# Patient Record
Sex: MALE | Race: WHITE | NOT HISPANIC OR LATINO | Employment: OTHER | URBAN - METROPOLITAN AREA
[De-identification: names, ages, dates, MRNs, and addresses within clinical notes are randomized per-mention and may not be internally consistent; named-entity substitution may affect disease eponyms.]

---

## 2017-05-18 ENCOUNTER — HOSPITAL ENCOUNTER (OUTPATIENT)
Facility: HOSPITAL | Age: 57
Setting detail: OBSERVATION
Discharge: HOME | End: 2017-05-19
Attending: EMERGENCY MEDICINE | Admitting: INTERNAL MEDICINE
Payer: COMMERCIAL

## 2017-05-18 ENCOUNTER — GENERIC CONVERSION - ENCOUNTER (OUTPATIENT)
Dept: OTHER | Facility: OTHER | Age: 57
End: 2017-05-18

## 2017-05-18 ENCOUNTER — APPOINTMENT (EMERGENCY)
Dept: RADIOLOGY | Facility: HOSPITAL | Age: 57
End: 2017-05-18
Payer: COMMERCIAL

## 2017-05-18 DIAGNOSIS — R06.01 ORTHOPNEA: Primary | ICD-10-CM

## 2017-05-18 DIAGNOSIS — I50.23 ACUTE ON CHRONIC SYSTOLIC CHF (CONGESTIVE HEART FAILURE) (HCC): ICD-10-CM

## 2017-05-18 DIAGNOSIS — N17.9 AKI (ACUTE KIDNEY INJURY) (HCC): ICD-10-CM

## 2017-05-18 DIAGNOSIS — R77.8 ELEVATED TROPONIN: ICD-10-CM

## 2017-05-18 DIAGNOSIS — I10 HYPERTENSION: ICD-10-CM

## 2017-05-18 PROBLEM — R79.89 ELEVATED TROPONIN: Status: ACTIVE | Noted: 2017-05-18

## 2017-05-18 LAB
ALBUMIN SERPL BCP-MCNC: 2.8 G/DL (ref 3.5–5)
ALP SERPL-CCNC: 54 U/L (ref 46–116)
ALT SERPL W P-5'-P-CCNC: 18 U/L (ref 12–78)
ANION GAP SERPL CALCULATED.3IONS-SCNC: 6 MMOL/L (ref 4–13)
ANION GAP SERPL CALCULATED.3IONS-SCNC: 9 MMOL/L (ref 4–13)
AST SERPL W P-5'-P-CCNC: 14 U/L (ref 5–45)
ATRIAL RATE: 93 BPM
BASOPHILS # BLD AUTO: 0.06 THOUSANDS/ΜL (ref 0–0.1)
BASOPHILS NFR BLD AUTO: 1 % (ref 0–1)
BILIRUB SERPL-MCNC: 0.8 MG/DL (ref 0.2–1)
BUN SERPL-MCNC: 20 MG/DL (ref 5–25)
BUN SERPL-MCNC: 23 MG/DL (ref 5–25)
CALCIUM SERPL-MCNC: 8.6 MG/DL (ref 8.3–10.1)
CALCIUM SERPL-MCNC: 8.6 MG/DL (ref 8.3–10.1)
CHLORIDE SERPL-SCNC: 101 MMOL/L (ref 100–108)
CHLORIDE SERPL-SCNC: 104 MMOL/L (ref 100–108)
CHOLEST SERPL-MCNC: 161 MG/DL (ref 50–200)
CO2 SERPL-SCNC: 27 MMOL/L (ref 21–32)
CO2 SERPL-SCNC: 28 MMOL/L (ref 21–32)
CREAT SERPL-MCNC: 1.51 MG/DL (ref 0.6–1.3)
CREAT SERPL-MCNC: 1.64 MG/DL (ref 0.6–1.3)
EOSINOPHIL # BLD AUTO: 0.32 THOUSAND/ΜL (ref 0–0.61)
EOSINOPHIL NFR BLD AUTO: 5 % (ref 0–6)
ERYTHROCYTE [DISTWIDTH] IN BLOOD BY AUTOMATED COUNT: 13 % (ref 11.6–15.1)
EST. AVERAGE GLUCOSE BLD GHB EST-MCNC: 306 MG/DL
GFR SERPL CREATININE-BSD FRML MDRD: 43.2 ML/MIN/1.73SQ M
GFR SERPL CREATININE-BSD FRML MDRD: 47.5 ML/MIN/1.73SQ M
GLUCOSE P FAST SERPL-MCNC: 332 MG/DL (ref 65–99)
GLUCOSE SERPL-MCNC: 163 MG/DL (ref 65–140)
GLUCOSE SERPL-MCNC: 164 MG/DL (ref 65–140)
GLUCOSE SERPL-MCNC: 261 MG/DL (ref 65–140)
GLUCOSE SERPL-MCNC: 289 MG/DL (ref 65–140)
GLUCOSE SERPL-MCNC: 289 MG/DL (ref 65–140)
GLUCOSE SERPL-MCNC: 332 MG/DL (ref 65–140)
GLUCOSE SERPL-MCNC: 401 MG/DL (ref 65–140)
GLUCOSE SERPL-MCNC: 41 MG/DL (ref 65–140)
GLUCOSE SERPL-MCNC: 43 MG/DL (ref 65–140)
GLUCOSE SERPL-MCNC: 76 MG/DL (ref 65–140)
HBA1C MFR BLD: 12.3 % (ref 4.2–6.3)
HCT VFR BLD AUTO: 37.3 % (ref 36.5–49.3)
HDLC SERPL-MCNC: 39 MG/DL (ref 40–60)
HGB BLD-MCNC: 12.4 G/DL (ref 12–17)
LDLC SERPL CALC-MCNC: 105 MG/DL (ref 0–100)
LYMPHOCYTES # BLD AUTO: 1.5 THOUSANDS/ΜL (ref 0.6–4.47)
LYMPHOCYTES NFR BLD AUTO: 24 % (ref 14–44)
MAGNESIUM SERPL-MCNC: 1.8 MG/DL (ref 1.6–2.6)
MCH RBC QN AUTO: 27.1 PG (ref 26.8–34.3)
MCHC RBC AUTO-ENTMCNC: 33.2 G/DL (ref 31.4–37.4)
MCV RBC AUTO: 82 FL (ref 82–98)
MONOCYTES # BLD AUTO: 0.49 THOUSAND/ΜL (ref 0.17–1.22)
MONOCYTES NFR BLD AUTO: 8 % (ref 4–12)
NEUTROPHILS # BLD AUTO: 3.81 THOUSANDS/ΜL (ref 1.85–7.62)
NEUTS SEG NFR BLD AUTO: 62 % (ref 43–75)
NT-PROBNP SERPL-MCNC: 2233 PG/ML
P AXIS: 65 DEGREES
PLATELET # BLD AUTO: 259 THOUSANDS/UL (ref 149–390)
PMV BLD AUTO: 9.6 FL (ref 8.9–12.7)
POTASSIUM SERPL-SCNC: 4.3 MMOL/L (ref 3.5–5.3)
POTASSIUM SERPL-SCNC: 4.3 MMOL/L (ref 3.5–5.3)
PR INTERVAL: 154 MS
PROT SERPL-MCNC: 6.4 G/DL (ref 6.4–8.2)
QRS AXIS: -43 DEGREES
QRSD INTERVAL: 82 MS
QT INTERVAL: 384 MS
QTC INTERVAL: 477 MS
RBC # BLD AUTO: 4.57 MILLION/UL (ref 3.88–5.62)
SODIUM SERPL-SCNC: 137 MMOL/L (ref 136–145)
SODIUM SERPL-SCNC: 138 MMOL/L (ref 136–145)
T WAVE AXIS: 73 DEGREES
TRIGL SERPL-MCNC: 84 MG/DL
TROPONIN I SERPL-MCNC: 0.18 NG/ML
TROPONIN I SERPL-MCNC: 0.18 NG/ML
TROPONIN I SERPL-MCNC: 0.2 NG/ML
VENTRICULAR RATE: 93 BPM
WBC # BLD AUTO: 6.18 THOUSAND/UL (ref 4.31–10.16)

## 2017-05-18 PROCEDURE — 83735 ASSAY OF MAGNESIUM: CPT | Performed by: PHYSICIAN ASSISTANT

## 2017-05-18 PROCEDURE — 85025 COMPLETE CBC W/AUTO DIFF WBC: CPT | Performed by: EMERGENCY MEDICINE

## 2017-05-18 PROCEDURE — 82948 REAGENT STRIP/BLOOD GLUCOSE: CPT

## 2017-05-18 PROCEDURE — 83880 ASSAY OF NATRIURETIC PEPTIDE: CPT | Performed by: PHYSICIAN ASSISTANT

## 2017-05-18 PROCEDURE — 83036 HEMOGLOBIN GLYCOSYLATED A1C: CPT | Performed by: PHYSICIAN ASSISTANT

## 2017-05-18 PROCEDURE — 80053 COMPREHEN METABOLIC PANEL: CPT | Performed by: PHYSICIAN ASSISTANT

## 2017-05-18 PROCEDURE — 80061 LIPID PANEL: CPT | Performed by: PHYSICIAN ASSISTANT

## 2017-05-18 PROCEDURE — 71020 HB CHEST X-RAY 2VW FRONTAL&LATL: CPT

## 2017-05-18 PROCEDURE — 84484 ASSAY OF TROPONIN QUANT: CPT | Performed by: EMERGENCY MEDICINE

## 2017-05-18 PROCEDURE — 84484 ASSAY OF TROPONIN QUANT: CPT | Performed by: PHYSICIAN ASSISTANT

## 2017-05-18 PROCEDURE — 36415 COLL VENOUS BLD VENIPUNCTURE: CPT | Performed by: EMERGENCY MEDICINE

## 2017-05-18 PROCEDURE — 99285 EMERGENCY DEPT VISIT HI MDM: CPT

## 2017-05-18 PROCEDURE — 80048 BASIC METABOLIC PNL TOTAL CA: CPT | Performed by: EMERGENCY MEDICINE

## 2017-05-18 PROCEDURE — 93005 ELECTROCARDIOGRAM TRACING: CPT | Performed by: EMERGENCY MEDICINE

## 2017-05-18 RX ORDER — HEPARIN SODIUM 5000 [USP'U]/ML
5000 INJECTION, SOLUTION INTRAVENOUS; SUBCUTANEOUS EVERY 8 HOURS SCHEDULED
Status: DISCONTINUED | OUTPATIENT
Start: 2017-05-18 | End: 2017-05-19 | Stop reason: HOSPADM

## 2017-05-18 RX ORDER — ONDANSETRON 2 MG/ML
4 INJECTION INTRAMUSCULAR; INTRAVENOUS EVERY 6 HOURS PRN
Status: DISCONTINUED | OUTPATIENT
Start: 2017-05-18 | End: 2017-05-19 | Stop reason: HOSPADM

## 2017-05-18 RX ORDER — LISINOPRIL 10 MG/1
20 TABLET ORAL ONCE
Status: COMPLETED | OUTPATIENT
Start: 2017-05-18 | End: 2017-05-18

## 2017-05-18 RX ORDER — ATORVASTATIN CALCIUM 40 MG/1
40 TABLET, FILM COATED ORAL
Status: DISCONTINUED | OUTPATIENT
Start: 2017-05-18 | End: 2017-05-19 | Stop reason: HOSPADM

## 2017-05-18 RX ORDER — FUROSEMIDE 40 MG/1
20 TABLET ORAL ONCE
Status: COMPLETED | OUTPATIENT
Start: 2017-05-18 | End: 2017-05-18

## 2017-05-18 RX ORDER — CARVEDILOL 12.5 MG/1
12.5 TABLET ORAL ONCE
Status: COMPLETED | OUTPATIENT
Start: 2017-05-18 | End: 2017-05-18

## 2017-05-18 RX ORDER — GINSENG 100 MG
1 CAPSULE ORAL ONCE
Status: DISCONTINUED | OUTPATIENT
Start: 2017-05-18 | End: 2017-05-19 | Stop reason: HOSPADM

## 2017-05-18 RX ORDER — ASPIRIN 81 MG/1
81 TABLET ORAL DAILY
Status: DISCONTINUED | OUTPATIENT
Start: 2017-05-18 | End: 2017-05-18

## 2017-05-18 RX ORDER — FUROSEMIDE 20 MG/1
20 TABLET ORAL DAILY
Status: DISCONTINUED | OUTPATIENT
Start: 2017-05-19 | End: 2017-05-18

## 2017-05-18 RX ORDER — FUROSEMIDE 10 MG/ML
20 INJECTION INTRAMUSCULAR; INTRAVENOUS
Status: DISCONTINUED | OUTPATIENT
Start: 2017-05-18 | End: 2017-05-18

## 2017-05-18 RX ORDER — ASPIRIN 81 MG/1
324 TABLET, CHEWABLE ORAL ONCE
Status: COMPLETED | OUTPATIENT
Start: 2017-05-18 | End: 2017-05-18

## 2017-05-18 RX ORDER — FUROSEMIDE 10 MG/ML
40 INJECTION INTRAMUSCULAR; INTRAVENOUS ONCE
Status: COMPLETED | OUTPATIENT
Start: 2017-05-18 | End: 2017-05-18

## 2017-05-18 RX ORDER — ASPIRIN 81 MG/1
81 TABLET, CHEWABLE ORAL DAILY
Status: DISCONTINUED | OUTPATIENT
Start: 2017-05-19 | End: 2017-05-19 | Stop reason: HOSPADM

## 2017-05-18 RX ORDER — ACETAMINOPHEN 325 MG/1
650 TABLET ORAL EVERY 4 HOURS PRN
Status: DISCONTINUED | OUTPATIENT
Start: 2017-05-18 | End: 2017-05-19 | Stop reason: HOSPADM

## 2017-05-18 RX ADMIN — INSULIN LISPRO 4 UNITS: 100 INJECTION, SOLUTION INTRAVENOUS; SUBCUTANEOUS at 12:56

## 2017-05-18 RX ADMIN — INSULIN LISPRO 2 UNITS: 100 INJECTION, SOLUTION INTRAVENOUS; SUBCUTANEOUS at 21:45

## 2017-05-18 RX ADMIN — INSULIN LISPRO 8 UNITS: 100 INJECTION, SOLUTION INTRAVENOUS; SUBCUTANEOUS at 12:56

## 2017-05-18 RX ADMIN — FUROSEMIDE 40 MG: 10 INJECTION, SOLUTION INTRAMUSCULAR; INTRAVENOUS at 15:26

## 2017-05-18 RX ADMIN — HEPARIN SODIUM 5000 UNITS: 5000 INJECTION, SOLUTION INTRAVENOUS; SUBCUTANEOUS at 21:45

## 2017-05-18 RX ADMIN — ASPIRIN 81 MG 324 MG: 81 TABLET ORAL at 03:34

## 2017-05-18 RX ADMIN — ATORVASTATIN CALCIUM 40 MG: 40 TABLET, FILM COATED ORAL at 16:50

## 2017-05-18 RX ADMIN — HEPARIN SODIUM 5000 UNITS: 5000 INJECTION, SOLUTION INTRAVENOUS; SUBCUTANEOUS at 14:26

## 2017-05-18 RX ADMIN — LISINOPRIL 20 MG: 10 TABLET ORAL at 02:49

## 2017-05-18 RX ADMIN — INSULIN LISPRO 4 UNITS: 100 INJECTION, SOLUTION INTRAVENOUS; SUBCUTANEOUS at 08:43

## 2017-05-18 RX ADMIN — INSULIN DETEMIR 30 UNITS: 100 INJECTION, SOLUTION SUBCUTANEOUS at 08:42

## 2017-05-18 RX ADMIN — METOPROLOL TARTRATE 25 MG: 25 TABLET ORAL at 08:42

## 2017-05-18 RX ADMIN — METFORMIN HYDROCHLORIDE 1000 MG: 500 TABLET, FILM COATED ORAL at 03:16

## 2017-05-18 RX ADMIN — HEPARIN SODIUM 5000 UNITS: 5000 INJECTION, SOLUTION INTRAVENOUS; SUBCUTANEOUS at 08:42

## 2017-05-18 RX ADMIN — CARVEDILOL 12.5 MG: 12.5 TABLET, FILM COATED ORAL at 02:50

## 2017-05-18 RX ADMIN — METOPROLOL TARTRATE 25 MG: 25 TABLET ORAL at 21:45

## 2017-05-18 RX ADMIN — NITROGLYCERIN 0.5 INCH: 20 OINTMENT TOPICAL at 03:35

## 2017-05-18 RX ADMIN — FUROSEMIDE 20 MG: 40 TABLET ORAL at 03:16

## 2017-05-19 ENCOUNTER — APPOINTMENT (OUTPATIENT)
Dept: NON INVASIVE DIAGNOSTICS | Facility: HOSPITAL | Age: 57
End: 2017-05-19
Payer: COMMERCIAL

## 2017-05-19 VITALS
DIASTOLIC BLOOD PRESSURE: 79 MMHG | OXYGEN SATURATION: 98 % | RESPIRATION RATE: 18 BRPM | HEIGHT: 70 IN | BODY MASS INDEX: 26.48 KG/M2 | WEIGHT: 185 LBS | SYSTOLIC BLOOD PRESSURE: 129 MMHG | HEART RATE: 75 BPM | TEMPERATURE: 98.1 F

## 2017-05-19 PROBLEM — R79.89 ELEVATED TROPONIN: Status: RESOLVED | Noted: 2017-05-18 | Resolved: 2017-05-19

## 2017-05-19 PROBLEM — R77.8 ELEVATED TROPONIN: Status: RESOLVED | Noted: 2017-05-18 | Resolved: 2017-05-19

## 2017-05-19 LAB
ANION GAP SERPL CALCULATED.3IONS-SCNC: 8 MMOL/L (ref 4–13)
BUN SERPL-MCNC: 26 MG/DL (ref 5–25)
CALCIUM SERPL-MCNC: 9 MG/DL (ref 8.3–10.1)
CHLORIDE SERPL-SCNC: 104 MMOL/L (ref 100–108)
CO2 SERPL-SCNC: 28 MMOL/L (ref 21–32)
CREAT SERPL-MCNC: 1.47 MG/DL (ref 0.6–1.3)
GFR SERPL CREATININE-BSD FRML MDRD: 49 ML/MIN/1.73SQ M
GLUCOSE P FAST SERPL-MCNC: 65 MG/DL (ref 65–99)
GLUCOSE SERPL-MCNC: 160 MG/DL (ref 65–140)
GLUCOSE SERPL-MCNC: 343 MG/DL (ref 65–140)
GLUCOSE SERPL-MCNC: 65 MG/DL (ref 65–140)
GLUCOSE SERPL-MCNC: 87 MG/DL (ref 65–140)
POTASSIUM SERPL-SCNC: 3.6 MMOL/L (ref 3.5–5.3)
SODIUM SERPL-SCNC: 140 MMOL/L (ref 136–145)

## 2017-05-19 PROCEDURE — 82948 REAGENT STRIP/BLOOD GLUCOSE: CPT

## 2017-05-19 PROCEDURE — 80048 BASIC METABOLIC PNL TOTAL CA: CPT | Performed by: INTERNAL MEDICINE

## 2017-05-19 RX ORDER — FUROSEMIDE 20 MG/1
20 TABLET ORAL DAILY
Qty: 30 TABLET | Refills: 0 | Status: SHIPPED | OUTPATIENT
Start: 2017-05-19 | End: 2018-06-12 | Stop reason: SDUPTHER

## 2017-05-19 RX ORDER — FUROSEMIDE 20 MG/1
20 TABLET ORAL DAILY
Status: DISCONTINUED | OUTPATIENT
Start: 2017-05-19 | End: 2017-05-19 | Stop reason: HOSPADM

## 2017-05-19 RX ORDER — ASPIRIN 81 MG/1
81 TABLET, CHEWABLE ORAL DAILY
Qty: 30 TABLET | Refills: 0
Start: 2017-05-19 | End: 2022-07-15

## 2017-05-19 RX ORDER — ATORVASTATIN CALCIUM 40 MG/1
40 TABLET, FILM COATED ORAL DAILY
Qty: 30 TABLET | Refills: 0 | Status: SHIPPED | OUTPATIENT
Start: 2017-05-19 | End: 2019-08-05 | Stop reason: SDUPTHER

## 2017-05-19 RX ADMIN — ASPIRIN 81 MG 81 MG: 81 TABLET ORAL at 08:55

## 2017-05-19 RX ADMIN — FUROSEMIDE 20 MG: 20 TABLET ORAL at 12:18

## 2017-05-19 RX ADMIN — METOPROLOL TARTRATE 25 MG: 25 TABLET ORAL at 08:55

## 2017-05-19 RX ADMIN — HEPARIN SODIUM 5000 UNITS: 5000 INJECTION, SOLUTION INTRAVENOUS; SUBCUTANEOUS at 05:23

## 2017-05-19 RX ADMIN — INSULIN DETEMIR 30 UNITS: 100 INJECTION, SOLUTION SUBCUTANEOUS at 08:56

## 2017-05-26 ENCOUNTER — ALLSCRIPTS OFFICE VISIT (OUTPATIENT)
Dept: OTHER | Facility: OTHER | Age: 57
End: 2017-05-26

## 2017-07-05 ENCOUNTER — ALLSCRIPTS OFFICE VISIT (OUTPATIENT)
Dept: OTHER | Facility: OTHER | Age: 57
End: 2017-07-05

## 2017-07-05 DIAGNOSIS — I25.10 ATHEROSCLEROTIC HEART DISEASE OF NATIVE CORONARY ARTERY WITHOUT ANGINA PECTORIS: ICD-10-CM

## 2017-07-05 DIAGNOSIS — E11.9 TYPE 2 DIABETES MELLITUS WITHOUT COMPLICATIONS (HCC): ICD-10-CM

## 2017-07-05 DIAGNOSIS — I10 ESSENTIAL (PRIMARY) HYPERTENSION: ICD-10-CM

## 2017-07-05 DIAGNOSIS — E78.00 PURE HYPERCHOLESTEROLEMIA: ICD-10-CM

## 2018-01-13 VITALS
SYSTOLIC BLOOD PRESSURE: 110 MMHG | DIASTOLIC BLOOD PRESSURE: 60 MMHG | OXYGEN SATURATION: 99 % | WEIGHT: 180.06 LBS | BODY MASS INDEX: 25.78 KG/M2 | HEIGHT: 70 IN | HEART RATE: 80 BPM

## 2018-01-15 VITALS
HEIGHT: 70 IN | BODY MASS INDEX: 25.62 KG/M2 | DIASTOLIC BLOOD PRESSURE: 60 MMHG | SYSTOLIC BLOOD PRESSURE: 108 MMHG | WEIGHT: 179 LBS | HEART RATE: 84 BPM

## 2018-02-20 PROBLEM — Z95.5 PRESENCE OF DRUG COATED STENT IN RIGHT CORONARY ARTERY: Status: ACTIVE | Noted: 2018-02-20

## 2018-02-20 PROBLEM — Z95.5 PRESENCE OF DRUG COATED STENT IN LAD CORONARY ARTERY: Status: ACTIVE | Noted: 2018-02-20

## 2018-03-10 ENCOUNTER — APPOINTMENT (OUTPATIENT)
Dept: LAB | Facility: CLINIC | Age: 58
End: 2018-03-10
Payer: COMMERCIAL

## 2018-03-10 ENCOUNTER — TRANSCRIBE ORDERS (OUTPATIENT)
Dept: LAB | Facility: CLINIC | Age: 58
End: 2018-03-10

## 2018-03-10 DIAGNOSIS — E11.9 DIABETES MELLITUS WITHOUT COMPLICATION (HCC): ICD-10-CM

## 2018-03-10 DIAGNOSIS — E11.9 DIABETES MELLITUS WITHOUT COMPLICATION (HCC): Primary | ICD-10-CM

## 2018-03-10 LAB
ALBUMIN SERPL BCP-MCNC: 3.4 G/DL (ref 3.5–5)
ALP SERPL-CCNC: 64 U/L (ref 46–116)
ALT SERPL W P-5'-P-CCNC: 26 U/L (ref 12–78)
ANION GAP SERPL CALCULATED.3IONS-SCNC: 7 MMOL/L (ref 4–13)
AST SERPL W P-5'-P-CCNC: 18 U/L (ref 5–45)
BACTERIA UR QL AUTO: ABNORMAL /HPF
BASOPHILS # BLD AUTO: 0.06 THOUSANDS/ΜL (ref 0–0.1)
BASOPHILS NFR BLD AUTO: 1 % (ref 0–1)
BILIRUB SERPL-MCNC: 1 MG/DL (ref 0.2–1)
BILIRUB UR QL STRIP: NEGATIVE
BUN SERPL-MCNC: 25 MG/DL (ref 5–25)
CALCIUM SERPL-MCNC: 9.3 MG/DL (ref 8.3–10.1)
CHLORIDE SERPL-SCNC: 103 MMOL/L (ref 100–108)
CHOLEST SERPL-MCNC: 119 MG/DL (ref 50–200)
CLARITY UR: CLEAR
CO2 SERPL-SCNC: 32 MMOL/L (ref 21–32)
COLOR UR: YELLOW
CREAT SERPL-MCNC: 1.41 MG/DL (ref 0.6–1.3)
CREAT UR-MCNC: 113 MG/DL
EOSINOPHIL # BLD AUTO: 0.37 THOUSAND/ΜL (ref 0–0.61)
EOSINOPHIL NFR BLD AUTO: 6 % (ref 0–6)
ERYTHROCYTE [DISTWIDTH] IN BLOOD BY AUTOMATED COUNT: 12.9 % (ref 11.6–15.1)
EST. AVERAGE GLUCOSE BLD GHB EST-MCNC: 318 MG/DL
GFR SERPL CREATININE-BSD FRML MDRD: 54 ML/MIN/1.73SQ M
GLUCOSE P FAST SERPL-MCNC: 159 MG/DL (ref 65–99)
GLUCOSE UR STRIP-MCNC: ABNORMAL MG/DL
HBA1C MFR BLD: 12.7 % (ref 4.2–6.3)
HCT VFR BLD AUTO: 37.6 % (ref 36.5–49.3)
HDLC SERPL-MCNC: 51 MG/DL (ref 40–60)
HGB BLD-MCNC: 12.8 G/DL (ref 12–17)
HGB UR QL STRIP.AUTO: ABNORMAL
KETONES UR STRIP-MCNC: NEGATIVE MG/DL
LDLC SERPL CALC-MCNC: 55 MG/DL (ref 0–100)
LDLC SERPL DIRECT ASSAY-MCNC: 55 MG/DL (ref 0–100)
LEUKOCYTE ESTERASE UR QL STRIP: ABNORMAL
LYMPHOCYTES # BLD AUTO: 1.48 THOUSANDS/ΜL (ref 0.6–4.47)
LYMPHOCYTES NFR BLD AUTO: 23 % (ref 14–44)
MCH RBC QN AUTO: 27.5 PG (ref 26.8–34.3)
MCHC RBC AUTO-ENTMCNC: 34 G/DL (ref 31.4–37.4)
MCV RBC AUTO: 81 FL (ref 82–98)
MICROALBUMIN UR-MCNC: 1290 MG/L (ref 0–20)
MICROALBUMIN/CREAT 24H UR: 1142 MG/G CREATININE (ref 0–30)
MONOCYTES # BLD AUTO: 0.48 THOUSAND/ΜL (ref 0.17–1.22)
MONOCYTES NFR BLD AUTO: 8 % (ref 4–12)
NEUTROPHILS # BLD AUTO: 4.05 THOUSANDS/ΜL (ref 1.85–7.62)
NEUTS SEG NFR BLD AUTO: 62 % (ref 43–75)
NITRITE UR QL STRIP: NEGATIVE
NON-SQ EPI CELLS URNS QL MICRO: ABNORMAL /HPF
PH UR STRIP.AUTO: 6 [PH] (ref 4.5–8)
PLATELET # BLD AUTO: 279 THOUSANDS/UL (ref 149–390)
PMV BLD AUTO: 9.9 FL (ref 8.9–12.7)
POTASSIUM SERPL-SCNC: 3.8 MMOL/L (ref 3.5–5.3)
PROT SERPL-MCNC: 7.2 G/DL (ref 6.4–8.2)
PROT UR STRIP-MCNC: ABNORMAL MG/DL
RBC # BLD AUTO: 4.65 MILLION/UL (ref 3.88–5.62)
RBC #/AREA URNS AUTO: ABNORMAL /HPF
SODIUM SERPL-SCNC: 142 MMOL/L (ref 136–145)
SP GR UR STRIP.AUTO: 1.02 (ref 1–1.03)
TRIGL SERPL-MCNC: 63 MG/DL
UROBILINOGEN UR QL STRIP.AUTO: 0.2 E.U./DL
WBC # BLD AUTO: 6.44 THOUSAND/UL (ref 4.31–10.16)
WBC #/AREA URNS AUTO: ABNORMAL /HPF

## 2018-03-10 PROCEDURE — 80053 COMPREHEN METABOLIC PANEL: CPT

## 2018-03-10 PROCEDURE — 36415 COLL VENOUS BLD VENIPUNCTURE: CPT

## 2018-03-10 PROCEDURE — 80061 LIPID PANEL: CPT

## 2018-03-10 PROCEDURE — 83036 HEMOGLOBIN GLYCOSYLATED A1C: CPT

## 2018-03-10 PROCEDURE — 81001 URINALYSIS AUTO W/SCOPE: CPT | Performed by: INTERNAL MEDICINE

## 2018-03-10 PROCEDURE — 82570 ASSAY OF URINE CREATININE: CPT | Performed by: INTERNAL MEDICINE

## 2018-03-10 PROCEDURE — 83721 ASSAY OF BLOOD LIPOPROTEIN: CPT

## 2018-03-10 PROCEDURE — 85025 COMPLETE CBC W/AUTO DIFF WBC: CPT

## 2018-03-10 PROCEDURE — 82043 UR ALBUMIN QUANTITATIVE: CPT | Performed by: INTERNAL MEDICINE

## 2018-06-12 DIAGNOSIS — I50.32 CHRONIC DIASTOLIC CONGESTIVE HEART FAILURE (HCC): Primary | ICD-10-CM

## 2018-06-12 RX ORDER — FUROSEMIDE 20 MG/1
TABLET ORAL
Qty: 90 TABLET | Refills: 3 | Status: SHIPPED | OUTPATIENT
Start: 2018-06-12 | End: 2019-04-01 | Stop reason: SDUPTHER

## 2018-09-02 ENCOUNTER — APPOINTMENT (EMERGENCY)
Dept: RADIOLOGY | Facility: HOSPITAL | Age: 58
DRG: 603 | End: 2018-09-02
Payer: COMMERCIAL

## 2018-09-02 ENCOUNTER — HOSPITAL ENCOUNTER (INPATIENT)
Facility: HOSPITAL | Age: 58
LOS: 3 days | Discharge: HOME | DRG: 603 | End: 2018-09-05
Attending: EMERGENCY MEDICINE | Admitting: INTERNAL MEDICINE
Payer: COMMERCIAL

## 2018-09-02 ENCOUNTER — APPOINTMENT (EMERGENCY)
Dept: ULTRASOUND IMAGING | Facility: HOSPITAL | Age: 58
DRG: 603 | End: 2018-09-02
Payer: COMMERCIAL

## 2018-09-02 DIAGNOSIS — L03.116 CELLULITIS OF LEFT LOWER LEG: Primary | ICD-10-CM

## 2018-09-02 DIAGNOSIS — N18.30 CHRONIC KIDNEY DISEASE (CKD), STAGE III (MODERATE) (HCC): ICD-10-CM

## 2018-09-02 DIAGNOSIS — N18.9 CKD (CHRONIC KIDNEY DISEASE): ICD-10-CM

## 2018-09-02 DIAGNOSIS — L03.119 RECURRENT CELLULITIS OF LOWER LEG: ICD-10-CM

## 2018-09-02 LAB
ALBUMIN SERPL BCP-MCNC: 2.9 G/DL (ref 3.5–5)
ALP SERPL-CCNC: 71 U/L (ref 46–116)
ALT SERPL W P-5'-P-CCNC: 21 U/L (ref 12–78)
ANION GAP SERPL CALCULATED.3IONS-SCNC: 5 MMOL/L (ref 4–13)
APTT PPP: 32 SECONDS (ref 24–36)
AST SERPL W P-5'-P-CCNC: 15 U/L (ref 5–45)
BACTERIA UR QL AUTO: ABNORMAL /HPF
BASOPHILS # BLD AUTO: 0.06 THOUSANDS/ΜL (ref 0–0.1)
BASOPHILS NFR BLD AUTO: 1 % (ref 0–1)
BILIRUB SERPL-MCNC: 0.9 MG/DL (ref 0.2–1)
BILIRUB UR QL STRIP: NEGATIVE
BUN SERPL-MCNC: 26 MG/DL (ref 5–25)
CALCIUM SERPL-MCNC: 9 MG/DL (ref 8.3–10.1)
CHLORIDE SERPL-SCNC: 103 MMOL/L (ref 100–108)
CLARITY UR: CLEAR
CO2 SERPL-SCNC: 31 MMOL/L (ref 21–32)
COLOR UR: YELLOW
CREAT SERPL-MCNC: 1.91 MG/DL (ref 0.6–1.3)
DEPRECATED D DIMER PPP: 687 NG/ML (FEU) (ref 0–424)
EOSINOPHIL # BLD AUTO: 0.3 THOUSAND/ΜL (ref 0–0.61)
EOSINOPHIL NFR BLD AUTO: 4 % (ref 0–6)
ERYTHROCYTE [DISTWIDTH] IN BLOOD BY AUTOMATED COUNT: 13 % (ref 11.6–15.1)
ERYTHROCYTE [SEDIMENTATION RATE] IN BLOOD: 76 MM/HOUR (ref 0–10)
GFR SERPL CREATININE-BSD FRML MDRD: 37 ML/MIN/1.73SQ M
GLUCOSE SERPL-MCNC: 152 MG/DL (ref 65–140)
GLUCOSE SERPL-MCNC: 160 MG/DL (ref 65–140)
GLUCOSE SERPL-MCNC: 240 MG/DL (ref 65–140)
GLUCOSE SERPL-MCNC: 248 MG/DL (ref 65–140)
GLUCOSE UR STRIP-MCNC: ABNORMAL MG/DL
HCT VFR BLD AUTO: 35.1 % (ref 36.5–49.3)
HGB BLD-MCNC: 11.6 G/DL (ref 12–17)
HGB UR QL STRIP.AUTO: ABNORMAL
IMM GRANULOCYTES # BLD AUTO: 0.03 THOUSAND/UL (ref 0–0.2)
IMM GRANULOCYTES NFR BLD AUTO: 0 % (ref 0–2)
INR PPP: 1.07 (ref 0.86–1.17)
KETONES UR STRIP-MCNC: NEGATIVE MG/DL
LACTATE SERPL-SCNC: 0.7 MMOL/L (ref 0.5–2)
LEUKOCYTE ESTERASE UR QL STRIP: NEGATIVE
LYMPHOCYTES # BLD AUTO: 1.48 THOUSANDS/ΜL (ref 0.6–4.47)
LYMPHOCYTES NFR BLD AUTO: 19 % (ref 14–44)
MCH RBC QN AUTO: 27.2 PG (ref 26.8–34.3)
MCHC RBC AUTO-ENTMCNC: 33 G/DL (ref 31.4–37.4)
MCV RBC AUTO: 82 FL (ref 82–98)
MONOCYTES # BLD AUTO: 0.51 THOUSAND/ΜL (ref 0.17–1.22)
MONOCYTES NFR BLD AUTO: 6 % (ref 4–12)
NEUTROPHILS # BLD AUTO: 5.59 THOUSANDS/ΜL (ref 1.85–7.62)
NEUTS SEG NFR BLD AUTO: 70 % (ref 43–75)
NITRITE UR QL STRIP: NEGATIVE
NON-SQ EPI CELLS URNS QL MICRO: ABNORMAL /HPF
NRBC BLD AUTO-RTO: 0 /100 WBCS
PH UR STRIP.AUTO: 5.5 [PH] (ref 4.5–8)
PLATELET # BLD AUTO: 326 THOUSANDS/UL (ref 149–390)
PMV BLD AUTO: 9.3 FL (ref 8.9–12.7)
POTASSIUM SERPL-SCNC: 4.2 MMOL/L (ref 3.5–5.3)
PROT SERPL-MCNC: 7.8 G/DL (ref 6.4–8.2)
PROT UR STRIP-MCNC: ABNORMAL MG/DL
PROTHROMBIN TIME: 13.6 SECONDS (ref 11.8–14.2)
RBC # BLD AUTO: 4.26 MILLION/UL (ref 3.88–5.62)
RBC #/AREA URNS AUTO: ABNORMAL /HPF
SODIUM SERPL-SCNC: 139 MMOL/L (ref 136–145)
SP GR UR STRIP.AUTO: 1.02 (ref 1–1.03)
UROBILINOGEN UR QL STRIP.AUTO: 0.2 E.U./DL
WBC # BLD AUTO: 7.97 THOUSAND/UL (ref 4.31–10.16)
WBC #/AREA URNS AUTO: ABNORMAL /HPF

## 2018-09-02 PROCEDURE — 73590 X-RAY EXAM OF LOWER LEG: CPT

## 2018-09-02 PROCEDURE — 93971 EXTREMITY STUDY: CPT | Performed by: SURGERY

## 2018-09-02 PROCEDURE — 81001 URINALYSIS AUTO W/SCOPE: CPT

## 2018-09-02 PROCEDURE — 85379 FIBRIN DEGRADATION QUANT: CPT | Performed by: PHYSICIAN ASSISTANT

## 2018-09-02 PROCEDURE — 36415 COLL VENOUS BLD VENIPUNCTURE: CPT | Performed by: PHYSICIAN ASSISTANT

## 2018-09-02 PROCEDURE — 82948 REAGENT STRIP/BLOOD GLUCOSE: CPT

## 2018-09-02 PROCEDURE — 93971 EXTREMITY STUDY: CPT

## 2018-09-02 PROCEDURE — 96361 HYDRATE IV INFUSION ADD-ON: CPT

## 2018-09-02 PROCEDURE — 85730 THROMBOPLASTIN TIME PARTIAL: CPT | Performed by: PHYSICIAN ASSISTANT

## 2018-09-02 PROCEDURE — 83605 ASSAY OF LACTIC ACID: CPT | Performed by: PHYSICIAN ASSISTANT

## 2018-09-02 PROCEDURE — 87040 BLOOD CULTURE FOR BACTERIA: CPT | Performed by: PHYSICIAN ASSISTANT

## 2018-09-02 PROCEDURE — 99285 EMERGENCY DEPT VISIT HI MDM: CPT

## 2018-09-02 PROCEDURE — 85652 RBC SED RATE AUTOMATED: CPT | Performed by: PHYSICIAN ASSISTANT

## 2018-09-02 PROCEDURE — 80053 COMPREHEN METABOLIC PANEL: CPT | Performed by: PHYSICIAN ASSISTANT

## 2018-09-02 PROCEDURE — 96360 HYDRATION IV INFUSION INIT: CPT

## 2018-09-02 PROCEDURE — 85610 PROTHROMBIN TIME: CPT | Performed by: PHYSICIAN ASSISTANT

## 2018-09-02 PROCEDURE — 85025 COMPLETE CBC W/AUTO DIFF WBC: CPT | Performed by: PHYSICIAN ASSISTANT

## 2018-09-02 PROCEDURE — 99223 1ST HOSP IP/OBS HIGH 75: CPT | Performed by: INTERNAL MEDICINE

## 2018-09-02 RX ORDER — HEPARIN SODIUM 5000 [USP'U]/ML
5000 INJECTION, SOLUTION INTRAVENOUS; SUBCUTANEOUS EVERY 8 HOURS SCHEDULED
Status: DISCONTINUED | OUTPATIENT
Start: 2018-09-02 | End: 2018-09-05 | Stop reason: HOSPADM

## 2018-09-02 RX ORDER — ASPIRIN 81 MG/1
81 TABLET, CHEWABLE ORAL DAILY
Status: DISCONTINUED | OUTPATIENT
Start: 2018-09-02 | End: 2018-09-05 | Stop reason: HOSPADM

## 2018-09-02 RX ORDER — TRAMADOL HYDROCHLORIDE 50 MG/1
50 TABLET ORAL EVERY 6 HOURS PRN
Status: DISCONTINUED | OUTPATIENT
Start: 2018-09-02 | End: 2018-09-05 | Stop reason: HOSPADM

## 2018-09-02 RX ORDER — ACETAMINOPHEN 325 MG/1
650 TABLET ORAL EVERY 6 HOURS PRN
Status: DISCONTINUED | OUTPATIENT
Start: 2018-09-02 | End: 2018-09-05 | Stop reason: HOSPADM

## 2018-09-02 RX ORDER — MORPHINE SULFATE 4 MG/ML
4 INJECTION, SOLUTION INTRAMUSCULAR; INTRAVENOUS ONCE
Status: COMPLETED | OUTPATIENT
Start: 2018-09-02 | End: 2018-09-02

## 2018-09-02 RX ORDER — ATORVASTATIN CALCIUM 40 MG/1
40 TABLET, FILM COATED ORAL DAILY
Status: DISCONTINUED | OUTPATIENT
Start: 2018-09-02 | End: 2018-09-05 | Stop reason: HOSPADM

## 2018-09-02 RX ORDER — FUROSEMIDE 20 MG/1
20 TABLET ORAL DAILY
Status: DISCONTINUED | OUTPATIENT
Start: 2018-09-02 | End: 2018-09-05 | Stop reason: HOSPADM

## 2018-09-02 RX ORDER — SODIUM CHLORIDE 9 MG/ML
125 INJECTION, SOLUTION INTRAVENOUS CONTINUOUS
Status: DISCONTINUED | OUTPATIENT
Start: 2018-09-02 | End: 2018-09-02

## 2018-09-02 RX ADMIN — VANCOMYCIN HYDROCHLORIDE 1750 MG: 1 INJECTION, POWDER, LYOPHILIZED, FOR SOLUTION INTRAVENOUS at 13:23

## 2018-09-02 RX ADMIN — CEFAZOLIN SODIUM 2000 MG: 2 SOLUTION INTRAVENOUS at 12:44

## 2018-09-02 RX ADMIN — HEPARIN SODIUM 5000 UNITS: 5000 INJECTION, SOLUTION INTRAVENOUS; SUBCUTANEOUS at 21:44

## 2018-09-02 RX ADMIN — TRAMADOL HYDROCHLORIDE 50 MG: 50 TABLET, COATED ORAL at 19:26

## 2018-09-02 RX ADMIN — SODIUM CHLORIDE 125 ML/HR: 0.9 INJECTION, SOLUTION INTRAVENOUS at 13:12

## 2018-09-02 RX ADMIN — MORPHINE SULFATE 4 MG: 4 INJECTION INTRAVENOUS at 12:44

## 2018-09-02 RX ADMIN — SODIUM CHLORIDE 1000 ML: 0.9 INJECTION, SOLUTION INTRAVENOUS at 10:49

## 2018-09-02 RX ADMIN — ASPIRIN 81 MG 81 MG: 81 TABLET ORAL at 16:11

## 2018-09-02 RX ADMIN — HEPARIN SODIUM 5000 UNITS: 5000 INJECTION, SOLUTION INTRAVENOUS; SUBCUTANEOUS at 16:11

## 2018-09-02 RX ADMIN — INSULIN LISPRO 1 UNITS: 100 INJECTION, SOLUTION INTRAVENOUS; SUBCUTANEOUS at 16:37

## 2018-09-02 RX ADMIN — METOPROLOL TARTRATE 25 MG: 25 TABLET, FILM COATED ORAL at 21:44

## 2018-09-02 RX ADMIN — CEFAZOLIN SODIUM 2000 MG: 2 SOLUTION INTRAVENOUS at 21:44

## 2018-09-02 NOTE — PLAN OF CARE
Problem: Potential for Falls  Goal: Patient will remain free of falls  INTERVENTIONS:  - Assess patient frequently for physical needs  -  Identify cognitive and physical deficits and behaviors that affect risk of falls  -  [place] fall precautions as indicated by assessment   - Educate patient/family on patient safety including physical limitations  - Instruct patient to call for assistance with activity based on assessment  - Modify environment to reduce risk of injury  - Consider OT/PT consult to assist with strengthening/mobility   Outcome: Progressing      Problem: Nutrition/Hydration-ADULT  Goal: Nutrient/Hydration intake appropriate for improving, restoring or maintaining nutritional needs  Monitor and assess patient's nutrition/hydration status for malnutrition (ex- brittle hair, bruises, dry skin, pale skin and conjunctiva, muscle wasting, smooth red tongue, and disorientation)  Collaborate with interdisciplinary team and initiate plan and interventions as ordered  Monitor patient's weight and dietary intake as ordered or per policy  Utilize nutrition screening tool and intervene per policy  Determine patient's food preferences and provide high-protein, high-caloric foods as appropriate       INTERVENTIONS:  - Monitor oral intake, urinary output, labs, and treatment plans  - Assess nutrition and hydration status and recommend course of action  - Evaluate amount of meals eaten  - Assist patient with eating if necessary   - Allow adequate time for meals  - Recommend/ encourage appropriate diets, oral nutritional supplements, and vitamin/mineral supplements  - Order, calculate, and assess calorie counts as needed  - Recommend, monitor, and adjust tube feedings and TPN/PPN based on assessed needs  - Assess need for intravenous fluids  - Provide specific nutrition/hydration education as appropriate  - Include patient/family/caregiver in decisions related to nutrition   Outcome: Progressing      Problem: PAIN - ADULT  Goal: Verbalizes/displays adequate comfort level or baseline comfort level  Interventions:  - Encourage patient to monitor pain and request assistance  - Assess pain using appropriate pain scale  - Administer analgesics based on type and severity of pain and evaluate response  - Implement non-pharmacological measures as appropriate and evaluate response  - Consider cultural and social influences on pain and pain management  - Notify physician/advanced practitioner if interventions unsuccessful or patient reports new pain  Outcome: Progressing      Problem: INFECTION - ADULT  Goal: Absence or prevention of progression during hospitalization  INTERVENTIONS:  - Assess and monitor for signs and symptoms of infection  - Monitor lab/diagnostic results  - Monitor all insertion sites, i e  indwelling lines, tubes, and drains  - Monitor endotracheal (as able) and nasal secretions for changes in amount and color  - [place] appropriate cooling/warming therapies per order  - Administer medications as ordered  - Instruct and encourage patient and family to use good hand hygiene technique  - Identify and instruct in appropriate isolation precautions for identified infection/condition  Outcome: Progressing      Problem: SAFETY ADULT  Goal: Maintain or return to baseline ADL function  INTERVENTIONS:  -  Assess patient's ability to carry out ADLs; assess patient's baseline for ADL function and identify physical deficits which impact ability to perform ADLs (bathing, care of mouth/teeth, toileting, grooming, dressing, etc )  - Assess/evaluate cause of self-care deficits   - Assess range of motion  - Assess patient's mobility; develop plan if impaired  - Assess patient's need for assistive devices and provide as appropriate  - Encourage maximum independence but intervene and supervise when necessary  ¯ Involve family in performance of ADLs  ¯ Assess for home care needs following discharge   ¯ Request OT consult to assist with ADL evaluation and planning for discharge  ¯ Provide patient education as appropriate  Outcome: Progressing    Goal: Maintain or return mobility status to optimal level  INTERVENTIONS:  - Assess patient's baseline mobility status (ambulation, transfers, stairs, etc )    - Identify cognitive and physical deficits and behaviors that affect mobility  - Identify mobility aids required to assist with transfers and/or ambulation (gait belt, sit-to-stand, lift, walker, cane, etc )  - [place] fall precautions as indicated by assessment  - Record patient progress and toleration of activity level on Mobility SBAR; progress patient to next Phase/Stage  - Instruct patient to call for assistance with activity based on assessment  - Request Rehabilitation consult to assist with strengthening/weightbearing, etc   Outcome: Progressing      Problem: DISCHARGE PLANNING  Goal: Discharge to home or other facility with appropriate resources  INTERVENTIONS:  - Identify barriers to discharge w/patient and caregiver  - Arrange for needed discharge resources and transportation as appropriate  - Identify discharge learning needs (meds, wound care, etc )  - Arrange for interpretive services to assist at discharge as needed  - Refer to Case Management Department for coordinating discharge planning if the patient needs post-hospital services based on physician/advanced practitioner order or complex needs related to functional status, cognitive ability, or social support system  Outcome: Progressing

## 2018-09-02 NOTE — ASSESSMENT & PLAN NOTE
Lab Results   Component Value Date    HGBA1C 12 7 (H) 03/10/2018       Recent Labs      09/02/18   1502   POCGLU  160*       Blood Sugar Average: Last 72 hrs:  (P) 160   · Last hemoglobin A1c in March 2018 elevated  Check repeat level  · Patient does not check his blood glucose at home    Continue Lantus with correctional scale coverage and adjust as indicated
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
## 2018-09-02 NOTE — ASSESSMENT & PLAN NOTE
· Baseline creatinine is unclear however his most recent levels in March of 2018 was 1 41  · Although he denies prior kidney disease, I suspect he has underlying chronic kidney disease  · Monitor renal function and hold IV fluids at this time as he appears euvolemic  · Check repeat labs in the morning

## 2018-09-02 NOTE — ASSESSMENT & PLAN NOTE
· Clinically euvolemic    Continue Lasix, metoprolol  · Last echocardiogram on the system was in February 2015 which had showed EF 40-45%  · Follows outpatient with Dr Leonel Malone
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
## 2018-09-02 NOTE — ASSESSMENT & PLAN NOTE
· Reports history recurrent left lower extremity cellulitis following traumatic injury dating 20 years ago for which she is status post removal of rods and skin graft in the past   · Increasing redness, pain and swelling over the past week  · Venous duplex done in the ED showed no evidence of DVT per ER physician  · Patient has failed one-week + treatment with cephalexin/ciprofloxacin outpatient and is now admitted for inpatient IV antibiotics  · Continue IV cephazolin, elevate extremity  · Patient counseled on keeping left lower extremity elevated as he reports noncompliance with this since onset of symptoms and he has been mostly on his feet

## 2018-09-03 LAB
ANION GAP SERPL CALCULATED.3IONS-SCNC: 5 MMOL/L (ref 4–13)
BUN SERPL-MCNC: 21 MG/DL (ref 5–25)
CALCIUM SERPL-MCNC: 8.5 MG/DL (ref 8.3–10.1)
CHLORIDE SERPL-SCNC: 103 MMOL/L (ref 100–108)
CO2 SERPL-SCNC: 30 MMOL/L (ref 21–32)
CREAT SERPL-MCNC: 1.56 MG/DL (ref 0.6–1.3)
ERYTHROCYTE [DISTWIDTH] IN BLOOD BY AUTOMATED COUNT: 13 % (ref 11.6–15.1)
EST. AVERAGE GLUCOSE BLD GHB EST-MCNC: 243 MG/DL
GFR SERPL CREATININE-BSD FRML MDRD: 48 ML/MIN/1.73SQ M
GLUCOSE SERPL-MCNC: 162 MG/DL (ref 65–140)
GLUCOSE SERPL-MCNC: 163 MG/DL (ref 65–140)
GLUCOSE SERPL-MCNC: 175 MG/DL (ref 65–140)
GLUCOSE SERPL-MCNC: 72 MG/DL (ref 65–140)
GLUCOSE SERPL-MCNC: 94 MG/DL (ref 65–140)
HBA1C MFR BLD: 10.1 % (ref 4.2–6.3)
HCT VFR BLD AUTO: 34.2 % (ref 36.5–49.3)
HGB BLD-MCNC: 10.9 G/DL (ref 12–17)
MCH RBC QN AUTO: 26.4 PG (ref 26.8–34.3)
MCHC RBC AUTO-ENTMCNC: 31.9 G/DL (ref 31.4–37.4)
MCV RBC AUTO: 83 FL (ref 82–98)
PLATELET # BLD AUTO: 297 THOUSANDS/UL (ref 149–390)
PMV BLD AUTO: 9.3 FL (ref 8.9–12.7)
POTASSIUM SERPL-SCNC: 3.6 MMOL/L (ref 3.5–5.3)
RBC # BLD AUTO: 4.13 MILLION/UL (ref 3.88–5.62)
SODIUM SERPL-SCNC: 138 MMOL/L (ref 136–145)
WBC # BLD AUTO: 7.9 THOUSAND/UL (ref 4.31–10.16)

## 2018-09-03 PROCEDURE — 83036 HEMOGLOBIN GLYCOSYLATED A1C: CPT | Performed by: INTERNAL MEDICINE

## 2018-09-03 PROCEDURE — 82948 REAGENT STRIP/BLOOD GLUCOSE: CPT

## 2018-09-03 PROCEDURE — 80048 BASIC METABOLIC PNL TOTAL CA: CPT | Performed by: INTERNAL MEDICINE

## 2018-09-03 PROCEDURE — 99232 SBSQ HOSP IP/OBS MODERATE 35: CPT | Performed by: NURSE PRACTITIONER

## 2018-09-03 PROCEDURE — 85027 COMPLETE CBC AUTOMATED: CPT | Performed by: INTERNAL MEDICINE

## 2018-09-03 RX ORDER — ONDANSETRON 2 MG/ML
4 INJECTION INTRAMUSCULAR; INTRAVENOUS EVERY 6 HOURS PRN
Status: DISCONTINUED | OUTPATIENT
Start: 2018-09-03 | End: 2018-09-05 | Stop reason: HOSPADM

## 2018-09-03 RX ADMIN — TRAMADOL HYDROCHLORIDE 50 MG: 50 TABLET, COATED ORAL at 08:32

## 2018-09-03 RX ADMIN — CEFAZOLIN SODIUM 2000 MG: 2 SOLUTION INTRAVENOUS at 06:24

## 2018-09-03 RX ADMIN — FUROSEMIDE 20 MG: 20 TABLET ORAL at 08:32

## 2018-09-03 RX ADMIN — METOPROLOL TARTRATE 25 MG: 25 TABLET, FILM COATED ORAL at 08:33

## 2018-09-03 RX ADMIN — HEPARIN SODIUM 5000 UNITS: 5000 INJECTION, SOLUTION INTRAVENOUS; SUBCUTANEOUS at 21:25

## 2018-09-03 RX ADMIN — ATORVASTATIN CALCIUM 40 MG: 40 TABLET, FILM COATED ORAL at 08:32

## 2018-09-03 RX ADMIN — ASPIRIN 81 MG 81 MG: 81 TABLET ORAL at 08:32

## 2018-09-03 RX ADMIN — CEFAZOLIN SODIUM 2000 MG: 2 SOLUTION INTRAVENOUS at 12:47

## 2018-09-03 RX ADMIN — HEPARIN SODIUM 5000 UNITS: 5000 INJECTION, SOLUTION INTRAVENOUS; SUBCUTANEOUS at 13:15

## 2018-09-03 RX ADMIN — INSULIN DETEMIR 30 UNITS: 100 INJECTION, SOLUTION SUBCUTANEOUS at 08:40

## 2018-09-03 RX ADMIN — ACETAMINOPHEN 650 MG: 325 TABLET, FILM COATED ORAL at 07:43

## 2018-09-03 RX ADMIN — CEFAZOLIN SODIUM 2000 MG: 2 SOLUTION INTRAVENOUS at 21:25

## 2018-09-03 RX ADMIN — METOPROLOL TARTRATE 25 MG: 25 TABLET, FILM COATED ORAL at 21:25

## 2018-09-03 RX ADMIN — HEPARIN SODIUM 5000 UNITS: 5000 INJECTION, SOLUTION INTRAVENOUS; SUBCUTANEOUS at 06:24

## 2018-09-03 RX ADMIN — TRAMADOL HYDROCHLORIDE 50 MG: 50 TABLET, COATED ORAL at 02:16
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
## 2018-09-03 NOTE — PLAN OF CARE
Problem: Potential for Falls  Goal: Patient will remain free of falls  INTERVENTIONS:  - Assess patient frequently for physical needs  -  Identify cognitive and physical deficits and behaviors that affect risk of falls  -  [place] fall precautions as indicated by assessment   - Educate patient/family on patient safety including physical limitations  - Instruct patient to call for assistance with activity based on assessment  - Modify environment to reduce risk of injury  - Consider OT/PT consult to assist with strengthening/mobility   Outcome: Progressing      Problem: Nutrition/Hydration-ADULT  Goal: Nutrient/Hydration intake appropriate for improving, restoring or maintaining nutritional needs  Monitor and assess patient's nutrition/hydration status for malnutrition (ex- brittle hair, bruises, dry skin, pale skin and conjunctiva, muscle wasting, smooth red tongue, and disorientation)  Collaborate with interdisciplinary team and initiate plan and interventions as ordered  Monitor patient's weight and dietary intake as ordered or per policy  Utilize nutrition screening tool and intervene per policy  Determine patient's food preferences and provide high-protein, high-caloric foods as appropriate       INTERVENTIONS:  - Monitor oral intake, urinary output, labs, and treatment plans  - Assess nutrition and hydration status and recommend course of action  - Evaluate amount of meals eaten  - Assist patient with eating if necessary   - Allow adequate time for meals  - Recommend/ encourage appropriate diets, oral nutritional supplements, and vitamin/mineral supplements  - Order, calculate, and assess calorie counts as needed  - Recommend, monitor, and adjust tube feedings and TPN/PPN based on assessed needs  - Assess need for intravenous fluids  - Provide specific nutrition/hydration education as appropriate  - Include patient/family/caregiver in decisions related to nutrition   Outcome: Progressing      Problem: PAIN - ADULT  Goal: Verbalizes/displays adequate comfort level or baseline comfort level  Interventions:  - Encourage patient to monitor pain and request assistance  - Assess pain using appropriate pain scale  - Administer analgesics based on type and severity of pain and evaluate response  - Implement non-pharmacological measures as appropriate and evaluate response  - Consider cultural and social influences on pain and pain management  - Notify physician/advanced practitioner if interventions unsuccessful or patient reports new pain   Outcome: Progressing      Problem: INFECTION - ADULT  Goal: Absence or prevention of progression during hospitalization  INTERVENTIONS:  - Assess and monitor for signs and symptoms of infection  - Monitor lab/diagnostic results  - Monitor all insertion sites, i e  indwelling lines, tubes, and drains  - Monitor endotracheal (as able) and nasal secretions for changes in amount and color  - [place] appropriate cooling/warming therapies per order  - Administer medications as ordered  - Instruct and encourage patient and family to use good hand hygiene technique  - Identify and instruct in appropriate isolation precautions for identified infection/condition   Outcome: Progressing      Problem: SAFETY ADULT  Goal: Maintain or return to baseline ADL function  INTERVENTIONS:  -  Assess patient's ability to carry out ADLs; assess patient's baseline for ADL function and identify physical deficits which impact ability to perform ADLs (bathing, care of mouth/teeth, toileting, grooming, dressing, etc )  - Assess/evaluate cause of self-care deficits   - Assess range of motion  - Assess patient's mobility; develop plan if impaired  - Assess patient's need for assistive devices and provide as appropriate  - Encourage maximum independence but intervene and supervise when necessary  ¯ Involve family in performance of ADLs  ¯ Assess for home care needs following discharge   ¯ Request OT consult to assist with ADL evaluation and planning for discharge  ¯ Provide patient education as appropriate   Outcome: Progressing    Goal: Maintain or return mobility status to optimal level  INTERVENTIONS:  - Assess patient's baseline mobility status (ambulation, transfers, stairs, etc )    - Identify cognitive and physical deficits and behaviors that affect mobility  - Identify mobility aids required to assist with transfers and/or ambulation (gait belt, sit-to-stand, lift, walker, cane, etc )  - [place] fall precautions as indicated by assessment  - Record patient progress and toleration of activity level on Mobility SBAR; progress patient to next Phase/Stage  - Instruct patient to call for assistance with activity based on assessment  - Request Rehabilitation consult to assist with strengthening/weightbearing, etc    Outcome: Progressing      Problem: DISCHARGE PLANNING  Goal: Discharge to home or other facility with appropriate resources  INTERVENTIONS:  - Identify barriers to discharge w/patient and caregiver  - Arrange for needed discharge resources and transportation as appropriate  - Identify discharge learning needs (meds, wound care, etc )  - Arrange for interpretive services to assist at discharge as needed  - Refer to Case Management Department for coordinating discharge planning if the patient needs post-hospital services based on physician/advanced practitioner order or complex needs related to functional status, cognitive ability, or social support system   Outcome: Progressing

## 2018-09-03 NOTE — ASSESSMENT & PLAN NOTE
· history recurrent left lower extremity cellulitis following traumatic injury dating 20 years ago, status post removal of rods and skin graft in the past   · Increasing redness, pain and swelling over the past week, failed PO abxs outpatient: cephalexin/ciprofloxacin  · Venous duplex done in the ED showed no evidence of DVT  · Continue IV cephazolin, elevate extremity

## 2018-09-03 NOTE — PROGRESS NOTES
Progress Note Shalini Quintana 1960, 59 y o  male MRN: [id number]    Unit/Bed#: -01 Encounter: [id number]    Primary Care Provider: Darylene Duster, MD   Date and time admitted to hospital: 9/2/2018  9:39 AM        * Recurrent cellulitis of lower leg   Assessment & Plan    · history recurrent left lower extremity cellulitis following traumatic injury dating 20 years ago, status post removal of rods and skin graft in the past   · Increasing redness, pain and swelling over the past week, failed PO abxs outpatient: cephalexin/ciprofloxacin  · Venous duplex done in the ED showed no evidence of DVT  · Continue IV cephazolin, elevate extremity        Type 2 diabetes mellitus with hyperglycemia, with long-term current use of insulin [facility])   Assessment & Plan    Lab Results   Component Value Date    HGBA1C 10 1 (H) 09/03/2018       Recent Labs      09/02/18   1605  09/02/18   2113  09/03/18   0706  09/03/18   1144   POCGLU  152*  248*  72  175*       Blood Sugar Average: Last 72 hrs:  (P) 161 4   · Last hemoglobin A1c in March 2018 = 12 7, now improved to 10 1  · Patient does not check his blood glucose at home  · Nutrition consulted- pt reports typically eating only one meal a day and candy in between   · C/w levemir 30 units daily with SSI coverage based on accuchecks         Chronic combined systolic and diastolic CHF (congestive heart failure) (HCC)   Assessment & Plan    · Clinically euvolemic    Continue Lasix, metoprolol  · Last echocardiogram on the system was in February 2015 which had showed EF 40-45%  · Follows outpatient with Dr Mor Narvaez        Chronic kidney disease (CKD), stage III (moderate)   Assessment & Plan    · Baseline creatinine appears to be 1 4-1 6 approximately   · Creat today stable at 1 56  · Recommend outpatient nephrology f/u after discharge        Essential hypertension   Assessment & Plan    · Stable, continue home medications            VTE Pharmacologic Prophylaxis:   Pharmacologic: Heparin  Mechanical VTE Prophylaxis in Place: No    Patient Centered Rounds: I have performed bedside rounds with nursing staff today  Discussions with Specialists or Other Care Team Provider: d/w RN     Education and Discussions with Family / Patient: d/w patient     Time Spent for Care: 30 minutes  More than 50% of total time spent on counseling and coordination of care as described above  Current Length of Stay: 1 day(s)    Current Patient Status: Inpatient   Certification Statement: The patient will continue to require additional inpatient hospital stay due to iv abxds     Discharge Plan: possible in the next 24-48hours     Code Status: Level 1 - Full Code      Subjective:   Pt reports some improvement in the left lower extremity cellulitis  Still persists with pain /10, improved with tramadol  (+) still persists with warmth over the site  (+) episode of n/v earlier after taking tramadol- not sure if it was related to the pain meds or the fact that he needed to eat  Denies any other complaints  Objective:     Vitals:   Temp (24hrs), Av 9 °F (36 6 °C), Min:97 5 °F (36 4 °C), Max:98 2 °F (36 8 °C)    HR:  [65-72] 69  Resp:  [16-18] 18  BP: (115-128)/(64-74) 115/64  SpO2:  [97 %-99 %] 98 %  Body mass index is 25 78 kg/m²  Input and Output Summary (last 24 hours): Intake/Output Summary (Last 24 hours) at 18 1424  Last data filed at 18 1301   Gross per 24 hour   Intake              900 ml   Output             1700 ml   Net             -800 ml       Physical Exam:     Physical Exam   Constitutional: He is oriented to person, place, and time  No distress  Cardiovascular: Normal rate, regular rhythm, normal heart sounds and intact distal pulses  No murmur heard  Pulmonary/Chest: Effort normal and breath sounds normal  No respiratory distress  He has no wheezes  He has no rales  Abdominal: Soft  Bowel sounds are normal  He exhibits no distension  There is no tenderness  There is no rebound  Musculoskeletal: He exhibits edema (left lower extremity mild-moderate edema)  He exhibits no tenderness  Neurological: He is alert and oriented to person, place, and time  Skin: Skin is warm and dry  He is not diaphoretic  There is erythema (LLE )  Psychiatric: He has a normal mood and affect  Additional Data:     Labs:      Results from last 7 days  Lab Units 09/03/18  0619 09/02/18  1038   WBC Thousand/uL 7 90 7 97   HEMOGLOBIN g/dL 10 9* 11 6*   HEMATOCRIT % 34 2* 35 1*   PLATELETS Thousands/uL 297 326   NEUTROS PCT %  --  70   LYMPHS PCT %  --  19   MONOS PCT %  --  6   EOS PCT %  --  4       Results from last 7 days  Lab Units 09/03/18  0619 09/02/18  1038   SODIUM mmol/L 138 139   POTASSIUM mmol/L 3 6 4 2   CHLORIDE mmol/L 103 103   CO2 mmol/L 30 31   BUN mg/dL 21 26*   CREATININE mg/dL 1 56* 1 91*   CALCIUM mg/dL 8 5 9 0   ALK PHOS U/L  --  71   ALT U/L  --  21   AST U/L  --  15       Results from last 7 days  Lab Units 09/02/18  1038   INR  1 07       Results from last 7 days  Lab Units 09/03/18  1144 09/03/18  0706 09/02/18  2113 09/02/18  1605 09/02/18  1502   POC GLUCOSE mg/dl 175* 72 248* 152* 160*       Results from last 7 days  Lab Units 09/03/18  0619   HEMOGLOBIN A1C % 10 1*         * I Have Reviewed All Lab Data Listed Above  * Additional Pertinent Lab Tests Reviewed:  All Labs Within Last 24 Hours Reviewed    Imaging:    Imaging Reports Reviewed Today Include: venous duplex, left tibia and fibula xray     Recent Cultures (last 7 days):           Last 24 Hours Medication List:     Current Facility-Administered Medications:  acetaminophen 650 mg Oral Q6H PRN Tera Jessica MD    aspirin 81 mg Oral Daily Tera Jessica MD    atorvastatin 40 mg Oral Daily Tera Jessica MD    cefazolin 2,000 mg Intravenous Nelsy Last MD Last Rate: 2,000 mg (09/03/18 1247)   furosemide 20 mg Oral Daily Tera Jessica MD    heparin (porcine) 5,000 Units Subcutaneous [facility] [address] Kelly Araujo MD    insulin detemir 30 Units Subcutaneous QAM Kelly Araujo MD    insulin lispro 1-5 Units Subcutaneous HS Kelly Araujo MD    insulin lispro 1-6 Units Subcutaneous TID AC Kelly Araujo MD    metoprolol tartrate 25 mg Oral Q12H [facility] Kelly Araujo MD    ondansetron 4 mg Intravenous Q6H PRN LATASHA Hamilton    traMADol 50 mg Oral Q6H PRN Yarelis Lu PA-C         Today, Patient Was Seen By: LATASHA Hamilton    ** Please Note: Dictation voice to text software may have been used in the creation of this document   **

## 2018-09-03 NOTE — ASSESSMENT & PLAN NOTE
· Baseline creatinine appears to be 1 4-1 6 approximately   · Creat today stable at 1 56  · Recommend outpatient nephrology f/u after discharge

## 2018-09-03 NOTE — ASSESSMENT & PLAN NOTE
Lab Results   Component Value Date    HGBA1C 10 1 (H) 09/03/2018       Recent Labs      09/02/18   1605  09/02/18   2113  09/03/18   0706  09/03/18   1144   POCGLU  152*  248*  72  175*       Blood Sugar Average: Last 72 hrs:  (P) 161 4   · Last hemoglobin A1c in March 2018 = 12 7, now improved to 10 1  · Patient does not check his blood glucose at home  · Nutrition consulted- pt reports typically eating only one meal a day and candy in between   · C/w levemir 30 units daily with SSI coverage based on accuchecks

## 2018-09-03 NOTE — ASSESSMENT & PLAN NOTE
· Clinically euvolemic    Continue Lasix, metoprolol  · Last echocardiogram on the system was in February 2015 which had showed EF 40-45%  · Follows outpatient with Dr Wu Padilla

## 2018-09-04 LAB
ANION GAP SERPL CALCULATED.3IONS-SCNC: 6 MMOL/L (ref 4–13)
BUN SERPL-MCNC: 22 MG/DL (ref 5–25)
CALCIUM SERPL-MCNC: 8.7 MG/DL (ref 8.3–10.1)
CHLORIDE SERPL-SCNC: 105 MMOL/L (ref 100–108)
CO2 SERPL-SCNC: 31 MMOL/L (ref 21–32)
CREAT SERPL-MCNC: 1.51 MG/DL (ref 0.6–1.3)
ERYTHROCYTE [DISTWIDTH] IN BLOOD BY AUTOMATED COUNT: 13.1 % (ref 11.6–15.1)
GFR SERPL CREATININE-BSD FRML MDRD: 49 ML/MIN/1.73SQ M
GLUCOSE SERPL-MCNC: 106 MG/DL (ref 65–140)
GLUCOSE SERPL-MCNC: 106 MG/DL (ref 65–140)
GLUCOSE SERPL-MCNC: 276 MG/DL (ref 65–140)
GLUCOSE SERPL-MCNC: 279 MG/DL (ref 65–140)
GLUCOSE SERPL-MCNC: 286 MG/DL (ref 65–140)
GLUCOSE SERPL-MCNC: 64 MG/DL (ref 65–140)
GLUCOSE SERPL-MCNC: 69 MG/DL (ref 65–140)
GLUCOSE SERPL-MCNC: 91 MG/DL (ref 65–140)
HCT VFR BLD AUTO: 35 % (ref 36.5–49.3)
HGB BLD-MCNC: 11.4 G/DL (ref 12–17)
MCH RBC QN AUTO: 27 PG (ref 26.8–34.3)
MCHC RBC AUTO-ENTMCNC: 32.6 G/DL (ref 31.4–37.4)
MCV RBC AUTO: 83 FL (ref 82–98)
PLATELET # BLD AUTO: 308 THOUSANDS/UL (ref 149–390)
PMV BLD AUTO: 9.1 FL (ref 8.9–12.7)
POTASSIUM SERPL-SCNC: 4 MMOL/L (ref 3.5–5.3)
RBC # BLD AUTO: 4.23 MILLION/UL (ref 3.88–5.62)
SODIUM SERPL-SCNC: 142 MMOL/L (ref 136–145)
WBC # BLD AUTO: 7.43 THOUSAND/UL (ref 4.31–10.16)

## 2018-09-04 PROCEDURE — 85027 COMPLETE CBC AUTOMATED: CPT | Performed by: NURSE PRACTITIONER

## 2018-09-04 PROCEDURE — 82948 REAGENT STRIP/BLOOD GLUCOSE: CPT

## 2018-09-04 PROCEDURE — 80048 BASIC METABOLIC PNL TOTAL CA: CPT | Performed by: NURSE PRACTITIONER

## 2018-09-04 PROCEDURE — 99232 SBSQ HOSP IP/OBS MODERATE 35: CPT | Performed by: NURSE PRACTITIONER

## 2018-09-04 RX ADMIN — METOPROLOL TARTRATE 25 MG: 25 TABLET, FILM COATED ORAL at 21:36

## 2018-09-04 RX ADMIN — HEPARIN SODIUM 5000 UNITS: 5000 INJECTION, SOLUTION INTRAVENOUS; SUBCUTANEOUS at 05:18

## 2018-09-04 RX ADMIN — FUROSEMIDE 20 MG: 20 TABLET ORAL at 10:25

## 2018-09-04 RX ADMIN — ASPIRIN 81 MG 81 MG: 81 TABLET ORAL at 08:49

## 2018-09-04 RX ADMIN — INSULIN LISPRO 4 UNITS: 100 INJECTION, SOLUTION INTRAVENOUS; SUBCUTANEOUS at 13:00

## 2018-09-04 RX ADMIN — INSULIN DETEMIR 30 UNITS: 100 INJECTION, SOLUTION SUBCUTANEOUS at 10:14

## 2018-09-04 RX ADMIN — ACETAMINOPHEN 650 MG: 325 TABLET, FILM COATED ORAL at 05:18

## 2018-09-04 RX ADMIN — CEFAZOLIN SODIUM 2000 MG: 2 SOLUTION INTRAVENOUS at 13:02

## 2018-09-04 RX ADMIN — HEPARIN SODIUM 5000 UNITS: 5000 INJECTION, SOLUTION INTRAVENOUS; SUBCUTANEOUS at 14:20

## 2018-09-04 RX ADMIN — INSULIN LISPRO 4 UNITS: 100 INJECTION, SOLUTION INTRAVENOUS; SUBCUTANEOUS at 16:24

## 2018-09-04 RX ADMIN — CEFAZOLIN SODIUM 2000 MG: 2 SOLUTION INTRAVENOUS at 05:18

## 2018-09-04 RX ADMIN — CEFAZOLIN SODIUM 2000 MG: 2 SOLUTION INTRAVENOUS at 21:36

## 2018-09-04 RX ADMIN — HEPARIN SODIUM 5000 UNITS: 5000 INJECTION, SOLUTION INTRAVENOUS; SUBCUTANEOUS at 21:36

## 2018-09-04 RX ADMIN — ATORVASTATIN CALCIUM 40 MG: 40 TABLET, FILM COATED ORAL at 08:49

## 2018-09-04 NOTE — PLAN OF CARE
DISCHARGE PLANNING     Discharge to home or other facility with appropriate resources Progressing        INFECTION - ADULT     Absence or prevention of progression during hospitalization Progressing        Nutrition/Hydration-ADULT     Nutrient/Hydration intake appropriate for improving, restoring or maintaining nutritional needs Progressing        PAIN - ADULT     Verbalizes/displays adequate comfort level or baseline comfort level Progressing        Potential for Falls     Patient will remain free of falls Progressing        SAFETY ADULT     Maintain or return to baseline ADL function Progressing     Maintain or return mobility status to optimal level Progressing

## 2018-09-04 NOTE — ASSESSMENT & PLAN NOTE
· Clinically euvolemic    Continue Lasix, metoprolol  · Last echocardiogram on the system was in February 2015 which had showed EF 40-45%  · Follows outpatient with Dr Gustabo England

## 2018-09-04 NOTE — PROGRESS NOTES
Progress Note Laurie Verma 1960, 59 y o  male MRN: [id number]    Unit/Bed#: -01 Encounter: [id number]    Primary Care Provider: Laine Wallace MD   Date and time admitted to hospital: 9/2/2018  9:39 AM        * Recurrent cellulitis of lower leg   Assessment & Plan    · history recurrent left lower extremity cellulitis following traumatic injury dating 20 years ago, status post removal of rods and skin graft in the past   · Increasing redness, pain and swelling over the past week, failed PO abxs outpatient: cephalexin/ciprofloxacin  · Venous duplex done in the ED showed no evidence of DVT  · Continue IV ancef today- day #3, consider transition to PO abxs in am  · elevate extremity        Type 2 diabetes mellitus with hyperglycemia, with long-term current use of insulin [facility])   Assessment & Plan    Lab Results   Component Value Date    HGBA1C 10 1 (H) 09/03/2018       Recent Labs      09/03/18   2100  09/04/18   0718  09/04/18   0958  09/04/18   1234   POCGLU  163*  64*  276*  286*       Blood Sugar Average: Last 72 hrs:  (P) 175 8   · Last hemoglobin A1c in March 2018 = 12 7, now improved to 10 1  · Patient does not check his blood glucose at home  · Nutrition consulted- pt reports typically eating only one meal a day and candy in between   · C/w levemir 30 units daily with SSI coverage based on accuchecks   · pts blood sugar was 64 this am- discussed with patient and RN- he did not have an evening snack- recommended this evening         Chronic combined systolic and diastolic CHF (congestive heart failure) (HCC)   Assessment & Plan    · Clinically euvolemic    Continue Lasix, metoprolol  · Last echocardiogram on the system was in February 2015 which had showed EF 40-45%  · Follows outpatient with Dr Alverto Toledo        Chronic kidney disease (CKD), stage III (moderate)   Assessment & Plan    · Baseline creatinine appears to be 1 4-1 6 approximately   · Creat today stable at 1 51  · Recommend outpatient nephrology f/u after discharge        Essential hypertension   Assessment & Plan    · Stable, continue home medications            VTE Pharmacologic Prophylaxis:   Pharmacologic: Heparin  Mechanical VTE Prophylaxis in Place: No    Patient Centered Rounds: I have performed bedside rounds with nursing staff today  Discussions with Specialists or Other Care Team Provider: greer/w RN     Education and Discussions with Family / Patient: d/w patient     Time Spent for Care: 30 minutes  More than 50% of total time spent on counseling and coordination of care as described above  Current Length of Stay: 2 day(s)    Current Patient Status: Inpatient   Certification Statement: The patient will continue to require additional inpatient hospital stay due to iv abxs     Discharge Plan: possible d/c in the next 24-48 hours     Code Status: Level 1 - Full Code      Subjective:   Pt reports that the pain in his left lower extremity is about a 5/10 today which is an improvement from yesterday  Denies any other complaints  Objective:     Vitals:   Temp (24hrs), Av 9 °F (36 6 °C), Min:97 8 °F (36 6 °C), Max:98 °F (36 7 °C)    HR:  [69-80] 80  Resp:  [16-20] 16  BP: (102-131)/(57-79) 131/68  SpO2:  [95 %-97 %] 95 %  Body mass index is 26 98 kg/m²  Input and Output Summary (last 24 hours): Intake/Output Summary (Last 24 hours) at 18 1426  Last data filed at 18 1000   Gross per 24 hour   Intake             1080 ml   Output              500 ml   Net              580 ml       Physical Exam:     Physical Exam   Constitutional: He is oriented to person, place, and time  No distress  Cardiovascular: Normal rate, regular rhythm, normal heart sounds and intact distal pulses  No murmur heard  Pulmonary/Chest: Effort normal and breath sounds normal  No respiratory distress  He has no wheezes  He has no rales  Abdominal: Soft  Bowel sounds are normal  He exhibits no distension  There is no tenderness   There is no rebound  Musculoskeletal: He exhibits no edema or tenderness  Neurological: He is alert and oriented to person, place, and time  Skin: Skin is warm and dry  He is not diaphoretic  Psychiatric: He has a normal mood and affect  Additional Data:     Labs:      Results from last 7 days  Lab Units 09/04/18  0514  09/02/18  1038   WBC Thousand/uL 7 43  < > 7 97   HEMOGLOBIN g/dL 11 4*  < > 11 6*   HEMATOCRIT % 35 0*  < > 35 1*   PLATELETS Thousands/uL 308  < > 326   NEUTROS PCT %  --   --  70   LYMPHS PCT %  --   --  19   MONOS PCT %  --   --  6   EOS PCT %  --   --  4   < > = values in this interval not displayed  Results from last 7 days  Lab Units 09/04/18  0514  09/02/18  1038   SODIUM mmol/L 142  < > 139   POTASSIUM mmol/L 4 0  < > 4 2   CHLORIDE mmol/L 105  < > 103   CO2 mmol/L 31  < > 31   BUN mg/dL 22  < > 26*   CREATININE mg/dL 1 51*  < > 1 91*   CALCIUM mg/dL 8 7  < > 9 0   ALK PHOS U/L  --   --  71   ALT U/L  --   --  21   AST U/L  --   --  15   < > = values in this interval not displayed  Results from last 7 days  Lab Units 09/02/18  1038   INR  1 07       Results from last 7 days  Lab Units 09/04/18  1234 09/04/18  0958 09/04/18  0718 09/03/18  2100 09/03/18  1603 09/03/18  1144 09/03/18  0706 09/02/18  2113 09/02/18  1605 09/02/18  1502   POC GLUCOSE mg/dl 286* 276* 64* 163* 162* 175* 72 248* 152* 160*       Results from last 7 days  Lab Units 09/03/18  0619   HEMOGLOBIN A1C % 10 1*         * I Have Reviewed All Lab Data Listed Above  * Additional Pertinent Lab Tests Reviewed: All Labs Within Last 24 Hours Reviewed    Imaging:    Imaging Reports Reviewed Today Include: left tibia and fibula xray     Recent Cultures (last 7 days):       Results from last 7 days  Lab Units 09/02/18  1053 09/02/18  1038   BLOOD CULTURE  No Growth at 24 hrs  No Growth at 24 hrs         Last 24 Hours Medication List:     Current Facility-Administered Medications:  acetaminophen 650 mg Oral Q6H PRN Kennedy Diaz MD    aspirin 81 mg Oral Daily Kennedy Diaz MD    atorvastatin 40 mg Oral Daily Kennedy Diaz MD    cefazolin 2,000 mg Intravenous Q8H Kennedy Diaz MD Last Rate: 2,000 mg (09/04/18 1302)   furosemide 20 mg Oral Daily Kennedy Diaz MD    heparin (porcine) 5,000 Units Subcutaneous Q8H [address] Kennedy Diaz MD    insulin detemir 30 Units Subcutaneous QAM Kennedy Diaz MD    insulin lispro 1-5 Units Subcutaneous HS Kennedy Diaz MD    insulin lispro 1-6 Units Subcutaneous TID AC Kennedy Diaz MD    metoprolol tartrate 25 mg Oral Q12H [address] Kennedy Diaz MD    ondansetron 4 mg Intravenous Q6H PRN LATASHA Henry    traMADol 50 mg Oral Q6H PRN Yarelis Lu PA-C         Today, Patient Was Seen By: LATASHA Giordano    ** Please Note: Dictation voice to text software may have been used in the creation of this document   **

## 2018-09-04 NOTE — ASSESSMENT & PLAN NOTE
Lab Results   Component Value Date    HGBA1C 10 1 (H) 09/03/2018       Recent Labs      09/03/18   2100  09/04/18   0718  09/04/18   0958  09/04/18   1234   POCGLU  163*  64*  276*  286*       Blood Sugar Average: Last 72 hrs:  (P) 175 8   · Last hemoglobin A1c in March 2018 = 12 7, now improved to 10 1  · Patient does not check his blood glucose at home  · Nutrition consulted- pt reports typically eating only one meal a day and candy in between   · C/w levemir 30 units daily with SSI coverage based on accuchecks   · pts blood sugar was 64 this am- discussed with patient and RN- he did not have an evening snack- recommended this evening

## 2018-09-04 NOTE — ASSESSMENT & PLAN NOTE
· Baseline creatinine appears to be 1 4-1 6 approximately   · Creat today stable at 1 51  · Recommend outpatient nephrology f/u after discharge

## 2018-09-04 NOTE — ASSESSMENT & PLAN NOTE
· history recurrent left lower extremity cellulitis following traumatic injury dating 20 years ago, status post removal of rods and skin graft in the past   · Increasing redness, pain and swelling over the past week, failed PO abxs outpatient: cephalexin/ciprofloxacin  · Venous duplex done in the ED showed no evidence of DVT  · Continue IV ancef today- day #3, consider transition to PO abxs in am  · elevate extremity

## 2018-09-05 VITALS
BODY MASS INDEX: 27.02 KG/M2 | RESPIRATION RATE: 16 BRPM | SYSTOLIC BLOOD PRESSURE: 128 MMHG | WEIGHT: 188.71 LBS | HEART RATE: 78 BPM | HEIGHT: 70 IN | OXYGEN SATURATION: 95 % | TEMPERATURE: 97.7 F | DIASTOLIC BLOOD PRESSURE: 71 MMHG

## 2018-09-05 LAB
GLUCOSE SERPL-MCNC: 105 MG/DL (ref 65–140)
GLUCOSE SERPL-MCNC: 131 MG/DL (ref 65–140)
GLUCOSE SERPL-MCNC: 239 MG/DL (ref 65–140)

## 2018-09-05 PROCEDURE — 99239 HOSP IP/OBS DSCHRG MGMT >30: CPT | Performed by: PHYSICIAN ASSISTANT

## 2018-09-05 PROCEDURE — 82948 REAGENT STRIP/BLOOD GLUCOSE: CPT

## 2018-09-05 RX ORDER — CEPHALEXIN 500 MG/1
500 CAPSULE ORAL EVERY 6 HOURS SCHEDULED
Qty: 56 CAPSULE | Refills: 0
Start: 2018-09-05 | End: 2018-09-16

## 2018-09-05 RX ADMIN — METOPROLOL TARTRATE 25 MG: 25 TABLET, FILM COATED ORAL at 08:38

## 2018-09-05 RX ADMIN — HEPARIN SODIUM 5000 UNITS: 5000 INJECTION, SOLUTION INTRAVENOUS; SUBCUTANEOUS at 05:19

## 2018-09-05 RX ADMIN — FUROSEMIDE 20 MG: 20 TABLET ORAL at 08:38

## 2018-09-05 RX ADMIN — ATORVASTATIN CALCIUM 40 MG: 40 TABLET, FILM COATED ORAL at 08:38

## 2018-09-05 RX ADMIN — CEFAZOLIN SODIUM 2000 MG: 2 SOLUTION INTRAVENOUS at 05:19

## 2018-09-05 RX ADMIN — ASPIRIN 81 MG 81 MG: 81 TABLET ORAL at 08:38

## 2018-09-05 RX ADMIN — INSULIN DETEMIR 30 UNITS: 100 INJECTION, SOLUTION SUBCUTANEOUS at 08:38

## 2018-09-05 NOTE — DISCHARGE SUMMARY
Discharge- Jailyn Mercado 1960, 59 y o  male MRN: [id number]    Unit/Bed#: -01 Encounter: [id number]    Primary Care Provider: Mely Reza MD   Date and time admitted to hospital: 9/2/2018  9:39 AM    * Recurrent cellulitis of lower leg   Assessment & Plan    · History recurrent left lower extremity cellulitis following traumatic injury dating 20 years ago, status post removal of rods and skin graft in the past   · Increasing redness, pain and swelling over the past week, failed PO abxs outpatient: cephalexin/ciprofloxacin  · Venous duplex done in the ED showed no evidence of DVT  · Continue IV ancef today- day #3, will transition to oral Keflex 500 mg QID for 14 days  Patient states he has Keflex at home with multiple refills  · Patient medically stable for discharge at this time  Has remained afebrile without leukocytosis and with clinical improvement of cellulitis  Chronic combined systolic and diastolic CHF (congestive heart failure) (HCC)   Assessment & Plan    · Clinically euvolemic  Continue Lasix, metoprolol  · Last echocardiogram on the system was in February 2015 which had showed EF 40-45%  · Follows outpatient with Dr Melita Warner  · Continue daily weights and low-sodium diet  Chronic kidney disease (CKD), stage III (moderate)   Assessment & Plan    · Baseline creatinine appears to be 1 4-1 6 approximately   · Creat today stable at 1 51  · Recommend outpatient nephrology f/u after discharge  · Estimated Creatinine Clearance: 53 7 mL/min (A) (by C-G formula based on SCr of 1 51 mg/dL (H))            Type 2 diabetes mellitus with hyperglycemia, with long-term current use of insulin [facility])   Assessment & Plan    Lab Results   Component Value Date    HGBA1C 10 1 (H) 09/03/2018       Recent Labs      09/04/18   1831  09/04/18   2058  09/05/18   0230  09/05/18   0739   POCGLU  106  106  131  105       Blood Sugar Average: Last 72 hrs:  (P) 159 625   · Last hemoglobin A1c in March 2018 = 12 7, now improved to 10 1  · Patient does not check his blood glucose at home  Patient reports if it "is managed", then he drops too low - hypoglycemia  · Nutrition had been consulted- pt reports typically eating only one meal a day and candy in between  · Continue home regimen and advise close follow-up with PCP  If patient continues to be above goal, consider referral to endocrinology  Essential hypertension   Assessment & Plan    · Stable throughout hospital course  Continue home regimen  Discharging Physician / Practitioner: Bela Cottrell PA-C  PCP: Nahum Esquivel MD  Admission Date:   Admission Orders     Ordered        09/02/18 1234  Inpatient Admission (expected length of stay for this patient is greater than two midnights)  Once             Discharge Date: 09/05/18    Resolved Problems  Date Reviewed: 9/5/2018    None          Consultations During Hospital Stay:  · Nutrition    Procedures Performed:     · None    Significant Findings / Test Results:     · BMP --> creatinine improved from 1 9 to 1 5 near patient baseline upon discharge  · UA --> negative nitrite, WBC, occasional bacteria -- neg UTI  · Blood culture x 2 negative at 48 hours  · Sed rate 76 on admission  · VAS lower limb venous duplex --> neg for thrombus bilaterally  · HgbA1C --> 10 1    Incidental Findings:   · None     Test Results Pending at Discharge (will require follow up): · None     Outpatient Tests Requested:  · None    Complications:  No complications during hospital course  Reason for Admission: Recurrent cellulitis of lower leg    Hospital Course:     Damian Zhong is a 59 y o  male patient who originally presented to the hospital on 9/2/2018 due to recurrent cellulitis of lower leg after failed outpatient oral antibiotics      Patient with PMH of recurrent cellulitis, chronic heart failure, chronic kidney disease, HTN and uncontrolled Type II DM reported to the ED with over 1 week history of worsening left lower extremity pain and redness  Patient has dealt with recurrent cellulitis to this limb following a traumatic injury 20 years ago  Patient already has Keflex at home, which he has on-hand for cellulitis flares  However after about 6 days of keflex/cipro, patient came to ED for further management  Venous duplex negative for thrombus  Patient afebrile without leukocytosis  Patient received 3 doses of IV Ancef with clinical improvement of cellulitis overall  Patient agreeable to transition to PO keflex upon discharge  Please see above list of diagnoses and related plan for additional information  Condition at Discharge: good     Discharge Day Visit / Exam:     Subjective:  Patient is seated upright in bed, lower extremities elevated with wife and daughter at bedside  Patient reports his left leg feels "much better" than admission  Patient reports this is a chronic issue, and that drainage from open wounds on his leg have stopped and have begun to heal   Patient also describes that his pain is very minimal at this time  Denies nausea/vomiting, chills, chest pain, abdominal pain, or diarrhea  Patient states he would like to go home if able  Vitals: Blood Pressure: 128/71 (09/05/18 0829)  Pulse: 78 (09/05/18 0829)  Temperature: 97 7 °F (36 5 °C) (09/05/18 0829)  Temp Source: Oral (09/05/18 0829)  Respirations: 16 (09/05/18 0829)  Height: 5' 10" (177 8 cm) (09/02/18 1307)  Weight - Scale: 85 6 kg (188 lb 11 4 oz) (09/05/18 0533)  SpO2: 95 % (09/05/18 0829)  Exam:   Physical Exam   Constitutional: He is oriented to person, place, and time  Vital signs are normal  He appears well-developed  HENT:   Head: Normocephalic  Eyes: Pupils are equal, round, and reactive to light  No scleral icterus  Cardiovascular: Normal rate, regular rhythm and normal heart sounds  No murmur heard  Pulmonary/Chest: Effort normal and breath sounds normal  No respiratory distress  Abdominal: Soft   Bowel sounds are normal  There is no tenderness  Musculoskeletal: He exhibits no edema  Neurological: He is alert and oriented to person, place, and time  Skin: Skin is warm and dry  Psychiatric: He has a normal mood and affect  His speech is normal  Cognition and memory are normal              Discussion with Family: Discussed with patient as well as wife and daughter at bedside  Instructed patient to take Keflex 500 mg q 6 hr for the next 14 days, of which patient already has a prescription at home with multiple refills  Also stressed the importance of diabetes management, as that can impede wound healing as well as worsen kidney disease  Patient verbalized agreement and understanding of discharge plan  Discharge instructions/Information to patient and family:   See after visit summary for information provided to patient and family  Provisions for Follow-Up Care:  See after visit summary for information related to follow-up care and any pertinent home health orders  Disposition:     Home    For Discharges to [facility]F:   · Not Applicable to this Patient - Not Applicable to this Patient    Planned Readmission: None     Discharge Statement:  I spent 40 minutes discharging the patient  This time was spent on the day of discharge  I had direct contact with the patient on the day of discharge  Greater than 50% of the total time was spent examining patient, answering all patient questions, arranging and discussing plan of care with patient as well as directly providing post-discharge instructions  Additional time then spent on discharge activities  Discharge Medications:  See after visit summary for reconciled discharge medications provided to patient and family        ** Please Note: This note has been constructed using a voice recognition system **

## 2018-09-05 NOTE — PLAN OF CARE
DISCHARGE PLANNING     Discharge to home or other facility with appropriate resources Adequate for Discharge        INFECTION - ADULT     Absence or prevention of progression during hospitalization Adequate for Discharge        Nutrition/Hydration-ADULT     Nutrient/Hydration intake appropriate for improving, restoring or maintaining nutritional needs Adequate for Discharge        PAIN - ADULT     Verbalizes/displays adequate comfort level or baseline comfort level Adequate for Discharge        Potential for Falls     Patient will remain free of falls Adequate for Discharge        SAFETY ADULT     Maintain or return to baseline ADL function Adequate for Discharge     Maintain or return mobility status to optimal level Adequate for Discharge

## 2018-09-05 NOTE — DISCHARGE INSTRUCTIONS
· Please follow up with your PCP within 1 week  · Watch for signs of worsening infection - redness, drainage, fever/chills, increased pain  · Continue Keflex 500 mg four times daily for 11 days, as you have received IV antibiotics for 3 days for a total of 14 days  · Continue daily weights and blood sugar checks  Follow a low-carb and low-sodium diet  · Suggested follow-up with nephrology given chronic kidney disease as well as endocrinology for diabetes management  Cellulitis, Ambulatory Care   GENERAL INFORMATION:   Cellulitis  is a skin infection caused by bacteria  Common symptoms include the following:   · Fever    · A red, warm, swollen area on your skin    · Pain when the area is touched    · Bumps or blisters (abscess) that may drain pus    · Bumpy, raised skin that feels like an orange peel  Seek immediate care for the following symptoms:   · An increase in pain, redness, warmth, and size    · Red streaks coming from the infected area    · A thin, gray-brown discharge coming from your infected skin area    · A crackling under your skin when you touch it    · Purple dots or bumps on your skin, or bleeding under your skin    · New swelling and pain in your legs    · Sudden trouble breathing or chest pain  Treatment for cellulitis  may include medicines to treat the bacterial infection or decrease pain  The infection may need to be cleaned out  Damaged, dead, or infected tissue may need to be cut away to help your wound heal   Manage your symptoms:   · Elevate your wound above the level of your heart  as often as you can  This will help decrease swelling and pain  Prop your wound on pillows or blankets to keep it elevated comfortably  · Clean your wound as directed  You may need to wash the wound with soap and water  Look for signs of infection  · Wear pressure stockings as directed  The stockings are tight and put pressure on your legs   This improves blood flow and decreases swelling  Prevent cellulitis:   · Wash your hands often  Use soap and water  Wash your hands after you use the bathroom, change a child's diapers, or sneeze  Wash your hands before you prepare or eat food  Use lotion to prevent dry, cracked skin  · Do not share personal items, such as towels, clothing, and razors  · Clean exercise equipment  with germ-killing  before and after you use it  Follow up with your healthcare provider as directed:  Write down your questions so you remember to ask them during your visits  CARE AGREEMENT:   You have the right to help plan your care  Learn about your health condition and how it may be treated  Discuss treatment options with your caregivers to decide what care you want to receive  You always have the right to refuse treatment  The above information is an  only  It is not intended as medical advice for individual conditions or treatments  Talk to your doctor, nurse or pharmacist before following any medical regimen to see if it is safe and effective for you  © 2014 [address] is for End User's use only and may not be sold, redistributed or otherwise used for commercial purposes  All illustrations and images included in CareNotes® are the copyrighted property of [employer]  or [employer]

## 2018-09-05 NOTE — ASSESSMENT & PLAN NOTE
Lab Results   Component Value Date    HGBA1C 10 1 (H) 09/03/2018       Recent Labs      09/04/18   1831  09/04/18   2058  09/05/18   0230  09/05/18   0739   POCGLU  106  106  131  105       Blood Sugar Average: Last 72 hrs:  (P) 159 625   · Last hemoglobin A1c in March 2018 = 12 7, now improved to 10 1  · Patient does not check his blood glucose at home  Patient reports if it "is managed", then he drops too low - hypoglycemia  · Nutrition had been consulted- pt reports typically eating only one meal a day and candy in between  · Continue home regimen and advise close follow-up with PCP  If patient continues to be above goal, consider referral to endocrinology

## 2018-09-05 NOTE — ASSESSMENT & PLAN NOTE
· Clinically euvolemic  Continue Lasix, metoprolol  · Last echocardiogram on the system was in February 2015 which had showed EF 40-45%  · Follows outpatient with Dr Alverto Toledo  · Continue daily weights and low-sodium diet

## 2018-09-05 NOTE — ASSESSMENT & PLAN NOTE
· Baseline creatinine appears to be 1 4-1 6 approximately   · Creat today stable at 1 51  · Recommend outpatient nephrology f/u after discharge  · Estimated Creatinine Clearance: 53 7 mL/min (A) (by C-G formula based on SCr of 1 51 mg/dL (H))

## 2018-09-05 NOTE — ASSESSMENT & PLAN NOTE
· History recurrent left lower extremity cellulitis following traumatic injury dating 20 years ago, status post removal of rods and skin graft in the past   · Increasing redness, pain and swelling over the past week, failed PO abxs outpatient: cephalexin/ciprofloxacin  · Venous duplex done in the ED showed no evidence of DVT  · Continue IV ancef today- day #3, will transition to oral Keflex 500 mg QID for 14 days  Patient states he has Keflex at home with multiple refills  · Patient medically stable for discharge at this time  Has remained afebrile without leukocytosis and with clinical improvement of cellulitis

## 2018-09-07 LAB
BACTERIA BLD CULT: NORMAL
BACTERIA BLD CULT: NORMAL

## 2018-09-16 ENCOUNTER — APPOINTMENT (EMERGENCY)
Dept: RADIOLOGY | Facility: HOSPITAL | Age: 58
End: 2018-09-16
Payer: COMMERCIAL

## 2018-09-16 ENCOUNTER — HOSPITAL ENCOUNTER (EMERGENCY)
Facility: HOSPITAL | Age: 58
Discharge: HOME | End: 2018-09-16
Attending: EMERGENCY MEDICINE | Admitting: EMERGENCY MEDICINE
Payer: COMMERCIAL

## 2018-09-16 VITALS
TEMPERATURE: 98.1 F | HEART RATE: 85 BPM | RESPIRATION RATE: 16 BRPM | WEIGHT: 188.71 LBS | OXYGEN SATURATION: 99 % | DIASTOLIC BLOOD PRESSURE: 80 MMHG | SYSTOLIC BLOOD PRESSURE: 128 MMHG | HEIGHT: 70 IN | BODY MASS INDEX: 27.02 KG/M2

## 2018-09-16 DIAGNOSIS — T07.XXXA MULTIPLE ABRASIONS: ICD-10-CM

## 2018-09-16 DIAGNOSIS — S63.259A DISLOCATION OF FINGER, INITIAL ENCOUNTER: Primary | ICD-10-CM

## 2018-09-16 PROCEDURE — 73140 X-RAY EXAM OF FINGER(S): CPT

## 2018-09-16 PROCEDURE — 99283 EMERGENCY DEPT VISIT LOW MDM: CPT

## 2018-09-16 PROCEDURE — 90715 TDAP VACCINE 7 YRS/> IM: CPT | Performed by: PHYSICIAN ASSISTANT

## 2018-09-16 PROCEDURE — 73130 X-RAY EXAM OF HAND: CPT

## 2018-09-16 PROCEDURE — 90471 IMMUNIZATION ADMIN: CPT

## 2018-09-16 RX ORDER — IBUPROFEN 600 MG/1
600 TABLET ORAL EVERY 6 HOURS PRN
Qty: 15 TABLET | Refills: 0 | Status: SHIPPED | OUTPATIENT
Start: 2018-09-16 | End: 2019-04-01 | Stop reason: ALTCHOICE

## 2018-09-16 RX ORDER — BUPIVACAINE HYDROCHLORIDE 2.5 MG/ML
5 INJECTION, SOLUTION EPIDURAL; INFILTRATION; INTRACAUDAL ONCE
Status: COMPLETED | OUTPATIENT
Start: 2018-09-16 | End: 2018-09-16

## 2018-09-16 RX ORDER — LIDOCAINE HYDROCHLORIDE 10 MG/ML
5 INJECTION, SOLUTION EPIDURAL; INFILTRATION; INTRACAUDAL; PERINEURAL ONCE
Status: COMPLETED | OUTPATIENT
Start: 2018-09-16 | End: 2018-09-16

## 2018-09-16 RX ADMIN — TETANUS TOXOID, REDUCED DIPHTHERIA TOXOID AND ACELLULAR PERTUSSIS VACCINE, ADSORBED 0.5 ML: 5; 2.5; 8; 8; 2.5 SUSPENSION INTRAMUSCULAR at 19:05

## 2018-09-16 RX ADMIN — BUPIVACAINE HYDROCHLORIDE 5 ML: 2.5 INJECTION, SOLUTION EPIDURAL; INFILTRATION; INTRACAUDAL at 19:08

## 2018-09-16 RX ADMIN — LIDOCAINE HYDROCHLORIDE 5 ML: 10 INJECTION, SOLUTION EPIDURAL; INFILTRATION; INTRACAUDAL; PERINEURAL at 19:08

## 2018-09-16 NOTE — DISCHARGE INSTRUCTIONS
Finger Dislocation   WHAT YOU NEED TO KNOW:   A finger dislocation occurs when bones in your finger move out of their normal position  This takes place at a joint (where bones meet)  DISCHARGE INSTRUCTIONS:   Care for your finger:  Care for your finger as directed  This may help reduce pain and swelling  It also may improve how well you can move and use your finger  · Ice your finger: This can help decrease pain and swelling  Place a plastic bag filled with ice, or an ice pack, on your finger  Apply an ice pack as often as directed  · Elevate your finger:  Keep your finger above the level of your heart  This can help reduce swelling  Prop your arm or hand on a pillow  This should be done as often as you can for the first 1 to 3 days after your injury  Medicines:   · Pain medicine: You may be given medicine to take at home to take away or decrease pain  Do not wait until the pain is too bad before taking your medicine  · Take your medicine as directed  Contact your healthcare provider if you think your medicine is not helping or if you have side effects  Tell him or her if you are allergic to any medicine  Keep a list of the medicines, vitamins, and herbs you take  Include the amounts, and when and why you take them  Bring the list or the pill bottles to follow-up visits  Carry your medicine list with you in case of an emergency  Exercise your finger:  Exercise can help reduce pain, swelling, and stiffness in your finger  It also can help increase strength and movement  You may need to exercise your finger as soon as you can  You also may be told not to move your finger for a few weeks  Be sure to follow your healthcare provider's instructions  Occupational therapy (OT) may be ordered for you  A therapist works with you to help you regain movement in your finger    Care for your splint or cast:  Your injured finger may be brooke-taped, splinted, or put in a cast to hold your finger or thumb in place while it heals  Joe tape is when your injured finger is taped to the finger next to it  A splint is a piece of stiff material attached to your finger using cloth straps  You may have these treatments for 8 weeks or more  To care for your splint or cast:  · Do not get your splint or cast wet  Use a plastic bag to cover the splint or cast if you shower  · Keep your splint or cast clean  Make sure no dirt gets under your splint or cast     · Do not trim your cast without talking to your healthcare provider  Also, never remove your cast on your own  Follow up with your healthcare provider or hand specialist as directed:  Write down your questions so you remember to ask them during your follow-up visits  Contact your healthcare provider or hand specialist if:   · You have numbness or tingling in your hand  · The skin under your cast or splint burns or stings  · The skin around your cast becomes red or raw  · Your cast becomes cracked or damaged  · You have questions or concerns about your injury or treatment  Return to the emergency department if:   · You have increased swelling beneath your splint or cast     · You think your cast or splint is too tight  · You cannot move your fingers  © 2017 [address]  Information is for End User's use only and may not be sold, redistributed or otherwise used for commercial purposes  All illustrations and images included in CareNotes® are the copyrighted property of [employer]  or [employer] Jensen  The above information is an  only  It is not intended as medical advice for individual conditions or treatments  Talk to your doctor, nurse or pharmacist before following any medical regimen to see if it is safe and effective for you

## 2018-09-16 NOTE — ED PROVIDER NOTES
History  Chief Complaint   Patient presents with    Finger Injury     fell approx 8 feet off ladder, has a deformed pinky finger L hand     Patient presents emergency room after falling 8 feet off of a ladder and injuring his left little finger  He also complains of some tenderness in his left shoulder as well as an abrasion on his right forearm and nose  He states he landed on his left side  He had no loss of consciousness  No headache, dizziness, blurred vision, nausea, vomiting  He denies any chest or abdominal pain  He is able to ambulate with no discomfort  Moving all extremities with the exception of his left little finger secondary to a deformity and pain  He has a past medical history that is positive for coronary artery disease, diabetes mellitus type 2, hypertension, mi, osteomyelitis, pancreatitis  His last tetanus was 10 years ago  History provided by:  Patient      Prior to Admission Medications   Prescriptions Last Dose Informant Patient Reported? Taking?   aspirin 81 mg chewable tablet   No No   Sig: Chew 1 tablet daily for 30 days   atorvastatin (LIPITOR) 40 mg tablet   No No   Sig: Take 1 tablet by mouth daily for 30 days   cephalexin (KEFLEX) 500 mg capsule   No No   Sig: Take 1 capsule (500 mg total) by mouth every 6 (six) hours for 11 days   furosemide (LASIX) 20 mg tablet   No No   Sig: TAKE 1 TABLET DAILY AS DIRECTED     insulin detemir (LEVEMIR) 100 units/mL subcutaneous injection   Yes No   Sig: Inject 30 Units under the skin every morning     metoprolol tartrate (LOPRESSOR) 25 mg tablet   No No   Sig: Take 1 tablet by mouth every 12 (twelve) hours for 30 days      Facility-Administered Medications: None       Past Medical History:   Diagnosis Date    CAD (coronary artery disease)     Diabetes mellitus ([facility] )     type 2    Hypertension     MI [facility]myocardial infarction) ([facility] )     Osteomyelitis ([facility] )     Pancreatitis        Past Surgical History:   Procedure Laterality Date  ANGIOPLASTY      ballon    CORONARY ANGIOPLASTY WITH STENT PLACEMENT      LULA to proximal and mid LAD, Proximal RCA   HERNIA REPAIR         Family History   Problem Relation Age of Onset    Heart attack Father     Diabetes Mother      I have reviewed and agree with the history as documented  Social History   Substance Use Topics    Smoking status: Never Smoker    Smokeless tobacco: Never Used    Alcohol use No        Review of Systems   Constitutional: Positive for activity change  HENT: Negative for congestion, dental problem, ear discharge, ear pain, facial swelling, mouth sores, nosebleeds, rhinorrhea, sinus pain, sinus pressure and sore throat  Eyes: Negative for photophobia, pain, discharge, redness, itching and visual disturbance  Respiratory: Negative for chest tightness and shortness of breath  Cardiovascular: Negative for chest pain  Gastrointestinal: Negative for abdominal pain  Musculoskeletal: Positive for arthralgias and joint swelling  Negative for back pain and gait problem  Skin: Positive for wound  Negative for color change, pallor and rash  Psychiatric/Behavioral: Negative for confusion  All other systems reviewed and are negative  Physical Exam  Physical Exam   Constitutional: He is oriented to person, place, and time  He appears well-developed and well-nourished  No distress  HENT:   Head: Normocephalic  Right Ear: External ear normal    Left Ear: External ear normal    Mouth/Throat: Oropharynx is clear and moist    Abrasion of the nose  There is no septal hematoma  Eyes: Conjunctivae and EOM are normal  Pupils are equal, round, and reactive to light  Right eye exhibits no discharge  Left eye exhibits no discharge  No scleral icterus  Neck: Neck supple  Pulmonary/Chest: Effort normal  No respiratory distress  He exhibits no tenderness  Abdominal: There is no tenderness  There is no rebound and no guarding     Musculoskeletal:   Examination of the right upper extremity there is an abrasion present over the mid forearm  There is full range of motion of the elbow and wrist in all planes  The arm is aligned  Remainder of the hand elbow upper arm and shoulder are nontender  Inspection of the left upper extremity  The there is a obvious deformity of the little finger on the left hand  There is a dislocation present  There is decreased range of motion of the PIP joint with tenderness upon palpation  Remainder of the hand wrist forearm elbow upper arm and shoulder are nontender with good range of motion  There is full range of motion of the lower extremities in all planes  There is no back tenderness present  Neurological: He is alert and oriented to person, place, and time  No cranial nerve deficit  Coordination normal    Skin: Capillary refill takes less than 2 seconds  He is not diaphoretic  Abrasion on the nose as well as the right forearm  Psychiatric: He has a normal mood and affect  His behavior is normal  Judgment and thought content normal    Nursing note and vitals reviewed        Vital Signs  ED Triage Vitals [09/16/18 1842]   Temperature Pulse Respirations Blood Pressure SpO2   98 1 °F (36 7 °C) 85 16 128/80 99 %      Temp Source Heart Rate Source Patient Position - Orthostatic VS BP Location FiO2 (%)   Oral Monitor Sitting Right arm --      Pain Score       5           Vitals:    09/16/18 1842   BP: 128/80   Pulse: 85   Patient Position - Orthostatic VS: Sitting       Visual Acuity      ED Medications  Medications   lidocaine (PF) (XYLOCAINE-MPF) 1 % injection 5 mL (5 mL Infiltration Given 9/16/18 1908)   bupivacaine (PF) (MARCAINE) 0 25 % injection 5 mL (5 mL Infiltration Given 9/16/18 1908)   tetanus-diphtheria-acellular pertussis (BOOSTRIX) IM injection 0 5 mL (0 5 mL Intramuscular Given 9/16/18 1905)       Diagnostic Studies  Results Reviewed     None                 XR finger fifth digit-pinkie LEFT   ED Interpretation by Caitlyn Ontiveros Gutzweiler, PA-C (09/16 1946)   Anatomical alignment      XR hand 3+ views LEFT   ED Interpretation by Migdalia Mejia PA-C (09/16 1902)   Dislocation of the PIP joint  No fracture seen                 Procedures  Orthopedic Injury  Date/Time: 9/16/2018 7:33 PM  Performed by: Alexa Flood  Authorized by: Alexa Flood     Patient Location:  ED  Other Assisting Provider: No    Verbal consent obtained?: Yes    Consent given by:  Patient  Patient states understanding of procedure being performed: Yes    Test results available and properly labeled: Yes    Site marked: Yes    Radiology Images displayed and confirmed  If images not available, report reviewed: Yes    Injury location: Left little finger dislocation of PIP joint    Distal perfusion: normal    Neurological function: normal    Range of motion: reduced    Local anesthesia used?: Yes    Anesthesia:  Digital block (Metacarpal)  Local anesthetic:  Lidocaine 1% without epinephrine and bupivacaine 0 25% without epinephrine  Skeletal traction used?: No    Immobilization:  Splint  Splint type:  Finger splint, static  Neurovascular status: Neurovascularly intact    Distal perfusion: normal    Neurological function: normal    Range of motion: normal    Patient tolerance:  Patient tolerated the procedure well with no immediate complications           Phone Contacts  ED Phone Contact    ED Course                               MDM  Number of Diagnoses or Management Options  Dislocation of finger, initial encounter: new and requires workup  Multiple abrasions: new and requires workup     Amount and/or Complexity of Data Reviewed  Tests in the radiology section of CPT®: ordered and reviewed  Tests in the medicine section of CPT®: ordered and reviewed    Risk of Complications, Morbidity, and/or Mortality  Presenting problems: high  Diagnostic procedures: moderate  Management options: moderate  General comments: Patient presents to the emergency room after falling at from an 8 foot ladder in injuring his left little finger  He was seen and evaluated  He was diagnosed with the dislocation of the PIP joint  A metacarpal block was performed and a closed reduction was performed without complications  Repeat x-rays demonstrate the dislocation to be in anatomical alignment  There does not appear to be a associated fracture  The patient was placed in a metal splint  He was given an orthopedic referral for follow-up  Patient Progress  Patient progress: stable    CritCare Time    Disposition  Final diagnoses:   Dislocation of finger, initial encounter - Acute dislocation of the PIP joint of the left little finger   Multiple abrasions - Right forearm and nose area     Time reflects when diagnosis was documented in both MDM as applicable and the Disposition within this note     Time User Action Codes Description Comment    9/16/2018  7:40 PM Cristian Arguelles [T89 [id number]] Dislocation of finger, initial encounter     9/16/2018  7:40 PM Sydnie Medrano [J19 [id number]] Dislocation of finger, initial encounter Acute dislocation of the PIP joint of the left little finger    9/16/2018  7:40 PM Armen Eagle  XXXA] Multiple abrasions     9/16/2018  7:40 PM Cristian Jennings Modify [T07  XXXA] Multiple abrasions Right forearm and nose area      ED Disposition     ED Disposition Condition Comment    Discharge  Noel Khan discharge to home/self care      Condition at discharge: Good        Follow-up Information     Follow up With Specialties Details Why [address], MD Orthopedic Surgery   [address] [address]  [id number] [facility]            Discharge Medication List as of 9/16/2018  7:43 PM      START taking these medications    Details   ibuprofen (MOTRIN) 600 mg tablet Take 1 tablet (600 mg total) by mouth every 6 (six) hours as needed for moderate pain for up to 15 doses, Starting Sun 9/16/2018, Normal         CONTINUE these medications which have NOT CHANGED    Details   aspirin 81 mg chewable tablet Chew 1 tablet daily for 30 days, Starting Fri 5/19/2017, Until Sun 9/2/2018, No Print      atorvastatin (LIPITOR) 40 mg tablet Take 1 tablet by mouth daily for 30 days, Starting Fri 5/19/2017, Until Sun 9/2/2018, Print      cephalexin (KEFLEX) 500 mg capsule Take 1 capsule (500 mg total) by mouth every 6 (six) hours for 11 days, Starting Wed 9/5/2018, Until Sun 9/16/2018, No Print      furosemide (LASIX) 20 mg tablet TAKE 1 TABLET DAILY AS DIRECTED , Normal      insulin detemir (LEVEMIR) 100 units/mL subcutaneous injection Inject 30 Units under the skin every morning  , Until Discontinued, Historical Med      metoprolol tartrate (LOPRESSOR) 25 mg tablet Take 1 tablet by mouth every 12 (twelve) hours for 30 days, Starting Fri 5/19/2017, Until Sun 9/2/2018, Print           No discharge procedures on file      ED Provider  Electronically Signed by           Lalit Wallis PA-C  09/16/18 5087

## 2018-09-18 ENCOUNTER — OFFICE VISIT (OUTPATIENT)
Dept: OBGYN CLINIC | Facility: CLINIC | Age: 58
End: 2018-09-18
Payer: COMMERCIAL

## 2018-09-18 VITALS
HEIGHT: 70 IN | SYSTOLIC BLOOD PRESSURE: 129 MMHG | WEIGHT: 182 LBS | DIASTOLIC BLOOD PRESSURE: 82 MMHG | HEART RATE: 86 BPM | BODY MASS INDEX: 26.05 KG/M2

## 2018-09-18 DIAGNOSIS — S63.289A DISLOCATION OF PROXIMAL INTERPHALANGEAL JOINT OF FINGER, INITIAL ENCOUNTER: Primary | ICD-10-CM

## 2018-09-18 PROCEDURE — 99203 OFFICE O/P NEW LOW 30 MIN: CPT | Performed by: ORTHOPAEDIC SURGERY

## 2018-09-18 RX ORDER — CIPROFLOXACIN 500 MG/1
500 TABLET, FILM COATED ORAL EVERY 12 HOURS
Refills: 2 | COMMUNITY
Start: 2018-08-31 | End: 2019-04-01 | Stop reason: ALTCHOICE

## 2018-09-18 RX ORDER — CALCIUM CITRATE/VITAMIN D3 200MG-6.25
TABLET ORAL
Refills: 11 | COMMUNITY
Start: 2018-08-20

## 2018-09-18 RX ORDER — INSULIN GLARGINE 100 [IU]/ML
INJECTION, SOLUTION SUBCUTANEOUS
Refills: 5 | Status: ON HOLD | COMMUNITY
Start: 2018-08-20 | End: 2020-02-16 | Stop reason: SDUPTHER

## 2018-09-18 NOTE — PROGRESS NOTES
Patient Name:  Yanna Maxwell  MRN:  [id number]    Assessment & Plan    Left small finger PIP joint dislocation 9/16/18  1  Continue Alumafoam splint for two weeks while at work only  2  Buddy taping as needed while moderate work with recommendation of stretching exercises  3  Follow-up in two weeks      Chief Complaint    Left small finger injury      History of the Present Illness    68-year-old male, RHD, reports to the office today for his left small finger  Patient states he fell off of a ladder on 9/16/18  After the fall he noted a refill  deformity about his left small finger with associated pain  He reported to the emergency department where x-rays revealed a PIP joint dislocation  This was close reduced in the emergency department and he was placed in an Alumafoam splint  He reports to the office today noting minimal pain  He does note swelling and stiffness  No numbness or tingling  No fevers or chills  Physical Exam    /82   Pulse 86   Ht 5' 10" (1 778 m)   Wt 82 6 kg (182 lb)   BMI 26 11 kg/m²     Left small finger:   No gross deformity  Skin intact  Soft tissue swelling noted about the PIP joint  Passive range of motion of the PIP MCP and DIP joints is intact with minimal discomfort  Sensation intact distally  Brisk capillary refill    Constitutional:  Well-developed and well-nourished  Eyes:  Anicteric sclerae  Neck:  Supple  Lungs:  Unlabored breathing  Cardiovascular:  Capillary refill is less than 2 seconds  Skin:  Intact without erythema  Neurologic:  Sensation intact to light touch  Psychiatric:  Mood and affect are appropriate  Data Review    I have personally reviewed pertinent films in PACS, and my interpretation follows  X-rays of the left small finger performed 9/16/18 reveals successful reduction of a PIP joint dislocation  Evidence of a small avulsion fracture noted at the base the middle phalanx      Past Medical History:   Diagnosis Date    CAD (coronary artery disease)     Diabetes mellitus ([facility] )     type 2    Hypertension     MI [facility]myocardial infarction) ([facility] )     Osteomyelitis ([facility] )     Pancreatitis        Past Surgical History:   Procedure Laterality Date    ANGIOPLASTY      ballon    CORONARY ANGIOPLASTY WITH STENT PLACEMENT      LULA to proximal and mid LAD, Proximal RCA   HERNIA REPAIR         No Known Allergies    Current Outpatient Prescriptions on File Prior to Visit   Medication Sig Dispense Refill    aspirin 81 mg chewable tablet Chew 1 tablet daily for 30 days 30 tablet 0    atorvastatin (LIPITOR) 40 mg tablet Take 1 tablet by mouth daily for 30 days 30 tablet 0    furosemide (LASIX) 20 mg tablet TAKE 1 TABLET DAILY AS DIRECTED  90 tablet 3    ibuprofen (MOTRIN) 600 mg tablet Take 1 tablet (600 mg total) by mouth every 6 (six) hours as needed for moderate pain for up to 15 doses 15 tablet 0    insulin detemir (LEVEMIR) 100 units/mL subcutaneous injection Inject 30 Units under the skin every morning        metoprolol tartrate (LOPRESSOR) 25 mg tablet Take 1 tablet by mouth every 12 (twelve) hours for 30 days 60 tablet 0     No current facility-administered medications on file prior to visit  Social History   Substance Use Topics    Smoking status: Never Smoker    Smokeless tobacco: Never Used    Alcohol use No       Family History   Problem Relation Age of Onset    Heart attack Father     Diabetes Mother          Review of Systems    General:  Negative for fever, lethargy/malaise, or night sweats  Eyes:  Negative for blurry vision or double vision  ENT:  Negative for hearing change, nasal discharge, or sore throat  Hematological:  Negative for bleeding problems or blood clots  Endocrine:  Negative for excessive thirst or temperature intolerance  Respiratory:  Negative for cough or wheezing  Cardiovascular:  Negative for chest pain, dyspnea on exertion, or palpitations    Gastrointestinal:  Negative for abdominal pain, diarrhea, or nausea/vomiting  Musculoskeletal:  As stated in the HPI and otherwise negative  Neurological:  Negative for confusion, headaches, or seizures  Psychological:  Negative for hallucinations or mood swings  Dermatological:  Negative for itching or rash        Scribe Attestation    I,:   Keenan Pierre PA-C am acting as a scribe while in the presence of the attending physician :        I,:   Shelly Raza MD personally performed the services described in this documentation    as scribed in my presence :

## 2018-10-02 ENCOUNTER — OFFICE VISIT (OUTPATIENT)
Dept: OBGYN CLINIC | Facility: CLINIC | Age: 58
End: 2018-10-02
Payer: COMMERCIAL

## 2018-10-02 VITALS
HEART RATE: 79 BPM | DIASTOLIC BLOOD PRESSURE: 75 MMHG | SYSTOLIC BLOOD PRESSURE: 115 MMHG | BODY MASS INDEX: 26.05 KG/M2 | WEIGHT: 182 LBS | HEIGHT: 70 IN

## 2018-10-02 DIAGNOSIS — S63.287A DISLOCATION OF PROXIMAL INTERPHALANGEAL JOINT OF LEFT LITTLE FINGER, INITIAL ENCOUNTER: Primary | ICD-10-CM

## 2018-10-02 PROCEDURE — 99213 OFFICE O/P EST LOW 20 MIN: CPT | Performed by: ORTHOPAEDIC SURGERY

## 2018-10-02 NOTE — PROGRESS NOTES
Patient Name:  Anthony Membreno  MRN:  [id number]    Assessment & Plan    Left small finger PIP joint dislocation 9/16/18  1  Resume normal activity as tolerated  2  Instructed stretching exercises for small finger flexion  3  Follow-up as needed if symptoms persist   Anticipate gradual resolution over the next couple months  Subjective    Patient returns today for his left small finger PIP joint dislocation on 9/16/18  He has some residual mild pain and swelling  He discontinued the splint and has been buddy-taping occasionally  General ROS:  Negative for fever, lethargy/malaise, or night sweats  Neurological ROS:  Negative for confusion or numbness/tingling  Objective    /75   Pulse 79   Ht 5' 10" (1 778 m)   Wt 82 6 kg (182 lb)   BMI 26 11 kg/m²     Left small finger:  Mild soft tissue swelling  Mild tenderness to palpation over the PIP joint  Lacks approximately 5-10 degrees of flexion at the PIP joint and 5° of flexion at the DIP joint  No gross instability  Capillary refill is brisk distally  Sensation intact to light touch in the finger tip

## 2019-03-19 ENCOUNTER — TELEPHONE (OUTPATIENT)
Dept: CARDIOLOGY CLINIC | Facility: CLINIC | Age: 59
End: 2019-03-19

## 2019-03-19 NOTE — TELEPHONE ENCOUNTER
Pt stopped in the office because his feet are swelling  He wanted to schedule an appt asap the next appt you have available isn't until May  He is currently taking lasix 20mg daily  He has not noticed any weight gain  I did ask him to keep track of his weight for the next week to see if it is going up  Please advise if you would like to increase his medication or to see you early      Thanks   Shasha Mayorga

## 2019-03-20 ENCOUNTER — APPOINTMENT (OUTPATIENT)
Dept: LAB | Facility: CLINIC | Age: 59
End: 2019-03-20
Payer: COMMERCIAL

## 2019-03-20 ENCOUNTER — TRANSCRIBE ORDERS (OUTPATIENT)
Dept: LAB | Facility: CLINIC | Age: 59
End: 2019-03-20

## 2019-03-20 DIAGNOSIS — E78.2 MIXED HYPERLIPIDEMIA: ICD-10-CM

## 2019-03-20 DIAGNOSIS — I10 ESSENTIAL HYPERTENSION: ICD-10-CM

## 2019-03-20 DIAGNOSIS — E11.9 DIABETES MELLITUS WITHOUT COMPLICATION (HCC): Primary | ICD-10-CM

## 2019-03-20 DIAGNOSIS — E11.9 DIABETES MELLITUS WITHOUT COMPLICATION (HCC): ICD-10-CM

## 2019-03-20 DIAGNOSIS — I25.10 ATHEROSCLEROSIS OF NATIVE CORONARY ARTERY, ANGINA PRESENCE UNSPECIFIED, UNSPECIFIED WHETHER NATIVE OR TRANSPLANTED HEART: ICD-10-CM

## 2019-03-20 LAB
ALBUMIN SERPL BCP-MCNC: 3.2 G/DL (ref 3.5–5)
ALP SERPL-CCNC: 86 U/L (ref 46–116)
ALT SERPL W P-5'-P-CCNC: 30 U/L (ref 12–78)
ANION GAP SERPL CALCULATED.3IONS-SCNC: 7 MMOL/L (ref 4–13)
AST SERPL W P-5'-P-CCNC: 17 U/L (ref 5–45)
BASOPHILS # BLD AUTO: 0.09 THOUSANDS/ΜL (ref 0–0.1)
BASOPHILS NFR BLD AUTO: 1 % (ref 0–1)
BILIRUB SERPL-MCNC: 1.2 MG/DL (ref 0.2–1)
BUN SERPL-MCNC: 28 MG/DL (ref 5–25)
CALCIUM SERPL-MCNC: 9.1 MG/DL (ref 8.3–10.1)
CHLORIDE SERPL-SCNC: 106 MMOL/L (ref 100–108)
CHOLEST SERPL-MCNC: 121 MG/DL (ref 50–200)
CO2 SERPL-SCNC: 30 MMOL/L (ref 21–32)
CREAT SERPL-MCNC: 1.59 MG/DL (ref 0.6–1.3)
CREAT UR-MCNC: 82.6 MG/DL
EOSINOPHIL # BLD AUTO: 0.32 THOUSAND/ΜL (ref 0–0.61)
EOSINOPHIL NFR BLD AUTO: 5 % (ref 0–6)
ERYTHROCYTE [DISTWIDTH] IN BLOOD BY AUTOMATED COUNT: 13.9 % (ref 11.6–15.1)
EST. AVERAGE GLUCOSE BLD GHB EST-MCNC: 283 MG/DL
GFR SERPL CREATININE-BSD FRML MDRD: 46 ML/MIN/1.73SQ M
GLUCOSE P FAST SERPL-MCNC: 73 MG/DL (ref 65–99)
HBA1C MFR BLD: 11.5 % (ref 4.2–6.3)
HCT VFR BLD AUTO: 40.8 % (ref 36.5–49.3)
HDLC SERPL-MCNC: 55 MG/DL (ref 40–60)
HGB BLD-MCNC: 12.9 G/DL (ref 12–17)
IMM GRANULOCYTES # BLD AUTO: 0.03 THOUSAND/UL (ref 0–0.2)
IMM GRANULOCYTES NFR BLD AUTO: 0 % (ref 0–2)
LDLC SERPL CALC-MCNC: 60 MG/DL (ref 0–100)
LDLC SERPL DIRECT ASSAY-MCNC: 64 MG/DL (ref 0–100)
LYMPHOCYTES # BLD AUTO: 1.38 THOUSANDS/ΜL (ref 0.6–4.47)
LYMPHOCYTES NFR BLD AUTO: 21 % (ref 14–44)
MCH RBC QN AUTO: 27 PG (ref 26.8–34.3)
MCHC RBC AUTO-ENTMCNC: 31.6 G/DL (ref 31.4–37.4)
MCV RBC AUTO: 86 FL (ref 82–98)
MICROALBUMIN UR-MCNC: 2860 MG/L (ref 0–20)
MICROALBUMIN/CREAT 24H UR: 3462 MG/G CREATININE (ref 0–30)
MONOCYTES # BLD AUTO: 0.66 THOUSAND/ΜL (ref 0.17–1.22)
MONOCYTES NFR BLD AUTO: 10 % (ref 4–12)
NEUTROPHILS # BLD AUTO: 4.24 THOUSANDS/ΜL (ref 1.85–7.62)
NEUTS SEG NFR BLD AUTO: 63 % (ref 43–75)
NONHDLC SERPL-MCNC: 66 MG/DL
NRBC BLD AUTO-RTO: 0 /100 WBCS
PLATELET # BLD AUTO: 287 THOUSANDS/UL (ref 149–390)
PMV BLD AUTO: 9.5 FL (ref 8.9–12.7)
POTASSIUM SERPL-SCNC: 3.9 MMOL/L (ref 3.5–5.3)
PROT SERPL-MCNC: 7.4 G/DL (ref 6.4–8.2)
RBC # BLD AUTO: 4.77 MILLION/UL (ref 3.88–5.62)
SODIUM SERPL-SCNC: 143 MMOL/L (ref 136–145)
TRIGL SERPL-MCNC: 31 MG/DL
WBC # BLD AUTO: 6.72 THOUSAND/UL (ref 4.31–10.16)

## 2019-03-20 PROCEDURE — 80053 COMPREHEN METABOLIC PANEL: CPT

## 2019-03-20 PROCEDURE — 80061 LIPID PANEL: CPT

## 2019-03-20 PROCEDURE — 82043 UR ALBUMIN QUANTITATIVE: CPT | Performed by: INTERNAL MEDICINE

## 2019-03-20 PROCEDURE — 82570 ASSAY OF URINE CREATININE: CPT | Performed by: INTERNAL MEDICINE

## 2019-03-20 PROCEDURE — 36415 COLL VENOUS BLD VENIPUNCTURE: CPT | Performed by: INTERNAL MEDICINE

## 2019-03-20 PROCEDURE — 83721 ASSAY OF BLOOD LIPOPROTEIN: CPT

## 2019-03-20 PROCEDURE — 85025 COMPLETE CBC W/AUTO DIFF WBC: CPT

## 2019-03-20 PROCEDURE — 83036 HEMOGLOBIN GLYCOSYLATED A1C: CPT | Performed by: INTERNAL MEDICINE

## 2019-04-01 ENCOUNTER — OFFICE VISIT (OUTPATIENT)
Dept: CARDIOLOGY CLINIC | Facility: CLINIC | Age: 59
End: 2019-04-01
Payer: COMMERCIAL

## 2019-04-01 VITALS
SYSTOLIC BLOOD PRESSURE: 132 MMHG | HEIGHT: 70 IN | HEART RATE: 84 BPM | OXYGEN SATURATION: 97 % | DIASTOLIC BLOOD PRESSURE: 78 MMHG | BODY MASS INDEX: 28.89 KG/M2 | WEIGHT: 201.8 LBS

## 2019-04-01 DIAGNOSIS — I50.32 CHRONIC DIASTOLIC CONGESTIVE HEART FAILURE (HCC): ICD-10-CM

## 2019-04-01 DIAGNOSIS — E11.65 TYPE 2 DIABETES MELLITUS WITH HYPERGLYCEMIA, WITH LONG-TERM CURRENT USE OF INSULIN (HCC): ICD-10-CM

## 2019-04-01 DIAGNOSIS — I50.42 CHRONIC COMBINED SYSTOLIC AND DIASTOLIC CHF (CONGESTIVE HEART FAILURE) (HCC): ICD-10-CM

## 2019-04-01 DIAGNOSIS — I25.10 CORONARY ARTERY DISEASE INVOLVING NATIVE CORONARY ARTERY OF NATIVE HEART WITHOUT ANGINA PECTORIS: Primary | ICD-10-CM

## 2019-04-01 DIAGNOSIS — I25.5 ISCHEMIC CARDIOMYOPATHY: ICD-10-CM

## 2019-04-01 DIAGNOSIS — N18.30 CHRONIC KIDNEY DISEASE (CKD), STAGE III (MODERATE) (HCC): ICD-10-CM

## 2019-04-01 DIAGNOSIS — I10 ESSENTIAL HYPERTENSION: ICD-10-CM

## 2019-04-01 DIAGNOSIS — E78.5 DYSLIPIDEMIA: ICD-10-CM

## 2019-04-01 DIAGNOSIS — Z79.4 TYPE 2 DIABETES MELLITUS WITH HYPERGLYCEMIA, WITH LONG-TERM CURRENT USE OF INSULIN (HCC): ICD-10-CM

## 2019-04-01 PROCEDURE — 93000 ELECTROCARDIOGRAM COMPLETE: CPT | Performed by: NURSE PRACTITIONER

## 2019-04-01 PROCEDURE — 3066F NEPHROPATHY DOC TX: CPT | Performed by: NURSE PRACTITIONER

## 2019-04-01 PROCEDURE — 99214 OFFICE O/P EST MOD 30 MIN: CPT | Performed by: NURSE PRACTITIONER

## 2019-04-01 RX ORDER — FUROSEMIDE 20 MG/1
TABLET ORAL
Qty: 90 TABLET | Refills: 3
Start: 2019-04-01 | End: 2019-04-15 | Stop reason: ALTCHOICE

## 2019-04-01 RX ORDER — POTASSIUM CHLORIDE 20 MEQ/1
TABLET, EXTENDED RELEASE ORAL
Qty: 60 TABLET | Refills: 1 | Status: SHIPPED | OUTPATIENT
Start: 2019-04-01 | End: 2019-11-08 | Stop reason: SDUPTHER

## 2019-04-10 ENCOUNTER — APPOINTMENT (OUTPATIENT)
Dept: LAB | Facility: CLINIC | Age: 59
End: 2019-04-10
Payer: COMMERCIAL

## 2019-04-10 DIAGNOSIS — I50.42 CHRONIC COMBINED SYSTOLIC AND DIASTOLIC CHF (CONGESTIVE HEART FAILURE) (HCC): ICD-10-CM

## 2019-04-10 LAB
ANION GAP SERPL CALCULATED.3IONS-SCNC: 7 MMOL/L (ref 4–13)
BUN SERPL-MCNC: 40 MG/DL (ref 5–25)
CALCIUM SERPL-MCNC: 8.9 MG/DL (ref 8.3–10.1)
CHLORIDE SERPL-SCNC: 101 MMOL/L (ref 100–108)
CO2 SERPL-SCNC: 30 MMOL/L (ref 21–32)
CREAT SERPL-MCNC: 1.82 MG/DL (ref 0.6–1.3)
GFR SERPL CREATININE-BSD FRML MDRD: 39 ML/MIN/1.73SQ M
GLUCOSE SERPL-MCNC: 261 MG/DL (ref 65–140)
MAGNESIUM SERPL-MCNC: 2.4 MG/DL (ref 1.6–2.6)
POTASSIUM SERPL-SCNC: 4.4 MMOL/L (ref 3.5–5.3)
SODIUM SERPL-SCNC: 138 MMOL/L (ref 136–145)

## 2019-04-10 PROCEDURE — 83735 ASSAY OF MAGNESIUM: CPT

## 2019-04-10 PROCEDURE — 80048 BASIC METABOLIC PNL TOTAL CA: CPT

## 2019-04-10 PROCEDURE — 36415 COLL VENOUS BLD VENIPUNCTURE: CPT

## 2019-04-15 ENCOUNTER — OFFICE VISIT (OUTPATIENT)
Dept: CARDIOLOGY CLINIC | Facility: CLINIC | Age: 59
End: 2019-04-15
Payer: COMMERCIAL

## 2019-04-15 VITALS
SYSTOLIC BLOOD PRESSURE: 118 MMHG | BODY MASS INDEX: 26.48 KG/M2 | HEART RATE: 72 BPM | DIASTOLIC BLOOD PRESSURE: 68 MMHG | OXYGEN SATURATION: 100 % | WEIGHT: 185 LBS | HEIGHT: 70 IN

## 2019-04-15 DIAGNOSIS — Z95.5 PRESENCE OF DRUG COATED STENT IN LAD CORONARY ARTERY: ICD-10-CM

## 2019-04-15 DIAGNOSIS — I25.5 ISCHEMIC CARDIOMYOPATHY: ICD-10-CM

## 2019-04-15 DIAGNOSIS — I10 ESSENTIAL HYPERTENSION: ICD-10-CM

## 2019-04-15 DIAGNOSIS — E78.5 DYSLIPIDEMIA: ICD-10-CM

## 2019-04-15 DIAGNOSIS — I25.10 CORONARY ARTERY DISEASE INVOLVING NATIVE CORONARY ARTERY OF NATIVE HEART WITHOUT ANGINA PECTORIS: ICD-10-CM

## 2019-04-15 DIAGNOSIS — I50.32 CHRONIC DIASTOLIC CONGESTIVE HEART FAILURE (HCC): ICD-10-CM

## 2019-04-15 DIAGNOSIS — I50.42 CHRONIC COMBINED SYSTOLIC AND DIASTOLIC CHF (CONGESTIVE HEART FAILURE) (HCC): Primary | ICD-10-CM

## 2019-04-15 PROCEDURE — 99213 OFFICE O/P EST LOW 20 MIN: CPT | Performed by: NURSE PRACTITIONER

## 2019-04-15 RX ORDER — FUROSEMIDE 40 MG/1
40 TABLET ORAL DAILY
Qty: 90 TABLET | Refills: 3 | Status: SHIPPED | OUTPATIENT
Start: 2019-04-15 | End: 2019-08-05

## 2019-04-29 ENCOUNTER — TRANSCRIBE ORDERS (OUTPATIENT)
Dept: RADIOLOGY | Facility: HOSPITAL | Age: 59
End: 2019-04-29

## 2019-04-29 ENCOUNTER — APPOINTMENT (OUTPATIENT)
Dept: LAB | Facility: CLINIC | Age: 59
End: 2019-04-29
Payer: COMMERCIAL

## 2019-04-29 DIAGNOSIS — I50.42 CHRONIC COMBINED SYSTOLIC AND DIASTOLIC CHF (CONGESTIVE HEART FAILURE) (HCC): ICD-10-CM

## 2019-04-29 LAB
ANION GAP SERPL CALCULATED.3IONS-SCNC: 11 MMOL/L (ref 4–13)
BUN SERPL-MCNC: 44 MG/DL (ref 5–25)
CALCIUM SERPL-MCNC: 9 MG/DL (ref 8.3–10.1)
CHLORIDE SERPL-SCNC: 99 MMOL/L (ref 100–108)
CO2 SERPL-SCNC: 26 MMOL/L (ref 21–32)
CREAT SERPL-MCNC: 1.83 MG/DL (ref 0.6–1.3)
GFR SERPL CREATININE-BSD FRML MDRD: 39 ML/MIN/1.73SQ M
GLUCOSE SERPL-MCNC: 412 MG/DL (ref 65–140)
POTASSIUM SERPL-SCNC: 4.5 MMOL/L (ref 3.5–5.3)
SODIUM SERPL-SCNC: 136 MMOL/L (ref 136–145)

## 2019-04-29 PROCEDURE — 80048 BASIC METABOLIC PNL TOTAL CA: CPT

## 2019-04-29 PROCEDURE — 36415 COLL VENOUS BLD VENIPUNCTURE: CPT

## 2019-05-01 ENCOUNTER — OFFICE VISIT (OUTPATIENT)
Dept: CARDIOLOGY CLINIC | Facility: CLINIC | Age: 59
End: 2019-05-01
Payer: COMMERCIAL

## 2019-05-01 VITALS
HEART RATE: 87 BPM | DIASTOLIC BLOOD PRESSURE: 76 MMHG | OXYGEN SATURATION: 100 % | SYSTOLIC BLOOD PRESSURE: 128 MMHG | BODY MASS INDEX: 27.37 KG/M2 | WEIGHT: 191.2 LBS | HEIGHT: 70 IN

## 2019-05-01 DIAGNOSIS — Z95.5 PRESENCE OF DRUG COATED STENT IN RIGHT CORONARY ARTERY: ICD-10-CM

## 2019-05-01 DIAGNOSIS — I25.10 CORONARY ARTERY DISEASE INVOLVING NATIVE CORONARY ARTERY OF NATIVE HEART WITHOUT ANGINA PECTORIS: ICD-10-CM

## 2019-05-01 DIAGNOSIS — Z95.5 PRESENCE OF DRUG COATED STENT IN LAD CORONARY ARTERY: ICD-10-CM

## 2019-05-01 DIAGNOSIS — I25.5 ISCHEMIC CARDIOMYOPATHY: ICD-10-CM

## 2019-05-01 DIAGNOSIS — I10 BENIGN ESSENTIAL HYPERTENSION: ICD-10-CM

## 2019-05-01 DIAGNOSIS — E78.5 DYSLIPIDEMIA: ICD-10-CM

## 2019-05-01 DIAGNOSIS — I50.42 CHRONIC COMBINED SYSTOLIC AND DIASTOLIC CHF (CONGESTIVE HEART FAILURE) (HCC): Primary | ICD-10-CM

## 2019-05-01 PROCEDURE — 99214 OFFICE O/P EST MOD 30 MIN: CPT | Performed by: INTERNAL MEDICINE

## 2019-05-08 ENCOUNTER — HOSPITAL ENCOUNTER (OUTPATIENT)
Dept: NON INVASIVE DIAGNOSTICS | Facility: CLINIC | Age: 59
Discharge: HOME | End: 2019-05-08
Payer: COMMERCIAL

## 2019-05-08 DIAGNOSIS — I25.5 ISCHEMIC CARDIOMYOPATHY: ICD-10-CM

## 2019-05-08 DIAGNOSIS — I50.42 CHRONIC COMBINED SYSTOLIC AND DIASTOLIC CHF (CONGESTIVE HEART FAILURE) (HCC): ICD-10-CM

## 2019-05-08 PROCEDURE — 93306 TTE W/DOPPLER COMPLETE: CPT

## 2019-05-08 PROCEDURE — 93306 TTE W/DOPPLER COMPLETE: CPT | Performed by: INTERNAL MEDICINE

## 2019-06-08 DIAGNOSIS — I50.42 CHRONIC COMBINED SYSTOLIC AND DIASTOLIC CHF (CONGESTIVE HEART FAILURE) (HCC): ICD-10-CM

## 2019-06-08 DIAGNOSIS — I25.5 ISCHEMIC CARDIOMYOPATHY: Primary | ICD-10-CM

## 2019-06-09 RX ORDER — FUROSEMIDE 20 MG/1
TABLET ORAL
Qty: 90 TABLET | Refills: 3 | Status: SHIPPED | OUTPATIENT
Start: 2019-06-09 | End: 2019-08-05

## 2019-08-05 ENCOUNTER — OFFICE VISIT (OUTPATIENT)
Dept: CARDIOLOGY CLINIC | Facility: CLINIC | Age: 59
End: 2019-08-05
Payer: COMMERCIAL

## 2019-08-05 VITALS
HEART RATE: 73 BPM | DIASTOLIC BLOOD PRESSURE: 70 MMHG | BODY MASS INDEX: 27.63 KG/M2 | HEIGHT: 70 IN | WEIGHT: 193 LBS | OXYGEN SATURATION: 98 % | SYSTOLIC BLOOD PRESSURE: 110 MMHG

## 2019-08-05 DIAGNOSIS — I50.42 CHRONIC COMBINED SYSTOLIC AND DIASTOLIC CHF (CONGESTIVE HEART FAILURE) (HCC): Primary | ICD-10-CM

## 2019-08-05 DIAGNOSIS — I25.10 CORONARY ARTERY DISEASE INVOLVING NATIVE CORONARY ARTERY OF NATIVE HEART WITHOUT ANGINA PECTORIS: ICD-10-CM

## 2019-08-05 DIAGNOSIS — I25.5 ISCHEMIC CARDIOMYOPATHY: ICD-10-CM

## 2019-08-05 DIAGNOSIS — Z95.5 PRESENCE OF DRUG COATED STENT IN LAD CORONARY ARTERY: ICD-10-CM

## 2019-08-05 DIAGNOSIS — Z95.5 PRESENCE OF DRUG COATED STENT IN RIGHT CORONARY ARTERY: ICD-10-CM

## 2019-08-05 DIAGNOSIS — E78.5 DYSLIPIDEMIA: ICD-10-CM

## 2019-08-05 DIAGNOSIS — I10 BENIGN ESSENTIAL HYPERTENSION: ICD-10-CM

## 2019-08-05 PROCEDURE — 3074F SYST BP LT 130 MM HG: CPT | Performed by: INTERNAL MEDICINE

## 2019-08-05 PROCEDURE — 99214 OFFICE O/P EST MOD 30 MIN: CPT | Performed by: INTERNAL MEDICINE

## 2019-08-05 RX ORDER — ATORVASTATIN CALCIUM 40 MG/1
40 TABLET, FILM COATED ORAL DAILY
Qty: 90 TABLET | Refills: 3 | Status: SHIPPED | OUTPATIENT
Start: 2019-08-05 | End: 2019-11-08 | Stop reason: SDUPTHER

## 2019-08-05 RX ORDER — FUROSEMIDE 40 MG/1
40 TABLET ORAL DAILY
Qty: 90 TABLET | Refills: 3 | Status: SHIPPED | OUTPATIENT
Start: 2019-08-05 | End: 2020-04-21 | Stop reason: SDUPTHER

## 2019-08-05 NOTE — PROGRESS NOTES
Cardiology Follow Up    Minerva Gage  1960  [id number]  [facility] [place]  [address]  [address]  [place] [address] [phone]    1  Chronic combined systolic and diastolic CHF (congestive heart failure) (HCC)  furosemide (LASIX) 40 mg tablet    Basic metabolic panel   2  Coronary artery disease involving native coronary artery of native heart without angina pectoris     3  Presence of drug coated stent in right coronary artery     4  Presence of drug coated stent in LAD coronary artery     5  Ischemic cardiomyopathy     6  Benign essential hypertension     7  Dyslipidemia  atorvastatin (LIPITOR) 40 mg tablet       Discussion/Summary:  Mr Cecilia Mckeon is a pleasant 67-year-old gentleman who returns to the office today for follow-up  He appears euvolemic on exam today on his current diuretic regimen  He will remain on this as prescribed  After his last visit he underwent an echocardiogram revealing his LV function remains stable at about 45%  He will continue to weigh himself on a regular basis and notify the office of any acute weight gain or signs or symptoms of volume overload  His blood pressure is well controlled in the office today  His most recent lipids were reviewed and are acceptable on current therapy  No testing is advised  I will see him back in the office in six months or sooner if deemed necessary  Interval History:  Mr Cecilia Mckeon is a pleasant 67-year-old gentleman who presents to the office today for follow-up  Since his last visit he has been feeling well  He is sedentary but denies exertional chest pain  He has chronic shortness of breath with exertion  He has been weighing himself regularly with a weight of 172 pounds    He denies any signs or symptoms of recurrent congestive heart failure including increasing lower extremity edema, paroxysmal nocturnal dyspnea, orthopnea, or increasing abdominal girth  He denies lightheadedness, syncope or presyncope  He denies symptoms of palpitations or claudication      He attempts to abide by a salt restricted diet  Problem List     Orthopnea    Essential hypertension    Type 2 diabetes mellitus with hyperglycemia, with long-term current use of insulin ([place] [address] )    Overview Signed 5/18/2017  6:20 AM by Edna Vines PA-C     type 1         Lab Results   Component Value Date    HGBA1C 11 5 (H) 03/20/2019       No results for input(s): POCGLU in the last 72 hours      Blood Sugar Average: Last 72 hrs:          Coronary artery disease    Chronic kidney disease (CKD), stage III (moderate) ([facility])    Chronic combined systolic and diastolic CHF (congestive heart failure) ([facility])    Dyslipidemia    Ischemic cardiomyopathy    Presence of drug coated stent in LAD coronary artery    Presence of drug coated stent in right coronary artery    Recurrent cellulitis of lower leg    Dislocation of proximal interphalangeal joint of left little finger        Past Medical History:   Diagnosis Date    CAD (coronary artery disease)     Chronic combined systolic and diastolic CHF (congestive heart failure) ([place] [address] )     Diabetes mellitus ([place] [address] )     type 2    Dyslipidemia     Hypertension     Ischemic cardiomyopathy     MI (myocardial infarction) ([place] [address] )     Osteomyelitis ([place] [address] )     Pancreatitis      Social History     Socioeconomic History    Marital status: /Civil Union     Spouse name: Not on file    Number of children: Not on file    Years of education: Not on file    Highest education level: Not on file   Occupational History    Not on file   Social Needs    Financial resource strain: Not on file    Food insecurity:     Worry: Not on file     Inability: Not on file    Transportation needs:     Medical: Not on file     Non-medical: Not on file   Tobacco Use    Smoking status: Never Smoker    Smokeless tobacco: Never Used   Substance and Sexual Activity    Alcohol use: No    Drug use: No    Sexual activity: Not on file   Lifestyle    Physical activity:     Days per week: Not on file     Minutes per session: Not on file    Stress: Not on file   Relationships    Social connections:     Talks on phone: Not on file     Gets together: Not on file     Attends Zoroastrian service: Not on file     Active member of club or organization: Not on file     Attends meetings of clubs or organizations: Not on file     Relationship status: Not on file    Intimate partner violence:     Fear of current or ex partner: Not on file     Emotionally abused: Not on file     Physically abused: Not on file     Forced sexual activity: Not on file   Other Topics Concern    Not on file   Social History Narrative    Not on file      Family History   Problem Relation Age of Onset    Heart attack Father     Hyperlipidemia Father     Cancer Father     Diabetes Mother     Heart failure Mother         poss    Cancer Paternal Grandfather     Stroke Neg Hx     Anuerysm Neg Hx     Clotting disorder Neg Hx     Arrhythmia Neg Hx     Coronary artery disease Neg Hx     Hypertension Neg Hx     Sudden death Neg Hx         scd     Past Surgical History:   Procedure Laterality Date    ANGIOPLASTY      ballon    CORONARY ANGIOPLASTY WITH STENT PLACEMENT      LULA to proximal and mid LAD, Proximal RCA       HERNIA REPAIR         Current Outpatient Medications:     aspirin 81 mg chewable tablet, Chew 1 tablet daily for 30 days, Disp: 30 tablet, Rfl: 0    LANTUS SOLOSTAR 100 units/mL injection pen, INJECT 30 UNITS SUBCUTANEOUSLY EVERY MORNING AND 10 UNITS SUBCUTANEOUSLY AT BEDTIME DAILY, Disp: , Rfl: 5    metoprolol tartrate (LOPRESSOR) 25 mg tablet, Take 1 tablet by mouth every 12 (twelve) hours for 30 days, Disp: 60 tablet, Rfl: 0    potassium chloride (K-DUR,KLOR-CON) 20 mEq tablet, One tablet twice a day, Disp: 60 tablet, Rfl: 1    TRUE METRIX BLOOD GLUCOSE TEST test strip, TEST FOUR TIMES DAILY, Disp: , Rfl: 11    atorvastatin (LIPITOR) 40 mg tablet, Take 1 tablet (40 mg total) by mouth daily, Disp: 90 tablet, Rfl: 3    furosemide (LASIX) 40 mg tablet, Take 1 tablet (40 mg total) by mouth daily, Disp: 90 tablet, Rfl: 3  No Known Allergies    Labs:     Chemistry        Component Value Date/Time     06/25/2015 1038    K 4 5 04/29/2019 1254    K 4 3 06/25/2015 1038    CL 99 (L) 04/29/2019 1254     06/25/2015 1038    CO2 26 04/29/2019 1254    CO2 27 06/25/2015 1038    BUN 44 (H) 04/29/2019 1254    BUN 30 (H) 06/25/2015 1038    CREATININE 1 83 (H) 04/29/2019 1254    CREATININE 1 16 06/25/2015 1038        Component Value Date/Time    CALCIUM 9 0 04/29/2019 1254    CALCIUM 8 7 06/25/2015 1038    ALKPHOS 86 03/20/2019 1010    ALKPHOS 61 06/25/2015 1038    AST 17 03/20/2019 1010    AST 15 06/25/2015 1038    ALT 30 03/20/2019 1010    ALT 22 06/25/2015 1038    BILITOT 1 3 (H) 06/25/2015 1038            Lab Results   Component Value Date    CHOL 98 06/25/2015    CHOL 102 01/29/2015    CHOL 150 12/18/2014     Lab Results   Component Value Date    HDL 55 03/20/2019    HDL 51 03/10/2018    HDL 39 (L) 05/18/2017     Lab Results   Component Value Date    LDLCALC 60 03/20/2019    LDLCALC 55 03/10/2018    LDLCALC 105 (H) 05/18/2017     Lab Results   Component Value Date    TRIG 31 03/20/2019    TRIG 63 03/10/2018    TRIG 84 05/18/2017     No results found for: CHOLHDL    Imaging: No results found  Review of Systems   Cardiovascular: Positive for dyspnea on exertion  Negative for chest pain and leg swelling  All other systems reviewed and are negative  Vitals:    08/05/19 1015   BP: 110/70   Pulse: 73   SpO2: 98%     Vitals:    08/05/19 1015   Weight: 87 5 kg (193 lb)     Height: 5' 10" (177 8 cm)   Body mass index is 27 69 kg/m²      Physical Exam:  General:  Alert and cooperative, appears stated age  HEENT:  PERRLA, EOMI, no scleral icterus, no conjunctival pallor  Neck:  No lymphadenopathy, no thyromegaly, no carotid bruits, no elevated JVP  Heart:  Regular rate and rhythm, normal S1/S2, no S3/S4, no murmur  Lungs:  Clear to auscultation bilaterally   Abdomen:  Soft, non-tender, positive bowel sounds, no rebound or guarding,   no organomegaly   Extremities:  Positive edema, left lower extremity scar noted  Vascular:  2+ pedal pulses  Skin:  No rashes or lesions on exposed skin  Neurologic:  Cranial nerves II-XII grossly intact without focal deficits

## 2019-11-08 ENCOUNTER — TELEPHONE (OUTPATIENT)
Dept: CARDIOLOGY CLINIC | Facility: CLINIC | Age: 59
End: 2019-11-08

## 2019-11-08 DIAGNOSIS — I50.42 CHRONIC COMBINED SYSTOLIC AND DIASTOLIC CHF (CONGESTIVE HEART FAILURE) (HCC): ICD-10-CM

## 2019-11-08 DIAGNOSIS — I25.5 ISCHEMIC CARDIOMYOPATHY: ICD-10-CM

## 2019-11-08 DIAGNOSIS — E78.5 DYSLIPIDEMIA: ICD-10-CM

## 2019-11-08 RX ORDER — POTASSIUM CHLORIDE 20 MEQ/1
TABLET, EXTENDED RELEASE ORAL
Qty: 90 TABLET | Refills: 3 | Status: SHIPPED | OUTPATIENT
Start: 2019-11-08 | End: 2020-08-15

## 2019-11-08 RX ORDER — ATORVASTATIN CALCIUM 40 MG/1
40 TABLET, FILM COATED ORAL DAILY
Qty: 90 TABLET | Refills: 3 | Status: SHIPPED | OUTPATIENT
Start: 2019-11-08 | End: 2020-12-02

## 2019-11-12 ENCOUNTER — TELEPHONE (OUTPATIENT)
Dept: CARDIOLOGY CLINIC | Facility: CLINIC | Age: 59
End: 2019-11-12

## 2019-11-12 ENCOUNTER — TRANSCRIBE ORDERS (OUTPATIENT)
Dept: LAB | Facility: CLINIC | Age: 59
End: 2019-11-12

## 2019-11-12 ENCOUNTER — APPOINTMENT (OUTPATIENT)
Dept: LAB | Facility: CLINIC | Age: 59
End: 2019-11-12
Payer: COMMERCIAL

## 2019-11-12 LAB
ANION GAP SERPL CALCULATED.3IONS-SCNC: 7 MMOL/L (ref 4–13)
BUN SERPL-MCNC: 29 MG/DL (ref 5–25)
CALCIUM SERPL-MCNC: 9.1 MG/DL (ref 8.3–10.1)
CHLORIDE SERPL-SCNC: 101 MMOL/L (ref 100–108)
CO2 SERPL-SCNC: 31 MMOL/L (ref 21–32)
CREAT SERPL-MCNC: 1.71 MG/DL (ref 0.6–1.3)
GFR SERPL CREATININE-BSD FRML MDRD: 42 ML/MIN/1.73SQ M
GLUCOSE SERPL-MCNC: 255 MG/DL (ref 65–140)
POTASSIUM SERPL-SCNC: 4.1 MMOL/L (ref 3.5–5.3)
SODIUM SERPL-SCNC: 139 MMOL/L (ref 136–145)

## 2019-11-12 PROCEDURE — 80048 BASIC METABOLIC PNL TOTAL CA: CPT | Performed by: INTERNAL MEDICINE

## 2019-11-12 PROCEDURE — 36415 COLL VENOUS BLD VENIPUNCTURE: CPT | Performed by: INTERNAL MEDICINE

## 2019-11-12 NOTE — TELEPHONE ENCOUNTER
Patient stopped in the office and states he has CHF symptoms-8lb weight gain in one week and SOB when he lies down  He states he was advised to contact us to report symptoms    [phone]

## 2019-11-13 NOTE — TELEPHONE ENCOUNTER
Please confirm his Lasix dose - if he is on it once daily please have him increase this to BID for three days and call back with an update  If he thinks he needs to be seen he can see an advanced practitioner         Thanks -  Sumit Lerma

## 2019-11-14 ENCOUNTER — TELEPHONE (OUTPATIENT)
Dept: CARDIOLOGY CLINIC | Facility: CLINIC | Age: 59
End: 2019-11-14

## 2019-11-14 NOTE — TELEPHONE ENCOUNTER
Called patient to verify dosage of Lasix currently taking and to advise per Dr Ernestine Heath instructions, pt did not answer, LMOM to call back

## 2019-11-15 ENCOUNTER — TELEPHONE (OUTPATIENT)
Dept: CARDIOLOGY CLINIC | Facility: CLINIC | Age: 59
End: 2019-11-15

## 2019-11-15 NOTE — TELEPHONE ENCOUNTER
Called pt's cell phone, continues to not answer, called wife's phone, Hermelinda Epley Tammy confirmed that the patient is now taking Lasix 40mg BID, advised per Dr Davon Armas instructions  Pt's wife reports that he is feeling much better and will return to regular dosing on Monday  Pt is reported to be losing weight (wife is not sure the exact amount)  Pt's wife fully educated on symptoms to watch for, and to seek medical attention if needed

## 2020-02-10 ENCOUNTER — APPOINTMENT (INPATIENT)
Dept: MRI IMAGING | Facility: HOSPITAL | Age: 60
DRG: 571 | End: 2020-02-10
Payer: COMMERCIAL

## 2020-02-10 ENCOUNTER — APPOINTMENT (EMERGENCY)
Dept: RADIOLOGY | Facility: HOSPITAL | Age: 60
DRG: 571 | End: 2020-02-10
Payer: COMMERCIAL

## 2020-02-10 ENCOUNTER — HOSPITAL ENCOUNTER (INPATIENT)
Facility: HOSPITAL | Age: 60
LOS: 6 days | Discharge: HOME HEALTH | DRG: 571 | End: 2020-02-16
Attending: EMERGENCY MEDICINE | Admitting: INTERNAL MEDICINE
Payer: COMMERCIAL

## 2020-02-10 DIAGNOSIS — Z79.4 TYPE 2 DIABETES MELLITUS WITH HYPERGLYCEMIA, WITH LONG-TERM CURRENT USE OF INSULIN (HCC): ICD-10-CM

## 2020-02-10 DIAGNOSIS — M65.072: ICD-10-CM

## 2020-02-10 DIAGNOSIS — L02.612 ABSCESS OF LEFT FOOT: ICD-10-CM

## 2020-02-10 DIAGNOSIS — N17.9 ACUTE-ON-CHRONIC KIDNEY INJURY (HCC): ICD-10-CM

## 2020-02-10 DIAGNOSIS — L03.116 CELLULITIS OF LEFT LOWER LEG: Primary | ICD-10-CM

## 2020-02-10 DIAGNOSIS — N18.9 ACUTE-ON-CHRONIC KIDNEY INJURY (HCC): ICD-10-CM

## 2020-02-10 DIAGNOSIS — A41.9 SEPSIS (HCC): ICD-10-CM

## 2020-02-10 DIAGNOSIS — E11.65 TYPE 2 DIABETES MELLITUS WITH HYPERGLYCEMIA, WITH LONG-TERM CURRENT USE OF INSULIN (HCC): ICD-10-CM

## 2020-02-10 DIAGNOSIS — L03.116 CELLULITIS OF LEFT FOOT: ICD-10-CM

## 2020-02-10 LAB
ALBUMIN SERPL BCP-MCNC: 2.8 G/DL (ref 3.5–5)
ALP SERPL-CCNC: 80 U/L (ref 46–116)
ALT SERPL W P-5'-P-CCNC: 13 U/L (ref 12–78)
ANION GAP SERPL CALCULATED.3IONS-SCNC: 12 MMOL/L (ref 4–13)
APTT PPP: 32 SECONDS (ref 23–37)
AST SERPL W P-5'-P-CCNC: 15 U/L (ref 5–45)
BASOPHILS # BLD AUTO: 0.09 THOUSANDS/ΜL (ref 0–0.1)
BASOPHILS NFR BLD AUTO: 1 % (ref 0–1)
BILIRUB SERPL-MCNC: 1.3 MG/DL (ref 0.2–1)
BUN SERPL-MCNC: 38 MG/DL (ref 5–25)
CALCIUM SERPL-MCNC: 9.1 MG/DL (ref 8.3–10.1)
CHLORIDE SERPL-SCNC: 101 MMOL/L (ref 100–108)
CK MB SERPL-MCNC: 0.7 NG/ML (ref 0–5)
CK MB SERPL-MCNC: <1 % (ref 0–2.5)
CK SERPL-CCNC: 166 U/L (ref 39–308)
CO2 SERPL-SCNC: 26 MMOL/L (ref 21–32)
CREAT SERPL-MCNC: 1.99 MG/DL (ref 0.6–1.3)
CRP SERPL HS-MCNC: 76.45 MG/L
EOSINOPHIL # BLD AUTO: 0.06 THOUSAND/ΜL (ref 0–0.61)
EOSINOPHIL NFR BLD AUTO: 0 % (ref 0–6)
ERYTHROCYTE [DISTWIDTH] IN BLOOD BY AUTOMATED COUNT: 12.8 % (ref 11.6–15.1)
EST. AVERAGE GLUCOSE BLD GHB EST-MCNC: 295 MG/DL
GFR SERPL CREATININE-BSD FRML MDRD: 35 ML/MIN/1.73SQ M
GLUCOSE SERPL-MCNC: 115 MG/DL (ref 65–140)
GLUCOSE SERPL-MCNC: 127 MG/DL (ref 65–140)
GLUCOSE SERPL-MCNC: 129 MG/DL (ref 65–140)
GLUCOSE SERPL-MCNC: 153 MG/DL (ref 65–140)
GLUCOSE SERPL-MCNC: 342 MG/DL (ref 65–140)
GLUCOSE SERPL-MCNC: 352 MG/DL (ref 65–140)
HBA1C MFR BLD: 11.9 % (ref 4.2–6.3)
HCT VFR BLD AUTO: 36.5 % (ref 36.5–49.3)
HGB BLD-MCNC: 12 G/DL (ref 12–17)
IMM GRANULOCYTES # BLD AUTO: 0.08 THOUSAND/UL (ref 0–0.2)
IMM GRANULOCYTES NFR BLD AUTO: 0 % (ref 0–2)
INR PPP: 1.16 (ref 0.84–1.19)
LACTATE SERPL-SCNC: 1.5 MMOL/L (ref 0.5–2)
LYMPHOCYTES # BLD AUTO: 0.92 THOUSANDS/ΜL (ref 0.6–4.47)
LYMPHOCYTES NFR BLD AUTO: 5 % (ref 14–44)
MCH RBC QN AUTO: 28.2 PG (ref 26.8–34.3)
MCHC RBC AUTO-ENTMCNC: 32.9 G/DL (ref 31.4–37.4)
MCV RBC AUTO: 86 FL (ref 82–98)
MONOCYTES # BLD AUTO: 1.51 THOUSAND/ΜL (ref 0.17–1.22)
MONOCYTES NFR BLD AUTO: 8 % (ref 4–12)
NEUTROPHILS # BLD AUTO: 16.19 THOUSANDS/ΜL (ref 1.85–7.62)
NEUTS SEG NFR BLD AUTO: 86 % (ref 43–75)
NRBC BLD AUTO-RTO: 0 /100 WBCS
PLATELET # BLD AUTO: 287 THOUSANDS/UL (ref 149–390)
PMV BLD AUTO: 9.8 FL (ref 8.9–12.7)
POTASSIUM SERPL-SCNC: 3.9 MMOL/L (ref 3.5–5.3)
PROT SERPL-MCNC: 7.3 G/DL (ref 6.4–8.2)
PROTHROMBIN TIME: 14.2 SECONDS (ref 11.6–14.5)
RBC # BLD AUTO: 4.26 MILLION/UL (ref 3.88–5.62)
SODIUM SERPL-SCNC: 139 MMOL/L (ref 136–145)
URATE SERPL-MCNC: 6.9 MG/DL (ref 4.2–8)
WBC # BLD AUTO: 18.85 THOUSAND/UL (ref 4.31–10.16)

## 2020-02-10 PROCEDURE — 99254 IP/OBS CNSLTJ NEW/EST MOD 60: CPT | Performed by: PODIATRIST

## 2020-02-10 PROCEDURE — 99223 1ST HOSP IP/OBS HIGH 75: CPT | Performed by: INTERNAL MEDICINE

## 2020-02-10 PROCEDURE — 87185 SC STD ENZYME DETCJ PER NZM: CPT | Performed by: PODIATRIST

## 2020-02-10 PROCEDURE — 99285 EMERGENCY DEPT VISIT HI MDM: CPT | Performed by: EMERGENCY MEDICINE

## 2020-02-10 PROCEDURE — 83036 HEMOGLOBIN GLYCOSYLATED A1C: CPT | Performed by: NURSE PRACTITIONER

## 2020-02-10 PROCEDURE — 0Y9N3ZX DRAINAGE OF LEFT FOOT, PERCUTANEOUS APPROACH, DIAGNOSTIC: ICD-10-PCS | Performed by: PODIATRIST

## 2020-02-10 PROCEDURE — 71046 X-RAY EXAM CHEST 2 VIEWS: CPT

## 2020-02-10 PROCEDURE — 87040 BLOOD CULTURE FOR BACTERIA: CPT | Performed by: EMERGENCY MEDICINE

## 2020-02-10 PROCEDURE — 80053 COMPREHEN METABOLIC PANEL: CPT | Performed by: EMERGENCY MEDICINE

## 2020-02-10 PROCEDURE — 82948 REAGENT STRIP/BLOOD GLUCOSE: CPT

## 2020-02-10 PROCEDURE — 87070 CULTURE OTHR SPECIMN AEROBIC: CPT | Performed by: PODIATRIST

## 2020-02-10 PROCEDURE — 87076 CULTURE ANAEROBE IDENT EACH: CPT | Performed by: PODIATRIST

## 2020-02-10 PROCEDURE — 99284 EMERGENCY DEPT VISIT MOD MDM: CPT

## 2020-02-10 PROCEDURE — 85025 COMPLETE CBC W/AUTO DIFF WBC: CPT | Performed by: EMERGENCY MEDICINE

## 2020-02-10 PROCEDURE — 73630 X-RAY EXAM OF FOOT: CPT

## 2020-02-10 PROCEDURE — 96365 THER/PROPH/DIAG IV INF INIT: CPT

## 2020-02-10 PROCEDURE — A9585 GADOBUTROL INJECTION: HCPCS | Performed by: INTERNAL MEDICINE

## 2020-02-10 PROCEDURE — 84550 ASSAY OF BLOOD/URIC ACID: CPT | Performed by: NURSE PRACTITIONER

## 2020-02-10 PROCEDURE — 85730 THROMBOPLASTIN TIME PARTIAL: CPT | Performed by: EMERGENCY MEDICINE

## 2020-02-10 PROCEDURE — 99255 IP/OBS CONSLTJ NEW/EST HI 80: CPT | Performed by: INTERNAL MEDICINE

## 2020-02-10 PROCEDURE — 85610 PROTHROMBIN TIME: CPT | Performed by: EMERGENCY MEDICINE

## 2020-02-10 PROCEDURE — 87147 CULTURE TYPE IMMUNOLOGIC: CPT | Performed by: PODIATRIST

## 2020-02-10 PROCEDURE — 73720 MRI LWR EXTREMITY W/O&W/DYE: CPT

## 2020-02-10 PROCEDURE — 10061 I&D ABSCESS COMP/MULTIPLE: CPT | Performed by: PODIATRIST

## 2020-02-10 PROCEDURE — 82550 ASSAY OF CK (CPK): CPT | Performed by: EMERGENCY MEDICINE

## 2020-02-10 PROCEDURE — 82553 CREATINE MB FRACTION: CPT | Performed by: EMERGENCY MEDICINE

## 2020-02-10 PROCEDURE — 73590 X-RAY EXAM OF LOWER LEG: CPT

## 2020-02-10 PROCEDURE — 36415 COLL VENOUS BLD VENIPUNCTURE: CPT | Performed by: EMERGENCY MEDICINE

## 2020-02-10 PROCEDURE — 87205 SMEAR GRAM STAIN: CPT | Performed by: PODIATRIST

## 2020-02-10 PROCEDURE — 87075 CULTR BACTERIA EXCEPT BLOOD: CPT | Performed by: PODIATRIST

## 2020-02-10 PROCEDURE — 86141 C-REACTIVE PROTEIN HS: CPT | Performed by: EMERGENCY MEDICINE

## 2020-02-10 PROCEDURE — 83605 ASSAY OF LACTIC ACID: CPT | Performed by: EMERGENCY MEDICINE

## 2020-02-10 RX ORDER — OXYCODONE HYDROCHLORIDE AND ACETAMINOPHEN 5; 325 MG/1; MG/1
1 TABLET ORAL EVERY 6 HOURS PRN
Status: DISCONTINUED | OUTPATIENT
Start: 2020-02-10 | End: 2020-02-16 | Stop reason: HOSPADM

## 2020-02-10 RX ORDER — POLYETHYLENE GLYCOL 3350 17 G/17G
17 POWDER, FOR SOLUTION ORAL DAILY PRN
Status: DISCONTINUED | OUTPATIENT
Start: 2020-02-10 | End: 2020-02-16 | Stop reason: HOSPADM

## 2020-02-10 RX ORDER — ONDANSETRON 2 MG/ML
4 INJECTION INTRAMUSCULAR; INTRAVENOUS EVERY 6 HOURS PRN
Status: DISCONTINUED | OUTPATIENT
Start: 2020-02-10 | End: 2020-02-16 | Stop reason: HOSPADM

## 2020-02-10 RX ORDER — ONDANSETRON 2 MG/ML
4 INJECTION INTRAMUSCULAR; INTRAVENOUS ONCE
Status: COMPLETED | OUTPATIENT
Start: 2020-02-10 | End: 2020-02-10

## 2020-02-10 RX ORDER — CEFAZOLIN SODIUM 1 G/50ML
1000 SOLUTION INTRAVENOUS EVERY 8 HOURS
Status: DISCONTINUED | OUTPATIENT
Start: 2020-02-10 | End: 2020-02-16

## 2020-02-10 RX ORDER — INSULIN GLARGINE 100 [IU]/ML
30 INJECTION, SOLUTION SUBCUTANEOUS EVERY MORNING
Status: DISCONTINUED | OUTPATIENT
Start: 2020-02-10 | End: 2020-02-16 | Stop reason: HOSPADM

## 2020-02-10 RX ORDER — ATORVASTATIN CALCIUM 40 MG/1
40 TABLET, FILM COATED ORAL
Status: DISCONTINUED | OUTPATIENT
Start: 2020-02-10 | End: 2020-02-16 | Stop reason: HOSPADM

## 2020-02-10 RX ORDER — POTASSIUM CHLORIDE 20 MEQ/1
20 TABLET, EXTENDED RELEASE ORAL DAILY
Status: DISCONTINUED | OUTPATIENT
Start: 2020-02-10 | End: 2020-02-16 | Stop reason: HOSPADM

## 2020-02-10 RX ORDER — HYDROMORPHONE HCL/PF 1 MG/ML
0.5 SYRINGE (ML) INJECTION ONCE
Status: COMPLETED | OUTPATIENT
Start: 2020-02-10 | End: 2020-02-10

## 2020-02-10 RX ORDER — FUROSEMIDE 40 MG/1
40 TABLET ORAL DAILY
Status: DISCONTINUED | OUTPATIENT
Start: 2020-02-10 | End: 2020-02-11

## 2020-02-10 RX ORDER — ACETAMINOPHEN 325 MG/1
650 TABLET ORAL EVERY 6 HOURS PRN
Status: DISCONTINUED | OUTPATIENT
Start: 2020-02-10 | End: 2020-02-16 | Stop reason: HOSPADM

## 2020-02-10 RX ORDER — SODIUM CHLORIDE 9 MG/ML
125 INJECTION, SOLUTION INTRAVENOUS CONTINUOUS
Status: DISCONTINUED | OUTPATIENT
Start: 2020-02-10 | End: 2020-02-10

## 2020-02-10 RX ORDER — INSULIN GLARGINE 100 [IU]/ML
10 INJECTION, SOLUTION SUBCUTANEOUS
Status: DISCONTINUED | OUTPATIENT
Start: 2020-02-10 | End: 2020-02-13

## 2020-02-10 RX ADMIN — FUROSEMIDE 40 MG: 40 TABLET ORAL at 08:39

## 2020-02-10 RX ADMIN — CEFAZOLIN SODIUM 1000 MG: 1 SOLUTION INTRAVENOUS at 17:35

## 2020-02-10 RX ADMIN — VANCOMYCIN HYDROCHLORIDE 1250 MG: 5 INJECTION, POWDER, LYOPHILIZED, FOR SOLUTION INTRAVENOUS at 03:13

## 2020-02-10 RX ADMIN — CEFAZOLIN SODIUM 1000 MG: 1 SOLUTION INTRAVENOUS at 23:25

## 2020-02-10 RX ADMIN — GADOBUTROL 8 ML: 604.72 INJECTION INTRAVENOUS at 18:52

## 2020-02-10 RX ADMIN — HYDROMORPHONE HYDROCHLORIDE 0.5 MG: 1 INJECTION, SOLUTION INTRAMUSCULAR; INTRAVENOUS; SUBCUTANEOUS at 03:10

## 2020-02-10 RX ADMIN — METOPROLOL TARTRATE 25 MG: 25 TABLET ORAL at 21:24

## 2020-02-10 RX ADMIN — ONDANSETRON 4 MG: 2 INJECTION INTRAMUSCULAR; INTRAVENOUS at 03:09

## 2020-02-10 RX ADMIN — CEFAZOLIN SODIUM 1000 MG: 1 SOLUTION INTRAVENOUS at 10:08

## 2020-02-10 RX ADMIN — INSULIN LISPRO 5 UNITS: 100 INJECTION, SOLUTION INTRAVENOUS; SUBCUTANEOUS at 23:20

## 2020-02-10 RX ADMIN — INSULIN LISPRO 6 UNITS: 100 INJECTION, SOLUTION INTRAVENOUS; SUBCUTANEOUS at 17:33

## 2020-02-10 RX ADMIN — AZTREONAM 1000 MG: 1 INJECTION, POWDER, LYOPHILIZED, FOR SOLUTION INTRAMUSCULAR; INTRAVENOUS at 08:34

## 2020-02-10 RX ADMIN — ATORVASTATIN CALCIUM 40 MG: 40 TABLET, FILM COATED ORAL at 17:32

## 2020-02-10 RX ADMIN — OXYCODONE HYDROCHLORIDE AND ACETAMINOPHEN 1 TABLET: 5; 325 TABLET ORAL at 08:39

## 2020-02-10 RX ADMIN — OXYCODONE HYDROCHLORIDE AND ACETAMINOPHEN 1 TABLET: 5; 325 TABLET ORAL at 17:43

## 2020-02-10 RX ADMIN — INSULIN GLARGINE 10 UNITS: 100 INJECTION, SOLUTION SUBCUTANEOUS at 21:23

## 2020-02-10 RX ADMIN — METOPROLOL TARTRATE 25 MG: 25 TABLET ORAL at 08:39

## 2020-02-10 RX ADMIN — POTASSIUM CHLORIDE 20 MEQ: 1500 TABLET, EXTENDED RELEASE ORAL at 08:39

## 2020-02-10 RX ADMIN — METRONIDAZOLE 500 MG: 500 INJECTION, SOLUTION INTRAVENOUS at 09:21

## 2020-02-10 RX ADMIN — SODIUM CHLORIDE 250 ML: 0.9 INJECTION, SOLUTION INTRAVENOUS at 03:10

## 2020-02-10 RX ADMIN — PIPERACILLIN SODIUM AND TAZOBACTAM SODIUM 4.5 G: 36; 4.5 INJECTION, POWDER, FOR SOLUTION INTRAVENOUS at 02:33

## 2020-02-10 NOTE — CONSULTS
Consultation - Infectious Disease   Lakisha Pacheco 56 y o  male MRN: [id number]  Unit/Bed#: W -01 Encounter: [id number]      IMPRESSION & RECOMMENDATIONS:     1  Sepsis  POA  Fever and leukocytosis  Source appears to be left lower extremity cellulitis and foot abscess  Blood cultures pending and negative to date  Rec:  · Continue IV antibiotic as below  · Monitor temperature and hemodynamics  · Follow-up pending blood cultures  · Repeat CBC in a m  2   Chronic diabetic foot ulcer with formation of abscess  Status post bedside I and D today with cultures pending  Preliminary Gram stain growing 4+ Gram-positive cocci in pairs and 2+ Gram-negative rods  Rec:  · Continue cefazolin IV  · Follow-up pending cultures  · Follow-up MRI left foot  · Follow-up clinically  · Repeat CBC in a m  3   Cellulitis left foot  Patient presenting with erythema of dorsal foot, swelling and pain  Patient has history of cellulitis of left lower extremity multiple times in the past with most recent treated with cefazolin to Keflex in 2018  Rec:  · Consider Cefazolin IV  · Serial exams  · Elevation frequently  · Follow-up MRI left foot scheduled for 4 p m     4   Diabetes with neuropathy  02/10/2020 hemoglobin A1c 11 9  Increases risk for infection  Rec:  Blood glucose management per primary care team  Regular diabetic foot care diabetic shoes recommended with neuropathy    5  Chronic kidney disease stage 3  Creatinine on admission 1 99 with an estimated GFR of 35%  Rec:  Continue cefazolin IV renal dose adjusted  Monitor creatinine    6  Chronic osteomyelitis of left tibia  Following fracture with hardware placement in 1996  Patient had multiple infections requiring IV antibiotics and hardware was eventually removed  Patient states every year or so since he has a lower extremity cellulitis    His last cellulitis course was September 2018 and patient received 3 days of Ancef in the hospital and then was transition to oral Keflex 500 mg q i d  for 14 day total course  Antibiotics:  Antibiotics D2 -Ancef, aztreonam, and Flagyl    Above plan discussed in detail with patient, family at bedside, RN, and Dr Estefani Eller  Thank you for this consultation  We will follow along with you  HISTORY OF PRESENT ILLNESS:  Reason for Consult: 1  Cellulitis of left lower leg 2  Cellulitis of left foot 3  Sepsis    HPI: Zakia Vides is a 56 y o  male with past medical history significant for diabetes with neuropathy, poorly controlled, chronic tibia osteomyelitis, coronary artery disease, chronic kidney disease, congestive heart failure who presents with erythema, swelling, and pain of left foot associated  Patient reports having fever and chills Saturday and started taking Cipro he had on hand prescribed by his PCP with his history of recurrence cellulitis and chronic osteomyelitis of left lower extremity  He states the Cipro and ibuprofen were not helping and then he developed redness, swelling, and pain in his dorsal foot that prompted him to come to the ER for evaluation on Sunday  On arrival to the ER, patient had fever to 101 8 with leukocytosis of 18 85  His left lower extremity x-ray showed chronic fracture of the tib-fib similar in appearance to 2018 without evidence of osteomyelitis  Blood cultures were obtained, patient given Zosyn and vancomycin in the ED, and the patient was admitted on cefazolin, aztreonam and Flagyl  Patient was made a new p o  Until seen by Podiatry  Dr Mamadou Lorenzo performed bedside I and D of plantar left foot abscess and sent cultures  Infectious Disease now being consulted regarding evaluation and management of cellulitis of the left lower leg and foot with sepsis  Of note, approximately 20 years ago, patient broke his proximal tibia and underwent surgery  He developed chronic infection    Over the years hardware has been removed he has been on PICC lines and IV antibiotics numerous times     REVIEW OF SYSTEMS:  As above in HPI, patient also denies any recent upper respiratory tract infection, cough, shortness of breath, nausea, vomiting, diarrhea, or any dysuria  Otherwise a complete system-based review of systems is otherwise negative  PAST MEDICAL HISTORY:  Past Medical History:   Diagnosis Date    CAD (coronary artery disease)     Chronic combined systolic and diastolic CHF (congestive heart failure) (HCC)     Diabetes mellitus (HCC)     type 2    Dyslipidemia     Hypertension     Ischemic cardiomyopathy     MI (myocardial infarction) ([address]     Osteomyelitis ([address] )     Pancreatitis      Past Surgical History:   Procedure Laterality Date    ANGIOPLASTY      ballon    CORONARY ANGIOPLASTY WITH STENT PLACEMENT      LULA to proximal and mid LAD, Proximal RCA   HERNIA REPAIR         FAMILY HISTORY:  Non-contributory    SOCIAL HISTORY:  Social History     Substance and Sexual Activity   Alcohol Use Not Currently     Social History     Substance and Sexual Activity   Drug Use No     Social History     Tobacco Use   Smoking Status Never Smoker   Smokeless Tobacco Never Used       ALLERGIES:  No Known Allergies    MEDICATIONS:  All current active medications have been reviewed      PHYSICAL EXAM:  Vitals:  Temp:  [97 1 °F (36 2 °C)-99 2 °F (37 3 °C)] 99 2 °F (37 3 °C)  HR:  [81-89] 89  Resp:  [15-18] 16  BP: ()/(54-78) 109/66  SpO2:  [95 %-100 %] 96 %  Temp (24hrs), Av 4 °F (36 9 °C), Min:97 1 °F (36 2 °C), Max:99 2 °F (37 3 °C)  Current: Temperature: 99 2 °F (37 3 °C)     Physical Exam:  General:  68-year-old male well-nourished, well-developed, in no acute distress, resting in bed with left foot elevated on pillows  Eyes:  Conjunctive clear with no hemorrhages or effusions  Oropharynx:  No ulcers, no lesions  Neck:  Supple, no lymphadenopathy  Lungs:  Clear to auscultation bilaterally, no accessory muscle use  Cardiac:  Regular rate and rhythm, no murmurs  Abdomen:  Soft, non-tender, non-distended  Extremities:  No peripheral cyanosis, clubbing, or edema or erythema outside of heavy gauze wrap to left foot which in since place  Admission photos reviewed with dorsal left foot erythema and callus of plantar aspect at 1st and 5th metatarsals noted  Arm IV site nontender  Patient has chronic hemosiderin since skin changes of left mid shin from prior surgeries and scarring  He also has an intact scab without active drainage or fluctuance  Bilateral DP pulses palpable  Skin:  No rashes, no ulcers  Neurological:  Moves all four extremities spontaneously, positive impaired sensation of plantar feet    LABS, IMAGING, & OTHER STUDIES:  Lab Results:  I have personally reviewed pertinent labs  Results from last 7 days   Lab Units 02/10/20  0200   POTASSIUM mmol/L 3 9   CHLORIDE mmol/L 101   CO2 mmol/L 26   BUN mg/dL 38*   CREATININE mg/dL 1 99*   EGFR ml/min/1 73sq m 35   CALCIUM mg/dL 9 1   AST U/L 15   ALT U/L 13   ALK PHOS U/L 80     Results from last 7 days   Lab Units 02/10/20  0200   WBC Thousand/uL 18 85*   HEMOGLOBIN g/dL 12 0   PLATELETS Thousands/uL 287     Results from last 7 days   Lab Units 02/10/20  0750 02/10/20  0219 02/10/20  0200   BLOOD CULTURE   --  Received in Microbiology Lab  Culture in Progress  Received in Microbiology Lab  Culture in Progress  GRAM STAIN RESULT  Rare Polys*  4+ Gram positive cocci in pairs*  2+ Gram negative rods*  --   --            Imaging Studies:   I have personally reviewed pertinent imaging study reports and images in PACS   02/10/2020 x-ray left tib-fib:  Chronic fractures without x-ray evidence of osteomyelitis of the left leg    EKG, Pathology, and Other Studies:   I have personally reviewed pertinent reports

## 2020-02-10 NOTE — H&P
APULIE&Tona Kramer 1960, 56 y o  male MRN: [id number]    Unit/Bed#: W -01 Encounter: [id number]    Primary Care Provider: Suzy Denise MD   Date and time admitted to hospital: 2/10/2020 12:30 AM        * Recurrent cellulitis of lower leg  Assessment & Plan  · Presents to ED with complaints of two day history left LE pain, erythema, swelling  Pain is located to great toe and mid shin  He reports prior left ankle fracture requiring plate  Hardware has since been removed  He reports episodes of cellulitis about once per year  · He reports he has cipro prescribed to him to use as needed for cellulitis  He took about 3 doses with no improvement  · Tmax 101 8 at home, currently afebrile  · Xray left tib fib: old proximal tib/fib fracture  · Xray left foot: subcutaneous gas 1st webspace, read by ED provider   · Received IV Zosyn IV vancomycin in ED   · Start ancef, aztreonam, flagyl  · Monitor neurovascular checks q2 hour  · NPO for now   · Non weight baring to LLE until eval by podiatry  · Consult podiatry     Sepsis [facility])  Assessment & Plan  · Present on admission   · As evidenced by fever 101 8 and leukocytosis   · Xray left foot concerning for subcutaneous gas on 1st webspace   · Received one liter IV fluids in ED   · Lactic normal  · BC pending   · Received vancomycin and zosyn in ED   · Start ancef, aztreonam, flagyl     Chronic combined systolic and diastolic CHF (congestive heart failure) ([facility] [address] )  Assessment & Plan  Wt Readings from Last 3 Encounters:   02/10/20 81 4 kg (179 lb 7 3 oz)   08/05/19 87 5 kg (193 lb)   05/01/19 86 7 kg (191 lb 3 2 oz)    Patient is known to Dr Brianna Ivan of Cardiology   Euvolemic   Last echocardiogram from May 1678:  Systolic function was mildly reduced  LVEF 45   I&O, daily weight   Low sodium diet and fluid restriction     Current regimen: metoprolol 25 mg BID, lasix 40 mg daily - continue       CKD (chronic kidney disease) stage 3, GFR 30-59 ml/min [facility])  Assessment & Plan   Creatinine slightly above baseline (1 5-1 8)  Creatinine on admission1 99   Monitor kidney function daily    Benign essential hypertension  Assessment & Plan  · Stable   · Continue metoprolol and lasix     Presence of drug coated stent in LAD coronary artery  Assessment & Plan  · Hold aspirin, pending podiatry plan  - last dose 02/09/2020  · Continue statin and beta blocker    Type 2 diabetes mellitus with hyperglycemia, with long-term current use of insulin (HCC)  Assessment & Plan  Lab Results   Component Value Date    HGBA1C 11 5 (H) 03/20/2019       No results for input(s): POCGLU in the last 72 hours  Blood Sugar Average: Last 72 hrs:  · Obtain A1c   · Continue home regimen of Lantus 30 units in a m  and 10 units at bedtime - continue   · Add accucheck and sliding scale  · Reports home blood sugar ranges from 70 to 500 at times       VTE Prophylaxis: Pharmacologic VTE Prophylaxis contraindicated due to hold pending podiatry plan  / sequential compression device   Code Status: Level 1  POLST: POLST is not applicable to this patient  Discussion with family: patient     Anticipated Length of Stay:  Patient will be admitted on an Inpatient basis with an anticipated length of stay of  Greater than 2 midnights  Justification for Hospital Stay: IV antitbiocs, podiatry consult pending     Total Time for Visit, including Counseling / Coordination of Care: 45 minutes  Greater than 50% of this total time spent on direct patient counseling and coordination of care  Chief Complaint:   Worsening erythema, swelling, pain of LLE      History of Present Illness:    Gunner Mendoza is a 56 y o  male who presents with worsening erythema, swelling, pain of LLE  Patient reports symptoms began about two days ago  He took about 3 doses of cipro with no improvement  He reports a fever 101 8  Otherwise, denies any URI, GI,  infectious symptoms  Reports normal appetite        Past medical history significant for type 2 diabetes, hypetension, CAD with stents, CHF, CKD, ICM, left ankle fracture     On arrival to ED, patient met sepsis criteria as evidenced by leukocytosis and pre hospital fever of 101 8  Lactic acid is normal    Otherwise, vitals are stable  Xray left tib fib shows no acute changes  Xray left foot concerning for subcutaneous gas  Received vancomycin and zosyn in ED  Patient will be admitted as inpatient for IV abx  He will require a consult to podiatry  Review of Systems:    Review of Systems   Constitutional: Positive for fever  Negative for appetite change, chills and unexpected weight change  HENT: Negative  Eyes: Negative  Respiratory: Negative  Cardiovascular: Negative  Gastrointestinal: Negative  Genitourinary: Negative  Musculoskeletal: Negative  Neurological: Negative  Hematological: Negative  Psychiatric/Behavioral: Negative  Past Medical and Surgical History:     Past Medical History:   Diagnosis Date    CAD (coronary artery disease)     Chronic combined systolic and diastolic CHF (congestive heart failure) ([facility] )     Diabetes mellitus ([facility] )     type 2    Dyslipidemia     Hypertension     Ischemic cardiomyopathy     MI (myocardial infarction) ([facility] )     Osteomyelitis ([place] [address] )     Pancreatitis        Past Surgical History:   Procedure Laterality Date    ANGIOPLASTY      ballon    CORONARY ANGIOPLASTY WITH STENT PLACEMENT      LULA to proximal and mid LAD, Proximal RCA   HERNIA REPAIR         Meds/Allergies:    Prior to Admission medications    Medication Sig Start Date End Date Taking?  Authorizing Provider   aspirin 81 mg chewable tablet Chew 1 tablet daily for 30 days 5/19/17 8/5/19  LATASHA Lin   atorvastatin (LIPITOR) 40 mg tablet Take 1 tablet (40 mg total) by mouth daily 11/8/19 1/24/22  Constantine Montanez MD   furosemide (LASIX) 40 mg tablet Take 1 tablet (40 mg total) by mouth daily 8/5/19   Calderon Watkins DO LANTUS SOLOSTAR 100 units/mL injection pen INJECT 30 UNITS SUBCUTANEOUSLY EVERY MORNING AND 10 UNITS SUBCUTANEOUSLY AT BEDTIME DAILY 8/20/18   Historical Provider, MD   metoprolol tartrate (LOPRESSOR) 25 mg tablet Take 1 tablet (25 mg total) by mouth every 12 (twelve) hours 11/8/19 12/8/19  Kaushik Farrar MD   potassium chloride (K-DUR,KLOR-CON) 20 mEq tablet One tablet twice a day 11/8/19   Kaushik Farrar MD   TRUE METRIX BLOOD GLUCOSE TEST test strip TEST FOUR TIMES DAILY 8/20/18   Historical Provider, MD     I have reviewed home medications with patient personally  Allergies: No Known Allergies    Social History:     Marital Status: /Civil Union     Substance Use History:   Social History     Substance and Sexual Activity   Alcohol Use No     Social History     Tobacco Use   Smoking Status Never Smoker   Smokeless Tobacco Never Used     Social History     Substance and Sexual Activity   Drug Use No       Family History:    non-contributory    Physical Exam:     Vitals:   Blood Pressure: 112/68 (02/10/20 0424)  Pulse: 81 (02/10/20 0424)  Temperature: 98 9 °F (37 2 °C) (02/10/20 0424)  Temp Source: Oral (02/10/20 0036)  Respirations: 18 (02/10/20 0424)  Height: 5' 10" (177 8 cm) (02/10/20 0036)  Weight - Scale: 81 4 kg (179 lb 7 3 oz) (02/10/20 0036)  SpO2: 95 % (02/10/20 0424)    Physical Exam   Constitutional: He is oriented to person, place, and time  He appears well-developed and well-nourished  No distress  HENT:   Head: Normocephalic and atraumatic  Eyes: Pupils are equal, round, and reactive to light  EOM are normal  No scleral icterus  Neck: Normal range of motion  Neck supple  Cardiovascular: Normal rate, regular rhythm, normal heart sounds and intact distal pulses  Exam reveals no friction rub  No murmur heard  Pulmonary/Chest: Effort normal and breath sounds normal  No stridor  No respiratory distress  He has no wheezes  He has no rales  Abdominal: Soft   Bowel sounds are normal  He exhibits no distension  There is no tenderness  Musculoskeletal: Normal range of motion  He exhibits edema and tenderness  Neurological: He is alert and oriented to person, place, and time  Skin: Skin is warm  Capillary refill takes less than 2 seconds  There is erythema  +2 pedal pulses   Psychiatric: He has a normal mood and affect  Additional Data:     Lab Results: I have personally reviewed pertinent reports  Results from last 7 days   Lab Units 02/10/20  0200   WBC Thousand/uL 18 85*   HEMOGLOBIN g/dL 12 0   HEMATOCRIT % 36 5   PLATELETS Thousands/uL 287   NEUTROS PCT % 86*   LYMPHS PCT % 5*   MONOS PCT % 8   EOS PCT % 0     Results from last 7 days   Lab Units 02/10/20  0200   SODIUM mmol/L 139   POTASSIUM mmol/L 3 9   CHLORIDE mmol/L 101   CO2 mmol/L 26   BUN mg/dL 38*   CREATININE mg/dL 1 99*   ANION GAP mmol/L 12   CALCIUM mg/dL 9 1   ALBUMIN g/dL 2 8*   TOTAL BILIRUBIN mg/dL 1 30*   ALK PHOS U/L 80   ALT U/L 13   AST U/L 15   GLUCOSE RANDOM mg/dL 153*     Results from last 7 days   Lab Units 02/10/20  0200   INR  1 16             Results from last 7 days   Lab Units 02/10/20  0200   LACTIC ACID mmol/L 1 5       Imaging: I have personally reviewed pertinent reports  XR chest 2 views   ED Interpretation by Ziyad Thomas MD (02/10 5457)   No acute disease read by me  XR tibia fibula 2 views LEFT   ED Interpretation by Ziyad Thomas MD (02/10 0218)   Old proximal tib/fib fracture read by me  XR foot 3+ views LEFT   ED Interpretation by Ziyad Thomas MD (02/10 0607)   No fx or FB; (+) subcutaneous gas noted 1st webspace read by me  EKG, Pathology, and Other Studies Reviewed on Admission:       Allscripts / [facility] Records Reviewed: Yes     ** Please Note: This note has been constructed using a voice recognition system   **

## 2020-02-10 NOTE — ASSESSMENT & PLAN NOTE
Lab Results   Component Value Date    HGBA1C 11 5 (H) 03/20/2019       No results for input(s): POCGLU in the last 72 hours      Blood Sugar Average: Last 72 hrs:  · Obtain A1c   · Continue home regimen of Lantus 30 units in a m  and 10 units at bedtime - continue   · Add accucheck and sliding scale  · Reports home blood sugar ranges from 70 to 500 at times

## 2020-02-10 NOTE — ASSESSMENT & PLAN NOTE
 Creatinine slightly above baseline (1 5-1 8)    Creatinine on admission1 99   Monitor kidney function daily

## 2020-02-10 NOTE — ASSESSMENT & PLAN NOTE
Wt Readings from Last 3 Encounters:   02/10/20 81 4 kg (179 lb 7 3 oz)   08/05/19 87 5 kg (193 lb)   05/01/19 86 7 kg (191 lb 3 2 oz)    Patient is known to Dr More Parker of Cardiology   Euvolemic   Last echocardiogram from May 7350:  Systolic function was mildly reduced  LVEF 45   I&O, daily weight   Low sodium diet and fluid restriction     Current regimen: metoprolol 25 mg BID, lasix 40 mg daily - continue

## 2020-02-10 NOTE — PLAN OF CARE
Problem: Potential for Falls  Goal: Patient will remain free of falls  Description  INTERVENTIONS:  - Assess patient frequently for physical needs  -  Identify cognitive and physical deficits and behaviors that affect risk of falls    -  Lead fall precautions as indicated by assessment   - Educate patient/family on patient safety including physical limitations  - Instruct patient to call for assistance with activity based on assessment  - Modify environment to reduce risk of injury  - Consider OT/PT consult to assist with strengthening/mobility  Outcome: Progressing     Problem: SKIN/TISSUE INTEGRITY - ADULT  Goal: Skin integrity remains intact  Description  INTERVENTIONS  - Identify patients at risk for skin breakdown  - Assess and monitor skin integrity  - Assess and monitor nutrition and hydration status  - Monitor labs (i e  albumin)  - Assess for incontinence   - Turn and reposition patient  - Assist with mobility/ambulation  - Relieve pressure over bony prominences  - Avoid friction and shearing  - Provide appropriate hygiene as needed including keeping skin clean and dry  - Evaluate need for skin moisturizer/barrier cream  - Collaborate with interdisciplinary team (i e  Nutrition, Rehabilitation, etc )   - Patient/family teaching  Outcome: Progressing  Goal: Incision(s), wounds(s) or drain site(s) healing without S/S of infection  Description  INTERVENTIONS  - Assess and document risk factors for skin impairment   - Assess and document dressing, incision, wound bed, drain sites and surrounding tissue  - Consider nutrition services referral as needed  - Oral mucous membranes remain intact  - Provide patient/ family education  Outcome: Progressing  Goal: Oral mucous membranes remain intact  Description  INTERVENTIONS  - Assess oral mucosa and hygiene practices  - Implement preventative oral hygiene regimen  - Implement oral medicated treatments as ordered  - Initiate Nutrition services referral as needed  Outcome: Progressing     Problem: MUSCULOSKELETAL - ADULT  Goal: Maintain or return mobility to safest level of function  Description  INTERVENTIONS:  - Assess patient's ability to carry out ADLs; assess patient's baseline for ADL function and identify physical deficits which impact ability to perform ADLs (bathing, care of mouth/teeth, toileting, grooming, dressing, etc )  - Assess/evaluate cause of self-care deficits   - Assess range of motion  - Assess patient's mobility  - Assess patient's need for assistive devices and provide as appropriate  - Encourage maximum independence but intervene and supervise when necessary  - Involve family in performance of ADLs  - Assess for home care needs following discharge   - Consider OT consult to assist with ADL evaluation and planning for discharge  - Provide patient education as appropriate  Outcome: Progressing  Goal: Maintain proper alignment of affected body part  Description  INTERVENTIONS:  - Support, maintain and protect limb and body alignment  - Provide patient/ family with appropriate education  Outcome: Progressing     Problem: PAIN - ADULT  Goal: Verbalizes/displays adequate comfort level or baseline comfort level  Description  Interventions:  - Encourage patient to monitor pain and request assistance  - Assess pain using appropriate pain scale  - Administer analgesics based on type and severity of pain and evaluate response  - Implement non-pharmacological measures as appropriate and evaluate response  - Consider cultural and social influences on pain and pain management  - Notify physician/advanced practitioner if interventions unsuccessful or patient reports new pain  Outcome: Progressing     Problem: INFECTION - ADULT  Goal: Absence or prevention of progression during hospitalization  Description  INTERVENTIONS:  - Assess and monitor for signs and symptoms of infection  - Monitor lab/diagnostic results  - Monitor all insertion sites, i e  indwelling lines, tubes, and drains  - Monitor endotracheal if appropriate and nasal secretions for changes in amount and color  - [place] appropriate cooling/warming therapies per order  - Administer medications as ordered  - Instruct and encourage patient and family to use good hand hygiene technique  - Identify and instruct in appropriate isolation precautions for identified infection/condition  Outcome: Progressing  Goal: Absence of fever/infection during neutropenic period  Description  INTERVENTIONS:  - Monitor WBC    Outcome: Progressing     Problem: SAFETY ADULT  Goal: Maintain or return to baseline ADL function  Description  INTERVENTIONS:  -  Assess patient's ability to carry out ADLs; assess patient's baseline for ADL function and identify physical deficits which impact ability to perform ADLs (bathing, care of mouth/teeth, toileting, grooming, dressing, etc )  - Assess/evaluate cause of self-care deficits   - Assess range of motion  - Assess patient's mobility; develop plan if impaired  - Assess patient's need for assistive devices and provide as appropriate  - Encourage maximum independence but intervene and supervise when necessary  - Involve family in performance of ADLs  - Assess for home care needs following discharge   - Consider OT consult to assist with ADL evaluation and planning for discharge  - Provide patient education as appropriate  Outcome: Progressing  Goal: Maintain or return mobility status to optimal level  Description  INTERVENTIONS:  - Assess patient's baseline mobility status (ambulation, transfers, stairs, etc )    - Identify cognitive and physical deficits and behaviors that affect mobility  - Identify mobility aids required to assist with transfers and/or ambulation (gait belt, sit-to-stand, lift, walker, cane, etc )  - [place] fall precautions as indicated by assessment  - Record patient progress and toleration of activity level on Mobility SBAR; progress patient to next Phase/Stage  - Instruct patient to call for assistance with activity based on assessment  - Consider rehabilitation consult to assist with strengthening/weightbearing, etc   Outcome: Progressing

## 2020-02-10 NOTE — UTILIZATION REVIEW
Initial Clinical Review    Admission: Date/Time/Statement: Admission Orders (From admission, onward)     Ordered        02/10/20 0305  Inpatient Admission  Once                   Orders Placed This Encounter   Procedures    Inpatient Admission     Standing Status:   Standing     Number of Occurrences:   1     Order Specific Question:   Admitting Physician     Answer:   Eliott Oppenheim     Order Specific Question:   Level of Care     Answer:   Med Surg [16]     Order Specific Question:   Estimated length of stay     Answer:   More than 2 Midnights     Order Specific Question:   Certification     Answer:   I certify that inpatient services are medically necessary for this patient for a duration of greater than two midnights  See H&P and MD Progress Notes for additional information about the patient's course of treatment  ED Arrival Information     Expected Arrival Acuity Means of Arrival Escorted By Service Admission Type    - 2/10/2020 00:29 Urgent Walk-In Self Hospitalist Urgent    Arrival Complaint    Leg pain,foot pain,fever        Chief Complaint   Patient presents with    Leg Pain     Pt presents to ED from home w/ left leg pain starting yesterday  pt states getting infections once a year in that leg  Pt (+) hx HTN, DM, CHF  Assessment/Plan: 55 yo male type 2 DM/CKD stage 3 to ED from home admitted as inpatient for Recurrent cellulitis of left Lower leg  Patient reports 2 day  Worsening of erythema , swelling & pain of Left lower extremity  Explains pain is located to great toe & mid shin  Reports prior left ankle fracture requiring plate with hardware removed, reports cellulitis once a year  Shares took 3 doses of Cipro with no improvement  Subjective fever of 101 8  In ED, met sepsis criteria for leukocytosis with pre hospital fever=101 8  In ED: Xray left foot concerning for subcutaneous gas; obtained blood cultures, received IV fluids, IV antibiotics & IV pain , medication   Continue IV antibiotics, neurovascular checks Q 2hr, NPO, consult Podiatry  2/10/2020 Podiatry:  Assessment:  1  Sepsis stemming from left foot abscess  2  Chronic diabetic foot ulcer with abscess left foot  3  Cellulitis left foot  4  Diabetes with neuropathy  5  Chronic osteomyelitis left tibia, stable   MD with I&D of plantar distal interspace- malodorous with seropurulent drainage with culture obtained  Obtain MRI  May weight bear in surgical shoe  Possible further surgical exploration vs local wound care vs partial amputation made in next 24 hours         ED Triage Vitals [02/10/20 0036]   Temperature Pulse Respirations Blood Pressure SpO2   (!) 97 1 °F (36 2 °C) 88 16 132/67 100 %      Temp Source Heart Rate Source Patient Position - Orthostatic VS BP Location FiO2 (%)   Oral Monitor Lying Right arm --      Pain Score       7        Wt Readings from Last 1 Encounters:   02/10/20 80 9 kg (178 lb 6 oz)     Additional Vital Signs:   Date/Time  Temp  Pulse  Resp  BP  MAP (mmHg)  SpO2  O2 Device  Patient Position - Orthostatic VS   02/10/20 08:42:48    85  18  118/78  91  99 %    Lying   02/10/20 0839    85               02/10/20 04:24:14  98 9 °F (37 2 °C)  81  18  112/68  83  95 %       02/10/20 0330    84  15  98/62  76  98 %  None (Room air)     02/10/20 0300    84    97/54  70  98 %       02/10/20 0234    87  16  112/71  87  99 %  None (Room air)  Lying   02/10/20 0203    85  18  120/77    98 %  None (Room air)  Lying   02/10/20 0036  97 1 °F (36 2 °C)Abnormal   88  16  132/67    100 %  None (Room air)  Lying      Weights (last 14 days)     Date/Time  Weight  Weight Method  Height   02/10/20 0600  80 9 kg (178 lb 6 oz)  Standing scale  5' 10" (1 778 m)   02/10/20 0036  81 4 kg (179 lb 7 3 oz)    5' 10" (1 778 m)       Pertinent Labs/Diagnostic Test Results:   Results from last 7 days   Lab Units 02/10/20  0200   WBC Thousand/uL 18 85*   HEMOGLOBIN g/dL 12 0   HEMATOCRIT % 36 5 PLATELETS Thousands/uL 287   NEUTROS ABS Thousands/µL 16 19*         Results from last 7 days   Lab Units 02/10/20  0200   SODIUM mmol/L 139   POTASSIUM mmol/L 3 9   CHLORIDE mmol/L 101   CO2 mmol/L 26   ANION GAP mmol/L 12   BUN mg/dL 38*   CREATININE mg/dL 1 99*   EGFR ml/min/1 73sq m 35   CALCIUM mg/dL 9 1     Results from last 7 days   Lab Units 02/10/20  0200   AST U/L 15   ALT U/L 13   ALK PHOS U/L 80   TOTAL PROTEIN g/dL 7 3   ALBUMIN g/dL 2 8*   TOTAL BILIRUBIN mg/dL 1 30*     Results from last 7 days   Lab Units 02/10/20  1056 02/10/20  0855 02/10/20  0618   POC GLUCOSE mg/dl 129 115 127     Results from last 7 days   Lab Units 02/10/20  0200   GLUCOSE RANDOM mg/dL 153*         Results from last 7 days   Lab Units 02/10/20  0200   HEMOGLOBIN A1C % 11 9*   EAG mg/dl 295     No results found for: BETA-HYDROXYBUTYRATE               Results from last 7 days   Lab Units 02/10/20  0200   CK TOTAL U/L 166   CK MB INDEX % <1 0   CK MB ng/mL 0 7             Results from last 7 days   Lab Units 02/10/20  0200   PROTIME seconds 14 2   INR  1 16   PTT seconds 32             Results from last 7 days   Lab Units 02/10/20  0200   LACTIC ACID mmol/L 1 5               Results from last 7 days   Lab Units 02/10/20  0219 02/10/20  0200   BLOOD CULTURE  Received in Microbiology Lab  Culture in Progress  Received in Microbiology Lab  Culture in Progress  2/10 XR foot 3 views: Evidence of emphysema in the 1st/2nd interspace suggest the possibility of gas-forming organisms/infection   Subcutaneous gas was noted on the ER preliminary result  No x-ray evidence of osteomyelitis at this time  2/10/2020 XR tibia/fibula:  Chronic fractures without x-ray evidence of osteomyelitis at this time    2/10 CXR:  No acute disease      ED Treatment:   Medication Administration from 02/10/2020 0029 to 02/10/2020 0414       Date/Time Order Dose Route Action     02/10/2020 0313 vancomycin (VANCOCIN) 1,250 mg in sodium chloride 0 9 % 250 mL IVPB 1,250 mg Intravenous New Bag     02/10/2020 0233 piperacillin-tazobactam (ZOSYN) 4 5 g in sodium chloride 0 9 % 100 mL IVPB 4 5 g Intravenous New Bag     02/10/2020 0310 sodium chloride 0 9 % bolus 250 mL 250 mL Intravenous New Bag     02/10/2020 0309 ondansetron (ZOFRAN) injection 4 mg 4 mg Intravenous Given     02/10/2020 0310 HYDROmorphone (DILAUDID) injection 0 5 mg 0 5 mg Intravenous Given        Past Medical History:   Diagnosis Date    CAD (coronary artery disease)     Chronic combined systolic and diastolic CH[facility]congestive heart failure) ([facility])     Diabetes mellitus ([place] [address] )     type 2    Dyslipidemia     Hypertension     Ischemic cardiomyopathy     MI (myocardial infarction) ([place] [address] )     Osteomyelitis ([place] [address] )     Pancreatitis      Present on Admission:   Recurrent cellulitis of lower leg   Chronic combined systolic and diastolic CHF (congestive heart failure) ([facility])   CKD (chronic kidney disease) stage 3, GFR 30-59 ml/min ([facility])   Benign essential hypertension      Admitting Diagnosis: Leg pain [M79 606]  Cellulitis of left foot [L03 116]  Acute-on-chronic kidney injury ([place] [address] ) [N17 9, N18 9]  Cellulitis of left lower leg [L03 116]  Age/Sex: 56 y o  male  Admission Orders:  Neurovascular checks Q 2hr  MRi  scd    Scheduled Medications:    Medications:  atorvastatin 40 mg Oral After Dinner   aztreonam 1,000 mg Intravenous Q8H   And      metroNIDAZOLE 500 mg Intravenous Q8H   cefazolin 1,000 mg Intravenous Q8H   furosemide 40 mg Oral Daily   insulin glargine 10 Units Subcutaneous HS   insulin glargine 30 Units Subcutaneous QAM   insulin lispro 1-6 Units Subcutaneous Q6H   metoprolol tartrate 25 mg Oral Q12H [address]   potassium chloride 20 mEq Oral Daily     Continuous IV Infusions:     PRN Meds:    acetaminophen 650 mg Oral Q6H PRN   ondansetron 4 mg Intravenous Q6H PRN   oxyCODONE-acetaminophen 1 tablet Oral Q6H PRN   polyethylene glycol 17 g Oral Daily PRN       IP CONSULT TO CASE MANAGEMENT  IP CONSULT TO PODIATRY  IP CONSULT TO INFECTIOUS DISEASES    Network Utilization Review Department  [email or web address]  ATTENTION: Please call with any questions or concerns to [phone] and carefully listen to the prompts so that you are directed to the right person  All voicemails are confidential   Rissa Goss all requests for admission clinical reviews, approved or denied determinations and any other requests to dedicated fax number below belonging to the campus where the patient is receiving treatment   List of dedicated fax numbers for the Facilities:  [address] DENIALS (Administrative/Medical Necessity) [phone]   [address] (Maternity/NICU/Pediatrics) [phone]   Kyara Darling [phone]   [facility] ([place], [place]) [phone]   Delilah Soto [phone]   [address] [place] [address]  [phone]   [address] [place] [address] [phone]   [facility]  [phone]   [address] [facility] [address]  [address] [facility] [address] [place] [phone]

## 2020-02-10 NOTE — ED PROVIDER NOTES
History  Chief Complaint   Patient presents with    Leg Pain     Pt presents to ED from home w/ left leg pain starting yesterday  pt states getting infections once a year in that leg  Pt (+) hx HTN, DM, CHF  Patient is a 56year old male with intermittent fever and chills since this past Saturday  (+) redness of left lower leg with pain  (+) left foot swelling and redness as well  No recent trauma  Had prior left lower leg fx over 20 years ago and had osteomyelitis and skin grafting  Last Tdap was in 2018 as per Epic  No cough  No N/V/D  No urinary sx  Was last seen in this ED on 9/16/18 for finger dislocation  [facility] website checked on this patient and no Rx found  History provided by:  Patient   used: No    Leg Pain   Associated symptoms: fever        Prior to Admission Medications   Prescriptions Last Dose Informant Patient Reported? Taking?    LANTUS SOLOSTAR 100 units/mL injection pen  Self Yes No   Sig: INJECT 30 UNITS SUBCUTANEOUSLY EVERY MORNING AND 10 UNITS SUBCUTANEOUSLY AT BEDTIME DAILY   TRUE METRIX BLOOD GLUCOSE TEST test strip  Self Yes No   Sig: TEST FOUR TIMES DAILY   aspirin 81 mg chewable tablet  Self No No   Sig: Chew 1 tablet daily for 30 days   atorvastatin (LIPITOR) 40 mg tablet   No No   Sig: Take 1 tablet (40 mg total) by mouth daily   furosemide (LASIX) 40 mg tablet   No No   Sig: Take 1 tablet (40 mg total) by mouth daily   metoprolol tartrate (LOPRESSOR) 25 mg tablet   No No   Sig: Take 1 tablet (25 mg total) by mouth every 12 (twelve) hours   potassium chloride (K-DUR,KLOR-CON) 20 mEq tablet   No No   Sig: One tablet twice a day      Facility-Administered Medications: None       Past Medical History:   Diagnosis Date    CAD (coronary artery disease)     Chronic combined systolic and diastolic CHF (congestive heart failure) (HCC)     Diabetes mellitus (HCC)     type 2    Dyslipidemia     Hypertension     Ischemic cardiomyopathy     [facility]     Osteomyelitis ([facility] )     Pancreatitis        Past Surgical History:   Procedure Laterality Date    ANGIOPLASTY      ballon    CORONARY ANGIOPLASTY WITH STENT PLACEMENT      LULA to proximal and mid LAD, Proximal RCA   HERNIA REPAIR         Family History   Problem Relation Age of Onset    Heart attack Father     Hyperlipidemia Father     Cancer Father     Diabetes Mother     Heart failure Mother         poss    Cancer Paternal Grandfather     Stroke Neg Hx     Anuerysm Neg Hx     Clotting disorder Neg Hx     Arrhythmia Neg Hx     Coronary artery disease Neg Hx     Hypertension Neg Hx     Sudden death Neg Hx         scd     I have reviewed and agree with the history as documented  Social History     Tobacco Use    Smoking status: Never Smoker    Smokeless tobacco: Never Used   Substance Use Topics    Alcohol use: No    Drug use: No        Review of Systems   Constitutional: Positive for chills and fever  Respiratory: Negative for cough  Gastrointestinal: Negative for diarrhea, nausea and vomiting  Genitourinary: Negative for difficulty urinating  Musculoskeletal: Positive for myalgias  Left foot swelling   Skin: Positive for color change  All other systems reviewed and are negative  Physical Exam  Physical Exam   Constitutional: He is oriented to person, place, and time  He appears well-developed and well-nourished  He appears distressed (moderate)  HENT:   Head: Normocephalic and atraumatic  Mouth/Throat: Oropharynx is clear and moist    Eyes: No scleral icterus  Neck: Normal range of motion  Neck supple  No JVD present  No tracheal deviation present  Cardiovascular: Normal rate, regular rhythm and normal heart sounds  No murmur heard  Pulmonary/Chest: Effort normal and breath sounds normal  No respiratory distress  Abdominal: Soft  Bowel sounds are normal  There is no tenderness     Musculoskeletal: He exhibits edema (left foot) and tenderness (mild left foot tenderness distally and dorsally)  NVI  Neurological: He is alert and oriented to person, place, and time  Skin: Skin is warm and dry  There is erythema (left lower leg anteriorly and distal dorsal left foot 1st webspace region  )  Psychiatric: He has a normal mood and affect  Nursing note and vitals reviewed        Vital Signs  ED Triage Vitals [02/10/20 0036]   Temperature Pulse Respirations Blood Pressure SpO2   (!) 97 1 °F (36 2 °C) 88 16 132/67 100 %      Temp Source Heart Rate Source Patient Position - Orthostatic VS BP Location FiO2 (%)   Oral Monitor Lying Right arm --      Pain Score       7           Vitals:    02/10/20 0036 02/10/20 0203 02/10/20 0234   BP: 132/67 120/77 112/71   Pulse: 88 85 87   Patient Position - Orthostatic VS: Lying Lying Lying         Visual Acuity      ED Medications  Medications   vancomycin (VANCOCIN) 1,250 mg in sodium chloride 0 9 % 250 mL IVPB (1,250 mg Intravenous New Bag 2/10/20 0313)   sodium chloride 0 9 % bolus 250 mL (250 mL Intravenous New Bag 2/10/20 0310)   sodium chloride 0 9 % infusion (has no administration in time range)   piperacillin-tazobactam (ZOSYN) 4 5 g in sodium chloride 0 9 % 100 mL IVPB (0 g Intravenous Stopped 2/10/20 0305)   ondansetron (ZOFRAN) injection 4 mg (4 mg Intravenous Given 2/10/20 0309)   HYDROmorphone (DILAUDID) injection 0 5 mg (0 5 mg Intravenous Given 2/10/20 0310)       Diagnostic Studies  Results Reviewed     Procedure Component Value Units Date/Time    High sensitivity CRP [[id number]] Collected:  02/10/20 0200    Lab Status:  Final result Specimen:  Blood from Arm, Left Updated:  02/10/20 0313     CRP, High Sensitivity 76 45 mg/L     Narrative:             HsCRP Level       Relative Risk           <1 0 mg/L          Low           1 0 to 3 0 mg/L    Average           >3 0 mg/L          High        CKMB [[id number]]  (Normal) Collected:  02/10/20 0200    Lab Status:  Final result Specimen:  Blood from Arm, Left Updated:  02/10/20 0312     CK-MB Index <1 0 %      CK-MB 0 7 ng/mL     CK Total with Reflex CKMB [[id number]]  (Normal) Collected:  02/10/20 0200    Lab Status:  Final result Specimen:  Blood from Arm, Left Updated:  02/10/20 0258     Total  U/L     Lactic acid, plasma x2 [[id number]]  (Normal) Collected:  02/10/20 0200    Lab Status:  Final result Specimen:  Blood from Arm, Left Updated:  02/10/20 0232     LACTIC ACID 1 5 mmol/L     Narrative:       Result may be elevated if tourniquet was used during collection  Comprehensive metabolic panel [[id number]]  (Abnormal) Collected:  02/10/20 0200    Lab Status:  Final result Specimen:  Blood from Arm, Left Updated:  02/10/20 0229     Sodium 139 mmol/L      Potassium 3 9 mmol/L      Chloride 101 mmol/L      CO2 26 mmol/L      ANION GAP 12 mmol/L      BUN 38 mg/dL      Creatinine 1 99 mg/dL      Glucose 153 mg/dL      Calcium 9 1 mg/dL      AST 15 U/L      ALT 13 U/L      Alkaline Phosphatase 80 U/L      Total Protein 7 3 g/dL      Albumin 2 8 g/dL      Total Bilirubin 1 30 mg/dL      eGFR 35 ml/min/1 73sq m     Narrative:       [place] guidelines for Chronic Kidney Disease (CKD):     Stage 1 with normal or high GFR (GFR > 90 mL/min/1 73 square meters)    Stage 2 Mild CKD (GFR = 60-89 mL/min/1 73 square meters)    Stage 3A Moderate CKD (GFR = 45-59 mL/min/1 73 square meters)    Stage 3B Moderate CKD (GFR = 30-44 mL/min/1 73 square meters)    Stage 4 Severe CKD (GFR = 15-29 mL/min/1 73 square meters)    Stage 5 End Stage CKD (GFR <15 mL/min/1 73 square meters)  Note: GFR calculation is accurate only with a steady state creatinine    Blood culture #2 [[id number]] Collected:  02/10/20 0219    Lab Status:   In process Specimen:  Blood from Arm, Right Updated:  02/10/20 0225    Protime-INR [[id number]]  (Normal) Collected:  02/10/20 0200    Lab Status:  Final result Specimen:  Blood from Arm, Left Updated:  02/10/20 0222 Protime 14 2 seconds      INR 1 16    APTT [[id number]]  (Normal) Collected:  02/10/20 0200    Lab Status:  Final result Specimen:  Blood from Arm, Left Updated:  02/10/20 0222     PTT 32 seconds     CBC and differential [[id number]]  (Abnormal) Collected:  02/10/20 0200    Lab Status:  Final result Specimen:  Blood from Arm, Left Updated:  02/10/20 0211     WBC 18 85 Thousand/uL      RBC 4 26 Million/uL      Hemoglobin 12 0 g/dL      Hematocrit 36 5 %      MCV 86 fL      MCH 28 2 pg      MCHC 32 9 g/dL      RDW 12 8 %      MPV 9 8 fL      Platelets 333 Thousands/uL      nRBC 0 /100 WBCs      Neutrophils Relative 86 %      Immat GRANS % 0 %      Lymphocytes Relative 5 %      Monocytes Relative 8 %      Eosinophils Relative 0 %      Basophils Relative 1 %      Neutrophils Absolute 16 19 Thousands/µL      Immature Grans Absolute 0 08 Thousand/uL      Lymphocytes Absolute 0 92 Thousands/µL      Monocytes Absolute 1 51 Thousand/µL      Eosinophils Absolute 0 06 Thousand/µL      Basophils Absolute 0 09 Thousands/µL     Blood culture #1 [[id number]] Collected:  02/10/20 0200    Lab Status: In process Specimen:  Blood from Arm, Left Updated:  02/10/20 0209                 XR chest 2 views   ED Interpretation by Mildred Peña MD (02/10 0116)   No acute disease read by me  XR tibia fibula 2 views LEFT   ED Interpretation by Mildred Peña MD (02/10 0218)   Old proximal tib/fib fracture read by me  XR foot 3+ views LEFT   ED Interpretation by Mildred Peña MD (02/10 2001)   No fx or FB; (+) subcutaneous gas noted 1st webspace read by me  Procedures  Procedures         ED Course  ED Course as of Feb 10 0314   Mon Feb 10, 2020   8149 Labs and x-rays d/w patient  Patient requesting pain medication so IV dilaudid ordered  Initial Sepsis Screening     Row Name 02/10/20 0236                Is the patient's history suggestive of a new or worsening infection?   (!) Yes (Proceed)  -AO        Suspected source of infection  soft tissue  -AO        Are two or more of the following signs & symptoms of infection both present and new to the patient? No  -AO        Indicate SIRS criteria  Leukocytosis (WBC > 44126 IJL)  -AO        If the answer is yes to both questions, suspicion of sepsis is present          If severe sepsis is present AND tissue hypoperfusion perists in the hour after fluid resuscitation or lactate > 4, the patient meets criteria for SEPTIC SHOCK          Are any of the following organ dysfunction criteria present within 6 hours of suspected infection and SIRS criteria that are NOT considered to be chronic conditions?         Organ dysfunction          Date of presentation of severe sepsis          Time of presentation of severe sepsis          Tissue hypoperfusion persists in the hour after crystalloid fluid administration, evidenced, by either:          Was hypotension present within one hour of the conclusion of crystalloid fluid administration?         Date of presentation of septic shock          Time of presentation of septic shock            User Key  (r) = Recorded By, (t) = Taken By, (c) = Cosigned By    [address] Name Provider Romelia Lang MD Physician                  MDM  Number of Diagnoses or Management Options  Diagnosis management comments: DDX including but not limited to: cellulitis, osteomyelitis, lymphangitis, folliculitis, carbuncle, abscess, rhabdomyolysis, necrotizing fasciitis, sepsis, severe sepsis; doubt allergic reaction, angioedema, DVT, pneumonia, meningitis, UTI, viral illness          Amount and/or Complexity of Data Reviewed  Clinical lab tests: ordered and reviewed  Tests in the radiology section of CPT®: ordered and reviewed  Decide to obtain previous medical records or to obtain history from someone other than the patient: yes  Review and summarize past medical records: yes  Discuss the patient with other providers: yes  Independent visualization of images, tracings, or specimens: yes          Disposition  Final diagnoses:   Cellulitis of left lower leg   Cellulitis of left foot   Acute-on-chronic kidney injury ([facility] [address] )     Time reflects when diagnosis was documented in both MDM as applicable and the Disposition within this note     Time User Action Codes Description Comment    2/10/2020  2:37 AM Hailey Fontan Add [L03 116] Cellulitis of left lower leg     2/10/2020  2:37 AM Hailey Fontan Add [L03 116] Cellulitis of left foot     2/10/2020  2:37 AM Hailey Fontan Add [N17 9,  N18 9] Acute-on-chronic kidney injury [facility])       ED Disposition     ED Disposition Condition Date/Time Comment    Admit Stable Mon Feb 10, 2020  3:05 AM Case was discussed with MO Armenta   and the patient's admission status was agreed to be Admission Status: inpatient status to the service of Dr Hannah Tuttle   Follow-up Information    None         Patient's Medications   Discharge Prescriptions    No medications on file     No discharge procedures on file      ED Provider  Electronically Signed by           Surekha Landon MD  02/10/20 4679       Surekha Landon MD  02/10/20 0660

## 2020-02-10 NOTE — ASSESSMENT & PLAN NOTE
· Present on admission   · As evidenced by fever 101 8 and leukocytosis   · Xray left foot concerning for subcutaneous gas on 1st webspace   · Received one liter IV fluids in ED   · Lactic normal  · BC pending   · Received vancomycin and zosyn in ED   · Start ancef, aztreonam, flagyl

## 2020-02-10 NOTE — ASSESSMENT & PLAN NOTE
· Presents to ED with complaints of two day history left LE pain, erythema, swelling  Pain is located to great toe and mid shin  He reports prior left ankle fracture requiring plate  Hardware has since been removed  He reports episodes of cellulitis about once per year  · He reports he has cipro prescribed to him to use as needed for cellulitis  He took about 3 doses with no improvement  · Tmax 101 8 at home, currently afebrile     · Xray left tib fib: old proximal tib/fib fracture  · Xray left foot: subcutaneous gas 1st webspace, read by ED provider   · Received IV Zosyn IV vancomycin in ED   · Start ancef, aztreonam, flagyl  · Monitor neurovascular checks q2 hour  · NPO for now   · Non weight baring to LLE until eval by podiatry  · Consult podiatry

## 2020-02-10 NOTE — PLAN OF CARE
Problem: Potential for Falls  Goal: Patient will remain free of falls  Description  INTERVENTIONS:  - Assess patient frequently for physical needs  -  Identify cognitive and physical deficits and behaviors that affect risk of falls    -  Jber fall precautions as indicated by assessment   - Educate patient/family on patient safety including physical limitations  - Instruct patient to call for assistance with activity based on assessment  - Modify environment to reduce risk of injury  - Consider OT/PT consult to assist with strengthening/mobility  Outcome: Progressing     Problem: SKIN/TISSUE INTEGRITY - ADULT  Goal: Skin integrity remains intact  Description  INTERVENTIONS  - Identify patients at risk for skin breakdown  - Assess and monitor skin integrity  - Assess and monitor nutrition and hydration status  - Monitor labs (i e  albumin)  - Assess for incontinence   - Turn and reposition patient  - Assist with mobility/ambulation  - Relieve pressure over bony prominences  - Avoid friction and shearing  - Provide appropriate hygiene as needed including keeping skin clean and dry  - Evaluate need for skin moisturizer/barrier cream  - Collaborate with interdisciplinary team (i e  Nutrition, Rehabilitation, etc )   - Patient/family teaching  Outcome: Progressing  Goal: Incision(s), wounds(s) or drain site(s) healing without S/S of infection  Description  INTERVENTIONS  - Assess and document risk factors for skin impairment   - Assess and document dressing, incision, wound bed, drain sites and surrounding tissue  - Consider nutrition services referral as needed  - Oral mucous membranes remain intact  - Provide patient/ family education  Outcome: Progressing  Goal: Oral mucous membranes remain intact  Description  INTERVENTIONS  - Assess oral mucosa and hygiene practices  - Implement preventative oral hygiene regimen  - Implement oral medicated treatments as ordered  - Initiate Nutrition services referral as needed  Outcome: Progressing     Problem: MUSCULOSKELETAL - ADULT  Goal: Maintain or return mobility to safest level of function  Description  INTERVENTIONS:  - Assess patient's ability to carry out ADLs; assess patient's baseline for ADL function and identify physical deficits which impact ability to perform ADLs (bathing, care of mouth/teeth, toileting, grooming, dressing, etc )  - Assess/evaluate cause of self-care deficits   - Assess range of motion  - Assess patient's mobility  - Assess patient's need for assistive devices and provide as appropriate  - Encourage maximum independence but intervene and supervise when necessary  - Involve family in performance of ADLs  - Assess for home care needs following discharge   - Consider OT consult to assist with ADL evaluation and planning for discharge  - Provide patient education as appropriate  Outcome: Progressing  Goal: Maintain proper alignment of affected body part  Description  INTERVENTIONS:  - Support, maintain and protect limb and body alignment  - Provide patient/ family with appropriate education  Outcome: Progressing     Problem: PAIN - ADULT  Goal: Verbalizes/displays adequate comfort level or baseline comfort level  Description  Interventions:  - Encourage patient to monitor pain and request assistance  - Assess pain using appropriate pain scale  - Administer analgesics based on type and severity of pain and evaluate response  - Implement non-pharmacological measures as appropriate and evaluate response  - Consider cultural and social influences on pain and pain management  - Notify physician/advanced practitioner if interventions unsuccessful or patient reports new pain  Outcome: Progressing     Problem: INFECTION - ADULT  Goal: Absence or prevention of progression during hospitalization  Description  INTERVENTIONS:  - Assess and monitor for signs and symptoms of infection  - Monitor lab/diagnostic results  - Monitor all insertion sites, i e  indwelling lines, tubes, and drains  - Monitor endotracheal if appropriate and nasal secretions for changes in amount and color  - Phil Campbell appropriate cooling/warming therapies per order  - Administer medications as ordered  - Instruct and encourage patient and family to use good hand hygiene technique  - Identify and instruct in appropriate isolation precautions for identified infection/condition  Outcome: Progressing     Problem: SAFETY ADULT  Goal: Maintain or return to baseline ADL function  Description  INTERVENTIONS:  -  Assess patient's ability to carry out ADLs; assess patient's baseline for ADL function and identify physical deficits which impact ability to perform ADLs (bathing, care of mouth/teeth, toileting, grooming, dressing, etc )  - Assess/evaluate cause of self-care deficits   - Assess range of motion  - Assess patient's mobility; develop plan if impaired  - Assess patient's need for assistive devices and provide as appropriate  - Encourage maximum independence but intervene and supervise when necessary  - Involve family in performance of ADLs  - Assess for home care needs following discharge   - Consider OT consult to assist with ADL evaluation and planning for discharge  - Provide patient education as appropriate  Outcome: Progressing  Goal: Maintain or return mobility status to optimal level  Description  INTERVENTIONS:  - Assess patient's baseline mobility status (ambulation, transfers, stairs, etc )    - Identify cognitive and physical deficits and behaviors that affect mobility  - Identify mobility aids required to assist with transfers and/or ambulation (gait belt, sit-to-stand, lift, walker, cane, etc )  - Phil Campbell fall precautions as indicated by assessment  - Record patient progress and toleration of activity level on Mobility SBAR; progress patient to next Phase/Stage  - Instruct patient to call for assistance with activity based on assessment  - Consider rehabilitation consult to assist with strengthening/weightbearing, etc   Outcome: Progressing

## 2020-02-10 NOTE — CONSULTS
Consult - Kaila Anderson Erin 56 y o  male MRN: [id number]  Unit/Bed#: W -01 Encounter: [id number]    Assessment/Plan     Assessment:  1  Sepsis stemming from left foot abscess  2  Chronic diabetic foot ulcer with abscess left foot  3  Cellulitis left foot  4  Diabetes with neuropathy  5  Chronic osteomyelitis left tibia, stable     Plan:  1  Patient had an unknown small wound on the plantar aspect of his left foot which probed into the 1st interspace correlating to the gas seen on the x-ray  The foot was cleaned with Betadine   Nursing was present in the room  I explained to the patient that I was going to do a small incision and drainage and he gave verbal consent   Patient has no feeling on the bottom of his foot and did not need any pain medication  Using an 11 blade a stab incision was made in the plantar distal interspace  There was immediate malodor with seropurulent drainage  This was cultured  The abscess involves the entire interspace between the great toe and 2nd digit  It extends dorsally  This was flushed with 20 mL of normal sterile saline and packed with quarter-inch plain packing  There was no direct probe to bone noted  Patient did not experience any pain due to his extensive neuropathy  2  Follow-up wound cultures  Follow-up MRI to rule out any septic joint or osteomyelitis  I do feel the acute infection has been decompressed at bedside therefore I gave the patient a diet and he may eat today  Continue broad-spectrum IV antibiotics  Recommend Infectious Disease consult  3  Weightbear to left heel in surgical shoe  4  A more definitive plan will be made and the next 24 hours whether the patient needs further surgical exploration versus local wound care versus partial amputations      History of Present Illness     HPI:  Semaj Pineda is a 56 y o  male who presents with past medical history significant for diabetes with neuropathy, poorly controlled, chronic tibia osteomyelitis, coronary artery disease, chronic kidney disease, congestive heart failure  Patient does not see a podiatrist regularly but states he does have neuropathy in his feet  Patient states he does not have any wounds on his feet and thinks he is admitted for cellulitis of his left leg  Approximately 20 years ago patient broke his proximal tibia and underwent surgery  He developed chronic infection  Over the years hardware has been removed he has been on PICC lines and IV antibiotics numerous times       Approximately 2 days ago he noticed his left leg and foot swelling more than normal   He took 3 days of Cipro without any improvement  He was getting fevers over 101  He denies any nausea, vomiting, chills, diarrhea  Upon arrival to the emergency department he did have extremely high leukocytosis with fever of 101 8  Consults  Review of Systems   Constitutional:  Fever  HENT: Negative  Eyes: Negative  Respiratory: Negative  Cardiovascular: Negative  Gastrointestinal: Negative  Musculoskeletal:  Deformity left proximal tibia from previous fracture  Skin:  Redness and swelling to left forefoot  Neurological:  Neuropathy in feet  Psych: negative      Historical Information   Past Medical History:   Diagnosis Date    CAD (coronary artery disease)     [facility] combined systolic [facility]     Diabetes mellitus ([facility])     type 2    Dyslipidemia     Hypertension     Ischemic cardiomyopathy     [facility] [address] [facility]     [facility]     Pancreatitis      Past Surgical History:   Procedure Laterality Date    ANGIOPLASTY      ballon    CORONARY ANGIOPLASTY WITH STENT PLACEMENT      LULA to proximal and mid LAD, Proximal RCA       HERNIA REPAIR       Social History   Social History     Substance and Sexual Activity   Alcohol Use No     Social History     Substance and Sexual Activity   Drug Use No     Social History Tobacco Use   Smoking Status Never Smoker   Smokeless Tobacco Never Used     Family History:   Family History   Problem Relation Age of Onset    Heart attack Father     Hyperlipidemia Father     Cancer Father     Diabetes Mother     Heart failure Mother         poss    Cancer Paternal Grandfather     Stroke Neg Hx     Anuerysm Neg Hx     Clotting disorder Neg Hx     Arrhythmia Neg Hx     Coronary artery disease Neg Hx     Hypertension Neg Hx     Sudden death Neg Hx         scd       Meds/Allergies   Medications Prior to Admission   Medication    aspirin 81 mg chewable tablet    atorvastatin (LIPITOR) 40 mg tablet    furosemide (LASIX) 40 mg tablet    LANTUS SOLOSTAR 100 units/mL injection pen    metoprolol tartrate (LOPRESSOR) 25 mg tablet    potassium chloride (K-DUR,KLOR-CON) 20 mEq tablet    TRUE METRIX BLOOD GLUCOSE TEST test strip     No Known Allergies    Objective   First Vitals:   Blood Pressure: 132/67 (02/10/20 0036)  Pulse: 88 (02/10/20 0036)  Temperature: (!) 97 1 °F (36 2 °C) (02/10/20 0036)  Temp Source: Oral (02/10/20 0036)  Respirations: 16 (02/10/20 0036)  Height: 5' 10" (177 8 cm) (02/10/20 0036)  Weight - Scale: 81 4 kg (179 lb 7 3 oz) (02/10/20 0036)  SpO2: 100 % (02/10/20 0036)    Current Vitals:   Blood Pressure: 112/68 (02/10/20 0424)  Pulse: 81 (02/10/20 0424)  Temperature: 98 9 °F (37 2 °C) (02/10/20 0424)  Temp Source: Oral (02/10/20 0036)  Respirations: 18 (02/10/20 0424)  Height: 5' 10" (177 8 cm) (02/10/20 0600)  Weight - Scale: 80 9 kg (178 lb 6 oz) (02/10/20 0600)  SpO2: 95 % (02/10/20 0424)        /68   Pulse 81   Temp 98 9 °F (37 2 °C)   Resp 18   Ht 5' 10" (1 778 m)   Wt 80 9 kg (178 lb 6 oz)   SpO2 95%   BMI 25 59 kg/m²   Physical Exam:  General:    Alert, cooperative, no distress   Head:     Normocephalic, without obvious abnormality, atraumatic                   Skin:   There is a small dry plantar ulcer to the left forefoot under the 1st metatarsal head with dry eschar  On further exploration the eschar was removed and there is a deep tunnel extending into the 1st interspace  This dorsally there is cellulitis at the 1st interspace on the left foot  Dry scab to left anterior leg  Lungs:     Respirations unlabored   Chest wall:    No tenderness or deformity   Heart/vasc:    Regular rate and rhythm  Palpable pedal pulses  Capillary refills brisk  Cellulitis to the left forefoot  Abdomen:     Soft, non-tender, no distention           Lower MSK:   Deformity to the left leg  Negative Ahmet sign  Ankle range of motion intact     Psychiatric:  AAOx3, no depression           Neurologic:   Lack of protective sensation bilateral lower extremities     Lab Results:   Admission on 02/10/2020   Component Date Value    WBC 02/10/2020 18 85*    RBC 02/10/2020 4 26     Hemoglobin 02/10/2020 12 0     Hematocrit 02/10/2020 36 5     MCV 02/10/2020 86     MCH 02/10/2020 28 2     MCHC 02/10/2020 32 9     RDW 02/10/2020 12 8     MPV 02/10/2020 9 8     Platelets 27/68/7411 287     nRBC 02/10/2020 0     Neutrophils Relative 02/10/2020 86*    Immat GRANS % 02/10/2020 0     Lymphocytes Relative 02/10/2020 5*    Monocytes Relative 02/10/2020 8     Eosinophils Relative 02/10/2020 0     Basophils Relative 02/10/2020 1     Neutrophils Absolute 02/10/2020 16 19*    Immature Grans Absolute 02/10/2020 0 08     Lymphocytes Absolute 02/10/2020 0 92     Monocytes Absolute 02/10/2020 1 51*    Eosinophils Absolute 02/10/2020 0 06     Basophils Absolute 02/10/2020 0 09     Protime 02/10/2020 14 2     INR 02/10/2020 1 16     PTT 02/10/2020 32     Sodium 02/10/2020 139     Potassium 02/10/2020 3 9     Chloride 02/10/2020 101     CO2 02/10/2020 26     ANION GAP 02/10/2020 12     BUN 02/10/2020 38*    Creatinine 02/10/2020 1 99*    Glucose 02/10/2020 153*    Calcium 02/10/2020 9 1     AST 02/10/2020 15     ALT 02/10/2020 13     Alkaline Phosphatase 02/10/2020 80     Total Protein 02/10/2020 7 3     Albumin 02/10/2020 2 8*    Total Bilirubin 02/10/2020 1 30*    eGFR 02/10/2020 35     CRP, High Sensitivity 02/10/2020 76 45     LACTIC ACID 02/10/2020 1 5     Total CK 02/10/2020 166     CK-MB Index 02/10/2020 <1 0     CK-MB 02/10/2020 0 7     Uric Acid 02/10/2020 6 9     POC Glucose 02/10/2020 127                            Imaging: I have personally reviewed pertinent films in PACS  There appears to be some soft tissue emphysema in the 1st interspace  No obvious cortical destruction noted  Code Status: Level 1 - Full Code  Advance Directive and Living Will:      Power of :    POLST:        Portions of the record may have been created with voice recognition software  Occasional wrong word or "sound a like" substitutions may have occurred due to the inherent limitations of voice recognition software  Read the chart carefully and recognize, using context, where substitutions have occurred

## 2020-02-11 LAB
ANION GAP SERPL CALCULATED.3IONS-SCNC: 8 MMOL/L (ref 4–13)
BASOPHILS # BLD AUTO: 0.05 THOUSANDS/ΜL (ref 0–0.1)
BASOPHILS NFR BLD AUTO: 0 % (ref 0–1)
BUN SERPL-MCNC: 42 MG/DL (ref 5–25)
CALCIUM SERPL-MCNC: 8 MG/DL (ref 8.3–10.1)
CHLORIDE SERPL-SCNC: 101 MMOL/L (ref 100–108)
CO2 SERPL-SCNC: 26 MMOL/L (ref 21–32)
CREAT SERPL-MCNC: 2.22 MG/DL (ref 0.6–1.3)
EOSINOPHIL # BLD AUTO: 0.22 THOUSAND/ΜL (ref 0–0.61)
EOSINOPHIL NFR BLD AUTO: 2 % (ref 0–6)
ERYTHROCYTE [DISTWIDTH] IN BLOOD BY AUTOMATED COUNT: 13 % (ref 11.6–15.1)
GFR SERPL CREATININE-BSD FRML MDRD: 31 ML/MIN/1.73SQ M
GLUCOSE SERPL-MCNC: 183 MG/DL (ref 65–140)
GLUCOSE SERPL-MCNC: 186 MG/DL (ref 65–140)
GLUCOSE SERPL-MCNC: 192 MG/DL (ref 65–140)
GLUCOSE SERPL-MCNC: 198 MG/DL (ref 65–140)
GLUCOSE SERPL-MCNC: 318 MG/DL (ref 65–140)
HCT VFR BLD AUTO: 32.1 % (ref 36.5–49.3)
HGB BLD-MCNC: 10.3 G/DL (ref 12–17)
IMM GRANULOCYTES # BLD AUTO: 0.06 THOUSAND/UL (ref 0–0.2)
IMM GRANULOCYTES NFR BLD AUTO: 1 % (ref 0–2)
LYMPHOCYTES # BLD AUTO: 1.09 THOUSANDS/ΜL (ref 0.6–4.47)
LYMPHOCYTES NFR BLD AUTO: 9 % (ref 14–44)
MCH RBC QN AUTO: 27.5 PG (ref 26.8–34.3)
MCHC RBC AUTO-ENTMCNC: 32.1 G/DL (ref 31.4–37.4)
MCV RBC AUTO: 86 FL (ref 82–98)
MONOCYTES # BLD AUTO: 1.14 THOUSAND/ΜL (ref 0.17–1.22)
MONOCYTES NFR BLD AUTO: 9 % (ref 4–12)
NEUTROPHILS # BLD AUTO: 9.6 THOUSANDS/ΜL (ref 1.85–7.62)
NEUTS SEG NFR BLD AUTO: 79 % (ref 43–75)
NRBC BLD AUTO-RTO: 0 /100 WBCS
PLATELET # BLD AUTO: 235 THOUSANDS/UL (ref 149–390)
PMV BLD AUTO: 9.6 FL (ref 8.9–12.7)
POTASSIUM SERPL-SCNC: 3.8 MMOL/L (ref 3.5–5.3)
RBC # BLD AUTO: 3.74 MILLION/UL (ref 3.88–5.62)
SODIUM SERPL-SCNC: 135 MMOL/L (ref 136–145)
WBC # BLD AUTO: 12.16 THOUSAND/UL (ref 4.31–10.16)

## 2020-02-11 PROCEDURE — 80048 BASIC METABOLIC PNL TOTAL CA: CPT | Performed by: NURSE PRACTITIONER

## 2020-02-11 PROCEDURE — 82948 REAGENT STRIP/BLOOD GLUCOSE: CPT

## 2020-02-11 PROCEDURE — 0JBR0ZZ EXCISION OF LEFT FOOT SUBCUTANEOUS TISSUE AND FASCIA, OPEN APPROACH: ICD-10-PCS | Performed by: PODIATRIST

## 2020-02-11 PROCEDURE — 85025 COMPLETE CBC W/AUTO DIFF WBC: CPT | Performed by: NURSE PRACTITIONER

## 2020-02-11 PROCEDURE — 11042 DBRDMT SUBQ TIS 1ST 20SQCM/<: CPT | Performed by: PODIATRIST

## 2020-02-11 PROCEDURE — 99232 SBSQ HOSP IP/OBS MODERATE 35: CPT | Performed by: INTERNAL MEDICINE

## 2020-02-11 RX ORDER — ASPIRIN 81 MG/1
81 TABLET, CHEWABLE ORAL DAILY
Status: DISCONTINUED | OUTPATIENT
Start: 2020-02-11 | End: 2020-02-13

## 2020-02-11 RX ORDER — HEPARIN SODIUM 5000 [USP'U]/ML
5000 INJECTION, SOLUTION INTRAVENOUS; SUBCUTANEOUS EVERY 8 HOURS SCHEDULED
Status: DISCONTINUED | OUTPATIENT
Start: 2020-02-11 | End: 2020-02-16 | Stop reason: HOSPADM

## 2020-02-11 RX ADMIN — INSULIN GLARGINE 30 UNITS: 100 INJECTION, SOLUTION SUBCUTANEOUS at 09:01

## 2020-02-11 RX ADMIN — METOPROLOL TARTRATE 25 MG: 25 TABLET ORAL at 21:19

## 2020-02-11 RX ADMIN — OXYCODONE HYDROCHLORIDE AND ACETAMINOPHEN 1 TABLET: 5; 325 TABLET ORAL at 09:09

## 2020-02-11 RX ADMIN — ATORVASTATIN CALCIUM 40 MG: 40 TABLET, FILM COATED ORAL at 17:43

## 2020-02-11 RX ADMIN — POTASSIUM CHLORIDE 20 MEQ: 1500 TABLET, EXTENDED RELEASE ORAL at 09:02

## 2020-02-11 RX ADMIN — HEPARIN SODIUM 5000 UNITS: 5000 INJECTION INTRAVENOUS; SUBCUTANEOUS at 21:19

## 2020-02-11 RX ADMIN — CEFAZOLIN SODIUM 1000 MG: 1 SOLUTION INTRAVENOUS at 09:02

## 2020-02-11 RX ADMIN — ASPIRIN 81 MG 81 MG: 81 TABLET ORAL at 15:45

## 2020-02-11 RX ADMIN — HEPARIN SODIUM 5000 UNITS: 5000 INJECTION INTRAVENOUS; SUBCUTANEOUS at 15:46

## 2020-02-11 RX ADMIN — CEFAZOLIN SODIUM 1000 MG: 1 SOLUTION INTRAVENOUS at 15:46

## 2020-02-11 RX ADMIN — OXYCODONE HYDROCHLORIDE AND ACETAMINOPHEN 1 TABLET: 5; 325 TABLET ORAL at 00:19

## 2020-02-11 RX ADMIN — FUROSEMIDE 40 MG: 40 TABLET ORAL at 09:02

## 2020-02-11 RX ADMIN — INSULIN GLARGINE 10 UNITS: 100 INJECTION, SOLUTION SUBCUTANEOUS at 21:19

## 2020-02-11 RX ADMIN — CEFAZOLIN SODIUM 1000 MG: 1 SOLUTION INTRAVENOUS at 23:42

## 2020-02-11 NOTE — ASSESSMENT & PLAN NOTE
Wt Readings from Last 3 Encounters:   02/10/20 80 9 kg (178 lb 6 oz)   08/05/19 87 5 kg (193 lb)   05/01/19 86 7 kg (191 lb 3 2 oz)    Patient is known to Dr Laura Gil of Cardiology   Euvolemic   Last echocardiogram from May 6686:  Systolic function was mildly reduced    LVEF 45   I&O, daily weight   Current regimen: metoprolol 25 mg BID, lasix held

## 2020-02-11 NOTE — ASSESSMENT & PLAN NOTE
· Presents to ED with complaints of two day history left LE pain, erythema, swelling  Pain is located to great toe and mid shin  He reports prior left ankle fracture requiring plate  Hardware has since been removed  He reports episodes of cellulitis about once per year  · He reports he has cipro prescribed to him to use as needed for cellulitis  He took about 3 doses with no improvement  · Tmax 101 8 at home; on admission temperature was 36 2; patient does not meet criteria for sepsis/SIRS as there was only leukocytosis on admission (WBC 18 85) and no tachycardia, tachypnea, or Temp <36 or >38  · MRI of L foot showed: Plantar distal first interspace tiny wound/ulcer and/or I&D penetration site with mild regional cellulitis  No residual abscess post I&D and packing  No osteomyelitis  No evidence of septic arthropathy  No significant tenosynovitis  Incidentally noted subcutaneous superficial pressure lesions at the level of the fourth and fifth MTP joints  Tiny T1 and T2 hypointense foci with minimal adjacent T2 hyperintensity and postcontrast hyperemia at the level of the fourth metatarsal head         Plan:  · Patient currently on cefazolin (today will be day 2)  · Podiatry input appreciated  · S/p bedside I and D of left foot abscess  · Weightbear to left heel in surgical shoe  · Infectious disease input appreciated  · Preliminary wound cultures show 4+ Gram-positive cocci and 2+ gram-negative rods; patient started on IV cefazolin as mentioned above    · Infectious disease input appreciated  · WBC count showing a downward trend (on admission it was 18 85 now has trended down to 12 16)

## 2020-02-11 NOTE — ASSESSMENT & PLAN NOTE
Lab Results   Component Value Date    HGBA1C 11 9 (H) 02/10/2020       Recent Labs     02/10/20  1547 02/10/20  2132 02/11/20  0754 02/11/20  1102   POCGLU 352* 342* 186* 318*       Blood Sugar Average: Last 72 hrs:  · (P) 224 [id number] A1c   · Continue home regimen of Lantus 30 units in a m  and 10 units at bedtime - continue   · Add accucheck and sliding scale  · Reports home blood sugar ranges from 70 to 500 at times   · This afternoon, Dr Olimpia Estrella has spoken to patient and he is not interested in seeing endocrinology also not interested in pre meal insulin coverage

## 2020-02-11 NOTE — PLAN OF CARE
Problem: Potential for Falls  Goal: Patient will remain free of falls  Description  INTERVENTIONS:  - Assess patient frequently for physical needs  -  Identify cognitive and physical deficits and behaviors that affect risk of falls    -  [place] fall precautions as indicated by assessment   - Educate patient/family on patient safety including physical limitations  - Instruct patient to call for assistance with activity based on assessment  - Modify environment to reduce risk of injury  - Consider OT/PT consult to assist with strengthening/mobility  Outcome: Progressing     Problem: SKIN/TISSUE INTEGRITY - ADULT  Goal: Skin integrity remains intact  Description  INTERVENTIONS  - Identify patients at risk for skin breakdown  - Assess and monitor skin integrity  - Assess and monitor nutrition and hydration status  - Monitor labs (i e  albumin)  - Assess for incontinence   - Turn and reposition patient  - Assist with mobility/ambulation  - Relieve pressure over bony prominences  - Avoid friction and shearing  - Provide appropriate hygiene as needed including keeping skin clean and dry  - Evaluate need for skin moisturizer/barrier cream  - Collaborate with interdisciplinary team (i e  Nutrition, Rehabilitation, etc )   - Patient/family teaching  Outcome: Progressing  Goal: Incision(s), wounds(s) or drain site(s) healing without S/S of infection  Description  INTERVENTIONS  - Assess and document risk factors for skin impairment   - Assess and document dressing, incision, wound bed, drain sites and surrounding tissue  - Consider nutrition services referral as needed  - Oral mucous membranes remain intact  - Provide patient/ family education  Outcome: Progressing  Goal: Oral mucous membranes remain intact  Description  INTERVENTIONS  - Assess oral mucosa and hygiene practices  - Implement preventative oral hygiene regimen  - Implement oral medicated treatments as ordered  - Initiate Nutrition services referral as needed  Outcome: Progressing     Problem: MUSCULOSKELETAL - ADULT  Goal: Maintain or return mobility to safest level of function  Description  INTERVENTIONS:  - Assess patient's ability to carry out ADLs; assess patient's baseline for ADL function and identify physical deficits which impact ability to perform ADLs (bathing, care of mouth/teeth, toileting, grooming, dressing, etc )  - Assess/evaluate cause of self-care deficits   - Assess range of motion  - Assess patient's mobility  - Assess patient's need for assistive devices and provide as appropriate  - Encourage maximum independence but intervene and supervise when necessary  - Involve family in performance of ADLs  - Assess for home care needs following discharge   - Consider OT consult to assist with ADL evaluation and planning for discharge  - Provide patient education as appropriate  Outcome: Progressing  Goal: Maintain proper alignment of affected body part  Description  INTERVENTIONS:  - Support, maintain and protect limb and body alignment  - Provide patient/ family with appropriate education  Outcome: Progressing     Problem: PAIN - ADULT  Goal: Verbalizes/displays adequate comfort level or baseline comfort level  Description  Interventions:  - Encourage patient to monitor pain and request assistance  - Assess pain using appropriate pain scale  - Administer analgesics based on type and severity of pain and evaluate response  - Implement non-pharmacological measures as appropriate and evaluate response  - Consider cultural and social influences on pain and pain management  - Notify physician/advanced practitioner if interventions unsuccessful or patient reports new pain  Outcome: Progressing     Problem: INFECTION - ADULT  Goal: Absence or prevention of progression during hospitalization  Description  INTERVENTIONS:  - Assess and monitor for signs and symptoms of infection  - Monitor lab/diagnostic results  - Monitor all insertion sites, i e  indwelling lines, tubes, and drains  - Monitor endotracheal if appropriate and nasal secretions for changes in amount and color  - [place] appropriate cooling/warming therapies per order  - Administer medications as ordered  - Instruct and encourage patient and family to use good hand hygiene technique  - Identify and instruct in appropriate isolation precautions for identified infection/condition  Outcome: Progressing  Goal: Absence of fever/infection during neutropenic period  Description  INTERVENTIONS:  - Monitor WBC    Outcome: Progressing     Problem: SAFETY ADULT  Goal: Maintain or return to baseline ADL function  Description  INTERVENTIONS:  -  Assess patient's ability to carry out ADLs; assess patient's baseline for ADL function and identify physical deficits which impact ability to perform ADLs (bathing, care of mouth/teeth, toileting, grooming, dressing, etc )  - Assess/evaluate cause of self-care deficits   - Assess range of motion  - Assess patient's mobility; develop plan if impaired  - Assess patient's need for assistive devices and provide as appropriate  - Encourage maximum independence but intervene and supervise when necessary  - Involve family in performance of ADLs  - Assess for home care needs following discharge   - Consider OT consult to assist with ADL evaluation and planning for discharge  - Provide patient education as appropriate  Outcome: Progressing  Goal: Maintain or return mobility status to optimal level  Description  INTERVENTIONS:  - Assess patient's baseline mobility status (ambulation, transfers, stairs, etc )    - Identify cognitive and physical deficits and behaviors that affect mobility  - Identify mobility aids required to assist with transfers and/or ambulation (gait belt, sit-to-stand, lift, walker, cane, etc )  - [place] fall precautions as indicated by assessment  - Record patient progress and toleration of activity level on Mobility SBAR; progress patient to next Phase/Stage  - Instruct patient to call for assistance with activity based on assessment  - Consider rehabilitation consult to assist with strengthening/weightbearing, etc   Outcome: Progressing     Problem: Nutrition/Hydration-ADULT  Goal: Nutrient/Hydration intake appropriate for improving, restoring or maintaining nutritional needs  Description  Monitor and assess patient's nutrition/hydration status for malnutrition  Collaborate with interdisciplinary team and initiate plan and interventions as ordered  Monitor patient's weight and dietary intake as ordered or per policy  Utilize nutrition screening tool and intervene as necessary  Determine patient's food preferences and provide high-protein, high-caloric foods as appropriate       INTERVENTIONS:  - Monitor oral intake, urinary output, labs, and treatment plans  - Assess nutrition and hydration status and recommend course of action  - Evaluate amount of meals eaten  - Assist patient with eating if necessary   - Allow adequate time for meals  - Recommend/ encourage appropriate diets, oral nutritional supplements, and vitamin/mineral supplements  - Order, calculate, and assess calorie counts as needed  - Recommend, monitor, and adjust tube feedings and TPN/PPN based on assessed needs  - Assess need for intravenous fluids  - Provide specific nutrition/hydration education as appropriate  - Include patient/family/caregiver in decisions related to nutrition  Outcome: Progressing

## 2020-02-11 NOTE — ASSESSMENT & PLAN NOTE
 Creatinine slightly above baseline (1 5-1 8)    Creatinine on admission1 99, today trending up to 2 22   Monitor kidney function daily   Hold Lasix (patient has already received his Lasix does earlier this morning but will hold tomorrow's does and continue to monitor BMP)

## 2020-02-11 NOTE — PROGRESS NOTES
Progress Note - Infectious Disease   Kait Delacruz 56 y o  male MRN: [id number]  Unit/Bed#: W -01 Encounter: [id number]      Impression/Plan:  1  Cellulitis and abscess of left foot  status post bedside I and D on 02/10/2020  MRI negative for osteomyelitis or residual abscess  Abscess culture is growing 2+ beta-hemolytic Streptococcus thus far  Blood cultures are negative to date  White count trending down from 18 to 12  Rec:  · Continue IV cefazolin at present pending final culture and clinical follow-up  · Monitor temperature and hemodynamics  · Follow-up pending blood and wound cultures  · Repeat CBC in a m      2  Chronic diabetic foot ulcer with formation of abscess  Status post bedside I and D 2/10/20  Rec:  · Continue cefazolin IV  · Follow-up pending cultures  · Abscess wound care per Podiatry     3  Diabetes with neuropathy  02/10/2020 hemoglobin A1c 11 9  Increases risk for infection  Rec:  Blood glucose management per primary care team  Regular diabetic foot care diabetic shoes recommended with neuropathy     4  Chronic kidney disease stage 3  Creatinine on admission 1 99  Creatinine 2 22 today  Rec:  Continue cefazolin IV renal dose adjusted  Monitor creatinine     5  Chronic osteomyelitis of left tibia  Following fracture with hardware placement in 1996  Patient had multiple infections requiring IV antibiotics and hardware was eventually removed  Patient states every year or so since he has a lower extremity cellulitis  His last cellulitis course was September 2018 and patient received 3 days of Ancef in the hospital and then was transition to oral Keflex 500 mg q i d  for 14 day total course      Antibiotics:  Cefazolin D3      Above plan discussed in detail with patient, RN, and Dr Daija Pollock  Subjective:  Patient has no fever, chills, sweats overnight; no nausea, vomiting, diarrhea; no cough, shortness of breath; no left foot/leg pain   No new symptoms  Objective:  Vitals:  Temp:  [98 5 °F (36 9 °C)-99 2 °F (37 3 °C)] 98 5 °F (36 9 °C)  HR:  [77-89] 77  Resp:  [16-18] 18  BP: (109)/(66) 109/66  SpO2:  [96 %] 96 %  Temp (24hrs), Av 9 °F (37 2 °C), Min:98 5 °F (36 9 °C), Max:99 2 °F (37 3 °C)  Current: Temperature: 98 5 °F (36 9 °C)    General Appearance:  Awake, alert, cooperative, resting in bed, no acute distress  Throat: Oropharynx moist without lesions  Lungs:   Clear to auscultation bilaterally; no wheezes, rhonchi or rales; respirations unlabored   Heart:  RRR; S1-S2 heard, no murmur   Abdomen:   Soft, non-tender, non-distended, positive bowel sounds  Extremities: Arm IV site nontender, left foot heavily gauze wrap with no strike through drainage  No erythema of foot outside of gauze wrap with 1+ swelling around the ankle  No active drainage, tenderness or erythema of left shin with chronic skin changes status post multiple surgeries  Skin: No rashes      Labs, Imaging, & Other studies:   All pertinent labs and imaging studies were personally reviewed  Results from last 7 days   Lab Units 20  0454 02/10/20  0200   WBC Thousand/uL 12 16* 18 85*   HEMOGLOBIN g/dL 10 3* 12 0   PLATELETS Thousands/uL 235 287     Results from last 7 days   Lab Units 20  0454 02/10/20  0200   POTASSIUM mmol/L 3 8 3 9   CHLORIDE mmol/L 101 101   CO2 mmol/L 26 26   BUN mg/dL 42* 38*   CREATININE mg/dL 2 22* 1 99*   EGFR ml/min/1 73sq m 31 35   CALCIUM mg/dL 8 0* 9 1   AST U/L  --  15   ALT U/L  --  13   ALK PHOS U/L  --  80         Results from last 7 days   Lab Units 02/10/20  0750 02/10/20  0219 02/10/20  0200   BLOOD CULTURE   --  No Growth at 24 hrs  No Growth at 24 hrs     GRAM STAIN RESULT  Rare Polys*  4+ Gram positive cocci in pairs*  2+ Gram negative rods*  --   --    WOUND CULTURE  2+ Growth of Beta Hemolytic Streptococcus Group B*  --   --

## 2020-02-11 NOTE — PROGRESS NOTES
Progress Note Jose L Kramer 1960, 56 y o  male MRN: [id number]    Unit/Bed#: W -01 Encounter: [id number]    Primary Care Provider: Suzy Denise MD   Date and time admitted to hospital: 2/10/2020 12:30 AM        * Cellulitis of left foot  Assessment & Plan  · Presents to ED with complaints of two day history left LE pain, erythema, swelling  Pain is located to great toe and mid shin  He reports prior left ankle fracture requiring plate  Hardware has since been removed  He reports episodes of cellulitis about once per year  · He reports he has cipro prescribed to him to use as needed for cellulitis  He took about 3 doses with no improvement  · Tmax 101 8 at home; on admission temperature was 36 2; patient does not meet criteria for sepsis/SIRS as there was only leukocytosis on admission (WBC 18 85) and no tachycardia, tachypnea, or Temp <36 or >38  · MRI of L foot showed: Plantar distal first interspace tiny wound/ulcer and/or I&D penetration site with mild regional cellulitis  No residual abscess post I&D and packing  No osteomyelitis  No evidence of septic arthropathy  No significant tenosynovitis  Incidentally noted subcutaneous superficial pressure lesions at the level of the fourth and fifth MTP joints  Tiny T1 and T2 hypointense foci with minimal adjacent T2 hyperintensity and postcontrast hyperemia at the level of the fourth metatarsal head         Plan:  · Patient currently on cefazolin (today will be day 2)  · Podiatry input appreciated  · S/p bedside I and D of left foot abscess  · Weightbear to left heel in surgical shoe  · Infectious disease input appreciated  · Preliminary wound cultures show 4+ Gram-positive cocci and 2+ gram-negative rods; patient started on IV cefazolin as mentioned above    · Infectious disease input appreciated  · WBC count showing a downward trend (on admission it was 18 85 now has trended down to 12 16)    Chronic combined systolic and diastolic CHF (congestive heart failure) ([facility])  Assessment & Plan  Wt Readings from Last 3 Encounters:   02/10/20 80 9 kg (178 lb 6 oz)   08/05/19 87 5 kg (193 lb)   05/01/19 86 7 kg (191 lb 3 2 oz)    Patient is known to Dr Suha Burk of Cardiology   Euvolemic   Last echocardiogram from May 1554:  Systolic function was mildly reduced  LVEF 45   I&O, daily weight   Current regimen: metoprolol 25 mg BID, lasix 40 mg daily - continue       Presence of drug coated stent in LAD coronary artery  Assessment & Plan  · last dose 02/09/2020; resume aspirin today  · Continue statin and beta blocker    Benign essential hypertension  Assessment & Plan  · Stable   · Continue metoprolol and lasix     Type 2 diabetes mellitus with hyperglycemia, with long-term current use of insulin [facility])  Assessment & Plan  Lab Results   Component Value Date    HGBA1C 11 9 (H) 02/10/2020       Recent Labs     02/10/20  1547 02/10/20  2132 02/11/20  0754 02/11/20  1102   POCGLU 352* 342* 186* 318*       Blood Sugar Average: Last 72 hrs:  · (P) 224 [id number] A1c   · Continue home regimen of Lantus 30 units in a m  and 10 units at bedtime - continue   · Add accucheck and sliding scale  · Reports home blood sugar ranges from 70 to 500 at times     CKD (chronic kidney disease) stage 3, GFR 30-59 ml/min ([facility])  Assessment & Plan   Creatinine slightly above baseline (1 5-1 8)  Creatinine on admission1 99, today trending up to 2 22   Monitor kidney function daily   Hold Lasix? VTE Pharmacologic Prophylaxis:   Pharmacologic: Heparin  Mechanical VTE Prophylaxis in Place: Yes    Discussions with Specialists or Other Care Team Provider: nursing; case management; infect    Education and Discussions with Family / Patient: patient     Current Length of Stay: 1 day(s)    Current Patient Status: Inpatient     Discharge Plan / Estimated Discharge Date:   To be determined    Code Status: Level 1 - Full Code      Subjective:   Patient reports that he feels much better today compared to yesterday  Reports that pain on his left leg is adequately controlled with the pain medications and we are giving him  Denies any chest pain, shortness of breath, abdominal pain, febrile episodes  Objective:     Vitals:   Temp (24hrs), Av 9 °F (37 2 °C), Min:98 5 °F (36 9 °C), Max:99 2 °F (37 3 °C)    Temp:  [98 5 °F (36 9 °C)-99 2 °F (37 3 °C)] 98 5 °F (36 9 °C)  HR:  [77-89] 77  Resp:  [16-18] 18  BP: (109)/(66) 109/66  SpO2:  [96 %] 96 %  Body mass index is 25 59 kg/m²  Input and Output Summary (last 24 hours):     No intake or output data in the 24 hours ending 20 1130    Physical Exam:     Physical Exam   Constitutional: He is oriented to person, place, and time  No distress  HENT:   Mouth/Throat: Oropharynx is clear and moist    Eyes: Conjunctivae are normal    Neck: Neck supple  Cardiovascular: Normal rate and regular rhythm  Exam reveals no gallop and no friction rub  No murmur heard  Pulmonary/Chest: Effort normal and breath sounds normal  No respiratory distress  He has no wheezes  Abdominal: Soft  Bowel sounds are normal  There is no tenderness  Musculoskeletal: Normal range of motion  left foot heavily wrapped in gauze, dressing is dry  No erythema of foot outside of gauze wrap with 1+ swelling around the ankle  No active drainage, tenderness or erythema of left shin  (+) chronic skin changes status post multiple surgeries  Neurological: He is alert and oriented to person, place, and time  Skin: Skin is warm         Additional Data:     Labs:    Results from last 7 days   Lab Units 20  0454   WBC Thousand/uL 12 16*   HEMOGLOBIN g/dL 10 3*   HEMATOCRIT % 32 1*   PLATELETS Thousands/uL 235   NEUTROS PCT % 79*   LYMPHS PCT % 9*   MONOS PCT % 9   EOS PCT % 2     Results from last 7 days   Lab Units 20  0454 02/10/20  0200   POTASSIUM mmol/L 3 8 3 9   CHLORIDE mmol/L 101 101   CO2 mmol/L 26 26   BUN mg/dL 42* 38*   CREATININE mg/dL 2 22* 1 99*   CALCIUM mg/dL 8 0* 9 1   ALK PHOS U/L  --  80   ALT U/L  --  13   AST U/L  --  15     Results from last 7 days   Lab Units 02/10/20  0200   INR  1 16       * I Have Reviewed All Lab Data Listed Above  * Additional Pertinent Lab Tests Reviewed: All Labs Within Last 24 Hours Reviewed    Imaging:    Imaging Reports Reviewed Today Include: MRI of L foot  Imaging Personally Reviewed by Myself Includes:  none    Recent Cultures (last 7 days):     Results from last 7 days   Lab Units 02/10/20  0750 02/10/20  0219 02/10/20  0200   BLOOD CULTURE   --  No Growth at 24 hrs  No Growth at 24 hrs  GRAM STAIN RESULT  Rare Polys*  4+ Gram positive cocci in pairs*  2+ Gram negative rods*  --   --    WOUND CULTURE  2+ Growth of Beta Hemolytic Streptococcus Group B*  --   --        Last 24 Hours Medication List:     Current Facility-Administered Medications:  acetaminophen 650 mg Oral Q6H PRN Mayda Paniagua DPM    atorvastatin 40 mg Oral After Kendal Revel, BINHNP    cefazolin 1,000 mg Intravenous Q8H LATASHA Pena Last Rate: 1,000 mg (02/11/20 0902)   furosemide 40 mg Oral Daily LATASHA Pena    insulin glargine 10 Units Subcutaneous HS LTAASHA Pena    insulin glargine 30 Units Subcutaneous QAM LATASHA Pena    insulin lispro 1-6 Units Subcutaneous 4x Daily (AC & HS) Jerad Blum PA-C    metoprolol tartrate 25 mg Oral Q12H [address] LATASHA Pena    ondansetron 4 mg Intravenous Q6H PRN LATASHA Pena    oxyCODONE-acetaminophen 1 tablet Oral Q6H PRN Mayda Paniagua DPFINESSE    polyethylene glycol 17 g Oral Daily PRN LATASHA Pena    potassium chloride 20 mEq Oral Daily LATASHA Pena         Today, Patient Was Seen By: Juarez Alvarez MD    ** Please Note: This note has been constructed using a voice recognition system   **

## 2020-02-11 NOTE — PROGRESS NOTES
Progress Note - Podiatry  Va Smith 56 y o  male MRN: [id number]  Unit/Bed#: W -01 Encounter: [id number]      Assessment:  1  Sepsis secondary to left foot abscess  2  Chronic diabetic foot ulcer with abscess left foot S/P bedside I&D  3  Cellulitis left foot - resolving  4  Diabetes with neuropathy  5  Chronic osteomyelitis left tibia, stable     Plan:  Wound is stable without pus  Decreased redness to left foot  MRI was also reviewed and the findings were discussed  Wound was further debrided as in the procedure  Will reassess him tomorrow for possible need for further I&D in the OR  Continue IV abx  NWB and elevation  Procedure: Debridement of subcutaneous tissue- 2 0 sq cm (84964)  Packing was removed  Under aseptic technique, the wound was thoroughly explored for undermining, deep sinus tracts and bone involvement  Sterile instrumentation including scissors and forcep used to excise the clearly delineated devitalized subcutaneous tissue exposing viable tissue  No pus was identified  Bleeding controlled with gentle pressure  Wound was packed open with 1/4" Iodoform gauze and DSD  Patient tolerated debridement without complications  The wound was dressed  Subjective/Objective   Chief Complaint:   Chief Complaint   Patient presents with    Leg Pain     Pt presents to ED from home w/ left leg pain starting yesterday  pt states getting infections once a year in that leg  Pt (+) hx HTN, DM, CHF  Subjective: 56 y o  y/o male was seen and evaluated at bedside        Review of Systems  Review of Systems         Past Medical History  Past Medical History:   Diagnosis Date    CAD (coronary artery disease)     Chronic combined systolic and diastolic CH[facility]congestive [facility] failure) [facility] [address] [facility]     Diabetes mellitus [facility] [address] )     type 2    Dyslipidemia     Hypertension     Ischemic cardiomyopathy     MI (myocardial infarction) [facility] [address] [facility]     Osteomyelitis [facility] [address] [facility]     Pancreatitis        Past Surgical History  Past Surgical History:   Procedure Laterality Date    ANGIOPLASTY      ballon    CORONARY ANGIOPLASTY WITH STENT PLACEMENT      LULA to proximal and mid LAD, Proximal RCA   HERNIA REPAIR          Allergies:  Patient has no known allergies      Medications:  Current Facility-Administered Medications   Medication Dose Route Frequency Provider Last Rate Last Dose    acetaminophen (TYLENOL) tablet 650 mg  650 mg Oral Q6H PRN Waynetta Cap, DPM        aspirin chewable tablet 81 mg  81 mg Oral Daily Demetrice Sagastume MD   81 mg at 02/11/20 1545    atorvastatin (LIPITOR) tablet 40 mg  40 mg Oral After Bob BINH BiswasNP   40 mg at 02/11/20 1743    ceFAZolin (ANCEF) IVPB (premix) 1,000 mg  1,000 mg Intravenous Q8H BINH WiseNP 100 mL/hr at 02/11/20 1546 1,000 mg at 02/11/20 1546    heparin (porcine) subcutaneous injection 5,000 Units  5,000 Units Subcutaneous [facility] Demetrice Sagastume MD   5,000 Units at 02/11/20 1546    insulin glargine (LANTUS) subcutaneous injection 10 Units 0 1 mL  10 Units Subcutaneous HS Sfoiya Lane CRNP   10 Units at 02/10/20 2123    insulin glargine (LANTUS) subcutaneous injection 30 Units 0 3 mL  30 Units Subcutaneous QAM LATASHA Wise   30 Units at 02/11/20 0901    insulin lispro (HumaLOG) 100 units/mL subcutaneous injection 1-6 Units  1-6 Units Subcutaneous 4x Daily (AC & HS) Delmi Laws PA-C   1 Units at 02/11/20 1743    metoprolol tartrate (LOPRESSOR) tablet 25 mg  25 mg Oral Q12H [address] BINH WiseNP   25 mg at 02/10/20 2124    ondansetron (ZOFRAN) injection 4 mg  4 mg Intravenous Q6H PRN BINH WiseNP        oxyCODONE-acetaminophen (PERCOCET) 5-325 mg per tablet 1 tablet  1 tablet Oral Q6H PRN Waynetta Cap, DPM   1 tablet at 02/11/20 0909    polyethylene glycol (MIRALAX) packet 17 g  17 g Oral Daily PRN BINH WiseNP        potassium chloride (K-DUR,KLOR-CON) CR tablet 20 mEq  20 mEq Oral Daily Sofiya Salmons, CRNP 20 mEq at 02/11/20 0902       Social History:  Social History     Socioeconomic History    Marital status: /Civil Union     Spouse name: None    Number of children: None    Years of education: None    Highest education level: None   Occupational History    None   Social Needs    Financial resource strain: None    Food insecurity:     Worry: None     Inability: None    Transportation needs:     Medical: None     Non-medical: None   Tobacco Use    Smoking status: Never Smoker    Smokeless tobacco: Never Used   Substance and Sexual Activity    Alcohol use: Not Currently    Drug use: No    Sexual activity: None   Lifestyle    Physical activity:     Days per week: None     Minutes per session: None    Stress: None   Relationships    Social connections:     Talks on phone: None     Gets together: None     Attends [employer] service: None     Active member of club or organization: None     Attends meetings of clubs or organizations: None     Relationship status: None    Intimate partner violence:     Fear of current or ex partner: None     Emotionally abused: None     Physically abused: None     Forced sexual activity: None   Other Topics Concern    None   Social History Narrative    None          Blood pressure 108/66, pulse 77, temperature 98 5 °F (36 9 °C), resp  rate 16, height 5' 10" (1 778 m), weight 80 9 kg (178 lb 6 oz), SpO2 96 %  ,Body mass index is 25 59 kg/m²  Invasive Devices     Peripheral Intravenous Line            Peripheral IV 02/10/20 Left Antecubital 1 day                Physical Exam:   General: Alert, cooperative and no distress  Lungs: Non labored breathing  Heart: RRR  Normal heart rate  Abdomen: Soft, non-tender  Neurologic:  No focal neurologic deficit  Sensation is intact to light touch  Extremity: No calf pain or edema  No cyanosis or clubbing  Skin:  Wound noted left 1st interspace  4 0 X 0 5 cm  No bone exposure  No soni pus    Some fibrotic/ non-viable tissues  Wound bed is 60% granular  Decreased redness and edema left foot  Vascular: Palpable pedal pulses  CRT less than 3 seconds  Chronic LLE edema presents  Lab and other studies: I have personally reviewed pertinent lab results      Imaging: I have personally reviewed pertinent films in PACS

## 2020-02-12 LAB
ANION GAP SERPL CALCULATED.3IONS-SCNC: 7 MMOL/L (ref 4–13)
BASOPHILS # BLD AUTO: 0.06 THOUSANDS/ΜL (ref 0–0.1)
BASOPHILS NFR BLD AUTO: 1 % (ref 0–1)
BUN SERPL-MCNC: 39 MG/DL (ref 5–25)
CALCIUM SERPL-MCNC: 8 MG/DL (ref 8.3–10.1)
CHLORIDE SERPL-SCNC: 103 MMOL/L (ref 100–108)
CO2 SERPL-SCNC: 27 MMOL/L (ref 21–32)
CREAT SERPL-MCNC: 1.85 MG/DL (ref 0.6–1.3)
EOSINOPHIL # BLD AUTO: 0.28 THOUSAND/ΜL (ref 0–0.61)
EOSINOPHIL NFR BLD AUTO: 3 % (ref 0–6)
ERYTHROCYTE [DISTWIDTH] IN BLOOD BY AUTOMATED COUNT: 12.8 % (ref 11.6–15.1)
GFR SERPL CREATININE-BSD FRML MDRD: 38 ML/MIN/1.73SQ M
GLUCOSE SERPL-MCNC: 104 MG/DL (ref 65–140)
GLUCOSE SERPL-MCNC: 222 MG/DL (ref 65–140)
GLUCOSE SERPL-MCNC: 226 MG/DL (ref 65–140)
GLUCOSE SERPL-MCNC: 286 MG/DL (ref 65–140)
GLUCOSE SERPL-MCNC: 77 MG/DL (ref 65–140)
HCT VFR BLD AUTO: 32.1 % (ref 36.5–49.3)
HGB BLD-MCNC: 10.3 G/DL (ref 12–17)
IMM GRANULOCYTES # BLD AUTO: 0.06 THOUSAND/UL (ref 0–0.2)
IMM GRANULOCYTES NFR BLD AUTO: 1 % (ref 0–2)
LYMPHOCYTES # BLD AUTO: 1.05 THOUSANDS/ΜL (ref 0.6–4.47)
LYMPHOCYTES NFR BLD AUTO: 10 % (ref 14–44)
MCH RBC QN AUTO: 27.5 PG (ref 26.8–34.3)
MCHC RBC AUTO-ENTMCNC: 32.1 G/DL (ref 31.4–37.4)
MCV RBC AUTO: 86 FL (ref 82–98)
MONOCYTES # BLD AUTO: 0.92 THOUSAND/ΜL (ref 0.17–1.22)
MONOCYTES NFR BLD AUTO: 9 % (ref 4–12)
NEUTROPHILS # BLD AUTO: 8.49 THOUSANDS/ΜL (ref 1.85–7.62)
NEUTS SEG NFR BLD AUTO: 76 % (ref 43–75)
NRBC BLD AUTO-RTO: 0 /100 WBCS
PLATELET # BLD AUTO: 264 THOUSANDS/UL (ref 149–390)
PMV BLD AUTO: 10.2 FL (ref 8.9–12.7)
POTASSIUM SERPL-SCNC: 3.7 MMOL/L (ref 3.5–5.3)
RBC # BLD AUTO: 3.75 MILLION/UL (ref 3.88–5.62)
SODIUM SERPL-SCNC: 137 MMOL/L (ref 136–145)
WBC # BLD AUTO: 10.86 THOUSAND/UL (ref 4.31–10.16)

## 2020-02-12 PROCEDURE — 82948 REAGENT STRIP/BLOOD GLUCOSE: CPT

## 2020-02-12 PROCEDURE — 99232 SBSQ HOSP IP/OBS MODERATE 35: CPT | Performed by: INTERNAL MEDICINE

## 2020-02-12 PROCEDURE — 80048 BASIC METABOLIC PNL TOTAL CA: CPT | Performed by: INTERNAL MEDICINE

## 2020-02-12 PROCEDURE — 85025 COMPLETE CBC W/AUTO DIFF WBC: CPT | Performed by: INTERNAL MEDICINE

## 2020-02-12 RX ORDER — INSULIN GLARGINE 100 [IU]/ML
INJECTION, SOLUTION SUBCUTANEOUS
Status: COMPLETED
Start: 2020-02-12 | End: 2020-02-12

## 2020-02-12 RX ORDER — CEFAZOLIN SODIUM 1 G/50ML
SOLUTION INTRAVENOUS
Status: COMPLETED
Start: 2020-02-12 | End: 2020-02-12

## 2020-02-12 RX ORDER — ASPIRIN 81 MG/1
TABLET, CHEWABLE ORAL
Status: COMPLETED
Start: 2020-02-12 | End: 2020-02-12

## 2020-02-12 RX ORDER — POTASSIUM CHLORIDE 20 MEQ/1
TABLET, EXTENDED RELEASE ORAL
Status: COMPLETED
Start: 2020-02-12 | End: 2020-02-12

## 2020-02-12 RX ADMIN — CEFAZOLIN SODIUM 1000 MG: 1 SOLUTION INTRAVENOUS at 16:42

## 2020-02-12 RX ADMIN — HEPARIN SODIUM 5000 UNITS: 5000 INJECTION INTRAVENOUS; SUBCUTANEOUS at 05:58

## 2020-02-12 RX ADMIN — ASPIRIN 81 MG 81 MG: 81 TABLET ORAL at 09:56

## 2020-02-12 RX ADMIN — METOPROLOL TARTRATE 25 MG: 25 TABLET ORAL at 21:24

## 2020-02-12 RX ADMIN — POTASSIUM CHLORIDE 20 MEQ: 1500 TABLET, EXTENDED RELEASE ORAL at 09:56

## 2020-02-12 RX ADMIN — INSULIN GLARGINE 30 UNITS: 100 INJECTION, SOLUTION SUBCUTANEOUS at 12:37

## 2020-02-12 RX ADMIN — METOPROLOL TARTRATE 25 MG: 25 TABLET ORAL at 09:56

## 2020-02-12 RX ADMIN — CEFAZOLIN SODIUM 1000 MG: 1 SOLUTION INTRAVENOUS at 09:55

## 2020-02-12 RX ADMIN — INSULIN GLARGINE 10 UNITS: 100 INJECTION, SOLUTION SUBCUTANEOUS at 21:24

## 2020-02-12 RX ADMIN — HEPARIN SODIUM 5000 UNITS: 5000 INJECTION INTRAVENOUS; SUBCUTANEOUS at 21:24

## 2020-02-12 RX ADMIN — HEPARIN SODIUM 5000 UNITS: 5000 INJECTION INTRAVENOUS; SUBCUTANEOUS at 14:49

## 2020-02-12 RX ADMIN — ATORVASTATIN CALCIUM 40 MG: 40 TABLET, FILM COATED ORAL at 17:09

## 2020-02-12 RX ADMIN — CEFAZOLIN SODIUM 1000 MG: 1 SOLUTION INTRAVENOUS at 23:45

## 2020-02-12 NOTE — ASSESSMENT & PLAN NOTE
· Presents to ED with complaints of two day history left LE pain, erythema, swelling  Pain is located to great toe and mid shin  He reports prior left ankle fracture requiring plate  Hardware has since been removed  He reports episodes of cellulitis about once per year  · He reports he has cipro prescribed to him to use as needed for cellulitis  He took about 3 doses with no improvement  · Tmax 101 8 at home; on admission temperature was 36 2; patient does not meet criteria for sepsis/SIRS as there was only leukocytosis on admission (WBC 18 85) and no tachycardia, tachypnea, or Temp <36 or >38  · MRI of L foot showed: Plantar distal first interspace tiny wound/ulcer and/or I&D penetration site with mild regional cellulitis  No residual abscess post I&D and packing  No osteomyelitis  No evidence of septic arthropathy  No significant tenosynovitis  Incidentally noted subcutaneous superficial pressure lesions at the level of the fourth and fifth MTP joints  Tiny T1 and T2 hypointense foci with minimal adjacent T2 hyperintensity and postcontrast hyperemia at the level of the fourth metatarsal head         Plan:  · Patient currently on cefazolin (today will be day 3)  · Podiatry input appreciated  · S/p bedside I and D of left foot abscess 2/10/2020; dietary also did another bedside debridement yesterday 02/11/2020 and will see patient again today to determine if any more drainage is to be done in the OR  · Weightbear to left heel in surgical shoe  · Infectious disease input appreciated  · Preliminary wound cultures show growth of group B strep   · Infectious disease input appreciated  · WBC count showing a downward trend (on admission it was 18 85 now has trended down to 10 3)

## 2020-02-12 NOTE — PROGRESS NOTES
Progress Note Carly Melendez 1960, 56 y o  male MRN: [id number]    Unit/Bed#: W -01 Encounter: [id number]    Primary Care Provider: Melvin Alvarado MD   Date and time admitted to hospital: 2/10/2020 12:30 AM        * Cellulitis of left foot  Assessment & Plan  · Presents to ED with complaints of two day history left LE pain, erythema, swelling  Pain is located to great toe and mid shin  He reports prior left ankle fracture requiring plate  Hardware has since been removed  He reports episodes of cellulitis about once per year  · He reports he has cipro prescribed to him to use as needed for cellulitis  He took about 3 doses with no improvement  · Tmax 101 8 at home; on admission temperature was 36 2; patient does not meet criteria for sepsis/SIRS as there was only leukocytosis on admission (WBC 18 85) and no tachycardia, tachypnea, or Temp <36 or >38  · MRI of L foot showed: Plantar distal first interspace tiny wound/ulcer and/or I&D penetration site with mild regional cellulitis  No residual abscess post I&D and packing  No osteomyelitis  No evidence of septic arthropathy  No significant tenosynovitis  Incidentally noted subcutaneous superficial pressure lesions at the level of the fourth and fifth MTP joints  Tiny T1 and T2 hypointense foci with minimal adjacent T2 hyperintensity and postcontrast hyperemia at the level of the fourth metatarsal head         Plan:  · Patient currently on cefazolin (today will be day 3)  · Podiatry input appreciated  · S/p bedside I and D of left foot abscess 2/10/2020; dietary also did another bedside debridement yesterday 02/11/2020 and will see patient again today to determine if any more drainage is to be done in the OR  · Weightbear to left heel in surgical shoe  · Infectious disease input appreciated  · Preliminary wound cultures show growth of group B strep   · Infectious disease input appreciated  · WBC count showing a downward trend (on admission it was 18 85 now has trended down to 10 3)    Chronic combined systolic and diastolic CHF (congestive heart failure) ([facility])  Assessment & Plan  Wt Readings from Last 3 Encounters:   02/12/20 81 3 kg (179 lb 2 oz)   08/05/19 87 5 kg (193 lb)   05/01/19 86 7 kg (191 lb 3 2 oz)    Patient is known to Dr Kenya Villanueva of Cardiology   Euvolemic   Last echocardiogram from May 1552:  Systolic function was mildly reduced  LVEF 45   I&O, daily weight   Current regimen: metoprolol 25 mg BID, lasix held      Presence of drug coated stent in LAD coronary artery  Assessment & Plan  · last dose 02/09/2020; resumed aspirin on 02/11/2020  · Continue statin and beta blocker    CKD (chronic kidney disease) stage 3, GFR 30-59 ml/min ([facility])  Assessment & Plan   Creatinine slightly above baseline (1 5-1 8)  Creatinine on admission1 99, yesterday trending up to 2 22, now has trended down to 1 85   Monitor kidney function daily   Hold Lasix (patient has already received his Lasix does earlier yesterday morning but will hold today's dose and continue to monitor BMP)    Benign essential hypertension  Assessment & Plan  · Stable   · Continue metoprolol  · Lasix held    Type 2 diabetes mellitus with hyperglycemia, with long-term current use of insulin [facility])  Assessment & Plan  Lab Results   Component Value Date    HGBA1C 11 9 (H) 02/10/2020       Recent Labs     02/11/20  1604 02/11/20  2120 02/12/20  0734 02/12/20  1103   POCGLU 183* 198* 77 226*       Blood Sugar Average: Last 72 hrs:  · (P) 204 [id number] A1c   · Continue home regimen of Lantus 30 units in a m  and 10 units at bedtime - continue   · Add accucheck and sliding scale  · Reports home blood sugar ranges from 70 to 500 at times   · Yesterday afternoon, Dr Brien Dickerson has spoken to patient and he is not interested in seeing endocrinology also not interested in pre meal insulin coverage    Today I also spoke with patient regarding better glucose control, he is aware of his poorly controlled diet and is habitually eating foods high in sugar  He is not sure whether he is ready to change  VTE Pharmacologic Prophylaxis:   Pharmacologic: Heparin  Mechanical VTE Prophylaxis in Place: Yes    Discussions with Specialists or Other Care Team Provider: case management; nursing    Education and Discussions with Family / Patient:  Patient    Current Length of Stay: 2 day(s)    Current Patient Status: Inpatient     Discharge Plan / Estimated Discharge Date: To be determined    Code Status: Level 1 - Full Code      Subjective:    Patient reports that he over also feels well, reports that pain is controlled  Objective:     Vitals:   Temp (24hrs), Av °F (37 2 °C), Min:98 5 °F (36 9 °C), Max:99 7 °F (37 6 °C)    Temp:  [98 5 °F (36 9 °C)-99 7 °F (37 6 °C)] 98 9 °F (37 2 °C)  HR:  [77-90] 79  Resp:  [16-18] 18  BP: (108-133)/(66-79) 133/77  SpO2:  [94 %-97 %] 97 %  Body mass index is 25 7 kg/m²  Input and Output Summary (last 24 hours): Intake/Output Summary (Last 24 hours) at 2020 1130  Last data filed at 2020 0900  Gross per 24 hour   Intake 290 ml   Output    Net 290 ml       Physical Exam:     Physical Exam  Constitutional: He is oriented to person, place, and time  No distress  HENT:   Mouth/Throat: Oropharynx is clear and moist    Eyes: Conjunctivae are normal    Neck: Neck supple  Cardiovascular: Normal rate and regular rhythm  Exam reveals no gallop and no friction rub  No murmur heard  Pulmonary/Chest: Effort normal and breath sounds normal  No respiratory distress  He has no wheezes  Abdominal: Soft  Bowel sounds are normal  There is no tenderness  Musculoskeletal: Normal range of motion  left foot heavily wrapped in gauze, dressing is dry  No erythema of foot outside of gauze wrap with 1+ swelling around the ankle  No active drainage, tenderness or erythema of left shin  (+) chronic skin changes status post multiple surgeries     Neurological: He is alert and oriented to person, place, and time  Skin: Skin is warm  Additional Data:     Labs:    Results from last 7 days   Lab Units 02/12/20  0557   WBC Thousand/uL 10 86*   HEMOGLOBIN g/dL 10 3*   HEMATOCRIT % 32 1*   PLATELETS Thousands/uL 264   NEUTROS PCT % 76*   LYMPHS PCT % 10*   MONOS PCT % 9   EOS PCT % 3     Results from last 7 days   Lab Units 02/12/20  0557  02/10/20  0200   POTASSIUM mmol/L 3 7   < > 3 9   CHLORIDE mmol/L 103   < > 101   CO2 mmol/L 27   < > 26   BUN mg/dL 39*   < > 38*   CREATININE mg/dL 1 85*   < > 1 99*   CALCIUM mg/dL 8 0*   < > 9 1   ALK PHOS U/L  --   --  80   ALT U/L  --   --  13   AST U/L  --   --  15    < > = values in this interval not displayed  Results from last 7 days   Lab Units 02/10/20  0200   INR  1 16       * I Have Reviewed All Lab Data Listed Above  * Additional Pertinent Lab Tests Reviewed: All Labs Within Last 24 Hours Reviewed    Imaging:    Imaging Reports Reviewed Today Include:  None  Imaging Personally Reviewed by Myself Includes:  None    Recent Cultures (last 7 days):     Results from last 7 days   Lab Units 02/10/20  0750 02/10/20  0219 02/10/20  0200   BLOOD CULTURE   --  No Growth at 48 hrs  No Growth at 48 hrs     GRAM STAIN RESULT  Rare Polys*  4+ Gram positive cocci in pairs*  2+ Gram negative rods*  --   --    WOUND CULTURE  2+ Growth of Beta Hemolytic Streptococcus Group B*  --   --        Last 24 Hours Medication List:     Current Facility-Administered Medications:  acetaminophen 650 mg Oral Q6H PRN Alexi Vogel DPM    aspirin 81 mg Oral Daily Camilo Bucio MD    atorvastatin 40 mg Oral After [employer], CRNP    cefazolin 1,000 mg Intravenous Q8H LATASHA Hendrix Last Rate: 1,000 mg (02/12/20 0955)   heparin (porcine) 5,000 Units Subcutaneous [facility] Camilo Bucio MD    insulin glargine        insulin glargine 10 Units Subcutaneous HS LATASHA Hendrix    insulin glargine 30 Units Subcutaneous GURPREET Moy LATASHA Bragg    insulin lispro 1-6 Units Subcutaneous 4x Daily (AC & HS) Marika Antony PA-C    metoprolol tartrate 25 mg Oral Q12H [address] Payton Phil, LATASHA    ondansetron 4 mg Intravenous Q6H PRN Payton LATASHA Villarreal    oxyCODONE-acetaminophen 1 tablet Oral Q6H PRN Ac Lion DPM    polyethylene glycol 17 g Oral Daily PRN Payton Phil, LATASHA    potassium chloride 20 mEq Oral Daily Payton LATASHA Villarreal         Today, Patient Was Seen By: Adam Medel MD    ** Please Note: This note has been constructed using a voice recognition system   **

## 2020-02-12 NOTE — PROGRESS NOTES
Progress Note - Infectious Disease   Va Smith 56 y o  male MRN: [id number]  Unit/Bed#: W -01 Encounter: [id number]      Impression/Plan:  1    Cellulitis and abscess of left foot   status post bedside I and D on 02/10/2020  MRI negative for osteomyelitis or residual abscess  Abscess culture is growing 2+ beta-hemolytic Streptococcus thus far  Blood cultures are negative to date  White count trending down from 18 to 10 8  Rec:  · Continue IV cefazolin at present pending final culture and clinical follow-up  · Monitor temperature and hemodynamics  · Follow-up pending blood and wound cultures     2   Chronic diabetic foot ulcer with formation of abscess   Status post bedside I and D 2/10/20 and further bedside debridement 2/11/20  Rec:  · Continue cefazolin IV for now  · Follow-up pending cultures  · Abscess wound care per Podiatry; wound vac planned      3   Diabetes with neuropathy   02/10/2020 hemoglobin A1c 11 9   Increases risk for infection  Rec:  Blood glucose management per primary care team  Regular diabetic foot care diabetic shoes recommended with neuropathy     4   Chronic kidney disease stage 3  Creatinine on admission 1 99  Creatinine 1 85 today  Rec:  Continue cefazolin IV renal dose adjusted  Monitor creatinine     5   Chronic osteomyelitis of left tibia   Following fracture with hardware placement in 1996  Patient had multiple infections requiring IV antibiotics and hardware was eventually removed   Patient states every year or so since he has a lower extremity cellulitis   His last cellulitis course was September 2018 and patient received 3 days of Ancef in the hospital and then was transition to oral Keflex 500 mg q i d  for 14 day total course      Antibiotics:  Cefazolin D4      Above plan discussed in detail with patient, RN, and Taylor Perry      Subjective:  Patient has no fever, chills, sweats overnight; no nausea, vomiting, diarrhea; no cough, shortness of breath; no left foot/leg pain with neuropathy  No new symptoms  Objective:  Vitals:  Temp:  [98 5 °F (36 9 °C)-99 7 °F (37 6 °C)] 98 9 °F (37 2 °C)  HR:  [77-90] 79  Resp:  [16-18] 18  BP: (108-133)/(66-79) 133/77  SpO2:  [94 %-97 %] 97 %  Temp (24hrs), Av °F (37 2 °C), Min:98 5 °F (36 9 °C), Max:99 7 °F (37 6 °C)  Current: Temperature: 98 9 °F (37 2 °C)    General Appearance:  Awake, alert, cooperative, resting in bed, no acute distress  Throat: Oropharynx moist without lesions  Lungs:   Clear to auscultation bilaterally; no wheezes, rhonchi or rales; respirations unlabored   Heart:  RRR; S1-S2 heard, no murmur   Abdomen:   Soft, non-tender, non-distended, positive bowel sounds  Extremities: arm IV site nontender, left foot heavily gauze wrapped s/p repeat bedside debridement last night  + bloody strike through on plantar aspect of gauze  No erythema of foot outside of dressing  Chronic left shin changes s/p multiple surgeries with intact scabs  Skin: No rashes      Labs, Imaging, & Other studies:   All pertinent labs and imaging studies were personally reviewed  Results from last 7 days   Lab Units 20  0557 20  0454 02/10/20  0200   WBC Thousand/uL 10 86* 12 16* 18 85*   HEMOGLOBIN g/dL 10 3* 10 3* 12 0   PLATELETS Thousands/uL 264 235 287     Results from last 7 days   Lab Units 20  0557  02/10/20  0200   POTASSIUM mmol/L 3 7   < > 3 9   CHLORIDE mmol/L 103   < > 101   CO2 mmol/L 27   < > 26   BUN mg/dL 39*   < > 38*   CREATININE mg/dL 1 85*   < > 1 99*   EGFR ml/min/1 73sq m 38   < > 35   CALCIUM mg/dL 8 0*   < > 9 1   AST U/L  --   --  15   ALT U/L  --   --  13   ALK PHOS U/L  --   --  80    < > = values in this interval not displayed  Results from last 7 days   Lab Units 02/10/20  0750 02/10/20  0219 02/10/20  0200   BLOOD CULTURE   --  No Growth at 48 hrs  No Growth at 48 hrs     GRAM STAIN RESULT  Rare Polys*  4+ Gram positive cocci in pairs*  2+ Gram negative rods*  --   --    WOUND CULTURE  2+ Growth of Beta Hemolytic Streptococcus Group B*  --   --

## 2020-02-12 NOTE — ASSESSMENT & PLAN NOTE
Lab Results   Component Value Date    HGBA1C 11 9 (H) 02/10/2020       Recent Labs     02/11/20  1604 02/11/20  2120 02/12/20  0734 02/12/20  1103   POCGLU 183* 198* 77 226*       Blood Sugar Average: Last 72 hrs:  · (P) 204 [id number] A1c   · Continue home regimen of Lantus 30 units in a m  and 10 units at bedtime - continue   · Add accucheck and sliding scale  · Reports home blood sugar ranges from 70 to 500 at times   · Yesterday afternoon, Dr Ara Garcia has spoken to patient and he is not interested in seeing endocrinology also not interested in pre meal insulin coverage  Today I also spoke with patient regarding better glucose control, he is aware of his poorly controlled diet and is habitually eating foods high in sugar  He is not sure whether he is ready to change

## 2020-02-12 NOTE — ASSESSMENT & PLAN NOTE
 Creatinine slightly above baseline (1 5-1 8)    Creatinine on admission1 99, yesterday trending up to 2 22, now has trended down to 1 85   Monitor kidney function daily   Hold Lasix (patient has already received his Lasix does earlier yesterday morning but will hold today's dose and continue to monitor BMP)

## 2020-02-12 NOTE — SOCIAL WORK
LOS 2, patient is not a bundle, patient is not a 30 day readmission  CM met with patient at bedside to discuss discharge planning  CM name and role introduced  Patient reports living with his wife Claudia whom he identifies as POA, and teenage daughter, also has a daughter in college  Patient reports being independent with his ADLs, no DME, and is self-employed as a  who owns a garage  Prescriptions filled at [facility] and patient is able to afford his medications, drives himself  Denies hx of STR, denies mental health, reports a prior history of alcohol use but states that he hasn't had a drink in 16 years  CM discussed discharge planning including [facility]; patient reports a history of VNA in the distant past but declines any referrals to [facility] at this time; CM to follow patient's hospital course and assist through discharge  Has transportation home with spouse  CM reviewed discharge planning process including the following: identifying caregivers at home, preference for d/c planning needs, availability of treatment team to discuss questions or concerns patient and/or family may have regarding diagnosis, plan of care, old or new medications and discharge planning   CM will continue to follow for care coordination and update assessment as necessary

## 2020-02-12 NOTE — ASSESSMENT & PLAN NOTE
Wt Readings from Last 3 Encounters:   02/12/20 81 3 kg (179 lb 2 oz)   08/05/19 87 5 kg (193 lb)   05/01/19 86 7 kg (191 lb 3 2 oz)    Patient is known to Dr Esha Posadas of Cardiology   Euvolemic   Last echocardiogram from May 4941:  Systolic function was mildly reduced    LVEF 45   I&O, daily weight   Current regimen: metoprolol 25 mg BID, lasix held

## 2020-02-13 ENCOUNTER — ANESTHESIA EVENT (INPATIENT)
Dept: PERIOP | Facility: HOSPITAL | Age: 60
DRG: 571 | End: 2020-02-13
Payer: COMMERCIAL

## 2020-02-13 ENCOUNTER — APPOINTMENT (INPATIENT)
Dept: ULTRASOUND IMAGING | Facility: HOSPITAL | Age: 60
DRG: 571 | End: 2020-02-13
Payer: COMMERCIAL

## 2020-02-13 PROBLEM — M65.072: Status: ACTIVE | Noted: 2020-02-10

## 2020-02-13 LAB
ANION GAP SERPL CALCULATED.3IONS-SCNC: 9 MMOL/L (ref 4–13)
BACTERIA WND AEROBE CULT: ABNORMAL
BACTERIA WND AEROBE CULT: ABNORMAL
BASOPHILS # BLD AUTO: 0.06 THOUSANDS/ΜL (ref 0–0.1)
BASOPHILS NFR BLD AUTO: 1 % (ref 0–1)
BUN SERPL-MCNC: 35 MG/DL (ref 5–25)
CALCIUM SERPL-MCNC: 8.3 MG/DL (ref 8.3–10.1)
CHLORIDE SERPL-SCNC: 104 MMOL/L (ref 100–108)
CO2 SERPL-SCNC: 27 MMOL/L (ref 21–32)
CREAT SERPL-MCNC: 1.73 MG/DL (ref 0.6–1.3)
EOSINOPHIL # BLD AUTO: 0.25 THOUSAND/ΜL (ref 0–0.61)
EOSINOPHIL NFR BLD AUTO: 2 % (ref 0–6)
ERYTHROCYTE [DISTWIDTH] IN BLOOD BY AUTOMATED COUNT: 12.9 % (ref 11.6–15.1)
GFR SERPL CREATININE-BSD FRML MDRD: 41 ML/MIN/1.73SQ M
GLUCOSE SERPL-MCNC: 107 MG/DL (ref 65–140)
GLUCOSE SERPL-MCNC: 109 MG/DL (ref 65–140)
GLUCOSE SERPL-MCNC: 123 MG/DL (ref 65–140)
GLUCOSE SERPL-MCNC: 199 MG/DL (ref 65–140)
GLUCOSE SERPL-MCNC: 56 MG/DL (ref 65–140)
GLUCOSE SERPL-MCNC: 77 MG/DL (ref 65–140)
GLUCOSE SERPL-MCNC: 85 MG/DL (ref 65–140)
GRAM STN SPEC: ABNORMAL
HCT VFR BLD AUTO: 32.5 % (ref 36.5–49.3)
HGB BLD-MCNC: 10.5 G/DL (ref 12–17)
IMM GRANULOCYTES # BLD AUTO: 0.09 THOUSAND/UL (ref 0–0.2)
IMM GRANULOCYTES NFR BLD AUTO: 1 % (ref 0–2)
LYMPHOCYTES # BLD AUTO: 1.3 THOUSANDS/ΜL (ref 0.6–4.47)
LYMPHOCYTES NFR BLD AUTO: 13 % (ref 14–44)
MCH RBC QN AUTO: 27.9 PG (ref 26.8–34.3)
MCHC RBC AUTO-ENTMCNC: 32.3 G/DL (ref 31.4–37.4)
MCV RBC AUTO: 86 FL (ref 82–98)
MONOCYTES # BLD AUTO: 1.01 THOUSAND/ΜL (ref 0.17–1.22)
MONOCYTES NFR BLD AUTO: 10 % (ref 4–12)
NEUTROPHILS # BLD AUTO: 7.65 THOUSANDS/ΜL (ref 1.85–7.62)
NEUTS SEG NFR BLD AUTO: 73 % (ref 43–75)
NRBC BLD AUTO-RTO: 0 /100 WBCS
PLATELET # BLD AUTO: 274 THOUSANDS/UL (ref 149–390)
PMV BLD AUTO: 9.7 FL (ref 8.9–12.7)
POTASSIUM SERPL-SCNC: 3.9 MMOL/L (ref 3.5–5.3)
RBC # BLD AUTO: 3.77 MILLION/UL (ref 3.88–5.62)
SODIUM SERPL-SCNC: 140 MMOL/L (ref 136–145)
WBC # BLD AUTO: 10.36 THOUSAND/UL (ref 4.31–10.16)

## 2020-02-13 PROCEDURE — 82948 REAGENT STRIP/BLOOD GLUCOSE: CPT

## 2020-02-13 PROCEDURE — 11042 DBRDMT SUBQ TIS 1ST 20SQCM/<: CPT | Performed by: PODIATRIST

## 2020-02-13 PROCEDURE — 0JBR0ZZ EXCISION OF LEFT FOOT SUBCUTANEOUS TISSUE AND FASCIA, OPEN APPROACH: ICD-10-PCS | Performed by: PODIATRIST

## 2020-02-13 PROCEDURE — 99232 SBSQ HOSP IP/OBS MODERATE 35: CPT | Performed by: INTERNAL MEDICINE

## 2020-02-13 PROCEDURE — 93925 LOWER EXTREMITY STUDY: CPT

## 2020-02-13 PROCEDURE — 85025 COMPLETE CBC W/AUTO DIFF WBC: CPT | Performed by: INTERNAL MEDICINE

## 2020-02-13 PROCEDURE — 80048 BASIC METABOLIC PNL TOTAL CA: CPT | Performed by: INTERNAL MEDICINE

## 2020-02-13 PROCEDURE — 93923 UPR/LXTR ART STDY 3+ LVLS: CPT

## 2020-02-13 RX ORDER — FUROSEMIDE 40 MG/1
40 TABLET ORAL DAILY
Status: DISCONTINUED | OUTPATIENT
Start: 2020-02-13 | End: 2020-02-13

## 2020-02-13 RX ADMIN — CEFAZOLIN SODIUM 1000 MG: 1 SOLUTION INTRAVENOUS at 16:20

## 2020-02-13 RX ADMIN — METOPROLOL TARTRATE 25 MG: 25 TABLET ORAL at 21:38

## 2020-02-13 RX ADMIN — POTASSIUM CHLORIDE 20 MEQ: 1500 TABLET, EXTENDED RELEASE ORAL at 07:57

## 2020-02-13 RX ADMIN — CEFAZOLIN SODIUM 1000 MG: 1 SOLUTION INTRAVENOUS at 07:57

## 2020-02-13 RX ADMIN — HEPARIN SODIUM 5000 UNITS: 5000 INJECTION INTRAVENOUS; SUBCUTANEOUS at 21:38

## 2020-02-13 RX ADMIN — ATORVASTATIN CALCIUM 40 MG: 40 TABLET, FILM COATED ORAL at 17:52

## 2020-02-13 RX ADMIN — METOPROLOL TARTRATE 25 MG: 25 TABLET ORAL at 07:57

## 2020-02-13 RX ADMIN — HEPARIN SODIUM 5000 UNITS: 5000 INJECTION INTRAVENOUS; SUBCUTANEOUS at 13:13

## 2020-02-13 RX ADMIN — ASPIRIN 81 MG 81 MG: 81 TABLET ORAL at 07:57

## 2020-02-13 RX ADMIN — HEPARIN SODIUM 5000 UNITS: 5000 INJECTION INTRAVENOUS; SUBCUTANEOUS at 05:36

## 2020-02-13 NOTE — ASSESSMENT & PLAN NOTE
· last dose 02/09/2020; resumed aspirin on 02/11/2020; will hold aspirin dose for tomorrow 2/14/2020 prior to surgery  · Continue statin and beta blocker

## 2020-02-13 NOTE — ASSESSMENT & PLAN NOTE
 Creatinine slightly above baseline on admission at 2 22 (1 5-1 8)  Creatinine on downward trend, today at 1 73 which is within patient's baseline     Monitor kidney function daily   Hold Lasix for one more day

## 2020-02-13 NOTE — PLAN OF CARE
Problem: Potential for Falls  Goal: Patient will remain free of falls  Description  INTERVENTIONS:  - Assess patient frequently for physical needs  -  Identify cognitive and physical deficits and behaviors that affect risk of falls    -  [place] fall precautions as indicated by assessment   - Educate patient/family on patient safety including physical limitations  - Instruct patient to call for assistance with activity based on assessment  - Modify environment to reduce risk of injury  - Consider OT/PT consult to assist with strengthening/mobility  Outcome: Progressing     Problem: SKIN/TISSUE INTEGRITY - ADULT  Goal: Skin integrity remains intact  Description  INTERVENTIONS  - Identify patients at risk for skin breakdown  - Assess and monitor skin integrity  - Assess and monitor nutrition and hydration status  - Monitor labs (i e  albumin)  - Assess for incontinence   - Turn and reposition patient  - Assist with mobility/ambulation  - Relieve pressure over bony prominences  - Avoid friction and shearing  - Provide appropriate hygiene as needed including keeping skin clean and dry  - Evaluate need for skin moisturizer/barrier cream  - Collaborate with interdisciplinary team (i e  Nutrition, Rehabilitation, etc )   - Patient/family teaching  Outcome: Progressing  Goal: Incision(s), wounds(s) or drain site(s) healing without S/S of infection  Description  INTERVENTIONS  - Assess and document risk factors for skin impairment   - Assess and document dressing, incision, wound bed, drain sites and surrounding tissue  - Consider nutrition services referral as needed  - Oral mucous membranes remain intact  - Provide patient/ family education  Outcome: Progressing  Goal: Oral mucous membranes remain intact  Description  INTERVENTIONS  - Assess oral mucosa and hygiene practices  - Implement preventative oral hygiene regimen  - Implement oral medicated treatments as ordered  - Initiate Nutrition services referral as needed  Outcome: Progressing     Problem: MUSCULOSKELETAL - ADULT  Goal: Maintain or return mobility to safest level of function  Description  INTERVENTIONS:  - Assess patient's ability to carry out ADLs; assess patient's baseline for ADL function and identify physical deficits which impact ability to perform ADLs (bathing, care of mouth/teeth, toileting, grooming, dressing, etc )  - Assess/evaluate cause of self-care deficits   - Assess range of motion  - Assess patient's mobility  - Assess patient's need for assistive devices and provide as appropriate  - Encourage maximum independence but intervene and supervise when necessary  - Involve family in performance of ADLs  - Assess for home care needs following discharge   - Consider OT consult to assist with ADL evaluation and planning for discharge  - Provide patient education as appropriate  Outcome: Progressing  Goal: Maintain proper alignment of affected body part  Description  INTERVENTIONS:  - Support, maintain and protect limb and body alignment  - Provide patient/ family with appropriate education  Outcome: Progressing     Problem: PAIN - ADULT  Goal: Verbalizes/displays adequate comfort level or baseline comfort level  Description  Interventions:  - Encourage patient to monitor pain and request assistance  - Assess pain using appropriate pain scale  - Administer analgesics based on type and severity of pain and evaluate response  - Implement non-pharmacological measures as appropriate and evaluate response  - Consider cultural and social influences on pain and pain management  - Notify physician/advanced practitioner if interventions unsuccessful or patient reports new pain  Outcome: Progressing     Problem: INFECTION - ADULT  Goal: Absence or prevention of progression during hospitalization  Description  INTERVENTIONS:  - Assess and monitor for signs and symptoms of infection  - Monitor lab/diagnostic results  - Monitor all insertion sites, i e  indwelling lines, tubes, and drains  - Monitor endotracheal if appropriate and nasal secretions for changes in amount and color  - [place] appropriate cooling/warming therapies per order  - Administer medications as ordered  - Instruct and encourage patient and family to use good hand hygiene technique  - Identify and instruct in appropriate isolation precautions for identified infection/condition  Outcome: Progressing  Goal: Absence of fever/infection during neutropenic period  Description  INTERVENTIONS:  - Monitor WBC    Outcome: Progressing     Problem: SAFETY ADULT  Goal: Maintain or return to baseline ADL function  Description  INTERVENTIONS:  -  Assess patient's ability to carry out ADLs; assess patient's baseline for ADL function and identify physical deficits which impact ability to perform ADLs (bathing, care of mouth/teeth, toileting, grooming, dressing, etc )  - Assess/evaluate cause of self-care deficits   - Assess range of motion  - Assess patient's mobility; develop plan if impaired  - Assess patient's need for assistive devices and provide as appropriate  - Encourage maximum independence but intervene and supervise when necessary  - Involve family in performance of ADLs  - Assess for home care needs following discharge   - Consider OT consult to assist with ADL evaluation and planning for discharge  - Provide patient education as appropriate  Outcome: Progressing  Goal: Maintain or return mobility status to optimal level  Description  INTERVENTIONS:  - Assess patient's baseline mobility status (ambulation, transfers, stairs, etc )    - Identify cognitive and physical deficits and behaviors that affect mobility  - Identify mobility aids required to assist with transfers and/or ambulation (gait belt, sit-to-stand, lift, walker, cane, etc )  - [place] fall precautions as indicated by assessment  - Record patient progress and toleration of activity level on Mobility SBAR; progress patient to next Phase/Stage  - Instruct patient to call for assistance with activity based on assessment  - Consider rehabilitation consult to assist with strengthening/weightbearing, etc   Outcome: Progressing     Problem: Nutrition/Hydration-ADULT  Goal: Nutrient/Hydration intake appropriate for improving, restoring or maintaining nutritional needs  Description  Monitor and assess patient's nutrition/hydration status for malnutrition  Collaborate with interdisciplinary team and initiate plan and interventions as ordered  Monitor patient's weight and dietary intake as ordered or per policy  Utilize nutrition screening tool and intervene as necessary  Determine patient's food preferences and provide high-protein, high-caloric foods as appropriate       INTERVENTIONS:  - Monitor oral intake, urinary output, labs, and treatment plans  - Assess nutrition and hydration status and recommend course of action  - Evaluate amount of meals eaten  - Assist patient with eating if necessary   - Allow adequate time for meals  - Recommend/ encourage appropriate diets, oral nutritional supplements, and vitamin/mineral supplements  - Order, calculate, and assess calorie counts as needed  - Recommend, monitor, and adjust tube feedings and TPN/PPN based on assessed needs  - Assess need for intravenous fluids  - Provide specific nutrition/hydration education as appropriate  - Include patient/family/caregiver in decisions related to nutrition  Outcome: Progressing

## 2020-02-13 NOTE — PROGRESS NOTES
Progress Note - Podiatry  Abi Dennis 56 y o  male MRN: [id number]  Unit/Bed#: W -01 Encounter: [id number]      Assessment:  1  Sepsis secondary to left foot abscess  2  Chronic diabetic foot ulcer with abscess left foot S/P bedside I&D  3  Cellulitis left foot - resolving  4  Diabetes with neuropathy  5  Chronic osteomyelitis left tibia, stable     Plan:  Wound is stable, but not healing  WBC trending down, but he still had low grade fever  Will order arterial study  Wound was debrided again  There is some skin necrosis developing dorsally  It will be beneficial to do OR wash-out  Plan for OR tomorrow for debridement/ I&D with possible wound VAC  Continue IV abx  NWB and elevation  NPO after midnight  Procedure: Debridement of subcutaneous tissue- 2 0 sq cm (12626)  Packing was removed  Under aseptic technique, the wound was thoroughly explored for undermining, deep sinus tracts and bone involvement  Sterile instrumentation including scissors and forcep used to excise the clearly delineated devitalized subcutaneous tissue exposing viable tissue  No soni pus was identified  Bleeding controlled with gentle pressure  Wound was packed open with 1/4" Iodoform gauze and DSD  Patient tolerated debridement without complications  Subjective/Objective   Chief Complaint:   Chief Complaint   Patient presents with    Leg Pain     Pt presents to ED from home w/ left leg pain starting yesterday  pt states getting infections once a year in that leg  Pt (+) hx HTN, DM, CHF  Subjective: 56 y o  y/o male was seen and evaluated at bedside  Minimal pain when he walks now  He has not been using walker to off-load left foot  No new complaint  Review of Systems  Review of Systems   Constitutional: Negative for chills and fever  Respiratory: Negative for shortness of breath  Cardiovascular: Negative for chest pain  Gastrointestinal: Negative for diarrhea, nausea and vomiting  Past Medical History  Past Medical History:   Diagnosis Date    CAD (coronary artery disease)     Chronic combined systolic and diastolic CHF (congestive heart failure) (HCC)     Diabetes mellitus (HCC)     type 2    Dyslipidemia     Hypertension     Ischemic cardiomyopathy     [facility] [address] [facility]     Osteomyelitis [facility] [address] [facility]     Pancreatitis        Past Surgical History  Past Surgical History:   Procedure Laterality Date    ANGIOPLASTY      ballon    CORONARY ANGIOPLASTY WITH STENT PLACEMENT      LULA to proximal and mid LAD, Proximal RCA   HERNIA REPAIR          Allergies:  Patient has no known allergies      Medications:  Current Facility-Administered Medications   Medication Dose Route Frequency Provider Last Rate Last Dose    acetaminophen (TYLENOL) tablet 650 mg  650 mg Oral Q6H PRN Pantera Mcmahon, DPM        aspirin chewable tablet 81 mg  81 mg Oral Daily Demetrice Sagastume MD   81 mg at 02/13/20 0757    atorvastatin (LIPITOR) tablet 40 mg  40 mg Oral After LATASHA Davila   40 mg at 02/12/20 1709    ceFAZolin (ANCEF) IVPB (premix) 1,000 mg  1,000 mg Intravenous Q8H LATASHA Wise   Stopped at 02/13/20 0827    heparin (porcine) subcutaneous injection 5,000 Units  5,000 Units Subcutaneous [facility] Demetrice Sagastume MD   5,000 Units at 02/13/20 0536    insulin glargine (LANTUS) subcutaneous injection 10 Units 0 1 mL  10 Units Subcutaneous HS LATASHA Wise   10 Units at 02/12/20 2124    insulin glargine (LANTUS) subcutaneous injection 30 Units 0 3 mL  30 Units Subcutaneous QAM LATASHA Wise   30 Units at 02/12/20 1237    insulin lispro (HumaLOG) 100 units/mL subcutaneous injection 1-6 Units  1-6 Units Subcutaneous 4x Daily (AC & HS) Delmi Laws PA-C   4 Units at 02/12/20 2126    metoprolol tartrate (LOPRESSOR) tablet 25 mg  25 mg Oral Q12H [address] LATASHA Wise   25 mg at 02/13/20 0757    ondansetron (ZOFRAN) injection 4 mg  4 mg Intravenous Q6H PRN Venedy Albany, CRNP        oxyCODONE-acetaminophen (PERCOCET) 5-325 mg per tablet 1 tablet  1 tablet Oral Q6H PRN Yesenia Choudhary DPM   1 tablet at 02/11/20 9521    polyethylene glycol (MIRALAX) packet 17 g  17 g Oral Daily PRN Venedy Albany, CRNP        potassium chloride (K-DUR,KLOR-CON) CR tablet 20 mEq  20 mEq Oral Daily John [place], CRNP   20 mEq at 02/13/20 3311       Social History:  Social History     Socioeconomic History    Marital status: /Civil Union     Spouse name: None    Number of children: None    Years of education: None    Highest education level: None   Occupational History    None   Social Needs    Financial resource strain: None    Food insecurity:     Worry: None     Inability: None    Transportation needs:     Medical: None     Non-medical: None   Tobacco Use    Smoking status: Never Smoker    Smokeless tobacco: Never Used   Substance and Sexual Activity    Alcohol use: Not Currently    Drug use: No    Sexual activity: None   Lifestyle    Physical activity:     Days per week: None     Minutes per session: None    Stress: None   Relationships    Social connections:     Talks on phone: None     Gets together: None     Attends [employer] service: None     Active member of club or organization: None     Attends meetings of clubs or organizations: None     Relationship status: None    Intimate partner violence:     Fear of current or ex partner: None     Emotionally abused: None     Physically abused: None     Forced sexual activity: None   Other Topics Concern    None   Social History Narrative    None          Blood pressure 126/76, pulse 83, temperature 99 5 °F (37 5 °C), resp  rate 18, height 5' 10" (1 778 m), weight 80 9 kg (178 lb 5 6 oz), SpO2 92 %  ,Body mass index is 25 59 kg/m²      Invasive Devices     Peripheral Intravenous Line            Peripheral IV 02/10/20 Left Antecubital 3 days                Physical Exam:   General: Alert, cooperative and no distress  Lungs: Non labored breathing  Heart: RRR  Normal heart rate  Abdomen: Soft, non-tender  Neurologic:  No focal neurologic deficit  Sensation is intact to light touch  Extremity: No calf pain or edema  No cyanosis or clubbing  Skin:  Wound noted left 1st interspace  4 0 X 0 5 cm  No bone exposure  No soni pus  Still has some fibrotic/ non-viable tissues  Mild skin necrosis/ ecchymosis around the dorsal incision  Wound bed is still 60% granular  Decreased redness and edema left foot  Vascular:  CRT less than 3 seconds  Chronic LLE edema presents  Lab and other studies: I have personally reviewed pertinent lab results      Imaging: I have personally reviewed pertinent films in PACS

## 2020-02-13 NOTE — PROGRESS NOTES
Progress Note - Infectious Disease   Nj Mojica 56 y o  male MRN: [id number]  Unit/Bed#: W -01 Encounter: [id number]      Impression/Plan:  1    Cellulitis and abscess of left foot   status post bedside I and D on 02/10/2020   MRI negative for osteomyelitis or residual abscess   Abscess culture is growing 2+ Group B Streptococcus   Blood cultures are negative to date   White count trending down from 18 to 10 36  Rec:  · Continue IV cefazolin at present pending final culture and clinical follow-up  · Monitor temperature and hemodynamics  · Follow-up final blood and wound cultures  · Abscess care per Podiatry: OR washout being planned       2   Chronic diabetic foot ulcer    Rec:  · Wound care per podiatry     3   Diabetes with neuropathy   02/10/2020 hemoglobin A1c 11 9   Increases risk for infection  Rec:  Blood glucose management per primary care team  Regular diabetic foot care diabetic shoes recommended with neuropathy     4   Chronic kidney disease stage 3  Creatinine on admission 1 99   Creatinine 1 73 today  Rec:  Continue cefazolin IV renal dose adjusted  Monitor creatinine     5   Chronic osteomyelitis of left tibia   Following fracture with hardware placement in 1996  Patient had multiple infections requiring IV antibiotics and hardware was eventually removed   Patient states every year or so since he has a lower extremity cellulitis   His last cellulitis course was September 2018 and patient received 3 days of Ancef in the hospital and then was transition to oral Keflex 500 mg q i d  for 14 day total course      Antibiotics:  Cefazolin D5     Above plan discussed in detail with patient, RN, and Dr Rome Huff      Subjective:  Patient has no fever, chills, sweats overnight; no nausea, vomiting, diarrhea; no cough, shortness of breath; no left foot/leg pain with neuropathy  No new symptoms      Objective:  Vitals:  Temp:  [97 5 °F (36 4 °C)-99 9 °F (37 7 °C)] 99 5 °F (37 5 °C)  HR:  [83-89] 83  Resp:  [18] 18  BP: (126-132)/(76-79) 126/76  SpO2:  [92 %-98 %] 92 %  Temp (24hrs), Av °F (37 2 °C), Min:97 5 °F (36 4 °C), Max:99 9 °F (37 7 °C)  Current: Temperature: 99 5 °F (37 5 °C)    General Appearance:  Awake, alert, cooperative, resting in bed, no acute distress  Throat: Oropharynx moist without lesions  Lungs:   Clear to auscultation bilaterally; no wheezes, rhonchi or rales; respirations unlabored   Heart:  RRR; S1-S2 heard, no murmur   Abdomen:   Soft, non-tender, non-distended, positive bowel sounds  Extremities: No edema, arm IV site nontender, left foot heavily gauze wrapped with dried bloody strike through drainage on plantar aspect of dressing  No erythema of foot outside of dressing  Chronic left shin skin changes status post multiple surgeries with dried blood at chronic scabs  Skin: No rashes      Labs, Imaging, & Other studies:   All pertinent labs and imaging studies were personally reviewed  Results from last 7 days   Lab Units 20  0551 20  0557 20  0454   WBC Thousand/uL 10 36* 10 86* 12 16*   HEMOGLOBIN g/dL 10 5* 10 3* 10 3*   PLATELETS Thousands/uL 274 264 235     Results from last 7 days   Lab Units 20  0551  02/10/20  0200   POTASSIUM mmol/L 3 9   < > 3 9   CHLORIDE mmol/L 104   < > 101   CO2 mmol/L 27   < > 26   BUN mg/dL 35*   < > 38*   CREATININE mg/dL 1 73*   < > 1 99*   EGFR ml/min/1 73sq m 41   < > 35   CALCIUM mg/dL 8 3   < > 9 1   AST U/L  --   --  15   ALT U/L  --   --  13   ALK PHOS U/L  --   --  80    < > = values in this interval not displayed  Results from last 7 days   Lab Units 02/10/20  0750 02/10/20  0219 02/10/20  0200   BLOOD CULTURE   --  No Growth at 72 hrs  No Growth at 72 hrs     GRAM STAIN RESULT  Rare Polys*  4+ Gram positive cocci in pairs*  2+ Gram negative rods*  --   --    WOUND CULTURE  2+ Growth of Beta Hemolytic Streptococcus Group B*  2+ Growth of   --   --

## 2020-02-13 NOTE — ASSESSMENT & PLAN NOTE
· Presents to ED with complaints of two day history left LE pain, erythema, swelling  Pain is located to great toe and mid shin  He reports prior left ankle fracture requiring plate  Hardware has since been removed  He reports episodes of cellulitis about once per year  · He reports he has cipro prescribed to him to use as needed for cellulitis  He took about 3 doses with no improvement  · Tmax 101 8 at home; on admission temperature was 36 2; patient does not meet criteria for sepsis/SIRS as there was only leukocytosis on admission (WBC 18 85) and no tachycardia, tachypnea, or Temp <36 or >38  · MRI of L foot showed: Plantar distal first interspace tiny wound/ulcer and/or I&D penetration site with mild regional cellulitis  No residual abscess post I&D and packing  No osteomyelitis  No evidence of septic arthropathy  No significant tenosynovitis  Incidentally noted subcutaneous superficial pressure lesions at the level of the fourth and fifth MTP joints  Tiny T1 and T2 hypointense foci with minimal adjacent T2 hyperintensity and postcontrast hyperemia at the level of the fourth metatarsal head  · WBC on downward trend but still with low-grade fever overnight      Plan:  · Patient currently on cefazolin (today will be day 4)  · Podiatry input appreciated  · S/p bedside I and D of left foot abscess 2/10/2020; podiatry also did another bedside debridement 02/11/2020   Patient seen today by Podiatry, dressing changed, skin necrosis developing dorsally noted, and plan for debridement tomorrow 2/14/2020 in OR  · Follow up bilateral arterial duplex of lower limb  · Weightbear to left heel in surgical shoe  · Infectious disease input appreciated  · Wound cultures so far show growth of group B strep  · Infectious disease input appreciated  · WBC count showing a downward trend

## 2020-02-13 NOTE — ASSESSMENT & PLAN NOTE
Wt Readings from Last 3 Encounters:   02/13/20 80 9 kg (178 lb 5 6 oz)   08/05/19 87 5 kg (193 lb)   05/01/19 86 7 kg (191 lb 3 2 oz)    Patient is known to Dr Laura Gil of Cardiology   Euvolemic   Last echocardiogram from May 6090:  Systolic function was mildly reduced    LVEF 45   I&O, daily weight   Current regimen: metoprolol 25 mg BID, lasix held

## 2020-02-13 NOTE — PROGRESS NOTES
Progress Note Vinny Cross 1960, 56 y o  male MRN: [id number]    Unit/Bed#: W -01 Encounter: [id number]    Primary Care Provider: Micah Wells MD   Date and time admitted to hospital: 2/10/2020 12:30 AM        * Cellulitis of left foot  Assessment & Plan  · Presents to ED with complaints of two day history left LE pain, erythema, swelling  Pain is located to great toe and mid shin  He reports prior left ankle fracture requiring plate  Hardware has since been removed  He reports episodes of cellulitis about once per year  · He reports he has cipro prescribed to him to use as needed for cellulitis  He took about 3 doses with no improvement  · Tmax 101 8 at home; on admission temperature was 36 2; patient does not meet criteria for sepsis/SIRS as there was only leukocytosis on admission (WBC 18 85) and no tachycardia, tachypnea, or Temp <36 or >38  · MRI of L foot showed: Plantar distal first interspace tiny wound/ulcer and/or I&D penetration site with mild regional cellulitis  No residual abscess post I&D and packing  No osteomyelitis  No evidence of septic arthropathy  No significant tenosynovitis  Incidentally noted subcutaneous superficial pressure lesions at the level of the fourth and fifth MTP joints  Tiny T1 and T2 hypointense foci with minimal adjacent T2 hyperintensity and postcontrast hyperemia at the level of the fourth metatarsal head  · WBC on downward trend but still with low-grade fever overnight      Plan:  · Patient currently on cefazolin (today will be day 4)  · Podiatry input appreciated  · S/p bedside I and D of left foot abscess 2/10/2020; podiatry also did another bedside debridement 02/11/2020   Patient seen today by Podiatry, dressing changed, skin necrosis developing dorsally noted, and plan for debridement tomorrow 2/14/2020 in OR  · Follow up bilateral arterial duplex of lower limb  · Weightbear to left heel in surgical shoe  · Infectious disease input appreciated  · Wound cultures so far show growth of group B strep  · Infectious disease input appreciated  · WBC count showing a downward trend     Chronic combined systolic and diastolic CHF (congestive heart failure) ([facility])  Assessment & Plan  Wt Readings from Last 3 Encounters:   02/13/20 80 9 kg (178 lb 5 6 oz)   08/05/19 87 5 kg (193 lb)   05/01/19 86 7 kg (191 lb 3 2 oz)    Patient is known to Dr Chacorta Edwards of Cardiology   Euvolemic   Last echocardiogram from May 3167:  Systolic function was mildly reduced  LVEF 45   I&O, daily weight   Current regimen: metoprolol 25 mg BID, lasix held       Presence of drug coated stent in LAD coronary artery  Assessment & Plan  · last dose 02/09/2020; resumed aspirin on 02/11/2020; will hold aspirin dose for tomorrow 2/14/2020 prior to surgery  · Continue statin and beta blocker    CKD (chronic kidney disease) stage 3, GFR 30-59 ml/min ([facility])  Assessment & Plan   Creatinine slightly above baseline on admission at 2 22 (1 5-1 8)  Creatinine on downward trend, today at 1 73 which is within patient's baseline   Monitor kidney function daily   Hold Lasix for one more day    Benign essential hypertension  Assessment & Plan  · Stable   · Continue metoprolol  · Lasix held    Type 2 diabetes mellitus with hyperglycemia, with long-term current use of insulin [facility])  Assessment & Plan  Lab Results   Component Value Date    HGBA1C 11 9 (H) 02/10/2020       Recent Labs     02/13/20  0753 02/13/20  0800 02/13/20  0918 02/13/20  1059   POCGLU 107 109 85 123       Blood Sugar Average: Last 72 hrs:  · (P) 180 [id number] A1c   · Continue home regimen of Lantus 30 units in a m    · Discontinued Lantus 10 units at bedtime today 2/13/2020 - reviewed glucose trend and blood sugars have been noted to be low in the early morning  · QID accuchecks and SSI  · Reports home blood sugar ranges from 70 to 500 at times   · Patient now interested in Endocrinology outpatient referral to   [facility]        VTE Pharmacologic Prophylaxis:   Pharmacologic: Heparin  Mechanical VTE Prophylaxis in Place: Yes    Discussions with Specialists or Other Care Team Provider: Podiatry    Education and Discussions with Family / Patient: patient    Current Length of Stay: 3 day(s)    Current Patient Status: Inpatient     Discharge Plan / Estimated Discharge Date: to be determined    Code Status: Level 1 - Full Code      Subjective:   Patient reports that he over also feels well, reports that pain on L foot is controlled  No chest pain, abdominal pain, shortness of breath, headache or dizziness  Objective:     Vitals:   Temp (24hrs), Av °F (37 2 °C), Min:97 5 °F (36 4 °C), Max:99 9 °F (37 7 °C)    Temp:  [97 5 °F (36 4 °C)-99 9 °F (37 7 °C)] 99 5 °F (37 5 °C)  HR:  [83-89] 83  Resp:  [18] 18  BP: (126-132)/(76-79) 126/76  SpO2:  [92 %-98 %] 92 %  Body mass index is 25 59 kg/m²  Input and Output Summary (last 24 hours): Intake/Output Summary (Last 24 hours) at 2020 1420  Last data filed at 2020 1001  Gross per 24 hour   Intake 600 ml   Output    Net 600 ml       Physical Exam:     Physical Exam  Constitutional: He is oriented to person, place, and time  No distress  HENT:   Mouth/Throat: Oropharynx is clear and moist    Eyes: Conjunctivae are normal    Neck: Neck supple  Cardiovascular: Normal rate and regular rhythm  Exam reveals no gallop and no friction rub    No murmur heard  Pulmonary/Chest: Effort normal and breath sounds normal  No respiratory distress  He has no wheezes  Abdominal: Soft  Bowel sounds are normal  There is no tenderness  Musculoskeletal: Normal range of motion    left foot heavily wrapped in gauze, dressing is very mildly soaked only on distal end of plantar area of foot   No erythema of foot outside of gauze wrap with 1+ swelling around the ankle   No active drainage, tenderness or erythema of left shin  (+) chronic skin changes status post multiple surgeries    Neurological: He is alert and oriented to person, place, and time  Skin: Skin is warm        Additional Data:     Labs:    Results from last 7 days   Lab Units 02/13/20  0551   WBC Thousand/uL 10 36*   HEMOGLOBIN g/dL 10 5*   HEMATOCRIT % 32 5*   PLATELETS Thousands/uL 274   NEUTROS PCT % 73   LYMPHS PCT % 13*   MONOS PCT % 10   EOS PCT % 2     Results from last 7 days   Lab Units 02/13/20  0551  02/10/20  0200   POTASSIUM mmol/L 3 9   < > 3 9   CHLORIDE mmol/L 104   < > 101   CO2 mmol/L 27   < > 26   BUN mg/dL 35*   < > 38*   CREATININE mg/dL 1 73*   < > 1 99*   CALCIUM mg/dL 8 3   < > 9 1   ALK PHOS U/L  --   --  80   ALT U/L  --   --  13   AST U/L  --   --  15    < > = values in this interval not displayed  Results from last 7 days   Lab Units 02/10/20  0200   INR  1 16       * I Have Reviewed All Lab Data Listed Above  * Additional Pertinent Lab Tests Reviewed: All Labs Within Last 24 Hours Reviewed    Imaging:    Imaging Reports Reviewed Today Include: none  Imaging Personally Reviewed by Myself Includes:  none    Recent Cultures (last 7 days):     Results from last 7 days   Lab Units 02/10/20  0750 02/10/20  0219 02/10/20  0200   BLOOD CULTURE   --  No Growth at 72 hrs  No Growth at 72 hrs     GRAM STAIN RESULT  Rare Polys*  4+ Gram positive cocci in pairs*  2+ Gram negative rods*  --   --    WOUND CULTURE  2+ Growth of Beta Hemolytic Streptococcus Group B*  2+ Growth of   --   --        Last 24 Hours Medication List:     Current Facility-Administered Medications:  acetaminophen 650 mg Oral Q6H PRN Artiericha Campbell, DPM    atorvastatin 40 mg Oral After Leonarda Bloodgood, CRNP    cefazolin 1,000 mg Intravenous Q8H Clearence Bake CRNP Last Rate: Stopped (02/13/20 0827)   heparin (porcine) 5,000 Units Subcutaneous [facility] MD    insulin glargine 30 Units Subcutaneous QAM Clearence Bake, CRNP    insulin lispro 1-6 Units Subcutaneous 4x Daily (AC & HS) Eber Desai PA-C metoprolol tartrate 25 mg Oral Q12H [facility] LATASAH Welch    ondansetron 4 mg Intravenous Q6H PRN LATASHA Welch    oxyCODONE-acetaminophen 1 tablet Oral Q6H PRN Bishop Yuliet DPM    polyethylene glycol 17 g Oral Daily PRN LATASHA Welch    potassium chloride 20 mEq Oral Daily LATASHA Welch         Today, Patient Was Seen By: Kane Mosley MD    ** Please Note: This note has been constructed using a voice recognition system   **

## 2020-02-13 NOTE — ASSESSMENT & PLAN NOTE
Lab Results   Component Value Date    HGBA1C 11 9 (H) 02/10/2020       Recent Labs     02/13/20  0753 02/13/20  0800 02/13/20  0918 02/13/20  1059   POCGLU 107 109 85 123       Blood Sugar Average: Last 72 hrs:  · (P) 180 [id number] A1c   · Continue home regimen of Lantus 30 units in a m    · Discontinued Lantus 10 units at bedtime today 2/13/2020 - reviewed glucose trend and blood sugars have been noted to be low in the early morning  · QID accuchecks and SSI  · Reports home blood sugar ranges from 70 to 500 at times   · Patient now interested in Endocrinology outpatient referral to Dr Calos Woods

## 2020-02-14 ENCOUNTER — ANESTHESIA (INPATIENT)
Dept: PERIOP | Facility: HOSPITAL | Age: 60
DRG: 571 | End: 2020-02-14
Payer: COMMERCIAL

## 2020-02-14 LAB
ANION GAP SERPL CALCULATED.3IONS-SCNC: 8 MMOL/L (ref 4–13)
BASOPHILS # BLD AUTO: 0.06 THOUSANDS/ΜL (ref 0–0.1)
BASOPHILS NFR BLD AUTO: 1 % (ref 0–1)
BUN SERPL-MCNC: 30 MG/DL (ref 5–25)
CALCIUM SERPL-MCNC: 8.4 MG/DL (ref 8.3–10.1)
CHLORIDE SERPL-SCNC: 104 MMOL/L (ref 100–108)
CO2 SERPL-SCNC: 27 MMOL/L (ref 21–32)
CREAT SERPL-MCNC: 1.63 MG/DL (ref 0.6–1.3)
EOSINOPHIL # BLD AUTO: 0.37 THOUSAND/ΜL (ref 0–0.61)
EOSINOPHIL NFR BLD AUTO: 4 % (ref 0–6)
ERYTHROCYTE [DISTWIDTH] IN BLOOD BY AUTOMATED COUNT: 12.7 % (ref 11.6–15.1)
GFR SERPL CREATININE-BSD FRML MDRD: 44 ML/MIN/1.73SQ M
GLUCOSE SERPL-MCNC: 126 MG/DL (ref 65–140)
GLUCOSE SERPL-MCNC: 145 MG/DL (ref 65–140)
GLUCOSE SERPL-MCNC: 151 MG/DL (ref 65–140)
GLUCOSE SERPL-MCNC: 173 MG/DL (ref 65–140)
GLUCOSE SERPL-MCNC: 193 MG/DL (ref 65–140)
GLUCOSE SERPL-MCNC: 275 MG/DL (ref 65–140)
HCT VFR BLD AUTO: 33.8 % (ref 36.5–49.3)
HGB BLD-MCNC: 10.8 G/DL (ref 12–17)
IMM GRANULOCYTES # BLD AUTO: 0.11 THOUSAND/UL (ref 0–0.2)
IMM GRANULOCYTES NFR BLD AUTO: 1 % (ref 0–2)
LYMPHOCYTES # BLD AUTO: 1.36 THOUSANDS/ΜL (ref 0.6–4.47)
LYMPHOCYTES NFR BLD AUTO: 16 % (ref 14–44)
MCH RBC QN AUTO: 27.3 PG (ref 26.8–34.3)
MCHC RBC AUTO-ENTMCNC: 32 G/DL (ref 31.4–37.4)
MCV RBC AUTO: 85 FL (ref 82–98)
MONOCYTES # BLD AUTO: 0.8 THOUSAND/ΜL (ref 0.17–1.22)
MONOCYTES NFR BLD AUTO: 9 % (ref 4–12)
NEUTROPHILS # BLD AUTO: 5.95 THOUSANDS/ΜL (ref 1.85–7.62)
NEUTS SEG NFR BLD AUTO: 69 % (ref 43–75)
NRBC BLD AUTO-RTO: 0 /100 WBCS
PLATELET # BLD AUTO: 313 THOUSANDS/UL (ref 149–390)
PMV BLD AUTO: 9.3 FL (ref 8.9–12.7)
POTASSIUM SERPL-SCNC: 4.2 MMOL/L (ref 3.5–5.3)
RBC # BLD AUTO: 3.96 MILLION/UL (ref 3.88–5.62)
SODIUM SERPL-SCNC: 139 MMOL/L (ref 136–145)
WBC # BLD AUTO: 8.65 THOUSAND/UL (ref 4.31–10.16)

## 2020-02-14 PROCEDURE — 99232 SBSQ HOSP IP/OBS MODERATE 35: CPT | Performed by: INTERNAL MEDICINE

## 2020-02-14 PROCEDURE — 82948 REAGENT STRIP/BLOOD GLUCOSE: CPT

## 2020-02-14 PROCEDURE — A6550 NEG PRES WOUND THER DRSG SET: HCPCS | Performed by: PODIATRIST

## 2020-02-14 PROCEDURE — 93922 UPR/L XTREMITY ART 2 LEVELS: CPT | Performed by: SURGERY

## 2020-02-14 PROCEDURE — 87181 SC STD AGAR DILUTION PER AGT: CPT | Performed by: PODIATRIST

## 2020-02-14 PROCEDURE — 87070 CULTURE OTHR SPECIMN AEROBIC: CPT | Performed by: PODIATRIST

## 2020-02-14 PROCEDURE — 0J9R30Z DRAINAGE OF LEFT FOOT SUBCUTANEOUS TISSUE AND FASCIA WITH DRAINAGE DEVICE, PERCUTANEOUS APPROACH: ICD-10-PCS | Performed by: STUDENT IN AN ORGANIZED HEALTH CARE EDUCATION/TRAINING PROGRAM

## 2020-02-14 PROCEDURE — 99024 POSTOP FOLLOW-UP VISIT: CPT | Performed by: PODIATRIST

## 2020-02-14 PROCEDURE — 87147 CULTURE TYPE IMMUNOLOGIC: CPT | Performed by: PODIATRIST

## 2020-02-14 PROCEDURE — 85025 COMPLETE CBC W/AUTO DIFF WBC: CPT | Performed by: INTERNAL MEDICINE

## 2020-02-14 PROCEDURE — 87075 CULTR BACTERIA EXCEPT BLOOD: CPT | Performed by: PODIATRIST

## 2020-02-14 PROCEDURE — 28003 TREATMENT OF FOOT INFECTION: CPT | Performed by: PODIATRIST

## 2020-02-14 PROCEDURE — 87185 SC STD ENZYME DETCJ PER NZM: CPT | Performed by: PODIATRIST

## 2020-02-14 PROCEDURE — 0JBR0ZZ EXCISION OF LEFT FOOT SUBCUTANEOUS TISSUE AND FASCIA, OPEN APPROACH: ICD-10-PCS | Performed by: STUDENT IN AN ORGANIZED HEALTH CARE EDUCATION/TRAINING PROGRAM

## 2020-02-14 PROCEDURE — NC001 PR NO CHARGE: Performed by: PODIATRIST

## 2020-02-14 PROCEDURE — 93925 LOWER EXTREMITY STUDY: CPT | Performed by: SURGERY

## 2020-02-14 PROCEDURE — 87205 SMEAR GRAM STAIN: CPT | Performed by: PODIATRIST

## 2020-02-14 PROCEDURE — 87077 CULTURE AEROBIC IDENTIFY: CPT | Performed by: PODIATRIST

## 2020-02-14 PROCEDURE — 80048 BASIC METABOLIC PNL TOTAL CA: CPT | Performed by: INTERNAL MEDICINE

## 2020-02-14 PROCEDURE — 87176 TISSUE HOMOGENIZATION CULTR: CPT | Performed by: PODIATRIST

## 2020-02-14 RX ORDER — BUPIVACAINE HYDROCHLORIDE 2.5 MG/ML
INJECTION, SOLUTION EPIDURAL; INFILTRATION; INTRACAUDAL AS NEEDED
Status: DISCONTINUED | OUTPATIENT
Start: 2020-02-14 | End: 2020-02-14 | Stop reason: HOSPADM

## 2020-02-14 RX ORDER — FENTANYL CITRATE 50 UG/ML
INJECTION, SOLUTION INTRAMUSCULAR; INTRAVENOUS AS NEEDED
Status: DISCONTINUED | OUTPATIENT
Start: 2020-02-14 | End: 2020-02-14 | Stop reason: SURG

## 2020-02-14 RX ORDER — LIDOCAINE HYDROCHLORIDE 10 MG/ML
INJECTION, SOLUTION EPIDURAL; INFILTRATION; INTRACAUDAL; PERINEURAL AS NEEDED
Status: DISCONTINUED | OUTPATIENT
Start: 2020-02-14 | End: 2020-02-14 | Stop reason: HOSPADM

## 2020-02-14 RX ORDER — PROPOFOL 10 MG/ML
INJECTION, EMULSION INTRAVENOUS CONTINUOUS PRN
Status: DISCONTINUED | OUTPATIENT
Start: 2020-02-14 | End: 2020-02-14 | Stop reason: SURG

## 2020-02-14 RX ORDER — SODIUM CHLORIDE, SODIUM LACTATE, POTASSIUM CHLORIDE, CALCIUM CHLORIDE 600; 310; 30; 20 MG/100ML; MG/100ML; MG/100ML; MG/100ML
50 INJECTION, SOLUTION INTRAVENOUS CONTINUOUS
Status: DISCONTINUED | OUTPATIENT
Start: 2020-02-14 | End: 2020-02-15

## 2020-02-14 RX ORDER — SODIUM CHLORIDE, SODIUM LACTATE, POTASSIUM CHLORIDE, CALCIUM CHLORIDE 600; 310; 30; 20 MG/100ML; MG/100ML; MG/100ML; MG/100ML
INJECTION, SOLUTION INTRAVENOUS CONTINUOUS PRN
Status: DISCONTINUED | OUTPATIENT
Start: 2020-02-14 | End: 2020-02-14 | Stop reason: SURG

## 2020-02-14 RX ORDER — FENTANYL CITRATE/PF 50 MCG/ML
25 SYRINGE (ML) INJECTION
Status: DISCONTINUED | OUTPATIENT
Start: 2020-02-14 | End: 2020-02-14 | Stop reason: HOSPADM

## 2020-02-14 RX ORDER — ONDANSETRON 2 MG/ML
4 INJECTION INTRAMUSCULAR; INTRAVENOUS ONCE AS NEEDED
Status: DISCONTINUED | OUTPATIENT
Start: 2020-02-14 | End: 2020-02-14 | Stop reason: HOSPADM

## 2020-02-14 RX ORDER — PROPOFOL 10 MG/ML
INJECTION, EMULSION INTRAVENOUS AS NEEDED
Status: DISCONTINUED | OUTPATIENT
Start: 2020-02-14 | End: 2020-02-14 | Stop reason: SURG

## 2020-02-14 RX ORDER — SODIUM CHLORIDE 9 MG/ML
INJECTION, SOLUTION INTRAVENOUS AS NEEDED
Status: DISCONTINUED | OUTPATIENT
Start: 2020-02-14 | End: 2020-02-14 | Stop reason: HOSPADM

## 2020-02-14 RX ORDER — CEFAZOLIN SODIUM 1 G/50ML
SOLUTION INTRAVENOUS AS NEEDED
Status: DISCONTINUED | OUTPATIENT
Start: 2020-02-14 | End: 2020-02-14 | Stop reason: SURG

## 2020-02-14 RX ORDER — DEXMEDETOMIDINE HYDROCHLORIDE 100 UG/ML
INJECTION, SOLUTION INTRAVENOUS AS NEEDED
Status: DISCONTINUED | OUTPATIENT
Start: 2020-02-14 | End: 2020-02-14 | Stop reason: SURG

## 2020-02-14 RX ADMIN — METOPROLOL TARTRATE 25 MG: 25 TABLET ORAL at 21:38

## 2020-02-14 RX ADMIN — OXYCODONE HYDROCHLORIDE AND ACETAMINOPHEN 1 TABLET: 5; 325 TABLET ORAL at 21:38

## 2020-02-14 RX ADMIN — DEXMEDETOMIDINE HYDROCHLORIDE 8 MCG: 100 INJECTION, SOLUTION INTRAVENOUS at 12:09

## 2020-02-14 RX ADMIN — CEFAZOLIN SODIUM 1000 MG: 1 SOLUTION INTRAVENOUS at 12:05

## 2020-02-14 RX ADMIN — CEFAZOLIN SODIUM 1000 MG: 1 SOLUTION INTRAVENOUS at 16:14

## 2020-02-14 RX ADMIN — CEFAZOLIN SODIUM 1000 MG: 1 SOLUTION INTRAVENOUS at 08:05

## 2020-02-14 RX ADMIN — METOPROLOL TARTRATE 25 MG: 25 TABLET ORAL at 08:04

## 2020-02-14 RX ADMIN — ATORVASTATIN CALCIUM 40 MG: 40 TABLET, FILM COATED ORAL at 17:18

## 2020-02-14 RX ADMIN — HEPARIN SODIUM 5000 UNITS: 5000 INJECTION INTRAVENOUS; SUBCUTANEOUS at 05:27

## 2020-02-14 RX ADMIN — PROPOFOL 100 MCG/KG/MIN: 10 INJECTION, EMULSION INTRAVENOUS at 12:09

## 2020-02-14 RX ADMIN — HEPARIN SODIUM 5000 UNITS: 5000 INJECTION INTRAVENOUS; SUBCUTANEOUS at 21:38

## 2020-02-14 RX ADMIN — CEFAZOLIN SODIUM 1000 MG: 1 SOLUTION INTRAVENOUS at 00:25

## 2020-02-14 RX ADMIN — FENTANYL CITRATE 25 MCG: 50 INJECTION INTRAMUSCULAR; INTRAVENOUS at 12:09

## 2020-02-14 RX ADMIN — SODIUM CHLORIDE, SODIUM LACTATE, POTASSIUM CHLORIDE, AND CALCIUM CHLORIDE: .6; .31; .03; .02 INJECTION, SOLUTION INTRAVENOUS at 12:05

## 2020-02-14 RX ADMIN — PROPOFOL 60 MG: 10 INJECTION, EMULSION INTRAVENOUS at 12:09

## 2020-02-14 RX ADMIN — POTASSIUM CHLORIDE 20 MEQ: 1500 TABLET, EXTENDED RELEASE ORAL at 08:04

## 2020-02-14 RX ADMIN — SODIUM CHLORIDE, SODIUM LACTATE, POTASSIUM CHLORIDE, AND CALCIUM CHLORIDE 50 ML/HR: .6; .31; .03; .02 INJECTION, SOLUTION INTRAVENOUS at 14:22

## 2020-02-14 NOTE — ANESTHESIA PREPROCEDURE EVALUATION
Review of Systems/Medical History          Cardiovascular  Hypertension , Past MI , CAD , Cardiac stents (3Stents) > 1 year CHF ,   Comment: 5/8/19  LEFT VENTRICLE:  Systolic function was mildly reduced  Ejection fraction was estimated to be 45 %  There was mild diffuse hypokinesis with regional variations  There was dyskinesis of the apical wall(s)  There was akinesis of the mid-apical anterior, mid anterolateral, and apical lateral wall(s)  Doppler parameters were consistent with restrictive physiology, indicative of decreased left ventricular diastolic compliance and/or increased left atrial pressure  Doppler parameters were consistent with high ventricular filling pressure,  Pulmonary  Not a smoker , Shortness of breath,        GI/Hepatic    No GERD ,        Kidney disease ,        Endo/Other  Diabetes ,      GYN       Hematology   Musculoskeletal       Neurology   Psychology           Physical Exam    Airway    Mallampati score: II  TM Distance: >3 FB  Neck ROM: full     Dental       Cardiovascular      Pulmonary      Other Findings        Anesthesia Plan  ASA Score- 3     Anesthesia Type- IV sedation with anesthesia with ASA Monitors  Additional Monitors:   Airway Plan:         Plan Factors-Patient not instructed to abstain from smoking on day of procedure  Patient did not smoke on day of surgery  Induction- intravenous  Postoperative Plan-     Informed Consent- Anesthetic plan and risks discussed with patient  I personally reviewed this patient with the CRNA  Discussed and agreed on the Anesthesia Plan with the CRNA               Lab Results   Component Value Date    GLUC 77 02/13/2020    GLUF 73 03/20/2019    ALT 13 02/10/2020    AST 15 02/10/2020    BUN 35 (H) 02/13/2020    CALCIUM 8 3 02/13/2020     02/13/2020    CHOL 98 06/25/2015    CO2 27 02/13/2020    CREATININE 1 73 (H) 02/13/2020    HDL 55 03/20/2019    INR 1 16 02/10/2020    HCT 32 5 (L) 02/13/2020    HGB 10 5 (L) 02/13/2020 PROT 7 7 06/25/2015    HGBA1C 11 9 (H) 02/10/2020    MG 2 4 04/10/2019     02/13/2020    K 3 9 02/13/2020    PSA 0 8 06/25/2015     06/25/2015    TRIG 31 03/20/2019    WBC 10 36 (H) 02/13/2020

## 2020-02-14 NOTE — ASSESSMENT & PLAN NOTE
Wt Readings from Last 3 Encounters:   02/14/20 78 5 kg (173 lb)   08/05/19 87 5 kg (193 lb)   05/01/19 86 7 kg (191 lb 3 2 oz)    Patient is known to Dr Janae Dacosta of Cardiology   Euvolemic   Last echocardiogram from May 5834:  Systolic function was mildly reduced    LVEF 45   I&O, daily weight   Current regimen: metoprolol 25 mg BID, hold lasix today as well given surgery plan today

## 2020-02-14 NOTE — ASSESSMENT & PLAN NOTE
 Creatinine slightly above baseline on admission at 2 22 (1 5-1 8)  Creatinine on downward trend, today at 1 63 which is within patient's baseline     Monitor kidney function daily   Hold Lasix for one more day

## 2020-02-14 NOTE — PROGRESS NOTES
Progress Note Steve Riley 1960, 56 y o  male MRN: [id number]    Unit/Bed#: OR [place] Encounter: [id number]    Primary Care Provider: Adriana Moore MD   Date and time admitted to hospital: 2/10/2020 12:30 AM        * Cellulitis of left foot  Assessment & Plan  · Presents to ED with complaints of two day history left LE pain, erythema, swelling  Pain is located to great toe and mid shin  He reports prior left ankle fracture requiring plate  Hardware has since been removed  He reports episodes of cellulitis about once per year  · He reports he has cipro prescribed to him to use as needed for cellulitis  He took about 3 doses with no improvement  · Tmax 101 8 at home; on admission temperature was 36 2; patient does not meet criteria for sepsis/SIRS as there was only leukocytosis on admission (WBC 18 85) and no tachycardia, tachypnea, or Temp <36 or >38  · MRI of L foot showed: Plantar distal first interspace tiny wound/ulcer and/or I&D penetration site with mild regional cellulitis  No residual abscess post I&D and packing  No osteomyelitis  No evidence of septic arthropathy  No significant tenosynovitis  Incidentally noted subcutaneous superficial pressure lesions at the level of the fourth and fifth MTP joints  Tiny T1 and T2 hypointense foci with minimal adjacent T2 hyperintensity and postcontrast hyperemia at the level of the fourth metatarsal head  · WBC on downward trend and with no reported fevers overnight      Plan:  · Patient currently on cefazolin (today will be day 5)  · Podiatry input appreciated  · S/p bedside I and D of left foot abscess 2/10/2020; podiatry also did another bedside debridement 02/11/2020   Patient seen yesterday by Podiatry, dressing changed, skin necrosis developing dorsally noted, and plan for debridement today 2/14/2020 in OR  · Bilateral arterial duplex of lower limb showed: no evidence of significant lower extremity arterial occlusive disease on both right and left lower extremities  · NWB and elevation  · Infectious disease input appreciated  · Wound cultures show growth of group B strep  · Operative findings today to guide final antibiotic recommendations  · WBC count continuing to show a downward trend     Chronic combined systolic and diastolic CHF (congestive heart failure) ([facility])  Assessment & Plan  Wt Readings from Last 3 Encounters:   02/14/20 78 5 kg (173 lb)   08/05/19 87 5 kg (193 lb)   05/01/19 86 7 kg (191 lb 3 2 oz)    Patient is known to Dr Prasad Barth of Cardiology   Euvolemic   Last echocardiogram from May 7353:  Systolic function was mildly reduced  LVEF 45   I&O, daily weight   Current regimen: metoprolol 25 mg BID, hold lasix today as well given surgery plan today      Presence of drug coated stent in LAD coronary artery  Assessment & Plan  · last dose 02/09/2020; resumed aspirin on 02/11/2020; will hold aspirin dose for today 2/14/2020 prior to surgery  · Continue statin and beta blocker    CKD (chronic kidney disease) stage 3, GFR 30-59 ml/min ([facility])  Assessment & Plan   Creatinine slightly above baseline on admission at 2 22 (1 5-1 8)  Creatinine on downward trend, today at 1 63 which is within patient's baseline   Monitor kidney function daily   Hold Lasix for one more day    Benign essential hypertension  Assessment & Plan  · Stable   · Continue metoprolol  · Lasix held    Type 2 diabetes mellitus with hyperglycemia, with long-term current use of insulin [facility])  Assessment & Plan  Lab Results   Component Value Date    HGBA1C 11 9 (H) 02/10/2020       Recent Labs     02/13/20  0918 02/13/20  1059 02/13/20  1556 02/14/20  0728   POCGLU 85 123 199* 173*       Blood Sugar Average: Last 72 hrs:  · (P) 182 [id number]  · Continue home regimen of Lantus 30 units in a m    · Discontinued Lantus 10 units at bedtime today 2/13/2020 - reviewed glucose trend and blood sugars have been noted to be low in the early morning; patient for surgery today and is NPO  · QID accuchecks and SSI  · Reports home blood sugar ranges from 70 to 500 at times   · Patient now interested in Endocrinology outpatient referral to Dr Pk Acuña          VTE Pharmacologic Prophylaxis:   Pharmacologic: Heparin  Mechanical VTE Prophylaxis in Place: Yes    Discussions with Specialists or Other Care Team Provider: nursing; case management    Education and Discussions with Family / Patient: patient    Current Length of Stay: 4 day(s)    Current Patient Status: Inpatient     Discharge Plan / Estimated Discharge Date: within 24-48 hrs    Code Status: Level 1 - Full Code      Subjective:    Patient reports that he over also feels well, reports that pain on L foot is controlled  No chest pain, abdominal pain, shortness of breath, headache or dizziness  Objective:     Vitals:   Temp (24hrs), Av 6 °F (37 °C), Min:97 8 °F (36 6 °C), Max:98 9 °F (37 2 °C)    Temp:  [97 8 °F (36 6 °C)-98 9 °F (37 2 °C)] 97 8 °F (36 6 °C)  HR:  [75-83] 75  Resp:  [16-18] 16  BP: (123-127)/(78-79) 123/79  SpO2:  [96 %-98 %] 98 %  Body mass index is 24 82 kg/m²  Input and Output Summary (last 24 hours): Intake/Output Summary (Last 24 hours) at 2020 1232  Last data filed at 2020 6081  Gross per 24 hour   Intake 100 ml   Output    Net 100 ml       Physical Exam:     Physical Exam  Constitutional: He is oriented to person, place, and time  No distress  HENT:   Mouth/Throat: Oropharynx is clear and moist    Eyes: Conjunctivae are normal    Neck: Neck supple  Cardiovascular: Normal rate and regular rhythm  Exam reveals no gallop and no friction rub    No murmur heard  Pulmonary/Chest: Effort normal and breath sounds normal  No respiratory distress  He has no wheezes  Abdominal: Soft  Bowel sounds are normal  There is no tenderness  Musculoskeletal: Normal range of motion    left foot heavily wrapped in gauze, dressing is very mildly soaked only on distal end of plantar area of foot   No erythema of foot outside of gauze wrap with 1+ swelling around the ankle   No active drainage, tenderness or erythema of left shin  (+) chronic skin changes status post multiple surgeries    Neurological: He is alert and oriented to person, place, and time  Skin: Skin is warm      Additional Data:     Labs:    Results from last 7 days   Lab Units 02/14/20  0534   WBC Thousand/uL 8 65   HEMOGLOBIN g/dL 10 8*   HEMATOCRIT % 33 8*   PLATELETS Thousands/uL 313   NEUTROS PCT % 69   LYMPHS PCT % 16   MONOS PCT % 9   EOS PCT % 4     Results from last 7 days   Lab Units 02/14/20  0534  02/10/20  0200   POTASSIUM mmol/L 4 2   < > 3 9   CHLORIDE mmol/L 104   < > 101   CO2 mmol/L 27   < > 26   BUN mg/dL 30*   < > 38*   CREATININE mg/dL 1 63*   < > 1 99*   CALCIUM mg/dL 8 4   < > 9 1   ALK PHOS U/L  --   --  80   ALT U/L  --   --  13   AST U/L  --   --  15    < > = values in this interval not displayed  Results from last 7 days   Lab Units 02/10/20  0200   INR  1 16       * I Have Reviewed All Lab Data Listed Above  * Additional Pertinent Lab Tests Reviewed: All Labs Within Last 24 Hours Reviewed    Imaging:    Imaging Reports Reviewed Today Include: none  Imaging Personally Reviewed by Myself Includes:  none    Recent Cultures (last 7 days):     Results from last 7 days   Lab Units 02/10/20  0750 02/10/20  0219 02/10/20  0200   BLOOD CULTURE   --  No Growth After 4 Days  No Growth After 4 Days     GRAM STAIN RESULT  Rare Polys*  4+ Gram positive cocci in pairs*  2+ Gram negative rods*  --   --    WOUND CULTURE  2+ Growth of Beta Hemolytic Streptococcus Group B*  2+ Growth of   --   --        Last 24 Hours Medication List:     Current Facility-Administered Medications:  [MAR Hold] acetaminophen 650 mg Oral Q6H PRN Artie Campbell DPM    [MAR Hold] atorvastatin 40 mg Oral After Leonarda BloodgoodLATASHA    bupivacaine (PF)   PRN Cintia Garcia DPM    [facility] Hold] cefazolin 1,000 mg Intravenous Q8H LATASHA Santa Last

## 2020-02-14 NOTE — PLAN OF CARE
Problem: Potential for Falls  Goal: Patient will remain free of falls  Description  INTERVENTIONS:  - Assess patient frequently for physical needs  -  Identify cognitive and physical deficits and behaviors that affect risk of falls    -  [place] fall precautions as indicated by assessment   - Educate patient/family on patient safety including physical limitations  - Instruct patient to call for assistance with activity based on assessment  - Modify environment to reduce risk of injury  - Consider OT/PT consult to assist with strengthening/mobility  Outcome: Progressing     Problem: SKIN/TISSUE INTEGRITY - ADULT  Goal: Skin integrity remains intact  Description  INTERVENTIONS  - Identify patients at risk for skin breakdown  - Assess and monitor skin integrity  - Assess and monitor nutrition and hydration status  - Monitor labs (i e  albumin)  - Assess for incontinence   - Turn and reposition patient  - Assist with mobility/ambulation  - Relieve pressure over bony prominences  - Avoid friction and shearing  - Provide appropriate hygiene as needed including keeping skin clean and dry  - Evaluate need for skin moisturizer/barrier cream  - Collaborate with interdisciplinary team (i e  Nutrition, Rehabilitation, etc )   - Patient/family teaching  Outcome: Progressing  Goal: Incision(s), wounds(s) or drain site(s) healing without S/S of infection  Description  INTERVENTIONS  - Assess and document risk factors for skin impairment   - Assess and document dressing, incision, wound bed, drain sites and surrounding tissue  - Consider nutrition services referral as needed  - Oral mucous membranes remain intact  - Provide patient/ family education  Outcome: Progressing  Goal: Oral mucous membranes remain intact  Description  INTERVENTIONS  - Assess oral mucosa and hygiene practices  - Implement preventative oral hygiene regimen  - Implement oral medicated treatments as ordered  - Initiate Nutrition services referral as needed  Outcome: Progressing     Problem: MUSCULOSKELETAL - ADULT  Goal: Maintain or return mobility to safest level of function  Description  INTERVENTIONS:  - Assess patient's ability to carry out ADLs; assess patient's baseline for ADL function and identify physical deficits which impact ability to perform ADLs (bathing, care of mouth/teeth, toileting, grooming, dressing, etc )  - Assess/evaluate cause of self-care deficits   - Assess range of motion  - Assess patient's mobility  - Assess patient's need for assistive devices and provide as appropriate  - Encourage maximum independence but intervene and supervise when necessary  - Involve family in performance of ADLs  - Assess for home care needs following discharge   - Consider OT consult to assist with ADL evaluation and planning for discharge  - Provide patient education as appropriate  Outcome: Progressing  Goal: Maintain proper alignment of affected body part  Description  INTERVENTIONS:  - Support, maintain and protect limb and body alignment  - Provide patient/ family with appropriate education  Outcome: Progressing     Problem: PAIN - ADULT  Goal: Verbalizes/displays adequate comfort level or baseline comfort level  Description  Interventions:  - Encourage patient to monitor pain and request assistance  - Assess pain using appropriate pain scale  - Administer analgesics based on type and severity of pain and evaluate response  - Implement non-pharmacological measures as appropriate and evaluate response  - Consider cultural and social influences on pain and pain management  - Notify physician/advanced practitioner if interventions unsuccessful or patient reports new pain  Outcome: Progressing     Problem: INFECTION - ADULT  Goal: Absence or prevention of progression during hospitalization  Description  INTERVENTIONS:  - Assess and monitor for signs and symptoms of infection  - Monitor lab/diagnostic results  - Monitor all insertion sites, i e  indwelling lines, tubes, and drains  - Monitor endotracheal if appropriate and nasal secretions for changes in amount and color  - [place] appropriate cooling/warming therapies per order  - Administer medications as ordered  - Instruct and encourage patient and family to use good hand hygiene technique  - Identify and instruct in appropriate isolation precautions for identified infection/condition  Outcome: Progressing  Goal: Absence of fever/infection during neutropenic period  Description  INTERVENTIONS:  - Monitor WBC    Outcome: Progressing     Problem: SAFETY ADULT  Goal: Maintain or return to baseline ADL function  Description  INTERVENTIONS:  -  Assess patient's ability to carry out ADLs; assess patient's baseline for ADL function and identify physical deficits which impact ability to perform ADLs (bathing, care of mouth/teeth, toileting, grooming, dressing, etc )  - Assess/evaluate cause of self-care deficits   - Assess range of motion  - Assess patient's mobility; develop plan if impaired  - Assess patient's need for assistive devices and provide as appropriate  - Encourage maximum independence but intervene and supervise when necessary  - Involve family in performance of ADLs  - Assess for home care needs following discharge   - Consider OT consult to assist with ADL evaluation and planning for discharge  - Provide patient education as appropriate  Outcome: Progressing  Goal: Maintain or return mobility status to optimal level  Description  INTERVENTIONS:  - Assess patient's baseline mobility status (ambulation, transfers, stairs, etc )    - Identify cognitive and physical deficits and behaviors that affect mobility  - Identify mobility aids required to assist with transfers and/or ambulation (gait belt, sit-to-stand, lift, walker, cane, etc )  - [place] fall precautions as indicated by assessment  - Record patient progress and toleration of activity level on Mobility SBAR; progress patient to next Phase/Stage  - Instruct patient to call for assistance with activity based on assessment  - Consider rehabilitation consult to assist with strengthening/weightbearing, etc   Outcome: Progressing     Problem: Nutrition/Hydration-ADULT  Goal: Nutrient/Hydration intake appropriate for improving, restoring or maintaining nutritional needs  Description  Monitor and assess patient's nutrition/hydration status for malnutrition  Collaborate with interdisciplinary team and initiate plan and interventions as ordered  Monitor patient's weight and dietary intake as ordered or per policy  Utilize nutrition screening tool and intervene as necessary  Determine patient's food preferences and provide high-protein, high-caloric foods as appropriate       INTERVENTIONS:  - Monitor oral intake, urinary output, labs, and treatment plans  - Assess nutrition and hydration status and recommend course of action  - Evaluate amount of meals eaten  - Assist patient with eating if necessary   - Allow adequate time for meals  - Recommend/ encourage appropriate diets, oral nutritional supplements, and vitamin/mineral supplements  - Order, calculate, and assess calorie counts as needed  - Recommend, monitor, and adjust tube feedings and TPN/PPN based on assessed needs  - Assess need for intravenous fluids  - Provide specific nutrition/hydration education as appropriate  - Include patient/family/caregiver in decisions related to nutrition  Outcome: Progressing

## 2020-02-14 NOTE — OP NOTE
OPERATIVE REPORT - Podiatry  PATIENT NAME: Abi Dennis    :  1960  MRN: [id number]  Pt Location: AN OR ROOM 02    SURGERY DATE: 2020    Surgeon(s) and Role:     * James Morgan DPM - Primary     * Kodak Nam DPM - Assisting    Pre-op Diagnosis:  Abscess of tendon of left foot [M65 072]    Post-Op Diagnosis Codes:     * Abscess of tendon of left foot [M65 072]    Procedure(s) (LRB):  EXPLORATION EXTREMITY (Left)  INCISION AND DRAINAGE (I&D) EXTREMITY left foot (cpt 66882) (Left)  APPLICATION VAC DRESSING (Left)    Specimen(s):  ID Type Source Tests Collected by Time Destination   A :  Tissue Foot, Left ANAEROBIC CULTURE AND GRAM STAIN, CULTURE, TISSUE AND GRAM STAIN James Morgan DPM 2020 1226        Estimated Blood Loss:   Minimal    Drains:  Negative Pressure Wound Therapy (V A C ) Toe (Comment  which one) Left (Active)   Black foam- # applied 3 2020 12:00 PM   Cycle Continuous 2020 12:00 PM   Target Pressure (mmHg) 125 2020 12:00 PM   Dressing Status Clean;Dry; Intact 2020 12:00 PM   Number of days: 0       Anesthesia Type:   Choice with 10 ml of 1% Lidocaine and 0 5% Bupivacaine in a 1:1 mixture    Hemostasis:  Surgical dissection  Pneumatic ankle tourniquet placed for 12 minutes    Materials:  Wound VAC (3 black sponges)    Operative Findings:  Necrotic and devitalized tissue was noted in the 1st interspace of the left foot  Upon incision and drainage multiple pus pockets were noted along the extensor hallucis longus tendon, dorsal interspace, and plantar medial interspace  Following incision and drainage with removal of necrotic tissue remaining tissue appeared viable  Extensor hallucis longus tendon appeared intact  Complications:   None    Procedure and Technique:     Under mild sedation, the patient was brought into the operating room and placed on the operating room table in the supine position   A pneumatic tourniquet was then placed around the patient's left lower extremity with ample webril padding  A time out was performed to confirm the correct patient, procedure and site with all parties in agreement  Following IV sedation, a Jones block was performed consisting of 10 ml of 1% Lidocaine and 0 5% Bupivacaine in a 1:1 mixture  The foot was then scrubbed, prepped and draped in the usual aseptic manner and the foot was placed on the operating room table  Attention was directed to the open wound located in the 1st interspace of left foot  Utilizing 15 blade, rongeur, and Jones scissors the wound was irrigated and drained, to remove all necrotic nonviable and purulent tissue  Upon exploration of the wound pocket of purulence were noted along the extensor hallucis longus tendon at the proximal phalanx, the dorsal interspace, in the plantar medial interspace  Once the surgical site was inspected and no remaining pockets of purulence were found, the surgical incision was irrigated with copious amounts of normal sterile saline  The wound was left open and covered with a wound VAC  Tourniquet wound was inflated for application of wound VAC  Wound VAC was applied as below:  Wound VAC application  1  Number of sponges: 3 black  2  Pressure settin continuous  3  Size of wound:  8 x 2 x 2 cm  4  Description of wound:  Remaining wound bed was a mixture of granular and fatty tissue with exposed extensor hallucis longus tendon noted dorsally  Wound was deep to capsule and periosteum of the 1st MPJ  Remaining tissues appeared healthy and viable  The tourniquet was deflated and normal hyperemic flush was noted to all digits  The patient tolerated the procedure and anesthesia well and was transported to the PACU with vital signs stable  Dr Chantelle Mckinnon was present during the entire procedure and participated in all key aspects        SIGNATURE: Shirin Salcedo DPM  DATE: 2020  TIME: 1:12 PM      Portions of the record may have been created with voice recognition software  Occasional wrong word or "sound a like" substitutions may have occurred due to the inherent limitations of voice recognition software  Read the chart carefully and recognize, using context, where substitutions have occurred

## 2020-02-14 NOTE — PLAN OF CARE
Problem: Potential for Falls  Goal: Patient will remain free of falls  Description  INTERVENTIONS:  - Assess patient frequently for physical needs  -  Identify cognitive and physical deficits and behaviors that affect risk of falls    -  [place] fall precautions as indicated by assessment   - Educate patient/family on patient safety including physical limitations  - Instruct patient to call for assistance with activity based on assessment  - Modify environment to reduce risk of injury  - Consider OT/PT consult to assist with strengthening/mobility  Outcome: Progressing     Problem: SKIN/TISSUE INTEGRITY - ADULT  Goal: Skin integrity remains intact  Description  INTERVENTIONS  - Identify patients at risk for skin breakdown  - Assess and monitor skin integrity  - Assess and monitor nutrition and hydration status  - Monitor labs (i e  albumin)  - Assess for incontinence   - Turn and reposition patient  - Assist with mobility/ambulation  - Relieve pressure over bony prominences  - Avoid friction and shearing  - Provide appropriate hygiene as needed including keeping skin clean and dry  - Evaluate need for skin moisturizer/barrier cream  - Collaborate with interdisciplinary team (i e  Nutrition, Rehabilitation, etc )   - Patient/family teaching  Outcome: Progressing  Goal: Incision(s), wounds(s) or drain site(s) healing without S/S of infection  Description  INTERVENTIONS  - Assess and document risk factors for skin impairment   - Assess and document dressing, incision, wound bed, drain sites and surrounding tissue  - Consider nutrition services referral as needed  - Oral mucous membranes remain intact  - Provide patient/ family education  Outcome: Progressing  Goal: Oral mucous membranes remain intact  Description  INTERVENTIONS  - Assess oral mucosa and hygiene practices  - Implement preventative oral hygiene regimen  - Implement oral medicated treatments as ordered  - Initiate Nutrition services referral as needed  Outcome: Progressing     Problem: MUSCULOSKELETAL - ADULT  Goal: Maintain or return mobility to safest level of function  Description  INTERVENTIONS:  - Assess patient's ability to carry out ADLs; assess patient's baseline for ADL function and identify physical deficits which impact ability to perform ADLs (bathing, care of mouth/teeth, toileting, grooming, dressing, etc )  - Assess/evaluate cause of self-care deficits   - Assess range of motion  - Assess patient's mobility  - Assess patient's need for assistive devices and provide as appropriate  - Encourage maximum independence but intervene and supervise when necessary  - Involve family in performance of ADLs  - Assess for home care needs following discharge   - Consider OT consult to assist with ADL evaluation and planning for discharge  - Provide patient education as appropriate  Outcome: Progressing  Goal: Maintain proper alignment of affected body part  Description  INTERVENTIONS:  - Support, maintain and protect limb and body alignment  - Provide patient/ family with appropriate education  Outcome: Progressing     Problem: PAIN - ADULT  Goal: Verbalizes/displays adequate comfort level or baseline comfort level  Description  Interventions:  - Encourage patient to monitor pain and request assistance  - Assess pain using appropriate pain scale  - Administer analgesics based on type and severity of pain and evaluate response  - Implement non-pharmacological measures as appropriate and evaluate response  - Consider cultural and social influences on pain and pain management  - Notify physician/advanced practitioner if interventions unsuccessful or patient reports new pain  Outcome: Progressing     Problem: INFECTION - ADULT  Goal: Absence or prevention of progression during hospitalization  Description  INTERVENTIONS:  - Assess and monitor for signs and symptoms of infection  - Monitor lab/diagnostic results  - Monitor all insertion sites, i e  indwelling lines, tubes, and drains  - Monitor endotracheal if appropriate and nasal secretions for changes in amount and color  - [place] appropriate cooling/warming therapies per order  - Administer medications as ordered  - Instruct and encourage patient and family to use good hand hygiene technique  - Identify and instruct in appropriate isolation precautions for identified infection/condition  Outcome: Progressing  Goal: Absence of fever/infection during neutropenic period  Description  INTERVENTIONS:  - Monitor WBC    Outcome: Progressing     Problem: SAFETY ADULT  Goal: Maintain or return to baseline ADL function  Description  INTERVENTIONS:  -  Assess patient's ability to carry out ADLs; assess patient's baseline for ADL function and identify physical deficits which impact ability to perform ADLs (bathing, care of mouth/teeth, toileting, grooming, dressing, etc )  - Assess/evaluate cause of self-care deficits   - Assess range of motion  - Assess patient's mobility; develop plan if impaired  - Assess patient's need for assistive devices and provide as appropriate  - Encourage maximum independence but intervene and supervise when necessary  - Involve family in performance of ADLs  - Assess for home care needs following discharge   - Consider OT consult to assist with ADL evaluation and planning for discharge  - Provide patient education as appropriate  Outcome: Progressing  Goal: Maintain or return mobility status to optimal level  Description  INTERVENTIONS:  - Assess patient's baseline mobility status (ambulation, transfers, stairs, etc )    - Identify cognitive and physical deficits and behaviors that affect mobility  - Identify mobility aids required to assist with transfers and/or ambulation (gait belt, sit-to-stand, lift, walker, cane, etc )  - [place] fall precautions as indicated by assessment  - Record patient progress and toleration of activity level on Mobility SBAR; progress patient to next Phase/Stage  - Instruct patient to call for assistance with activity based on assessment  - Consider rehabilitation consult to assist with strengthening/weightbearing, etc   Outcome: Progressing     Problem: Nutrition/Hydration-ADULT  Goal: Nutrient/Hydration intake appropriate for improving, restoring or maintaining nutritional needs  Description  Monitor and assess patient's nutrition/hydration status for malnutrition  Collaborate with interdisciplinary team and initiate plan and interventions as ordered  Monitor patient's weight and dietary intake as ordered or per policy  Utilize nutrition screening tool and intervene as necessary  Determine patient's food preferences and provide high-protein, high-caloric foods as appropriate       INTERVENTIONS:  - Monitor oral intake, urinary output, labs, and treatment plans  - Assess nutrition and hydration status and recommend course of action  - Evaluate amount of meals eaten  - Assist patient with eating if necessary   - Allow adequate time for meals  - Recommend/ encourage appropriate diets, oral nutritional supplements, and vitamin/mineral supplements  - Order, calculate, and assess calorie counts as needed  - Recommend, monitor, and adjust tube feedings and TPN/PPN based on assessed needs  - Assess need for intravenous fluids  - Provide specific nutrition/hydration education as appropriate  - Include patient/family/caregiver in decisions related to nutrition  Outcome: Progressing

## 2020-02-14 NOTE — ASSESSMENT & PLAN NOTE
· Presents to ED with complaints of two day history left LE pain, erythema, swelling  Pain is located to great toe and mid shin  He reports prior left ankle fracture requiring plate  Hardware has since been removed  He reports episodes of cellulitis about once per year  · He reports he has cipro prescribed to him to use as needed for cellulitis  He took about 3 doses with no improvement  · Tmax 101 8 at home; on admission temperature was 36 2; patient does not meet criteria for sepsis/SIRS as there was only leukocytosis on admission (WBC 18 85) and no tachycardia, tachypnea, or Temp <36 or >38  · MRI of L foot showed: Plantar distal first interspace tiny wound/ulcer and/or I&D penetration site with mild regional cellulitis  No residual abscess post I&D and packing  No osteomyelitis  No evidence of septic arthropathy  No significant tenosynovitis  Incidentally noted subcutaneous superficial pressure lesions at the level of the fourth and fifth MTP joints  Tiny T1 and T2 hypointense foci with minimal adjacent T2 hyperintensity and postcontrast hyperemia at the level of the fourth metatarsal head  · WBC on downward trend and with no reported fevers overnight      Plan:  · Patient currently on cefazolin (today will be day 5)  · Podiatry input appreciated  · S/p bedside I and D of left foot abscess 2/10/2020; podiatry also did another bedside debridement 02/11/2020  Patient seen yesterday by Podiatry, dressing changed, skin necrosis developing dorsally noted, and plan for debridement today 2/14/2020 in OR  · Bilateral arterial duplex of lower limb showed: no evidence of significant lower extremity arterial occlusive disease on both right and left lower extremities    · NWB and elevation  · Infectious disease input appreciated  · Wound cultures show growth of group B strep  · Operative findings today to guide final antibiotic recommendations  · WBC count continuing to show a downward trend

## 2020-02-14 NOTE — PROGRESS NOTES
Progress Note - Infectious Disease   Abi Dennis 56 y o  male MRN: [id number]  Unit/Bed#: OR POOL Encounter: [id number]      Impression/Plan:  1    Cellulitis and abscess of left foot   status post bedside I and D on 02/10/2020   MRI negative for osteomyelitis or residual abscess   Abscess culture is growing 2+ Group B Streptococcus   Blood cultures are negative to date   White count trended down from 18 to 8 65  Patient is status post OR washout today   Rec:  · Continue IV cefazolin at present pending final OR Cultures  · Monitor temperature and hemodynamics  · Follow-up final OR cultures  · Abscess care per Podiatry: OR washout today with Wound Vac placement  2   Chronic diabetic foot ulcer    Rec:  · Wound care per podiatry     3   Diabetes with neuropathy   02/10/2020 hemoglobin A1c 11 9   Increases risk for infection  Rec:  Blood glucose management per primary care team  Regular diabetic foot care diabetic shoes recommended with neuropathy     4   Chronic kidney disease stage 3  Creatinine on admission 1 99   Creatinine 1 63 today  Rec:  Continue cefazolin IV renal dose adjusted  Monitor creatinine     5   Chronic osteomyelitis of left tibia   Following fracture with hardware placement in 1996  Patient had multiple infections requiring IV antibiotics and hardware was eventually removed   Patient states every year or so since he has a lower extremity cellulitis   His last cellulitis course was September 2018 and patient received 3 days of Ancef in the hospital and then was transition to oral Keflex 500 mg q i d  for 14 day total course      Antibiotics:  Cefazolin D6  OR washout today     Above plan discussed in detail with patient, RN, and Dr Clifton Redmond      Subjective:  Patient going to OR for left foot abscess washout today, no left foot/leg pain with neuropathy  ROS:  Patient has no fever, chills, sweats; no nausea, vomiting, diarrhea; no cough, shortness of breath; no pain   No new symptoms  Objective:  Vitals:  Temp:  [97 8 °F (36 6 °C)-98 9 °F (37 2 °C)] 98 3 °F (36 8 °C)  HR:  [62-83] 62  Resp:  [14-18] 14  BP: (103-127)/(65-79) 106/65  SpO2:  [96 %-100 %] 100 %  Temp (24hrs), Av 5 °F (36 9 °C), Min:97 8 °F (36 6 °C), Max:98 9 °F (37 2 °C)  Current: Temperature: 98 3 °F (36 8 °C)    General Appearance:  Awake, alert, cooperative, resting in bed, no acute distress  Throat: Oropharynx moist without lesions  Lungs:   Clear to auscultation bilaterally; no wheezes, rhonchi or rales; respirations unlabored   Heart:  RRR; S1-S2 heard, no murmur   Abdomen:   Soft, non-tender, non-distended, positive bowel sounds  Extremities: Left foot heavily gauze wrapped with dried Denny state through drainage on plantar aspect of dressing  No erythema foot outside of dressing  Chronic left shin skin changes status post multiple surgeries dried blood is chronic scabs  arm IV site nontender   Skin: No rashes      Labs, Imaging, & Other studies:   All pertinent labs and imaging studies were personally reviewed  Results from last 7 days   Lab Units 20  0534 20  0551 20  0557   WBC Thousand/uL 8 65 10 36* 10 86*   HEMOGLOBIN g/dL 10 8* 10 5* 10 3*   PLATELETS Thousands/uL 313 274 264     Results from last 7 days   Lab Units 20  0534  02/10/20  0200   POTASSIUM mmol/L 4 2   < > 3 9   CHLORIDE mmol/L 104   < > 101   CO2 mmol/L 27   < > 26   BUN mg/dL 30*   < > 38*   CREATININE mg/dL 1 63*   < > 1 99*   EGFR ml/min/1 73sq m 44   < > 35   CALCIUM mg/dL 8 4   < > 9 1   AST U/L  --   --  15   ALT U/L  --   --  13   ALK PHOS U/L  --   --  80    < > = values in this interval not displayed  Results from last 7 days   Lab Units 02/10/20  0750 02/10/20  0219 02/10/20  0200   BLOOD CULTURE   --  No Growth After 4 Days  No Growth After 4 Days     GRAM STAIN RESULT  Rare Polys*  4+ Gram positive cocci in pairs*  2+ Gram negative rods*  --   --    WOUND CULTURE  2+ Growth of Beta Hemolytic Streptococcus Group B*  2+ Growth of   --   --

## 2020-02-14 NOTE — ASSESSMENT & PLAN NOTE
· last dose 02/09/2020; resumed aspirin on 02/11/2020; will hold aspirin dose for today 2/14/2020 prior to surgery  · Continue statin and beta blocker

## 2020-02-14 NOTE — ASSESSMENT & PLAN NOTE
Form signed and faxed (confirmation received). Lab Results   Component Value Date    HGBA1C 11 9 (H) 02/10/2020       Recent Labs     02/13/20  0918 02/13/20  1059 02/13/20  1556 02/14/20  0728   POCGLU 85 123 199* 173*       Blood Sugar Average: Last 72 hrs:  · (P) 302 [id number]  · Continue home regimen of Lantus 30 units in a m    · Discontinued Lantus 10 units at bedtime today 2/13/2020 - reviewed glucose trend and blood sugars have been noted to be low in the early morning; patient for surgery today and is NPO  · QID accuchecks and SSI  · Reports home blood sugar ranges from 70 to 500 at times   · Patient now interested in Endocrinology outpatient referral to Dr Darian Pan

## 2020-02-14 NOTE — ANESTHESIA POSTPROCEDURE EVALUATION
Post-Op Assessment Note    CV Status:  Stable  Pain Score: 0    Pain management: adequate     Mental Status:  Somnolent and arousable   Hydration Status:  Euvolemic   PONV Controlled:  Controlled   Airway Patency:  Patent   Post Op Vitals Reviewed: Yes      Staff: CRNA           BP   103/65   Temp 98 3 °F (36 8 °C) (02/14/20 1310)    Pulse 63 (02/14/20 1310)   Resp   13   SpO2   100

## 2020-02-14 NOTE — UTILIZATION REVIEW
Continued Stay Review    Date: 2020                          Current Patient Class: inpatient     Current Level of Care: med surg    HPI:60 y o  male initially admitted on 2/10/2020 admitted as inpatient for Recurrent cellulitis of left Lower leg  Patient reports 2 day  Worsening of erythema , swelling & pain of Left lower extremity  Pain is located to great toe and mid shin  He reports prior left ankle fracture requiring plate  Hardware has since been removed  He reports episodes of cellulitis about once per year  Assessment/Plan: Patient reports Cipro is prescribed to him to use as needed for Cellulitis; he took 3 doses prior to admission with no improvement  WBC on downward trend  No fevers  Abscess culture is growing 2+ Group B Streptococcus   Blood cultures are negative to date Cont on IV antibiotics ( day 5)  Continue IV cefazolin at present pending final OR Cultures  Podiatry:   2/10 I&D left foot abscess  2020 I&D bedside debridement  2020 in OR: SURGERY DATE: 2020  Procedure(s) (LRB):  EXPLORATION EXTREMITY (Left)  INCISION AND DRAINAGE (I&D) EXTREMITY left foot (cpt 16491) (Left)  APPLICATION VAC DRESSING (Left)  Anesthesia Type:   Choice with 10 ml of 1% Lidocaine and 0 5% Bupivacaine in a 1:1 mixture   Operative Findings:  Necrotic and devitalized tissue was noted in the 1st interspace of the left foot  Upon incision and drainage multiple pus pockets were noted along the extensor hallucis longus tendon, dorsal interspace, and plantar medial interspace  Following incision and drainage with removal of necrotic tissue remaining tissue appeared viable  Extensor hallucis longus tendon appeared intact  The wound was left open and covered with a wound VAC  Tourniquet wound was inflated for application of wound VAC  Wound VAC was applied as below:  Wound VAC application  1  Number of sponges: 3 black  2  Pressure settin continuous  3  Size of wound:  8 x 2 x 2 cm  4  Description of wound:  Remaining wound bed was a mixture of granular and fatty tissue with exposed extensor hallucis longus tendon noted dorsally  Wound was deep to capsule and periosteum of the 1st MPJ  Remaining tissues appeared healthy and viable  Monitoring creatinine-holding Lasix today for CKD      Pertinent Labs/Diagnostic Results:   Results from last 7 days   Lab Units 02/14/20  0534 02/13/20  0551 02/12/20  0557 02/11/20  0454 02/10/20  0200   WBC Thousand/uL 8 65 10 36* 10 86* 12 16* 18 85*   HEMOGLOBIN g/dL 10 8* 10 5* 10 3* 10 3* 12 0   HEMATOCRIT % 33 8* 32 5* 32 1* 32 1* 36 5   PLATELETS Thousands/uL 313 274 264 235 287   NEUTROS ABS Thousands/µL 5 95 7 65* 8 49* 9 60* 16 19*         Results from last 7 days   Lab Units 02/14/20  0534 02/13/20  0551 02/12/20  0557 02/11/20  0454 02/10/20  0200   SODIUM mmol/L 139 140 137 135* 139   POTASSIUM mmol/L 4 2 3 9 3 7 3 8 3 9   CHLORIDE mmol/L 104 104 103 101 101   CO2 mmol/L 27 27 27 26 26   ANION GAP mmol/L 8 9 7 8 12   BUN mg/dL 30* 35* 39* 42* 38*   CREATININE mg/dL 1 63* 1 73* 1 85* 2 22* 1 99*   EGFR ml/min/1 73sq m 44 41 38 31 35   CALCIUM mg/dL 8 4 8 3 8 0* 8 0* 9 1     Results from last 7 days   Lab Units 02/10/20  0200   AST U/L 15   ALT U/L 13   ALK PHOS U/L 80   TOTAL PROTEIN g/dL 7 3   ALBUMIN g/dL 2 8*   TOTAL BILIRUBIN mg/dL 1 30*     Results from last 7 days   Lab Units 02/14/20  1335 02/14/20  1104 02/14/20  0728 02/13/20  1556 02/13/20  1059 02/13/20  0918 02/13/20  0800 02/13/20  0753 02/13/20  0712 02/12/20  2110 02/12/20  1700 02/12/20  1103   POC GLUCOSE mg/dl 145* 151* 173* 199* 123 85 109 107 56* 286* 222* 226*     Results from last 7 days   Lab Units 02/14/20  0534 02/13/20  0551 02/12/20  0557 02/11/20  0454 02/10/20  0200   GLUCOSE RANDOM mg/dL 193* 77 104 192* 153*         Results from last 7 days   Lab Units 02/10/20  0200   HEMOGLOBIN A1C % 11 9*   EAG mg/dl 295     No results found for: BETA-HYDROXYBUTYRATE Results from last 7 days   Lab Units 02/10/20  0200   CK TOTAL U/L 166   CK MB INDEX % <1 0   CK MB ng/mL 0 7             Results from last 7 days   Lab Units 02/10/20  0200   PROTIME seconds 14 2   INR  1 16   PTT seconds 32             Results from last 7 days   Lab Units 02/10/20  0200   LACTIC ACID mmol/L 1 5       Results from last 7 days   Lab Units 02/10/20  0750 02/10/20  0219 02/10/20  0200   BLOOD CULTURE   --  No Growth After 4 Days  No Growth After 4 Days  GRAM STAIN RESULT  Rare Polys*  4+ Gram positive cocci in pairs*  2+ Gram negative rods*  --   --    WOUND CULTURE  2+ Growth of Beta Hemolytic Streptococcus Group B*  2+ Growth of   --   --      2/11/2020 MRI foot/forefoot toes left w & wo contrast: Incidentally noted subcutaneous superficial pressure lesions at the level of the fourth and fifth MTP joints  Tiny T1 and T2 hypointense foci with minimal adjacent T2 hyperintensity and postcontrast hyperemia at the level of the fourth metatarsal head  No evidence of definitive corresponding foreign body on the comparison radiographs   Differential includes tiny nonmetallic foreign bodies with reactive changes, incidental pressure lesion in the setting of old distal plantar fascial arcade injury and   tiny foci of early adventitial bursa formation  Consider follow-up with nonemergent targeted lower extremity ultrasound if clinically warranted      Vital Signs:   02/14/20 1134  97 8 °F (36 6 °C)  75  16      98 %  None (Room air)   02/14/20 07:29:57  98 7 °F (37 1 °C)  79  16  123/79  94  96 %           Medications:   Scheduled Medications:    Medications:  atorvastatin 40 mg Oral After Dinner   cefazolin 1,000 mg Intravenous Q8H   heparin (porcine) 5,000 Units Subcutaneous Q8H [facility]   insulin glargine 30 Units Subcutaneous QAM   insulin lispro 1-6 Units Subcutaneous 4x Daily (AC & HS)   metoprolol tartrate 25 mg Oral Q12H [facility]   potassium chloride 20 mEq Oral Daily     Continuous IV Infusions:    lactated ringers 50 mL/hr Intravenous Continuous     PRN Meds:  acetaminophen 650 mg Oral Q6H PRN   ondansetron 4 mg Intravenous Q6H PRN   oxyCODONE-acetaminophen 1 tablet Oral Q6H PRN   polyethylene glycol 17 g Oral Daily PRN     Discharge Plan: tbd  Network Utilization Review Department  [email or web address]  ATTENTION: Please call with any questions or concerns to [phone] and carefully listen to the prompts so that you are directed to the right person  All voicemails are confidential   Tavon Kwok all requests for admission clinical reviews, approved or denied determinations and any other requests to dedicated fax number below belonging to the campus where the patient is receiving treatment   List of dedicated fax numbers for the Facilities:  [address] DENIALS (Administrative/Medical Necessity) [phone]   [address] (Maternity/NICU/Pediatrics) [phone]   [facility] [phone]   [facility] [phone]   [address] [place] [address] [phone]   [facility] [phone]   [address] [phone]   [facility]  [phone]   [address] [facility]  [address] [facility] [address] [facility] [phone]

## 2020-02-14 NOTE — PROGRESS NOTES
[address] Podiatry - Pre Operative Note  Patient: Kenyon Gr 56 y o  male   MRN: [id number]  PCP: Abhay Brantley MD  Unit/Bed#: [facility] Encounter: [id number]  Date Of Visit: 02/14/20      Assessment:    Podiatric Assessment:  1  Sepsis secondary to left foot abscess  2  Chronic diabetic foot ulcer with abscess left foot S/P bedside I&D  3  Cellulitis left foot - resolving  4  Diabetes with neuropathy    Principal Problem:    Cellulitis of left foot  Active Problems:    Benign essential hypertension    Type 2 diabetes mellitus with hyperglycemia, with long-term current use of insulin ([facility])    CKD (chronic kidney disease) stage 3, GFR 30-59 ml/min ([facility])    Chronic combined systolic and diastolic CHF (congestive heart failure) ([facility])    Presence of drug coated stent in LAD coronary artery    Abscess of tendon of left foot      PLAN:    · Patient to go to OR today, 02/14/20, for  left  Foot  debridement/ I&D with possible wound VAC with Dr Que Barrera  · Consent placed in chart  To be signed with surgeon prior to procedure  · Confirmed NPO status  · H&P, vitals, and current labs reviewed  No acute changes noted  · Alternatives, risks, and complications discussed with patient  · All questions answered  No guarantees given to outcome of procedure  SUBJECTIVE:     Chief Complaint:   Chief Complaint   Patient presents with    Leg Pain     Pt presents to ED from home w/ left leg pain starting yesterday  pt states getting infections once a year in that leg  Pt (+) hx HTN, DM, CHF  The patient was seen, evaluated, and assessed at bedside today  The patient was awake, alert, and in no acute distress  Patient confirmed NPO status  All questions and concerns regarding the surgical procedure addressed  Patient understands risks vs benefits of procedure and remains amenable with plan for surgery today  Patient denies N/V/F/chills/SOB/CP        OBJECTIVE:     Vitals:   /79   Pulse 75   Temp 97 8 °F (36 6 °C) (Temporal)   Resp 16   Ht 5' 10" (1 778 m)   Wt 78 5 kg (173 lb)   SpO2 98%   BMI 24 82 kg/m²     Temp (24hrs), Av 6 °F (37 °C), Min:97 8 °F (36 6 °C), Max:98 9 °F (37 2 °C)      PHYSICAL EXAM:     General: Alert, cooperative and no distress  Lungs: Non labored breathing  Abdomen: Soft, non-tender  Extremity:     NVS at baseline B/l  MSK function at baseline B/l  No calf tenderness noted B/l  Dressing is left intact to the OR  Additional Data:     Labs:    Results from last 7 days   Lab Units 20  0534   WBC Thousand/uL 8 65   HEMOGLOBIN g/dL 10 8*   HEMATOCRIT % 33 8*   PLATELETS Thousands/uL 313   NEUTROS PCT % 69   LYMPHS PCT % 16   MONOS PCT % 9   EOS PCT % 4     Results from last 7 days   Lab Units 20  0534  02/10/20  0200   POTASSIUM mmol/L 4 2   < > 3 9   CHLORIDE mmol/L 104   < > 101   CO2 mmol/L 27   < > 26   BUN mg/dL 30*   < > 38*   CREATININE mg/dL 1 63*   < > 1 99*   CALCIUM mg/dL 8 4   < > 9 1   ALK PHOS U/L  --   --  80   ALT U/L  --   --  13   AST U/L  --   --  15    < > = values in this interval not displayed  Results from last 7 days   Lab Units 02/10/20  0200   INR  1 16       * I Have Reviewed All Lab Data Listed Above  Recent Cultures (last 7 days):     Results from last 7 days   Lab Units 02/10/20  0750 02/10/20  0219 02/10/20  0200   BLOOD CULTURE   --  No Growth After 4 Days  No Growth After 4 Days  GRAM STAIN RESULT  Rare Polys*  4+ Gram positive cocci in pairs*  2+ Gram negative rods*  --   --    WOUND CULTURE  2+ Growth of Beta Hemolytic Streptococcus Group B*  2+ Growth of   --   --        Imaging: I have personally reviewed pertinent films in PACS  EKG, Pathology, and Other Studies: I have personally reviewed pertinent reports  Today, Patient Was Seen By: Christine Hightower DPM    ** Please Note: Portions of the record may have been created with voice recognition software   Occasional wrong word or "sound a like" substitutions may have occurred due to the inherent limitations of voice recognition software  Read the chart carefully and recognize, using context, where substitutions have occurred   **

## 2020-02-14 NOTE — PROGRESS NOTES
Patient was seen prior to the surgery  Non-healing wound after initial bedside I&D  Will need OR exploration/ I&D/ wash out with possible wound VAC  Explained surgical details and post-op course  Discussed all risks and complications related to his condition including further amputation and limb loss  The patient understood that the surgery would not guarantee desirable outcome  All questions and concerns were addressed and the consent was signed  Proceed to OR as planned

## 2020-02-15 PROBLEM — L02.612 ABSCESS OF LEFT FOOT: Status: ACTIVE | Noted: 2018-09-02

## 2020-02-15 LAB
ANION GAP SERPL CALCULATED.3IONS-SCNC: 9 MMOL/L (ref 4–13)
BACTERIA BLD CULT: NORMAL
BACTERIA BLD CULT: NORMAL
BASOPHILS # BLD AUTO: 0.07 THOUSANDS/ΜL (ref 0–0.1)
BASOPHILS NFR BLD AUTO: 1 % (ref 0–1)
BUN SERPL-MCNC: 31 MG/DL (ref 5–25)
CALCIUM SERPL-MCNC: 8.2 MG/DL (ref 8.3–10.1)
CHLORIDE SERPL-SCNC: 103 MMOL/L (ref 100–108)
CO2 SERPL-SCNC: 25 MMOL/L (ref 21–32)
CREAT SERPL-MCNC: 1.48 MG/DL (ref 0.6–1.3)
EOSINOPHIL # BLD AUTO: 0.4 THOUSAND/ΜL (ref 0–0.61)
EOSINOPHIL NFR BLD AUTO: 5 % (ref 0–6)
ERYTHROCYTE [DISTWIDTH] IN BLOOD BY AUTOMATED COUNT: 12.8 % (ref 11.6–15.1)
GFR SERPL CREATININE-BSD FRML MDRD: 50 ML/MIN/1.73SQ M
GLUCOSE SERPL-MCNC: 225 MG/DL (ref 65–140)
GLUCOSE SERPL-MCNC: 229 MG/DL (ref 65–140)
GLUCOSE SERPL-MCNC: 234 MG/DL (ref 65–140)
GLUCOSE SERPL-MCNC: 255 MG/DL (ref 65–140)
GLUCOSE SERPL-MCNC: 314 MG/DL (ref 65–140)
HCT VFR BLD AUTO: 33.5 % (ref 36.5–49.3)
HGB BLD-MCNC: 10.6 G/DL (ref 12–17)
IMM GRANULOCYTES # BLD AUTO: 0.11 THOUSAND/UL (ref 0–0.2)
IMM GRANULOCYTES NFR BLD AUTO: 1 % (ref 0–2)
LYMPHOCYTES # BLD AUTO: 1.53 THOUSANDS/ΜL (ref 0.6–4.47)
LYMPHOCYTES NFR BLD AUTO: 18 % (ref 14–44)
MCH RBC QN AUTO: 27.5 PG (ref 26.8–34.3)
MCHC RBC AUTO-ENTMCNC: 31.6 G/DL (ref 31.4–37.4)
MCV RBC AUTO: 87 FL (ref 82–98)
MONOCYTES # BLD AUTO: 0.82 THOUSAND/ΜL (ref 0.17–1.22)
MONOCYTES NFR BLD AUTO: 10 % (ref 4–12)
NEUTROPHILS # BLD AUTO: 5.5 THOUSANDS/ΜL (ref 1.85–7.62)
NEUTS SEG NFR BLD AUTO: 65 % (ref 43–75)
NRBC BLD AUTO-RTO: 0 /100 WBCS
PLATELET # BLD AUTO: 312 THOUSANDS/UL (ref 149–390)
PMV BLD AUTO: 9.3 FL (ref 8.9–12.7)
POTASSIUM SERPL-SCNC: 4.4 MMOL/L (ref 3.5–5.3)
RBC # BLD AUTO: 3.86 MILLION/UL (ref 3.88–5.62)
SODIUM SERPL-SCNC: 137 MMOL/L (ref 136–145)
WBC # BLD AUTO: 8.43 THOUSAND/UL (ref 4.31–10.16)

## 2020-02-15 PROCEDURE — 80048 BASIC METABOLIC PNL TOTAL CA: CPT | Performed by: INTERNAL MEDICINE

## 2020-02-15 PROCEDURE — 99232 SBSQ HOSP IP/OBS MODERATE 35: CPT | Performed by: INTERNAL MEDICINE

## 2020-02-15 PROCEDURE — 97163 PT EVAL HIGH COMPLEX 45 MIN: CPT

## 2020-02-15 PROCEDURE — 82948 REAGENT STRIP/BLOOD GLUCOSE: CPT

## 2020-02-15 PROCEDURE — 99024 POSTOP FOLLOW-UP VISIT: CPT | Performed by: PODIATRIST

## 2020-02-15 PROCEDURE — 85025 COMPLETE CBC W/AUTO DIFF WBC: CPT | Performed by: INTERNAL MEDICINE

## 2020-02-15 RX ORDER — ASPIRIN 81 MG/1
81 TABLET, CHEWABLE ORAL DAILY
Status: DISCONTINUED | OUTPATIENT
Start: 2020-02-15 | End: 2020-02-16 | Stop reason: HOSPADM

## 2020-02-15 RX ORDER — INSULIN GLARGINE 100 [IU]/ML
10 INJECTION, SOLUTION SUBCUTANEOUS
Status: DISCONTINUED | OUTPATIENT
Start: 2020-02-15 | End: 2020-02-16

## 2020-02-15 RX ORDER — FUROSEMIDE 40 MG/1
40 TABLET ORAL DAILY
Status: DISCONTINUED | OUTPATIENT
Start: 2020-02-15 | End: 2020-02-16 | Stop reason: HOSPADM

## 2020-02-15 RX ADMIN — INSULIN GLARGINE 30 UNITS: 100 INJECTION, SOLUTION SUBCUTANEOUS at 09:32

## 2020-02-15 RX ADMIN — HEPARIN SODIUM 5000 UNITS: 5000 INJECTION INTRAVENOUS; SUBCUTANEOUS at 21:35

## 2020-02-15 RX ADMIN — SODIUM CHLORIDE, SODIUM LACTATE, POTASSIUM CHLORIDE, AND CALCIUM CHLORIDE 50 ML/HR: .6; .31; .03; .02 INJECTION, SOLUTION INTRAVENOUS at 09:37

## 2020-02-15 RX ADMIN — ASPIRIN 81 MG 81 MG: 81 TABLET ORAL at 09:37

## 2020-02-15 RX ADMIN — CEFAZOLIN SODIUM 1000 MG: 1 SOLUTION INTRAVENOUS at 16:37

## 2020-02-15 RX ADMIN — METOPROLOL TARTRATE 25 MG: 25 TABLET ORAL at 09:32

## 2020-02-15 RX ADMIN — ATORVASTATIN CALCIUM 40 MG: 40 TABLET, FILM COATED ORAL at 16:37

## 2020-02-15 RX ADMIN — CEFAZOLIN SODIUM 1000 MG: 1 SOLUTION INTRAVENOUS at 01:02

## 2020-02-15 RX ADMIN — INSULIN GLARGINE 10 UNITS: 100 INJECTION, SOLUTION SUBCUTANEOUS at 21:37

## 2020-02-15 RX ADMIN — CEFAZOLIN SODIUM 1000 MG: 1 SOLUTION INTRAVENOUS at 09:33

## 2020-02-15 RX ADMIN — HEPARIN SODIUM 5000 UNITS: 5000 INJECTION INTRAVENOUS; SUBCUTANEOUS at 05:45

## 2020-02-15 RX ADMIN — METOPROLOL TARTRATE 25 MG: 25 TABLET ORAL at 21:35

## 2020-02-15 RX ADMIN — POTASSIUM CHLORIDE 20 MEQ: 1500 TABLET, EXTENDED RELEASE ORAL at 09:32

## 2020-02-15 RX ADMIN — HEPARIN SODIUM 5000 UNITS: 5000 INJECTION INTRAVENOUS; SUBCUTANEOUS at 15:09

## 2020-02-15 RX ADMIN — FUROSEMIDE 40 MG: 40 TABLET ORAL at 09:37

## 2020-02-15 NOTE — PROGRESS NOTES
Progress Note - Podiatry  Ema Whittington 56 y o  male MRN: [id number]  Unit/Bed#: W -01 Encounter: [id number]    Assessment  1  Left foot abscess status post incision and drainage with wound VAC dressing application    Plan:  1  VAC intact running at 1:25 a m  Low continuous pressure  Minimal serosanguineous drainage canister  No cellulitis to the foot  No pain  Capillary refills brisk to all toes  Patient is stable  He will need the wound VAC at discharge  He is nonweightbearing to his left foot  I did contact Case Management about the need for the wound VAC at discharge  He is stable for discharge at any point in my opinion  He should follow up at the [facility] wound care center with either Dr Maris Manzanares or myself    Subjective/Objective   Chief Complaint:   Chief Complaint   Patient presents with    Leg Pain     Pt presents to ED from home w/ left leg pain starting yesterday  pt states getting infections once a year in that leg  Pt (+) hx HTN, DM, CHF  Subjective: 56 y o  y/o male seen and evaluated at bedside  Patient has no pain from yesterday's procedure wound VAC is intact    No acute events overnight  Denies N/F/V/SOB/CP/cough/diarrhea/constipation  Blood pressure 123/77, pulse 75, temperature 98 7 °F (37 1 °C), resp  rate 18, height 5' 10" (1 778 m), weight 78 6 kg (173 lb 4 5 oz), SpO2 99 %  ,Body mass index is 24 86 kg/m²      Lab Results   Component Value Date    WBC 8 43 02/15/2020    HGB 10 6 (L) 02/15/2020    HCT 33 5 (L) 02/15/2020    MCV 87 02/15/2020     02/15/2020     Lab Results   Component Value Date    GLUCOSE 84 06/25/2015    CALCIUM 8 2 (L) 02/15/2020     06/25/2015    K 4 4 02/15/2020    CO2 25 02/15/2020     02/15/2020    BUN 31 (H) 02/15/2020    CREATININE 1 48 (H) 02/15/2020         Invasive Devices     Peripheral Intravenous Line            Peripheral IV 02/14/20 Right Forearm 1 day          Drain            Negative Pressure Wound Therapy (V  A C ) Toe (Comment  which one) Left less than 1 day                Physical Exam:   General: alert, cooperative and no distress  Left lower extremity wound VAC intact at 1:25 a m  Low continuous pressure  Minimal serosanguineous drainage canister  Capillary refills brisk to all toes  No significant cellulitis or edema  No significant pain  Ankle range of motion intact  No calf pain with compression  Lack of protective sensation bilateral        Lab, Imaging and other studies:   Results from last 7 days   Lab Units 02/14/20  1226 02/10/20  0750   GRAM STAIN RESULT  No Polys or Bacteria seen Rare Polys*  4+ Gram positive cocci in pairs*  2+ Gram negative rods*       Results from last 7 days   Lab Units 02/10/20  0219 02/10/20  0200   BLOOD CULTURE  No Growth After 4 Days  No Growth After 4 Days  Results from last 7 days   Lab Units 02/10/20  0750   ANAEROBIC CULTURE  Culture results to follow  Results from last 7 days   Lab Units 02/10/20  0750   WOUND CULTURE  2+ Growth of Beta Hemolytic Streptococcus Group B*  2+ Growth of      Imaging: I have personally reviewed pertinent films in PACS          Portions of the record may have been created with voice recognition software  Occasional wrong word or "sound a like" substitutions may have occurred due to the inherent limitations of voice recognition software  Read the chart carefully and recognize, using context, where substitutions have occurred

## 2020-02-15 NOTE — PLAN OF CARE
Problem: PHYSICAL THERAPY ADULT  Goal: Performs mobility at highest level of function for planned discharge setting  See evaluation for individualized goals  Description  Treatment/Interventions: Functional transfer training, LE strengthening/ROM, Elevations, Therapeutic exercise, Endurance training, Patient/family training, Equipment eval/education, Bed mobility, Gait training, Spoke to nursing, Spoke to case management  Equipment Recommended: [place] Stains, Cane(cane for stairs if entering w/ 2 ANA CRISTINA)       See flowsheet documentation for full assessment, interventions and recommendations  Note:   Prognosis: Good  Problem List: Decreased strength, Decreased endurance, Impaired balance, Decreased mobility, Orthopedic restrictions  Assessment: Olga Hopson is a 55 y/o Male who [facility]/10[facility]2020 from home w/ c/o increasing LLE pain, with a diagnosis of abscess of L foot, s/p I&D w/ wound VAC application   Received orders for PT eval and treat, with activity order(s) of NWB L LE and fall precautions  This patient presents w/ comorbidities of DMII (poorly controlled), CKD stage 3, CAD w/ stent, HTN, CHF and has personal factors of stair(s) to enter home, visual impairments and inability to perform current job functions  Patient presents with the following impairments at time of evaluation including: weakness, impaired balance, gait deviations, visual impairment, orthopedic restrictions and fall risk  These impairments are evident in findings from the physical examination weakness, mobility assessment (need for supervision to mod I assist w/ all phases of mobility when usually mobilizing independently and tolerance to only 20 feet of ambulation), and objective measure(s) Barthel Index: 80/100  During session, pt needed input for mobility technique initially, then able to demonstrate understanding of input  Due to physical deficits, patient is at an increased risk for falls   This patient's clinical presentation is unstable/unpredictable, which is evident in poor blood sugar control, need for assist w/ all phases of mobility when usually mobilizing independently and tolerance to only 20 feet of ambulation  At this time patient would benefit from skilled inpatient PT in order to address abovementioned deficits in order to improve overall function and mobility and return ability to navigate previous living environment  Discharge recommendation is for home w/ family support in order to reduce fall risk and maximize level of functional independence  Recommend next session to work on stair training for 1-2 ANA CRISTINA w/ railing + cane  Recommendation: Home with family support     PT - OK to Discharge: Yes(when medically stable/cleared)    See flowsheet documentation for full assessment

## 2020-02-15 NOTE — ASSESSMENT & PLAN NOTE
· Presents to ED with complaints of two day history left LE pain, erythema, swelling  Pain is located to great toe and mid shin  He reports prior left ankle fracture requiring plate  Hardware has since been removed  He reports episodes of cellulitis about once per year  · He reports he has cipro prescribed to him to use as needed for cellulitis  He took about 3 doses with no improvement  · Tmax 101 8 at home; on admission temperature was 36 2; patient does not meet criteria for sepsis/SIRS as there was only leukocytosis on admission (WBC 18 85) and no tachycardia, tachypnea, or Temp <36 or >38  · MRI of L foot showed: Plantar distal first interspace tiny wound/ulcer and/or I&D penetration site with mild regional cellulitis  No residual abscess post I&D and packing  No osteomyelitis  No evidence of septic arthropathy  No significant tenosynovitis  Incidentally noted subcutaneous superficial pressure lesions at the level of the fourth and fifth MTP joints  Tiny T1 and T2 hypointense foci with minimal adjacent T2 hyperintensity and postcontrast hyperemia at the level of the fourth metatarsal head  · WBC on downward trend and with no reported fevers overnight      Plan:  · Patient currently on cefazolin (today will be day 6)  · Podiatry input appreciated  · S/p bedside I and D of left foot abscess 2/10/2020; podiatry also did another bedside debridement 02/11/2020  Drainage of abscess done in OR yesterday 02/14/2020 by podiatry  · Bilateral arterial duplex of lower limb showed: no evidence of significant lower extremity arterial occlusive disease on both right and left lower extremities    · NWB on left leg (patient already provided with walker for use at home)  · Wound VAC on discharge as well as wound care services at home (this has already been set up)  · Patient to follow-up with podiatry and [address] [place] [address] on discharge  · Infectious disease input appreciated  · Wound cultures show growth of group B strep  · Per infectious disease, to wait for final OR culture results  For now, continue patient on IV cefazolin and treat tenosynovitis    · WBC count continuing to show a downward trend

## 2020-02-15 NOTE — PHYSICAL THERAPY NOTE
PHYSICAL THERAPY EVALUATION NOTE      Patient Name: Obdulia Marks  CGDHM'L Date: 2/15/2020     AGE:   56 y o  Mrn:   [id number]  ADMIT DX:  Leg pain [M79 606]  Cellulitis of left foot [C16 309]  Acute-on-chronic kidney injury ([facility] ) [N17 9, N18 9]  Cellulitis of left lower leg [D70 712]    Past Medical History:   Diagnosis Date    CAD (coronary artery disease)     Chronic combined systolic and diastolic CHF (congestive heart failure) ([place] [address] )     Diabetes mellitus ([place] [address] )     type 2    Dyslipidemia     Hypertension     Ischemic cardiomyopathy     MI (myocardial infarction) ([place] [address] )     Osteomyelitis ([place] [address] )     Pancreatitis      Length Of Stay: 5  PHYSICAL THERAPY EVALUATION :        02/15/20 1254   Note Type   Note type Eval/Treat   Pain Assessment   Pain Assessment 0-10   Pain Score No Pain   Home Living   Type of Home House   Home Layout Stairs to enter with rails; Two level; Able to live on main level with bedroom/bathroom  (2 ANA CRISTINA from front, 1 ANA CRISTINA from garage)   Bathroom Shower/Tub Walk-in shower   [address]   (None)   Additional Comments Pt lives in 2 [address] [facility] w/ 2 ANA CRISTINA from front, 1 ANA CRISTINA from garage  Master bedroom/bathroom are on first floor, pt is able to live on first floor  No DME at home PTA   Prior Function   Level of [place] Independent with ADLs and functional mobility   Lives With Spouse;Daughter   ADL Assistance Independent   IADLs Independent   Falls in the last 6 months 0   Vocational Full time employment  ()   Comments Pt is I w/ ADLs and IADLs PTA   Restrictions/Precautions   Weight Bearing Precautions Per Order Yes   LLE Weight Bearing Per Order NWB   Braces or Orthoses Other (Comment)  (Surgical shoe on LLE)   Other Precautions Multiple lines; Fall Risk;WBS  (Wound VAC)   General   Family/Caregiver Present Yes  (Friends present for session)   Cognition   Arousal/Participation Alert Orientation Level Oriented X4  (Pt identified by full name and )   Memory Within functional limits   Following Commands Follows one step commands without difficulty   Comments Pt supine in bed upon arrival  He is agreeable to PT intervention, is able to state he is NWB on LLE and able to demonstrate understanding  RLE Assessment   RLE Assessment WFL   Strength RLE   RLE Overall Strength 4/5   LLE Assessment   LLE Assessment WFL   Strength LLE   LLE Overall Strength 4/5   Coordination   Sensation WFL   Light Touch   RLE Light Touch Grossly intact   LLE Light Touch Grossly intact  (toes, ankle)   Bed Mobility   Supine to Sit 6  Modified independent   Sit to Supine 6  Modified independent   Transfers   Sit to Stand 5  Supervision   Additional items Verbal cues  (for hand placement)   Stand to Sit 6  Modified independent   Additional Comments Pt able to demonstrate ability to perform "push ups" w/ walker to replicate stairs, able to perform 2 w/ supervision  Pt spent time explaining up/down stairs w/ use of railing and cane, pt verbalized understanding   Ambulation/Elevation   Gait pattern Short stride  (Swing to)   Gait Assistance 5  Supervision   Additional items Assist x 1;Verbal cues   Assistive Device Rolling walker   Distance 20 ft  (limited secondary wound vac line)   Stair Management Assistance   (see additional comments above)   Balance   Static Sitting Good   Static Standing Fair   Ambulatory Fair -   Activity Tolerance   Activity Tolerance Patient tolerated treatment well   Medical Staff Made Aware TT'd POLLO [place] 50 to RN Robin Blum who cleared pt for PT intervention   Assessment   Prognosis Good   Problem List Decreased strength;Decreased endurance; Impaired balance;Decreased mobility;Orthopedic restrictions   Assessment Joe Boateng is a 57 y/o Male who [facility] on 2/10/2020 from home w/ c/o increasing LLE pain, with a diagnosis of abscess of L foot, s/p I&D w/ wound VAC application   Received orders for PT eval and treat, with activity order(s) of NWB L LE and fall precautions  This patient presents w/ comorbidities of DMII (poorly controlled), CKD stage 3, CAD w/ stent, HTN, CHF and has personal factors of stair(s) to enter home, visual impairments and inability to perform current job functions  Patient presents with the following impairments at time of evaluation including: weakness, impaired balance, gait deviations, visual impairment, orthopedic restrictions and fall risk  These impairments are evident in findings from the physical examination weakness, mobility assessment (need for supervision to mod I assist w/ all phases of mobility when usually mobilizing independently and tolerance to only 20 feet of ambulation), and objective measure(s) Barthel Index: 80/100  During session, pt needed input for mobility technique initially, then able to demonstrate understanding of input  Due to physical deficits, patient is at an increased risk for falls  This patient's clinical presentation is unstable/unpredictable, which is evident in poor blood sugar control, need for assist w/ all phases of mobility when usually mobilizing independently and tolerance to only 20 feet of ambulation  At this time patient would benefit from skilled inpatient PT in order to address abovementioned deficits in order to improve overall function and mobility and return ability to navigate previous living environment  Discharge recommendation is for home w/ family support in order to reduce fall risk and maximize level of functional independence  Recommend next session to work on stair training for 1-2 ANA CRISTINA w/ railing + cane      Goals   Patient Goals go home   STG Expiration Date 02/25/20   Short Term Goal #1 Patient will: Perform all transfers independently to improve independence, Ambulate at least 150 ft  with roller walker independently w/o LOB w/ NWB LLE, Navigate 2 stairs modified independent with unilateral handrail and cane while maintaining NWB LLE to facilitate return to previous living environment and Increase all balance 1/2 grade to decrease risk for falls   PT Treatment Day 0   Plan   Treatment/Interventions Functional transfer training;LE strengthening/ROM; Elevations; Therapeutic exercise; Endurance training;Patient/family training;Equipment eval/education; Bed mobility;Gait training;Spoke to nursing;Spoke to case management   PT Frequency 1-2x/wk   Recommendation   Recommendation Home with family support   Equipment Recommended Walker;Cane  (cane for stairs if entering w/ 2 ANA CRISTINA)   PT - OK to Discharge Yes  (when medically stable/cleared)   Barthel Index   Feeding 10   Bathing 5   Grooming Score 5   Dressing Score 10   Bladder Score 10   Bowels Score 10   Toilet Use Score 10   Transfers (Bed/Chair) Score 15   Mobility (Level Surface) Score 0   Stairs Score 5   Barthel Index Score 80       Skilled PT recommended while in hospital and upon D/C to progress pt toward treatment goals       Caitlyn Bunch, PT

## 2020-02-15 NOTE — ASSESSMENT & PLAN NOTE
Lab Results   Component Value Date    HGBA1C 11 9 (H) 02/10/2020       Recent Labs     02/14/20  1559 02/14/20  2137 02/15/20  0733 02/15/20  1106   POCGLU 126 275* 229* 314*       Blood Sugar Average: Last 72 hrs:  (P) 170 [id number]  · Continue home regimen of Lantus 30 units in a m    · Discontinued Lantus 10 units at bedtime 2/13/2020 - reviewed glucose trend and blood sugars have been noted to be low in the early morning; patient for surgery today yesterday and was on NPO  · Will resume Lantus 10 units at bedtime today 02/15/2020  · QID accuchecks and SSI  · Reports home blood sugar ranges from 70 to 500 at times   · Patient now interested in Endocrinology outpatient referral to Dr Margo Tovar

## 2020-02-15 NOTE — ASSESSMENT & PLAN NOTE
Wt Readings from Last 3 Encounters:   02/15/20 78 6 kg (173 lb 4 5 oz)   08/05/19 87 5 kg (193 lb)   05/01/19 86 7 kg (191 lb 3 2 oz)    Patient is known to Dr Flor Hemphill of Cardiology   Euvolemic   Last echocardiogram from May 1534:  Systolic function was mildly reduced    LVEF 45   I&O, daily weight   Current regimen: metoprolol 25 mg BID, Lasix restarted today 02/15/2020

## 2020-02-15 NOTE — SOCIAL WORK
CM received call from Romeo Duane with [facility] reporting the patient is approved and they will be delivering wound vac to patient's bedside today  CM informed SLIM and bedside RN of same

## 2020-02-15 NOTE — PLAN OF CARE
Problem: SKIN/TISSUE INTEGRITY - ADULT  Goal: Incision(s), wounds(s) or drain site(s) healing without S/S of infection  Description  INTERVENTIONS  - Assess and document risk factors for skin impairment   - Assess and document dressing, incision, wound bed, drain sites and surrounding tissue  - Consider nutrition services referral as needed  - Oral mucous membranes remain intact  - Provide patient/ family education  Outcome: Progressing     Problem: PAIN - ADULT  Goal: Verbalizes/displays adequate comfort level or baseline comfort level  Description  Interventions:  - Encourage patient to monitor pain and request assistance  - Assess pain using appropriate pain scale  - Administer analgesics based on type and severity of pain and evaluate response  - Implement non-pharmacological measures as appropriate and evaluate response  - Consider cultural and social influences on pain and pain management  - Notify physician/advanced practitioner if interventions unsuccessful or patient reports new pain  Outcome: Progressing     Problem: INFECTION - ADULT  Goal: Absence of fever/infection during neutropenic period  Description  INTERVENTIONS:  - Monitor WBC    Outcome: Progressing

## 2020-02-15 NOTE — ASSESSMENT & PLAN NOTE
 Creatinine slightly above baseline on admission at 2 22 (1 5-1 8)  Creatinine on downward trend, today at 1 48   Resume Lasix today 02/15/2020   KODAK tomorrow anastacia patten

## 2020-02-15 NOTE — SOCIAL WORK
CM spoke to patient regarding need for walker  Patient would like to use [facility] for his DME provider  [facility] referral made to [facility]  [facility] accepted and walker delivered to patient's room

## 2020-02-15 NOTE — PROGRESS NOTES
Progress Note [facility] 1960, 56 y o  male MRN: [id number]    Unit/Bed#: W -01 Encounter: [id number]    Primary Care Provider: Hair Guillen MD   Date and time admitted to hospital: 2/10/2020 12:30 AM        * Abscess of left foot  Assessment & Plan  · Presents to ED with complaints of two day history left LE pain, erythema, swelling  Pain is located to great toe and mid shin  He reports prior left ankle fracture requiring plate  Hardware has since been removed  He reports episodes of cellulitis about once per year  · He reports he has cipro prescribed to him to use as needed for cellulitis  He took about 3 doses with no improvement  · Tmax 101 8 at home; on admission temperature was 36 2; patient does not meet criteria for sepsis/SIRS as there was only leukocytosis on admission (WBC 18 85) and no tachycardia, tachypnea, or Temp <36 or >38  · MRI of L foot showed: Plantar distal first interspace tiny wound/ulcer and/or I&D penetration site with mild regional cellulitis  No residual abscess post I&D and packing  No osteomyelitis  No evidence of septic arthropathy  No significant tenosynovitis  Incidentally noted subcutaneous superficial pressure lesions at the level of the fourth and fifth MTP joints  Tiny T1 and T2 hypointense foci with minimal adjacent T2 hyperintensity and postcontrast hyperemia at the level of the fourth metatarsal head  · WBC on downward trend and with no reported fevers overnight      Plan:  · Patient currently on cefazolin (today will be day 6)  · Podiatry input appreciated  · S/p bedside I and D of left foot abscess 2/10/2020; podiatry also did another bedside debridement 02/11/2020  Drainage of abscess done in OR yesterday 02/14/2020 by podiatry  · Bilateral arterial duplex of lower limb showed: no evidence of significant lower extremity arterial occlusive disease on both right and left lower extremities    · NWB on left leg (patient already provided with walker for use at home)  · Wound VAC on discharge as well as wound care services at home (this has already been set up)  · Patient to follow-up with podiatry and [facility] [address] on discharge  · Infectious disease input appreciated  · Wound cultures show growth of group B strep  · Per infectious disease, to wait for final OR culture results  For now, continue patient on IV cefazolin and treat tenosynovitis  · WBC count continuing to show a downward trend     Chronic combined systolic and diastolic CHF (congestive heart failure) ([facility])  Assessment & Plan  Wt Readings from Last 3 Encounters:   02/15/20 78 6 kg (173 lb 4 5 oz)   08/05/19 87 5 kg (193 lb)   05/01/19 86 7 kg (191 lb 3 2 oz)    Patient is known to Dr Honorio Arvizu of Cardiology   Euvolemic   Last echocardiogram from May 4094:  Systolic function was mildly reduced  LVEF 45   I&O, daily weight   Current regimen: metoprolol 25 mg BID, Lasix restarted today 02/15/2020      Presence of drug coated stent in LAD coronary artery  Assessment & Plan  · last dose 02/09/2020; resumed aspirin on 02/11/2020; aspirin held prior to surgery on 02/14/2020; aspirin resumed today 02/15/2020  · Continue statin and beta blocker    CKD (chronic kidney disease) stage 3, GFR 30-59 ml/min ([facility])  Assessment & Plan   Creatinine slightly above baseline on admission at 2 22 (1 5-1 8)  Creatinine on downward trend, today at 1 48   Resume Lasix today 02/15/2020   BMP tomorrow a m      Benign essential hypertension  Assessment & Plan  · Stable   · Continue metoprolol  · Lasix restarted today 02/15/2020    Type 2 diabetes mellitus with hyperglycemia, with long-term current use of insulin [facility])  Assessment & Plan  Lab Results   Component Value Date    HGBA1C 11 9 (H) 02/10/2020       Recent Labs     02/14/20  1559 02/14/20  2137 02/15/20  0733 02/15/20  1106   POCGLU 126 275* 229* 314*       Blood Sugar Average: Last 72 hrs:  (P) 170 [id number]  · Continue home regimen of Lantus 30 units in a m  · Discontinued Lantus 10 units at bedtime 2020 - reviewed glucose trend and blood sugars have been noted to be low in the early morning; patient for surgery today yesterday and was on NPO  · Will resume Lantus 10 units at bedtime today 02/15/2020  · QID accuchecks and SSI  · Reports home blood sugar ranges from 70 to 500 at times   · Patient now interested in Endocrinology outpatient referral to Dr Mario Travis        VTE Pharmacologic Prophylaxis:   Pharmacologic: Heparin  Mechanical VTE Prophylaxis in Place: Yes    Discussions with Specialists or Other Care Team Provider:  Podiatry, Infectious Disease, case management     Education and Discussions with Family / Patient:  Patient and patient's wife    Current Length of Stay: 5 day(s)    Current Patient Status: Inpatient     Discharge Plan / Estimated Discharge Date:  Most likely on 2020    Code Status: Level 1 - Full Code      Subjective:   Patient reports that he feels well overall  Reports no pain at operative site, denies any chest pain abdominal pain or shortness of breath  Objective:     Vitals:   Temp (24hrs), Av 6 °F (37 °C), Min:98 4 °F (36 9 °C), Max:98 7 °F (37 1 °C)    Temp:  [98 4 °F (36 9 °C)-98 7 °F (37 1 °C)] 98 6 °F (37 °C)  HR:  [75] 75  Resp:  [16-18] 16  BP: (121-125)/(74-80) 121/74  SpO2:  [97 %] 97 %  Body mass index is 24 86 kg/m²  Input and Output Summary (last 24 hours): Intake/Output Summary (Last 24 hours) at 2/15/2020 1542  Last data filed at 2/15/2020 1501  Gross per 24 hour   Intake 1499 67 ml   Output 250 ml   Net 1249 67 ml       Physical Exam:     Physical Exam  Constitutional: He is oriented to person, place, and time  No distress  HENT:   Mouth/Throat: Oropharynx is clear and moist    Eyes: Conjunctivae are normal    Neck: Neck supple  Cardiovascular: Normal rate and regular rhythm  Exam reveals no gallop and no friction rub    No murmur heard    Pulmonary/Chest: Effort normal and breath sounds normal  No respiratory distress  He has no wheezes  Abdominal: Soft  Bowel sounds are normal  There is no tenderness  Musculoskeletal: Normal range of motion    left foot heavily wrapped in gauze, dressing is very mildly soaked only on distal end of plantar area of foot  No erythema of foot outside of gauze wrap with 1+ swelling around the ankle   No active drainage, tenderness or erythema of left shin  (+) chronic skin changes status post multiple surgeries    Neurological: He is alert and oriented to person, place, and time  Skin: Skin is warm      Additional Data:     Labs:    Results from last 7 days   Lab Units 02/15/20  0611   WBC Thousand/uL 8 43   HEMOGLOBIN g/dL 10 6*   HEMATOCRIT % 33 5*   PLATELETS Thousands/uL 312   NEUTROS PCT % 65   LYMPHS PCT % 18   MONOS PCT % 10   EOS PCT % 5     Results from last 7 days   Lab Units 02/15/20  0611  02/10/20  0200   POTASSIUM mmol/L 4 4   < > 3 9   CHLORIDE mmol/L 103   < > 101   CO2 mmol/L 25   < > 26   BUN mg/dL 31*   < > 38*   CREATININE mg/dL 1 48*   < > 1 99*   CALCIUM mg/dL 8 2*   < > 9 1   ALK PHOS U/L  --   --  80   ALT U/L  --   --  13   AST U/L  --   --  15    < > = values in this interval not displayed  Results from last 7 days   Lab Units 02/10/20  0200   INR  1 16       * I Have Reviewed All Lab Data Listed Above  * Additional Pertinent Lab Tests Reviewed: All Labs Within Last 24 Hours Reviewed    Imaging:    Imaging Reports Reviewed Today Include:  None  Imaging Personally Reviewed by Myself Includes:  None    Recent Cultures (last 7 days):     Results from last 7 days   Lab Units 02/14/20  1226 02/10/20  0750 02/10/20  0219 02/10/20  0200   BLOOD CULTURE   --   --  No Growth After 5 Days  No Growth After 5 Days     GRAM STAIN RESULT  No Polys or Bacteria seen Rare Polys*  4+ Gram positive cocci in pairs*  2+ Gram negative rods*  --   --    WOUND CULTURE   --  2+ Growth of Beta Hemolytic Streptococcus Group B*  2+ Growth of   --   --        Last 24 Hours Medication List:     Current Facility-Administered Medications:  acetaminophen 650 mg Oral Q6H PRN Carly Tovar, DPM    aspirin 81 mg Oral Daily Kane Mosley MD    atorvastatin 40 mg Oral After Banks Cassis, DPM    cefazolin 1,000 mg Intravenous Q8H Carly Tovar, DPFINESSE Last Rate: 1,000 mg (02/15/20 0933)   furosemide 40 mg Oral Daily Kane Mosley MD    heparin (porcine) 5,000 Units Subcutaneous [facility] Carly Tovar, DPFINESSE    insulin glargine 10 Units Subcutaneous HS Kane Mosley MD    insulin glargine 30 Units Subcutaneous QAM Carly Tovar DPM    insulin lispro 1-6 Units Subcutaneous 4x Daily (AC & HS) Carly Tovar, DPFINESSE    metoprolol tartrate 25 mg Oral Q12H [facility] Carly Tovar, DPFINESSE    ondansetron 4 mg Intravenous Q6H PRN Carly Tovar, DPFINESSE    oxyCODONE-acetaminophen 1 tablet Oral Q6H PRN Carly Tovar, DPM    polyethylene glycol 17 g Oral Daily PRN Carly Tovar, DPM    potassium chloride 20 mEq Oral Daily Carly Tovar DPM         Today, Patient Was Seen By: Kane Mosley MD    ** Please Note: This note has been constructed using a voice recognition system   **

## 2020-02-15 NOTE — SOCIAL WORK
CM made aware patient will need wound vac at DC  Form filled out by podiatry and appropriate clinical along with script sent via [facility] through to [facility]  CM spoke to patient to update him that CM is working on the wound vac and will keep him informed  CM also discussed Marcin Larry services at [place] for wound vac care  CM discussed freedom of choice in agencies and [facility] referral made to  [facility] per preference  CM will continue to follow

## 2020-02-15 NOTE — ASSESSMENT & PLAN NOTE
· last dose 02/09/2020; resumed aspirin on 02/11/2020; aspirin held prior to surgery on 02/14/2020; aspirin resumed today 02/15/2020  · Continue statin and beta blocker

## 2020-02-16 VITALS
RESPIRATION RATE: 18 BRPM | HEIGHT: 70 IN | OXYGEN SATURATION: 92 % | BODY MASS INDEX: 26.95 KG/M2 | WEIGHT: 188.27 LBS | DIASTOLIC BLOOD PRESSURE: 84 MMHG | HEART RATE: 79 BPM | TEMPERATURE: 98.4 F | SYSTOLIC BLOOD PRESSURE: 131 MMHG

## 2020-02-16 LAB
BACTERIA SPEC ANAEROBE CULT: ABNORMAL
BACTERIA SPEC ANAEROBE CULT: ABNORMAL
GLUCOSE SERPL-MCNC: 200 MG/DL (ref 65–140)
GLUCOSE SERPL-MCNC: 64 MG/DL (ref 65–140)
GLUCOSE SERPL-MCNC: 92 MG/DL (ref 65–140)

## 2020-02-16 PROCEDURE — 82948 REAGENT STRIP/BLOOD GLUCOSE: CPT

## 2020-02-16 PROCEDURE — 99024 POSTOP FOLLOW-UP VISIT: CPT | Performed by: PODIATRIST

## 2020-02-16 PROCEDURE — 99239 HOSP IP/OBS DSCHRG MGMT >30: CPT | Performed by: INTERNAL MEDICINE

## 2020-02-16 RX ORDER — CEPHALEXIN 500 MG/1
500 CAPSULE ORAL EVERY 6 HOURS SCHEDULED
Qty: 28 CAPSULE | Refills: 0 | Status: SHIPPED | OUTPATIENT
Start: 2020-02-16 | End: 2020-02-23

## 2020-02-16 RX ORDER — METRONIDAZOLE 500 MG/1
500 TABLET ORAL EVERY 8 HOURS SCHEDULED
Status: DISCONTINUED | OUTPATIENT
Start: 2020-02-16 | End: 2020-02-16 | Stop reason: HOSPADM

## 2020-02-16 RX ORDER — INSULIN GLARGINE 100 [IU]/ML
30 INJECTION, SOLUTION SUBCUTANEOUS EVERY MORNING
Qty: 5 PEN | Refills: 0 | Status: ON HOLD
Start: 2020-02-16 | End: 2021-12-26 | Stop reason: SDUPTHER

## 2020-02-16 RX ORDER — METRONIDAZOLE 500 MG/1
500 TABLET ORAL EVERY 8 HOURS SCHEDULED
Qty: 21 TABLET | Refills: 0 | Status: SHIPPED | OUTPATIENT
Start: 2020-02-16 | End: 2020-02-23

## 2020-02-16 RX ORDER — CEPHALEXIN 500 MG/1
500 CAPSULE ORAL EVERY 6 HOURS SCHEDULED
Status: DISCONTINUED | OUTPATIENT
Start: 2020-02-16 | End: 2020-02-16 | Stop reason: HOSPADM

## 2020-02-16 RX ORDER — INSULIN GLARGINE 100 [IU]/ML
5 INJECTION, SOLUTION SUBCUTANEOUS
Status: DISCONTINUED | OUTPATIENT
Start: 2020-02-16 | End: 2020-02-16

## 2020-02-16 RX ADMIN — INSULIN GLARGINE 30 UNITS: 100 INJECTION, SOLUTION SUBCUTANEOUS at 10:11

## 2020-02-16 RX ADMIN — CEFAZOLIN SODIUM 1000 MG: 1 SOLUTION INTRAVENOUS at 00:19

## 2020-02-16 RX ADMIN — ASPIRIN 81 MG 81 MG: 81 TABLET ORAL at 10:11

## 2020-02-16 RX ADMIN — METOPROLOL TARTRATE 25 MG: 25 TABLET ORAL at 10:12

## 2020-02-16 RX ADMIN — CEPHALEXIN 500 MG: 500 CAPSULE ORAL at 12:58

## 2020-02-16 RX ADMIN — HEPARIN SODIUM 5000 UNITS: 5000 INJECTION INTRAVENOUS; SUBCUTANEOUS at 07:08

## 2020-02-16 RX ADMIN — CEFAZOLIN SODIUM 1000 MG: 1 SOLUTION INTRAVENOUS at 10:10

## 2020-02-16 RX ADMIN — FUROSEMIDE 40 MG: 40 TABLET ORAL at 10:11

## 2020-02-16 RX ADMIN — POTASSIUM CHLORIDE 20 MEQ: 1500 TABLET, EXTENDED RELEASE ORAL at 10:11

## 2020-02-16 NOTE — ASSESSMENT & PLAN NOTE
· last dose 02/09/2020; resumed aspirin on 02/11/2020; aspirin held prior to surgery on 02/14/2020; aspirin resumed 02/15/2020  · Continue statin and beta blocker

## 2020-02-16 NOTE — DISCHARGE INSTRUCTIONS
Dear Shell Archer,     It was our pleasure to care for you here at [facility]  It is our hope that we were always able to exceed the expected standards for your care during your stay  You were hospitalized due to fever and pain on the left foot with associated swelling of the foot  You were cared for on the 4th floor [address] [facility] by Adam Medel MD under the service of Giuliana Obando MD with the [address] [place] [address] Internal Medicine Hospitalist Group who covers for your primary care physician (PCP), Ty Aldana MD, while you were hospitalized  If you have any questions or concerns related to this hospitalization, you may contact us at [phone]  For follow up as well as any medication refills, we recommend that you follow up with your primary care physician  A registered nurse will reach out to you by phone within a few days after your discharge to answer any additional questions that you may have after going home  However, at this time we provide for you here, the most important instructions / recommendations at discharge:     · Notable Medication Adjustments -   · You will take  Antibiotic Metronidazole 500mg every 8 hours for 7 days, take with food, do not drink alcohol with this medication, because it can cause serious side effects  · You will take Antibiotic Keflex 500 mg every 6 hours for 7 days    · Important follow up information -   · Please follow-up at the [address] in [place] (with Dr Sole Perkins or Dr Azar Fernández)  · Please schedule an appointment with your primary care doctor as soon as possible    · Please schedule an appointment with endocrinology (Dr Marina Joshi) soon as possible  · Other Instructions   · Nonweightbearing on left lower extremity (please use walker to ambulate)  · Please review this entire after visit summary as additional general instructions including medication list, appointments, activity, diet, any pertinent wound care, and other additional recommendations from your care team that may be provided for you  Instructions for visiting nurses are as follows:     A) change VAC dressing Monday, Wednesday, Friday  B) gently flushed wound with normal sterile saline and dry well  C) apply wound VAC at 125 Low continuous pressure and bridge to the dorsum of the foot  D) follow-up in 10-14 days with Dr Jorge Alberto Rivero or Marlon Smith at the [place] [address]  E)    Nonweightbearing to left foot      Sincerely,     Salena Lynn MD

## 2020-02-16 NOTE — ASSESSMENT & PLAN NOTE
Lab Results   Component Value Date    HGBA1C 11 9 (H) 02/10/2020       Recent Labs     02/15/20  2117 02/16/20  0742 02/16/20  0904 02/16/20  1115   POCGLU 225* 64* 92 200*     Assessment:  Patient has an episode of hypoglycemia this morning, likely related to Lantus 10 units at bedtime according to the patient he does not take Lantus 10 units at night and he only gets this medication every time he was hospitalized for some reason  Blood Sugar Average: Last 72 hrs:  (P) 510 [id number]  · Continue home regimen of Lantus 30 units in a m    · Discontinued Lantus 10 units at bedtime since patient reported that he does not take this at home  · QID accuchecks and SSI  · Reports home blood sugar ranges from 70 to 500 at times   · Patient now interested in Endocrinology outpatient referral to Dr Filomena Ruiz

## 2020-02-16 NOTE — QUICK NOTE
Unable to see patient prior to discharge  Contacted by primary service and discussed case with Podiatry attending  Clinically patient site is healing well  Podiatry attending confident about culture on initial collection  So far OR cultures remain without growth  Patient is otherwise clinically stable from primary service/chart review  Cultures from 02/10 with additional growth of anaerobic organisms  At this time would recommend an additional 7 days of Keflex with Flagyl, given source control in the OR  No findings on MRI of septic arthropathy or osteomyelitis  Attempted contacting current ID attending  Case reviewed with ID advanced practitioner  Will plan to follow-up patient's pending OR cultures and recommend further adjustments to therapy if needed as outpatient  Additional care as per primary service and Podiatry

## 2020-02-16 NOTE — PROCEDURES
Patient is 2 days s/p left foot I&D  VAC for home use is delivered  HE feels well  Oral consent obtained for VAC change  HE has no feeling in his foot and did not need pain medication  Three black sponges removed from the patient's left foot ulceration  Healthy granular tissue noted within the wound  There is a small area of exposed extensor tendon but overall the wound is red granular without any deep probing to bone  No purulence or cellulitis  No malodor  Wound measures 8 x 2 x 2 cm  I applied 2 black sponges to the wound (1 in the wound, a 2nd to bridge to the dorsum of the foot)  Good seal was noted with pressure 125 low continuous  No complications  PLAN  1  Patient has home VAC and room  Dressing can be left intact when he leaves  Nurse to come to his house Monday or Tuesday to change the dressing  2  Patient initial I&D cultures are finalized  Group B strep  Although a new set was taken 2 days later in the OR, they are currently showing no growth and I see no need to have these finalized as we already have clean culture from 2 days prior  I will defer to ID whether he needs to stay but I feel regardless of the final culture this will not change his course of antibiotic  I am trying to discuss with ID  I did discuss with internal medicine    3  Instructions for visiting nurses are as follows:    A) change VAC dressing Monday, Wednesday, Friday  B) gently flushed wound with normal sterile saline and dry well  C) apply wound VAC at 125 Low continuous pressure and bridge to the dorsum of the foot  D) follow-up in 10-14 days with Dr Violeta Renee or Eber Renteria at the [place] [address]  E)    Nonweightbearing to left foot

## 2020-02-16 NOTE — ASSESSMENT & PLAN NOTE
· Presents to ED with complaints of two day history left LE pain, erythema, swelling  Pain is located to great toe and mid shin  He reports prior left ankle fracture requiring plate  Hardware has since been removed  He reports episodes of cellulitis about once per year  · He reports he has cipro prescribed to him to use as needed for cellulitis  He took about 3 doses with no improvement  · Tmax 101 8 at home; on admission temperature was 36 2; patient does not meet criteria for sepsis/SIRS as there was only leukocytosis on admission (WBC 18 85) and no tachycardia, tachypnea, or Temp <36 or >38  · MRI of L foot showed: Plantar distal first interspace tiny wound/ulcer and/or I&D penetration site with mild regional cellulitis  No residual abscess post I&D and packing  No osteomyelitis  No evidence of septic arthropathy  No significant tenosynovitis  Incidentally noted subcutaneous superficial pressure lesions at the level of the fourth and fifth MTP joints  Tiny T1 and T2 hypointense foci with minimal adjacent T2 hyperintensity and postcontrast hyperemia at the level of the fourth metatarsal head  · WBC on downward trend and with no reported fevers overnight      Plan:  · Patient currently on cefazolin (today will be day 8), will discontinue today  · Podiatry input appreciated  · S/p bedside I and D of left foot abscess 2/10/2020; podiatry also did another bedside debridement 02/11/2020  Drainage of abscess done in OR yesterday 02/14/2020 by podiatry  · Bilateral arterial duplex of lower limb showed: no evidence of significant lower extremity arterial occlusive disease on both right and left lower extremities  · NWB on left leg (patient already provided with walker for use at home)  · change VAC dressing Monday, Wednesday, Friday  · gently flushed wound with normal sterile saline and dry well  · apply wound VAC at 125 Low continuous pressure and bridge to the dorsum of the foot    · follow-up in 10-14 days with Dr Soni Malik or Yovany Marques at the [facility]  · Nonweightbearing to left foot    · Infectious disease input appreciated  · Wound cultures show growth of group B strep  · Discuss with infectious disease, podiatry concerns, that patient is okay for discharge today, and he does not believe that we no longer need to wait for the final cultures till Monday, discussed the case with on-call ID, she discussed the case with Sally advanced practitioner who was familiar with the case, unable to contact ID attending  Will plan to follow-up patient's pending OR cultures and recommend further adjustments to therapy if needed as outpatient  From ID standpoint he is okay for discharge with Flagyl 500 mg every 8 hours for additional 7 days, take with food and avoid alcohol, Keflex 500 mg every 6 hours for additional 7 days to take with food, this was all discussed with the patient    · Internal Medicine, Podiatry and ID agreed that patient is safe for discharge today

## 2020-02-16 NOTE — DISCHARGE SUMMARY
Discharge- Greg Cavanaugh 1960, 56 y o  male MRN: [id number]    Unit/Bed#: W -01 Encounter: [id number]    Primary Care Provider: Jasson Brock MD   Date and time admitted to hospital: 2/10/2020 12:30 AM      * Abscess of left foot  Assessment & Plan  · Presents to ED with complaints of two day history left LE pain, erythema, swelling  Pain is located to great toe and mid shin  He reports prior left ankle fracture requiring plate  Hardware has since been removed  He reports episodes of cellulitis about once per year  · He reports he has cipro prescribed to him to use as needed for cellulitis  He took about 3 doses with no improvement  · Tmax 101 8 at home; on admission temperature was 36 2; patient does not meet criteria for sepsis/SIRS as there was only leukocytosis on admission (WBC 18 85) and no tachycardia, tachypnea, or Temp <36 or >38  · MRI of L foot showed: Plantar distal first interspace tiny wound/ulcer and/or I&D penetration site with mild regional cellulitis  No residual abscess post I&D and packing  No osteomyelitis  No evidence of septic arthropathy  No significant tenosynovitis  Incidentally noted subcutaneous superficial pressure lesions at the level of the fourth and fifth MTP joints  Tiny T1 and T2 hypointense foci with minimal adjacent T2 hyperintensity and postcontrast hyperemia at the level of the fourth metatarsal head  · WBC on downward trend and with no reported fevers overnight      Plan:  · Patient currently on cefazolin (today will be day 8), will discontinue today  · Podiatry input appreciated  · S/p bedside I and D of left foot abscess 2/10/2020; podiatry also did another bedside debridement 02/11/2020  Drainage of abscess done in OR yesterday 02/14/2020 by podiatry  · Bilateral arterial duplex of lower limb showed: no evidence of significant lower extremity arterial occlusive disease on both right and left lower extremities    · NWB on left leg (patient already provided with walker for use at home)  · change VAC dressing Monday, Wednesday, Friday  · gently flushed wound with normal sterile saline and dry well  · apply wound VAC at 125 Low continuous pressure and bridge to the dorsum of the foot  · follow-up in 10-14 days with Dr Olvin Vazquez or Edward Clarke at the [place] [address]  · Nonweightbearing to left foot    · Infectious disease input appreciated  · Wound cultures show growth of group B strep  · Discuss with infectious disease, podiatry concerns, that patient is okay for discharge today, and he does not believe that we no longer need to wait for the final cultures till Monday, discussed the case with on-call ID, she discussed the case with Sally advanced practitioner who was familiar with the case, unable to contact ID attending  Will plan to follow-up patient's pending OR cultures and recommend further adjustments to therapy if needed as outpatient  From ID standpoint he is okay for discharge with Flagyl 500 mg every 8 hours for additional 7 days, take with food and avoid alcohol, Keflex 500 mg every 6 hours for additional 7 days to take with food, this was all discussed with the patient  · Internal Medicine, Podiatry and ID agreed that patient is safe for discharge today    Chronic combined systolic and diastolic CHF (congestive heart failure) (Formerly [facility])  Assessment & Plan  Wt Readings from Last 3 Encounters:   02/16/20 85 4 kg (188 lb 4 4 oz)   08/05/19 87 5 kg (193 lb)   05/01/19 86 7 kg (191 lb 3 2 oz)    Patient is known to Dr Rey Ceballos of Cardiology   Euvolemic   Last echocardiogram from May 9141:  Systolic function was mildly reduced  LVEF 45   I&O, daily weight   Current regimen: metoprolol 25 mg BID, Lasix restarted today 02/15/2020      CKD (chronic kidney disease) stage 3, GFR 30-59 ml/min (Formerly [facility])  Assessment & Plan   Creatinine slightly above baseline on admission at 2 22 (1 5-1 8)    Creatinine on downward trend, today at 1 48    Resume Lasix 02/15/2020   Patient is at baseline    Presence of drug coated stent in LAD coronary artery  Assessment & Plan  · last dose 02/09/2020; resumed aspirin on 02/11/2020; aspirin held prior to surgery on 02/14/2020; aspirin resumed 02/15/2020  · Continue statin and beta blocker    Type 2 diabetes mellitus with hyperglycemia, with long-term current use of insulin [facility])  Assessment & Plan  Lab Results   Component Value Date    HGBA1C 11 9 (H) 02/10/2020       Recent Labs     02/15/20  2117 02/16/20  0742 02/16/20  0904 02/16/20  1115   POCGLU 225* 64* 92 200*     Assessment:  Patient has an episode of hypoglycemia this morning, likely related to Lantus 10 units at bedtime according to the patient he does not take Lantus 10 units at night and he only gets this medication every time he was hospitalized for some reason  Blood Sugar Average: Last 72 hrs:  (P) 577 [id number]  · Continue home regimen of Lantus 30 units in a m    · Discontinued Lantus 10 units at bedtime since patient reported that he does not take this at home  · QID accuchecks and SSI  · Reports home blood sugar ranges from 70 to 500 at times   · Patient now interested in Endocrinology outpatient referral to Dr Amanda Ramirez    Benign essential hypertension  Assessment & Plan  · Stable   · Continue metoprolol  · Lasix restarted 02/15/2020         Discharging Resident Physician: Nanci Castañeda MD  Attending: Michelle Coughlin MD  PCP: Sophie Ricci MD  Admission Date: 2/10/2020  Discharge Date: 02/16/20    Disposition:     Home    Reason for Admission:  Fever, swelling of left lower extremity    Consultations During Hospital Stay:  · Podiatry  · Infectious disease  · Case management    Procedures Performed:     · I&D of foot abscess c/o podiatry 02/10/2020  · Wound debridement C/0 podiatry 02/11/2020  · I and D of foot abscess C/0 podiatry 02/14/2020    Significant Findings / Test Results:     · Wound culture grew group B strep  · MRI of left foot: Plantar distal first interspace tiny wound/ulcer and/or I&D penetration site with mild regional cellulitis  No residual abscess post I&D and packing  No evidence of septic arthropathy  No osteomyelitis  No significant tenosynovitis  Incidental Findings:   · MRI of left foot:  Incidentally noted subcutaneous superficial pressure lesions at the level of the fourth and fifth MTP joints  Tiny T1 and T2 hypointense foci with minimal adjacent T2 hyperintensity and postcontrast hyperemia at the level of the fourth metatarsal head  No evidence of definitive corresponding foreign body on the comparison radiographs   Differential includes tiny nonmetallic foreign bodies with reactive changes, incidental pressure lesion in the setting of old distal plantar fascial arcade injury and   tiny foci of early adventitial bursa formation  Consider follow-up with nonemergent targeted lower extremity ultrasound if clinically warranted  Test Results Pending at Discharge (will require follow up):   · none     Outpatient Tests Requested:  · none    Complications:  none    Hospital Course:     Danae Coates is a 56 y o  male patient who originally presented to the hospital on 2/10/2020 due to erythema, swelling and pain in left lower extremity with associated fever at home  Patient was evaluated by podiatry team on admission, found to have abscess which was drained at bedside  Wound cultures done  X-rays of left foot taken  MRI also ordered to rule out any septic joint or osteomyelitis present  Infectious disease was consulted per podiatry recommendations  Patient started on IV cefazolin  On the 1st hospital day (02/11/2020),  patient remained hemodynamically stable  Wound culture grew GBS  MRI negative for deep infection  Cefazolin IV was continued  Wound was further debrided by Podiatry  On the 2nd hospital day (02/12/2020), patient remained hemodynamically stable    Continued on IV cefazolin and patient was recommended to follow-up with endocrinology in outpatient as his most recent hemoglobin was high  WBC count continued to show a downward trend  Lasix was held because of increased creatinine on admission  On the 3rd hospital day (02/13/2020), patient remained hemodynamically stable  Patient seen by podiatry and wound was debrided again was found to have some skin necrosis developing dorsally, so the plan was to take the patient to the OR the next day for debridement or I&D with possible wound VAC  Patient continued on IV cefazolin  On the 4th hospital day (02/14/2020), patient remained hemodynamically stable  Aspirin held in preparation for OR procedure  Lasix also continued to be held as there was anticipated blood loss with surgery  Patient creatinine continued to improve and show a downward trend  Patient underwent incision and drainage of left foot with application of VAC dressing  No complications  Patient was noted to be progressing well after surgery  Cultures sent  On the 5th hospital day (02/15/2020), patient remained hemodynamically stable and reported minimal pain at the operative site  Lasix an aspirin resumed  Patient continued on IV cefazolin  From podiatry standpoint, patient was stable for discharge however infectious disease plan is to wait for final OR cultures and to keep patient on IV cefazolin  Wound VAC and walker delivered to patient's room and ready for use on discharge  On the 6th day, discuss case with Podiatry and ID, from their standpoint patient is safe to be discharged and will just follow up wound culture outpatient, patient will be discharged with Flagyl 500 mg every 8 hours and Keflex 500 mg every 6 hours for a total of additional 7 days  Patient will need to follow-up as an outpatient with endocrinologist, and primary care physician and Wound Care,  has set up [facility] visiting nurses for wound VAC        Condition at Discharge: good     Discharge Day Visit / Exam:     Subjective:  Patient has no subjective complain  Vitals: Blood Pressure: 131/84 (02/16/20 1008)  Pulse: 79 (02/16/20 0744)  Temperature: 98 4 °F (36 9 °C) (02/16/20 0744)  Temp Source: Oral (02/16/20 0744)  Respirations: 18 (02/16/20 0744)  Height: 5' 10" (177 8 cm) (02/11/20 0945)  Weight - Scale: 85 4 kg (188 lb 4 4 oz)(refused standing) (02/16/20 0600)  SpO2: 92 % (02/16/20 0744)  Exam:   Physical Exam   Constitutional: He is oriented to person, place, and time  He appears well-developed and well-nourished  HENT:   Head: Normocephalic  Eyes: Pupils are equal, round, and reactive to light  Neck: Normal range of motion  Cardiovascular: Normal rate, regular rhythm, normal heart sounds and intact distal pulses  Pulmonary/Chest: Effort normal and breath sounds normal    Abdominal: Soft  Bowel sounds are normal    Musculoskeletal: Normal range of motion  Neurological: He is alert and oriented to person, place, and time  Skin: Skin is warm  Dressing is dry and intact on left foot       Discussion with Family:  Offer to call patient wife, patient reported he ordered called his wife    Discharge instructions/Information to patient and family:   See after visit summary for information provided to patient and family  Provisions for Follow-Up Care:  See after visit summary for information related to follow-up care and any pertinent home health orders  Discharge Medications:  See after visit summary for reconciled discharge medications provided to patient and family        ** Please Note: This note has been constructed using a voice recognition system **

## 2020-02-16 NOTE — ASSESSMENT & PLAN NOTE
Wt Readings from Last 3 Encounters:   02/16/20 85 4 kg (188 lb 4 4 oz)   08/05/19 87 5 kg (193 lb)   05/01/19 86 7 kg (191 lb 3 2 oz)    Patient is known to Dr Stewart Pop of Cardiology   Euvolemic   Last echocardiogram from May 3847:  Systolic function was mildly reduced    LVEF 45   I&O, daily weight   Current regimen: metoprolol 25 mg BID, Lasix restarted today 02/15/2020

## 2020-02-16 NOTE — ASSESSMENT & PLAN NOTE
 Creatinine slightly above baseline on admission at 2 22 (1 5-1 8)  Creatinine on downward trend, today at 1 48     Resume Lasix 02/15/2020   Patient is at baseline

## 2020-02-17 LAB
BACTERIA SPEC ANAEROBE CULT: ABNORMAL
BACTERIA TISS AEROBE CULT: ABNORMAL
BACTERIA TISS AEROBE CULT: ABNORMAL
GRAM STN SPEC: ABNORMAL

## 2020-02-18 NOTE — UTILIZATION REVIEW
J Carlos Rosado RN   Registered Nurse   Utilization Review   Utilization Review   Signed   Date of Service:  2/14/2020  3:19 PM               Signed             Show:Clear all  [x]Manual[x]Template[x]Copied    Added by:  Jewels Brantley RN    []Rosey for details  Continued Stay Review     Date: 2/14/2020                           Current Patient Class: inpatient                     Current Level of Care: med surg     HPI:60 y o  male initially admitted on 2/10/2020 admitted as inpatient for Recurrent cellulitis of left Lower leg  Patient reports 2 day  Worsening of erythema , swelling & pain of Left lower extremity  Pain is located to great toe and mid shin   He reports prior left ankle fracture requiring plate   Hardware has since been removed   He reports episodes of cellulitis about once per year       Assessment/Plan: Patient reports Cipro is prescribed to him to use as needed for Cellulitis; he took 3 doses prior to admission with no improvement  WBC on downward trend  No fevers  Abscess culture is growing 2+ Group B Streptococcus   Blood cultures are negative to date Cont on IV antibiotics ( day 5)   Continue IV cefazolin at present pending final OR Cultures  Podiatry:   2/10 I&D left foot abscess  2/11/2020 I&D bedside debridement  2/14/2020 in OR: SURGERY DATE: 2/14/2020  Procedure(s) (LRB):  EXPLORATION EXTREMITY (Left)  INCISION AND DRAINAGE (I&D) EXTREMITY left foot (cpt 23566) (Left)  APPLICATION VAC DRESSING (Left)  Anesthesia Type:   Choice with 10 ml of 1% Lidocaine and 0 5% Bupivacaine in a 1:1 mixture   Operative Findings:  Necrotic and devitalized tissue was noted in the 1st interspace of the left foot   Upon incision and drainage multiple pus pockets were noted along the extensor hallucis longus tendon, dorsal interspace, and plantar medial interspace   Following incision and drainage with removal of necrotic tissue remaining tissue appeared viable   Extensor hallucis longus tendon appeared intact  The wound was left open and covered with a wound VAC   Tourniquet wound was inflated for application of wound VAC   Wound VAC was applied as below:  Wound VAC application  1  Number of sponges: 3 black  2  Pressure settin continuous  3  Size of wound:  8 x 2 x 2 cm  4   Description of wound:  Remaining wound bed was a mixture of granular and fatty tissue with exposed extensor hallucis longus tendon noted dorsally   Wound was deep to capsule and periosteum of the 1st MPJ   Remaining tissues appeared healthy and viable      Monitoring creatinine-holding Lasix today for CKD      Pertinent Labs/Diagnostic Results:            Results from last 7 days   Lab Units 20  0534 20  0551 20  0557 20  0454 02/10/20  0200   WBC Thousand/uL 8 65 10 36* 10 86* 12 16* 18 85*   HEMOGLOBIN g/dL 10 8* 10 5* 10 3* 10 3* 12 0   HEMATOCRIT % 33 8* 32 5* 32 1* 32 1* 36 5   PLATELETS Thousands/uL 313 274 264 235 287   NEUTROS ABS Thousands/µL 5 95 7 65* 8 49* 9 60* 16 19*                   Results from last 7 days   Lab Units 20  0534 20  0551 20  0557 20  0454 02/10/20  0200   SODIUM mmol/L 139 140 137 135* 139   POTASSIUM mmol/L 4 2 3 9 3 7 3 8 3 9   CHLORIDE mmol/L 104 104 103 101 101   CO2 mmol/L 27 27 27 26 26   ANION GAP mmol/L 8 9 7 8 12   BUN mg/dL 30* 35* 39* 42* 38*   CREATININE mg/dL 1 63* 1 73* 1 85* 2 22* 1 99*   EGFR ml/min/1 73sq m 44 41 38 31 35   CALCIUM mg/dL 8 4 8 3 8 0* 8 0* 9 1           Results from last 7 days   Lab Units 02/10/20  0200   AST U/L 15   ALT U/L 13   ALK PHOS U/L 80   TOTAL PROTEIN g/dL 7 3   ALBUMIN g/dL 2 8*   TOTAL BILIRUBIN mg/dL 1 30*                      Results from last 7 days   Lab Units 20  1335 20  1104 20  0728 20  1556 20  1059 20  0918 20  0800 20  0753 20  0712 20  2110 20  1700 20  1103   POC GLUCOSE mg/dl 145* 151* 173* 199* 123 85 109 107 56* 286* 222* 226*               Results from last 7 days   Lab Units 02/14/20  0534 02/13/20  0551 02/12/20  0557 02/11/20  0454 02/10/20  0200   GLUCOSE RANDOM mg/dL 193* 77 104 192* 153*               Results from last 7 days   Lab Units 02/10/20  0200   HEMOGLOBIN A1C % 11 9*   EAG mg/dl 295      No results found for: BETA-HYDROXYBUTYRATE                    Results from last 7 days   Lab Units 02/10/20  0200   CK TOTAL U/L 166   CK MB INDEX % <1 0   CK MB ng/mL 0 7                   Results from last 7 days   Lab Units 02/10/20  0200   PROTIME seconds 14 2   INR   1 16   PTT seconds 32                   Results from last 7 days   Lab Units 02/10/20  0200   LACTIC ACID mmol/L 1 5              Results from last 7 days   Lab Units 02/10/20  0750 02/10/20  0219 02/10/20  0200   BLOOD CULTURE    --  No Growth After 4 Days  No Growth After 4 Days  GRAM STAIN RESULT   Rare Polys*  4+ Gram positive cocci in pairs*  2+ Gram negative rods*  --   --    WOUND CULTURE   2+ Growth of Beta Hemolytic Streptococcus Group B*  2+ Growth of   --   --       2/11/2020 MRI foot/forefoot toes left w & wo contrast: Incidentally noted subcutaneous superficial pressure lesions at the level of the fourth and fifth MTP joints  Tiny T1 and T2 hypointense foci with minimal adjacent T2 hyperintensity and postcontrast hyperemia at the level of the fourth metatarsal head  No evidence of definitive corresponding foreign body on the comparison radiographs   Differential includes tiny nonmetallic foreign bodies with reactive changes, incidental pressure lesion in the setting of old distal plantar fascial arcade injury and   tiny foci of early adventitial bursa formation   Consider follow-up with nonemergent targeted lower extremity ultrasound if clinically warranted      Vital Signs:   02/14/20 1134   97 8 °F (36 6 °C)   75   16         98 %   None (Room air)   02/14/20 07:29:57   98 7 °F (37 1 °C)   79   16   123/79   94   96 %             Medications:   Scheduled Medications:     Medications:  atorvastatin 40 mg Oral After Dinner   cefazolin 1,000 mg Intravenous Q8H   heparin (porcine) 5,000 Units Subcutaneous Q8H [address]   insulin glargine 30 Units Subcutaneous QAM   insulin lispro 1-6 Units Subcutaneous 4x Daily (AC & HS)   metoprolol tartrate 25 mg Oral Q12H [address]   potassium chloride 20 mEq Oral Daily      Continuous IV Infusions:     lactated ringers 50 mL/hr Intravenous Continuous      PRN Meds:  acetaminophen 650 mg Oral Q6H PRN   ondansetron 4 mg Intravenous Q6H PRN   oxyCODONE-acetaminophen 1 tablet Oral Q6H PRN   polyethylene glycol 17 g Oral Daily PRN      Discharge Plan: tbd  Network Utilization Review Department  [email or web address]  ATTENTION: Please call with any questions or concerns to [phone] and carefully listen to the prompts so that you are directed to the right person  All voicemails are confidential   Melvin Woodward all requests for admission clinical reviews, approved or denied determinations and any other requests to dedicated fax number below belonging to the campus where the patient is receiving treatment  List of dedicated fax numbers for the Facilities:  [address] DENIALS (Administrative/Medical Necessity) [phone]   [address] (Maternity/NICU/Pediatrics) [phone]   [place] [phone]   [facility] [phone]   [address] [place] [address] [phone]   145 [facility]  [phone]   1205 [facility] 1525 [facility] [phone]   Iker Gonzalez [phone]   [facility]  [phone]   [address] [place] [address] [address] [facility] [phone]                     Notification of Discharge  This is a Notification of Discharge from our facility [address]   Please be advised that this patient has been discharge from our facility  Below you will find the admission and discharge date and time including the patients disposition  PRESENTATION DATE: 2/10/2020 12:30 AM  OBS ADMISSION DATE:   IP ADMISSION DATE: 2/10/20 0305   DISCHARGE DATE: 2/16/2020  1:25 PM  DISPOSITION: Home with Adolph Hoang with [facility]   Admission Orders listed below:  Admission Orders (From admission, onward)     Ordered        02/10/20 0305  Inpatient Admission  Once                   Please contact the UR Department if additional information is required to close this patient's authorization/case  [address] Ryan Salazar [facility] Utilization Review Department  Main: [phone] x carefully listen to the prompts  All voicemails are confidential   [email or web address]  Send all requests for admission clinical reviews, approved or denied determinations and any other requests to dedicated fax number below belonging to the campus where the patient is receiving treatment   List of dedicated fax numbers:  [address] DENIALS (Administrative/Medical Necessity) [phone]   [address] (Maternity/NICU/Pediatrics) [phone]   Twyla Menard [phone]   Leyva Dorian [phone]   Woodrow Garces [phone]   Rola Page [facility] 1525 [facility] [phone]   [facility]  [phone]   2205 [facility]  [address] [phone]

## 2020-02-20 ENCOUNTER — APPOINTMENT (OUTPATIENT)
Dept: WOUND CARE | Facility: HOSPITAL | Age: 60
End: 2020-02-20
Payer: COMMERCIAL

## 2020-02-20 PROCEDURE — 11042 DBRDMT SUBQ TIS 1ST 20SQCM/<: CPT

## 2020-02-27 ENCOUNTER — APPOINTMENT (OUTPATIENT)
Dept: WOUND CARE | Facility: HOSPITAL | Age: 60
End: 2020-02-27
Payer: COMMERCIAL

## 2020-02-27 PROCEDURE — 11042 DBRDMT SUBQ TIS 1ST 20SQCM/<: CPT

## 2020-02-27 PROCEDURE — 97605 NEG PRS WND THER DME<=50SQCM: CPT

## 2020-03-02 ENCOUNTER — APPOINTMENT (OUTPATIENT)
Dept: WOUND CARE | Facility: HOSPITAL | Age: 60
End: 2020-03-02
Payer: COMMERCIAL

## 2020-03-02 PROCEDURE — 99212 OFFICE O/P EST SF 10 MIN: CPT

## 2020-03-05 ENCOUNTER — APPOINTMENT (OUTPATIENT)
Dept: WOUND CARE | Facility: HOSPITAL | Age: 60
End: 2020-03-05
Payer: COMMERCIAL

## 2020-03-05 PROCEDURE — 11043 DBRDMT MUSC&/FSCA 1ST 20/<: CPT

## 2020-03-10 ENCOUNTER — TELEPHONE (OUTPATIENT)
Dept: CARDIOLOGY CLINIC | Facility: CLINIC | Age: 60
End: 2020-03-10

## 2020-03-10 DIAGNOSIS — I25.5 ISCHEMIC CARDIOMYOPATHY: Primary | ICD-10-CM

## 2020-03-10 NOTE — TELEPHONE ENCOUNTER
S/w Claudia advised-- verbally understood  Phone # given for central scheduling  I also advised that EKG can be completed at any of the offices w/ an appt-- verbally understood

## 2020-03-10 NOTE — TELEPHONE ENCOUNTER
Patients wife Claudia called, patient is in need of an echo and ekg prior to being able to use hyperbaric oxygen therapy for a diabetic foot ulcer  The physician is asking that our office order testing  He had an echo in 5/2019 but they want one for this year   Patient is scheduled to see you 4/21

## 2020-03-12 ENCOUNTER — APPOINTMENT (OUTPATIENT)
Dept: WOUND CARE | Facility: HOSPITAL | Age: 60
End: 2020-03-12
Payer: COMMERCIAL

## 2020-03-12 PROCEDURE — 11043 DBRDMT MUSC&/FSCA 1ST 20/<: CPT

## 2020-03-12 PROCEDURE — 97605 NEG PRS WND THER DME<=50SQCM: CPT

## 2020-03-16 ENCOUNTER — TRANSCRIBE ORDERS (OUTPATIENT)
Dept: ADMINISTRATIVE | Facility: HOSPITAL | Age: 60
End: 2020-03-16

## 2020-03-16 ENCOUNTER — HOSPITAL ENCOUNTER (OUTPATIENT)
Dept: RADIOLOGY | Facility: HOSPITAL | Age: 60
Discharge: HOME | End: 2020-03-16
Attending: PODIATRIST
Payer: COMMERCIAL

## 2020-03-16 DIAGNOSIS — L97.523 ULCER OF LEFT FOOT, WITH NECROSIS OF MUSCLE (HCC): Primary | ICD-10-CM

## 2020-03-16 DIAGNOSIS — L97.523 ULCER OF LEFT FOOT, WITH NECROSIS OF MUSCLE (HCC): ICD-10-CM

## 2020-03-16 PROCEDURE — 73630 X-RAY EXAM OF FOOT: CPT

## 2020-03-19 ENCOUNTER — APPOINTMENT (OUTPATIENT)
Dept: WOUND CARE | Facility: HOSPITAL | Age: 60
End: 2020-03-19
Payer: COMMERCIAL

## 2020-03-19 PROCEDURE — 97605 NEG PRS WND THER DME<=50SQCM: CPT

## 2020-03-19 PROCEDURE — 11042 DBRDMT SUBQ TIS 1ST 20SQCM/<: CPT

## 2020-03-23 ENCOUNTER — TELEPHONE (OUTPATIENT)
Dept: CARDIOLOGY CLINIC | Facility: CLINIC | Age: 60
End: 2020-03-23

## 2020-03-23 NOTE — TELEPHONE ENCOUNTER
I ordered this at the request of a physician due to hyperbaric oxygen  If they absolutely need this they should probably be the ones to complete a peer to peer etc   His wife was the one who called and I am unsure what physician is requesting the echo  Please reach out to her to see if this testing is absolutely necessary per whomever requested it - thanks       Juan Carlos Madrid

## 2020-03-23 NOTE — TELEPHONE ENCOUNTER
Per insurance Denial notice - Echocardiogram test was denied not medically necessary - requesting medical notes stating  if patient heart problems are not better even though medications are taken, or if patient has new signs or complaints of a heart muscle problem, or if your exam by the doctor is getting worse      If urgent for patient to have this test completed -option for peer to peer @[phone] [id number]

## 2020-03-26 ENCOUNTER — APPOINTMENT (OUTPATIENT)
Dept: WOUND CARE | Facility: HOSPITAL | Age: 60
End: 2020-03-26
Payer: COMMERCIAL

## 2020-03-26 PROCEDURE — 11721 DEBRIDE NAIL 6 OR MORE: CPT

## 2020-03-26 PROCEDURE — 97605 NEG PRS WND THER DME<=50SQCM: CPT

## 2020-03-26 PROCEDURE — 11042 DBRDMT SUBQ TIS 1ST 20SQCM/<: CPT

## 2020-03-26 PROCEDURE — 11056 PARNG/CUTG B9 HYPRKR LES 2-4: CPT

## 2020-04-06 ENCOUNTER — APPOINTMENT (OUTPATIENT)
Dept: WOUND CARE | Facility: HOSPITAL | Age: 60
End: 2020-04-06
Payer: COMMERCIAL

## 2020-04-06 PROCEDURE — 11042 DBRDMT SUBQ TIS 1ST 20SQCM/<: CPT

## 2020-04-06 PROCEDURE — 97605 NEG PRS WND THER DME<=50SQCM: CPT

## 2020-04-06 PROCEDURE — 99212 OFFICE O/P EST SF 10 MIN: CPT

## 2020-04-09 ENCOUNTER — TRANSCRIBE ORDERS (OUTPATIENT)
Dept: ADMINISTRATIVE | Facility: HOSPITAL | Age: 60
End: 2020-04-09

## 2020-04-09 ENCOUNTER — HOSPITAL ENCOUNTER (OUTPATIENT)
Dept: RADIOLOGY | Facility: HOSPITAL | Age: 60
Discharge: HOME | End: 2020-04-09
Attending: PODIATRIST
Payer: COMMERCIAL

## 2020-04-09 DIAGNOSIS — L97.523 NON-PRESSURE CHRONIC ULCER OF OTHER PART OF LEFT FOOT WITH NECROSIS OF MUSCLE (HCC): Primary | ICD-10-CM

## 2020-04-09 DIAGNOSIS — L97.523 NON-PRESSURE CHRONIC ULCER OF OTHER PART OF LEFT FOOT WITH NECROSIS OF MUSCLE (HCC): ICD-10-CM

## 2020-04-09 PROCEDURE — 73630 X-RAY EXAM OF FOOT: CPT

## 2020-04-13 ENCOUNTER — APPOINTMENT (OUTPATIENT)
Dept: WOUND CARE | Facility: HOSPITAL | Age: 60
End: 2020-04-13
Payer: COMMERCIAL

## 2020-04-13 PROCEDURE — 97605 NEG PRS WND THER DME<=50SQCM: CPT

## 2020-04-13 PROCEDURE — 11042 DBRDMT SUBQ TIS 1ST 20SQCM/<: CPT

## 2020-04-20 ENCOUNTER — APPOINTMENT (OUTPATIENT)
Dept: WOUND CARE | Facility: HOSPITAL | Age: 60
End: 2020-04-20
Payer: COMMERCIAL

## 2020-04-20 PROCEDURE — 11042 DBRDMT SUBQ TIS 1ST 20SQCM/<: CPT

## 2020-04-20 PROCEDURE — 97605 NEG PRS WND THER DME<=50SQCM: CPT

## 2020-04-21 ENCOUNTER — TELEMEDICINE (OUTPATIENT)
Dept: CARDIOLOGY CLINIC | Facility: CLINIC | Age: 60
End: 2020-04-21
Payer: COMMERCIAL

## 2020-04-21 VITALS — HEIGHT: 70 IN | WEIGHT: 181 LBS | BODY MASS INDEX: 25.91 KG/M2

## 2020-04-21 DIAGNOSIS — E78.5 DYSLIPIDEMIA: ICD-10-CM

## 2020-04-21 DIAGNOSIS — E11.65 TYPE 2 DIABETES MELLITUS WITH HYPERGLYCEMIA, WITH LONG-TERM CURRENT USE OF INSULIN (HCC): ICD-10-CM

## 2020-04-21 DIAGNOSIS — Z95.5 PRESENCE OF DRUG COATED STENT IN LAD CORONARY ARTERY: ICD-10-CM

## 2020-04-21 DIAGNOSIS — Z79.4 TYPE 2 DIABETES MELLITUS WITH HYPERGLYCEMIA, WITH LONG-TERM CURRENT USE OF INSULIN (HCC): ICD-10-CM

## 2020-04-21 DIAGNOSIS — I50.42 CHRONIC COMBINED SYSTOLIC AND DIASTOLIC CHF (CONGESTIVE HEART FAILURE) (HCC): ICD-10-CM

## 2020-04-21 DIAGNOSIS — I10 BENIGN ESSENTIAL HYPERTENSION: ICD-10-CM

## 2020-04-21 DIAGNOSIS — I25.10 CORONARY ARTERY DISEASE INVOLVING NATIVE CORONARY ARTERY OF NATIVE HEART WITHOUT ANGINA PECTORIS: Primary | ICD-10-CM

## 2020-04-21 DIAGNOSIS — I25.5 ISCHEMIC CARDIOMYOPATHY: ICD-10-CM

## 2020-04-21 DIAGNOSIS — Z95.5 PRESENCE OF DRUG COATED STENT IN RIGHT CORONARY ARTERY: ICD-10-CM

## 2020-04-21 PROCEDURE — 99213 OFFICE O/P EST LOW 20 MIN: CPT | Performed by: INTERNAL MEDICINE

## 2020-04-21 RX ORDER — FUROSEMIDE 40 MG/1
40 TABLET ORAL DAILY
Qty: 90 TABLET | Refills: 3 | Status: SHIPPED | OUTPATIENT
Start: 2020-04-21 | End: 2021-04-17 | Stop reason: SDUPTHER

## 2020-04-27 ENCOUNTER — APPOINTMENT (OUTPATIENT)
Dept: WOUND CARE | Facility: HOSPITAL | Age: 60
End: 2020-04-27
Payer: COMMERCIAL

## 2020-04-27 PROCEDURE — 11042 DBRDMT SUBQ TIS 1ST 20SQCM/<: CPT

## 2020-05-04 ENCOUNTER — APPOINTMENT (OUTPATIENT)
Dept: WOUND CARE | Facility: HOSPITAL | Age: 60
End: 2020-05-04
Payer: COMMERCIAL

## 2020-05-04 PROCEDURE — 11042 DBRDMT SUBQ TIS 1ST 20SQCM/<: CPT

## 2020-05-11 ENCOUNTER — APPOINTMENT (OUTPATIENT)
Dept: WOUND CARE | Facility: HOSPITAL | Age: 60
End: 2020-05-11
Payer: COMMERCIAL

## 2020-05-11 PROCEDURE — 11042 DBRDMT SUBQ TIS 1ST 20SQCM/<: CPT

## 2020-05-18 ENCOUNTER — APPOINTMENT (OUTPATIENT)
Dept: WOUND CARE | Facility: HOSPITAL | Age: 60
End: 2020-05-18
Payer: COMMERCIAL

## 2020-05-18 PROCEDURE — 99213 OFFICE O/P EST LOW 20 MIN: CPT

## 2020-06-01 ENCOUNTER — APPOINTMENT (OUTPATIENT)
Dept: WOUND CARE | Facility: HOSPITAL | Age: 60
End: 2020-06-01
Payer: COMMERCIAL

## 2020-06-01 PROCEDURE — 99213 OFFICE O/P EST LOW 20 MIN: CPT

## 2020-06-11 ENCOUNTER — APPOINTMENT (OUTPATIENT)
Dept: WOUND CARE | Facility: HOSPITAL | Age: 60
End: 2020-06-11
Payer: COMMERCIAL

## 2020-06-11 PROCEDURE — 99213 OFFICE O/P EST LOW 20 MIN: CPT

## 2020-06-22 ENCOUNTER — APPOINTMENT (OUTPATIENT)
Dept: WOUND CARE | Facility: HOSPITAL | Age: 60
End: 2020-06-22
Payer: COMMERCIAL

## 2020-06-22 PROCEDURE — 99212 OFFICE O/P EST SF 10 MIN: CPT

## 2020-07-21 ENCOUNTER — OFFICE VISIT (OUTPATIENT)
Dept: PODIATRY | Facility: CLINIC | Age: 60
End: 2020-07-21
Payer: COMMERCIAL

## 2020-07-21 VITALS
BODY MASS INDEX: 24.39 KG/M2 | HEIGHT: 70 IN | SYSTOLIC BLOOD PRESSURE: 106 MMHG | HEART RATE: 67 BPM | DIASTOLIC BLOOD PRESSURE: 65 MMHG | WEIGHT: 170.4 LBS

## 2020-07-21 DIAGNOSIS — B35.1 ONYCHOMYCOSIS: ICD-10-CM

## 2020-07-21 DIAGNOSIS — I87.2 VENOUS INSUFFICIENCY OF LOWER EXTREMITY, UNSPECIFIED LATERALITY: ICD-10-CM

## 2020-07-21 DIAGNOSIS — I87.312 CHRONIC VENOUS HYPERTENSION (IDIOPATHIC) WITH ULCER OF LEFT LOWER EXTREMITY (HCC): ICD-10-CM

## 2020-07-21 DIAGNOSIS — E11.40 TYPE 2 DIABETES MELLITUS WITH DIABETIC NEUROPATHY, WITH LONG-TERM CURRENT USE OF INSULIN (HCC): Primary | ICD-10-CM

## 2020-07-21 DIAGNOSIS — Z79.4 TYPE 2 DIABETES MELLITUS WITH DIABETIC NEUROPATHY, WITH LONG-TERM CURRENT USE OF INSULIN (HCC): Primary | ICD-10-CM

## 2020-07-21 DIAGNOSIS — L97.929 CHRONIC VENOUS HYPERTENSION (IDIOPATHIC) WITH ULCER OF LEFT LOWER EXTREMITY (HCC): ICD-10-CM

## 2020-07-21 PROCEDURE — 11721 DEBRIDE NAIL 6 OR MORE: CPT | Performed by: PODIATRIST

## 2020-07-21 PROCEDURE — 99213 OFFICE O/P EST LOW 20 MIN: CPT | Performed by: PODIATRIST

## 2020-07-21 NOTE — PROGRESS NOTES
PATIENT:  Malina Khan    1960    ASSESSMENT:     1  Type 2 diabetes mellitus with diabetic neuropathy, with long-term current use of insulin (HCC)     2  Venous insufficiency of lower extremity, unspecified laterality     3  Chronic venous hypertension (idiopathic) with ulcer of left lower extremity (HCC)     4  Onychomycosis  Debridement         PLAN:  1  Patient was counseled on the condition and diagnosis  2  Educated disease prevention and risks related to diabetes  Educated proper daily foot care and exam   Instructed proper skin care / protection and footwear  Instructed to identify any signs of infection and related foot problem  3  Discussed proper blood glucose control with diet and exercise  4  Instructed local care, compression, and elevation on left leg  Instructed him to call the office with any worsening of the wound or if wound does not heal in 2 weeks  5  The patient will return in 2 months  Procedure: All mycotic toenails were reduced and debrided in length, width, and girth using a nail nipper and dremel  Patient tolerated procedure(s) well without complications  Subjective:          HPI  The patients presents for left foot evaluation  S/P I&D of left foot from gas gangrene in Feb   He was treated with local care and wound VAC after the surgery  He was discharged from wound center about 1 month ago  No wound left foot  He has chronic wound on left shin  It is on and off over 20 years after the left leg injury/ wound infection  No cellulitis  His BS has been better  He complained of thick, elongated toenails  The following portions of the patient's history were reviewed and updated as appropriate: allergies, current medications, past family history, past medical history, past social history, past surgical history and problem list   All pertinent labs and images were reviewed      Past Medical History  Past Medical History:   Diagnosis Date    CAD (coronary artery disease)     Chronic combined systolic and diastolic CHF (congestive heart failure) ([facility] )     Diabetes mellitus ([place] [address] )     type 2    Dyslipidemia     Hypertension     Ischemic cardiomyopathy     MI (myocardial infarction) ([facility] )     Osteomyelitis ([facility] )     Pancreatitis        Past Surgical History  Past Surgical History:   Procedure Laterality Date    ANGIOPLASTY      ballon    CORONARY ANGIOPLASTY WITH STENT PLACEMENT      LULA to proximal and mid LAD, Proximal RCA   HERNIA REPAIR      INCISION AND DRAINAGE OF WOUND Left 2/14/2020    Procedure: INCISION AND DRAINAGE (I&D) EXTREMITY left foot (cpt 30248); Surgeon: Hebert Prajapati DPM;  [facility];  Service: Podiatry    WI EXPLORE [facility] Left 2/14/2020    Procedure: EXPLORATION EXTREMITY;  Surgeon: Hebert Prajapati DPM;  [facility];  Service: Podiatry   111 [facility], < 50 CM Left 2/14/2020    Procedure: APPLICATION VAC DRESSING;  Surgeon: Hebert Prajapati DPM;  [facility];  Service: Podiatry        Allergies:  Patient has no known allergies  Medications:  Current Outpatient Medications   Medication Sig Dispense Refill    atorvastatin (LIPITOR) 40 mg tablet Take 1 tablet (40 mg total) by mouth daily 90 tablet 3    furosemide (LASIX) 40 mg tablet Take 1 tablet (40 mg total) by mouth daily 90 tablet 3    LANTUS SOLOSTAR 100 units/mL injection pen Inject 30 Units under the skin every morning 5 pen 0    potassium chloride (K-DUR,KLOR-CON) 20 mEq tablet One tablet twice a day (Patient taking differently: 20 mEq daily One tablet twice a day) 90 tablet 3    TRUE METRIX BLOOD GLUCOSE TEST test strip TEST FOUR TIMES DAILY  11    aspirin 81 mg chewable tablet Chew 1 tablet daily for 30 days 30 tablet 0    metoprolol tartrate (LOPRESSOR) 25 mg tablet Take 1 tablet (25 mg total) by mouth every 12 (twelve) hours 180 tablet 3     No current facility-administered medications for this visit          Social History:  Social History     Socioeconomic History    Marital status: /Civil Union     Spouse name: None    Number of children: None    Years of education: None    Highest education level: None   Occupational History    None   Social Needs    Financial resource strain: None    Food insecurity:     Worry: None     Inability: None    Transportation needs:     Medical: None     Non-medical: None   Tobacco Use    Smoking status: Never Smoker    Smokeless tobacco: Never Used   Substance and Sexual Activity    Alcohol use: Not Currently    Drug use: No    Sexual activity: None   Lifestyle    Physical activity:     Days per week: None     Minutes per session: None    Stress: None   Relationships    Social connections:     Talks on phone: None     Gets together: None     Attends [employer] service: None     Active member of club or organization: None     Attends meetings of clubs or organizations: None     Relationship status: None    Intimate partner violence:     Fear of current or ex partner: None     Emotionally abused: None     Physically abused: None     Forced sexual activity: None   Other Topics Concern    None   Social History Narrative    None        Review of Systems   Constitutional: Negative for chills and fever  HENT: Negative for sore throat  Respiratory: Negative for shortness of breath  Cardiovascular: Positive for leg swelling  Negative for chest pain  Gastrointestinal: Negative for diarrhea, nausea and vomiting  Skin: Positive for wound  Neurological: Positive for numbness  Negative for weakness  Psychiatric/Behavioral: Negative for behavioral problems and confusion  Objective:      /65   Pulse 67   Ht 5' 10" (1 778 m)   Wt 77 3 kg (170 lb 6 4 oz)   BMI 24 45 kg/m²          Physical Exam   Constitutional: He is oriented to person, place, and time  He appears well-developed and well-nourished  No distress     HENT:   Head: Normocephalic and atraumatic  Neck: Normal range of motion  Neck supple  Cardiovascular: Normal rate and regular rhythm  Pulses are no weak pulses  Pulses:       Dorsalis pedis pulses are 1+ on the right side, and 1+ on the left side  Posterior tibial pulses are 1+ on the right side, and 0 on the left side  He has class findings with lack of digital hairs, skin atrophy/ dryness, and dystrophic nails  Pulmonary/Chest: Effort normal and breath sounds normal  No respiratory distress  Musculoskeletal: He exhibits no tenderness  No acute joint pain, edema, or inflammation  Hammertoe deformity noted  Feet:   Right Foot:   Skin Integrity: Positive for dry skin  Negative for ulcer, skin breakdown, erythema, warmth or callus  Left Foot:   Skin Integrity: Positive for dry skin  Negative for ulcer, skin breakdown, erythema, warmth or callus  Neurological: He is alert and oriented to person, place, and time  No cranial nerve deficit  Skin: No ecchymosis, no laceration and no petechiae noted  Wound presents left shin with venous stasis and edema  It is 0 8 X 0 6 X 0 1 cm  Wound bed is granular  No deep probing  No cellulitis  Mycotic nails X 7 with thickening, discoloration, and onycholysis  Psychiatric: His behavior is normal    Vitals reviewed  Diabetic Foot Exam    Right Foot/Ankle   Right Foot Inspection  Skin Exam: skin normal, skin intact and dry skin no warmth, no callus, no erythema, no maceration, no abnormal color, no pre-ulcer, no ulcer and no callus                          Toe Exam: right toe deformityno swelling, no tenderness and erythema  Sensory   Vibration: absent  Proprioception: intact   Monofilament testing: diminished  Vascular  Capillary refills: < 3 seconds  The right DP pulse is 1+  The right PT pulse is 1+       Left Foot/Ankle  Left Foot Inspection  Skin Exam: skin normal, skin intact and dry skinno warmth, no erythema, no maceration, normal color, no pre-ulcer, no ulcer and no callus                         Toe Exam: left toe deformityno swelling, no tenderness and no erythema                   Sensory   Vibration: absent  Proprioception: intact  Monofilament: diminished  Vascular  Capillary refills: < 3 seconds  The left DP pulse is 1+  The left PT pulse is 0  Assign Risk Category:  Deformity present; Loss of protective sensation;  No weak pulses       Risk: 2

## 2020-08-15 DIAGNOSIS — I50.42 CHRONIC COMBINED SYSTOLIC AND DIASTOLIC CHF (CONGESTIVE HEART FAILURE) (HCC): ICD-10-CM

## 2020-08-15 DIAGNOSIS — I25.5 ISCHEMIC CARDIOMYOPATHY: ICD-10-CM

## 2020-08-15 RX ORDER — POTASSIUM CHLORIDE 20 MEQ/1
TABLET, EXTENDED RELEASE ORAL
Qty: 180 TABLET | Refills: 1 | Status: SHIPPED | OUTPATIENT
Start: 2020-08-15 | End: 2021-03-12

## 2020-09-27 ENCOUNTER — HOSPITAL ENCOUNTER (EMERGENCY)
Facility: HOSPITAL | Age: 60
Discharge: HOME | End: 2020-09-27
Attending: EMERGENCY MEDICINE
Payer: COMMERCIAL

## 2020-09-27 VITALS
WEIGHT: 180.12 LBS | DIASTOLIC BLOOD PRESSURE: 70 MMHG | HEIGHT: 70 IN | BODY MASS INDEX: 25.79 KG/M2 | TEMPERATURE: 98 F | OXYGEN SATURATION: 100 % | HEART RATE: 75 BPM | SYSTOLIC BLOOD PRESSURE: 115 MMHG | RESPIRATION RATE: 20 BRPM

## 2020-09-27 DIAGNOSIS — T63.441A LOCAL REACTION TO BEE STING: Primary | ICD-10-CM

## 2020-09-27 PROCEDURE — 99284 EMERGENCY DEPT VISIT MOD MDM: CPT | Performed by: EMERGENCY MEDICINE

## 2020-09-27 PROCEDURE — 99282 EMERGENCY DEPT VISIT SF MDM: CPT

## 2020-09-27 RX ORDER — EPINEPHRINE 0.3 MG/.3ML
0.3 INJECTION SUBCUTANEOUS ONCE
Qty: 0.6 ML | Refills: 0 | Status: SHIPPED | OUTPATIENT
Start: 2020-09-27 | End: 2021-12-26 | Stop reason: HOSPADM

## 2020-09-29 ENCOUNTER — OFFICE VISIT (OUTPATIENT)
Dept: PODIATRY | Facility: CLINIC | Age: 60
End: 2020-09-29
Payer: COMMERCIAL

## 2020-09-29 VITALS — HEIGHT: 70 IN | TEMPERATURE: 98.2 F | BODY MASS INDEX: 25.84 KG/M2

## 2020-09-29 DIAGNOSIS — E11.40 TYPE 2 DIABETES MELLITUS WITH DIABETIC NEUROPATHY, WITH LONG-TERM CURRENT USE OF INSULIN (HCC): Primary | ICD-10-CM

## 2020-09-29 DIAGNOSIS — Z79.4 TYPE 2 DIABETES MELLITUS WITH DIABETIC NEUROPATHY, WITH LONG-TERM CURRENT USE OF INSULIN (HCC): Primary | ICD-10-CM

## 2020-09-29 DIAGNOSIS — I87.2 VENOUS INSUFFICIENCY OF LOWER EXTREMITY, UNSPECIFIED LATERALITY: ICD-10-CM

## 2020-09-29 DIAGNOSIS — M20.40 ACQUIRED HAMMERTOE: ICD-10-CM

## 2020-09-29 PROCEDURE — 99213 OFFICE O/P EST LOW 20 MIN: CPT | Performed by: PODIATRIST

## 2020-09-29 NOTE — PROGRESS NOTES
PATIENT:  Karine Khan    1960    ASSESSMENT:     1  Type 2 diabetes mellitus with diabetic neuropathy, with long-term current use of insulin ([facility])  Diabetic Shoe    Diabetic Shoe Inserts   2  Venous insufficiency of lower extremity, unspecified laterality     3  Acquired hammertoe  Diabetic Shoe    Diabetic Shoe Inserts         PLAN:  1  Patient was counseled on the condition and diagnosis  2  Educated disease prevention and risks related to diabetes  Educated proper daily foot care and exam   Instructed proper skin care / protection and footwear  Instructed to identify any signs of infection and related foot problem  3  Discussed proper blood glucose control with diet and exercise  4  Instructed compression and elevation on left leg  5  Referred him for diabetic shoes and inserts  The patient will return in 2 months  Subjective:         HPI:  The patients presents for diabetic foot exam   He has high risk diabetic feet with history of ulcers and infection  No foot ulcer  No cellulitis  His BS has been better  He did not obtain DM shoes yet  The following portions of the patient's history were reviewed and updated as appropriate: allergies, current medications, past family history, past medical history, past social history, past surgical history and problem list   All pertinent labs and images were reviewed  Past Medical History  Past Medical History:   Diagnosis Date    CAD (coronary artery disease)     Chronic combined systolic and diastolic CHF (congestive heart failure) ([facility])     Diabetes mellitus ([facility])     type 2    Dyslipidemia     Hypertension     Ischemic cardiomyopathy     MI [facility]my[facility] infarction) [facility]     [facility]el[facility] )     Pancreatitis        Past Surgical History  Past Surgical History:   Procedure Laterality Date    ANGIOPLASTY      ballon    CORONARY ANGIOPLASTY WITH STENT PLACEMENT      LULA to proximal and mid LAD, Proximal RCA       HERNIA REPAIR      INCISION AND DRAINAGE OF WOUND Left 2/14/2020    Procedure: INCISION AND DRAINAGE (I&D) EXTREMITY left foot (cpt 32874); Surgeon: Betito Cruz DPM;  [facility];  Service: Podiatry    VT EXPLORE [address] [facility] Left 2/14/2020    Procedure: EXPLORATION EXTREMITY;  Surgeon: Betito Cruz DPM;  [facility];  Service: Podiatry   [facility], < 50 CM Left 2/14/2020    Procedure: APPLICATION VAC DRESSING;  Surgeon: Betito Cruz DPM;  [facility];  Service: Podiatry        Allergies:  Patient has no known allergies  Medications:  Current Outpatient Medications   Medication Sig Dispense Refill    atorvastatin (LIPITOR) 40 mg tablet Take 1 tablet (40 mg total) by mouth daily 90 tablet 3    furosemide (LASIX) 40 mg tablet Take 1 tablet (40 mg total) by mouth daily 90 tablet 3    LANTUS SOLOSTAR 100 units/mL injection pen Inject 30 Units under the skin every morning 5 pen 0    potassium chloride (Klor-Con M20) 20 mEq tablet TAKE 1 TABLET BY MOUTH TWICE A  tablet 1    TRUE METRIX BLOOD GLUCOSE TEST test strip TEST FOUR TIMES DAILY  11    aspirin 81 mg chewable tablet Chew 1 tablet daily for 30 days 30 tablet 0    EPINEPHrine (EPIPEN) 0 3 mg/0 3 mL SOAJ Inject 0 3 mL (0 3 mg total) into a muscle once for 1 dose Only use for trouble breathing, or swelling of the lips, tongue, or throat  0 6 mL 0    metoprolol tartrate (LOPRESSOR) 25 mg tablet Take 1 tablet (25 mg total) by mouth every 12 (twelve) hours 180 tablet 3     No current facility-administered medications for this visit          Social History:  Social History     Socioeconomic History    Marital status: /Civil Union     Spouse name: None    Number of children: None    Years of education: None    Highest education level: None   Occupational History    None   Social Needs    Financial resource strain: None    Food insecurity     Worry: None     Inability: None    Transportation needs Medical: None     Non-medical: None   Tobacco Use    Smoking status: Never Smoker    Smokeless tobacco: Never Used   Substance and Sexual Activity    Alcohol use: Not Currently    Drug use: No    Sexual activity: None   Lifestyle    Physical activity     Days per week: None     Minutes per session: None    Stress: None   Relationships    Social connections     Talks on phone: None     Gets together: None     Attends Sabianism service: None     Active member of club or organization: None     Attends meetings of clubs or organizations: None     Relationship status: None    Intimate partner violence     Fear of current or ex partner: None     Emotionally abused: None     Physically abused: None     Forced sexual activity: None   Other Topics Concern    None   Social History Narrative    None        Review of Systems   Constitutional: Negative for chills and fever  HENT: Negative for sore throat  Respiratory: Negative for shortness of breath  Cardiovascular: Positive for leg swelling  Negative for chest pain  Gastrointestinal: Negative for diarrhea, nausea and vomiting  Neurological: Positive for numbness  Negative for weakness  Objective:      Temp 98 2 °F (36 8 °C)   Ht 5' 10" (1 778 m)   BMI 25 84 kg/m²          Physical Exam  Vitals signs reviewed  Constitutional:       General: He is not in acute distress  Appearance: Normal appearance  He is well-developed  He is not ill-appearing  Cardiovascular:      Rate and Rhythm: Normal rate and regular rhythm  Pulses: no weak pulses          Dorsalis pedis pulses are 1+ on the right side and 1+ on the left side  Posterior tibial pulses are 1+ on the right side and 0 on the left side  Comments: He has class findings with lack of digital hairs, skin atrophy/ dryness, and dystrophic nails  Pulmonary:      Effort: Pulmonary effort is normal  No respiratory distress  Musculoskeletal:         General: Deformity present  No tenderness or signs of injury  Left lower leg: Edema present  Comments: No acute joint pain, edema, or inflammation  Hammertoe deformity noted  Feet:      Right foot:      Skin integrity: Dry skin present  No ulcer, skin breakdown, erythema, warmth or callus  Left foot:      Skin integrity: Dry skin present  No ulcer, skin breakdown, erythema, warmth or callus  Skin:     General: Skin is dry  Coloration: Skin is not cyanotic or mottled  Findings: No ecchymosis, laceration or petechiae  Rash is not purpuric  Nails: There is no clubbing  Comments: No cellulitis  Venous stasis noted LLE  Mycotic nails X 7 with thickening, discoloration, and onycholysis  Neurological:      General: No focal deficit present  Mental Status: He is alert and oriented to person, place, and time  Cranial Nerves: No cranial nerve deficit  Sensory: Sensory deficit present  Motor: No weakness  Psychiatric:         Mood and Affect: Mood normal          Behavior: Behavior normal          Thought Content:  Thought content normal          Judgment: Judgment normal

## 2020-11-25 ENCOUNTER — OFFICE VISIT (OUTPATIENT)
Dept: PODIATRY | Facility: CLINIC | Age: 60
End: 2020-11-25
Payer: COMMERCIAL

## 2020-11-25 VITALS — WEIGHT: 175 LBS | HEIGHT: 70 IN | TEMPERATURE: 97.6 F | BODY MASS INDEX: 25.05 KG/M2

## 2020-11-25 DIAGNOSIS — I87.2 VENOUS INSUFFICIENCY OF LOWER EXTREMITY, UNSPECIFIED LATERALITY: ICD-10-CM

## 2020-11-25 DIAGNOSIS — Z79.4 TYPE 2 DIABETES MELLITUS WITH DIABETIC NEUROPATHY, WITH LONG-TERM CURRENT USE OF INSULIN (HCC): ICD-10-CM

## 2020-11-25 DIAGNOSIS — L97.529 ULCER OF LEFT FOOT, UNSPECIFIED ULCER STAGE (HCC): Primary | ICD-10-CM

## 2020-11-25 DIAGNOSIS — E11.40 TYPE 2 DIABETES MELLITUS WITH DIABETIC NEUROPATHY, WITH LONG-TERM CURRENT USE OF INSULIN (HCC): ICD-10-CM

## 2020-11-25 DIAGNOSIS — B35.1 ONYCHOMYCOSIS: ICD-10-CM

## 2020-11-25 PROCEDURE — 99213 OFFICE O/P EST LOW 20 MIN: CPT | Performed by: PODIATRIST

## 2020-11-25 PROCEDURE — 11721 DEBRIDE NAIL 6 OR MORE: CPT | Performed by: PODIATRIST

## 2020-11-25 RX ORDER — PEN NEEDLE, DIABETIC 31 GX5/16"
NEEDLE, DISPOSABLE MISCELLANEOUS
COMMUNITY
Start: 2020-09-08

## 2020-12-01 DIAGNOSIS — E78.5 DYSLIPIDEMIA: ICD-10-CM

## 2020-12-02 RX ORDER — ATORVASTATIN CALCIUM 40 MG/1
TABLET, FILM COATED ORAL
Qty: 90 TABLET | Refills: 3 | Status: SHIPPED | OUTPATIENT
Start: 2020-12-02

## 2020-12-15 ENCOUNTER — OFFICE VISIT (OUTPATIENT)
Dept: PODIATRY | Facility: CLINIC | Age: 60
End: 2020-12-15
Payer: COMMERCIAL

## 2020-12-15 VITALS — HEIGHT: 70 IN | BODY MASS INDEX: 25.05 KG/M2 | WEIGHT: 175 LBS

## 2020-12-15 DIAGNOSIS — I87.312 CHRONIC VENOUS HYPERTENSION (IDIOPATHIC) WITH ULCER OF LEFT LOWER EXTREMITY (CODE) (HCC): ICD-10-CM

## 2020-12-15 DIAGNOSIS — L97.529 ULCER OF LEFT FOOT, UNSPECIFIED ULCER STAGE (HCC): Primary | ICD-10-CM

## 2020-12-15 DIAGNOSIS — Z79.4 TYPE 2 DIABETES MELLITUS WITH DIABETIC NEUROPATHY, WITH LONG-TERM CURRENT USE OF INSULIN (HCC): ICD-10-CM

## 2020-12-15 DIAGNOSIS — E11.40 TYPE 2 DIABETES MELLITUS WITH DIABETIC NEUROPATHY, WITH LONG-TERM CURRENT USE OF INSULIN (HCC): ICD-10-CM

## 2020-12-15 PROCEDURE — 99213 OFFICE O/P EST LOW 20 MIN: CPT | Performed by: PODIATRIST

## 2021-01-19 DIAGNOSIS — I50.42 CHRONIC COMBINED SYSTOLIC AND DIASTOLIC CHF (CONGESTIVE HEART FAILURE) (HCC): ICD-10-CM

## 2021-02-23 ENCOUNTER — OFFICE VISIT (OUTPATIENT)
Dept: PODIATRY | Facility: CLINIC | Age: 61
End: 2021-02-23
Payer: COMMERCIAL

## 2021-02-23 VITALS
DIASTOLIC BLOOD PRESSURE: 88 MMHG | HEIGHT: 70 IN | SYSTOLIC BLOOD PRESSURE: 139 MMHG | WEIGHT: 189.8 LBS | BODY MASS INDEX: 27.17 KG/M2

## 2021-02-23 DIAGNOSIS — Z79.4 TYPE 2 DIABETES MELLITUS WITH DIABETIC NEUROPATHY, WITH LONG-TERM CURRENT USE OF INSULIN (HCC): Primary | ICD-10-CM

## 2021-02-23 DIAGNOSIS — M20.42 HAMMER TOES OF BOTH FEET: ICD-10-CM

## 2021-02-23 DIAGNOSIS — I87.312 CHRONIC VENOUS HYPERTENSION (IDIOPATHIC) WITH ULCER OF LEFT LOWER EXTREMITY (CODE) (HCC): ICD-10-CM

## 2021-02-23 DIAGNOSIS — M20.41 HAMMER TOES OF BOTH FEET: ICD-10-CM

## 2021-02-23 DIAGNOSIS — E11.40 TYPE 2 DIABETES MELLITUS WITH DIABETIC NEUROPATHY, WITH LONG-TERM CURRENT USE OF INSULIN (HCC): Primary | ICD-10-CM

## 2021-02-23 PROCEDURE — 99214 OFFICE O/P EST MOD 30 MIN: CPT | Performed by: PODIATRIST
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
## 2021-03-11 DIAGNOSIS — I25.5 ISCHEMIC CARDIOMYOPATHY: ICD-10-CM

## 2021-03-11 DIAGNOSIS — I50.42 CHRONIC COMBINED SYSTOLIC AND DIASTOLIC CHF (CONGESTIVE HEART FAILURE) (HCC): ICD-10-CM

## 2021-03-12 RX ORDER — POTASSIUM CHLORIDE 20 MEQ/1
TABLET, EXTENDED RELEASE ORAL
Qty: 180 TABLET | Refills: 1 | Status: SHIPPED | OUTPATIENT
Start: 2021-03-12 | End: 2021-12-26 | Stop reason: HOSPADM

## 2021-03-18 ENCOUNTER — OFFICE VISIT (OUTPATIENT)
Dept: CARDIOLOGY CLINIC | Facility: CLINIC | Age: 61
End: 2021-03-18
Payer: COMMERCIAL

## 2021-03-18 VITALS
HEIGHT: 70 IN | DIASTOLIC BLOOD PRESSURE: 76 MMHG | BODY MASS INDEX: 26.03 KG/M2 | HEART RATE: 78 BPM | WEIGHT: 181.8 LBS | SYSTOLIC BLOOD PRESSURE: 122 MMHG

## 2021-03-18 DIAGNOSIS — I50.42 CHRONIC COMBINED SYSTOLIC AND DIASTOLIC CHF (CONGESTIVE HEART FAILURE) (HCC): ICD-10-CM

## 2021-03-18 DIAGNOSIS — E11.65 TYPE 2 DIABETES MELLITUS WITH HYPERGLYCEMIA, WITH LONG-TERM CURRENT USE OF INSULIN (HCC): ICD-10-CM

## 2021-03-18 DIAGNOSIS — Z95.5 PRESENCE OF DRUG COATED STENT IN LAD CORONARY ARTERY: ICD-10-CM

## 2021-03-18 DIAGNOSIS — E78.5 DYSLIPIDEMIA: ICD-10-CM

## 2021-03-18 DIAGNOSIS — Z79.4 TYPE 2 DIABETES MELLITUS WITH HYPERGLYCEMIA, WITH LONG-TERM CURRENT USE OF INSULIN (HCC): ICD-10-CM

## 2021-03-18 DIAGNOSIS — I25.10 CORONARY ARTERY DISEASE INVOLVING NATIVE CORONARY ARTERY OF NATIVE HEART WITHOUT ANGINA PECTORIS: Primary | ICD-10-CM

## 2021-03-18 DIAGNOSIS — I25.5 ISCHEMIC CARDIOMYOPATHY: ICD-10-CM

## 2021-03-18 DIAGNOSIS — Z95.5 PRESENCE OF DRUG COATED STENT IN RIGHT CORONARY ARTERY: ICD-10-CM

## 2021-03-18 DIAGNOSIS — I10 BENIGN ESSENTIAL HYPERTENSION: ICD-10-CM

## 2021-03-18 PROCEDURE — 99214 OFFICE O/P EST MOD 30 MIN: CPT | Performed by: INTERNAL MEDICINE

## 2021-03-18 PROCEDURE — 93000 ELECTROCARDIOGRAM COMPLETE: CPT | Performed by: INTERNAL MEDICINE

## 2021-03-18 PROCEDURE — 1036F TOBACCO NON-USER: CPT | Performed by: INTERNAL MEDICINE

## 2021-03-18 PROCEDURE — 3008F BODY MASS INDEX DOCD: CPT | Performed by: INTERNAL MEDICINE

## 2021-03-18 RX ORDER — FUROSEMIDE 80 MG
80 TABLET ORAL DAILY
Qty: 90 TABLET | Refills: 3 | Status: SHIPPED | OUTPATIENT
Start: 2021-03-18 | End: 2021-12-26 | Stop reason: HOSPADM

## 2021-03-18 NOTE — PROGRESS NOTES
Cardiology Follow Up    Karen Khan  1960  [id number]  [facility]  [address] [address]  [facility] [address]  Phone#  [phone]  Fax#  [phone]    1  Coronary artery disease involving native coronary artery of native heart without angina pectoris  POCT ECG   2  Presence of drug coated stent in right coronary artery  POCT ECG   3  Presence of drug coated stent in LAD coronary artery     4  Ischemic cardiomyopathy  POCT ECG   5  Chronic combined systolic and diastolic CHF (congestive heart failure) ([facility])  furosemide (LASIX) 80 mg tablet   6  Benign essential hypertension  POCT ECG   7  Dyslipidemia  Lipid Panel With Direct LDL    Comprehensive metabolic panel   8  Type 2 diabetes mellitus with hyperglycemia, with long-term current use of insulin ([facility])  HEMOGLOBIN A1C W/ EAG ESTIMATION        Discussion/Summary:  Mr Khan is a pleasant 33-year-old gentleman who presents to the office today for routine follow-up  Since his last visit he has been utilizing additional Lasix four to five times a week  I have instructed him to take 80 mg on a daily basis  He will have a repeat BMP performed in a week to reassess his renal function and electrolytes  He will increase his potassium supplementation to twice a day  A low-salt diet was reinforced      His blood pressure is well controlled in the office today  No changes were made to his medication regimen      I have no recent assessment of his lipids since 2019  A prescription was provided  An echocardiogram in 2019 revealed his ejection fraction remains stable at 45%  Given his ongoing shortness of breath with exertion we discussed a repeat ischemic evaluation    We will revisit this at his next visit      I will see him back in the office in three months or sooner if deemed necessary      Interval History:  Mr Khan is a pleasant 33-year-old gentleman who presents to the office today for routine follow-up      Since his last visit he has been feeling relatively well  However in the recent past he has been taking additional Lasix 40 mg four to five times a week  This is for acute weight gain  He reports a dry weight of about 172 lb  He has been having orthopnea in the recent past   He denies any increasing abdominal girth  He reports noncompliance with a low-salt diet at home  He has chronic shortness of breath with exertion  He denies any exertional chest pain  He denies palpitations  He denies lightheadedness, syncope or presyncope    He denies symptoms of claudication      Problem List     Orthopnea    Type 2 diabetes mellitus with hyperglycemia, with long-term current use of insulin ([facility] )    Overview Signed 5/18/2017  6:20 AM by Jose Templeton PA-C     type 1           Lab Results   Component Value Date    HGBA1C 11 9 (H) 02/10/2020         Coronary artery disease    CKD (chronic kidney disease) stage 3, GFR 30-59 ml/min ([facility])    Lab Results   Component Value Date    EGFR 50 02/15/2020    EGFR 44 02/14/2020    EGFR 41 02/13/2020    CREATININE 1 48 (H) 02/15/2020    CREATININE 1 63 (H) 02/14/2020    CREATININE 1 73 (H) 02/13/2020         Chronic combined systolic and diastolic CHF (congestive heart failure) ([facility] )    Wt Readings from Last 3 Encounters:   03/18/21 82 5 kg (181 lb 12 8 oz)   02/23/21 86 1 kg (189 lb 12 8 oz)   12/15/20 79 4 kg (175 lb)                 Dyslipidemia    Ischemic cardiomyopathy    Presence of drug coated stent in LAD coronary artery    Presence of drug coated stent in right coronary artery    Abscess of left foot    Dislocation of proximal interphalangeal joint of left little finger    Sepsis ([facility] )    Abscess of tendon of left foot    Overview Signed 2/13/2020 12:47 PM by Kurt Muñoz DPM     Added automatically from request for surgery [id number]         Benign essential hypertension        Past Medical History:   Diagnosis Date    CAD (coronary artery disease)     Chronic combined systolic and diastolic CHF (congestive heart failure) ([place] [address] )     Diabetes mellitus ([place] [address] )     type 2    Dyslipidemia     Hypertension     Ischemic cardiomyopathy     MI (myocardial infarction) ([place] [address] )     Osteomyelitis ([place] [address] )     Pancreatitis      Social History     Socioeconomic History    Marital status: /Civil Union     Spouse name: Not on file    Number of children: Not on file    Years of education: Not on file    Highest education level: Not on file   Occupational History    Not on file   Social Needs    Financial resource strain: Not on file    Food insecurity     Worry: Not on file     Inability: Not on file   [employer] needs     Medical: Not on file     Non-medical: Not on file   Tobacco Use    Smoking status: Never Smoker    Smokeless tobacco: Never Used   Substance and Sexual Activity    Alcohol use: Not Currently    Drug use: No    Sexual activity: Not on file   Lifestyle    Physical activity     Days per week: Not on file     Minutes per session: Not on file    Stress: Not on file   Relationships    Social connections     Talks on phone: Not on file     Gets together: Not on file     Attends Hindu service: Not on file     Active member of club or organization: Not on file     Attends meetings of clubs or organizations: Not on file     Relationship status: Not on file    Intimate partner violence     Fear of current or ex partner: Not on file     Emotionally abused: Not on file     Physically abused: Not on file     Forced sexual activity: Not on file   Other Topics Concern    Not on file   Social History Narrative    Not on file      Family History   Problem Relation Age of Onset    Heart attack Father     Hyperlipidemia Father     Cancer Father     Diabetes Mother     Heart failure Mother         poss    Cancer Paternal Grandfather     Stroke Neg Hx     Anuerysm Neg Hx     Clotting disorder Neg Hx     Arrhythmia Neg Hx     Coronary artery disease Neg Hx     Hypertension Neg Hx     Sudden death Neg Hx         scd     Past Surgical History:   Procedure Laterality Date    ANGIOPLASTY      ballon    CORONARY ANGIOPLASTY WITH STENT PLACEMENT      LULA to proximal and mid LAD, Proximal RCA   HERNIA REPAIR      INCISION AND DRAINAGE OF WOUND Left 2/14/2020    Procedure: INCISION AND DRAINAGE (I&D) EXTREMITY left foot (cpt 81623);   Surgeon: Mari Tinoco DPM;  [facility];  Service: Podiatry    HI EXPLORE [address] Left 2/14/2020    Procedure: EXPLORATION EXTREMITY;  Surgeon: Mari Tinoco DPM;  [facility];  Service: Podiatry    HI NEG PRESS WOUND TX, < 50 CM Left 2/14/2020    Procedure: APPLICATION VAC DRESSING;  Surgeon: Mari Tinoco DPM;  [facility];  Service: Podiatry       Current Outpatient Medications:     aspirin 81 mg chewable tablet, Chew 1 tablet daily for 30 days, Disp: 30 tablet, Rfl: 0    atorvastatin (LIPITOR) 40 mg tablet, TAKE 1 TABLET BY MOUTH EVERY DAY, Disp: 90 tablet, Rfl: 3    furosemide (LASIX) 80 mg tablet, Take 1 tablet (80 mg total) by mouth daily, Disp: 90 tablet, Rfl: 3    LANTUS SOLOSTAR 100 units/mL injection pen, Inject 30 Units under the skin every morning, Disp: 5 pen, Rfl: 0    potassium chloride (Klor-Con M20) 20 mEq tablet, TAKE 1 TABLET BY MOUTH TWICE A DAY (Patient taking differently: 20 mEq daily TAKE 1 TABLET BY MOUTH TWICE A DAY), Disp: 180 tablet, Rfl: 1    TRUE METRIX BLOOD GLUCOSE TEST test strip, TEST FOUR TIMES DAILY, Disp: , Rfl: 11    Unifine Pentips 31G X 8 MM MISC, USE 4 TMES A DAY, Disp: , Rfl:     EPINEPHrine (EPIPEN) 0 3 mg/0 3 mL SOAJ, Inject 0 3 mL (0 3 mg total) into a muscle once for 1 dose Only use for trouble breathing, or swelling of the lips, tongue, or throat , Disp: 0 6 mL, Rfl: 0    metoprolol tartrate (LOPRESSOR) 25 mg tablet, TAKE 1 TABLET (25 MG TOTAL) BY MOUTH EVERY 12 (TWELVE) HOURS, Disp: 180 tablet, Rfl: 3  No Known Allergies    Labs:     Chemistry        Component Value Date/Time     06/25/2015 1038    K 4 4 02/15/2020 0611    K 4 3 06/25/2015 1038     02/15/2020 0611     06/25/2015 1038    CO2 25 02/15/2020 0611    CO2 27 06/25/2015 1038    BUN 31 (H) 02/15/2020 0611    BUN 30 (H) 06/25/2015 1038    CREATININE 1 48 (H) 02/15/2020 0611    CREATININE 1 16 06/25/2015 1038        Component Value Date/Time    CALCIUM 8 2 (L) 02/15/2020 0611    CALCIUM 8 7 06/25/2015 1038    ALKPHOS 80 02/10/2020 0200    ALKPHOS 61 06/25/2015 1038    AST 15 02/10/2020 0200    AST 15 06/25/2015 1038    ALT 13 02/10/2020 0200    ALT 22 06/25/2015 1038    BILITOT 1 3 (H) 06/25/2015 1038            Lab Results   Component Value Date    CHOL 98 06/25/2015    CHOL 102 01/29/2015    CHOL 150 12/18/2014     Lab Results   Component Value Date    HDL 55 03/20/2019    HDL 51 03/10/2018    HDL 39 (L) 05/18/2017     Lab Results   Component Value Date    LDLCALC 60 03/20/2019    LDLCALC 55 03/10/2018    LDLCALC 105 (H) 05/18/2017     Lab Results   Component Value Date    TRIG 31 03/20/2019    TRIG 63 03/10/2018    TRIG 84 05/18/2017     No results found for: CHOLHDL    Imaging: No results found  ECG:    Normal sinus rhythm, left anterior fascicular block, left ventricular hypertrophy, anteroseptal infarct      Review of Systems   Constitution: Positive for weight gain  Cardiovascular: Positive for dyspnea on exertion, leg swelling and orthopnea  Negative for chest pain  All other systems reviewed and are negative  Vitals:    03/18/21 0948   BP: 122/76   Pulse: 78     Vitals:    03/18/21 0948   Weight: 82 5 kg (181 lb 12 8 oz)     Height: 5' 10" (177 8 cm)   Body mass index is 26 09 kg/m²      Physical Exam:   General appearance:  Appears stated age, alert, well appearing and in no distress  HEENT:  PERRLA, EOMI, no scleral icterus, no conjunctival pallor  NECK:  Supple, No elevated JVP, no thyromegaly, no carotid bruits  HEART: Regular rate and rhythm, normal S1/S2, no S3/S4, no murmur or rub  LUNGS:  Clear to auscultation bilaterally  ABDOMEN:  Soft, non-tender, positive bowel sounds, no rebound or guarding, no organomegaly   EXTREMITIES:  bilateral lower extremity edema   SKIN: No lesions or rashes on exposed skin  NEURO:  CN II-XII intact, no focal deficits

## 2021-03-30 DIAGNOSIS — Z23 ENCOUNTER FOR IMMUNIZATION: ICD-10-CM

## 2021-04-06 ENCOUNTER — IMMUNIZATIONS (OUTPATIENT)
Dept: FAMILY MEDICINE CLINIC | Facility: HOSPITAL | Age: 61
End: 2021-04-06

## 2021-04-06 DIAGNOSIS — Z23 ENCOUNTER FOR IMMUNIZATION: Primary | ICD-10-CM

## 2021-04-06 PROCEDURE — 91300 SARS-COV-2 / COVID-19 MRNA VACCINE (PFIZER-BIONTECH) 30 MCG: CPT

## 2021-04-06 PROCEDURE — 0001A SARS-COV-2 / COVID-19 MRNA VACCINE (PFIZER-BIONTECH) 30 MCG: CPT

## 2021-04-17 DIAGNOSIS — I50.42 CHRONIC COMBINED SYSTOLIC AND DIASTOLIC CHF (CONGESTIVE HEART FAILURE) (HCC): ICD-10-CM

## 2021-04-17 RX ORDER — FUROSEMIDE 40 MG/1
TABLET ORAL
Qty: 90 TABLET | Refills: 3 | Status: SHIPPED | OUTPATIENT
Start: 2021-04-17 | End: 2021-12-07 | Stop reason: ALTCHOICE

## 2021-04-28 ENCOUNTER — IMMUNIZATIONS (OUTPATIENT)
Dept: FAMILY MEDICINE CLINIC | Facility: HOSPITAL | Age: 61
End: 2021-04-28

## 2021-04-28 DIAGNOSIS — Z23 ENCOUNTER FOR IMMUNIZATION: Primary | ICD-10-CM

## 2021-04-28 PROCEDURE — 0002A SARS-COV-2 / COVID-19 MRNA VACCINE (PFIZER-BIONTECH) 30 MCG: CPT

## 2021-04-28 PROCEDURE — 91300 SARS-COV-2 / COVID-19 MRNA VACCINE (PFIZER-BIONTECH) 30 MCG: CPT

## 2021-06-01 ENCOUNTER — APPOINTMENT (OUTPATIENT)
Dept: LAB | Facility: CLINIC | Age: 61
End: 2021-06-01
Payer: COMMERCIAL

## 2021-06-01 ENCOUNTER — TRANSCRIBE ORDERS (OUTPATIENT)
Dept: LAB | Facility: CLINIC | Age: 61
End: 2021-06-01

## 2021-06-01 DIAGNOSIS — M86.9 INFLAMMATION OF BONE (HCC): ICD-10-CM

## 2021-06-01 DIAGNOSIS — I25.10 CVD (CARDIOVASCULAR DISEASE): ICD-10-CM

## 2021-06-01 DIAGNOSIS — I10 HYPERTENSION, BENIGN: ICD-10-CM

## 2021-06-01 DIAGNOSIS — E78.2 MIXED HYPERLIPIDEMIA: ICD-10-CM

## 2021-06-01 DIAGNOSIS — E78.5 DYSLIPIDEMIA: ICD-10-CM

## 2021-06-01 DIAGNOSIS — I25.10 CVD (CARDIOVASCULAR DISEASE): Primary | ICD-10-CM

## 2021-06-01 LAB
ALBUMIN SERPL BCP-MCNC: 3.1 G/DL (ref 3.5–5)
ALP SERPL-CCNC: 84 U/L (ref 46–116)
ALT SERPL W P-5'-P-CCNC: 30 U/L (ref 12–78)
ANION GAP SERPL CALCULATED.3IONS-SCNC: 9 MMOL/L (ref 4–13)
AST SERPL W P-5'-P-CCNC: 21 U/L (ref 5–45)
BASOPHILS # BLD AUTO: 0.06 THOUSANDS/ΜL (ref 0–0.1)
BASOPHILS NFR BLD AUTO: 1 % (ref 0–1)
BILIRUB SERPL-MCNC: 1.01 MG/DL (ref 0.2–1)
BUN SERPL-MCNC: 34 MG/DL (ref 5–25)
CALCIUM ALBUM COR SERPL-MCNC: 9.4 MG/DL (ref 8.3–10.1)
CALCIUM SERPL-MCNC: 8.7 MG/DL (ref 8.3–10.1)
CHLORIDE SERPL-SCNC: 107 MMOL/L (ref 100–108)
CHOLEST SERPL-MCNC: 115 MG/DL (ref 50–200)
CO2 SERPL-SCNC: 30 MMOL/L (ref 21–32)
CREAT SERPL-MCNC: 1.74 MG/DL (ref 0.6–1.3)
CREAT UR-MCNC: 82.5 MG/DL
EOSINOPHIL # BLD AUTO: 0.39 THOUSAND/ΜL (ref 0–0.61)
EOSINOPHIL NFR BLD AUTO: 6 % (ref 0–6)
ERYTHROCYTE [DISTWIDTH] IN BLOOD BY AUTOMATED COUNT: 13.3 % (ref 11.6–15.1)
EST. AVERAGE GLUCOSE BLD GHB EST-MCNC: 280 MG/DL
GFR SERPL CREATININE-BSD FRML MDRD: 41 ML/MIN/1.73SQ M
GLUCOSE P FAST SERPL-MCNC: 70 MG/DL (ref 65–99)
HBA1C MFR BLD: 11.4 %
HCT VFR BLD AUTO: 39.1 % (ref 36.5–49.3)
HDLC SERPL-MCNC: 56 MG/DL
HGB BLD-MCNC: 12.7 G/DL (ref 12–17)
IMM GRANULOCYTES # BLD AUTO: 0.02 THOUSAND/UL (ref 0–0.2)
IMM GRANULOCYTES NFR BLD AUTO: 0 % (ref 0–2)
LDLC SERPL CALC-MCNC: 50 MG/DL (ref 0–100)
LDLC SERPL DIRECT ASSAY-MCNC: 54 MG/DL (ref 0–100)
LYMPHOCYTES # BLD AUTO: 1.6 THOUSANDS/ΜL (ref 0.6–4.47)
LYMPHOCYTES NFR BLD AUTO: 25 % (ref 14–44)
MCH RBC QN AUTO: 27.6 PG (ref 26.8–34.3)
MCHC RBC AUTO-ENTMCNC: 32.5 G/DL (ref 31.4–37.4)
MCV RBC AUTO: 85 FL (ref 82–98)
MICROALBUMIN UR-MCNC: 2290 MG/L (ref 0–20)
MICROALBUMIN/CREAT 24H UR: 2776 MG/G CREATININE (ref 0–30)
MONOCYTES # BLD AUTO: 0.59 THOUSAND/ΜL (ref 0.17–1.22)
MONOCYTES NFR BLD AUTO: 9 % (ref 4–12)
NEUTROPHILS # BLD AUTO: 3.76 THOUSANDS/ΜL (ref 1.85–7.62)
NEUTS SEG NFR BLD AUTO: 59 % (ref 43–75)
NRBC BLD AUTO-RTO: 0 /100 WBCS
PLATELET # BLD AUTO: 229 THOUSANDS/UL (ref 149–390)
PMV BLD AUTO: 9.4 FL (ref 8.9–12.7)
POTASSIUM SERPL-SCNC: 3.9 MMOL/L (ref 3.5–5.3)
PROT SERPL-MCNC: 7 G/DL (ref 6.4–8.2)
RBC # BLD AUTO: 4.6 MILLION/UL (ref 3.88–5.62)
SODIUM SERPL-SCNC: 146 MMOL/L (ref 136–145)
TRIGL SERPL-MCNC: 43 MG/DL
WBC # BLD AUTO: 6.42 THOUSAND/UL (ref 4.31–10.16)

## 2021-06-01 PROCEDURE — 82570 ASSAY OF URINE CREATININE: CPT | Performed by: INTERNAL MEDICINE

## 2021-06-01 PROCEDURE — 3062F POS MACROALBUMINURIA REV: CPT | Performed by: INTERNAL MEDICINE

## 2021-06-01 PROCEDURE — 80053 COMPREHEN METABOLIC PANEL: CPT | Performed by: INTERNAL MEDICINE

## 2021-06-01 PROCEDURE — 80061 LIPID PANEL: CPT

## 2021-06-01 PROCEDURE — 85025 COMPLETE CBC W/AUTO DIFF WBC: CPT

## 2021-06-01 PROCEDURE — 82043 UR ALBUMIN QUANTITATIVE: CPT | Performed by: INTERNAL MEDICINE

## 2021-06-01 PROCEDURE — 36415 COLL VENOUS BLD VENIPUNCTURE: CPT | Performed by: INTERNAL MEDICINE

## 2021-06-01 PROCEDURE — 3046F HEMOGLOBIN A1C LEVEL >9.0%: CPT | Performed by: INTERNAL MEDICINE

## 2021-06-01 PROCEDURE — 83721 ASSAY OF BLOOD LIPOPROTEIN: CPT

## 2021-06-01 PROCEDURE — 83036 HEMOGLOBIN GLYCOSYLATED A1C: CPT | Performed by: INTERNAL MEDICINE

## 2021-06-01 NOTE — PROGRESS NOTES
Cardiology Follow Up    Cristina Khan  1960  [id number]  [facility] [address]  [place] [address]  Phone#  [phone]  Fax#  [phone]    1  Coronary artery disease involving native coronary artery of native heart without angina pectoris  NM myocardial perfusion spect (rx stress and/or rest)    Echo complete with contrast if indicated   2  Presence of drug coated stent in right coronary artery  NM myocardial perfusion spect (rx stress and/or rest)    Echo complete with contrast if indicated   3  Presence of drug coated stent in LAD coronary artery  NM myocardial perfusion spect (rx stress and/or rest)    Echo complete with contrast if indicated   4  Ischemic cardiomyopathy     5  Chronic combined systolic and diastolic CHF (congestive heart failure) ([address] )     6  Benign essential hypertension     7  Dyslipidemia     8  Dyspnea on exertion          Discussion/Summary:  Mr Khan is a pleasant 10-year-old gentleman who presents to the office today for routine follow-up  He does appear euvolemic on exam today  His weight is down to his dry weight  No changes were made to his diuretic regimen  His renal function is stable and within his baseline of 1 4 to 1 7      His blood pressure control is acceptable  However given his diabetes and LV dysfunction along with proteinuria we discussed the addition of an ACE-inhibitor with careful monitoring of his renal function which he declines      His most recent lipids reveal acceptable numbers on his current statin regimen to which no changes were made  An echocardiogram in 2019 revealed his ejection fraction remains stable at 45%  I will reassess this with an echocardiogram     Given his ongoing shortness of breath with exertion we discussed a repeat ischemic evaluation to which he is agreeable      His hemoglobin A1c remains suboptimal   I did recommend evaluation by an endocrinologist      I will see him back in the office in a few months for re-evaluation      Interval History:  Mr Khan is a pleasant 58-year-old gentleman who presents to the office today for routine follow-up  After his last visit he has Lasix dose was increased to 80 mg daily due to signs and symptoms of volume overload      He has been weighing about 172 to 174 lb on his home scale which is about his dry weight  He denies any further orthopnea  He notes chronic shortness of breath with exertion  He denies any exertional chest pain  He denies any increasing abdominal girth  He reports he has been abiding by a low-salt diet  He has chronic shortness of breath with exertion  He denies any exertional chest pain  He denies palpitations  He denies lightheadedness, syncope or presyncope    He denies symptoms of claudication      Problem List     Orthopnea    Type 2 diabetes mellitus with hyperglycemia, with long-term current use of insulin ([facility] )    Overview Signed 5/18/2017  6:20 AM by Tripp Thomas PA-C     type 1           Lab Results   Component Value Date    HGBA1C 11 4 (H) 06/01/2021         Coronary artery disease    CKD (chronic kidney disease) stage 3, GFR 30-59 ml/min ([facility])    Lab Results   Component Value Date    EGFR 41 06/01/2021    EGFR 50 02/15/2020    EGFR 44 02/14/2020    CREATININE 1 74 (H) 06/01/2021    CREATININE 1 48 (H) 02/15/2020    CREATININE 1 63 (H) 02/14/2020         Chronic combined systolic and diastolic CHF (congestive heart failure) ([facility])    Wt Readings from Last 3 Encounters:   06/02/21 81 5 kg (179 lb 11 2 oz)   03/18/21 82 5 kg (181 lb 12 8 oz)   02/23/21 86 1 kg (189 lb 12 8 oz)                 Dyslipidemia    Ischemic cardiomyopathy    Presence of drug coated stent in LAD coronary artery    Presence of drug coated stent in right coronary artery    Abscess of left foot    Dislocation of proximal interphalangeal joint of left little finger    Sepsis ([facility])    Abscess of tendon of left foot    Overview Signed 2/13/2020 12:47 PM by Brent Husain DPM     Added automatically from request for surgery [id number]         Benign essential hypertension        Past Medical History:   Diagnosis Date    CAD (coronary artery disease)     Chronic combined systolic and diastolic CHF (congestive heart failure) ([place] [address] )     Diabetes mellitus ([place] [address] )     type 2    Dyslipidemia     Hypertension     Ischemic cardiomyopathy     MI (myocardial infarction) ([place] [address] )     Osteomyelitis ([place] [address] )     Pancreatitis      Social History     Socioeconomic History    Marital status: /Civil Union     Spouse name: Not on file    Number of children: Not on file    Years of education: Not on file    Highest education level: Not on file   Occupational History    Not on file   Social Needs    Financial resource strain: Not on file    Food insecurity     Worry: Not on file     Inability: Not on file   [employer] needs     Medical: Not on file     Non-medical: Not on file   Tobacco Use    Smoking status: Never Smoker    Smokeless tobacco: Never Used   Substance and Sexual Activity    Alcohol use: Not Currently    Drug use: No    Sexual activity: Not on file   Lifestyle    Physical activity     Days per week: Not on file     Minutes per session: Not on file    Stress: Not on file   Relationships    Social connections     Talks on phone: Not on file     Gets together: Not on file     Attends Voodoo service: Not on file     Active member of club or organization: Not on file     Attends meetings of clubs or organizations: Not on file     Relationship status: Not on file    Intimate partner violence     Fear of current or ex partner: Not on file     Emotionally abused: Not on file     Physically abused: Not on file     Forced sexual activity: Not on file   Other Topics Concern    Not on file   Social History Narrative    Not on file      Family History   Problem Relation Age of Onset    Heart attack Father     Hyperlipidemia Father     Cancer Father     Diabetes Mother     Heart failure Mother         poss    Cancer Paternal Grandfather     Stroke Neg Hx     Anuerysm Neg Hx     Clotting disorder Neg Hx     Arrhythmia Neg Hx     Coronary artery disease Neg Hx     Hypertension Neg Hx     Sudden death Neg Hx         scd     Past Surgical History:   Procedure Laterality Date    ANGIOPLASTY      ballon    CORONARY ANGIOPLASTY WITH STENT PLACEMENT      LULA to proximal and mid LAD, Proximal RCA   HERNIA REPAIR      INCISION AND DRAINAGE OF WOUND Left 2/14/2020    Procedure: INCISION AND DRAINAGE (I&D) EXTREMITY left foot (cpt 10026);   Surgeon: Jeanne Johnson DPM;  [facility];  Service: Podiatry    IN EXPLORE [address] [facility] Left 2/14/2020    Procedure: EXPLORATION EXTREMITY;  Surgeon: Jeanne Johnson DPM;  [facility];  Service: Podiatry    IN NEG PRESS WOUND TX, < 50 CM Left 2/14/2020    Procedure: APPLICATION VAC DRESSING;  Surgeon: Jeanne Johnson DPM;  [facility];  Service: Podiatry       Current Outpatient Medications:     aspirin 81 mg chewable tablet, Chew 1 tablet daily for 30 days, Disp: 30 tablet, Rfl: 0    atorvastatin (LIPITOR) 40 mg tablet, TAKE 1 TABLET BY MOUTH EVERY DAY, Disp: 90 tablet, Rfl: 3    EPINEPHrine (EPIPEN) 0 3 mg/0 3 mL SOAJ, Inject 0 3 mL (0 3 mg total) into a muscle once for 1 dose Only use for trouble breathing, or swelling of the lips, tongue, or throat , Disp: 0 6 mL, Rfl: 0    furosemide (LASIX) 80 mg tablet, Take 1 tablet (80 mg total) by mouth daily, Disp: 90 tablet, Rfl: 3    LANTUS SOLOSTAR 100 units/mL injection pen, Inject 30 Units under the skin every morning, Disp: 5 pen, Rfl: 0    metoprolol tartrate (LOPRESSOR) 25 mg tablet, TAKE 1 TABLET (25 MG TOTAL) BY MOUTH EVERY 12 (TWELVE) HOURS, Disp: 180 tablet, Rfl: 3    potassium chloride (Klor-Con M20) 20 mEq tablet, TAKE 1 TABLET BY MOUTH TWICE A DAY (Patient taking differently: 20 mEq daily TAKE 1 TABLET BY MOUTH TWICE A DAY), Disp: 180 tablet, Rfl: 1    TRUE METRIX BLOOD GLUCOSE TEST test strip, TEST FOUR TIMES DAILY, Disp: , Rfl: 11    Unifine Pentips 31G X 8 MM MISC, USE 4 TMES A DAY, Disp: , Rfl:     furosemide (LASIX) 40 mg tablet, TAKE 1 TABLET BY MOUTH EVERY DAY (Patient not taking: Reported on 6/2/2021), Disp: 90 tablet, Rfl: 3  No Known Allergies    Labs:     Chemistry        Component Value Date/Time     06/25/2015 1038    K 3 9 06/01/2021 0826    K 4 3 06/25/2015 1038     06/01/2021 0826     06/25/2015 1038    CO2 30 06/01/2021 0826    CO2 27 06/25/2015 1038    BUN 34 (H) 06/01/2021 0826    BUN 30 (H) 06/25/2015 1038    CREATININE 1 74 (H) 06/01/2021 0826    CREATININE 1 16 06/25/2015 1038        Component Value Date/Time    CALCIUM 8 7 06/01/2021 0826    CALCIUM 8 7 06/25/2015 1038    ALKPHOS 84 06/01/2021 0826    ALKPHOS 61 06/25/2015 1038    AST 21 06/01/2021 0826    AST 15 06/25/2015 1038    ALT 30 06/01/2021 0826    ALT 22 06/25/2015 1038    BILITOT 1 3 (H) 06/25/2015 1038            Lab Results   Component Value Date    CHOL 98 06/25/2015    CHOL 102 01/29/2015    CHOL 150 12/18/2014     Lab Results   Component Value Date    HDL 56 06/01/2021    HDL 55 03/20/2019    HDL 51 03/10/2018     Lab Results   Component Value Date    LDLCALC 50 06/01/2021    LDLCALC 60 03/20/2019    LDLCALC 55 03/10/2018     Lab Results   Component Value Date    TRIG 43 06/01/2021    TRIG 31 03/20/2019    TRIG 63 03/10/2018     No results found for: CHOLHDL    Imaging: No results found  Review of Systems   Cardiovascular: Positive for dyspnea on exertion  Negative for claudication, cyanosis, leg swelling, near-syncope, orthopnea and palpitations  All other systems reviewed and are negative  Vitals:    06/02/21 1135   BP: 130/72   Pulse:    SpO2:      Vitals:    06/02/21 1111   Weight: 81 5 kg (179 lb 11 2 oz)     Height: 5' 10" (177 8 cm)   Body mass index is 25 78 kg/m²  Physical Exam:  General:  Alert and cooperative, appears stated age  HEENT:  PERRLA, EOMI, no scleral icterus, no conjunctival pallor  Neck:  No lymphadenopathy, no thyromegaly, no carotid bruits, no elevated JVP  Heart:  Regular rate and rhythm, normal S1/S2, no S3/S4, no murmur  Lungs:  Clear to auscultation bilaterally   Abdomen:  Soft, non-tender, positive bowel sounds, no rebound or guarding,   no organomegaly   Extremities:  No clubbing, cyanosis or edema   Skin:  No rashes or lesions on exposed skin  Neurologic:  Cranial nerves II-XII grossly intact without focal deficits

## 2021-06-02 ENCOUNTER — OFFICE VISIT (OUTPATIENT)
Dept: CARDIOLOGY CLINIC | Facility: CLINIC | Age: 61
End: 2021-06-02
Payer: COMMERCIAL

## 2021-06-02 VITALS
DIASTOLIC BLOOD PRESSURE: 72 MMHG | HEIGHT: 70 IN | SYSTOLIC BLOOD PRESSURE: 130 MMHG | HEART RATE: 69 BPM | OXYGEN SATURATION: 98 % | WEIGHT: 179.7 LBS | BODY MASS INDEX: 25.73 KG/M2

## 2021-06-02 DIAGNOSIS — R06.00 DYSPNEA ON EXERTION: ICD-10-CM

## 2021-06-02 DIAGNOSIS — I25.5 ISCHEMIC CARDIOMYOPATHY: ICD-10-CM

## 2021-06-02 DIAGNOSIS — Z95.5 PRESENCE OF DRUG COATED STENT IN LAD CORONARY ARTERY: ICD-10-CM

## 2021-06-02 DIAGNOSIS — Z95.5 PRESENCE OF DRUG COATED STENT IN RIGHT CORONARY ARTERY: ICD-10-CM

## 2021-06-02 DIAGNOSIS — I50.42 CHRONIC COMBINED SYSTOLIC AND DIASTOLIC CHF (CONGESTIVE HEART FAILURE) (HCC): ICD-10-CM

## 2021-06-02 DIAGNOSIS — I10 BENIGN ESSENTIAL HYPERTENSION: ICD-10-CM

## 2021-06-02 DIAGNOSIS — I25.10 CORONARY ARTERY DISEASE INVOLVING NATIVE CORONARY ARTERY OF NATIVE HEART WITHOUT ANGINA PECTORIS: Primary | ICD-10-CM

## 2021-06-02 DIAGNOSIS — E78.5 DYSLIPIDEMIA: ICD-10-CM

## 2021-06-02 PROCEDURE — 1036F TOBACCO NON-USER: CPT | Performed by: INTERNAL MEDICINE

## 2021-06-02 PROCEDURE — 99214 OFFICE O/P EST MOD 30 MIN: CPT | Performed by: INTERNAL MEDICINE

## 2021-06-02 PROCEDURE — 3075F SYST BP GE 130 - 139MM HG: CPT | Performed by: INTERNAL MEDICINE

## 2021-06-02 PROCEDURE — 3008F BODY MASS INDEX DOCD: CPT | Performed by: INTERNAL MEDICINE

## 2021-06-02 PROCEDURE — 3078F DIAST BP <80 MM HG: CPT | Performed by: INTERNAL MEDICINE

## 2021-07-30 ENCOUNTER — HOSPITAL ENCOUNTER (OUTPATIENT)
Dept: NON INVASIVE DIAGNOSTICS | Facility: CLINIC | Age: 61
Discharge: HOME | End: 2021-07-30
Payer: COMMERCIAL

## 2021-07-30 DIAGNOSIS — Z95.5 PRESENCE OF DRUG COATED STENT IN RIGHT CORONARY ARTERY: ICD-10-CM

## 2021-07-30 DIAGNOSIS — I25.10 CORONARY ARTERY DISEASE INVOLVING NATIVE CORONARY ARTERY OF NATIVE HEART WITHOUT ANGINA PECTORIS: ICD-10-CM

## 2021-07-30 DIAGNOSIS — Z95.5 PRESENCE OF DRUG COATED STENT IN LAD CORONARY ARTERY: ICD-10-CM

## 2021-07-30 PROCEDURE — A9502 TC99M TETROFOSMIN: HCPCS

## 2021-07-30 PROCEDURE — 93017 CV STRESS TEST TRACING ONLY: CPT

## 2021-07-30 PROCEDURE — 78452 HT MUSCLE IMAGE SPECT MULT: CPT

## 2021-07-30 PROCEDURE — 93018 CV STRESS TEST I&R ONLY: CPT | Performed by: INTERNAL MEDICINE

## 2021-07-30 PROCEDURE — 93306 TTE W/DOPPLER COMPLETE: CPT | Performed by: INTERNAL MEDICINE

## 2021-07-30 PROCEDURE — 93016 CV STRESS TEST SUPVJ ONLY: CPT | Performed by: INTERNAL MEDICINE

## 2021-07-30 PROCEDURE — 93306 TTE W/DOPPLER COMPLETE: CPT

## 2021-07-30 PROCEDURE — 78452 HT MUSCLE IMAGE SPECT MULT: CPT | Performed by: INTERNAL MEDICINE

## 2021-07-30 RX ADMIN — REGADENOSON 0.4 MG: 0.08 INJECTION, SOLUTION INTRAVENOUS at 14:18

## 2021-07-30 RX ADMIN — PERFLUTREN 0.6 ML/MIN: 6.52 INJECTION, SUSPENSION INTRAVENOUS at 12:18

## 2021-09-07 ENCOUNTER — OFFICE VISIT (OUTPATIENT)
Dept: PODIATRY | Facility: CLINIC | Age: 61
End: 2021-09-07
Payer: COMMERCIAL

## 2021-09-07 ENCOUNTER — TELEPHONE (OUTPATIENT)
Dept: PODIATRY | Facility: CLINIC | Age: 61
End: 2021-09-07

## 2021-09-07 VITALS
SYSTOLIC BLOOD PRESSURE: 134 MMHG | HEIGHT: 70 IN | BODY MASS INDEX: 25.34 KG/M2 | DIASTOLIC BLOOD PRESSURE: 81 MMHG | HEART RATE: 83 BPM | WEIGHT: 177 LBS

## 2021-09-07 DIAGNOSIS — L97.529 ULCER OF LEFT FOOT, UNSPECIFIED ULCER STAGE (HCC): Primary | ICD-10-CM

## 2021-09-07 DIAGNOSIS — M20.42 HAMMER TOES OF BOTH FEET: ICD-10-CM

## 2021-09-07 DIAGNOSIS — I87.312 CHRONIC VENOUS HYPERTENSION (IDIOPATHIC) WITH ULCER OF LEFT LOWER EXTREMITY (CODE) (HCC): ICD-10-CM

## 2021-09-07 DIAGNOSIS — E11.40 TYPE 2 DIABETES MELLITUS WITH DIABETIC NEUROPATHY, WITH LONG-TERM CURRENT USE OF INSULIN (HCC): ICD-10-CM

## 2021-09-07 DIAGNOSIS — Z79.4 TYPE 2 DIABETES MELLITUS WITH DIABETIC NEUROPATHY, WITH LONG-TERM CURRENT USE OF INSULIN (HCC): ICD-10-CM

## 2021-09-07 DIAGNOSIS — M20.41 HAMMER TOES OF BOTH FEET: ICD-10-CM

## 2021-09-07 PROCEDURE — 11042 DBRDMT SUBQ TIS 1ST 20SQCM/<: CPT | Performed by: PODIATRIST

## 2021-09-07 PROCEDURE — 99214 OFFICE O/P EST MOD 30 MIN: CPT | Performed by: PODIATRIST

## 2021-09-07 RX ORDER — CEPHALEXIN 500 MG/1
500 CAPSULE ORAL EVERY 8 HOURS SCHEDULED
Qty: 21 CAPSULE | Refills: 0 | Status: SHIPPED | OUTPATIENT
Start: 2021-09-07 | End: 2021-09-14

## 2021-09-07 NOTE — PROGRESS NOTES
PATIENT:  Bennie Khan    1960    ASSESSMENT:     1  Ulcer of left foot, unspecified ulcer stage (HCC)  XR foot 3+ vw left    cephalexin (KEFLEX) 500 mg capsule   2  Type 2 diabetes mellitus with diabetic neuropathy, with long-term current use of insulin ([address] )     3  Chronic venous hypertension (idiopathic) with ulcer of left lower extremity (CODE) (HCC)     4  Hammer toes of both feet           PLAN:  1  Patient was counseled on the condition and diagnosis  2  Educated disease prevention and risks related to diabetes and foot ulcer  3  X-ray was obtained and personally reviewed  The radiographic findings were discussed with the patient  4  Wound was debrided  Instructed daily local care  Start him on orthowedge shoe for off-loading  Recommended NWB with crutches  Rx: Keflex to reduce bioburden  5  He also has VSU on left side  Instructed compression and elevation on left leg  6  The recent blood work was reviewed and the last HbA1c was 11 4  Discussed proper blood glucose control with diet and properly administering the medication  7   Referred him to wound center for further care  Debridement Procedure:    After  Verbal Consent was obtained and time out performed  Wound located left foot was  excisionally Surgical- Subcutaneous  (CPT: 82338) debrided using scapel  Pre-debridement wound measures 0 cm x 0 cm x 0 cm  Pain was controlled by Lack of sensation due to Neuropathy   Post debridement measurement 2 cm x 1 cm x 0 2 cm for a total of 2 square centimeters, with wound appearing Fresh bleeding tissue, viable and granular  100%  of wound debrided  Tissue debrided includes depth of Epidermis, Dermis and Subcutaneous with devitalized tissue being debrided including Hyperkeratosis, Slough, Fibrous and Biofilm  with a small amount of bleeding bleeding was noted during procedure  Hemostasis was achieved using pressure  Pain noted during procedure rated as 0   Pain noted after procedure rated as 0  Procedure was Well tolerated by patient  Subjective:         HPI:  The patients presents for left foot  He noticed discoloration on left plantar foot yesterday  No bleeding or drainage left foot  He gets callus where he had discoloration  No pain  No redness  BS has been high  He does not use insulin as prescribed because he is afraid of hypoglycemia  He also has non-healing ulcer on left leg for a while  He has high risk diabetic foot with prior foot infection / wound and diabetic neuropathy  He was supposed to follow-up in the office in April, but did not present for the appointment  The following portions of the patient's history were reviewed and updated as appropriate: allergies, current medications, past family history, past medical history, past social history, past surgical history and problem list   All pertinent labs and images were reviewed  Past Medical History  Past Medical History:   Diagnosis Date    CAD (coronary artery disease)     Chronic combined systolic and diastolic CHF (congestive heart failure) (HCC)     Diabetes mellitus (HCC)     type 2    Dyslipidemia     Hypertension     Ischemic cardiomyopathy     [facility]     Osteomyelitis [facility] )     Pancreatitis        Past Surgical History  Past Surgical History:   Procedure Laterality Date    ANGIOPLASTY      ballon    CORONARY ANGIOPLASTY WITH STENT PLACEMENT      LULA to proximal and mid LAD, Proximal RCA   HERNIA REPAIR      INCISION AND DRAINAGE OF WOUND Left 2/14/2020    Procedure: INCISION AND DRAINAGE (I&D) EXTREMITY left foot (cpt 10691);   Surgeon: Jaida Rushing DPM;  [facility];  Service: Podiatry    [place] EXPLORE [facility] Left 2/14/2020    Procedure: EXPLORATION EXTREMITY;  Surgeon: Jaida Rushing DPM;  [facility];  Service: Podiatry   [address] [facility], < 50 CM Left 2/14/2020    Procedure: APPLICATION VAC DRESSING;  Surgeon: Mark Ruiz Aj Robertson DPM;  [facility];  Service: Podiatry        Allergies:  Patient has no known allergies  Medications:  Current Outpatient Medications   Medication Sig Dispense Refill    atorvastatin (LIPITOR) 40 mg tablet TAKE 1 TABLET BY MOUTH EVERY DAY 90 tablet 3    furosemide (LASIX) 80 mg tablet Take 1 tablet (80 mg total) by mouth daily 90 tablet 3    LANTUS SOLOSTAR 100 units/mL injection pen Inject 30 Units under the skin every morning 5 pen 0    potassium chloride (Klor-Con M20) 20 mEq tablet TAKE 1 TABLET BY MOUTH TWICE A DAY (Patient taking differently: 20 mEq daily TAKE 1 TABLET BY MOUTH TWICE A DAY) 180 tablet 1    TRUE METRIX BLOOD GLUCOSE TEST test strip TEST FOUR TIMES DAILY  11    Unifine Pentips 31G X 8 MM MISC USE 4 TMES A DAY      aspirin 81 mg chewable tablet Chew 1 tablet daily for 30 days 30 tablet 0    cephalexin (KEFLEX) 500 mg capsule Take 1 capsule (500 mg total) by mouth every 8 (eight) hours for 7 days 21 capsule 0    EPINEPHrine (EPIPEN) 0 3 mg/0 3 mL SOAJ Inject 0 3 mL (0 3 mg total) into a muscle once for 1 dose Only use for trouble breathing, or swelling of the lips, tongue, or throat  0 6 mL 0    furosemide (LASIX) 40 mg tablet TAKE 1 TABLET BY MOUTH EVERY DAY (Patient not taking: Reported on 6/2/2021) 90 tablet 3    metoprolol tartrate (LOPRESSOR) 25 mg tablet TAKE 1 TABLET (25 MG TOTAL) BY MOUTH EVERY 12 (TWELVE) HOURS 180 tablet 3     No current facility-administered medications for this visit         Social History:  Social History     Socioeconomic History    Marital status: /Civil Union     Spouse name: None    Number of children: None    Years of education: None    Highest education level: None   Occupational History    None   Tobacco Use    Smoking status: Never Smoker    Smokeless tobacco: Never Used   Vaping Use    Vaping Use: Never used   Substance and Sexual Activity    Alcohol use: Not Currently    Drug use: No    Sexual activity: None Other Topics Concern    None   Social History Narrative    None     Social Determinants of Health     Financial Resource Strain:     Difficulty of Paying Living Expenses:    Food Insecurity:     Worried About Running Out of Food in the Last Year:     [address] in the Last Year:    Transportation Needs:     Lack of Transportation (Medical):  Lack of Transportation (Non-Medical):    Physical Activity:     Days of Exercise per Week:     Minutes of Exercise per Session:    Stress:     Feeling of Stress :    Social Connections:     Frequency of Communication with Friends and Family:     Frequency of Social Gatherings with Friends and Family:     Attends Denominational Services:     Active Member of Clubs or Organizations:     Attends Club or Organization Meetings:     Marital Status:    Intimate Partner Violence:     Fear of Current or Ex-Partner:     Emotionally Abused:     Physically Abused:     Sexually Abused:         Review of Systems   Constitutional: Negative for chills and fever  Respiratory: Negative for shortness of breath  Cardiovascular: Positive for leg swelling  Negative for chest pain  Gastrointestinal: Negative for diarrhea, nausea and vomiting  Skin: Positive for wound  Neurological: Positive for numbness  Negative for weakness  Objective:      /81   Pulse 83   Ht 5' 10" (1 778 m) Comment: verbal  Wt 80 3 kg (177 lb)   BMI 25 40 kg/m²          Physical Exam  Vitals reviewed  Constitutional:       General: He is not in acute distress  Appearance: He is well-developed  He is not ill-appearing or toxic-appearing  HENT:      Head: Normocephalic and atraumatic  Cardiovascular:      Rate and Rhythm: Normal rate and regular rhythm  Pulses:           Dorsalis pedis pulses are 1+ on the right side and 1+ on the left side  Posterior tibial pulses are 1+ on the right side and 0 on the left side        Comments: He has class findings with lack of digital hairs, skin atrophy/ dryness, and dystrophic nails  Pulmonary:      Effort: Pulmonary effort is normal  No respiratory distress  Musculoskeletal:         General: Deformity present  No tenderness or signs of injury  Cervical back: Normal range of motion and neck supple  Left lower leg: Edema present  Comments: No acute joint pain, edema, or inflammation  Hammertoe deformity noted  Feet:      Right foot:      Skin integrity: Dry skin present  No ulcer, skin breakdown, erythema, warmth or callus  Left foot:      Skin integrity: Dry skin present  No ulcer, skin breakdown, erythema, warmth or callus  Skin:     General: Skin is warm and dry  Capillary Refill: Capillary refill takes less than 2 seconds  Coloration: Skin is not cyanotic or mottled  Findings: Wound present  No ecchymosis, laceration or petechiae  Nails: There is no clubbing  Comments: No cellulitis  Venous stasis noted LLE  Wound presents left shin  It is about 1 0 X 0 8 X 0 2 cm  Wound bed is granular  No deep probing  Heavy callus / dry blood on left submet 4  Ulcer presents under the callus  No deep probing  No purulence  No cellulitis  Neurological:      General: No focal deficit present  Mental Status: He is alert and oriented to person, place, and time  Cranial Nerves: No cranial nerve deficit  Sensory: Sensory deficit present  Motor: No weakness  Psychiatric:         Mood and Affect: Mood normal          Behavior: Behavior normal          Thought Content:  Thought content normal          Judgment: Judgment normal

## 2021-09-09 ENCOUNTER — TELEPHONE (OUTPATIENT)
Dept: NEPHROLOGY | Facility: CLINIC | Age: 61
End: 2021-09-09

## 2021-09-09 NOTE — TELEPHONE ENCOUNTER
Received a call from patient regarding scheduling an appointment  Patient has been referred by Dr Loly Koehler [phone])  I have faxed a request for medical records as well as doctor to doctor referral to [phone]

## 2021-09-14 ENCOUNTER — OFFICE VISIT (OUTPATIENT)
Dept: PODIATRY | Facility: CLINIC | Age: 61
End: 2021-09-14
Payer: COMMERCIAL

## 2021-09-14 ENCOUNTER — APPOINTMENT (OUTPATIENT)
Dept: LAB | Facility: CLINIC | Age: 61
End: 2021-09-14
Payer: COMMERCIAL

## 2021-09-14 VITALS
BODY MASS INDEX: 25.34 KG/M2 | SYSTOLIC BLOOD PRESSURE: 136 MMHG | HEART RATE: 76 BPM | HEIGHT: 70 IN | WEIGHT: 177 LBS | DIASTOLIC BLOOD PRESSURE: 82 MMHG

## 2021-09-14 DIAGNOSIS — L97.529 ULCER OF LEFT FOOT, UNSPECIFIED ULCER STAGE (HCC): Primary | ICD-10-CM

## 2021-09-14 DIAGNOSIS — E11.40 TYPE 2 DIABETES MELLITUS WITH DIABETIC NEUROPATHY, WITH LONG-TERM CURRENT USE OF INSULIN (HCC): ICD-10-CM

## 2021-09-14 DIAGNOSIS — E11.9 DIABETES MELLITUS WITHOUT COMPLICATION (HCC): ICD-10-CM

## 2021-09-14 DIAGNOSIS — B35.1 ONYCHOMYCOSIS: ICD-10-CM

## 2021-09-14 DIAGNOSIS — I87.312 CHRONIC VENOUS HYPERTENSION (IDIOPATHIC) WITH ULCER OF LEFT LOWER EXTREMITY (CODE) (HCC): ICD-10-CM

## 2021-09-14 DIAGNOSIS — Z79.4 TYPE 2 DIABETES MELLITUS WITH DIABETIC NEUROPATHY, WITH LONG-TERM CURRENT USE OF INSULIN (HCC): ICD-10-CM

## 2021-09-14 LAB
ALBUMIN SERPL BCP-MCNC: 3.2 G/DL (ref 3.5–5)
ALP SERPL-CCNC: 83 U/L (ref 46–116)
ALT SERPL W P-5'-P-CCNC: 25 U/L (ref 12–78)
ANION GAP SERPL CALCULATED.3IONS-SCNC: 6 MMOL/L (ref 4–13)
AST SERPL W P-5'-P-CCNC: 9 U/L (ref 5–45)
BASOPHILS # BLD AUTO: 0.07 THOUSANDS/ΜL (ref 0–0.1)
BASOPHILS NFR BLD AUTO: 1 % (ref 0–1)
BILIRUB SERPL-MCNC: 0.99 MG/DL (ref 0.2–1)
BUN SERPL-MCNC: 32 MG/DL (ref 5–25)
CALCIUM ALBUM COR SERPL-MCNC: 9.3 MG/DL (ref 8.3–10.1)
CALCIUM SERPL-MCNC: 8.7 MG/DL (ref 8.3–10.1)
CHLORIDE SERPL-SCNC: 103 MMOL/L (ref 100–108)
CO2 SERPL-SCNC: 32 MMOL/L (ref 21–32)
CORTIS AM PEAK SERPL-MCNC: 14.4 UG/DL (ref 4.2–22.4)
CREAT SERPL-MCNC: 1.85 MG/DL (ref 0.6–1.3)
EOSINOPHIL # BLD AUTO: 0.44 THOUSAND/ΜL (ref 0–0.61)
EOSINOPHIL NFR BLD AUTO: 7 % (ref 0–6)
ERYTHROCYTE [DISTWIDTH] IN BLOOD BY AUTOMATED COUNT: 13 % (ref 11.6–15.1)
EST. AVERAGE GLUCOSE BLD GHB EST-MCNC: 269 MG/DL
GFR SERPL CREATININE-BSD FRML MDRD: 38 ML/MIN/1.73SQ M
GLUCOSE P FAST SERPL-MCNC: 143 MG/DL (ref 65–99)
HBA1C MFR BLD: 11 %
HCT VFR BLD AUTO: 38.6 % (ref 36.5–49.3)
HGB BLD-MCNC: 12.3 G/DL (ref 12–17)
IMM GRANULOCYTES # BLD AUTO: 0.03 THOUSAND/UL (ref 0–0.2)
IMM GRANULOCYTES NFR BLD AUTO: 1 % (ref 0–2)
LYMPHOCYTES # BLD AUTO: 1.42 THOUSANDS/ΜL (ref 0.6–4.47)
LYMPHOCYTES NFR BLD AUTO: 22 % (ref 14–44)
MAGNESIUM SERPL-MCNC: 2.2 MG/DL (ref 1.6–2.6)
MCH RBC QN AUTO: 27.6 PG (ref 26.8–34.3)
MCHC RBC AUTO-ENTMCNC: 31.9 G/DL (ref 31.4–37.4)
MCV RBC AUTO: 87 FL (ref 82–98)
MONOCYTES # BLD AUTO: 0.57 THOUSAND/ΜL (ref 0.17–1.22)
MONOCYTES NFR BLD AUTO: 9 % (ref 4–12)
NEUTROPHILS # BLD AUTO: 3.9 THOUSANDS/ΜL (ref 1.85–7.62)
NEUTS SEG NFR BLD AUTO: 60 % (ref 43–75)
NRBC BLD AUTO-RTO: 0 /100 WBCS
PHOSPHATE SERPL-MCNC: 3.1 MG/DL (ref 2.3–4.1)
PLATELET # BLD AUTO: 254 THOUSANDS/UL (ref 149–390)
PMV BLD AUTO: 9.6 FL (ref 8.9–12.7)
POTASSIUM SERPL-SCNC: 4.1 MMOL/L (ref 3.5–5.3)
PROT SERPL-MCNC: 7.3 G/DL (ref 6.4–8.2)
RBC # BLD AUTO: 4.46 MILLION/UL (ref 3.88–5.62)
SODIUM SERPL-SCNC: 141 MMOL/L (ref 136–145)
T4 FREE SERPL-MCNC: 0.87 NG/DL (ref 0.76–1.46)
TSH SERPL DL<=0.05 MIU/L-ACNC: 5.14 UIU/ML (ref 0.36–3.74)
WBC # BLD AUTO: 6.43 THOUSAND/UL (ref 4.31–10.16)

## 2021-09-14 PROCEDURE — 36415 COLL VENOUS BLD VENIPUNCTURE: CPT

## 2021-09-14 PROCEDURE — 82533 TOTAL CORTISOL: CPT

## 2021-09-14 PROCEDURE — 82024 ASSAY OF ACTH: CPT

## 2021-09-14 PROCEDURE — 80053 COMPREHEN METABOLIC PANEL: CPT

## 2021-09-14 PROCEDURE — 11721 DEBRIDE NAIL 6 OR MORE: CPT | Performed by: PODIATRIST

## 2021-09-14 PROCEDURE — 83835 ASSAY OF METANEPHRINES: CPT

## 2021-09-14 PROCEDURE — 84439 ASSAY OF FREE THYROXINE: CPT

## 2021-09-14 PROCEDURE — 83735 ASSAY OF MAGNESIUM: CPT

## 2021-09-14 PROCEDURE — 83036 HEMOGLOBIN GLYCOSYLATED A1C: CPT

## 2021-09-14 PROCEDURE — 84443 ASSAY THYROID STIM HORMONE: CPT

## 2021-09-14 PROCEDURE — 84100 ASSAY OF PHOSPHORUS: CPT

## 2021-09-14 PROCEDURE — 85025 COMPLETE CBC W/AUTO DIFF WBC: CPT

## 2021-09-14 PROCEDURE — 99213 OFFICE O/P EST LOW 20 MIN: CPT | Performed by: PODIATRIST

## 2021-09-14 RX ORDER — HUMAN INSULIN 100 [IU]/ML
INJECTION, SUSPENSION SUBCUTANEOUS
COMMUNITY
Start: 2021-09-08 | End: 2021-12-26 | Stop reason: HOSPADM

## 2021-09-14 NOTE — PROGRESS NOTES
PATIENT:  Lalo Khan    1960    ASSESSMENT:     1  Ulcer of left foot, unspecified ulcer stage ([facility] [address] )     2  Type 2 diabetes mellitus with diabetic neuropathy, with long-term current use of insulin ([facility] )     3  Chronic venous hypertension (idiopathic) with ulcer of left lower extremity (CODE) ([facility])     4  Onychomycosis  Debridement         PLAN:  1  Reviewed the last office note  Patient was counseled on the condition and diagnosis  2  Educated disease prevention and risks related to diabetes and foot ulcer  3  Continue local care and orthowedge shoe  Recommended NWB with crutches  4  Discussed proper blood glucose control  5  Pt to follow up at wound center on Monday  Procedure: All mycotic toenails were reduced and debrided in length, width, and girth using a nail nipper and dremel  Patient tolerated procedure(s) well without complications  Subjective:         HPI:  The patients presents for wound care left foot  He has DFU on left  He also has VSU left leg  No pain  No redness  Tolerates antibiotics well  He presents with orthowedge shoe and crutches today  He complained of thick, elongated toenails  The following portions of the patient's history were reviewed and updated as appropriate: allergies, current medications, past family history, past medical history, past social history, past surgical history and problem list   All pertinent labs and images were reviewed      Past Medical History  Past Medical History:   Diagnosis Date    CAD (coronary artery disease)     Chronic combined systolic and diastolic CHF (congestive heart failure) ([facility])     Diabetes mellitus ([facility])     type 2    Dyslipidemia     Hypertension     Ischemic cardiomyopathy     MI (myocardial infarction) ([facility] [address] )     [facility] )     Pancreatitis        Past Surgical History  Past Surgical History:   Procedure Laterality Date    ANGIOPLASTY      ballon    CORONARY ANGIOPLASTY WITH STENT PLACEMENT      LULA to proximal and mid LAD, Proximal RCA   HERNIA REPAIR      INCISION AND DRAINAGE OF WOUND Left 2/14/2020    Procedure: INCISION AND DRAINAGE (I&D) EXTREMITY left foot (cpt 90485); Surgeon: Jacey Almanza DPM;  [facility];  Service: Podiatry    [place] EXPLORE [facility] Left 2/14/2020    Procedure: EXPLORATION EXTREMITY;  Surgeon: Jacey Almanza DPM;  [facility];  Service: Podiatry   [address] [facility], < 50 CM Left 2/14/2020    Procedure: APPLICATION VAC DRESSING;  Surgeon: Jacey Almanza DPM;  [facility];  Service: Podiatry        Allergies:  Patient has no known allergies  Medications:  Current Outpatient Medications   Medication Sig Dispense Refill    atorvastatin (LIPITOR) 40 mg tablet TAKE 1 TABLET BY MOUTH EVERY DAY 90 tablet 3    cephalexin (KEFLEX) 500 mg capsule Take 1 capsule (500 mg total) by mouth every 8 (eight) hours for 7 days 21 capsule 0    furosemide (LASIX) 80 mg tablet Take 1 tablet (80 mg total) by mouth daily 90 tablet 3    LANTUS SOLOSTAR 100 units/mL injection pen Inject 30 Units under the skin every morning 5 pen 0    NovoLIN 70/30 FlexPen (70-30) 100 UNIT/ML injection pen INJECT 8 TO 10 UNITES AT DINNER      potassium chloride (Klor-Con M20) 20 mEq tablet TAKE 1 TABLET BY MOUTH TWICE A DAY (Patient taking differently: 20 mEq daily TAKE 1 TABLET BY MOUTH TWICE A DAY) 180 tablet 1    TRUE METRIX BLOOD GLUCOSE TEST test strip TEST FOUR TIMES DAILY  11    Unifine Pentips 31G X 8 MM MISC USE 4 TMES A DAY      aspirin 81 mg chewable tablet Chew 1 tablet daily for 30 days 30 tablet 0    EPINEPHrine (EPIPEN) 0 3 mg/0 3 mL SOAJ Inject 0 3 mL (0 3 mg total) into a muscle once for 1 dose Only use for trouble breathing, or swelling of the lips, tongue, or throat   0 6 mL 0    furosemide (LASIX) 40 mg tablet TAKE 1 TABLET BY MOUTH EVERY DAY (Patient not taking: Reported on 6/2/2021) 90 tablet 3    metoprolol tartrate (LOPRESSOR) 25 mg tablet TAKE 1 TABLET (25 MG TOTAL) BY MOUTH EVERY 12 (TWELVE) HOURS 180 tablet 3     No current facility-administered medications for this visit  Social History:  Social History     Socioeconomic History    Marital status: /Civil Union     Spouse name: None    Number of children: None    Years of education: None    Highest education level: None   Occupational History    None   Tobacco Use    Smoking status: Never Smoker    Smokeless tobacco: Never Used   Vaping Use    Vaping Use: Never used   Substance and Sexual Activity    Alcohol use: Not Currently    Drug use: No    Sexual activity: None   Other Topics Concern    None   Social History Narrative    None     Social Determinants of Health     Financial Resource Strain:     Difficulty of Paying Living Expenses:    Food Insecurity:     Worried About Running Out of Food in the Last Year:     Ran Out of Food in the Last Year:    Transportation Needs:     Lack of Transportation (Medical):  Lack of Transportation (Non-Medical):    Physical Activity:     Days of Exercise per Week:     Minutes of Exercise per Session:    Stress:     Feeling of Stress :    Social Connections:     Frequency of Communication with Friends and Family:     Frequency of Social Gatherings with Friends and Family:     Attends Cheondoism Services:     Active Member of Clubs or Organizations:     Attends Club or Organization Meetings:     Marital Status:    Intimate Partner Violence:     Fear of Current or Ex-Partner:     Emotionally Abused:     Physically Abused:     Sexually Abused:         Review of Systems   Constitutional: Negative for chills and fever  Respiratory: Negative for shortness of breath  Cardiovascular: Positive for leg swelling  Negative for chest pain  Gastrointestinal: Negative for diarrhea, nausea and vomiting  Skin: Positive for wound  Neurological: Positive for numbness  Negative for weakness           Objective:      BP 136/82   Pulse 76   Ht 5' 10" (1 778 m) Comment: verbal  Wt 80 3 kg (177 lb)   BMI 25 40 kg/m²          Physical Exam  Vitals reviewed  Constitutional:       General: He is not in acute distress  Appearance: He is well-developed  He is not ill-appearing or toxic-appearing  Cardiovascular:      Rate and Rhythm: Normal rate and regular rhythm  Pulses:           Dorsalis pedis pulses are 1+ on the right side and 1+ on the left side  Posterior tibial pulses are 1+ on the right side and 0 on the left side  Comments: He has class findings with lack of digital hairs, skin atrophy/ dryness, and dystrophic nails  Pulmonary:      Effort: Pulmonary effort is normal  No respiratory distress  Musculoskeletal:         General: Deformity present  No tenderness or signs of injury  Left lower leg: Edema present  Comments: No acute joint pain, edema, or inflammation  Hammertoe deformity noted  Feet:      Right foot:      Skin integrity: Dry skin present  No ulcer, skin breakdown, erythema, warmth or callus  Left foot:      Skin integrity: Dry skin present  No ulcer, skin breakdown, erythema, warmth or callus  Skin:     General: Skin is warm and dry  Capillary Refill: Capillary refill takes less than 2 seconds  Coloration: Skin is not cyanotic or mottled  Findings: Wound present  No abscess, ecchymosis, laceration or petechiae  Nails: There is no clubbing  Comments: No cellulitis  Venous stasis noted LLE  VSU is stable  Submet 4 ulcer is stable without infection  It is 1 0 X 0 8 X 0 1 cm  He has thick, mycotic nails with discoloration and onycholysis  He has class findings with lack of pedal hairs, skin discoloration, skin atrophy, and brittle toenails  Neurological:      General: No focal deficit present  Mental Status: He is alert and oriented to person, place, and time  Cranial Nerves: No cranial nerve deficit        Sensory: Sensory deficit present  Motor: No weakness  Psychiatric:         Mood and Affect: Mood normal          Behavior: Behavior normal          Thought Content:  Thought content normal          Judgment: Judgment normal

## 2021-09-15 ENCOUNTER — HOSPITAL ENCOUNTER (OUTPATIENT)
Dept: ULTRASOUND IMAGING | Facility: HOSPITAL | Age: 61
Discharge: HOME | End: 2021-09-15
Attending: SPECIALIST
Payer: COMMERCIAL

## 2021-09-15 DIAGNOSIS — E10.65 TYPE 1 DIABETES MELLITUS WITH HYPERGLYCEMIA (HCC): ICD-10-CM

## 2021-09-15 DIAGNOSIS — E10.29 TYPE 1 DIABETES MELLITUS WITH OTHER DIABETIC KIDNEY COMPLICATION (HCC): ICD-10-CM

## 2021-09-15 DIAGNOSIS — E10.49 TYPE 1 DIABETES MELLITUS WITH OTHER DIABETIC NEUROLOGICAL COMPLICATION (HCC): ICD-10-CM

## 2021-09-15 LAB — ACTH PLAS-MCNC: 106 PG/ML (ref 7.2–63.3)

## 2021-09-15 PROCEDURE — 76770 US EXAM ABDO BACK WALL COMP: CPT

## 2021-09-20 ENCOUNTER — OFFICE VISIT (OUTPATIENT)
Dept: WOUND CARE | Facility: HOSPITAL | Age: 61
End: 2021-09-20
Payer: COMMERCIAL

## 2021-09-20 VITALS
TEMPERATURE: 97.2 F | SYSTOLIC BLOOD PRESSURE: 118 MMHG | DIASTOLIC BLOOD PRESSURE: 72 MMHG | RESPIRATION RATE: 18 BRPM | HEART RATE: 72 BPM

## 2021-09-20 DIAGNOSIS — I87.312 CHRONIC VENOUS HYPERTENSION (IDIOPATHIC) WITH ULCER OF LEFT LOWER EXTREMITY (HCC): ICD-10-CM

## 2021-09-20 DIAGNOSIS — E11.40 TYPE 2 DIABETES MELLITUS WITH DIABETIC NEUROPATHY, WITH LONG-TERM CURRENT USE OF INSULIN (HCC): ICD-10-CM

## 2021-09-20 DIAGNOSIS — L97.522 ULCER OF LEFT FOOT, WITH FAT LAYER EXPOSED (HCC): Primary | ICD-10-CM

## 2021-09-20 DIAGNOSIS — Z79.4 TYPE 2 DIABETES MELLITUS WITH DIABETIC NEUROPATHY, WITH LONG-TERM CURRENT USE OF INSULIN (HCC): ICD-10-CM

## 2021-09-20 DIAGNOSIS — L97.929 CHRONIC VENOUS HYPERTENSION (IDIOPATHIC) WITH ULCER OF LEFT LOWER EXTREMITY (HCC): ICD-10-CM

## 2021-09-20 LAB
METANEPH FREE SERPL-MCNC: 75 PG/ML (ref 0–88)
NORMETANEPHRINE SERPL-MCNC: 150.9 PG/ML (ref 0–191.8)

## 2021-09-20 PROCEDURE — 11042 DBRDMT SUBQ TIS 1ST 20SQCM/<: CPT | Performed by: PODIATRIST

## 2021-09-20 PROCEDURE — 97597 DBRDMT OPN WND 1ST 20 CM/<: CPT | Performed by: PODIATRIST

## 2021-09-20 PROCEDURE — 99213 OFFICE O/P EST LOW 20 MIN: CPT | Performed by: PODIATRIST

## 2021-09-20 RX ORDER — LIDOCAINE 40 MG/G
CREAM TOPICAL ONCE
Status: COMPLETED | OUTPATIENT
Start: 2021-09-20 | End: 2021-09-20

## 2021-09-20 RX ADMIN — LIDOCAINE: 40 CREAM TOPICAL at 08:41

## 2021-09-20 NOTE — PROGRESS NOTES
Debridement   Wound 09/20/21 Venous Ulcer Leg Left; Anterior    Universal Protocol:  Consent: Verbal consent obtained    Risks and benefits: risks, benefits and alternatives were discussed  Consent given by: patient  Timeout called at: 9/20/2021 8:49 AM   Patient understanding: patient states understanding of the procedure being performed  Patient identity confirmed: verbally with patient      Performed by: physician  Debridement type: selective  Pain control: lidocaine 4%  Post-debridement measurements  Length (cm): 0 5  Width (cm): 0 5  Depth (cm): 0 1  Percent debrided: 100%  Surface Area (cm^2): 0 25  Area debrided (cm^2): 0 25  Volume (cm^3): 0 03  Devitalized tissue debrided: fibrin and slough  Instrument(s) utilized: blade  Bleeding: small  Hemostasis obtained with: pressure  Procedural pain (0-10): 0  Post-procedural pain: 0   Response to treatment: procedure was tolerated well

## 2021-09-20 NOTE — PATIENT INSTRUCTIONS
Orders Placed This Encounter   Procedures    Wound cleansing and dressings     Left foot wound and left leg wound  Wash your hands with soap and water  Remove old dressing, discard into plastic bag and place in trash  Cleanse the wound with mild soap and water prior to applying a clean dressing  Do not use tissue or cotton balls  Do not scrub the wound  Pat dry using gauze  Shower yes   Apply moisturizing lotion to skin surrounding wound  Apply dermagran to the all  wounds  Cover with 4x4 gauze  Secure with rolled gauze and paper tape  Change dressing daily      Off-loading Instructions:    Keep weight and pressure off wound at all times  and Continue with wedge shoe and using crutches when walking  Put on immediately when rising in the morning and remove when going to bed      Follow up in one week in [place] if possible     Standing Status:   Future     Standing Expiration Date:   9/20/2022

## 2021-09-20 NOTE — PROGRESS NOTES
Patient ID: Elle Dickerson is a 59 y o  male Date of Birth 1960     Chief Complaint  Chief Complaint   Patient presents with   [facility] Patient Visit     Patient here for intitial wound to left foot  he reports that it started around labor day       Allergies  Patient has no known allergies  Assessment:    No problem-specific Assessment & Plan notes found for this encounter  Diagnoses and all orders for this visit:    Ulcer of left foot, with fat layer exposed ([address] )  -     lidocaine (LMX) 4 % cream  -     Wound cleansing and dressings; Future  -     Debridement    Chronic venous hypertension (idiopathic) with ulcer of left lower extremity (HCC)  -     lidocaine (LMX) 4 % cream  -     Wound cleansing and dressings; Future  -     Debridement    Type 2 diabetes mellitus with diabetic neuropathy, with long-term current use of insulin (HCC)  -     lidocaine (LMX) 4 % cream  -     Wound cleansing and dressings; Future  -     Debridement          Debridement   Wound 09/20/21 Diabetic Ulcer Foot Left;Lateral;Posterior    Universal Protocol:  Consent: Verbal consent obtained  Risks and benefits: risks, benefits and alternatives were discussed  Consent given by: patient  Time out: Immediately prior to procedure a "time out" was called to verify the correct patient, procedure, equipment, support staff and site/side marked as required    Timeout called at: 9/20/2021 8:47 AM   Patient understanding: patient states understanding of the procedure being performed  Patient identity confirmed: verbally with patient      Performed by: physician  Debridement type: surgical  Level of debridement: subcutaneous tissue  Pain control: lidocaine 4%  Post-debridement measurements  Length (cm): 1  Width (cm): 0 7  Depth (cm): 0 2  Percent debrided: 100%  Surface Area (cm^2): 0 7  Area debrided (cm^2): 0 7  Volume (cm^3): 0 14  Tissue and other material debrided: dermis, epidermis and subcutaneous tissue  Devitalized tissue debrided: callus, fibrin and slough  Instrument(s) utilized: blade  Bleeding: medium  Hemostasis obtained with: pressure  Procedural pain (0-10): 0  Post-procedural pain: 0   Response to treatment: procedure was tolerated well            PLAN:  1  Patient was counseled on the condition and diagnosis  2  Educated disease prevention and risks related to diabetes and foot ulcer  3  Wound was debrided  Instructed daily local care  Orthowedge shoe for off-loading  Recommended NWB with crutches if possible  4  He also has VSU on left side  Instructed compression and elevation on left leg  Tubigrip X 2 left leg  5  RA in 1 week  Wound 09/20/21 Venous Ulcer Leg Left; Anterior (Active)   Wound Image Images linked 09/20/21 0842   Wound Description Granulation tissue; Epithelialization;Slough 09/20/21 0831   Suzy-wound Assessment Edema 09/20/21 0831   Wound Length (cm) 0 8 cm 09/20/21 0831   Wound Width (cm) 0 6 cm 09/20/21 0831   Wound Depth (cm) 0 2 cm 09/20/21 0831   Wound Surface Area (cm^2) 0 48 cm^2 09/20/21 0831   Wound Volume (cm^3) 0 096 cm^3 09/20/21 0831   Calculated Wound Volume (cm^3) 0 1 cm^3 09/20/21 0831   Drainage Amount Small 09/20/21 0831   Drainage Description Serous 09/20/21 0831   Non-staged Wound Description Full thickness 09/20/21 0831       Wound 09/20/21 Diabetic Ulcer Foot Left;Lateral;Posterior (Active)   Wound Image Images linked 09/20/21 0842   Wound Description Granulation tissue; Epithelialization;Slough 09/20/21 0831   Suzy-wound Assessment Callus 09/20/21 0831   Wound Length (cm) 0 5 cm 09/20/21 0831   Wound Width (cm) 0 3 cm 09/20/21 0831   Wound Depth (cm) 0 3 cm 09/20/21 0831   Wound Surface Area (cm^2) 0 15 cm^2 09/20/21 0831   Wound Volume (cm^3) 0 045 cm^3 09/20/21 0831   Calculated Wound Volume (cm^3) 0 05 cm^3 09/20/21 0831   Drainage Amount Scant 09/20/21 0831   Drainage Description Serous 09/20/21 0831   Non-staged Wound Description Full thickness 09/20/21 0860       Wound 09/20/21 Venous Ulcer Leg Left; Anterior (Active)   Date First Assessed/Time First Assessed: 09/20/21 0828   Primary Wound Type: Venous Ulcer  Location: Leg  Wound Location Orientation: Left; Anterior       Wound 09/20/21 Diabetic Ulcer Foot Left;Lateral;Posterior (Active)   Date First Assessed/Time First Assessed: 09/20/21 0829   Primary Wound Type: Diabetic Ulcer  Location: Foot  Wound Location Orientation: Left;Lateral;Posterior       [REMOVED] Wound 05/18/17 Leg Left; Lower (Removed)   Resolved Date: 09/20/21  Date First Assessed/Time First Assessed: 05/18/17 0245   Location: Leg  Wound Location Orientation: Left; Lower       [REMOVED] Wound 09/02/18 Abrasion(s) Leg Left Shin (Removed)   Resolved Date: 09/20/21  Date First Assessed/Time First Assessed: 09/02/18 1310   Traumatic Wound Type: Abrasion(s)  Location: Leg  Wound Location Orientation: Left  Wound Description (Comments): [place]  Dressing Status: Clean;Dry; Intact       [REMOVED] Wound 02/10/20 Other (Comment) Leg Anterior;Left;Mid (Removed)   Resolved Date: 09/20/21  Date First Assessed/Time First Assessed: 02/10/20 0745   Pre-Existing Wound: Yes  Traumatic Wound Type: (c) Other (Comment)  Location: Leg  Wound Location Orientation: Anterior;Left;Mid  Wound Description (Comments): 2 scabs       [REMOVED] Wound 02/10/20 Foot Left;Posterior (Removed)   Resolved Date: 09/20/21  Date First Assessed/Time First Assessed: 02/10/20 0747   Pre-Existing Wound: Yes  Location: Foot  Wound Location Orientation: Left;Posterior       [REMOVED] Wound 02/14/20 Foot Left (Removed)   Resolved Date: 09/20/21  Date First Assessed/Time First Assessed: 02/14/20 1222   Location: Foot  Wound Location Orientation: Left  Wound Description (Comments): wound vac in place  Incision's 1st Dressing: KERLIX (x1), DRESSING SURGIPAD 5 X 9 IN STRL    Subjective:        HPI  Ashutosh Fishman presents for wound care  He has left DFU and VSU  No redness  No edema  Wound looks smaller  No pain  BS has been better  The following portions of the patient's history were reviewed and updated as appropriate: allergies, current medications, past family history, past medical history, past social history, past surgical history and problem list     PAST MEDICAL HISTORY:  Past Medical History:   Diagnosis Date    CAD (coronary artery disease)     Chronic combined systolic and diastolic CHF (congestive heart failure) ([facility] )     Diabetes mellitus ([facility] )     type 2    Dyslipidemia     Hypertension     Ischemic cardiomyopathy     MI (myocardial infarction) ([facility] )     Osteomyelitis ([place] [address] )     Pancreatitis        PAST SURGICAL HISTORY:  Past Surgical History:   Procedure Laterality Date    ANGIOPLASTY      ballon    CORONARY ANGIOPLASTY WITH STENT PLACEMENT      LULA to proximal and mid LAD, Proximal RCA   HERNIA REPAIR      INCISION AND DRAINAGE OF WOUND Left 2/14/2020    Procedure: INCISION AND DRAINAGE (I&D) EXTREMITY left foot (cpt 76013); Surgeon: Jacey Almanza DPM;  [facility];  Service: Podiatry    MS EXPLORE [facility] Left 2/14/2020    Procedure: EXPLORATION EXTREMITY;  Surgeon: Jacey Almanza DPM;  [facility];  Service: Podiatry   [facility], < 50 CM Left 2/14/2020    Procedure: APPLICATION VAC DRESSING;  Surgeon: Jacey Almanza DPM;  [facility];  Service: Podiatry        ALLERGIES:  Patient has no known allergies  MEDICATIONS:  Current Outpatient Medications   Medication Sig Dispense Refill    aspirin 81 mg chewable tablet Chew 1 tablet daily for 30 days 30 tablet 0    atorvastatin (LIPITOR) 40 mg tablet TAKE 1 TABLET BY MOUTH EVERY DAY 90 tablet 3    EPINEPHrine (EPIPEN) 0 3 mg/0 3 mL SOAJ Inject 0 3 mL (0 3 mg total) into a muscle once for 1 dose Only use for trouble breathing, or swelling of the lips, tongue, or throat   0 6 mL 0    furosemide (LASIX) 40 mg tablet TAKE 1 TABLET BY MOUTH EVERY DAY (Patient not taking: Reported on 6/2/2021) 90 tablet 3  furosemide (LASIX) 80 mg tablet Take 1 tablet (80 mg total) by mouth daily 90 tablet 3    LANTUS SOLOSTAR 100 units/mL injection pen Inject 30 Units under the skin every morning 5 pen 0    metoprolol tartrate (LOPRESSOR) 25 mg tablet TAKE 1 TABLET (25 MG TOTAL) BY MOUTH EVERY 12 (TWELVE) HOURS 180 tablet 3    NovoLIN 70/30 FlexPen (70-30) 100 UNIT/ML injection pen INJECT 8 TO 10 UNITES AT DINNER      potassium chloride (Klor-Con M20) 20 mEq tablet TAKE 1 TABLET BY MOUTH TWICE A DAY (Patient taking differently: 20 mEq daily TAKE 1 TABLET BY MOUTH TWICE A DAY) 180 tablet 1    TRUE METRIX BLOOD GLUCOSE TEST test strip TEST FOUR TIMES DAILY  11    Unifine Pentips 31G X 8 MM MISC USE 4 TMES A DAY       No current facility-administered medications for this visit  SOCIAL HISTORY:  Social History     Socioeconomic History    Marital status: /Civil Union     Spouse name: None    Number of children: None    Years of education: None    Highest education level: None   Occupational History    None   Tobacco Use    Smoking status: Never Smoker    Smokeless tobacco: Never Used   Vaping Use    Vaping Use: Never used   Substance and Sexual Activity    Alcohol use: Not Currently    Drug use: No    Sexual activity: None   Other Topics Concern    None   Social History Narrative    None     Social Determinants of Health     Financial Resource Strain:     Difficulty of Paying Living Expenses:    Food Insecurity:     Worried About Running Out of Food in the Last Year:     Ran Out of Food in the Last Year:    Transportation Needs:     Lack of Transportation (Medical):      Lack of Transportation (Non-Medical):    Physical Activity:     Days of Exercise per Week:     Minutes of Exercise per Session:    Stress:     Feeling of Stress :    Social Connections:     Frequency of Communication with Friends and Family:     Frequency of Social Gatherings with Friends and Family:     Attends [employer] Services:     Active Member of Clubs or Organizations:     Attends Club or Organization Meetings:     Marital Status:    Intimate Partner Violence:     Fear of Current or Ex-Partner:     Emotionally Abused:     Physically Abused:     Sexually Abused:       Review of Systems   Constitutional: Negative for appetite change, chills and fever  Respiratory: Negative for cough and shortness of breath  Cardiovascular: Negative for chest pain  Gastrointestinal: Negative for diarrhea, nausea and vomiting  Musculoskeletal: Negative for gait problem  Skin: Positive for wound  Neurological: Positive for numbness  Objective:       Wound 09/20/21 Venous Ulcer Leg Left; Anterior (Active)   Wound Image Images linked 09/20/21 0842   Wound Description Granulation tissue; Epithelialization;Slough 09/20/21 0831   Suzy-wound Assessment Edema 09/20/21 0831   Wound Length (cm) 0 8 cm 09/20/21 0831   Wound Width (cm) 0 6 cm 09/20/21 0831   Wound Depth (cm) 0 2 cm 09/20/21 0831   Wound Surface Area (cm^2) 0 48 cm^2 09/20/21 0831   Wound Volume (cm^3) 0 096 cm^3 09/20/21 0831   Calculated Wound Volume (cm^3) 0 1 cm^3 09/20/21 0831   Drainage Amount Small 09/20/21 0831   Drainage Description Serous 09/20/21 0831   Non-staged Wound Description Full thickness 09/20/21 0831       Wound 09/20/21 Diabetic Ulcer Foot Left;Lateral;Posterior (Active)   Wound Image Images linked 09/20/21 0842   Wound Description Granulation tissue; Epithelialization;Slough 09/20/21 0831   Suzy-wound Assessment Callus 09/20/21 0831   Wound Length (cm) 0 5 cm 09/20/21 0831   Wound Width (cm) 0 3 cm 09/20/21 0831   Wound Depth (cm) 0 3 cm 09/20/21 0831   Wound Surface Area (cm^2) 0 15 cm^2 09/20/21 0831   Wound Volume (cm^3) 0 045 cm^3 09/20/21 0831   Calculated Wound Volume (cm^3) 0 05 cm^3 09/20/21 0831   Drainage Amount Scant 09/20/21 0831   Drainage Description Serous 09/20/21 0831   Non-staged Wound Description Full thickness 09/20/21 0831 /72   Pulse 72   Temp (!) 97 2 °F (36 2 °C)   Resp 18     Physical Exam  Vitals reviewed  Constitutional:       General: He is not in acute distress  Appearance: He is not toxic-appearing or diaphoretic  Cardiovascular:      Rate and Rhythm: Normal rate and regular rhythm  Pulses:           Dorsalis pedis pulses are 2+ on the right side and 1+ on the left side  Posterior tibial pulses are 0 on the right side and 0 on the left side  Comments: Venous stasis skin changes LLE with edema  Pulmonary:      Effort: Pulmonary effort is normal    Musculoskeletal:         General: Deformity present  No tenderness or signs of injury  Cervical back: Normal range of motion and neck supple  Left lower leg: Edema present  Right foot: No Charcot foot or foot drop  Left foot: No Charcot foot or foot drop  Skin:     General: Skin is warm and dry  Capillary Refill: Capillary refill takes less than 2 seconds  Coloration: Skin is not cyanotic or mottled  Findings: Wound present  No abscess or ecchymosis  Nails: There is no clubbing  Comments: Full thickness ulcer left submet 4  No deep probing  No infection  Stable left VSU  See wound assessment  Neurological:      General: No focal deficit present  Mental Status: He is alert and oriented to person, place, and time  Cranial Nerves: No cranial nerve deficit  Sensory: Sensory deficit present  Coordination: Coordination normal    Psychiatric:         Mood and Affect: Mood normal          Behavior: Behavior normal          Thought Content: Thought content normal          Judgment: Judgment normal            Wound Instructions:  Orders Placed This Encounter   Procedures    Wound cleansing and dressings     Left foot wound and left leg wound  Wash your hands with soap and water  Remove old dressing, discard into plastic bag and place in trash    Cleanse the wound with mild soap and water prior to applying a clean dressing  Do not use tissue or cotton balls  Do not scrub the wound  Pat dry using gauze  Shower yes   Apply moisturizing lotion to skin surrounding wound  Apply dermagran to the all  wounds  Cover with 4x4 gauze  Secure with rolled gauze and paper tape  Change dressing daily      Off-loading Instructions:    Keep weight and pressure off wound at all times  and Continue with wedge shoe and using crutches when walking  Put on immediately when rising in the morning and remove when going to bed  Follow up in one week in [place] if possible     Standing Status:   Future     Standing Expiration Date:   9/20/2022    Debridement     This order was created via procedure documentation    Debridement     This order was created via procedure documentation        Diagnosis ICD-10-CM Associated Orders   1  Ulcer of left foot, with fat layer exposed ([facility] )  L97 522 lidocaine (LMX) 4 % cream     Wound cleansing and dressings     Debridement   2  Chronic venous hypertension (idiopathic) with ulcer of left lower extremity ([facility])  I87 312 lidocaine (LMX) 4 % cream    L97 929 Wound cleansing and dressings     Debridement   3   Type 2 diabetes mellitus with diabetic neuropathy, with long-term current use of insulin ([facility])  E11 40 lidocaine (LMX) 4 % cream    Z79 4 Wound cleansing and dressings     Debridement

## 2021-09-30 ENCOUNTER — OFFICE VISIT (OUTPATIENT)
Dept: WOUND CARE | Facility: HOSPITAL | Age: 61
End: 2021-09-30
Payer: COMMERCIAL

## 2021-09-30 VITALS
HEART RATE: 71 BPM | RESPIRATION RATE: 18 BRPM | SYSTOLIC BLOOD PRESSURE: 141 MMHG | TEMPERATURE: 97.7 F | DIASTOLIC BLOOD PRESSURE: 83 MMHG

## 2021-09-30 DIAGNOSIS — Z79.4 TYPE 2 DIABETES MELLITUS WITH DIABETIC NEUROPATHY, WITH LONG-TERM CURRENT USE OF INSULIN (HCC): ICD-10-CM

## 2021-09-30 DIAGNOSIS — E11.40 TYPE 2 DIABETES MELLITUS WITH DIABETIC NEUROPATHY, WITH LONG-TERM CURRENT USE OF INSULIN (HCC): ICD-10-CM

## 2021-09-30 DIAGNOSIS — L97.522 ULCER OF LEFT FOOT, WITH FAT LAYER EXPOSED (HCC): Primary | ICD-10-CM

## 2021-09-30 DIAGNOSIS — I87.312 CHRONIC VENOUS HYPERTENSION (IDIOPATHIC) WITH ULCER OF LEFT LOWER EXTREMITY (HCC): ICD-10-CM

## 2021-09-30 DIAGNOSIS — L97.929 CHRONIC VENOUS HYPERTENSION (IDIOPATHIC) WITH ULCER OF LEFT LOWER EXTREMITY (HCC): ICD-10-CM

## 2021-09-30 PROCEDURE — 97597 DBRDMT OPN WND 1ST 20 CM/<: CPT | Performed by: PODIATRIST

## 2021-09-30 PROCEDURE — 99213 OFFICE O/P EST LOW 20 MIN: CPT | Performed by: PODIATRIST

## 2021-09-30 RX ORDER — LIDOCAINE HYDROCHLORIDE 40 MG/ML
1 SOLUTION TOPICAL ONCE
Status: COMPLETED | OUTPATIENT
Start: 2021-09-30 | End: 2021-09-30

## 2021-09-30 RX ADMIN — LIDOCAINE HYDROCHLORIDE 1 ML: 40 SOLUTION TOPICAL at 11:16

## 2021-10-07 ENCOUNTER — OFFICE VISIT (OUTPATIENT)
Dept: WOUND CARE | Facility: HOSPITAL | Age: 61
End: 2021-10-07
Payer: COMMERCIAL

## 2021-10-07 VITALS
TEMPERATURE: 96.9 F | RESPIRATION RATE: 16 BRPM | HEART RATE: 69 BPM | SYSTOLIC BLOOD PRESSURE: 131 MMHG | DIASTOLIC BLOOD PRESSURE: 75 MMHG

## 2021-10-07 DIAGNOSIS — L97.929 CHRONIC VENOUS HYPERTENSION (IDIOPATHIC) WITH ULCER OF LEFT LOWER EXTREMITY (HCC): ICD-10-CM

## 2021-10-07 DIAGNOSIS — E11.40 TYPE 2 DIABETES MELLITUS WITH DIABETIC NEUROPATHY, WITH LONG-TERM CURRENT USE OF INSULIN (HCC): ICD-10-CM

## 2021-10-07 DIAGNOSIS — I87.312 CHRONIC VENOUS HYPERTENSION (IDIOPATHIC) WITH ULCER OF LEFT LOWER EXTREMITY (HCC): ICD-10-CM

## 2021-10-07 DIAGNOSIS — Z79.4 TYPE 2 DIABETES MELLITUS WITH DIABETIC NEUROPATHY, WITH LONG-TERM CURRENT USE OF INSULIN (HCC): ICD-10-CM

## 2021-10-07 DIAGNOSIS — L97.522 ULCER OF LEFT FOOT, WITH FAT LAYER EXPOSED (HCC): Primary | ICD-10-CM

## 2021-10-07 PROCEDURE — 99213 OFFICE O/P EST LOW 20 MIN: CPT | Performed by: PODIATRIST

## 2021-10-07 RX ORDER — LIDOCAINE HYDROCHLORIDE 40 MG/ML
5 SOLUTION TOPICAL ONCE
Status: COMPLETED | OUTPATIENT
Start: 2021-10-07 | End: 2021-10-07

## 2021-10-07 RX ADMIN — LIDOCAINE HYDROCHLORIDE 5 ML: 40 SOLUTION TOPICAL at 10:42

## 2021-10-21 ENCOUNTER — HOSPITAL ENCOUNTER (OUTPATIENT)
Dept: RADIOLOGY | Facility: HOSPITAL | Age: 61
Discharge: HOME | End: 2021-10-21
Attending: PODIATRIST
Payer: COMMERCIAL

## 2021-10-21 ENCOUNTER — OFFICE VISIT (OUTPATIENT)
Dept: WOUND CARE | Facility: HOSPITAL | Age: 61
End: 2021-10-21
Payer: COMMERCIAL

## 2021-10-21 VITALS
RESPIRATION RATE: 16 BRPM | SYSTOLIC BLOOD PRESSURE: 150 MMHG | TEMPERATURE: 97.6 F | HEART RATE: 75 BPM | DIASTOLIC BLOOD PRESSURE: 87 MMHG

## 2021-10-21 DIAGNOSIS — L03.032 CELLULITIS OF TOE, LEFT: ICD-10-CM

## 2021-10-21 DIAGNOSIS — L97.521 ULCER OF GREAT TOE, LEFT, LIMITED TO BREAKDOWN OF SKIN (HCC): ICD-10-CM

## 2021-10-21 DIAGNOSIS — E11.40 TYPE 2 DIABETES MELLITUS WITH DIABETIC NEUROPATHY, WITH LONG-TERM CURRENT USE OF INSULIN (HCC): ICD-10-CM

## 2021-10-21 DIAGNOSIS — Z79.4 TYPE 2 DIABETES MELLITUS WITH DIABETIC NEUROPATHY, WITH LONG-TERM CURRENT USE OF INSULIN (HCC): ICD-10-CM

## 2021-10-21 DIAGNOSIS — L97.522 ULCER OF LEFT FOOT, WITH FAT LAYER EXPOSED (HCC): Primary | ICD-10-CM

## 2021-10-21 PROCEDURE — 99214 OFFICE O/P EST MOD 30 MIN: CPT | Performed by: PODIATRIST

## 2021-10-21 PROCEDURE — 73660 X-RAY EXAM OF TOE(S): CPT

## 2021-10-21 PROCEDURE — 11042 DBRDMT SUBQ TIS 1ST 20SQCM/<: CPT | Performed by: PODIATRIST

## 2021-10-21 PROCEDURE — 99213 OFFICE O/P EST LOW 20 MIN: CPT | Performed by: PODIATRIST

## 2021-10-21 RX ORDER — CEPHALEXIN 500 MG/1
500 CAPSULE ORAL EVERY 6 HOURS SCHEDULED
Qty: 28 CAPSULE | Refills: 0 | Status: SHIPPED | OUTPATIENT
Start: 2021-10-21 | End: 2021-10-28

## 2021-10-28 ENCOUNTER — OFFICE VISIT (OUTPATIENT)
Dept: WOUND CARE | Facility: HOSPITAL | Age: 61
End: 2021-10-28
Payer: COMMERCIAL

## 2021-10-28 VITALS
RESPIRATION RATE: 16 BRPM | TEMPERATURE: 96.7 F | SYSTOLIC BLOOD PRESSURE: 149 MMHG | HEART RATE: 77 BPM | DIASTOLIC BLOOD PRESSURE: 77 MMHG

## 2021-10-28 DIAGNOSIS — L97.521 ULCER OF GREAT TOE, LEFT, LIMITED TO BREAKDOWN OF SKIN (HCC): ICD-10-CM

## 2021-10-28 DIAGNOSIS — L97.522 ULCER OF LEFT FOOT, WITH FAT LAYER EXPOSED (HCC): Primary | ICD-10-CM

## 2021-10-28 DIAGNOSIS — E11.40 TYPE 2 DIABETES MELLITUS WITH DIABETIC NEUROPATHY, WITH LONG-TERM CURRENT USE OF INSULIN (HCC): ICD-10-CM

## 2021-10-28 DIAGNOSIS — Z79.4 TYPE 2 DIABETES MELLITUS WITH DIABETIC NEUROPATHY, WITH LONG-TERM CURRENT USE OF INSULIN (HCC): ICD-10-CM

## 2021-10-28 PROCEDURE — 99213 OFFICE O/P EST LOW 20 MIN: CPT | Performed by: PODIATRIST

## 2021-10-28 RX ORDER — LIDOCAINE HYDROCHLORIDE 40 MG/ML
5 SOLUTION TOPICAL ONCE
Status: COMPLETED | OUTPATIENT
Start: 2021-10-28 | End: 2021-10-28

## 2021-10-28 RX ADMIN — LIDOCAINE HYDROCHLORIDE 5 ML: 40 SOLUTION TOPICAL at 09:15

## 2021-11-04 ENCOUNTER — OFFICE VISIT (OUTPATIENT)
Dept: WOUND CARE | Facility: HOSPITAL | Age: 61
End: 2021-11-04
Payer: COMMERCIAL

## 2021-11-04 VITALS
RESPIRATION RATE: 16 BRPM | SYSTOLIC BLOOD PRESSURE: 162 MMHG | DIASTOLIC BLOOD PRESSURE: 80 MMHG | HEART RATE: 82 BPM | TEMPERATURE: 96.5 F

## 2021-11-04 DIAGNOSIS — Z79.4 TYPE 2 DIABETES MELLITUS WITH DIABETIC NEUROPATHY, WITH LONG-TERM CURRENT USE OF INSULIN (HCC): ICD-10-CM

## 2021-11-04 DIAGNOSIS — E11.40 TYPE 2 DIABETES MELLITUS WITH DIABETIC NEUROPATHY, WITH LONG-TERM CURRENT USE OF INSULIN (HCC): ICD-10-CM

## 2021-11-04 DIAGNOSIS — L97.522 ULCER OF LEFT FOOT, WITH FAT LAYER EXPOSED (HCC): Primary | ICD-10-CM

## 2021-11-04 PROCEDURE — 99212 OFFICE O/P EST SF 10 MIN: CPT | Performed by: PODIATRIST

## 2021-11-04 PROCEDURE — 99213 OFFICE O/P EST LOW 20 MIN: CPT | Performed by: PODIATRIST

## 2021-11-11 ENCOUNTER — OFFICE VISIT (OUTPATIENT)
Dept: WOUND CARE | Facility: HOSPITAL | Age: 61
End: 2021-11-11
Payer: COMMERCIAL

## 2021-11-11 VITALS
RESPIRATION RATE: 16 BRPM | DIASTOLIC BLOOD PRESSURE: 91 MMHG | TEMPERATURE: 96.4 F | HEART RATE: 85 BPM | SYSTOLIC BLOOD PRESSURE: 159 MMHG

## 2021-11-11 DIAGNOSIS — E11.40 TYPE 2 DIABETES MELLITUS WITH DIABETIC NEUROPATHY, WITH LONG-TERM CURRENT USE OF INSULIN (HCC): ICD-10-CM

## 2021-11-11 DIAGNOSIS — Z79.4 TYPE 2 DIABETES MELLITUS WITH DIABETIC NEUROPATHY, WITH LONG-TERM CURRENT USE OF INSULIN (HCC): ICD-10-CM

## 2021-11-11 DIAGNOSIS — L97.522 ULCER OF LEFT FOOT, WITH FAT LAYER EXPOSED (HCC): Primary | ICD-10-CM

## 2021-11-11 PROCEDURE — 99213 OFFICE O/P EST LOW 20 MIN: CPT | Performed by: PODIATRIST

## 2021-11-11 PROCEDURE — 99212 OFFICE O/P EST SF 10 MIN: CPT | Performed by: PODIATRIST

## 2021-12-02 NOTE — TELEPHONE ENCOUNTER
Called Dr Tom Saucedo office and left a voicemail with the offices answering service requesting a doctor to doctor referral for patient's upcoming consult appointment  Once referral is sent, please attach and change appointment from NP to consult

## 2021-12-07 ENCOUNTER — OFFICE VISIT (OUTPATIENT)
Dept: NEPHROLOGY | Facility: CLINIC | Age: 61
End: 2021-12-07
Payer: COMMERCIAL

## 2021-12-07 VITALS
HEIGHT: 70 IN | HEART RATE: 72 BPM | DIASTOLIC BLOOD PRESSURE: 86 MMHG | SYSTOLIC BLOOD PRESSURE: 128 MMHG | WEIGHT: 184 LBS | BODY MASS INDEX: 26.34 KG/M2

## 2021-12-07 DIAGNOSIS — E11.65 TYPE 2 DIABETES MELLITUS WITH HYPERGLYCEMIA, WITH LONG-TERM CURRENT USE OF INSULIN (HCC): ICD-10-CM

## 2021-12-07 DIAGNOSIS — N18.32 STAGE 3B CHRONIC KIDNEY DISEASE (HCC): Primary | ICD-10-CM

## 2021-12-07 DIAGNOSIS — Z79.4 TYPE 2 DIABETES MELLITUS WITH HYPERGLYCEMIA, WITH LONG-TERM CURRENT USE OF INSULIN (HCC): ICD-10-CM

## 2021-12-07 DIAGNOSIS — I10 BENIGN ESSENTIAL HYPERTENSION: ICD-10-CM

## 2021-12-07 DIAGNOSIS — I25.5 ISCHEMIC CARDIOMYOPATHY: ICD-10-CM

## 2021-12-07 LAB
SL AMB  POCT GLUCOSE, UA: 100
SL AMB LEUKOCYTE ESTERASE,UA: NEGATIVE
SL AMB POCT BILIRUBIN,UA: NEGATIVE
SL AMB POCT BLOOD,UA: POSITIVE
SL AMB POCT CLARITY,UA: CLEAR
SL AMB POCT COLOR,UA: ABNORMAL
SL AMB POCT KETONES,UA: NEGATIVE
SL AMB POCT NITRITE,UA: NEGATIVE
SL AMB POCT PH,UA: 5
SL AMB POCT SPECIFIC GRAVITY,UA: 1.01
SL AMB POCT URINE PROTEIN: 2000
SL AMB POCT UROBILINOGEN: 0.2

## 2021-12-07 PROCEDURE — 81002 URINALYSIS NONAUTO W/O SCOPE: CPT | Performed by: INTERNAL MEDICINE

## 2021-12-07 PROCEDURE — 99244 OFF/OP CNSLTJ NEW/EST MOD 40: CPT | Performed by: INTERNAL MEDICINE

## 2021-12-07 RX ORDER — BLOOD PRESSURE TEST KIT-MEDIUM
KIT MISCELLANEOUS 2 TIMES DAILY
Qty: 1 KIT | Refills: 0 | Status: SHIPPED | OUTPATIENT
Start: 2021-12-07

## 2021-12-08 ENCOUNTER — APPOINTMENT (OUTPATIENT)
Dept: LAB | Facility: CLINIC | Age: 61
End: 2021-12-08
Payer: COMMERCIAL

## 2021-12-08 ENCOUNTER — TELEPHONE (OUTPATIENT)
Dept: NEPHROLOGY | Facility: CLINIC | Age: 61
End: 2021-12-08

## 2021-12-08 DIAGNOSIS — N18.32 STAGE 3B CHRONIC KIDNEY DISEASE (HCC): ICD-10-CM

## 2021-12-08 DIAGNOSIS — N18.32 STAGE 3B CHRONIC KIDNEY DISEASE (HCC): Primary | ICD-10-CM

## 2021-12-08 LAB
ALBUMIN SERPL BCP-MCNC: 3.3 G/DL (ref 3.5–5)
ALP SERPL-CCNC: 75 U/L (ref 46–116)
ALT SERPL W P-5'-P-CCNC: 23 U/L (ref 12–78)
ANION GAP SERPL CALCULATED.3IONS-SCNC: 6 MMOL/L (ref 4–13)
AST SERPL W P-5'-P-CCNC: 20 U/L (ref 5–45)
BACTERIA UR QL AUTO: ABNORMAL /HPF
BILIRUB SERPL-MCNC: 0.96 MG/DL (ref 0.2–1)
BILIRUB UR QL STRIP: NEGATIVE
BUN SERPL-MCNC: 34 MG/DL (ref 5–25)
CALCIUM ALBUM COR SERPL-MCNC: 9.2 MG/DL (ref 8.3–10.1)
CALCIUM SERPL-MCNC: 8.6 MG/DL (ref 8.3–10.1)
CHLORIDE SERPL-SCNC: 104 MMOL/L (ref 100–108)
CLARITY UR: CLEAR
CO2 SERPL-SCNC: 30 MMOL/L (ref 21–32)
COLOR UR: YELLOW
CREAT SERPL-MCNC: 2.04 MG/DL (ref 0.6–1.3)
CREAT UR-MCNC: 77 MG/DL
CREAT UR-MCNC: 79.8 MG/DL
GFR SERPL CREATININE-BSD FRML MDRD: 34 ML/MIN/1.73SQ M
GLUCOSE P FAST SERPL-MCNC: 101 MG/DL (ref 65–99)
GLUCOSE UR STRIP-MCNC: ABNORMAL MG/DL
HGB UR QL STRIP.AUTO: ABNORMAL
HYALINE CASTS #/AREA URNS LPF: ABNORMAL /LPF
KETONES UR STRIP-MCNC: NEGATIVE MG/DL
LEUKOCYTE ESTERASE UR QL STRIP: NEGATIVE
MICROALBUMIN UR-MCNC: 2250 MG/L (ref 0–20)
MICROALBUMIN/CREAT 24H UR: 2820 MG/G CREATININE (ref 0–30)
MUCOUS THREADS UR QL AUTO: ABNORMAL
NITRITE UR QL STRIP: NEGATIVE
NON-SQ EPI CELLS URNS QL MICRO: ABNORMAL /HPF
PH UR STRIP.AUTO: 5.5 [PH]
POTASSIUM SERPL-SCNC: 3.4 MMOL/L (ref 3.5–5.3)
PROT SERPL-MCNC: 7.2 G/DL (ref 6.4–8.2)
PROT UR STRIP-MCNC: ABNORMAL MG/DL
PROT UR-MCNC: 303 MG/DL
PROT/CREAT UR: 3.94 MG/G{CREAT} (ref 0–0.1)
RBC #/AREA URNS AUTO: ABNORMAL /HPF
SODIUM SERPL-SCNC: 140 MMOL/L (ref 136–145)
SP GR UR STRIP.AUTO: 1.02 (ref 1–1.03)
UROBILINOGEN UR QL STRIP.AUTO: 0.2 E.U./DL
WBC #/AREA URNS AUTO: ABNORMAL /HPF

## 2021-12-08 PROCEDURE — 82043 UR ALBUMIN QUANTITATIVE: CPT

## 2021-12-08 PROCEDURE — 36415 COLL VENOUS BLD VENIPUNCTURE: CPT

## 2021-12-08 PROCEDURE — 84156 ASSAY OF PROTEIN URINE: CPT

## 2021-12-08 PROCEDURE — 80053 COMPREHEN METABOLIC PANEL: CPT

## 2021-12-08 PROCEDURE — 82570 ASSAY OF URINE CREATININE: CPT

## 2021-12-08 PROCEDURE — 81001 URINALYSIS AUTO W/SCOPE: CPT

## 2021-12-10 ENCOUNTER — OFFICE VISIT (OUTPATIENT)
Dept: PODIATRY | Facility: CLINIC | Age: 61
End: 2021-12-10
Payer: COMMERCIAL

## 2021-12-10 VITALS
WEIGHT: 188.4 LBS | BODY MASS INDEX: 26.97 KG/M2 | SYSTOLIC BLOOD PRESSURE: 117 MMHG | HEART RATE: 66 BPM | DIASTOLIC BLOOD PRESSURE: 81 MMHG | HEIGHT: 70 IN

## 2021-12-10 DIAGNOSIS — E11.40 TYPE 2 DIABETES MELLITUS WITH DIABETIC NEUROPATHY, WITH LONG-TERM CURRENT USE OF INSULIN (HCC): Primary | ICD-10-CM

## 2021-12-10 DIAGNOSIS — Z79.4 TYPE 2 DIABETES MELLITUS WITH DIABETIC NEUROPATHY, WITH LONG-TERM CURRENT USE OF INSULIN (HCC): Primary | ICD-10-CM

## 2021-12-10 DIAGNOSIS — B35.1 ONYCHOMYCOSIS: ICD-10-CM

## 2021-12-10 DIAGNOSIS — I87.312 CHRONIC VENOUS HYPERTENSION (IDIOPATHIC) WITH ULCER OF LEFT LOWER EXTREMITY (HCC): ICD-10-CM

## 2021-12-10 DIAGNOSIS — L97.929 CHRONIC VENOUS HYPERTENSION (IDIOPATHIC) WITH ULCER OF LEFT LOWER EXTREMITY (HCC): ICD-10-CM

## 2021-12-10 PROCEDURE — 11721 DEBRIDE NAIL 6 OR MORE: CPT | Performed by: PODIATRIST

## 2021-12-10 PROCEDURE — 99213 OFFICE O/P EST LOW 20 MIN: CPT | Performed by: PODIATRIST

## 2021-12-15 ENCOUNTER — APPOINTMENT (OUTPATIENT)
Dept: LAB | Facility: CLINIC | Age: 61
End: 2021-12-15
Payer: COMMERCIAL

## 2021-12-15 DIAGNOSIS — N18.32 STAGE 3B CHRONIC KIDNEY DISEASE (HCC): ICD-10-CM

## 2021-12-15 LAB
ANION GAP SERPL CALCULATED.3IONS-SCNC: 6 MMOL/L (ref 4–13)
BUN SERPL-MCNC: 33 MG/DL (ref 5–25)
CALCIUM SERPL-MCNC: 8.9 MG/DL (ref 8.3–10.1)
CHLORIDE SERPL-SCNC: 105 MMOL/L (ref 100–108)
CO2 SERPL-SCNC: 32 MMOL/L (ref 21–32)
CREAT SERPL-MCNC: 2.01 MG/DL (ref 0.6–1.3)
GFR SERPL CREATININE-BSD FRML MDRD: 34 ML/MIN/1.73SQ M
GLUCOSE P FAST SERPL-MCNC: 86 MG/DL (ref 65–99)
POTASSIUM SERPL-SCNC: 3.3 MMOL/L (ref 3.5–5.3)
SODIUM SERPL-SCNC: 143 MMOL/L (ref 136–145)

## 2021-12-15 PROCEDURE — 80048 BASIC METABOLIC PNL TOTAL CA: CPT

## 2021-12-15 PROCEDURE — 36415 COLL VENOUS BLD VENIPUNCTURE: CPT

## 2021-12-16 ENCOUNTER — TELEPHONE (OUTPATIENT)
Dept: NEPHROLOGY | Facility: CLINIC | Age: 61
End: 2021-12-16

## 2021-12-16 DIAGNOSIS — N18.32 STAGE 3B CHRONIC KIDNEY DISEASE (HCC): Primary | ICD-10-CM

## 2021-12-21 ENCOUNTER — TELEPHONE (OUTPATIENT)
Dept: NEPHROLOGY | Facility: CLINIC | Age: 61
End: 2021-12-21

## 2021-12-22 ENCOUNTER — APPOINTMENT (EMERGENCY)
Dept: RADIOLOGY | Facility: HOSPITAL | Age: 61
DRG: 866 | End: 2021-12-22
Payer: COMMERCIAL

## 2021-12-22 ENCOUNTER — APPOINTMENT (EMERGENCY)
Dept: CT IMAGING | Facility: HOSPITAL | Age: 61
DRG: 866 | End: 2021-12-22
Payer: COMMERCIAL

## 2021-12-22 ENCOUNTER — HOSPITAL ENCOUNTER (INPATIENT)
Facility: HOSPITAL | Age: 61
LOS: 2 days | Discharge: HOME | DRG: 866 | End: 2021-12-26
Attending: EMERGENCY MEDICINE | Admitting: INTERNAL MEDICINE
Payer: COMMERCIAL

## 2021-12-22 DIAGNOSIS — R09.89 PULMONARY VASCULAR CONGESTION: ICD-10-CM

## 2021-12-22 DIAGNOSIS — N18.9 CHRONIC KIDNEY DISEASE: Primary | ICD-10-CM

## 2021-12-22 DIAGNOSIS — K40.90 LEFT INGUINAL HERNIA: ICD-10-CM

## 2021-12-22 DIAGNOSIS — K56.609 SMALL BOWEL OBSTRUCTION (HCC): ICD-10-CM

## 2021-12-22 DIAGNOSIS — R59.0 LYMPHADENOPATHY, ABDOMINAL: ICD-10-CM

## 2021-12-22 DIAGNOSIS — N13.30 HYDRONEPHROSIS: ICD-10-CM

## 2021-12-22 DIAGNOSIS — Z79.4 TYPE 2 DIABETES MELLITUS WITH HYPERGLYCEMIA, WITH LONG-TERM CURRENT USE OF INSULIN (HCC): ICD-10-CM

## 2021-12-22 DIAGNOSIS — J11.1 INFLUENZA: ICD-10-CM

## 2021-12-22 DIAGNOSIS — R91.8 RIGHT UPPER LOBE PULMONARY INFILTRATE: ICD-10-CM

## 2021-12-22 DIAGNOSIS — E11.65 TYPE 2 DIABETES MELLITUS WITH HYPERGLYCEMIA, WITH LONG-TERM CURRENT USE OF INSULIN (HCC): ICD-10-CM

## 2021-12-22 DIAGNOSIS — N17.9 AKI (ACUTE KIDNEY INJURY) (HCC): ICD-10-CM

## 2021-12-22 PROBLEM — R79.89 ELEVATED SERUM CREATININE: Status: ACTIVE | Noted: 2021-12-22

## 2021-12-22 PROBLEM — J10.1 URI DUE TO INFLUENZA A VIRUS: Status: ACTIVE | Noted: 2021-12-22

## 2021-12-22 PROBLEM — R05.9 COUGH: Status: ACTIVE | Noted: 2021-12-22

## 2021-12-22 PROBLEM — R10.32 LLQ PAIN: Status: ACTIVE | Noted: 2021-12-22

## 2021-12-22 LAB
4HR DELTA HS TROPONIN: 1 NG/L
ALBUMIN SERPL BCP-MCNC: 3.3 G/DL (ref 3.5–5)
ALP SERPL-CCNC: 77 U/L (ref 46–116)
ALT SERPL W P-5'-P-CCNC: 24 U/L (ref 12–78)
AMORPH URATE CRY URNS QL MICRO: ABNORMAL /HPF
ANION GAP SERPL CALCULATED.3IONS-SCNC: 10 MMOL/L (ref 4–13)
AST SERPL W P-5'-P-CCNC: 20 U/L (ref 5–45)
BACTERIA UR QL AUTO: ABNORMAL /HPF
BASOPHILS # BLD AUTO: 0.03 THOUSANDS/ΜL (ref 0–0.1)
BASOPHILS NFR BLD AUTO: 1 % (ref 0–1)
BILIRUB SERPL-MCNC: 1.43 MG/DL (ref 0.2–1)
BILIRUB UR QL STRIP: NEGATIVE
BUN SERPL-MCNC: 38 MG/DL (ref 5–25)
CALCIUM ALBUM COR SERPL-MCNC: 9.7 MG/DL (ref 8.3–10.1)
CALCIUM SERPL-MCNC: 9.1 MG/DL (ref 8.3–10.1)
CARDIAC TROPONIN I PNL SERPL HS: 22 NG/L
CARDIAC TROPONIN I PNL SERPL HS: 23 NG/L
CHLORIDE SERPL-SCNC: 100 MMOL/L (ref 100–108)
CLARITY UR: CLEAR
CO2 SERPL-SCNC: 26 MMOL/L (ref 21–32)
COARSE GRAN CASTS URNS QL MICRO: ABNORMAL /LPF
COLOR UR: YELLOW
CREAT SERPL-MCNC: 2.23 MG/DL (ref 0.6–1.3)
D DIMER PPP FEU-MCNC: 0.52 UG/ML FEU
EOSINOPHIL # BLD AUTO: 0.04 THOUSAND/ΜL (ref 0–0.61)
EOSINOPHIL NFR BLD AUTO: 1 % (ref 0–6)
ERYTHROCYTE [DISTWIDTH] IN BLOOD BY AUTOMATED COUNT: 13.6 % (ref 11.6–15.1)
FLUAV RNA RESP QL NAA+PROBE: POSITIVE
FLUBV RNA RESP QL NAA+PROBE: NEGATIVE
GFR SERPL CREATININE-BSD FRML MDRD: 30 ML/MIN/1.73SQ M
GLUCOSE SERPL-MCNC: 171 MG/DL (ref 65–140)
GLUCOSE UR STRIP-MCNC: ABNORMAL MG/DL
HCT VFR BLD AUTO: 39.8 % (ref 36.5–49.3)
HGB BLD-MCNC: 12.7 G/DL (ref 12–17)
HGB UR QL STRIP.AUTO: ABNORMAL
HOLD SPECIMEN: NORMAL
HYALINE CASTS #/AREA URNS LPF: ABNORMAL /LPF
IMM GRANULOCYTES # BLD AUTO: 0.02 THOUSAND/UL (ref 0–0.2)
IMM GRANULOCYTES NFR BLD AUTO: 0 % (ref 0–2)
KETONES UR STRIP-MCNC: NEGATIVE MG/DL
LACTATE SERPL-SCNC: 1.2 MMOL/L (ref 0.5–2)
LEUKOCYTE ESTERASE UR QL STRIP: NEGATIVE
LIPASE SERPL-CCNC: 44 U/L (ref 73–393)
LYMPHOCYTES # BLD AUTO: 0.5 THOUSANDS/ΜL (ref 0.6–4.47)
LYMPHOCYTES NFR BLD AUTO: 9 % (ref 14–44)
MCH RBC QN AUTO: 27.8 PG (ref 26.8–34.3)
MCHC RBC AUTO-ENTMCNC: 31.9 G/DL (ref 31.4–37.4)
MCV RBC AUTO: 87 FL (ref 82–98)
MONOCYTES # BLD AUTO: 0.6 THOUSAND/ΜL (ref 0.17–1.22)
MONOCYTES NFR BLD AUTO: 11 % (ref 4–12)
NEUTROPHILS # BLD AUTO: 4.55 THOUSANDS/ΜL (ref 1.85–7.62)
NEUTS SEG NFR BLD AUTO: 78 % (ref 43–75)
NITRITE UR QL STRIP: NEGATIVE
NON-SQ EPI CELLS URNS QL MICRO: ABNORMAL /HPF
NRBC BLD AUTO-RTO: 0 /100 WBCS
NT-PROBNP SERPL-MCNC: 7650 PG/ML
PH UR STRIP.AUTO: 5.5 [PH]
PLATELET # BLD AUTO: 198 THOUSANDS/UL (ref 149–390)
PMV BLD AUTO: 9.6 FL (ref 8.9–12.7)
POTASSIUM SERPL-SCNC: 4.8 MMOL/L (ref 3.5–5.3)
PROT SERPL-MCNC: 7.7 G/DL (ref 6.4–8.2)
PROT UR STRIP-MCNC: ABNORMAL MG/DL
RBC # BLD AUTO: 4.57 MILLION/UL (ref 3.88–5.62)
RBC #/AREA URNS AUTO: ABNORMAL /HPF
RSV RNA RESP QL NAA+PROBE: NEGATIVE
SARS-COV-2 RNA RESP QL NAA+PROBE: NEGATIVE
SODIUM SERPL-SCNC: 136 MMOL/L (ref 136–145)
SP GR UR STRIP.AUTO: 1.02 (ref 1–1.03)
UROBILINOGEN UR QL STRIP.AUTO: 0.2 E.U./DL
WBC # BLD AUTO: 5.74 THOUSAND/UL (ref 4.31–10.16)
WBC #/AREA URNS AUTO: ABNORMAL /HPF

## 2021-12-22 PROCEDURE — G1004 CDSM NDSC: HCPCS

## 2021-12-22 PROCEDURE — 71275 CT ANGIOGRAPHY CHEST: CPT

## 2021-12-22 PROCEDURE — 85379 FIBRIN DEGRADATION QUANT: CPT | Performed by: EMERGENCY MEDICINE

## 2021-12-22 PROCEDURE — 80053 COMPREHEN METABOLIC PANEL: CPT | Performed by: EMERGENCY MEDICINE

## 2021-12-22 PROCEDURE — 36415 COLL VENOUS BLD VENIPUNCTURE: CPT

## 2021-12-22 PROCEDURE — 81001 URINALYSIS AUTO W/SCOPE: CPT

## 2021-12-22 PROCEDURE — 85025 COMPLETE CBC W/AUTO DIFF WBC: CPT | Performed by: EMERGENCY MEDICINE

## 2021-12-22 PROCEDURE — 71045 X-RAY EXAM CHEST 1 VIEW: CPT

## 2021-12-22 PROCEDURE — 84484 ASSAY OF TROPONIN QUANT: CPT | Performed by: EMERGENCY MEDICINE

## 2021-12-22 PROCEDURE — 87040 BLOOD CULTURE FOR BACTERIA: CPT | Performed by: EMERGENCY MEDICINE

## 2021-12-22 PROCEDURE — 74177 CT ABD & PELVIS W/CONTRAST: CPT

## 2021-12-22 PROCEDURE — 99285 EMERGENCY DEPT VISIT HI MDM: CPT | Performed by: EMERGENCY MEDICINE

## 2021-12-22 PROCEDURE — 96365 THER/PROPH/DIAG IV INF INIT: CPT

## 2021-12-22 PROCEDURE — 83605 ASSAY OF LACTIC ACID: CPT | Performed by: EMERGENCY MEDICINE

## 2021-12-22 PROCEDURE — 99285 EMERGENCY DEPT VISIT HI MDM: CPT

## 2021-12-22 PROCEDURE — 83690 ASSAY OF LIPASE: CPT | Performed by: EMERGENCY MEDICINE

## 2021-12-22 PROCEDURE — 96375 TX/PRO/DX INJ NEW DRUG ADDON: CPT

## 2021-12-22 PROCEDURE — NC001 PR NO CHARGE: Performed by: SURGERY

## 2021-12-22 PROCEDURE — 83880 ASSAY OF NATRIURETIC PEPTIDE: CPT | Performed by: EMERGENCY MEDICINE

## 2021-12-22 PROCEDURE — 0241U HB NFCT DS VIR RESP RNA 4 TRGT: CPT | Performed by: EMERGENCY MEDICINE

## 2021-12-22 RX ORDER — OXYCODONE HYDROCHLORIDE 5 MG/1
2.5 TABLET ORAL EVERY 4 HOURS PRN
Status: DISCONTINUED | OUTPATIENT
Start: 2021-12-22 | End: 2021-12-26 | Stop reason: HOSPADM

## 2021-12-22 RX ORDER — ONDANSETRON 2 MG/ML
4 INJECTION INTRAMUSCULAR; INTRAVENOUS ONCE
Status: COMPLETED | OUTPATIENT
Start: 2021-12-22 | End: 2021-12-22

## 2021-12-22 RX ORDER — POTASSIUM CHLORIDE 20 MEQ/1
20 TABLET, EXTENDED RELEASE ORAL 2 TIMES DAILY WITH MEALS
Status: DISCONTINUED | OUTPATIENT
Start: 2021-12-23 | End: 2021-12-26 | Stop reason: HOSPADM

## 2021-12-22 RX ORDER — HYDROMORPHONE HCL/PF 1 MG/ML
0.2 SYRINGE (ML) INJECTION EVERY 4 HOURS PRN
Status: DISCONTINUED | OUTPATIENT
Start: 2021-12-22 | End: 2021-12-26 | Stop reason: HOSPADM

## 2021-12-22 RX ORDER — DOXYCYCLINE HYCLATE 100 MG/1
100 CAPSULE ORAL 2 TIMES DAILY
Qty: 14 CAPSULE | Refills: 0 | Status: CANCELLED | OUTPATIENT
Start: 2021-12-22 | End: 2021-12-29

## 2021-12-22 RX ORDER — HYDROMORPHONE HCL/PF 1 MG/ML
0.5 SYRINGE (ML) INJECTION ONCE
Status: COMPLETED | OUTPATIENT
Start: 2021-12-22 | End: 2021-12-22

## 2021-12-22 RX ORDER — OXYCODONE HYDROCHLORIDE 5 MG/1
5 TABLET ORAL EVERY 4 HOURS PRN
Status: DISCONTINUED | OUTPATIENT
Start: 2021-12-22 | End: 2021-12-26 | Stop reason: HOSPADM

## 2021-12-22 RX ADMIN — ONDANSETRON 4 MG: 2 INJECTION INTRAMUSCULAR; INTRAVENOUS at 15:14

## 2021-12-22 RX ADMIN — IOHEXOL 100 ML: 350 INJECTION, SOLUTION INTRAVENOUS at 16:54

## 2021-12-22 RX ADMIN — HYDROMORPHONE HYDROCHLORIDE 0.5 MG: 1 INJECTION, SOLUTION INTRAMUSCULAR; INTRAVENOUS; SUBCUTANEOUS at 15:13

## 2021-12-22 RX ADMIN — CEFTRIAXONE SODIUM 1000 MG: 10 INJECTION, POWDER, FOR SOLUTION INTRAVENOUS at 18:44

## 2021-12-23 ENCOUNTER — APPOINTMENT (OUTPATIENT)
Dept: RADIOLOGY | Facility: HOSPITAL | Age: 61
DRG: 866 | End: 2021-12-23
Payer: COMMERCIAL

## 2021-12-23 PROBLEM — M87.00 AVN (AVASCULAR NECROSIS OF BONE) (HCC): Status: ACTIVE | Noted: 2021-12-23

## 2021-12-23 PROBLEM — K74.60 CIRRHOSIS (HCC): Status: ACTIVE | Noted: 2021-12-23

## 2021-12-23 PROBLEM — R19.09 PRESACRAL MASS: Status: ACTIVE | Noted: 2021-12-23

## 2021-12-23 LAB
ANION GAP SERPL CALCULATED.3IONS-SCNC: 10 MMOL/L (ref 4–13)
BASOPHILS # BLD AUTO: 0.04 THOUSANDS/ΜL (ref 0–0.1)
BASOPHILS NFR BLD AUTO: 1 % (ref 0–1)
BUN SERPL-MCNC: 37 MG/DL (ref 5–25)
CALCIUM SERPL-MCNC: 8.5 MG/DL (ref 8.3–10.1)
CHLORIDE SERPL-SCNC: 99 MMOL/L (ref 100–108)
CO2 SERPL-SCNC: 28 MMOL/L (ref 21–32)
CREAT SERPL-MCNC: 2.06 MG/DL (ref 0.6–1.3)
EOSINOPHIL # BLD AUTO: 0.14 THOUSAND/ΜL (ref 0–0.61)
EOSINOPHIL NFR BLD AUTO: 2 % (ref 0–6)
ERYTHROCYTE [DISTWIDTH] IN BLOOD BY AUTOMATED COUNT: 13.6 % (ref 11.6–15.1)
GFR SERPL CREATININE-BSD FRML MDRD: 33 ML/MIN/1.73SQ M
GLUCOSE P FAST SERPL-MCNC: 52 MG/DL (ref 65–99)
GLUCOSE SERPL-MCNC: 123 MG/DL (ref 65–140)
GLUCOSE SERPL-MCNC: 145 MG/DL (ref 65–140)
GLUCOSE SERPL-MCNC: 239 MG/DL (ref 65–140)
GLUCOSE SERPL-MCNC: 47 MG/DL (ref 65–140)
GLUCOSE SERPL-MCNC: 52 MG/DL (ref 65–140)
HCT VFR BLD AUTO: 38.7 % (ref 36.5–49.3)
HGB BLD-MCNC: 12.4 G/DL (ref 12–17)
IMM GRANULOCYTES # BLD AUTO: 0.03 THOUSAND/UL (ref 0–0.2)
IMM GRANULOCYTES NFR BLD AUTO: 1 % (ref 0–2)
LYMPHOCYTES # BLD AUTO: 0.9 THOUSANDS/ΜL (ref 0.6–4.47)
LYMPHOCYTES NFR BLD AUTO: 15 % (ref 14–44)
MCH RBC QN AUTO: 27.3 PG (ref 26.8–34.3)
MCHC RBC AUTO-ENTMCNC: 32 G/DL (ref 31.4–37.4)
MCV RBC AUTO: 85 FL (ref 82–98)
MONOCYTES # BLD AUTO: 0.8 THOUSAND/ΜL (ref 0.17–1.22)
MONOCYTES NFR BLD AUTO: 13 % (ref 4–12)
NEUTROPHILS # BLD AUTO: 4.3 THOUSANDS/ΜL (ref 1.85–7.62)
NEUTS SEG NFR BLD AUTO: 68 % (ref 43–75)
NRBC BLD AUTO-RTO: 0 /100 WBCS
PLATELET # BLD AUTO: 204 THOUSANDS/UL (ref 149–390)
PMV BLD AUTO: 9.7 FL (ref 8.9–12.7)
POTASSIUM SERPL-SCNC: 3.4 MMOL/L (ref 3.5–5.3)
PROCALCITONIN SERPL-MCNC: 0.13 NG/ML
RBC # BLD AUTO: 4.54 MILLION/UL (ref 3.88–5.62)
SODIUM SERPL-SCNC: 137 MMOL/L (ref 136–145)
WBC # BLD AUTO: 6.21 THOUSAND/UL (ref 4.31–10.16)

## 2021-12-23 PROCEDURE — 99215 OFFICE O/P EST HI 40 MIN: CPT | Performed by: SURGERY

## 2021-12-23 PROCEDURE — 84145 PROCALCITONIN (PCT): CPT | Performed by: INTERNAL MEDICINE

## 2021-12-23 PROCEDURE — C9113 INJ PANTOPRAZOLE SODIUM, VIA: HCPCS | Performed by: INTERNAL MEDICINE

## 2021-12-23 PROCEDURE — 80048 BASIC METABOLIC PNL TOTAL CA: CPT | Performed by: INTERNAL MEDICINE

## 2021-12-23 PROCEDURE — 85025 COMPLETE CBC W/AUTO DIFF WBC: CPT | Performed by: INTERNAL MEDICINE

## 2021-12-23 PROCEDURE — 74022 RADEX COMPL AQT ABD SERIES: CPT

## 2021-12-23 PROCEDURE — 82948 REAGENT STRIP/BLOOD GLUCOSE: CPT

## 2021-12-23 PROCEDURE — 99220 PR INITIAL OBSERVATION CARE/DAY 70 MINUTES: CPT | Performed by: INTERNAL MEDICINE

## 2021-12-23 RX ORDER — POTASSIUM CHLORIDE 20 MEQ/1
20 TABLET, EXTENDED RELEASE ORAL ONCE
Status: COMPLETED | OUTPATIENT
Start: 2021-12-23 | End: 2021-12-23

## 2021-12-23 RX ORDER — ATORVASTATIN CALCIUM 40 MG/1
40 TABLET, FILM COATED ORAL DAILY
Status: DISCONTINUED | OUTPATIENT
Start: 2021-12-23 | End: 2021-12-26 | Stop reason: HOSPADM

## 2021-12-23 RX ORDER — ONDANSETRON 2 MG/ML
4 INJECTION INTRAMUSCULAR; INTRAVENOUS ONCE
Status: DISCONTINUED | OUTPATIENT
Start: 2021-12-23 | End: 2021-12-23

## 2021-12-23 RX ORDER — ONDANSETRON 2 MG/ML
4 INJECTION INTRAMUSCULAR; INTRAVENOUS EVERY 4 HOURS PRN
Status: DISCONTINUED | OUTPATIENT
Start: 2021-12-23 | End: 2021-12-26 | Stop reason: HOSPADM

## 2021-12-23 RX ORDER — HEPARIN SODIUM 5000 [USP'U]/ML
5000 INJECTION, SOLUTION INTRAVENOUS; SUBCUTANEOUS EVERY 8 HOURS SCHEDULED
Status: DISCONTINUED | OUTPATIENT
Start: 2021-12-23 | End: 2021-12-26 | Stop reason: HOSPADM

## 2021-12-23 RX ORDER — ACETAMINOPHEN 325 MG/1
650 TABLET ORAL EVERY 6 HOURS PRN
Status: DISCONTINUED | OUTPATIENT
Start: 2021-12-23 | End: 2021-12-26 | Stop reason: HOSPADM

## 2021-12-23 RX ORDER — SODIUM CHLORIDE 9 MG/ML
50 INJECTION, SOLUTION INTRAVENOUS CONTINUOUS
Status: DISPENSED | OUTPATIENT
Start: 2021-12-23 | End: 2021-12-23

## 2021-12-23 RX ORDER — PROMETHAZINE HYDROCHLORIDE 25 MG/ML
25 INJECTION, SOLUTION INTRAMUSCULAR; INTRAVENOUS ONCE
Status: COMPLETED | OUTPATIENT
Start: 2021-12-23 | End: 2021-12-23

## 2021-12-23 RX ORDER — INSULIN GLARGINE 100 [IU]/ML
30 INJECTION, SOLUTION SUBCUTANEOUS EVERY MORNING
Status: DISCONTINUED | OUTPATIENT
Start: 2021-12-23 | End: 2021-12-23

## 2021-12-23 RX ORDER — ASPIRIN 81 MG/1
81 TABLET, CHEWABLE ORAL DAILY
Status: DISCONTINUED | OUTPATIENT
Start: 2021-12-23 | End: 2021-12-26 | Stop reason: HOSPADM

## 2021-12-23 RX ORDER — PANTOPRAZOLE SODIUM 40 MG/1
40 INJECTION, POWDER, FOR SOLUTION INTRAVENOUS
Status: DISCONTINUED | OUTPATIENT
Start: 2021-12-23 | End: 2021-12-26 | Stop reason: HOSPADM

## 2021-12-23 RX ORDER — ONDANSETRON 2 MG/ML
4 INJECTION INTRAMUSCULAR; INTRAVENOUS EVERY 6 HOURS PRN
Status: DISCONTINUED | OUTPATIENT
Start: 2021-12-23 | End: 2021-12-23

## 2021-12-23 RX ORDER — PROMETHAZINE HYDROCHLORIDE 25 MG/ML
25 INJECTION, SOLUTION INTRAMUSCULAR; INTRAVENOUS EVERY 6 HOURS PRN
Status: DISCONTINUED | OUTPATIENT
Start: 2021-12-23 | End: 2021-12-23

## 2021-12-23 RX ORDER — INSULIN GLARGINE 100 [IU]/ML
20 INJECTION, SOLUTION SUBCUTANEOUS EVERY MORNING
Status: DISCONTINUED | OUTPATIENT
Start: 2021-12-24 | End: 2021-12-25

## 2021-12-23 RX ADMIN — INSULIN LISPRO 3 UNITS: 100 INJECTION, SOLUTION INTRAVENOUS; SUBCUTANEOUS at 12:10

## 2021-12-23 RX ADMIN — OXYCODONE HYDROCHLORIDE 5 MG: 5 TABLET ORAL at 22:25

## 2021-12-23 RX ADMIN — ASPIRIN 81 MG CHEWABLE TABLET 81 MG: 81 TABLET CHEWABLE at 09:26

## 2021-12-23 RX ADMIN — HEPARIN SODIUM 5000 UNITS: 5000 INJECTION INTRAVENOUS; SUBCUTANEOUS at 05:14

## 2021-12-23 RX ADMIN — POTASSIUM CHLORIDE 20 MEQ: 1500 TABLET, EXTENDED RELEASE ORAL at 16:25

## 2021-12-23 RX ADMIN — CEFTRIAXONE SODIUM 1000 MG: 10 INJECTION, POWDER, FOR SOLUTION INTRAVENOUS at 17:30

## 2021-12-23 RX ADMIN — PANTOPRAZOLE SODIUM 40 MG: 40 INJECTION, POWDER, FOR SOLUTION INTRAVENOUS at 15:33

## 2021-12-23 RX ADMIN — METOPROLOL TARTRATE 25 MG: 25 TABLET, FILM COATED ORAL at 09:26

## 2021-12-23 RX ADMIN — ONDANSETRON 4 MG: 2 INJECTION INTRAMUSCULAR; INTRAVENOUS at 14:23

## 2021-12-23 RX ADMIN — HEPARIN SODIUM 5000 UNITS: 5000 INJECTION INTRAVENOUS; SUBCUTANEOUS at 14:14

## 2021-12-23 RX ADMIN — ATORVASTATIN CALCIUM 40 MG: 40 TABLET, FILM COATED ORAL at 09:26

## 2021-12-23 RX ADMIN — PROMETHAZINE HYDROCHLORIDE 25 MG: 25 INJECTION INTRAMUSCULAR; INTRAVENOUS at 16:25

## 2021-12-23 RX ADMIN — OXYCODONE HYDROCHLORIDE 5 MG: 5 TABLET ORAL at 12:08

## 2021-12-23 RX ADMIN — POTASSIUM CHLORIDE 20 MEQ: 1500 TABLET, EXTENDED RELEASE ORAL at 09:34

## 2021-12-23 RX ADMIN — SODIUM CHLORIDE 50 ML/HR: 0.9 INJECTION, SOLUTION INTRAVENOUS at 05:14

## 2021-12-23 RX ADMIN — POTASSIUM CHLORIDE 20 MEQ: 1500 TABLET, EXTENDED RELEASE ORAL at 12:08

## 2021-12-23 RX ADMIN — HEPARIN SODIUM 5000 UNITS: 5000 INJECTION INTRAVENOUS; SUBCUTANEOUS at 22:25

## 2021-12-23 RX ADMIN — ONDANSETRON 4 MG: 2 INJECTION INTRAMUSCULAR; INTRAVENOUS at 22:25

## 2021-12-23 RX ADMIN — OXYCODONE HYDROCHLORIDE 5 MG: 5 TABLET ORAL at 16:25

## 2021-12-23 RX ADMIN — METOPROLOL TARTRATE 25 MG: 25 TABLET, FILM COATED ORAL at 22:25

## 2021-12-24 ENCOUNTER — APPOINTMENT (OUTPATIENT)
Dept: ULTRASOUND IMAGING | Facility: HOSPITAL | Age: 61
DRG: 866 | End: 2021-12-24
Payer: COMMERCIAL

## 2021-12-24 LAB
ANION GAP SERPL CALCULATED.3IONS-SCNC: 8 MMOL/L (ref 4–13)
BACTERIA UR QL AUTO: ABNORMAL /HPF
BILIRUB UR QL STRIP: NEGATIVE
BUN SERPL-MCNC: 46 MG/DL (ref 5–25)
CALCIUM SERPL-MCNC: 8.5 MG/DL (ref 8.3–10.1)
CHLORIDE SERPL-SCNC: 100 MMOL/L (ref 100–108)
CLARITY UR: CLEAR
CO2 SERPL-SCNC: 28 MMOL/L (ref 21–32)
COLOR UR: YELLOW
CREAT SERPL-MCNC: 2.56 MG/DL (ref 0.6–1.3)
ERYTHROCYTE [DISTWIDTH] IN BLOOD BY AUTOMATED COUNT: 13.4 % (ref 11.6–15.1)
EST. AVERAGE GLUCOSE BLD GHB EST-MCNC: 217 MG/DL
GFR SERPL CREATININE-BSD FRML MDRD: 25 ML/MIN/1.73SQ M
GLUCOSE P FAST SERPL-MCNC: 143 MG/DL (ref 65–99)
GLUCOSE SERPL-MCNC: 100 MG/DL (ref 65–140)
GLUCOSE SERPL-MCNC: 143 MG/DL (ref 65–140)
GLUCOSE SERPL-MCNC: 149 MG/DL (ref 65–140)
GLUCOSE SERPL-MCNC: 153 MG/DL (ref 65–140)
GLUCOSE SERPL-MCNC: 204 MG/DL (ref 65–140)
GLUCOSE SERPL-MCNC: 208 MG/DL (ref 65–140)
GLUCOSE UR STRIP-MCNC: NEGATIVE MG/DL
HBA1C MFR BLD: 9.2 %
HCT VFR BLD AUTO: 41 % (ref 36.5–49.3)
HGB BLD-MCNC: 13.4 G/DL (ref 12–17)
HGB UR QL STRIP.AUTO: ABNORMAL
KETONES UR STRIP-MCNC: NEGATIVE MG/DL
LEUKOCYTE ESTERASE UR QL STRIP: NEGATIVE
MCH RBC QN AUTO: 27.9 PG (ref 26.8–34.3)
MCHC RBC AUTO-ENTMCNC: 32.7 G/DL (ref 31.4–37.4)
MCV RBC AUTO: 85 FL (ref 82–98)
NITRITE UR QL STRIP: NEGATIVE
NON-SQ EPI CELLS URNS QL MICRO: ABNORMAL /HPF
PH UR STRIP.AUTO: 6 [PH]
PLATELET # BLD AUTO: 204 THOUSANDS/UL (ref 149–390)
PMV BLD AUTO: 9.8 FL (ref 8.9–12.7)
POTASSIUM SERPL-SCNC: 4.8 MMOL/L (ref 3.5–5.3)
PROCALCITONIN SERPL-MCNC: 0.14 NG/ML
PROT UR STRIP-MCNC: ABNORMAL MG/DL
RBC # BLD AUTO: 4.81 MILLION/UL (ref 3.88–5.62)
RBC #/AREA URNS AUTO: ABNORMAL /HPF
SODIUM SERPL-SCNC: 136 MMOL/L (ref 136–145)
SP GR UR STRIP.AUTO: 1.02 (ref 1–1.03)
UROBILINOGEN UR QL STRIP.AUTO: 0.2 E.U./DL
WBC # BLD AUTO: 6.2 THOUSAND/UL (ref 4.31–10.16)
WBC #/AREA URNS AUTO: ABNORMAL /HPF

## 2021-12-24 PROCEDURE — 76770 US EXAM ABDO BACK WALL COMP: CPT

## 2021-12-24 PROCEDURE — 85027 COMPLETE CBC AUTOMATED: CPT | Performed by: INTERNAL MEDICINE

## 2021-12-24 PROCEDURE — 83036 HEMOGLOBIN GLYCOSYLATED A1C: CPT | Performed by: INTERNAL MEDICINE

## 2021-12-24 PROCEDURE — 99232 SBSQ HOSP IP/OBS MODERATE 35: CPT | Performed by: SURGERY

## 2021-12-24 PROCEDURE — 81001 URINALYSIS AUTO W/SCOPE: CPT | Performed by: INTERNAL MEDICINE

## 2021-12-24 PROCEDURE — 82948 REAGENT STRIP/BLOOD GLUCOSE: CPT

## 2021-12-24 PROCEDURE — 80048 BASIC METABOLIC PNL TOTAL CA: CPT | Performed by: INTERNAL MEDICINE

## 2021-12-24 PROCEDURE — C9113 INJ PANTOPRAZOLE SODIUM, VIA: HCPCS | Performed by: INTERNAL MEDICINE

## 2021-12-24 PROCEDURE — 99255 IP/OBS CONSLTJ NEW/EST HI 80: CPT | Performed by: INTERNAL MEDICINE

## 2021-12-24 PROCEDURE — 99232 SBSQ HOSP IP/OBS MODERATE 35: CPT | Performed by: INTERNAL MEDICINE

## 2021-12-24 PROCEDURE — 84145 PROCALCITONIN (PCT): CPT | Performed by: INTERNAL MEDICINE

## 2021-12-24 RX ORDER — SODIUM CHLORIDE 9 MG/ML
75 INJECTION, SOLUTION INTRAVENOUS ONCE
Status: COMPLETED | OUTPATIENT
Start: 2021-12-24 | End: 2021-12-24

## 2021-12-24 RX ADMIN — CEFTRIAXONE SODIUM 1000 MG: 10 INJECTION, POWDER, FOR SOLUTION INTRAVENOUS at 17:56

## 2021-12-24 RX ADMIN — HEPARIN SODIUM 5000 UNITS: 5000 INJECTION INTRAVENOUS; SUBCUTANEOUS at 21:42

## 2021-12-24 RX ADMIN — POTASSIUM CHLORIDE 20 MEQ: 1500 TABLET, EXTENDED RELEASE ORAL at 17:56

## 2021-12-24 RX ADMIN — HEPARIN SODIUM 5000 UNITS: 5000 INJECTION INTRAVENOUS; SUBCUTANEOUS at 13:13

## 2021-12-24 RX ADMIN — INSULIN LISPRO 1 UNITS: 100 INJECTION, SOLUTION INTRAVENOUS; SUBCUTANEOUS at 18:04

## 2021-12-24 RX ADMIN — ATORVASTATIN CALCIUM 40 MG: 40 TABLET, FILM COATED ORAL at 09:19

## 2021-12-24 RX ADMIN — ASPIRIN 81 MG CHEWABLE TABLET 81 MG: 81 TABLET CHEWABLE at 09:18

## 2021-12-24 RX ADMIN — SODIUM CHLORIDE 75 ML/HR: 0.9 INJECTION, SOLUTION INTRAVENOUS at 09:19

## 2021-12-24 RX ADMIN — INSULIN GLARGINE 6 UNITS: 100 INJECTION, SOLUTION SUBCUTANEOUS at 09:19

## 2021-12-24 RX ADMIN — PANTOPRAZOLE SODIUM 40 MG: 40 INJECTION, POWDER, FOR SOLUTION INTRAVENOUS at 09:20

## 2021-12-24 RX ADMIN — HEPARIN SODIUM 5000 UNITS: 5000 INJECTION INTRAVENOUS; SUBCUTANEOUS at 05:52

## 2021-12-24 RX ADMIN — INSULIN LISPRO 1 UNITS: 100 INJECTION, SOLUTION INTRAVENOUS; SUBCUTANEOUS at 09:33

## 2021-12-24 RX ADMIN — METOPROLOL TARTRATE 25 MG: 25 TABLET, FILM COATED ORAL at 09:18

## 2021-12-24 RX ADMIN — POTASSIUM CHLORIDE 20 MEQ: 1500 TABLET, EXTENDED RELEASE ORAL at 09:33

## 2021-12-25 LAB
ALBUMIN SERPL BCP-MCNC: 2.5 G/DL (ref 3.5–5)
ALP SERPL-CCNC: 64 U/L (ref 46–116)
ALT SERPL W P-5'-P-CCNC: 17 U/L (ref 12–78)
ANION GAP SERPL CALCULATED.3IONS-SCNC: 7 MMOL/L (ref 4–13)
AST SERPL W P-5'-P-CCNC: 18 U/L (ref 5–45)
BILIRUB SERPL-MCNC: 0.77 MG/DL (ref 0.2–1)
BUN SERPL-MCNC: 52 MG/DL (ref 5–25)
CALCIUM ALBUM COR SERPL-MCNC: 9.4 MG/DL (ref 8.3–10.1)
CALCIUM SERPL-MCNC: 8.2 MG/DL (ref 8.3–10.1)
CHLORIDE SERPL-SCNC: 102 MMOL/L (ref 100–108)
CO2 SERPL-SCNC: 27 MMOL/L (ref 21–32)
CREAT SERPL-MCNC: 2.83 MG/DL (ref 0.6–1.3)
GFR SERPL CREATININE-BSD FRML MDRD: 22 ML/MIN/1.73SQ M
GLUCOSE SERPL-MCNC: 115 MG/DL (ref 65–140)
GLUCOSE SERPL-MCNC: 141 MG/DL (ref 65–140)
GLUCOSE SERPL-MCNC: 144 MG/DL (ref 65–140)
GLUCOSE SERPL-MCNC: 162 MG/DL (ref 65–140)
GLUCOSE SERPL-MCNC: 163 MG/DL (ref 65–140)
POTASSIUM SERPL-SCNC: 4.6 MMOL/L (ref 3.5–5.3)
PROT SERPL-MCNC: 6.3 G/DL (ref 6.4–8.2)
SODIUM SERPL-SCNC: 136 MMOL/L (ref 136–145)

## 2021-12-25 PROCEDURE — 99233 SBSQ HOSP IP/OBS HIGH 50: CPT | Performed by: INTERNAL MEDICINE

## 2021-12-25 PROCEDURE — 99231 SBSQ HOSP IP/OBS SF/LOW 25: CPT | Performed by: SURGERY

## 2021-12-25 PROCEDURE — 99232 SBSQ HOSP IP/OBS MODERATE 35: CPT | Performed by: INTERNAL MEDICINE

## 2021-12-25 PROCEDURE — 80053 COMPREHEN METABOLIC PANEL: CPT | Performed by: INTERNAL MEDICINE

## 2021-12-25 PROCEDURE — C9113 INJ PANTOPRAZOLE SODIUM, VIA: HCPCS | Performed by: INTERNAL MEDICINE

## 2021-12-25 PROCEDURE — 82948 REAGENT STRIP/BLOOD GLUCOSE: CPT

## 2021-12-25 RX ORDER — SODIUM CHLORIDE 9 MG/ML
50 INJECTION, SOLUTION INTRAVENOUS ONCE
Status: COMPLETED | OUTPATIENT
Start: 2021-12-25 | End: 2021-12-25

## 2021-12-25 RX ORDER — SODIUM CHLORIDE 9 MG/ML
50 INJECTION, SOLUTION INTRAVENOUS ONCE
Status: DISCONTINUED | OUTPATIENT
Start: 2021-12-25 | End: 2021-12-25

## 2021-12-25 RX ORDER — INSULIN GLARGINE 100 [IU]/ML
10 INJECTION, SOLUTION SUBCUTANEOUS EVERY MORNING
Status: DISCONTINUED | OUTPATIENT
Start: 2021-12-26 | End: 2021-12-26 | Stop reason: HOSPADM

## 2021-12-25 RX ADMIN — POTASSIUM CHLORIDE 20 MEQ: 1500 TABLET, EXTENDED RELEASE ORAL at 18:19

## 2021-12-25 RX ADMIN — INSULIN GLARGINE 20 UNITS: 100 INJECTION, SOLUTION SUBCUTANEOUS at 08:12

## 2021-12-25 RX ADMIN — METOPROLOL TARTRATE 25 MG: 25 TABLET, FILM COATED ORAL at 08:13

## 2021-12-25 RX ADMIN — POTASSIUM CHLORIDE 20 MEQ: 1500 TABLET, EXTENDED RELEASE ORAL at 08:13

## 2021-12-25 RX ADMIN — HEPARIN SODIUM 5000 UNITS: 5000 INJECTION INTRAVENOUS; SUBCUTANEOUS at 05:19

## 2021-12-25 RX ADMIN — SODIUM CHLORIDE 50 ML/HR: 0.9 INJECTION, SOLUTION INTRAVENOUS at 20:19

## 2021-12-25 RX ADMIN — HEPARIN SODIUM 5000 UNITS: 5000 INJECTION INTRAVENOUS; SUBCUTANEOUS at 21:18

## 2021-12-25 RX ADMIN — HEPARIN SODIUM 5000 UNITS: 5000 INJECTION INTRAVENOUS; SUBCUTANEOUS at 15:24

## 2021-12-25 RX ADMIN — ASPIRIN 81 MG CHEWABLE TABLET 81 MG: 81 TABLET CHEWABLE at 08:14

## 2021-12-25 RX ADMIN — ATORVASTATIN CALCIUM 40 MG: 40 TABLET, FILM COATED ORAL at 08:13

## 2021-12-25 RX ADMIN — PANTOPRAZOLE SODIUM 40 MG: 40 INJECTION, POWDER, FOR SOLUTION INTRAVENOUS at 08:12

## 2021-12-26 VITALS
HEIGHT: 70 IN | SYSTOLIC BLOOD PRESSURE: 138 MMHG | WEIGHT: 175.9 LBS | OXYGEN SATURATION: 98 % | BODY MASS INDEX: 25.18 KG/M2 | TEMPERATURE: 97.5 F | DIASTOLIC BLOOD PRESSURE: 83 MMHG | HEART RATE: 67 BPM | RESPIRATION RATE: 16 BRPM

## 2021-12-26 LAB
ANION GAP SERPL CALCULATED.3IONS-SCNC: 9 MMOL/L (ref 4–13)
BUN SERPL-MCNC: 47 MG/DL (ref 5–25)
CALCIUM SERPL-MCNC: 8.4 MG/DL (ref 8.3–10.1)
CHLORIDE SERPL-SCNC: 104 MMOL/L (ref 100–108)
CO2 SERPL-SCNC: 26 MMOL/L (ref 21–32)
CREAT SERPL-MCNC: 2.29 MG/DL (ref 0.6–1.3)
GFR SERPL CREATININE-BSD FRML MDRD: 29 ML/MIN/1.73SQ M
GLUCOSE SERPL-MCNC: 74 MG/DL (ref 65–140)
GLUCOSE SERPL-MCNC: 84 MG/DL (ref 65–140)
POTASSIUM SERPL-SCNC: 4 MMOL/L (ref 3.5–5.3)
SODIUM SERPL-SCNC: 139 MMOL/L (ref 136–145)

## 2021-12-26 PROCEDURE — 99239 HOSP IP/OBS DSCHRG MGMT >30: CPT | Performed by: INTERNAL MEDICINE

## 2021-12-26 PROCEDURE — C9113 INJ PANTOPRAZOLE SODIUM, VIA: HCPCS | Performed by: INTERNAL MEDICINE

## 2021-12-26 PROCEDURE — 80048 BASIC METABOLIC PNL TOTAL CA: CPT | Performed by: INTERNAL MEDICINE

## 2021-12-26 PROCEDURE — 82948 REAGENT STRIP/BLOOD GLUCOSE: CPT

## 2021-12-26 PROCEDURE — 99233 SBSQ HOSP IP/OBS HIGH 50: CPT | Performed by: INTERNAL MEDICINE

## 2021-12-26 RX ORDER — POTASSIUM CHLORIDE 20 MEQ/1
20 TABLET, EXTENDED RELEASE ORAL 2 TIMES DAILY WITH MEALS
Qty: 60 TABLET | Refills: 0 | Status: SHIPPED | OUTPATIENT
Start: 2021-12-26 | End: 2022-05-15

## 2021-12-26 RX ORDER — INSULIN GLARGINE 100 [IU]/ML
10 INJECTION, SOLUTION SUBCUTANEOUS EVERY MORNING
Refills: 0 | Status: ON HOLD
Start: 2021-12-26 | End: 2022-05-15 | Stop reason: SDUPTHER

## 2021-12-26 RX ADMIN — METOPROLOL TARTRATE 25 MG: 25 TABLET, FILM COATED ORAL at 08:36

## 2021-12-26 RX ADMIN — POTASSIUM CHLORIDE 20 MEQ: 1500 TABLET, EXTENDED RELEASE ORAL at 08:36

## 2021-12-26 RX ADMIN — ATORVASTATIN CALCIUM 40 MG: 40 TABLET, FILM COATED ORAL at 08:36

## 2021-12-26 RX ADMIN — ASPIRIN 81 MG CHEWABLE TABLET 81 MG: 81 TABLET CHEWABLE at 08:36

## 2021-12-26 RX ADMIN — OXYCODONE HYDROCHLORIDE 5 MG: 5 TABLET ORAL at 09:45

## 2021-12-26 RX ADMIN — PANTOPRAZOLE SODIUM 40 MG: 40 INJECTION, POWDER, FOR SOLUTION INTRAVENOUS at 08:36

## 2021-12-26 RX ADMIN — HEPARIN SODIUM 5000 UNITS: 5000 INJECTION INTRAVENOUS; SUBCUTANEOUS at 05:53

## 2021-12-26 RX ADMIN — INSULIN GLARGINE 10 UNITS: 100 INJECTION, SOLUTION SUBCUTANEOUS at 08:36

## 2021-12-27 ENCOUNTER — TELEPHONE (OUTPATIENT)
Dept: NEPHROLOGY | Facility: CLINIC | Age: 61
End: 2021-12-27

## 2021-12-27 DIAGNOSIS — N18.32 STAGE 3B CHRONIC KIDNEY DISEASE (HCC): Primary | ICD-10-CM

## 2021-12-27 LAB
BACTERIA BLD CULT: NORMAL
BACTERIA BLD CULT: NORMAL

## 2022-01-03 ENCOUNTER — APPOINTMENT (OUTPATIENT)
Dept: LAB | Facility: CLINIC | Age: 62
End: 2022-01-03
Payer: COMMERCIAL

## 2022-01-03 DIAGNOSIS — N17.9 AKI (ACUTE KIDNEY INJURY) (HCC): ICD-10-CM

## 2022-01-03 DIAGNOSIS — R80.9 NEPHROTIC RANGE PROTEINURIA: ICD-10-CM

## 2022-01-03 DIAGNOSIS — N18.32 STAGE 3B CHRONIC KIDNEY DISEASE (HCC): ICD-10-CM

## 2022-01-03 LAB
ANION GAP SERPL CALCULATED.3IONS-SCNC: 13 MMOL/L (ref 4–13)
BUN SERPL-MCNC: 27 MG/DL (ref 5–25)
C3 SERPL-MCNC: 92.9 MG/DL (ref 90–180)
C4 SERPL-MCNC: 26 MG/DL (ref 10–40)
CALCIUM SERPL-MCNC: 8.7 MG/DL (ref 8.3–10.1)
CHLORIDE SERPL-SCNC: 107 MMOL/L (ref 100–108)
CO2 SERPL-SCNC: 22 MMOL/L (ref 21–32)
CREAT SERPL-MCNC: 1.89 MG/DL (ref 0.6–1.3)
GFR SERPL CREATININE-BSD FRML MDRD: 36 ML/MIN/1.73SQ M
GLUCOSE P FAST SERPL-MCNC: 65 MG/DL (ref 65–99)
HBV CORE AB SER QL: NORMAL
HBV CORE IGM SER QL: NORMAL
HBV SURFACE AG SER QL: NORMAL
HCV AB SER QL: NORMAL
HIV 1+2 AB+HIV1 P24 AG SERPL QL IA: NORMAL
HIV1 P24 AG SER QL: NORMAL
POTASSIUM SERPL-SCNC: 4.1 MMOL/L (ref 3.5–5.3)
RPR SER QL: NORMAL
SODIUM SERPL-SCNC: 142 MMOL/L (ref 136–145)

## 2022-01-03 PROCEDURE — 86803 HEPATITIS C AB TEST: CPT

## 2022-01-03 PROCEDURE — 83520 IMMUNOASSAY QUANT NOS NONAB: CPT

## 2022-01-03 PROCEDURE — 87806 HIV AG W/HIV1&2 ANTB W/OPTIC: CPT

## 2022-01-03 PROCEDURE — 84165 PROTEIN E-PHORESIS SERUM: CPT

## 2022-01-03 PROCEDURE — 83883 ASSAY NEPHELOMETRY NOT SPEC: CPT

## 2022-01-03 PROCEDURE — 87340 HEPATITIS B SURFACE AG IA: CPT

## 2022-01-03 PROCEDURE — 86255 FLUORESCENT ANTIBODY SCREEN: CPT

## 2022-01-03 PROCEDURE — 86704 HEP B CORE ANTIBODY TOTAL: CPT

## 2022-01-03 PROCEDURE — 82595 ASSAY OF CRYOGLOBULIN: CPT

## 2022-01-03 PROCEDURE — 86160 COMPLEMENT ANTIGEN: CPT

## 2022-01-03 PROCEDURE — 84165 PROTEIN E-PHORESIS SERUM: CPT | Performed by: PATHOLOGY

## 2022-01-03 PROCEDURE — 86705 HEP B CORE ANTIBODY IGM: CPT

## 2022-01-03 PROCEDURE — 86162 COMPLEMENT TOTAL (CH50): CPT

## 2022-01-03 PROCEDURE — 84166 PROTEIN E-PHORESIS/URINE/CSF: CPT | Performed by: PATHOLOGY

## 2022-01-03 PROCEDURE — 86225 DNA ANTIBODY NATIVE: CPT

## 2022-01-03 PROCEDURE — 36415 COLL VENOUS BLD VENIPUNCTURE: CPT

## 2022-01-03 PROCEDURE — 83516 IMMUNOASSAY NONANTIBODY: CPT

## 2022-01-03 PROCEDURE — 86038 ANTINUCLEAR ANTIBODIES: CPT

## 2022-01-03 PROCEDURE — 84166 PROTEIN E-PHORESIS/URINE/CSF: CPT

## 2022-01-03 PROCEDURE — 86592 SYPHILIS TEST NON-TREP QUAL: CPT

## 2022-01-03 PROCEDURE — 80048 BASIC METABOLIC PNL TOTAL CA: CPT

## 2022-01-04 LAB
ALBUMIN SERPL ELPH-MCNC: 3.46 G/DL (ref 3.5–5)
ALBUMIN SERPL ELPH-MCNC: 54.1 % (ref 52–65)
ALBUMIN UR ELPH-MCNC: 80.8 %
ALPHA1 GLOB MFR UR ELPH: 3.8 %
ALPHA1 GLOB SERPL ELPH-MCNC: 0.33 G/DL (ref 0.1–0.4)
ALPHA1 GLOB SERPL ELPH-MCNC: 5.1 % (ref 2.5–5)
ALPHA2 GLOB MFR UR ELPH: 2.9 %
ALPHA2 GLOB SERPL ELPH-MCNC: 0.72 G/DL (ref 0.4–1.2)
ALPHA2 GLOB SERPL ELPH-MCNC: 11.3 % (ref 7–13)
B-GLOBULIN MFR UR ELPH: 5.8 %
BETA GLOB ABNORMAL SERPL ELPH-MCNC: 0.4 G/DL (ref 0.4–0.8)
BETA1 GLOB SERPL ELPH-MCNC: 6.3 % (ref 5–13)
BETA2 GLOB SERPL ELPH-MCNC: 6.2 % (ref 2–8)
BETA2+GAMMA GLOB SERPL ELPH-MCNC: 0.4 G/DL (ref 0.2–0.5)
DSDNA AB SER-ACNC: <1 IU/ML (ref 0–9)
GAMMA GLOB ABNORMAL SERPL ELPH-MCNC: 1.09 G/DL (ref 0.5–1.6)
GAMMA GLOB MFR UR ELPH: 6.7 %
GAMMA GLOB SERPL ELPH-MCNC: 17 % (ref 12–22)
IGG/ALB SER: 1.18 {RATIO} (ref 1.1–1.8)
PROT PATTERN SERPL ELPH-IMP: ABNORMAL
PROT PATTERN UR ELPH-IMP: ABNORMAL
PROT SERPL-MCNC: 6.4 G/DL (ref 6.4–8.2)
PROT UR-MCNC: 251 MG/DL
RYE IGE QN: NEGATIVE

## 2022-01-05 LAB
C-ANCA TITR SER IF: NORMAL TITER
CH50 SERPL-ACNC: >60 U/ML
GBM AB SER IA-ACNC: 3 UNITS (ref 0–20)
KAPPA LC FREE SER-MCNC: 66.1 MG/L (ref 3.3–19.4)
KAPPA LC FREE/LAMBDA FREE SER: 1.67 {RATIO} (ref 0.26–1.65)
LAMBDA LC FREE SERPL-MCNC: 39.5 MG/L (ref 5.7–26.3)
MYELOPEROXIDASE AB SER IA-ACNC: <9 U/ML (ref 0–9)
P-ANCA ATYPICAL TITR SER IF: NORMAL TITER
P-ANCA TITR SER IF: NORMAL TITER
PROTEINASE3 AB SER IA-ACNC: <3.5 U/ML (ref 0–3.5)

## 2022-01-07 LAB — CRYOGLOB SER QL 1D COLD INC: POSITIVE

## 2022-01-10 LAB — MISCELLANEOUS LAB TEST RESULT: NORMAL

## 2022-01-12 ENCOUNTER — OFFICE VISIT (OUTPATIENT)
Dept: NEPHROLOGY | Facility: CLINIC | Age: 62
End: 2022-01-12
Payer: COMMERCIAL

## 2022-01-12 VITALS
SYSTOLIC BLOOD PRESSURE: 135 MMHG | BODY MASS INDEX: 26.69 KG/M2 | WEIGHT: 186.4 LBS | DIASTOLIC BLOOD PRESSURE: 78 MMHG | HEART RATE: 81 BPM | HEIGHT: 70 IN

## 2022-01-12 DIAGNOSIS — N18.32 STAGE 3B CHRONIC KIDNEY DISEASE (HCC): Primary | ICD-10-CM

## 2022-01-12 DIAGNOSIS — Z79.4 TYPE 2 DIABETES MELLITUS WITH HYPERGLYCEMIA, WITH LONG-TERM CURRENT USE OF INSULIN (HCC): ICD-10-CM

## 2022-01-12 DIAGNOSIS — E11.65 TYPE 2 DIABETES MELLITUS WITH HYPERGLYCEMIA, WITH LONG-TERM CURRENT USE OF INSULIN (HCC): ICD-10-CM

## 2022-01-12 DIAGNOSIS — I50.42 CHRONIC COMBINED SYSTOLIC AND DIASTOLIC CHF (CONGESTIVE HEART FAILURE) (HCC): ICD-10-CM

## 2022-01-12 DIAGNOSIS — I10 BENIGN ESSENTIAL HYPERTENSION: ICD-10-CM

## 2022-01-12 DIAGNOSIS — R80.9 PROTEINURIA, UNSPECIFIED TYPE: ICD-10-CM

## 2022-01-12 PROCEDURE — 99214 OFFICE O/P EST MOD 30 MIN: CPT | Performed by: INTERNAL MEDICINE

## 2022-01-12 NOTE — PROGRESS NOTES
NEPHROLOGY OUTPATIENT PROGRESS NOTE   Tracy Khan 59 y o  male MRN: [id number]  DATE: 1/12/2022  Reason for visit:   Chief Complaint   Patient presents with    Chronic Kidney Disease    Follow-up        Patient Instructions   1  CKD stage 3b in setting of diabetes, hypertensive nephrosclerosis, ischemic cardiomyopathy as well as cirrhosis  -diuretics on hold since hospitalization, d/c sCr 2 29, now 1 89  -b/l sCr appears to be 1 4-1 6 previously, with rise in sCr to 2 22 as of Feb 2020  Episode of LOBO likely d/t sepsis in setting of left foot abscess at the time  Suspect slightly higher baseline thereafter with last sCr 1 85 as of Sept 2021  This correlates with eGFR in the mid 30s-40s  -recurrent LOBO over Christmas 2021, peak sCr 2 8, improved with holding diuretics/providing IVF  -monitor BMP  -recent UA showed small blood, 2+ protein, 0-1 WBCs, occasional bacteria and microalb:Cr 2820 mg/g as of Dec  2021  -need glycemic control to slow down progression of CKD  -has been referred to CKD education class at last office visit  -I do note elevated eosinophils on Sept 2021 CBC, monitor this, doubt AIN  -CKD education booklet provided in office at last office visit     2  HTN - BP at goal in office  -lasix 80mg daily remains on hold, on K supplement 20meq twice daily, K 4 1  -avoid high salt/sodium diet  -avoid caffeinated beverages  -may take lasix 40mg as needed for weight gain > 3 lbs  If you take lasix 40mg daily, take one 20meq tablet of potassium     3  DM2, uncontrolled - last A1C 11-->9 2 as of Dec  2021, following with Dr Carlos Barone of endocrinology, continue lantus      4  Proteinuria likely d/t diabetic nephropathy in setting of uncontrolled diabetes - microalb:Cr was as high as 3 4g in March 2019, now down to 2 8g as of Dec  2021, monitor this  Not currently on ACEi as s/p recent LOBO   Would likely benefit from low dose ACEi if BP can tolerate    -anti PLA2R, dsDNA, HIV, RPR, complements, SPEP/UPEP/FLC, antiGBM, chronic hep panel, ANCA panel, LISA negative  -cryo + in setting of cirrhosis, will repeat cryoglobulin and check RF     5  Ischemic cardiomyopathy with EF 50% - diuretics as above, monitor daily weight, follows with cardiology outpatient, Dr Steve Bhatia     Of note, you are not anemic  Other issues: AVN of bone, cirrhosis, presacral mass  RTC in in 3 months  Obtain blood and urine testing next week  Opal Carrillo was seen today for chronic kidney disease and follow-up  Diagnoses and all orders for this visit:    Stage 3b chronic kidney disease ([address] )  -     Cryoglobulin; Future  -     Rheumatoid Factor; Future  -     Basic metabolic panel; Future  -     Urinalysis with microscopic; Future  -     Protein / creatinine ratio, urine; Future    Type 2 diabetes mellitus with hyperglycemia, with long-term current use of insulin (HCC)    Benign essential hypertension    Chronic combined systolic and diastolic CHF (congestive heart failure) (HCC)    Proteinuria, unspecified type  -     Cryoglobulin; Future  -     Rheumatoid Factor; Future        Assessment/Plan:  1  CKD stage 3b in setting of diabetes, hypertensive nephrosclerosis, ischemic cardiomyopathy as well as cirrhosis  -diuretics on hold since hospitalization, d/c sCr 2 29, now 1 89  -b/l sCr appears to be 1 4-1 6 previously, with rise in sCr to 2 22 as of Feb 2020  Episode of LOBO likely d/t sepsis in setting of left foot abscess at the time  Suspect slightly higher baseline thereafter with last sCr 1 85 as of Sept 2021  This correlates with eGFR in the mid 30s-40s     -recurrent LOBO over Christmas 2021, peak sCr 2 8, improved with holding diuretics/providing IVF  -monitor BMP  -recent UA showed small blood, 2+ protein, 0-1 WBCs, occasional bacteria and microalb:Cr 2820 mg/g as of Dec  2021  -need glycemic control to slow down progression of CKD  -has been referred to CKD education class at last office visit  -I do note elevated eosinophils on Sept 2021 CBC, monitor this, doubt AIN  -CKD education booklet provided in office at last office visit     2  HTN - BP at goal in office  -lasix 80mg daily remains on hold, on K supplement 20meq twice daily, K 4 1  -avoid high salt/sodium diet  -avoid caffeinated beverages  -may take lasix 40mg as needed for weight gain > 3 lbs  If you take lasix 40mg daily, take one 20meq tablet of potassium     3  DM2, uncontrolled - last A1C 11-->9 2 as of Dec  2021, following with Dr Jean Marie Agrawal of endocrinology, continue lantus      4  Proteinuria likely d/t diabetic nephropathy in setting of uncontrolled diabetes - microalb:Cr was as high as 3 4g in March 2019, now down to 2 8g as of Dec  2021, monitor this  Not currently on ACEi as s/p recent LOBO  Would likely benefit from low dose ACEi if BP can tolerate    -anti PLA2R, dsDNA, HIV, RPR, complements, SPEP/UPEP/FLC, antiGBM, chronic hep panel, ANCA panel, LISA negative  -cryo + in setting of cirrhosis, will repeat cryoglobulin and check RF     5  Ischemic cardiomyopathy with EF 50% - diuretics as above, monitor daily weight, follows with cardiology outpatient, Dr Radha Corado     Of note, you are not anemic  Other issues: AVN of bone, cirrhosis, presacral mass  RTC in in 3 months  SUBJECTIVE / INTERVAL HISTORY:  59 y o  male presents in follow up of CKD  Willard Kamaljit Khan s/p recent admission to [address] [place] [address] for abdominal pain and influenza  Denies NSAID use  HTN - does not check BP at home  DM2 - blood sugars low at times  Had shortness of breath Monday night  Today is Wednesday  Had dropped 170s and went up to 186lbs  Took lasix x 2 days  Thinks he needs 40mg for volume management  Review of Systems   Constitutional: Negative for chills and fever  HENT: Negative for sore throat  Eyes: Negative for visual disturbance  Respiratory: Negative for cough and shortness of breath  Cardiovascular: Negative for chest pain and leg swelling  Gastrointestinal: Negative for abdominal pain, constipation, diarrhea, nausea and vomiting  Endocrine: Negative for polyuria  Genitourinary: Negative for decreased urine volume, difficulty urinating, dysuria and hematuria  Musculoskeletal: Negative for back pain and myalgias  Skin: Negative for rash  Neurological: Negative for dizziness, light-headedness and numbness  Psychiatric/Behavioral: Negative for confusion  OBJECTIVE:  /78 (BP Location: Left arm, Patient Position: Sitting, Cuff Size: Standard)   Pulse 81   Ht 5' 10" (1 778 m)   Wt 84 6 kg (186 lb 6 4 oz)   BMI 26 75 kg/m²  Body mass index is 26 75 kg/m²  Physical exam:  Physical Exam  Vitals reviewed  Constitutional:       General: He is not in acute distress  Appearance: Normal appearance  He is well-developed  He is not diaphoretic  HENT:      Head: Normocephalic and atraumatic  Nose: Nose normal       Mouth/Throat:      Mouth: Mucous membranes are moist       Pharynx: No oropharyngeal exudate  Eyes:      General: No scleral icterus  Right eye: No discharge  Left eye: No discharge  Comments: eyeglasses   Neck:      Thyroid: No thyromegaly  Cardiovascular:      Rate and Rhythm: Normal rate and regular rhythm  Heart sounds: Normal heart sounds  Pulmonary:      Effort: Pulmonary effort is normal       Breath sounds: Normal breath sounds  No wheezing or rales  Abdominal:      General: Bowel sounds are normal  There is no distension  Palpations: Abdomen is soft  Tenderness: There is no abdominal tenderness  Musculoskeletal:         General: Swelling (trace RLE) present  Normal range of motion  Cervical back: Neck supple  Lymphadenopathy:      Cervical: No cervical adenopathy  Skin:     General: Skin is warm and dry  Findings: Lesion (over B/l LE) present  No rash  Neurological:      General: No focal deficit present  Mental Status: He is alert  Comments: awake   Psychiatric:         Mood and Affect: Mood normal          Behavior: Behavior normal          Medications:    Current Outpatient Medications:     aspirin 81 mg chewable tablet, Chew 1 tablet daily for 30 days, Disp: 30 tablet, Rfl: 0    atorvastatin (LIPITOR) 40 mg tablet, TAKE 1 TABLET BY MOUTH EVERY DAY, Disp: 90 tablet, Rfl: 3    Blood Pressure Monitoring (CVS Blood Pressure Monitor) KIT, Use 2 (two) times a day Automatic BP cuff  Measure BP twice daily  , Disp: 1 kit, Rfl: 0    Lantus SoloStar 100 units/mL injection pen, Inject 10 Units under the skin every morning (Patient taking differently: Inject 20 Units under the skin every morning  ), Disp: , Rfl: 0    metoprolol tartrate (LOPRESSOR) 25 mg tablet, TAKE 1 TABLET (25 MG TOTAL) BY MOUTH EVERY 12 (TWELVE) HOURS, Disp: 180 tablet, Rfl: 3    potassium chloride (Klor-Con M20) 20 mEq tablet, Take 1 tablet (20 mEq total) by mouth 2 (two) times a day with meals, Disp: 60 tablet, Rfl: 0    TRUE METRIX BLOOD GLUCOSE TEST test strip, TEST FOUR TIMES DAILY, Disp: , Rfl: 11    Unifine Pentips 31G X 8 MM MISC, USE 4 TMES A DAY, Disp: , Rfl:     Allergies:   Allergies as of 01/12/2022    (No Known Allergies)       The following portions of the patient's history were reviewed and updated as appropriate: past family history, past surgical history and problem list     Laboratory Results:  Lab Results   Component Value Date    SODIUM 142 01/03/2022    K 4 1 01/03/2022     01/03/2022    CO2 22 01/03/2022    BUN 27 (H) 01/03/2022    CREATININE 1 89 (H) 01/03/2022    GLUC 74 12/26/2021    CALCIUM 8 7 01/03/2022        Lab Results   Component Value Date    CALCIUM 8 7 01/03/2022    PHOS 3 1 09/14/2021       Portions of the record may have been created with voice recognition software   Occasional wrong word or "sound a like" substitutions may have occurred due to the inherent limitations of voice recognition software   Read the chart carefully and recognize, using context, where substitutions have occurred

## 2022-01-12 NOTE — PATIENT INSTRUCTIONS
1  CKD stage 3b in setting of diabetes, hypertensive nephrosclerosis, ischemic cardiomyopathy as well as cirrhosis  -diuretics on hold since hospitalization, d/c sCr 2 29, now 1 89  -b/l sCr appears to be 1 4-1 6 previously, with rise in sCr to 2 22 as of Feb 2020  Episode of LOBO likely d/t sepsis in setting of left foot abscess at the time  Suspect slightly higher baseline thereafter with last sCr 1 85 as of Sept 2021  This correlates with eGFR in the mid 30s-40s  -recurrent LOBO over Christmas 2021, peak sCr 2 8, improved with holding diuretics/providing IVF  -monitor BMP  -recent UA showed small blood, 2+ protein, 0-1 WBCs, occasional bacteria and microalb:Cr 2820 mg/g as of Dec  2021  -need glycemic control to slow down progression of CKD  -has been referred to CKD education class at last office visit  -I do note elevated eosinophils on Sept 2021 CBC, monitor this, doubt AIN  -CKD education booklet provided in office at last office visit     2  HTN - BP at goal in office  -lasix 80mg daily remains on hold, on K supplement 20meq twice daily, K 4 1  -avoid high salt/sodium diet  -avoid caffeinated beverages  -may take lasix 40mg as needed for weight gain > 3 lbs  If you take lasix 40mg daily, take one 20meq tablet of potassium     3  DM2, uncontrolled - last A1C 11-->9 2 as of Dec  2021, following with Dr Carlos Barone of endocrinology, continue lantus      4  Proteinuria likely d/t diabetic nephropathy in setting of uncontrolled diabetes - microalb:Cr was as high as 3 4g in March 2019, now down to 2 8g as of Dec  2021, monitor this  Not currently on ACEi as s/p recent LOBO  Would likely benefit from low dose ACEi if BP can tolerate    -anti PLA2R, dsDNA, HIV, RPR, complements, SPEP/UPEP/FLC, antiGBM, chronic hep panel, ANCA panel, LISA negative  -cryo + in setting of cirrhosis, will repeat cryoglobulin and check RF     5   Ischemic cardiomyopathy with EF 50% - diuretics as above, monitor daily weight, follows with cardiology outpatient, Dr Crow Trent     Of note, you are not anemic  Other issues: AVN of bone, cirrhosis, presacral mass  RTC in in 3 months  Obtain blood and urine testing next week

## 2022-01-14 ENCOUNTER — OFFICE VISIT (OUTPATIENT)
Dept: PODIATRY | Facility: CLINIC | Age: 62
End: 2022-01-14
Payer: COMMERCIAL

## 2022-01-14 VITALS
DIASTOLIC BLOOD PRESSURE: 96 MMHG | WEIGHT: 178 LBS | HEART RATE: 8 BPM | SYSTOLIC BLOOD PRESSURE: 157 MMHG | HEIGHT: 70 IN | BODY MASS INDEX: 25.48 KG/M2

## 2022-01-14 DIAGNOSIS — I87.2 VENOUS STASIS DERMATITIS OF LOWER EXTREMITY: ICD-10-CM

## 2022-01-14 DIAGNOSIS — Z79.4 TYPE 2 DIABETES MELLITUS WITH DIABETIC NEUROPATHY, WITH LONG-TERM CURRENT USE OF INSULIN (HCC): Primary | ICD-10-CM

## 2022-01-14 DIAGNOSIS — E11.40 TYPE 2 DIABETES MELLITUS WITH DIABETIC NEUROPATHY, WITH LONG-TERM CURRENT USE OF INSULIN (HCC): Primary | ICD-10-CM

## 2022-01-14 PROCEDURE — 99213 OFFICE O/P EST LOW 20 MIN: CPT | Performed by: PODIATRIST

## 2022-01-14 RX ORDER — TRIAMCINOLONE ACETONIDE 5 MG/G
CREAM TOPICAL 2 TIMES DAILY
Qty: 454 G | Refills: 0 | Status: SHIPPED | OUTPATIENT
Start: 2022-01-14 | End: 2022-05-15

## 2022-01-14 RX ORDER — HUMAN INSULIN 100 [IU]/ML
INJECTION, SUSPENSION SUBCUTANEOUS
COMMUNITY
Start: 2021-12-31

## 2022-01-14 NOTE — PROGRESS NOTES
Patient ID: Maria M Bates is a 59 y o  male Date of Birth 1960     Assessment:    No problem-specific Assessment & Plan notes found for this encounter  Diagnoses and all orders for this visit:    Type 2 diabetes mellitus with diabetic neuropathy, with long-term current use of insulin (HCC)  Venous stasis dermatitis lower extremity        PLAN:  1  Reviewed previous office note  Patient was counseled on the condition and diagnosis  2  Educated disease prevention and risks related to diabetes  Educated proper daily foot care and exam   Instructed proper skin care / protection and footwear  Reviewed blood work and HbA1c was 9 2  Discussed proper BS control  3  Tubigrip for compression  Rx: triamcinolone cream for venous stasis dermatitis  4   Educated management of LE edema with compression, elevation, and diet  5   RA in 6 weeks  Subjective:      HPI  Tia Martin presents for diabetic foot exam and follow-up on left foot ulcer  No recurring ulcer  No pain  He presents with diabetic shoes  He has rashes in his legs  He has been scratching  No cellulitis  BS has been better  The following portions of the patient's history were reviewed and updated as appropriate: allergies, current medications, past family history, past medical history, past social history, past surgical history and problem list     PAST MEDICAL HISTORY:  Past Medical History:   Diagnosis Date    CAD (coronary artery disease)     Chronic combined systolic and diastolic CHF (congestive heart failure) ([facility] )     Diabetes mellitus ([facility] )     type 2    Dyslipidemia     Hypertension     Ischemic cardiomyopathy     MI (myocardial infarction) ([facility] )     Osteomyelitis ([facility] )     Pancreatitis        PAST SURGICAL HISTORY:  Past Surgical History:   Procedure Laterality Date    ANGIOPLASTY      ballon    CORONARY ANGIOPLASTY WITH STENT PLACEMENT      LULA to proximal and mid LAD, Proximal RCA       HERNIA REPAIR  INCISION AND DRAINAGE OF WOUND Left 2/14/2020    Procedure: INCISION AND DRAINAGE (I&D) EXTREMITY left foot (cpt 65539); Surgeon: Ashly Murillo DPM;  [facility];  Service: Podiatry    NV EXPLORE [facility] Left 2/14/2020    Procedure: EXPLORATION EXTREMITY;  Surgeon: Ashly Murillo DPM;  [facility];  Service: Podiatry   [facility], < 50 CM Left 2/14/2020    Procedure: APPLICATION VAC DRESSING;  Surgeon: Ashly Murillo DPM;  [facility];  Service: Podiatry        ALLERGIES:  Patient has no known allergies  MEDICATIONS:  Current Outpatient Medications   Medication Sig Dispense Refill    atorvastatin (LIPITOR) 40 mg tablet TAKE 1 TABLET BY MOUTH EVERY DAY 90 tablet 3    Blood Pressure Monitoring (CVS Blood Pressure Monitor) KIT Use 2 (two) times a day Automatic BP cuff  Measure BP twice daily  1 kit 0    Lantus SoloStar 100 units/mL injection pen Inject 10 Units under the skin every morning (Patient taking differently: Inject 20 Units under the skin every morning  )  0    potassium chloride (Klor-Con M20) 20 mEq tablet Take 1 tablet (20 mEq total) by mouth 2 (two) times a day with meals 60 tablet 0    TRUE METRIX BLOOD GLUCOSE TEST test strip TEST FOUR TIMES DAILY  11    Unifine Pentips 31G X 8 MM MISC USE 4 TMES A DAY      aspirin 81 mg chewable tablet Chew 1 tablet daily for 30 days 30 tablet 0    metoprolol tartrate (LOPRESSOR) 25 mg tablet TAKE 1 TABLET (25 MG TOTAL) BY MOUTH EVERY 12 (TWELVE) HOURS 180 tablet 3    NovoLIN 70/30 FlexPen (70-30) 100 UNIT/ML injection pen  (Patient not taking: Reported on 1/14/2022 )      triamcinolone (KENALOG) 0 5 % cream Apply topically 2 (two) times a day 454 g 0     No current facility-administered medications for this visit         SOCIAL HISTORY:  Social History     Socioeconomic History    Marital status: /Civil Union     Spouse name: None    Number of children: None    Years of education: None    Highest education level: None   Occupational History    None   Tobacco Use    Smoking status: Never Smoker    Smokeless tobacco: Never Used   Vaping Use    Vaping Use: Never used   Substance and Sexual Activity    Alcohol use: Not Currently    Drug use: No    Sexual activity: None   Other Topics Concern    None   Social History Narrative    None     Social Determinants of Health     Financial Resource Strain: Not on file   Food Insecurity: Not on file   Transportation Needs: Not on file   Physical Activity: Not on file   Stress: Not on file   Social Connections: Not on file   Intimate Partner Violence: Not on file   Housing Stability: Not on file      Review of Systems   Constitutional: Negative for appetite change, chills and fever  Respiratory: Negative for cough and shortness of breath  Cardiovascular: Negative for chest pain  Gastrointestinal: Negative for diarrhea, nausea and vomiting  Musculoskeletal: Negative for gait problem  Neurological: Positive for numbness  Objective:            /96   Pulse (!) 8   Ht 5' 10" (1 778 m) Comment: verbal  Wt 80 7 kg (178 lb)   BMI 25 54 kg/m²     Physical Exam  Vitals reviewed  Constitutional:       General: He is not in acute distress  Appearance: He is not toxic-appearing or diaphoretic  Cardiovascular:      Rate and Rhythm: Normal rate and regular rhythm  Pulses: no weak pulses          Dorsalis pedis pulses are 2+ on the right side and 1+ on the left side  Posterior tibial pulses are 0 on the right side and 0 on the left side  Comments: Venous stasis skin changes LLE with edema  Pulmonary:      Effort: Pulmonary effort is normal    Musculoskeletal:         General: Deformity present  No tenderness or signs of injury  Left lower leg: Edema present  Right foot: No Charcot foot or foot drop  Left foot: No Charcot foot or foot drop  Feet:      Right foot:      Protective Sensation: 10 sites tested   10 sites sensed  Skin integrity: Dry skin present  No ulcer or callus  Left foot:      Protective Sensation: 10 sites tested  5 sites sensed  Skin integrity: Dry skin present  No ulcer or callus  Skin:     General: Skin is warm and dry  Capillary Refill: Capillary refill takes less than 2 seconds  Coloration: Skin is not cyanotic or mottled  Findings: Rash and wound present  No abscess or ecchymosis  Rash is papular and scaling  Nails: There is no clubbing  Comments: Venous stasis dermatitis noted BLE, right worse than left  No recurring foot ulcer  Neurological:      General: No focal deficit present  Mental Status: He is alert and oriented to person, place, and time  Cranial Nerves: No cranial nerve deficit  Sensory: Sensory deficit present  Motor: No weakness  Coordination: Coordination normal    Psychiatric:         Mood and Affect: Mood normal          Behavior: Behavior normal          Thought Content: Thought content normal          Judgment: Judgment normal          Diabetic Foot Exam    Patient's shoes and socks removed  Right Foot/Ankle   Right Foot Inspection  Skin Exam: skin intact and dry skin  No callus, no ulcer and no callus  Toe Exam: right toe deformity  No swelling, no tenderness and erythema    Sensory   Vibration: diminished  Proprioception: intact  Monofilament testing: intact    Vascular  Capillary refills: < 3 seconds  The right DP pulse is 2+  The right PT pulse is 0  Left Foot/Ankle  Left Foot Inspection  Skin Exam: dry skin  No ulcer and no callus  Toe Exam: left toe deformity  No swelling, no tenderness and no erythema  Sensory   Vibration: diminished  Proprioception: intact  Monofilament testing: diminished    Vascular  Capillary refills: < 3 seconds  The left DP pulse is 1+  The left PT pulse is 0       Assign Risk Category  Deformity present  Loss of protective sensation  No weak pulses

## 2022-01-14 NOTE — PATIENT INSTRUCTIONS
Foot Care for People with Diabetes   AMBULATORY CARE:   What you need to know about foot care:   · Foot care helps protect your feet and prevent foot ulcers or sores  Long-term high blood sugar levels can damage the blood vessels and nerves in your legs and feet  This damage makes it hard to feel pressure, pain, temperature, and touch  You may not be able to feel a cut or sore, or shoes that are too tight  Foot care is needed to prevent serious problems, such as an infection or amputation  · Diabetes may cause your toes to become crooked or curved under  These changes may affect the way you walk and can lead to increased pressure on your foot  The pressure can decrease blood flow to your feet  Lack of blood flow increases your risk for a foot ulcer  Do not ignore small problems, such as dry skin or small wounds  These can become life-threatening over time without proper care  Call your care team provider if:   · Your feet become numb, weak, or hard to move  · You have pus draining from a sore on your foot  · You have a wound on your foot that gets bigger, deeper, or does not heal      · You see blisters, cuts, scratches, calluses, or sores on your foot  · You have a fever, and your feet become red, warm, and swollen  · Your toenails become thick, curled, or yellow  · You find it hard to check your feet because your vision is poor  · You have questions or concerns about your condition or care  How to care for your feet:   · Check your feet each day  Look at your whole foot, including the bottom, and between and under your toes  Check for wounds, corns, and calluses  Use a mirror to see the bottom of your feet  The skin on your feet may be shiny, tight, or darker than normal  Your feet may also be cold and pale  Feel your feet by running your hands along the tops, bottoms, sides, and between your toes   Redness, swelling, and warmth are signs of blood flow problems that can lead to a foot ulcer  Do not try to remove corns or calluses yourself  · Wash your feet each day with soap and warm water  Do not use hot water, because this can injure your foot  Dry your feet gently with a towel after you wash them  Dry between and under your toes  · Apply lotion or a moisturizer on your dry feet  Ask your care team provider what lotions are best to use  Do not put lotion or moisturizer between your toes  Moisture between your toes could lead to skin breakdown  · Cut your toenails correctly  File or cut your toenails straight across  Use a soft brush to clean around your toenails  If your toenails are very thick, you may need to have a care team provider or specialist cut them  · Protect your feet  Do not walk barefoot or wear your shoes without socks  Check your shoes for rocks or other objects that can hurt your feet  Wear cotton socks to help keep your feet dry  Wear socks without toe seams, or wear them with the seams inside out  Change your socks each day  Do not wear socks that are dirty or damp  · Wear shoes that fit well  Wear shoes that do not rub against any area of your feet  Your shoes should be ½ to ¾ inch (1 to 2 centimeters) longer than your feet  Your shoes should also have extra space around the widest part of your feet  Walking or athletic shoes with laces or straps that adjust are best  Ask your care team provider for help to choose shoes that fit you best  Ask him or her if you need to wear an insert, orthotic, or bandage on your feet  · Go to your follow-up visits  Your care team provider will do a foot exam at least once a year  You may need a foot exam more often if you have nerve damage, foot deformities, or ulcers  He will check for nerve damage and how well you can feel your feet  He will check your shoes to see if they fit well  · Do not smoke  Smoking can damage your blood vessels and put you at increased risk for foot ulcers   Ask your care team provider for information if you currently smoke and need help to quit  E-cigarettes or smokeless tobacco still contain nicotine  Talk to your care team provider before you use these products  Follow up with your diabetes care team provider or foot specialist as directed: You will need to have your feet checked at least once a year  You may need a foot exam more often if you have nerve damage, foot deformities, or ulcers  Write down your questions so you remember to ask them during your visits  © Copyright [employer] 2021 Information is for End User's use only and may not be sold, redistributed or otherwise used for commercial purposes  All illustrations and images included in CareNotes® are the copyrighted property of A D A M , Inc  or [facility][note 3] Karli Tyson   The above information is an  only  It is not intended as medical advice for individual conditions or treatments  Talk to your doctor, nurse or pharmacist before following any medical regimen to see if it is safe and effective for you

## 2022-01-21 ENCOUNTER — APPOINTMENT (OUTPATIENT)
Dept: LAB | Facility: CLINIC | Age: 62
End: 2022-01-21
Payer: COMMERCIAL

## 2022-01-21 ENCOUNTER — TELEPHONE (OUTPATIENT)
Dept: NEPHROLOGY | Facility: CLINIC | Age: 62
End: 2022-01-21

## 2022-01-21 DIAGNOSIS — E03.9 MYXEDEMA HEART DISEASE: ICD-10-CM

## 2022-01-21 DIAGNOSIS — E11.40 DIABETIC NEUROPATHY WITH NEUROLOGIC COMPLICATION (HCC): ICD-10-CM

## 2022-01-21 DIAGNOSIS — E66.9 LIFELONG OBESITY: ICD-10-CM

## 2022-01-21 DIAGNOSIS — N18.32 STAGE 3B CHRONIC KIDNEY DISEASE (HCC): ICD-10-CM

## 2022-01-21 DIAGNOSIS — E11.00 TYPE II DIABETES MELLITUS WITH HYPEROSMOLARITY, UNCONTROLLED (HCC): ICD-10-CM

## 2022-01-21 DIAGNOSIS — R80.9 PROTEINURIA, UNSPECIFIED TYPE: ICD-10-CM

## 2022-01-21 DIAGNOSIS — IMO0002 TYPE II DIABETES MELLITUS WITH RENAL MANIFESTATIONS, UNCONTROLLED: ICD-10-CM

## 2022-01-21 DIAGNOSIS — E11.65 TYPE II DIABETES MELLITUS WITH HYPEROSMOLARITY, UNCONTROLLED (HCC): ICD-10-CM

## 2022-01-21 DIAGNOSIS — I51.9 MYXEDEMA HEART DISEASE: ICD-10-CM

## 2022-01-21 DIAGNOSIS — E11.49 DIABETIC NEUROPATHY WITH NEUROLOGIC COMPLICATION (HCC): ICD-10-CM

## 2022-01-21 LAB
ALBUMIN SERPL BCP-MCNC: 2.7 G/DL (ref 3.5–5)
ALP SERPL-CCNC: 77 U/L (ref 46–116)
ALT SERPL W P-5'-P-CCNC: 20 U/L (ref 12–78)
ANION GAP SERPL CALCULATED.3IONS-SCNC: 8 MMOL/L (ref 4–13)
AST SERPL W P-5'-P-CCNC: 21 U/L (ref 5–45)
BASOPHILS # BLD AUTO: 0.03 THOUSANDS/ΜL (ref 0–0.1)
BASOPHILS NFR BLD AUTO: 1 % (ref 0–1)
BILIRUB SERPL-MCNC: 0.85 MG/DL (ref 0.2–1)
BUN SERPL-MCNC: 26 MG/DL (ref 5–25)
CALCIUM ALBUM COR SERPL-MCNC: 9.4 MG/DL (ref 8.3–10.1)
CALCIUM SERPL-MCNC: 8.4 MG/DL (ref 8.3–10.1)
CHLORIDE SERPL-SCNC: 107 MMOL/L (ref 100–108)
CO2 SERPL-SCNC: 27 MMOL/L (ref 21–32)
CREAT SERPL-MCNC: 1.66 MG/DL (ref 0.6–1.3)
CREAT UR-MCNC: 80.4 MG/DL
CREAT UR-MCNC: 81 MG/DL
EOSINOPHIL # BLD AUTO: 0.27 THOUSAND/ΜL (ref 0–0.61)
EOSINOPHIL NFR BLD AUTO: 6 % (ref 0–6)
ERYTHROCYTE [DISTWIDTH] IN BLOOD BY AUTOMATED COUNT: 14.1 % (ref 11.6–15.1)
EST. AVERAGE GLUCOSE BLD GHB EST-MCNC: 214 MG/DL
GFR SERPL CREATININE-BSD FRML MDRD: 43 ML/MIN/1.73SQ M
GLUCOSE P FAST SERPL-MCNC: 115 MG/DL (ref 65–99)
HBA1C MFR BLD: 9.1 %
HCT VFR BLD AUTO: 36.9 % (ref 36.5–49.3)
HGB BLD-MCNC: 11.8 G/DL (ref 12–17)
IMM GRANULOCYTES # BLD AUTO: 0.01 THOUSAND/UL (ref 0–0.2)
IMM GRANULOCYTES NFR BLD AUTO: 0 % (ref 0–2)
LYMPHOCYTES # BLD AUTO: 1.08 THOUSANDS/ΜL (ref 0.6–4.47)
LYMPHOCYTES NFR BLD AUTO: 22 % (ref 14–44)
MAGNESIUM SERPL-MCNC: 1.8 MG/DL (ref 1.6–2.6)
MCH RBC QN AUTO: 27.4 PG (ref 26.8–34.3)
MCHC RBC AUTO-ENTMCNC: 32 G/DL (ref 31.4–37.4)
MCV RBC AUTO: 86 FL (ref 82–98)
MICROALBUMIN UR-MCNC: 2720 MG/L (ref 0–20)
MICROALBUMIN/CREAT 24H UR: 3383 MG/G CREATININE (ref 0–30)
MONOCYTES # BLD AUTO: 0.48 THOUSAND/ΜL (ref 0.17–1.22)
MONOCYTES NFR BLD AUTO: 10 % (ref 4–12)
NEUTROPHILS # BLD AUTO: 2.97 THOUSANDS/ΜL (ref 1.85–7.62)
NEUTS SEG NFR BLD AUTO: 61 % (ref 43–75)
NRBC BLD AUTO-RTO: 0 /100 WBCS
PHOSPHATE SERPL-MCNC: 3.3 MG/DL (ref 2.3–4.1)
PLATELET # BLD AUTO: 206 THOUSANDS/UL (ref 149–390)
PMV BLD AUTO: 9.8 FL (ref 8.9–12.7)
POTASSIUM SERPL-SCNC: 4 MMOL/L (ref 3.5–5.3)
PROT SERPL-MCNC: 6.5 G/DL (ref 6.4–8.2)
PROT UR-MCNC: 367 MG/DL
PROT/CREAT UR: 4.53 MG/G{CREAT} (ref 0–0.1)
RBC # BLD AUTO: 4.3 MILLION/UL (ref 3.88–5.62)
RHEUMATOID FACT SER QL LA: NEGATIVE
SODIUM SERPL-SCNC: 142 MMOL/L (ref 136–145)
T4 FREE SERPL-MCNC: 1.06 NG/DL (ref 0.76–1.46)
TSH SERPL DL<=0.05 MIU/L-ACNC: 6.29 UIU/ML (ref 0.36–3.74)
WBC # BLD AUTO: 4.84 THOUSAND/UL (ref 4.31–10.16)

## 2022-01-21 PROCEDURE — 86430 RHEUMATOID FACTOR TEST QUAL: CPT

## 2022-01-21 PROCEDURE — 84156 ASSAY OF PROTEIN URINE: CPT

## 2022-01-21 PROCEDURE — 80053 COMPREHEN METABOLIC PANEL: CPT

## 2022-01-21 PROCEDURE — 82595 ASSAY OF CRYOGLOBULIN: CPT

## 2022-01-21 PROCEDURE — 82043 UR ALBUMIN QUANTITATIVE: CPT

## 2022-01-21 PROCEDURE — 82570 ASSAY OF URINE CREATININE: CPT

## 2022-01-21 PROCEDURE — 83036 HEMOGLOBIN GLYCOSYLATED A1C: CPT

## 2022-01-21 PROCEDURE — 84443 ASSAY THYROID STIM HORMONE: CPT

## 2022-01-21 PROCEDURE — 83735 ASSAY OF MAGNESIUM: CPT

## 2022-01-21 PROCEDURE — 84100 ASSAY OF PHOSPHORUS: CPT

## 2022-01-21 PROCEDURE — 84439 ASSAY OF FREE THYROXINE: CPT

## 2022-01-21 PROCEDURE — 85025 COMPLETE CBC W/AUTO DIFF WBC: CPT

## 2022-01-21 PROCEDURE — 36415 COLL VENOUS BLD VENIPUNCTURE: CPT

## 2022-01-21 NOTE — TELEPHONE ENCOUNTER
Pt advised that CR and lytes stable per Ray Quintana  Followup visit scheduled in March with Dr Feliz Fleming      ----- Message from [place], [place] sent at 1/21/2022  3:15 PM EST -----  Creatinine and electrolytes stable  No changes  Follow up appointment scheduled for march with Dr Feliz Fleming

## 2022-01-24 ENCOUNTER — TELEPHONE (OUTPATIENT)
Dept: NEPHROLOGY | Facility: CLINIC | Age: 62
End: 2022-01-24

## 2022-01-24 DIAGNOSIS — N18.32 STAGE 3B CHRONIC KIDNEY DISEASE (HCC): Primary | ICD-10-CM

## 2022-01-24 NOTE — TELEPHONE ENCOUNTER
----- Message from Radha Charles DO sent at 1/24/2022  9:08 AM EST -----  Proteinuria has risen to 4 5g  I would like to initiate low dose lisinopril for the patient if he is willing for proteinuria reduction in the setting of DM  If he is willing to start this, I will order the lisinopril and he should have repeat BMP in 1 week  Thanks

## 2022-01-24 NOTE — TELEPHONE ENCOUNTER
LM for patient asking him to call back to let us know if he is willing to start taking lisinopril for the protein in his urine  Will explain that he needs to go for a BMP a week after starting medication if he chooses to take it

## 2022-01-27 LAB — CRYOGLOB SER QL 1D COLD INC: NORMAL

## 2022-01-27 RX ORDER — LISINOPRIL 5 MG/1
5 TABLET ORAL DAILY
Qty: 90 TABLET | Refills: 1 | Status: SHIPPED | OUTPATIENT
Start: 2022-01-27 | End: 2022-08-05 | Stop reason: SDUPTHER

## 2022-01-27 NOTE — TELEPHONE ENCOUNTER
Pt called back,agreeable with starting Lisinopril and having labs done in a week  Please sent prescription for [facility]in[facility]ril to [facility] on [address] for a 3 months supply

## 2022-01-29 ENCOUNTER — IMMUNIZATIONS (OUTPATIENT)
Dept: FAMILY MEDICINE CLINIC | Facility: HOSPITAL | Age: 62
End: 2022-01-29

## 2022-01-29 DIAGNOSIS — Z23 ENCOUNTER FOR IMMUNIZATION: Primary | ICD-10-CM

## 2022-01-29 PROCEDURE — 0001A COVID-19 PFIZER VACC 0.3 ML: CPT

## 2022-01-29 PROCEDURE — 91300 COVID-19 PFIZER VACC 0.3 ML: CPT

## 2022-02-09 ENCOUNTER — APPOINTMENT (OUTPATIENT)
Dept: LAB | Facility: CLINIC | Age: 62
End: 2022-02-09
Payer: COMMERCIAL

## 2022-02-09 DIAGNOSIS — N18.32 STAGE 3B CHRONIC KIDNEY DISEASE (HCC): ICD-10-CM

## 2022-02-09 LAB
ANION GAP SERPL CALCULATED.3IONS-SCNC: 5 MMOL/L (ref 4–13)
BUN SERPL-MCNC: 37 MG/DL (ref 5–25)
CALCIUM SERPL-MCNC: 8.6 MG/DL (ref 8.3–10.1)
CHLORIDE SERPL-SCNC: 105 MMOL/L (ref 100–108)
CO2 SERPL-SCNC: 30 MMOL/L (ref 21–32)
CREAT SERPL-MCNC: 1.7 MG/DL (ref 0.6–1.3)
GFR SERPL CREATININE-BSD FRML MDRD: 41 ML/MIN/1.73SQ M
GLUCOSE P FAST SERPL-MCNC: 198 MG/DL (ref 65–99)
POTASSIUM SERPL-SCNC: 3.7 MMOL/L (ref 3.5–5.3)
SODIUM SERPL-SCNC: 140 MMOL/L (ref 136–145)

## 2022-02-09 PROCEDURE — 36415 COLL VENOUS BLD VENIPUNCTURE: CPT

## 2022-02-09 PROCEDURE — 80048 BASIC METABOLIC PNL TOTAL CA: CPT

## 2022-02-14 ENCOUNTER — OFFICE VISIT (OUTPATIENT)
Dept: PODIATRY | Facility: CLINIC | Age: 62
End: 2022-02-14
Payer: COMMERCIAL

## 2022-02-14 VITALS
HEART RATE: 77 BPM | HEIGHT: 70 IN | BODY MASS INDEX: 25.48 KG/M2 | WEIGHT: 178 LBS | SYSTOLIC BLOOD PRESSURE: 135 MMHG | DIASTOLIC BLOOD PRESSURE: 88 MMHG

## 2022-02-14 DIAGNOSIS — B35.1 ONYCHOMYCOSIS: ICD-10-CM

## 2022-02-14 DIAGNOSIS — L97.529 ULCER OF LEFT FOOT, UNSPECIFIED ULCER STAGE (HCC): Primary | ICD-10-CM

## 2022-02-14 DIAGNOSIS — Z79.4 TYPE 2 DIABETES MELLITUS WITH DIABETIC NEUROPATHY, WITH LONG-TERM CURRENT USE OF INSULIN (HCC): ICD-10-CM

## 2022-02-14 DIAGNOSIS — E11.40 TYPE 2 DIABETES MELLITUS WITH DIABETIC NEUROPATHY, WITH LONG-TERM CURRENT USE OF INSULIN (HCC): ICD-10-CM

## 2022-02-14 PROCEDURE — 11721 DEBRIDE NAIL 6 OR MORE: CPT | Performed by: PODIATRIST

## 2022-02-14 PROCEDURE — 99213 OFFICE O/P EST LOW 20 MIN: CPT | Performed by: PODIATRIST

## 2022-02-14 RX ORDER — SODIUM FLUORIDE 6 MG/ML
PASTE, DENTIFRICE DENTAL
COMMUNITY
Start: 2022-01-22 | End: 2022-05-15

## 2022-02-14 NOTE — PROGRESS NOTES
PATIENT:  Shelly Khan    1960    ASSESSMENT:     1  Ulcer of left foot, unspecified ulcer stage ([facility] )     2  Type 2 diabetes mellitus with diabetic neuropathy, with long-term current use of insulin ([facility]     3  Onychomycosis  Debridement         PLAN:  1  Patient was counseled on the condition and diagnosis  2  Educated disease prevention and risks related to diabetes and foot ulcer  3  Wound was cleaned and slough was removed  Instructed daily local care  4  Orthowedge shoe and crutches for NWB  5  The recent blood work was reviewed / discussed and the last HbA1c was 9 1  Discussed proper blood glucose control  6   RA in 2 weeks  Procedure: All mycotic toenails were reduced and debrided in length, width, and girth using a nail nipper and dremel  Patient tolerated procedure(s) well without complications  Subjective:       HPI:  The patients presents for ulcer on left foot  He noticed discoloration on left plantar foot for about 1 week  No pain  No redness  He has been on his feet a lot with some different shoes  BS has been better  He also complained of thick, elongated toenails  The following portions of the patient's history were reviewed and updated as appropriate: allergies, current medications, past family history, past medical history, past social history, past surgical history and problem list   All pertinent labs and images were reviewed      Past Medical History  Past Medical History:   Diagnosis Date    CAD (coronary artery disease)     Chronic combined systolic and diastolic CHF (congestive heart failure) (HCC)     Diabetes mellitus (HCC)     type 2    Dyslipidemia     Hypertension     Ischemic cardiomyopathy     [facility] infarction) [facility]     Osteomyelitis [facility]     Pancreatitis        Past Surgical History  Past Surgical History:   Procedure Laterality Date    ANGIOPLASTY      ballon    CORONARY ANGIOPLASTY WITH STENT PLACEMENT LULA to proximal and mid LAD, Proximal RCA   HERNIA REPAIR      INCISION AND DRAINAGE OF WOUND Left 2/14/2020    Procedure: INCISION AND DRAINAGE (I&D) EXTREMITY left foot (cpt 27546); Surgeon: Cristine Newton DPM;  [facility];  Service: Podiatry    IA EXPLORE [facility] Left 2/14/2020    Procedure: EXPLORATION EXTREMITY;  Surgeon: Cristine Newton DPM;  [facility];  Service: Podiatry   [address] [facility], < 50 CM Left 2/14/2020    Procedure: APPLICATION VAC DRESSING;  Surgeon: Cristine Newton DPM;  [facility];  Service: Podiatry        Allergies:  Patient has no known allergies  Medications:  Current Outpatient Medications   Medication Sig Dispense Refill    atorvastatin (LIPITOR) 40 mg tablet TAKE 1 TABLET BY MOUTH EVERY DAY 90 tablet 3    Blood Pressure Monitoring ([facility] Blood Pressure Monitor) KIT Use 2 (two) times a day Automatic BP cuff  Measure BP twice daily   1 kit 0    Lantus SoloStar 100 units/mL injection pen Inject 10 Units under the skin every morning (Patient taking differently: Inject 20 Units under the skin every morning  )  0    lisinopril (ZESTRIL) 5 mg tablet Take 1 tablet (5 mg total) by mouth daily 90 tablet 1    potassium chloride (Klor-Con M20) 20 mEq tablet Take 1 tablet (20 mEq total) by mouth 2 (two) times a day with meals 60 tablet 0    Sodium Fluoride 5000 PPM 1 1 % PSTE BRUSH TWICE A DAY FOR 2 TO 3 MINUTES AND SPIT NO RINISING FOR 30 MINUTES      triamcinolone (KENALOG) 0 5 % cream Apply topically 2 (two) times a day 454 g 0    TRUE METRIX BLOOD GLUCOSE TEST test strip TEST FOUR TIMES DAILY  11    Unifine Pentips 31G X 8 MM MISC USE 4 TMES A DAY      aspirin 81 mg chewable tablet Chew 1 tablet daily for 30 days 30 tablet 0    metoprolol tartrate (LOPRESSOR) 25 mg tablet TAKE 1 TABLET (25 MG TOTAL) BY MOUTH EVERY 12 (TWELVE) HOURS 180 tablet 3    NovoLIN 70/30 FlexPen (70-30) 100 UNIT/ML injection pen  (Patient not taking: Reported on 1/14/2022 ) No current facility-administered medications for this visit  Social History:  Social History     Socioeconomic History    Marital status: /Civil Union     Spouse name: None    Number of children: None    Years of education: None    Highest education level: None   Occupational History    None   Tobacco Use    Smoking status: Never Smoker    Smokeless tobacco: Never Used   Vaping Use    Vaping Use: Never used   Substance and Sexual Activity    Alcohol use: Not Currently    Drug use: No    Sexual activity: None   Other Topics Concern    None   Social History Narrative    None     Social Determinants of Health     Financial Resource Strain: Not on file   Food Insecurity: Not on file   Transportation Needs: Not on file   Physical Activity: Not on file   Stress: Not on file   Social Connections: Not on file   Intimate Partner Violence: Not on file   Housing Stability: Not on file        Review of Systems   Constitutional: Negative for chills and fever  Respiratory: Negative for shortness of breath  Cardiovascular: Positive for leg swelling  Negative for chest pain  Gastrointestinal: Negative for diarrhea, nausea and vomiting  Skin: Positive for wound  Neurological: Positive for numbness  Negative for weakness  Objective:      /88   Pulse 77   Ht 5' 10" (1 778 m) Comment: verbal  Wt 80 7 kg (178 lb)   BMI 25 54 kg/m²          Physical Exam  Vitals reviewed  Constitutional:       General: He is not in acute distress  Appearance: He is well-developed  He is not ill-appearing or toxic-appearing  HENT:      Head: Normocephalic and atraumatic  Cardiovascular:      Rate and Rhythm: Normal rate and regular rhythm  Pulses:           Dorsalis pedis pulses are 1+ on the right side and 1+ on the left side  Posterior tibial pulses are 1+ on the right side and 0 on the left side        Comments: He has class findings with lack of digital hairs, skin atrophy/ dryness, and dystrophic nails  Pulmonary:      Effort: Pulmonary effort is normal  No respiratory distress  Musculoskeletal:         General: Deformity present  No tenderness or signs of injury  Cervical back: Normal range of motion and neck supple  Left lower leg: Edema present  Comments: No acute joint pain, edema, or inflammation  Hammertoe deformity noted  Feet:      Right foot:      Skin integrity: Dry skin present  No ulcer, skin breakdown, erythema, warmth or callus  Left foot:      Skin integrity: Dry skin present  No ulcer, skin breakdown, erythema, warmth or callus  Skin:     General: Skin is warm and dry  Capillary Refill: Capillary refill takes less than 2 seconds  Coloration: Skin is not cyanotic or mottled  Findings: Wound present  No ecchymosis, laceration or petechiae  Nails: There is no clubbing  Comments: DFU left submet 5  It is partial thick  0 7 X 0 7 X 0 1 cm  Wound bed granular  No cellulitis  Venous stasis noted LLE  No deep probing  No purulence  Thick, mycotic toenails X 10  Neurological:      General: No focal deficit present  Mental Status: He is alert and oriented to person, place, and time  Cranial Nerves: No cranial nerve deficit  Sensory: Sensory deficit present  Motor: No weakness  Psychiatric:         Mood and Affect: Mood normal          Behavior: Behavior normal          Thought Content:  Thought content normal          Judgment: Judgment normal

## 2022-02-17 ENCOUNTER — TELEPHONE (OUTPATIENT)
Dept: PODIATRY | Facility: CLINIC | Age: 62
End: 2022-02-17

## 2022-02-17 DIAGNOSIS — L03.116 CELLULITIS OF LEFT LOWER EXTREMITY: Primary | ICD-10-CM

## 2022-02-17 RX ORDER — CEPHALEXIN 500 MG/1
500 CAPSULE ORAL EVERY 6 HOURS SCHEDULED
Qty: 28 CAPSULE | Refills: 0 | Status: SHIPPED | OUTPATIENT
Start: 2022-02-17 | End: 2022-02-24 | Stop reason: SDUPTHER

## 2022-02-17 NOTE — TELEPHONE ENCOUNTER
Patient called  He thinks he has cellulitis  He asked if Dr Yvonne Del Rosario could give him a phone call  Can Dr Yvonen Del Rosario please call the patient back  Pt  Phone  [phone]      Thank you!!!

## 2022-02-21 ENCOUNTER — TELEPHONE (OUTPATIENT)
Dept: NEPHROLOGY | Facility: CLINIC | Age: 62
End: 2022-02-21

## 2022-02-21 NOTE — TELEPHONE ENCOUNTER
Reschedule Appointment   Person speaking to  Patient   Date of original appointment 03/04/22    New appointment date 04/07/22   Patient on dialysis No   Location [facility]    Provider Zane Rosales    Additional Information

## 2022-02-24 ENCOUNTER — TELEPHONE (OUTPATIENT)
Dept: PODIATRY | Facility: CLINIC | Age: 62
End: 2022-02-24

## 2022-02-24 DIAGNOSIS — L03.116 CELLULITIS OF LEFT LOWER EXTREMITY: ICD-10-CM

## 2022-02-24 RX ORDER — CEPHALEXIN 500 MG/1
500 CAPSULE ORAL EVERY 6 HOURS SCHEDULED
Qty: 12 CAPSULE | Refills: 0 | Status: SHIPPED | OUTPATIENT
Start: 2022-02-24 | End: 2022-02-27

## 2022-02-24 NOTE — TELEPHONE ENCOUNTER
Called pt and left a message letting him know that Dr Niko Gallardo has sent over 3 more days of the antibiotics, and we will see him for his appt on 03 01 2022

## 2022-03-01 ENCOUNTER — OFFICE VISIT (OUTPATIENT)
Dept: PODIATRY | Facility: CLINIC | Age: 62
End: 2022-03-01
Payer: COMMERCIAL

## 2022-03-01 VITALS
SYSTOLIC BLOOD PRESSURE: 116 MMHG | HEART RATE: 70 BPM | DIASTOLIC BLOOD PRESSURE: 75 MMHG | HEIGHT: 70 IN | BODY MASS INDEX: 25.48 KG/M2 | WEIGHT: 178 LBS

## 2022-03-01 DIAGNOSIS — E11.40 TYPE 2 DIABETES MELLITUS WITH DIABETIC NEUROPATHY, WITH LONG-TERM CURRENT USE OF INSULIN (HCC): ICD-10-CM

## 2022-03-01 DIAGNOSIS — Z79.4 TYPE 2 DIABETES MELLITUS WITH DIABETIC NEUROPATHY, WITH LONG-TERM CURRENT USE OF INSULIN (HCC): ICD-10-CM

## 2022-03-01 DIAGNOSIS — I82.4Z2 ACUTE DEEP VEIN THROMBOSIS (DVT) OF DISTAL VEIN OF LEFT LOWER EXTREMITY (HCC): ICD-10-CM

## 2022-03-01 DIAGNOSIS — L97.529 ULCER OF LEFT FOOT, UNSPECIFIED ULCER STAGE (HCC): Primary | ICD-10-CM

## 2022-03-01 PROCEDURE — 99213 OFFICE O/P EST LOW 20 MIN: CPT | Performed by: PODIATRIST

## 2022-03-01 NOTE — PROGRESS NOTES
PATIENT:  Jerome Khan    1960    ASSESSMENT:     1  Ulcer of left foot, unspecified ulcer stage ([facility] )     2  Type 2 diabetes mellitus with diabetic neuropathy, with long-term current use of insulin ([facility] )     3  Acute deep vein thrombosis (DVT) of distal vein of left lower extremity (HCC)  VAS lower limb venous duplex study, complete bilateral         PLAN:  1  Reviewed the last office note  Patient was counseled on the condition and diagnosis  2  Educated disease prevention and risks related to diabetes and foot ulcer  3  Removed periwound callus  Wound looks stable without signs of infection  Stressed on patient compliance about proper off-loading and staying off of his feet  Recommended NWB  4  New orthowedge shoe dispensed  5  He has pain and swelling in left calf  It did not look cellulitic  Sent him for STAT venous study to rule out DVT  6  RA in 2 weeks  Subjective:       HPI:  The patients presents for ulcer on left foot  He started pain in left leg after the last visit  He thought it was cellulitis because left leg was tender and little red  Then, it got better, but he still has pain  He presents with worn out orthowedge shoe and carrying crutches instead of using it for off-loading  The following portions of the patient's history were reviewed and updated as appropriate: allergies, current medications, past family history, past medical history, past social history, past surgical history and problem list   All pertinent labs and images were reviewed      Past Medical History  Past Medical History:   Diagnosis Date    CAD (coronary artery disease)     [facility] combined [facility]     Diabetes mellitus (HCC)     type 2    Dyslipidemia     Hypertension     Ischemic cardiomyopathy     [facility]ocardial infarction) [facility]     [facility]     Pancreatitis        Past Surgical History  Past Surgical History: Procedure Laterality Date    ANGIOPLASTY      ballon    CORONARY ANGIOPLASTY WITH STENT PLACEMENT      LULA to proximal and mid LAD, Proximal RCA   HERNIA REPAIR      INCISION AND DRAINAGE OF WOUND Left 2/14/2020    Procedure: INCISION AND DRAINAGE (I&D) EXTREMITY left foot (cpt 90151); Surgeon: Debbie Pitt DPM;  [facility];  Service: Podiatry    MA EXPLORE [facility] Left 2/14/2020    Procedure: EXPLORATION EXTREMITY;  Surgeon: Debbie Pitt DPM;  [facility];  Service: Podiatry   [address] [facility], < 50 CM Left 2/14/2020    Procedure: APPLICATION VAC DRESSING;  Surgeon: Debbie Pitt DPM;  [facility];  Service: Podiatry        Allergies:  Patient has no known allergies  Medications:  Current Outpatient Medications   Medication Sig Dispense Refill    atorvastatin (LIPITOR) 40 mg tablet TAKE 1 TABLET BY MOUTH EVERY DAY 90 tablet 3    Blood Pressure Monitoring ([facility] Blood Pressure Monitor) KIT Use 2 (two) times a day Automatic BP cuff  Measure BP twice daily   1 kit 0    Lantus SoloStar 100 units/mL injection pen Inject 10 Units under the skin every morning (Patient taking differently: Inject 20 Units under the skin every morning  )  0    lisinopril (ZESTRIL) 5 mg tablet Take 1 tablet (5 mg total) by mouth daily 90 tablet 1    potassium chloride (Klor-Con M20) 20 mEq tablet Take 1 tablet (20 mEq total) by mouth 2 (two) times a day with meals 60 tablet 0    Sodium Fluoride 5000 PPM 1 1 % PSTE BRUSH TWICE A DAY FOR 2 TO 3 MINUTES AND SPIT NO RINISING FOR 30 MINUTES      triamcinolone (KENALOG) 0 5 % cream Apply topically 2 (two) times a day 454 g 0    TRUE METRIX BLOOD GLUCOSE TEST test strip TEST FOUR TIMES DAILY  11    Unifine Pentips 31G X 8 MM MISC USE 4 TMES A DAY      aspirin 81 mg chewable tablet Chew 1 tablet daily for 30 days 30 tablet 0    metoprolol tartrate (LOPRESSOR) 25 mg tablet TAKE 1 TABLET (25 MG TOTAL) BY MOUTH EVERY 12 (TWELVE) HOURS 180 tablet 3  NovoLIN 70/30 FlexPen (70-30) 100 UNIT/ML injection pen  (Patient not taking: Reported on 1/14/2022 )       No current facility-administered medications for this visit  Social History:  Social History     Socioeconomic History    Marital status: /Civil Union     Spouse name: None    Number of children: None    Years of education: None    Highest education level: None   Occupational History    None   Tobacco Use    Smoking status: Never Smoker    Smokeless tobacco: Never Used   Vaping Use    Vaping Use: Never used   Substance and Sexual Activity    Alcohol use: Not Currently    Drug use: No    Sexual activity: None   Other Topics Concern    None   Social History Narrative    None     Social Determinants of Health     Financial Resource Strain: Not on file   Food Insecurity: Not on file   Transportation Needs: Not on file   Physical Activity: Not on file   Stress: Not on file   Social Connections: Not on file   Intimate Partner Violence: Not on file   Housing Stability: Not on file        Review of Systems   Constitutional: Negative for chills and fever  Respiratory: Negative for shortness of breath  Cardiovascular: Positive for leg swelling  Negative for chest pain  Gastrointestinal: Negative for diarrhea, nausea and vomiting  Skin: Positive for wound  Neurological: Positive for numbness  Negative for weakness  Objective:      /75   Pulse 70   Ht 5' 10" (1 778 m) Comment: verbal  Wt 80 7 kg (178 lb)   BMI 25 54 kg/m²          Physical Exam  Vitals reviewed  Constitutional:       General: He is not in acute distress  Appearance: He is well-developed  He is not ill-appearing or toxic-appearing  HENT:      Head: Normocephalic and atraumatic  Cardiovascular:      Rate and Rhythm: Normal rate and regular rhythm  Pulses:           Dorsalis pedis pulses are 1+ on the right side and 1+ on the left side          Posterior tibial pulses are 1+ on the right side and 0 on the left side  Comments: He has class findings with lack of digital hairs, skin atrophy/ dryness, and dystrophic nails  Pulmonary:      Effort: Pulmonary effort is normal  No respiratory distress  Musculoskeletal:         General: Deformity present  No tenderness or signs of injury  Cervical back: Normal range of motion and neck supple  Left lower leg: Edema present  Comments: Left calf pain and swelling  Feet:      Right foot:      Skin integrity: Dry skin present  No ulcer, skin breakdown, erythema, warmth or callus  Left foot:      Skin integrity: Dry skin present  No ulcer, skin breakdown, erythema, warmth or callus  Skin:     General: Skin is warm and dry  Capillary Refill: Capillary refill takes less than 2 seconds  Coloration: Skin is not cyanotic or mottled  Findings: Wound present  No ecchymosis, laceration or petechiae  Nails: There is no clubbing  Comments: DFU left submet 5  It is 0 6 X 0 5 X 0 1 cm  Wound bed granular  No cellulitis  Venous stasis noted LLE  No deep probing  No purulence  Chronic venous stasis skin changes in left leg  It does not look cellulitic  Increased edema left leg  Neurological:      General: No focal deficit present  Mental Status: He is alert and oriented to person, place, and time  Cranial Nerves: No cranial nerve deficit  Sensory: Sensory deficit present  Motor: No weakness  Psychiatric:         Mood and Affect: Mood normal          Behavior: Behavior normal          Thought Content:  Thought content normal          Judgment: Judgment normal

## 2022-03-02 ENCOUNTER — HOSPITAL ENCOUNTER (OUTPATIENT)
Dept: VASCULAR ULTRASOUND | Facility: HOSPITAL | Age: 62
Discharge: HOME | End: 2022-03-02
Attending: PODIATRIST
Payer: COMMERCIAL

## 2022-03-02 DIAGNOSIS — I82.4Z2 ACUTE DEEP VEIN THROMBOSIS (DVT) OF DISTAL VEIN OF LEFT LOWER EXTREMITY (HCC): ICD-10-CM

## 2022-03-02 PROCEDURE — 93970 EXTREMITY STUDY: CPT | Performed by: SURGERY

## 2022-03-02 PROCEDURE — 93970 EXTREMITY STUDY: CPT

## 2022-03-15 ENCOUNTER — PROCEDURE VISIT (OUTPATIENT)
Dept: PODIATRY | Facility: CLINIC | Age: 62
End: 2022-03-15
Payer: COMMERCIAL

## 2022-03-15 VITALS
WEIGHT: 179 LBS | SYSTOLIC BLOOD PRESSURE: 122 MMHG | HEART RATE: 73 BPM | HEIGHT: 70 IN | DIASTOLIC BLOOD PRESSURE: 78 MMHG | BODY MASS INDEX: 25.62 KG/M2

## 2022-03-15 DIAGNOSIS — Z79.4 TYPE 2 DIABETES MELLITUS WITH DIABETIC NEUROPATHY, WITH LONG-TERM CURRENT USE OF INSULIN (HCC): ICD-10-CM

## 2022-03-15 DIAGNOSIS — L97.529 ULCER OF LEFT FOOT, UNSPECIFIED ULCER STAGE (HCC): Primary | ICD-10-CM

## 2022-03-15 DIAGNOSIS — E11.40 TYPE 2 DIABETES MELLITUS WITH DIABETIC NEUROPATHY, WITH LONG-TERM CURRENT USE OF INSULIN (HCC): ICD-10-CM

## 2022-03-15 PROCEDURE — 11042 DBRDMT SUBQ TIS 1ST 20SQCM/<: CPT | Performed by: PODIATRIST

## 2022-03-15 NOTE — PROGRESS NOTES
PATIENT:  Kishan Khan    1960    ASSESSMENT:     1  Ulcer of left foot, unspecified ulcer stage (HCC)  Debridement   2  Type 2 diabetes mellitus with diabetic neuropathy, with long-term current use of insulin ([facility] [address] )  Debridement         PLAN:  1  Reviewed the last office note  Patient was counseled on the condition and diagnosis  2  Educated disease prevention and risks related to diabetes and foot ulcer  3  Wound debrided  Stressed on patient compliance about proper off-loading and staying off of his feet  Recommended NWB  4  Will see him at wound center for possible TCC  Debridement Procedure:  After  Verbal Consent was obtained and time out performed  Wound located left submet 5 was  excisionally Surgical- Subcutaneous  (CPT: 92362) debrided using scapel  Pre-debridement wound measures 0 7 cm x 0 7 cm x 0 2 cm  Pain was controlled by Lack of sensation due to Neuropathy   Post debridement measurement 0 9 cm x 0 8 cm x 0 2 cm for a total of 0 72 square centimeters, with wound appearing Fresh bleeding tissue, viable and granular  100%  of wound debrided  Tissue debrided includes depth of Epidermis, Dermis and Subcutaneous with devitalized tissue being debrided including Hyperkeratosis, Slough and Fibrous  with a small amount of bleeding bleeding was noted during procedure  Hemostasis was achieved using pressure  Pain noted during procedure rated as 0  Pain noted after procedure rated as 0  Procedure was Well tolerated by patient  Subjective:       HPI:  The patients presents for ulcer on left foot  Redness and swelling resolved LLE  No pain  BS has been little better  No new compliant  He was not compliant about staying off of his foot          The following portions of the patient's history were reviewed and updated as appropriate: allergies, current medications, past family history, past medical history, past social history, past surgical history and problem list   All pertinent labs and images were reviewed  Past Medical History  Past Medical History:   Diagnosis Date    CAD (coronary artery disease)     Chronic combined systolic and diastolic CHF (congestive heart failure) (HCC)     Diabetes mellitus (HCC)     type 2    Dyslipidemia     Hypertension     Ischemic cardiomyopathy     [facility] infarction) ([facility] [address] )     Osteomyelitis ([facility] [address] )     Pancreatitis        Past Surgical History  Past Surgical History:   Procedure Laterality Date    ANGIOPLASTY      ballon    CORONARY ANGIOPLASTY WITH STENT PLACEMENT      LULA to proximal and mid LAD, Proximal RCA   HERNIA REPAIR      INCISION AND DRAINAGE OF WOUND Left 2/14/2020    Procedure: INCISION AND DRAINAGE (I&D) EXTREMITY left foot (cpt 46507); Surgeon: Gene Pinzon DPM;  [facility];  Service: Podiatry    CT EXPLORE [address] [facility] Left 2/14/2020    Procedure: EXPLORATION EXTREMITY;  Surgeon: Gene Pinzon DPM;  [facility];  Service: Podiatry   [facility], < 50 CM Left 2/14/2020    Procedure: APPLICATION VAC DRESSING;  Surgeon: Gene Pinzon DPM;  [facility];  Service: Podiatry        Allergies:  Patient has no known allergies  Medications:  Current Outpatient Medications   Medication Sig Dispense Refill    atorvastatin (LIPITOR) 40 mg tablet TAKE 1 TABLET BY MOUTH EVERY DAY 90 tablet 3    Blood Pressure Monitoring ([employer] Blood Pressure Monitor) KIT Use 2 (two) times a day Automatic BP cuff  Measure BP twice daily   1 kit 0    Lantus SoloStar 100 units/mL injection pen Inject 10 Units under the skin every morning (Patient taking differently: Inject 20 Units under the skin every morning  )  0    lisinopril (ZESTRIL) 5 mg tablet Take 1 tablet (5 mg total) by mouth daily 90 tablet 1    potassium chloride (Klor-Con M20) 20 mEq tablet Take 1 tablet (20 mEq total) by mouth 2 (two) times a day with meals 60 tablet 0    Sodium Fluoride 5000 PPM 1 1 % PSTE BRUSH TWICE A DAY FOR 2 TO 3 MINUTES AND SPIT NO RINISING FOR 30 MINUTES      triamcinolone (KENALOG) 0 5 % cream Apply topically 2 (two) times a day 454 g 0    TRUE METRIX BLOOD GLUCOSE TEST test strip TEST FOUR TIMES DAILY  11    Unifine Pentips 31G X 8 MM MISC USE 4 TMES A DAY      aspirin 81 mg chewable tablet Chew 1 tablet daily for 30 days 30 tablet 0    metoprolol tartrate (LOPRESSOR) 25 mg tablet TAKE 1 TABLET (25 MG TOTAL) BY MOUTH EVERY 12 (TWELVE) HOURS 180 tablet 3    NovoLIN 70/30 FlexPen (70-30) 100 UNIT/ML injection pen  (Patient not taking: Reported on 1/14/2022 )       No current facility-administered medications for this visit  Social History:  Social History     Socioeconomic History    Marital status: /Civil Union     Spouse name: None    Number of children: None    Years of education: None    Highest education level: None   Occupational History    None   Tobacco Use    Smoking status: Never Smoker    Smokeless tobacco: Never Used   Vaping Use    Vaping Use: Never used   Substance and Sexual Activity    Alcohol use: Not Currently    Drug use: No    Sexual activity: None   Other Topics Concern    None   Social History Narrative    None     Social Determinants of Health     Financial Resource Strain: Not on file   Food Insecurity: Not on file   Transportation Needs: Not on file   Physical Activity: Not on file   Stress: Not on file   Social Connections: Not on file   Intimate Partner Violence: Not on file   Housing Stability: Not on file        Review of Systems   Constitutional: Negative for chills and fever  Respiratory: Negative for shortness of breath  Cardiovascular: Positive for leg swelling  Negative for chest pain  Gastrointestinal: Negative for diarrhea, nausea and vomiting  Skin: Positive for wound  Neurological: Positive for numbness  Negative for weakness           Objective:      /78   Pulse 73   Ht 5' 10" (1 778 m) Comment: verbal  Wt 81 2 kg (179 lb)   BMI 25 68 kg/m²          Physical Exam  Vitals reviewed  Constitutional:       General: He is not in acute distress  Appearance: He is well-developed  He is not ill-appearing or toxic-appearing  HENT:      Head: Normocephalic and atraumatic  Cardiovascular:      Rate and Rhythm: Normal rate and regular rhythm  Pulses:           Dorsalis pedis pulses are 1+ on the right side and 1+ on the left side  Posterior tibial pulses are 1+ on the right side and 0 on the left side  Comments: He has class findings with lack of digital hairs, skin atrophy/ dryness, and dystrophic nails  Pulmonary:      Effort: Pulmonary effort is normal  No respiratory distress  Musculoskeletal:         General: Deformity present  No tenderness or signs of injury  Cervical back: Normal range of motion and neck supple  Left lower leg: Edema present  Comments: LLE edema at baseline  Feet:      Right foot:      Skin integrity: Dry skin present  No ulcer, skin breakdown, erythema, warmth or callus  Left foot:      Skin integrity: Dry skin present  No ulcer, skin breakdown, erythema, warmth or callus  Skin:     General: Skin is warm and dry  Capillary Refill: Capillary refill takes less than 2 seconds  Coloration: Skin is not cyanotic or mottled  Findings: Wound present  No ecchymosis, laceration or petechiae  Nails: There is no clubbing  Comments: Full thickness DFU left submet 5  It is 0 7 X 0 7 X 0 2 cm  Periwound callus / undermining presents  No cellulitis  Venous stasis noted LLE  No deep probing  No purulence  Chronic venous stasis skin changes in left leg  Neurological:      General: No focal deficit present  Mental Status: He is alert and oriented to person, place, and time  Cranial Nerves: No cranial nerve deficit  Sensory: Sensory deficit present  Motor: No weakness     Psychiatric:         Mood and Affect: Mood normal  Behavior: Behavior normal          Thought Content:  Thought content normal          Judgment: Judgment normal

## 2022-03-31 ENCOUNTER — OFFICE VISIT (OUTPATIENT)
Dept: WOUND CARE | Facility: HOSPITAL | Age: 62
End: 2022-03-31
Payer: COMMERCIAL

## 2022-03-31 VITALS
BODY MASS INDEX: 25.2 KG/M2 | HEIGHT: 70 IN | HEART RATE: 77 BPM | DIASTOLIC BLOOD PRESSURE: 83 MMHG | SYSTOLIC BLOOD PRESSURE: 138 MMHG | WEIGHT: 176 LBS | RESPIRATION RATE: 16 BRPM | TEMPERATURE: 96.2 F

## 2022-03-31 DIAGNOSIS — L97.522 ULCER OF LEFT FOOT, WITH FAT LAYER EXPOSED (HCC): Primary | ICD-10-CM

## 2022-03-31 DIAGNOSIS — E11.40 TYPE 2 DIABETES MELLITUS WITH DIABETIC NEUROPATHY, WITH LONG-TERM CURRENT USE OF INSULIN (HCC): ICD-10-CM

## 2022-03-31 DIAGNOSIS — L97.929 CHRONIC VENOUS HYPERTENSION (IDIOPATHIC) WITH ULCER OF LEFT LOWER EXTREMITY (HCC): ICD-10-CM

## 2022-03-31 DIAGNOSIS — Z79.4 TYPE 2 DIABETES MELLITUS WITH DIABETIC NEUROPATHY, WITH LONG-TERM CURRENT USE OF INSULIN (HCC): ICD-10-CM

## 2022-03-31 DIAGNOSIS — I87.312 CHRONIC VENOUS HYPERTENSION (IDIOPATHIC) WITH ULCER OF LEFT LOWER EXTREMITY (HCC): ICD-10-CM

## 2022-03-31 PROCEDURE — 99214 OFFICE O/P EST MOD 30 MIN: CPT | Performed by: PODIATRIST

## 2022-03-31 PROCEDURE — 11042 DBRDMT SUBQ TIS 1ST 20SQCM/<: CPT | Performed by: PODIATRIST

## 2022-03-31 RX ORDER — CEPHALEXIN 500 MG/1
500 CAPSULE ORAL EVERY 6 HOURS SCHEDULED
Qty: 28 CAPSULE | Refills: 0 | Status: SHIPPED | OUTPATIENT
Start: 2022-03-31 | End: 2022-04-07

## 2022-03-31 RX ORDER — LIDOCAINE 40 MG/G
CREAM TOPICAL ONCE
Status: COMPLETED | OUTPATIENT
Start: 2022-03-31 | End: 2022-03-31

## 2022-03-31 RX ADMIN — LIDOCAINE 1 APPLICATION: 40 CREAM TOPICAL at 08:52

## 2022-03-31 NOTE — PATIENT INSTRUCTIONS
Orders Placed This Encounter   Procedures    Wound cleansing and dressings     Left Foot and Left Leg wounds:  Wash your hands with soap and water  Cleanse the wound with mild soap and water prior to applying a clean dressing  Shower no   Apply Silver Alginate to the wounds  Cover with gauze  Secure with toni and tape  Change dressing every other day and as needed      Treatments above were completed today at the [facility]      Supplies ordered today with [employer]---#[phone]     Standing Status:   Future     Standing Expiration Date:   3/31/2023    Wound compression and edema control     Elastic Tubular Stocking----Double Spandagrip size F to the LLE    Tubular elastic bandage: Apply from base of toes to behind the knee  Apply in AM, may remove for sleep  Avoid prolonged standing in one place  Elevate leg(s) above the level of the heart when sitting or as much as possible       Standing Status:   Future     Standing Expiration Date:   3/31/2023    Wound off loading     Continue to wear the Darco shoe to the Left foot for all ambulation and transfers     Standing Status:   Future     Standing Expiration Date:   3/31/2023

## 2022-03-31 NOTE — PROGRESS NOTES
Patient ID: Alexx Hightower is a 57 y o  male Date of Birth 1960     Chief Complaint  Chief Complaint   Patient presents with    New Patient Visit     LLE and left foot wounds       Allergies  Patient has no known allergies  Assessment:    No problem-specific Assessment & Plan notes found for this encounter  Diagnoses and all orders for this visit:    Ulcer of left foot, with fat layer exposed ([facility] [address] )  -     lidocaine (LMX) 4 % cream  -     Wound cleansing and dressings; Future  -     Wound compression and edema control; Future  -     Wound off loading; Future  -     Debridement    Chronic venous hypertension (idiopathic) with ulcer of left lower extremity (HCC)  -     lidocaine (LMX) 4 % cream  -     Wound cleansing and dressings; Future  -     Wound compression and edema control; Future  -     Wound off loading; Future  -     cephalexin (KEFLEX) 500 mg capsule; Take 1 capsule (500 mg total) by mouth every 6 (six) hours for 7 days  -     Debridement  -     Debridement    Type 2 diabetes mellitus with diabetic neuropathy, with long-term current use of insulin ([facility] [address] )  -     Debridement          Debridement   Wound 03/31/22 Diabetic Ulcer Foot Left;Plantar    Universal Protocol:  Consent: Verbal consent obtained  Risks and benefits: risks, benefits and alternatives were discussed  Consent given by: patient  Time out: Immediately prior to procedure a "time out" was called to verify the correct patient, procedure, equipment, support staff and site/side marked as required    Timeout called at: 3/31/2022 8:36 AM   Patient understanding: patient states understanding of the procedure being performed  Patient identity confirmed: verbally with patient      Debridement type: surgical  Level of debridement: subcutaneous tissue  Pain control: lidocaine 4%  Post-debridement measurements  Length (cm): 1  Width (cm): 0 8  Depth (cm): 0 3  Percent debrided: 100%  Surface Area (cm^2): 0 8  Area debrided (cm^2): 0 8  Volume (cm^3): 0 24  Tissue and other material debrided: dermis, epidermis and subcutaneous tissue  Devitalized tissue debrided: biofilm, fibrin and slough  Instrument(s) utilized: blade  Bleeding: small  Hemostasis obtained with: pressure  Procedural pain (0-10): 0  Post-procedural pain: 0   Response to treatment: procedure was tolerated well    Debridement   Wound 03/31/22 Venous Ulcer Leg Left; Lower; Anterior;Proximal    Universal Protocol:  Consent: Verbal consent obtained  Risks and benefits: risks, benefits and alternatives were discussed  Consent given by: patient  Time out: Immediately prior to procedure a "time out" was called to verify the correct patient, procedure, equipment, support staff and site/side marked as required  Timeout called at: 3/31/2022 8:37 AM   Patient understanding: patient states understanding of the procedure being performed  Patient identity confirmed: verbally with patient      Performed by: physician  Debridement type: surgical  Level of debridement: subcutaneous tissue  Pain control: lidocaine 4%  Post-debridement measurements  Length (cm): 1 1  Width (cm): 1  Depth (cm): 0 2  Percent debrided: 100%  Surface Area (cm^2): 1 1  Area debrided (cm^2): 1 1  Volume (cm^3): 0 22  Tissue and other material debrided: dermis, epidermis and subcutaneous tissue  Devitalized tissue debrided: biofilm, fibrin and slough  Instrument(s) utilized: blade  Bleeding: small  Hemostasis obtained with: pressure  Procedural pain (0-10): 0  Post-procedural pain: 0   Response to treatment: procedure was tolerated well    Debridement   Wound 03/31/22 Venous Ulcer Leg Distal;Left; Anterior; Lower    Universal Protocol:  Consent: Verbal consent obtained    Risks and benefits: risks, benefits and alternatives were discussed  Consent given by: patient  Timeout called at: 3/31/2022 8:38 AM   Patient understanding: patient states understanding of the procedure being performed  Patient identity confirmed: verbally with patient      Performed by: physician  Debridement type: surgical  Level of debridement: subcutaneous tissue  Pain control: lidocaine 4%  Post-debridement measurements  Length (cm): 0 6  Width (cm): 0 6  Depth (cm): 0 2  Percent debrided: 100%  Surface Area (cm^2): 0 36  Area debrided (cm^2): 0 36  Volume (cm^3): 0 07  Tissue and other material debrided: dermis, epidermis and subcutaneous tissue  Devitalized tissue debrided: biofilm, fibrin and slough  Instrument(s) utilized: blade  Bleeding: small  Hemostasis obtained with: pressure  Procedural pain (0-10): 0  Post-procedural pain: 0   Response to treatment: procedure was tolerated well            PLAN:  1  Patient was counseled on the condition and diagnosis  2  Educated disease prevention and risks related to diabetes and foot ulcer  3  Wound was debrided  Instructed daily local care  Orthowedge shoe for off-loading  Recommended NWB with crutches if possible  4  He has VSU on left side  Instructed compression and elevation on left leg  Tubigrip X 2 left leg  Rx: Keflex to reduce bioburden  5  RA in 1 week  Wound 03/31/22 Venous Ulcer Leg Left; Lower; Anterior;Proximal (Active)   Wound Image Images linked 03/31/22 0858   Wound Description Yellow;Pink 03/31/22 0833   Suzy-wound Assessment Hyperpigmented;Edema 03/31/22 0833   Wound Length (cm) 1 1 cm 03/31/22 0833   Wound Width (cm) 0 9 cm 03/31/22 0833   Wound Depth (cm) 0 1 cm 03/31/22 0833   Wound Surface Area (cm^2) 0 99 cm^2 03/31/22 0833   Wound Volume (cm^3) 0 099 cm^3 03/31/22 0833   Calculated Wound Volume (cm^3) 0 1 cm^3 03/31/22 0833   Drainage Amount Moderate 03/31/22 0833   Drainage Description Serosanguineous 03/31/22 0833   Non-staged Wound Description Full thickness 03/31/22 0833       Wound 03/31/22 Diabetic Ulcer Foot Left;Plantar (Active)   Wound Image Images linked 03/31/22 0858   Wound Description Yellow;Pink 03/31/22 0836   Suzy-wound Assessment Callus 03/31/22 0836   Wound Length (cm) 0 7 cm 03/31/22 0836   Wound Width (cm) 0 7 cm 03/31/22 0836   Wound Depth (cm) 0 3 cm 03/31/22 0836   Wound Surface Area (cm^2) 0 49 cm^2 03/31/22 0836   Wound Volume (cm^3) 0 147 cm^3 03/31/22 0836   Calculated Wound Volume (cm^3) 0 15 cm^3 03/31/22 0836   Drainage Amount Moderate 03/31/22 0836   Drainage Description Serosanguineous 03/31/22 0836   Non-staged Wound Description Full thickness 03/31/22 0836   Dressing Status Intact 03/31/22 0836       Wound 03/31/22 Venous Ulcer Leg Distal;Left; Anterior; Lower (Active)   Wound Image Images linked 03/31/22 0856   Wound Description Yellow;Pink 03/31/22 0901   Suzy-wound Assessment Edema; Hyperpigmented 03/31/22 0901   Wound Length (cm) 0 5 cm 03/31/22 0901   Wound Width (cm) 0 5 cm 03/31/22 0901   Wound Depth (cm) 0 1 cm 03/31/22 0901   Wound Surface Area (cm^2) 0 25 cm^2 03/31/22 0901   Wound Volume (cm^3) 0 025 cm^3 03/31/22 0901   Calculated Wound Volume (cm^3) 0 03 cm^3 03/31/22 0901   Drainage Amount Moderate 03/31/22 0901   Drainage Description Serosanguineous 03/31/22 0901   Non-staged Wound Description Full thickness 03/31/22 0901       Wound 03/31/22 Venous Ulcer Leg Left; Lower; Anterior;Proximal (Active)   Date First Assessed/Time First Assessed: 03/31/22 0832   Pre-Existing Wound: Yes  Primary Wound Type: Venous Ulcer  Location: Leg  Wound Location Orientation: Left; Lower; Anterior;Proximal       Wound 03/31/22 Diabetic Ulcer Foot Left;Plantar (Active)   Date First Assessed/Time First Assessed: 03/31/22 0832   Pre-Existing Wound: Yes  Primary Wound Type: Diabetic Ulcer  Location: Foot  Wound Location Orientation: Left;Plantar       Wound 03/31/22 Venous Ulcer Leg Distal;Left; Anterior; Lower (Active)   Date First Assessed/Time First Assessed: 03/31/22 0856   Pre-Existing Wound: Yes  Primary Wound Type: Venous Ulcer  Location: Leg  Wound Location Orientation: Distal;Left; Anterior; Lower       Subjective:        HPI  Shalini Manningsabine presents for wound care    He has left DFU left submet 5  No redness  No edema  No pain  BS has been better  He also developed ulcer on left leg  It started with a boil and popped open  The following portions of the patient's history were reviewed and updated as appropriate: allergies, current medications, past family history, past medical history, past social history, past surgical history and problem list     PAST MEDICAL HISTORY:  Past Medical History:   Diagnosis Date    CAD (coronary artery disease)     Chronic combined systolic and diastolic CHF (congestive heart failure) ([facility] )     Diabetes mellitus ([place] [address] )     type 2    Dyslipidemia     Hypertension     Ischemic cardiomyopathy     MI (myocardial infarction) ([facility] )     Osteomyelitis ([place] [address] )     Pancreatitis        PAST SURGICAL HISTORY:  Past Surgical History:   Procedure Laterality Date    ANGIOPLASTY      ballon    CORONARY ANGIOPLASTY WITH STENT PLACEMENT      LULA to proximal and mid LAD, Proximal RCA   HERNIA REPAIR      INCISION AND DRAINAGE OF WOUND Left 2/14/2020    Procedure: INCISION AND DRAINAGE (I&D) EXTREMITY left foot (cpt 03768); Surgeon: Ashly Murillo DPM;  [facility];  Service: Podiatry    NH EXPLORE [facility] Left 2/14/2020    Procedure: EXPLORATION EXTREMITY;  Surgeon: Ashly Murillo DPM;  [facility];  Service: Podiatry   [facility], < 50 CM Left 2/14/2020    Procedure: APPLICATION VAC DRESSING;  Surgeon: Ashly Murillo DPM;  [facility];  Service: Podiatry        ALLERGIES:  Patient has no known allergies  MEDICATIONS:  Current Outpatient Medications   Medication Sig Dispense Refill    aspirin 81 mg chewable tablet Chew 1 tablet daily for 30 days 30 tablet 0    atorvastatin (LIPITOR) 40 mg tablet TAKE 1 TABLET BY MOUTH EVERY DAY 90 tablet 3    Blood Pressure Monitoring ([employer] Blood Pressure Monitor) KIT Use 2 (two) times a day Automatic BP cuff  Measure BP twice daily   1 kit 0    cephalexin (KEFLEX) 500 mg capsule Take 1 capsule (500 mg total) by mouth every 6 (six) hours for 7 days 28 capsule 0    Lantus SoloStar 100 units/mL injection pen Inject 10 Units under the skin every morning (Patient taking differently: Inject 20 Units under the skin every morning  )  0    lisinopril (ZESTRIL) 5 mg tablet Take 1 tablet (5 mg total) by mouth daily 90 tablet 1    metoprolol tartrate (LOPRESSOR) 25 mg tablet TAKE 1 TABLET (25 MG TOTAL) BY MOUTH EVERY 12 (TWELVE) HOURS 180 tablet 3    NovoLIN 70/30 FlexPen (70-30) 100 UNIT/ML injection pen  (Patient not taking: Reported on 1/14/2022 )      potassium chloride (Klor-Con M20) 20 mEq tablet Take 1 tablet (20 mEq total) by mouth 2 (two) times a day with meals 60 tablet 0    Sodium Fluoride 5000 PPM 1 1 % PSTE BRUSH TWICE A DAY FOR 2 TO 3 MINUTES AND SPIT NO RINISING FOR 30 MINUTES      triamcinolone (KENALOG) 0 5 % cream Apply topically 2 (two) times a day 454 g 0    TRUE METRIX BLOOD GLUCOSE TEST test strip TEST FOUR TIMES DAILY  11    Unifine Pentips 31G X 8 MM MISC USE 4 TMES A DAY       No current facility-administered medications for this visit         SOCIAL HISTORY:  Social History     Socioeconomic History    Marital status: /Civil Union     Spouse name: Not on file    Number of children: Not on file    Years of education: Not on file    Highest education level: Not on file   Occupational History    Not on file   Tobacco Use    Smoking status: Never Smoker    Smokeless tobacco: Never Used   Vaping Use    Vaping Use: Never used   Substance and Sexual Activity    Alcohol use: Not Currently    Drug use: No    Sexual activity: Not on file   Other Topics Concern    Not on file   Social History Narrative    Not on file     Social Determinants of Health     Financial Resource Strain: Not on file   Food Insecurity: Not on file   Transportation Needs: Not on file   Physical Activity: Not on file   Stress: Not on file   Social Connections: Not on file Intimate Partner Violence: Not on file   Housing Stability: Not on file      Review of Systems   Constitutional: Negative for appetite change, chills and fever  Respiratory: Negative for cough and shortness of breath  Cardiovascular: Negative for chest pain  Gastrointestinal: Negative for diarrhea, nausea and vomiting  Musculoskeletal: Negative for gait problem  Skin: Positive for wound  Neurological: Positive for numbness  Objective:       Wound 03/31/22 Venous Ulcer Leg Left; Lower; Anterior;Proximal (Active)   Wound Image Images linked 03/31/22 0858   Wound Description Yellow;Pink 03/31/22 0833   Suzy-wound Assessment Hyperpigmented;Edema 03/31/22 0833   Wound Length (cm) 1 1 cm 03/31/22 0833   Wound Width (cm) 0 9 cm 03/31/22 0833   Wound Depth (cm) 0 1 cm 03/31/22 0833   Wound Surface Area (cm^2) 0 99 cm^2 03/31/22 0833   Wound Volume (cm^3) 0 099 cm^3 03/31/22 0833   Calculated Wound Volume (cm^3) 0 1 cm^3 03/31/22 0833   Drainage Amount Moderate 03/31/22 0833   Drainage Description Serosanguineous 03/31/22 0833   Non-staged Wound Description Full thickness 03/31/22 0833       Wound 03/31/22 Diabetic Ulcer Foot Left;Plantar (Active)   Wound Image Images linked 03/31/22 0858   Wound Description Yellow;Pink 03/31/22 0836   Suzy-wound Assessment Callus 03/31/22 0836   Wound Length (cm) 0 7 cm 03/31/22 0836   Wound Width (cm) 0 7 cm 03/31/22 0836   Wound Depth (cm) 0 3 cm 03/31/22 0836   Wound Surface Area (cm^2) 0 49 cm^2 03/31/22 0836   Wound Volume (cm^3) 0 147 cm^3 03/31/22 0836   Calculated Wound Volume (cm^3) 0 15 cm^3 03/31/22 0836   Drainage Amount Moderate 03/31/22 0836   Drainage Description Serosanguineous 03/31/22 0836   Non-staged Wound Description Full thickness 03/31/22 0836   Dressing Status Intact 03/31/22 0836       Wound 03/31/22 Venous Ulcer Leg Distal;Left; Anterior; Lower (Active)   Wound Image Images linked 03/31/22 0856   Wound Description Yellow;Pink 03/31/22 0901 Suzy-wound Assessment Edema; Hyperpigmented 03/31/22 0901   Wound Length (cm) 0 5 cm 03/31/22 0901   Wound Width (cm) 0 5 cm 03/31/22 0901   Wound Depth (cm) 0 1 cm 03/31/22 0901   Wound Surface Area (cm^2) 0 25 cm^2 03/31/22 0901   Wound Volume (cm^3) 0 025 cm^3 03/31/22 0901   Calculated Wound Volume (cm^3) 0 03 cm^3 03/31/22 0901   Drainage Amount Moderate 03/31/22 0901   Drainage Description Serosanguineous 03/31/22 0901   Non-staged Wound Description Full thickness 03/31/22 0901       /83   Pulse 77   Temp (!) 96 2 °F (35 7 °C)   Resp 16   Ht 5' 10" (1 778 m)   Wt 79 8 kg (176 lb)   BMI 25 25 kg/m²     Physical Exam  Vitals reviewed  Constitutional:       General: He is not in acute distress  Appearance: He is not toxic-appearing or diaphoretic  Cardiovascular:      Rate and Rhythm: Normal rate and regular rhythm  Pulses:           Dorsalis pedis pulses are 2+ on the right side and 1+ on the left side  Posterior tibial pulses are 0 on the right side and 0 on the left side  Comments: Venous stasis skin changes LLE with edema  Pulmonary:      Effort: Pulmonary effort is normal    Musculoskeletal:         General: Deformity present  No tenderness or signs of injury  Cervical back: Normal range of motion and neck supple  Left lower leg: Edema present  Right foot: No Charcot foot or foot drop  Left foot: No Charcot foot or foot drop  Skin:     General: Skin is warm and dry  Capillary Refill: Capillary refill takes less than 2 seconds  Coloration: Skin is not cyanotic or mottled  Findings: Wound present  No abscess or ecchymosis  Nails: There is no clubbing  Comments: Full thickness ulcer left submet 5  No deep probing  No infection  Left leg VSU left shin with fibrous wound bed  There is a cutaneous abscess / boil distally with ulcer underneath  See wound assessment  Neurological:      General: No focal deficit present  Mental Status: He is alert and oriented to person, place, and time  Cranial Nerves: No cranial nerve deficit  Sensory: Sensory deficit present  Coordination: Coordination normal    Psychiatric:         Mood and Affect: Mood normal          Behavior: Behavior normal          Thought Content: Thought content normal          Judgment: Judgment normal            Wound Instructions:  Orders Placed This Encounter   Procedures    Wound cleansing and dressings     Left Foot and Left Leg wounds:  Wash your hands with soap and water  Cleanse the wound with mild soap and water prior to applying a clean dressing  Shower no   Apply Silver Alginate to the wounds  Cover with gauze  Secure with toni and tape  Change dressing every other day and as needed      Treatments above were completed today at the [facility]      Supplies ordered today with [employer]---#[phone]     Standing Status:   Future     Standing Expiration Date:   3/31/2023    Wound compression and edema control     Elastic Tubular Stocking----Double Spandagrip size F to the LLE    Tubular elastic bandage: Apply from base of toes to behind the knee  Apply in AM, may remove for sleep  Avoid prolonged standing in one place  Elevate leg(s) above the level of the heart when sitting or as much as possible  Standing Status:   Future     Standing Expiration Date:   3/31/2023    Wound off loading     Continue to wear the Darco shoe to the Left foot for all ambulation and transfers     Standing Status:   Future     Standing Expiration Date:   3/31/2023    Debridement     This order was created via procedure documentation    Debridement     This order was created via procedure documentation    Debridement     This order was created via procedure documentation        Diagnosis ICD-10-CM Associated Orders   1   Ulcer of left foot, with fat layer exposed ([facility] )  L95 522 lidocaine (LMX) 4 % cream     Wound cleansing and dressings     Wound compression and edema control     Wound off loading     Debridement   2  Chronic venous hypertension (idiopathic) with ulcer of left lower extremity ([facility])  I87 312 lidocaine (LMX) 4 % cream    L97 929 Wound cleansing and dressings     Wound compression and edema control     Wound off loading     cephalexin (KEFLEX) 500 mg capsule     Debridement     Debridement   3   Type 2 diabetes mellitus with diabetic neuropathy, with long-term current use of insulin ([facility])  E11 40 Debridement    Z79 4

## 2022-04-04 ENCOUNTER — TELEPHONE (OUTPATIENT)
Dept: NEPHROLOGY | Facility: HOSPITAL | Age: 62
End: 2022-04-04

## 2022-04-04 DIAGNOSIS — R79.89 ELEVATED SERUM CREATININE: Primary | ICD-10-CM

## 2022-04-04 DIAGNOSIS — I10 BENIGN ESSENTIAL HYPERTENSION: ICD-10-CM

## 2022-04-04 DIAGNOSIS — N18.32 STAGE 3B CHRONIC KIDNEY DISEASE (HCC): ICD-10-CM

## 2022-04-04 NOTE — TELEPHONE ENCOUNTER
----- Message from Eloy Manzanares PA-C sent at 4/1/2022  2:35 PM EDT -----  Just a BMP and UPC ratio  Thanks   ----- Message -----  From: oJse Adame  Sent: 3/30/2022   8:42 AM EDT  To: Eloy Manzanares PA-C    Hi, What labs would you like this patient to have before his follow up?

## 2022-04-05 ENCOUNTER — TELEPHONE (OUTPATIENT)
Dept: NEPHROLOGY | Facility: CLINIC | Age: 62
End: 2022-04-05

## 2022-04-05 NOTE — TELEPHONE ENCOUNTER
Appointment Confirmation   Person confirmed appointment with  If not patient, name of the person lm    Date and time of appointment 04/07   Patient acknowledged and will be at appointment? no   Did you advise the patient that they will need a urine sample if they are a new patient? no   Did you advise the patient to bring their current medications for verification? (including any OTC) no   Additional Information Lm to confirm

## 2022-04-06 ENCOUNTER — APPOINTMENT (OUTPATIENT)
Dept: LAB | Facility: CLINIC | Age: 62
End: 2022-04-06
Payer: COMMERCIAL

## 2022-04-06 DIAGNOSIS — N18.32 STAGE 3B CHRONIC KIDNEY DISEASE (HCC): ICD-10-CM

## 2022-04-06 DIAGNOSIS — N18.31 CHRONIC KIDNEY DISEASE (CKD) STAGE G3A/A1, MODERATELY DECREASED GLOMERULAR FILTRATION RATE (GFR) BETWEEN 45-59 ML/MIN/1.73 SQUARE METER AND ALBUMINURIA CREATININE RATIO LESS THAN 30 MG/G (HCC): ICD-10-CM

## 2022-04-06 DIAGNOSIS — E11.49 DIABETIC NEUROPATHY WITH NEUROLOGIC COMPLICATION (HCC): ICD-10-CM

## 2022-04-06 DIAGNOSIS — R79.89 ELEVATED SERUM CREATININE: ICD-10-CM

## 2022-04-06 DIAGNOSIS — E11.40 DIABETIC NEUROPATHY WITH NEUROLOGIC COMPLICATION (HCC): ICD-10-CM

## 2022-04-06 DIAGNOSIS — Z12.5 SPECIAL SCREENING FOR MALIGNANT NEOPLASM OF PROSTATE: ICD-10-CM

## 2022-04-06 DIAGNOSIS — E11.00 TYPE II DIABETES MELLITUS WITH HYPEROSMOLARITY, UNCONTROLLED (HCC): ICD-10-CM

## 2022-04-06 DIAGNOSIS — I10 ESSENTIAL HYPERTENSION, MALIGNANT: ICD-10-CM

## 2022-04-06 DIAGNOSIS — I10 BENIGN ESSENTIAL HYPERTENSION: ICD-10-CM

## 2022-04-06 DIAGNOSIS — E66.9 OBESITY, UNSPECIFIED CLASSIFICATION, UNSPECIFIED OBESITY TYPE, UNSPECIFIED WHETHER SERIOUS COMORBIDITY PRESENT: ICD-10-CM

## 2022-04-06 DIAGNOSIS — E11.65 TYPE II DIABETES MELLITUS WITH HYPEROSMOLARITY, UNCONTROLLED (HCC): ICD-10-CM

## 2022-04-06 DIAGNOSIS — B02.23 POSTHERPETIC POLYNEUROPATHY: ICD-10-CM

## 2022-04-06 DIAGNOSIS — I51.9 MYXEDEMA HEART DISEASE: ICD-10-CM

## 2022-04-06 DIAGNOSIS — I73.9 PERIPHERAL VASCULAR DISEASE, UNSPECIFIED (HCC): ICD-10-CM

## 2022-04-06 DIAGNOSIS — E11.21 DIABETIC GLOMERULOPATHY (HCC): ICD-10-CM

## 2022-04-06 DIAGNOSIS — E03.9 MYXEDEMA HEART DISEASE: ICD-10-CM

## 2022-04-06 LAB
ALBUMIN SERPL BCP-MCNC: 3.1 G/DL (ref 3.5–5)
ALP SERPL-CCNC: 76 U/L (ref 46–116)
ALT SERPL W P-5'-P-CCNC: 25 U/L (ref 12–78)
AMYLASE SERPL-CCNC: 51 IU/L (ref 25–115)
ANION GAP SERPL CALCULATED.3IONS-SCNC: 9 MMOL/L (ref 4–13)
AST SERPL W P-5'-P-CCNC: 20 U/L (ref 5–45)
BASOPHILS # BLD AUTO: 0.04 THOUSANDS/ΜL (ref 0–0.1)
BASOPHILS NFR BLD AUTO: 1 % (ref 0–1)
BILIRUB SERPL-MCNC: 0.86 MG/DL (ref 0.2–1)
BUN SERPL-MCNC: 39 MG/DL (ref 5–25)
CALCIUM ALBUM COR SERPL-MCNC: 9.5 MG/DL (ref 8.3–10.1)
CALCIUM SERPL-MCNC: 8.8 MG/DL (ref 8.3–10.1)
CHLORIDE SERPL-SCNC: 106 MMOL/L (ref 100–108)
CHOLEST SERPL-MCNC: 114 MG/DL
CO2 SERPL-SCNC: 29 MMOL/L (ref 21–32)
CREAT SERPL-MCNC: 1.89 MG/DL (ref 0.6–1.3)
CREAT UR-MCNC: 95 MG/DL
EOSINOPHIL # BLD AUTO: 0.22 THOUSAND/ΜL (ref 0–0.61)
EOSINOPHIL NFR BLD AUTO: 3 % (ref 0–6)
ERYTHROCYTE [DISTWIDTH] IN BLOOD BY AUTOMATED COUNT: 14.7 % (ref 11.6–15.1)
GFR SERPL CREATININE-BSD FRML MDRD: 36 ML/MIN/1.73SQ M
GLUCOSE P FAST SERPL-MCNC: 104 MG/DL (ref 65–99)
HCT VFR BLD AUTO: 39.8 % (ref 36.5–49.3)
HDLC SERPL-MCNC: 64 MG/DL
HGB BLD-MCNC: 12.5 G/DL (ref 12–17)
IMM GRANULOCYTES # BLD AUTO: 0.06 THOUSAND/UL (ref 0–0.2)
IMM GRANULOCYTES NFR BLD AUTO: 1 % (ref 0–2)
LDLC SERPL CALC-MCNC: 42 MG/DL (ref 0–100)
LIPASE SERPL-CCNC: 37 U/L (ref 73–393)
LYMPHOCYTES # BLD AUTO: 0.89 THOUSANDS/ΜL (ref 0.6–4.47)
LYMPHOCYTES NFR BLD AUTO: 11 % (ref 14–44)
MAGNESIUM SERPL-MCNC: 2.1 MG/DL (ref 1.6–2.6)
MCH RBC QN AUTO: 27.1 PG (ref 26.8–34.3)
MCHC RBC AUTO-ENTMCNC: 31.4 G/DL (ref 31.4–37.4)
MCV RBC AUTO: 86 FL (ref 82–98)
MONOCYTES # BLD AUTO: 0.64 THOUSAND/ΜL (ref 0.17–1.22)
MONOCYTES NFR BLD AUTO: 8 % (ref 4–12)
NEUTROPHILS # BLD AUTO: 5.97 THOUSANDS/ΜL (ref 1.85–7.62)
NEUTS SEG NFR BLD AUTO: 76 % (ref 43–75)
NONHDLC SERPL-MCNC: 50 MG/DL
NRBC BLD AUTO-RTO: 0 /100 WBCS
PHOSPHATE SERPL-MCNC: 4.4 MG/DL (ref 2.3–4.1)
PLATELET # BLD AUTO: 236 THOUSANDS/UL (ref 149–390)
PMV BLD AUTO: 9.8 FL (ref 8.9–12.7)
POTASSIUM SERPL-SCNC: 4 MMOL/L (ref 3.5–5.3)
PROT SERPL-MCNC: 7.1 G/DL (ref 6.4–8.2)
PROT UR-MCNC: <6 MG/DL
PROT/CREAT UR: <0.06 MG/G{CREAT} (ref 0–0.1)
PSA SERPL-MCNC: 0.7 NG/ML (ref 0–4)
RBC # BLD AUTO: 4.61 MILLION/UL (ref 3.88–5.62)
SODIUM SERPL-SCNC: 144 MMOL/L (ref 136–145)
T4 FREE SERPL-MCNC: 1.14 NG/DL (ref 0.76–1.46)
TRIGL SERPL-MCNC: 38 MG/DL
TSH SERPL DL<=0.05 MIU/L-ACNC: 4.71 UIU/ML (ref 0.45–4.5)
WBC # BLD AUTO: 7.82 THOUSAND/UL (ref 4.31–10.16)

## 2022-04-06 PROCEDURE — 82150 ASSAY OF AMYLASE: CPT

## 2022-04-06 PROCEDURE — 80053 COMPREHEN METABOLIC PANEL: CPT

## 2022-04-06 PROCEDURE — 85025 COMPLETE CBC W/AUTO DIFF WBC: CPT

## 2022-04-06 PROCEDURE — 36415 COLL VENOUS BLD VENIPUNCTURE: CPT

## 2022-04-06 PROCEDURE — 84443 ASSAY THYROID STIM HORMONE: CPT

## 2022-04-06 PROCEDURE — 83735 ASSAY OF MAGNESIUM: CPT

## 2022-04-06 PROCEDURE — 84156 ASSAY OF PROTEIN URINE: CPT

## 2022-04-06 PROCEDURE — 84439 ASSAY OF FREE THYROXINE: CPT

## 2022-04-06 PROCEDURE — 84100 ASSAY OF PHOSPHORUS: CPT

## 2022-04-06 PROCEDURE — G0103 PSA SCREENING: HCPCS

## 2022-04-06 PROCEDURE — 83036 HEMOGLOBIN GLYCOSYLATED A1C: CPT

## 2022-04-06 PROCEDURE — 80061 LIPID PANEL: CPT

## 2022-04-06 PROCEDURE — 83690 ASSAY OF LIPASE: CPT

## 2022-04-06 PROCEDURE — 82570 ASSAY OF URINE CREATININE: CPT

## 2022-04-07 ENCOUNTER — OFFICE VISIT (OUTPATIENT)
Dept: NEPHROLOGY | Facility: CLINIC | Age: 62
End: 2022-04-07
Payer: COMMERCIAL

## 2022-04-07 ENCOUNTER — OFFICE VISIT (OUTPATIENT)
Dept: WOUND CARE | Facility: HOSPITAL | Age: 62
End: 2022-04-07
Payer: COMMERCIAL

## 2022-04-07 VITALS
DIASTOLIC BLOOD PRESSURE: 86 MMHG | WEIGHT: 178 LBS | SYSTOLIC BLOOD PRESSURE: 136 MMHG | HEART RATE: 76 BPM | BODY MASS INDEX: 25.48 KG/M2 | HEIGHT: 70 IN

## 2022-04-07 VITALS
SYSTOLIC BLOOD PRESSURE: 141 MMHG | DIASTOLIC BLOOD PRESSURE: 83 MMHG | HEART RATE: 77 BPM | TEMPERATURE: 97 F | RESPIRATION RATE: 16 BRPM

## 2022-04-07 DIAGNOSIS — I25.5 ISCHEMIC CARDIOMYOPATHY: ICD-10-CM

## 2022-04-07 DIAGNOSIS — N18.32 STAGE 3B CHRONIC KIDNEY DISEASE (HCC): Primary | ICD-10-CM

## 2022-04-07 DIAGNOSIS — I50.42 CHRONIC COMBINED SYSTOLIC AND DIASTOLIC CHF (CONGESTIVE HEART FAILURE) (HCC): ICD-10-CM

## 2022-04-07 DIAGNOSIS — E11.65 TYPE 2 DIABETES MELLITUS WITH HYPERGLYCEMIA, WITH LONG-TERM CURRENT USE OF INSULIN (HCC): ICD-10-CM

## 2022-04-07 DIAGNOSIS — I87.312 CHRONIC VENOUS HYPERTENSION (IDIOPATHIC) WITH ULCER OF LEFT LOWER EXTREMITY (HCC): ICD-10-CM

## 2022-04-07 DIAGNOSIS — Z79.4 TYPE 2 DIABETES MELLITUS WITH HYPERGLYCEMIA, WITH LONG-TERM CURRENT USE OF INSULIN (HCC): ICD-10-CM

## 2022-04-07 DIAGNOSIS — E11.40 TYPE 2 DIABETES MELLITUS WITH DIABETIC NEUROPATHY, WITH LONG-TERM CURRENT USE OF INSULIN (HCC): ICD-10-CM

## 2022-04-07 DIAGNOSIS — L97.929 CHRONIC VENOUS HYPERTENSION (IDIOPATHIC) WITH ULCER OF LEFT LOWER EXTREMITY (HCC): ICD-10-CM

## 2022-04-07 DIAGNOSIS — I10 BENIGN ESSENTIAL HYPERTENSION: ICD-10-CM

## 2022-04-07 DIAGNOSIS — R80.9 PROTEINURIA, UNSPECIFIED TYPE: ICD-10-CM

## 2022-04-07 DIAGNOSIS — L97.522 ULCER OF LEFT FOOT, WITH FAT LAYER EXPOSED (HCC): Primary | ICD-10-CM

## 2022-04-07 DIAGNOSIS — Z79.4 TYPE 2 DIABETES MELLITUS WITH DIABETIC NEUROPATHY, WITH LONG-TERM CURRENT USE OF INSULIN (HCC): ICD-10-CM

## 2022-04-07 DIAGNOSIS — L02.416 CUTANEOUS ABSCESS OF LEFT LOWER EXTREMITY: ICD-10-CM

## 2022-04-07 LAB
EST. AVERAGE GLUCOSE BLD GHB EST-MCNC: 252 MG/DL
HBA1C MFR BLD: 10.4 %

## 2022-04-07 PROCEDURE — 87077 CULTURE AEROBIC IDENTIFY: CPT | Performed by: PODIATRIST

## 2022-04-07 PROCEDURE — 10060 I&D ABSCESS SIMPLE/SINGLE: CPT | Performed by: PODIATRIST

## 2022-04-07 PROCEDURE — 99214 OFFICE O/P EST MOD 30 MIN: CPT | Performed by: PHYSICIAN ASSISTANT

## 2022-04-07 PROCEDURE — 99213 OFFICE O/P EST LOW 20 MIN: CPT | Performed by: PODIATRIST

## 2022-04-07 PROCEDURE — 87070 CULTURE OTHR SPECIMN AEROBIC: CPT | Performed by: PODIATRIST

## 2022-04-07 PROCEDURE — 87205 SMEAR GRAM STAIN: CPT | Performed by: PODIATRIST

## 2022-04-07 PROCEDURE — 11042 DBRDMT SUBQ TIS 1ST 20SQCM/<: CPT | Performed by: PODIATRIST

## 2022-04-07 PROCEDURE — 87186 SC STD MICRODIL/AGAR DIL: CPT | Performed by: PODIATRIST

## 2022-04-07 PROCEDURE — 99214 OFFICE O/P EST MOD 30 MIN: CPT | Performed by: PODIATRIST

## 2022-04-07 RX ORDER — LIDOCAINE 40 MG/G
CREAM TOPICAL ONCE
Status: COMPLETED | OUTPATIENT
Start: 2022-04-07 | End: 2022-04-07

## 2022-04-07 RX ORDER — FUROSEMIDE 40 MG/1
40 TABLET ORAL DAILY
COMMUNITY

## 2022-04-07 RX ADMIN — LIDOCAINE: 40 CREAM TOPICAL at 11:13

## 2022-04-07 NOTE — PATIENT INSTRUCTIONS
Keep working on diabetes control to help your kidneys  Try taking the lasix every other day but if breathing gets worse go back to daily   Repeat BMP in 1 month and if labs stable will consider increasing lisinopril   Follow up in 3-4 months with Dr Ector Pittman

## 2022-04-07 NOTE — PATIENT INSTRUCTIONS
Orders Placed This Encounter   Procedures    Wound cleansing and dressings     Left Foot and Left Leg wounds:  Wash your hands with soap and water  Cleanse the wound with mild soap and water prior to applying a clean dressing       Shower no   Apply Silver Alginate to the wounds  Cover with gauze  Secure with toni and tape  Change dressing every other day and as needed     Treatments above were completed today at the [facility]           Standing Status:   Future     Standing Expiration Date:   4/7/2023    Wound compression and edema control      Wound compression and edema control      Elastic Tubular Stocking----Spandagrip size F followed by ace wrap to the LLE     Tubular elastic bandage: Apply from base of toes to behind the knee  Apply in AM, may remove for sleep      Avoid prolonged standing in one place      Elevate leg(s) above the level of the heart when sitting or as much as possible       Standing Status:   Future     Standing Expiration Date:   4/7/2023    Wound off loading              Wound off loading      Continue to wear the Darco shoe to the Left foot for all ambulation and transfers          Standing Status:   Future     Standing Expiration Date:   4/7/2023

## 2022-04-07 NOTE — PROGRESS NOTES
OFFICE FOLLOW UP - Nephrology   Brynn Khan 57 y o  male MRN: [id number]       ASSESSMENT/PLAN:  1  CKD stage IIIB: Baseline creatinine around recently 1 6-1 9 due to diabetes, hypertension, ischemic cardiomyopathy and cirrhosis  Follows with Dr Mily Matias  Had LOBO with creatinine up to 2 8 in Dec 2021 but now LOBO resolved  Creatinine 4/6 was 1 89 which is trending up a bit   · Previously referred to [facility]  · We discussed the importance of controlling his diabetes to help prevent the progression of CKD  · Ideally would increase lisinopril given significant proteinuria  He is going to try taking his Lasix every other day so hopefully this will help creatinine stabilize  Will repeat BMP in 1 month and if creatinine stable can increase lisinopril  2  Hypertension:  Blood pressure acceptable  Continue current medications  3  Proteinuria:  Microalbumin creatinine ratio 2 8 g which is trending down (UPC ratio 0 06g but likely lab error)  On lisinopril 5mg daily which was started Jan 2022  · Likely due to diabetic nephropathy in the setting of uncontrolled diabetes  · Prior serologic workup negative except initial Cryo positive but repeat was negative   4  Ischemic cardiomyopathy with EF 50%: Appears euvolemic  Hospitalized with prerenal acute kidney injury in December 2021 and diuretics were held  About a month ago he restarted taking Lasix 40 mg daily  5  DM II: recent A1c 10 4   Admits to dietary noncompliance  Eating candy and cakes  We discussed the importance of controlling his diabetes to help prevent progression of his CKD  6   Cirrhosis    PATIENT INSTRUCTIONS:  Patient Instructions   Keep working on diabetes control to help your kidneys  Try taking the lasix every other day but if breathing gets worse go back to daily   Repeat BMP in 1 month and if labs stable will consider increasing lisinopril   Follow up in 3-4 months with Dr Mily Matias       HPI: Brynn Khan is a 57 y o  male who is here for CKD follow-up  Currently dealing with diabetic foot wound in his left foot  Has walking boot  About a month ago he was having difficulty breathing when lying flat so he restarted his Lasix 40 mg daily which had previously been on hold due to hospitalization in December with dehydration  He was told by another doctor to try the Lasix every other day but he is still taking it daily  He is having difficulty controlling his blood sugar  It is very labile  Yesterday morning it was in the 40s  He is often eating things like cake and candy  ROS:   A complete review of systems was done  Pertinent positives and negatives as noted in the HPI, otherwise the review of systems is negative  Allergies: Patient has no known allergies  Medications:   Current Outpatient Medications:     aspirin 81 mg chewable tablet, Chew 1 tablet daily for 30 days, Disp: 30 tablet, Rfl: 0    atorvastatin (LIPITOR) 40 mg tablet, TAKE 1 TABLET BY MOUTH EVERY DAY, Disp: 90 tablet, Rfl: 3    Blood Pressure Monitoring (CVS Blood Pressure Monitor) KIT, Use 2 (two) times a day Automatic BP cuff  Measure BP twice daily  , Disp: 1 kit, Rfl: 0    cephalexin (KEFLEX) 500 mg capsule, Take 1 capsule (500 mg total) by mouth every 6 (six) hours for 7 days, Disp: 28 capsule, Rfl: 0    furosemide (LASIX) 40 mg tablet, Take 40 mg by mouth daily, Disp: , Rfl:     Lantus SoloStar 100 units/mL injection pen, Inject 10 Units under the skin every morning (Patient taking differently: Inject 20 Units under the skin every morning  ), Disp: , Rfl: 0    lisinopril (ZESTRIL) 5 mg tablet, Take 1 tablet (5 mg total) by mouth daily, Disp: 90 tablet, Rfl: 1    metoprolol tartrate (LOPRESSOR) 25 mg tablet, TAKE 1 TABLET (25 MG TOTAL) BY MOUTH EVERY 12 (TWELVE) HOURS, Disp: 180 tablet, Rfl: 3    Sodium Fluoride 5000 PPM 1 1 % PSTE, BRUSH TWICE A DAY FOR 2 TO 3 MINUTES AND SPIT NO RINISING FOR 30 MINUTES, Disp: , Rfl:     triamcinolone (KENALOG) 0 5 % cream, Apply topically 2 (two) times a day, Disp: 454 g, Rfl: 0    TRUE METRIX BLOOD GLUCOSE TEST test strip, TEST FOUR TIMES DAILY, Disp: , Rfl: 11    Unifine Pentips 31G X 8 MM MISC, USE 4 TMES A DAY, Disp: , Rfl:     NovoLIN 70/30 FlexPen (70-30) 100 UNIT/ML injection pen, , Disp: , Rfl:     potassium chloride (Klor-Con M20) 20 mEq tablet, Take 1 tablet (20 mEq total) by mouth 2 (two) times a day with meals (Patient not taking: Reported on 4/7/2022 ), Disp: 60 tablet, Rfl: 0    Past Medical History:   Diagnosis Date    CAD (coronary artery disease)     Chronic combined systolic and diastolic CHF (congestive heart failure) ([facility])     Diabetes mellitus (HCC)     type 2    Dyslipidemia     Hypertension     Ischemic cardiomyopathy     [facility] infarction) [facility] [address] [facility]     Osteomyelitis ([facility] [address] [facility]     Pancreatitis      Past Surgical History:   Procedure Laterality Date    ANGIOPLASTY      ballon    CORONARY ANGIOPLASTY WITH STENT PLACEMENT      LULA to proximal and mid LAD, Proximal RCA   HERNIA REPAIR      INCISION AND DRAINAGE OF WOUND Left 2/14/2020    Procedure: INCISION AND DRAINAGE (I&D) EXTREMITY left foot (cpt 55430);   Surgeon: Jessenia Strong DPM;  [facility];  Service: Almas Lob Left 2/14/2020    Procedure: EXPLORATION EXTREMITY;  Surgeon: Jessenia Strong DPM;  [facility];  Service: Podiatry    KS NEG PRESS WOUND TX, < 50 CM Left 2/14/2020    Procedure: APPLICATION VAC DRESSING;  Surgeon: Jessenia Strong DPM;  [facility];  Service: Podiatry     Family History   Problem Relation Age of Onset    Heart attack Father     Hyperlipidemia Father     Cancer Father     Diabetes Father     Diabetes Mother     Heart failure Mother         poss    Kidney disease Mother     Cancer Paternal Grandfather     Stroke Neg Hx     Anuerysm Neg Hx     Clotting disorder Neg Hx     Arrhythmia Neg Hx     Coronary artery disease Neg Hx     Hypertension Neg Hx  Sudden death Neg Hx         scd      reports that he has never smoked  He has never used smokeless tobacco  He reports previous alcohol use  He reports that he does not use drugs  Physical Exam:   Vitals:    04/07/22 0910   BP: 136/86   BP Location: Left arm   Patient Position: Sitting   Cuff Size: Adult   Pulse: 76   Weight: 80 7 kg (178 lb)   Height: 5' 10" (1 778 m)     Body mass index is 25 54 kg/m²  General: no acute distress   Eyes: conjunctivae pink, anicteric sclerae  ENT: mucous membranes moist  Neck: supple, no JVD  Chest: clear to auscultation bilaterally with no wheezes, rale or rhochi  CVS: regular rate and rhythm   Abdomen: soft, non-tender, non-distended  Extremities:  Left foot in boot, left leg swelling, no right edema  Skin: no rash  Neuro: awake and alert       Lab Results:  Results for orders placed or performed in visit on 04/06/22   Protein / creatinine ratio, urine   Result Value Ref Range    Creatinine, Ur 95 0 mg/dL    Protein Urine Random <6 mg/dL    Prot/Creat Ratio, Ur <0 06 0 00 - 0 10   Comprehensive metabolic panel   Result Value Ref Range    Sodium 144 136 - 145 mmol/L    Potassium 4 0 3 5 - 5 3 mmol/L    Chloride 106 100 - 108 mmol/L    CO2 29 21 - 32 mmol/L    ANION GAP 9 4 - 13 mmol/L    BUN 39 (H) 5 - 25 mg/dL    Creatinine 1 89 (H) 0 60 - 1 30 mg/dL    Glucose, Fasting 104 (H) 65 - 99 mg/dL    Calcium 8 8 8 3 - 10 1 mg/dL    Corrected Calcium 9 5 8 3 - 10 1 mg/dL    AST 20 5 - 45 U/L    ALT 25 12 - 78 U/L    Alkaline Phosphatase 76 46 - 116 U/L    Total Protein 7 1 6 4 - 8 2 g/dL    Albumin 3 1 (L) 3 5 - 5 0 g/dL    Total Bilirubin 0 86 0 20 - 1 00 mg/dL    eGFR 36 ml/min/1 73sq m   Hemoglobin A1C   Result Value Ref Range    Hemoglobin A1C 10 4 (H) Normal 3 8-5 6%; PreDiabetic 5 7-6 4%;  Diabetic >=6 5%; Glycemic control for adults with diabetes <7 0% %     mg/dl   Lipid panel   Result Value Ref Range    Cholesterol 114 See Comment mg/dL    Triglycerides 38 See Comment mg/dL    HDL, Direct 64 >=40 mg/dL    LDL Calculated 42 0 - 100 mg/dL    Non-HDL-Chol (CHOL-HDL) 50 mg/dl   PSA, Total Screen   Result Value Ref Range    PSA 0 7 0 0 - 4 0 ng/mL   CBC and differential   Result Value Ref Range    WBC 7 82 4 31 - 10 16 Thousand/uL    RBC 4 61 3 88 - 5 62 Million/uL    Hemoglobin 12 5 12 0 - 17 0 g/dL    Hematocrit 39 8 36 5 - 49 3 %    MCV 86 82 - 98 fL    MCH 27 1 26 8 - 34 3 pg    MCHC 31 4 31 4 - 37 4 g/dL    RDW 14 7 11 6 - 15 1 %    MPV 9 8 8 9 - 12 7 fL    Platelets 655 834 - 637 Thousands/uL    nRBC 0 /100 WBCs    Neutrophils Relative 76 (H) 43 - 75 %    Immat GRANS % 1 0 - 2 %    Lymphocytes Relative 11 (L) 14 - 44 %    Monocytes Relative 8 4 - 12 %    Eosinophils Relative 3 0 - 6 %    Basophils Relative 1 0 - 1 %    Neutrophils Absolute 5 97 1 85 - 7 62 Thousands/µL    Immature Grans Absolute 0 06 0 00 - 0 20 Thousand/uL    Lymphocytes Absolute 0 89 0 60 - 4 47 Thousands/µL    Monocytes Absolute 0 64 0 17 - 1 22 Thousand/µL    Eosinophils Absolute 0 22 0 00 - 0 61 Thousand/µL    Basophils Absolute 0 04 0 00 - 0 10 Thousands/µL   TSH, 3rd generation   Result Value Ref Range    TSH 3RD GENERATON 4 705 (H) 0 450 - 4 500 uIU/mL   T4, free   Result Value Ref Range    Free T4 1 14 0 76 - 1 46 ng/dL   Magnesium   Result Value Ref Range    Magnesium 2 1 1 6 - 2 6 mg/dL   Phosphorus   Result Value Ref Range    Phosphorus 4 4 (H) 2 3 - 4 1 mg/dL   Lipase   Result Value Ref Range    Lipase 37 (L) 73 - 393 u/L   Amylase   Result Value Ref Range    Amylase 51 25 - 115 IU/L       Results from last 7 days   Lab Units 04/06/22  0824   WBC Thousand/uL 7 82   HEMOGLOBIN g/dL 12 5   HEMATOCRIT % 39 8   PLATELETS Thousands/uL 236   SODIUM mmol/L 144   POTASSIUM mmol/L 4 0   CHLORIDE mmol/L 106   CO2 mmol/L 29   BUN mg/dL 39*   CREATININE mg/dL 1 89*   CALCIUM mg/dL 8 8   MAGNESIUM mg/dL 2 1   PHOSPHORUS mg/dL 4 4*         Portions of the record may have been created with voice recognition software  Occasional wrong word or "sound a like" substitutions may have occurred due to the inherent limitations of voice recognition software  Read the chart carefully and recognize, using context, where substitutions have occurred  If you have any questions, please contact the dictating provider

## 2022-04-07 NOTE — PROGRESS NOTES
Patient ID: Irma Flowers is a 57 y o  male Date of Birth 1960     Chief Complaint  No chief complaint on file  Allergies  Patient has no known allergies  Assessment:    No problem-specific Assessment & Plan notes found for this encounter  Diagnoses and all orders for this visit:    Ulcer of left foot, with fat layer exposed ([address] )  -     lidocaine (LMX) 4 % cream  -     Wound cleansing and dressings; Future  -     Wound compression and edema control; Future  -     Wound off loading; Future  -     Debridement    Cutaneous abscess of left lower extremity  -     Wound culture and Gram stain  -     Incision and Drainage    Chronic venous hypertension (idiopathic) with ulcer of left lower extremity (HCC)  -     lidocaine (LMX) 4 % cream  -     Wound cleansing and dressings; Future  -     Wound compression and edema control; Future  -     Wound off loading; Future    Type 2 diabetes mellitus with diabetic neuropathy, with long-term current use of insulin ([facility] [address] [address] )  -     Debridement          Debridement   Wound 03/31/22 Diabetic Ulcer Foot Left;Plantar    Universal Protocol:  Consent: Verbal consent obtained  Risks and benefits: risks, benefits and alternatives were discussed  Consent given by: patient  Time out: Immediately prior to procedure a "time out" was called to verify the correct patient, procedure, equipment, support staff and site/side marked as required    Timeout called at: 4/7/2022 11:38 AM   Patient understanding: patient states understanding of the procedure being performed  Patient identity confirmed: verbally with patient      Performed by: physician  Debridement type: surgical  Level of debridement: subcutaneous tissue  Pain control: lidocaine 4%  Post-debridement measurements  Length (cm): 0 8  Width (cm): 0 8  Depth (cm): 0 2  Percent debrided: 100%  Surface Area (cm^2): 0 64  Area debrided (cm^2): 0 64  Volume (cm^3): 0 13  Tissue and other material debrided: dermis, epidermis and subcutaneous tissue  Devitalized tissue debrided: callus, fibrin and slough  Instrument(s) utilized: blade  Bleeding: medium  Hemostasis obtained with: pressure and silver nitrate  Procedural pain (0-10): 0  Post-procedural pain: 0   Response to treatment: procedure was tolerated well    Incision and Drainage    Date/Time: 4/7/2022 11:39 AM  Performed by: Sushila Barnhart DPM  Authorized by: Sushila Barnhart DPM   Universal Protocol:  Consent: Verbal consent obtained  Risks and benefits: risks, benefits and alternatives were discussed  Consent given by: patient  Time out: Immediately prior to procedure a "time out" was called to verify the correct patient, procedure, equipment, support staff and site/side marked as required  Timeout called at: 4/7/2022 11:39 AM   Patient understanding: patient states understanding of the procedure being performed  Patient identity confirmed: verbally with patient      Patient location:  Clinic  Location:     Type:  Abscess    Location:  Lower extremity    Lower extremity location:  L leg  Pre-procedure details:     Skin preparation:  Betadine  Anesthesia (see MAR for exact dosages): Anesthesia method:  None  Procedure details:     Complexity:  Simple    Incision types:  Single straight    Scalpel blade:  15    Incision depth:  Subcutaneous    Wound management:  Irrigated with saline and debrided    Drainage:  Purulent    Drainage amount: Moderate    Wound treatment:  Wound left open    Packing materials:  None  Post-procedure details:     Patient tolerance of procedure: Tolerated well, no immediate complications          PLAN:  1  Patient was counseled on the condition and diagnosis  2  Educated disease prevention and risks related to diabetes and foot ulcer  3  Wound was debrided  Instructed daily local care  Orthowedge shoe for off-loading  Recommended NWB with crutches  Stressed on patient compliance  Possible TCC once his leg is stable    4  New abscess in left medial calf that he was not aware  I&D performed and wound culture taken  See procedure  5  Instructed compression and elevation on left leg  6  RA in 1 week  Wound 03/31/22 Venous Ulcer Leg Left; Lower; Anterior;Proximal (Active)   Wound Image Images linked 04/07/22 1057   Wound Description Yellow;Pink;Slough 04/07/22 1108   Suzy-wound Assessment Hyperpigmented;Edema;[place] 04/07/22 1108   Wound Length (cm) 0 6 cm 04/07/22 1108   Wound Width (cm) 0 5 cm 04/07/22 1108   Wound Depth (cm) 0 2 cm 04/07/22 1108   Wound Surface Area (cm^2) 0 3 cm^2 04/07/22 1108   Wound Volume (cm^3) 0 06 cm^3 04/07/22 1108   Calculated Wound Volume (cm^3) 0 06 cm^3 04/07/22 1108   Change in Wound Size % 40 04/07/22 1108   Drainage Amount Moderate 04/07/22 1108   Drainage Description Serosanguineous 04/07/22 1108   Non-staged Wound Description Full thickness 04/07/22 1108       Wound 03/31/22 Diabetic Ulcer Foot Left;Plantar (Active)   Wound Image Images linked 04/07/22 1125   Wound Description Granulation tissue; Yellow;Slough 04/07/22 1110   Suzy-wound Assessment Callus 04/07/22 1110   Wound Length (cm) 0 7 cm 04/07/22 1110   Wound Width (cm) 0 7 cm 04/07/22 1110   Wound Depth (cm) 0 3 cm 04/07/22 1110   Wound Surface Area (cm^2) 0 49 cm^2 04/07/22 1110   Wound Volume (cm^3) 0 147 cm^3 04/07/22 1110   Calculated Wound Volume (cm^3) 0 15 cm^3 04/07/22 1110   Change in Wound Size % 0 04/07/22 1110   Undermining 0 3 04/07/22 1110   Undermining is depth extending from 10-1 oclock  04/07/22 1110   Drainage Amount Moderate 04/07/22 1110   Drainage Description Serosanguineous 04/07/22 1110   Non-staged Wound Description Full thickness 04/07/22 1110       Wound 03/31/22 Venous Ulcer Leg Distal;Left; Anterior; Lower (Active)   Wound Image Images linked 04/07/22 1058   Wound Description Yellow;Pink;Slough 04/07/22 1109   Suzy-wound Assessment Edema; Hyperpigmented;Pink 04/07/22 1109   Wound Length (cm) 0 3 cm 04/07/22 1109   Wound Width (cm) 0 2 cm 04/07/22 1109   Wound Depth (cm) 0 1 cm 04/07/22 1109   Wound Surface Area (cm^2) 0 06 cm^2 04/07/22 1109   Wound Volume (cm^3) 0 006 cm^3 04/07/22 1109   Calculated Wound Volume (cm^3) 0 01 cm^3 04/07/22 1109   Change in Wound Size % 66 67 04/07/22 1109   Drainage Amount Small 04/07/22 1109   Drainage Description Serosanguineous 04/07/22 1109   Non-staged Wound Description Full thickness 04/07/22 1109       Wound 04/07/22 Abscess Left;Medial (Active)   Wound Image Images linked 04/07/22 1124   Wound Description Beefy red;Granulation tissue 04/07/22 1121   Suzy-wound Assessment Pink 04/07/22 1121   Wound Length (cm) 0 4 cm 04/07/22 1121   Wound Width (cm) 0 9 cm 04/07/22 1121   Wound Depth (cm) 0 6 cm 04/07/22 1121   Wound Surface Area (cm^2) 0 36 cm^2 04/07/22 1121   Wound Volume (cm^3) 0 216 cm^3 04/07/22 1121   Calculated Wound Volume (cm^3) 0 22 cm^3 04/07/22 1121   Drainage Amount Moderate 04/07/22 1121   Drainage Description Serosanguineous;Milky; Bloody 04/07/22 1121   Non-staged Wound Description Full thickness 04/07/22 1121       Wound 03/31/22 Venous Ulcer Leg Left; Lower; Anterior;Proximal (Active)   Date First Assessed/Time First Assessed: 03/31/22 0832   Pre-Existing Wound: Yes  Primary Wound Type: Venous Ulcer  Location: Leg  Wound Location Orientation: Left; Lower; Anterior;Proximal       Wound 03/31/22 Diabetic Ulcer Foot Left;Plantar (Active)   Date First Assessed/Time First Assessed: 03/31/22 0832   Pre-Existing Wound: Yes  Primary Wound Type: Diabetic Ulcer  Location: Foot  Wound Location Orientation: Left;Plantar       Wound 03/31/22 Venous Ulcer Leg Distal;Left; Anterior; Lower (Active)   Date First Assessed/Time First Assessed: 03/31/22 0856   Pre-Existing Wound: Yes  Primary Wound Type: Venous Ulcer  Location: Leg  Wound Location Orientation: Distal;Left; Anterior; Lower       Wound 04/07/22 Abscess Left;Medial (Active)   Date First Assessed/Time First Assessed: 04/07/22 1121   Primary Wound Type: Abscess  Wound Location Orientation: Left;Medial       Subjective:        HPI  Alicia Owen presents for wound care  He has left DFU left submet 5 and left leg wound  No redness  No pain  BS is still high  The following portions of the patient's history were reviewed and updated as appropriate: allergies, current medications, past family history, past medical history, past social history, past surgical history and problem list     PAST MEDICAL HISTORY:  Past Medical History:   Diagnosis Date    CAD (coronary artery disease)     Chronic combined systolic and diastolic CHF (congestive heart failure) ([facility] )     Diabetes mellitus ([facility] )     type 2    Dyslipidemia     Hypertension     Ischemic cardiomyopathy     MI (myocardial infarction) ([facility] )     Osteomyelitis ([facility] )     Pancreatitis        PAST SURGICAL HISTORY:  Past Surgical History:   Procedure Laterality Date    ANGIOPLASTY      ballon    CORONARY ANGIOPLASTY WITH STENT PLACEMENT      LULA to proximal and mid LAD, Proximal RCA   HERNIA REPAIR      INCISION AND DRAINAGE OF WOUND Left 2/14/2020    Procedure: INCISION AND DRAINAGE (I&D) EXTREMITY left foot (cpt 40908); Surgeon: Vicky De Jesus DPM;  [facility];  Service: Podiatry    HI EXPLORE [facility] Left 2/14/2020    Procedure: EXPLORATION EXTREMITY;  Surgeon: Vicky De Jesus DPM;  [facility];  Service: Podiatry   [facility], < 50 CM Left 2/14/2020    Procedure: APPLICATION VAC DRESSING;  Surgeon: Vicky De Jesus DPM;  [facility];  Service: Podiatry        ALLERGIES:  Patient has no known allergies  MEDICATIONS:  Current Outpatient Medications   Medication Sig Dispense Refill    aspirin 81 mg chewable tablet Chew 1 tablet daily for 30 days 30 tablet 0    atorvastatin (LIPITOR) 40 mg tablet TAKE 1 TABLET BY MOUTH EVERY DAY 90 tablet 3    Blood Pressure Monitoring ([employer] Blood Pressure Monitor) KIT Use 2 (two) times a day Automatic BP cuff   Measure BP twice daily  1 kit 0    cephalexin (KEFLEX) 500 mg capsule Take 1 capsule (500 mg total) by mouth every 6 (six) hours for 7 days 28 capsule 0    furosemide (LASIX) 40 mg tablet Take 40 mg by mouth daily      Lantus SoloStar 100 units/mL injection pen Inject 10 Units under the skin every morning (Patient taking differently: Inject 20 Units under the skin every morning  )  0    lisinopril (ZESTRIL) 5 mg tablet Take 1 tablet (5 mg total) by mouth daily 90 tablet 1    metoprolol tartrate (LOPRESSOR) 25 mg tablet TAKE 1 TABLET (25 MG TOTAL) BY MOUTH EVERY 12 (TWELVE) HOURS 180 tablet 3    NovoLIN 70/30 FlexPen (70-30) 100 UNIT/ML injection pen  (Patient not taking: Reported on 1/14/2022 )      potassium chloride (Klor-Con M20) 20 mEq tablet Take 1 tablet (20 mEq total) by mouth 2 (two) times a day with meals (Patient not taking: Reported on 4/7/2022 ) 60 tablet 0    Sodium Fluoride 5000 PPM 1 1 % PSTE BRUSH TWICE A DAY FOR 2 TO 3 MINUTES AND SPIT NO RINISING FOR 30 MINUTES      triamcinolone (KENALOG) 0 5 % cream Apply topically 2 (two) times a day 454 g 0    TRUE METRIX BLOOD GLUCOSE TEST test strip TEST FOUR TIMES DAILY  11    Unifine Pentips 31G X 8 MM MISC USE 4 TMES A DAY       No current facility-administered medications for this visit         SOCIAL HISTORY:  Social History     Socioeconomic History    Marital status: /Civil Union     Spouse name: None    Number of children: None    Years of education: None    Highest education level: None   Occupational History    None   Tobacco Use    Smoking status: Never Smoker    Smokeless tobacco: Never Used   Vaping Use    Vaping Use: Never used   Substance and Sexual Activity    Alcohol use: Not Currently    Drug use: No    Sexual activity: None   Other Topics Concern    None   Social History Narrative    None     Social Determinants of Health     Financial Resource Strain: Not on file   Food Insecurity: Not on file   Transportation Needs: Not on file   Physical Activity: Not on file   Stress: Not on file   Social Connections: Not on file   Intimate Partner Violence: Not on file   Housing Stability: Not on file      Review of Systems   Constitutional: Negative for appetite change, chills and fever  Respiratory: Negative for cough and shortness of breath  Cardiovascular: Negative for chest pain  Gastrointestinal: Negative for diarrhea, nausea and vomiting  Musculoskeletal: Negative for gait problem  Skin: Positive for wound  Neurological: Positive for numbness  Objective:       Wound 03/31/22 Venous Ulcer Leg Left; Lower; Anterior;Proximal (Active)   Wound Image Images linked 04/07/22 1057   Wound Description Yellow;Pink;Slough 04/07/22 1108   Suzy-wound Assessment Hyperpigmented;Edema;[place] 04/07/22 1108   Wound Length (cm) 0 6 cm 04/07/22 1108   Wound Width (cm) 0 5 cm 04/07/22 1108   Wound Depth (cm) 0 2 cm 04/07/22 1108   Wound Surface Area (cm^2) 0 3 cm^2 04/07/22 1108   Wound Volume (cm^3) 0 06 cm^3 04/07/22 1108   Calculated Wound Volume (cm^3) 0 06 cm^3 04/07/22 1108   Change in Wound Size % 40 04/07/22 1108   Drainage Amount Moderate 04/07/22 1108   Drainage Description Serosanguineous 04/07/22 1108   Non-staged Wound Description Full thickness 04/07/22 1108       Wound 03/31/22 Diabetic Ulcer Foot Left;Plantar (Active)   Wound Image Images linked 04/07/22 1125   Wound Description Granulation tissue; Yellow;Slough 04/07/22 1110   Suzy-wound Assessment Callus 04/07/22 1110   Wound Length (cm) 0 7 cm 04/07/22 1110   Wound Width (cm) 0 7 cm 04/07/22 1110   Wound Depth (cm) 0 3 cm 04/07/22 1110   Wound Surface Area (cm^2) 0 49 cm^2 04/07/22 1110   Wound Volume (cm^3) 0 147 cm^3 04/07/22 1110   Calculated Wound Volume (cm^3) 0 15 cm^3 04/07/22 1110   Change in Wound Size % 0 04/07/22 1110   Undermining 0 3 04/07/22 1110   Undermining is depth extending from 10-1 oclock  04/07/22 1110   Drainage Amount Moderate 04/07/22 1110   Drainage Description Serosanguineous 04/07/22 1110   Non-staged Wound Description Full thickness 04/07/22 1110       Wound 03/31/22 Venous Ulcer Leg Distal;Left; Anterior; Lower (Active)   Wound Image Images linked 04/07/22 1058   Wound Description Yellow;Pink;Slough 04/07/22 1109   Suzy-wound Assessment Edema; Hyperpigmented;Pink 04/07/22 1109   Wound Length (cm) 0 3 cm 04/07/22 1109   Wound Width (cm) 0 2 cm 04/07/22 1109   Wound Depth (cm) 0 1 cm 04/07/22 1109   Wound Surface Area (cm^2) 0 06 cm^2 04/07/22 1109   Wound Volume (cm^3) 0 006 cm^3 04/07/22 1109   Calculated Wound Volume (cm^3) 0 01 cm^3 04/07/22 1109   Change in Wound Size % 66 67 04/07/22 1109   Drainage Amount Small 04/07/22 1109   Drainage Description Serosanguineous 04/07/22 1109   Non-staged Wound Description Full thickness 04/07/22 1109       Wound 04/07/22 Abscess Left;Medial (Active)   Wound Image Images linked 04/07/22 1124   Wound Description Beefy red;Granulation tissue 04/07/22 1121   Suzy-wound Assessment Pink 04/07/22 1121   Wound Length (cm) 0 4 cm 04/07/22 1121   Wound Width (cm) 0 9 cm 04/07/22 1121   Wound Depth (cm) 0 6 cm 04/07/22 1121   Wound Surface Area (cm^2) 0 36 cm^2 04/07/22 1121   Wound Volume (cm^3) 0 216 cm^3 04/07/22 1121   Calculated Wound Volume (cm^3) 0 22 cm^3 04/07/22 1121   Drainage Amount Moderate 04/07/22 1121   Drainage Description Serosanguineous;Milky; Bloody 04/07/22 1121   Non-staged Wound Description Full thickness 04/07/22 1121       /83   Pulse 77   Temp (!) 97 °F (36 1 °C)   Resp 16     Physical Exam  Vitals reviewed  Constitutional:       General: He is not in acute distress  Appearance: He is not toxic-appearing or diaphoretic  Cardiovascular:      Rate and Rhythm: Normal rate and regular rhythm  Pulses:           Dorsalis pedis pulses are 2+ on the right side and 1+ on the left side  Posterior tibial pulses are 0 on the right side and 0 on the left side        Comments: Venous stasis skin changes LLE with edema  Pulmonary:      Effort: Pulmonary effort is normal    Musculoskeletal:         General: Deformity present  No tenderness or signs of injury  Cervical back: Normal range of motion and neck supple  Left lower leg: Edema present  Right foot: No Charcot foot or foot drop  Left foot: No Charcot foot or foot drop  Skin:     General: Skin is warm and dry  Capillary Refill: Capillary refill takes less than 2 seconds  Coloration: Skin is not cyanotic or mottled  Findings: Wound present  No abscess or ecchymosis  Nails: There is no clubbing  Comments: Full thickness ulcer left submet 5  Callus noted around the ulcer  No deep probing  No infection  Left leg VSU left shin with increased granulation  Distal left leg wound is healing well  There is a new abscess on left calf  1 5 cm in diameter with redness and fluctuation  See wound assessment  Neurological:      General: No focal deficit present  Mental Status: He is alert and oriented to person, place, and time  Cranial Nerves: No cranial nerve deficit  Sensory: Sensory deficit present  Coordination: Coordination normal    Psychiatric:         Mood and Affect: Mood normal          Behavior: Behavior normal          Thought Content: Thought content normal          Judgment: Judgment normal            Wound Instructions:  Orders Placed This Encounter   Procedures    Wound cleansing and dressings     Left Foot and Left Leg wounds:  Wash your hands with soap and water  Cleanse the wound with mild soap and water prior to applying a clean dressing       Shower no   Apply Silver Alginate to the wounds    Cover with gauze  Secure with toni and tape  Change dressing every other day and as needed     Treatments above were completed [facility] at [facility]           Standing Status:   Future     Standing Expiration Date:   4/7/2023    Wound compression and edema control      Wound compression and edema control      Elastic Tubular Stocking----Spandagrip size F followed by ace wrap to the LLE     Tubular elastic bandage: Apply from base of toes to behind the knee  Apply in AM, may remove for sleep      Avoid prolonged standing in one place      Elevate leg(s) above the level of the heart when sitting or as much as possible  Standing Status:   Future     Standing Expiration Date:   4/7/2023    Wound off loading              Wound off loading      Continue to wear the Darco shoe to the Left foot for all ambulation and transfers          Standing Status:   Future     Standing Expiration Date:   4/7/2023    Debridement     This order was created via procedure documentation    Incision and Drainage     This order was created via procedure documentation    Wound culture and Gram stain     Order Specific Question:   Release to patient through Mychart     Answer:   Immediate        Diagnosis ICD-10-CM Associated Orders   1  Ulcer of left foot, with fat layer exposed ([facility])  L97 522 lidocaine (LMX) 4 % cream     Wound cleansing and dressings     Wound compression and edema control     Wound off loading     Debridement   2  Cutaneous abscess of left lower extremity  L02 416 Wound culture and Gram stain     Incision and Drainage   3  Chronic venous hypertension (idiopathic) with ulcer of left lower extremity ([facility])  I87 312 lidocaine (LMX) 4 % cream    L97 929 Wound cleansing and dressings     Wound compression and edema control     Wound off loading   4   Type 2 diabetes mellitus with diabetic neuropathy, with long-term current use of insulin ([facility])  E11 40 Debridement    Z79 4

## 2022-04-09 LAB
BACTERIA WND AEROBE CULT: ABNORMAL
GRAM STN SPEC: ABNORMAL

## 2022-04-14 ENCOUNTER — HOSPITAL ENCOUNTER (OUTPATIENT)
Dept: RADIOLOGY | Facility: HOSPITAL | Age: 62
Discharge: HOME | End: 2022-04-14
Attending: PODIATRIST
Payer: COMMERCIAL

## 2022-04-14 ENCOUNTER — OFFICE VISIT (OUTPATIENT)
Dept: WOUND CARE | Facility: HOSPITAL | Age: 62
End: 2022-04-14
Payer: COMMERCIAL

## 2022-04-14 VITALS
RESPIRATION RATE: 18 BRPM | TEMPERATURE: 96.9 F | SYSTOLIC BLOOD PRESSURE: 132 MMHG | DIASTOLIC BLOOD PRESSURE: 76 MMHG | HEART RATE: 69 BPM

## 2022-04-14 DIAGNOSIS — E11.40 TYPE 2 DIABETES MELLITUS WITH DIABETIC NEUROPATHY, WITH LONG-TERM CURRENT USE OF INSULIN (HCC): ICD-10-CM

## 2022-04-14 DIAGNOSIS — Z79.4 TYPE 2 DIABETES MELLITUS WITH DIABETIC NEUROPATHY, WITH LONG-TERM CURRENT USE OF INSULIN (HCC): ICD-10-CM

## 2022-04-14 DIAGNOSIS — I87.312 CHRONIC VENOUS HYPERTENSION (IDIOPATHIC) WITH ULCER OF LEFT LOWER EXTREMITY (HCC): ICD-10-CM

## 2022-04-14 DIAGNOSIS — L02.416 CUTANEOUS ABSCESS OF LEFT LOWER EXTREMITY: ICD-10-CM

## 2022-04-14 DIAGNOSIS — L97.522 ULCER OF LEFT FOOT, WITH FAT LAYER EXPOSED (HCC): Primary | ICD-10-CM

## 2022-04-14 DIAGNOSIS — L97.929 CHRONIC VENOUS HYPERTENSION (IDIOPATHIC) WITH ULCER OF LEFT LOWER EXTREMITY (HCC): ICD-10-CM

## 2022-04-14 PROCEDURE — 11042 DBRDMT SUBQ TIS 1ST 20SQCM/<: CPT | Performed by: PODIATRIST

## 2022-04-14 PROCEDURE — 11043 DBRDMT MUSC&/FSCA 1ST 20/<: CPT | Performed by: PODIATRIST

## 2022-04-14 PROCEDURE — 73590 X-RAY EXAM OF LOWER LEG: CPT

## 2022-04-14 PROCEDURE — 99214 OFFICE O/P EST MOD 30 MIN: CPT | Performed by: PODIATRIST

## 2022-04-14 RX ORDER — CIPROFLOXACIN 500 MG/1
500 TABLET, FILM COATED ORAL EVERY 24 HOURS
Qty: 7 TABLET | Refills: 0 | Status: SHIPPED | OUTPATIENT
Start: 2022-04-14 | End: 2022-04-21

## 2022-04-14 RX ORDER — LIDOCAINE 40 MG/G
CREAM TOPICAL ONCE
Status: COMPLETED | OUTPATIENT
Start: 2022-04-14 | End: 2022-04-14

## 2022-04-14 RX ADMIN — LIDOCAINE: 40 CREAM TOPICAL at 10:29

## 2022-04-14 NOTE — PROGRESS NOTES
Debridement   Wound 03/31/22 Diabetic Ulcer Foot Left;Plantar    Universal Protocol:  Consent: Verbal consent obtained  Risks and benefits: risks, benefits and alternatives were discussed  Consent given by: patient  Time out: Immediately prior to procedure a "time out" was called to verify the correct patient, procedure, equipment, support staff and site/side marked as required    Timeout called at: 4/14/2022 11:24 AM   Patient understanding: patient states understanding of the procedure being performed  Patient identity confirmed: verbally with patient      Performed by: physician  Debridement type: surgical  Level of debridement: subcutaneous tissue  Pain control: lidocaine 4%  Post-debridement measurements  Length (cm): 0 6  Width (cm): 0 6  Depth (cm): 0 2  Percent debrided: 100%  Surface Area (cm^2): 0 36  Area debrided (cm^2): 0 36  Volume (cm^3): 0 07  Tissue and other material debrided: dermis, epidermis and subcutaneous tissue  Devitalized tissue debrided: callus, fibrin and slough  Instrument(s) utilized: blade  Bleeding: small  Hemostasis obtained with: pressure  Procedural pain (0-10): 0  Post-procedural pain: 0   Response to treatment: procedure was tolerated well

## 2022-04-14 NOTE — PROGRESS NOTES
Patient ID: Eileen Arias is a 57 y o  male Date of Birth 1960     Chief Complaint  Chief Complaint   Patient presents with    Follow Up Wound Care Visit     left foot and left leg wounds        Allergies  Patient has no known allergies  Assessment:    No problem-specific Assessment & Plan notes found for this encounter  Diagnoses and all orders for this visit:    Ulcer of left foot, with fat layer exposed ([address] )  -     lidocaine (LMX) 4 % cream  -     Wound cleansing and dressings; Future    Chronic venous hypertension (idiopathic) with ulcer of left lower extremity (HCC)  -     lidocaine (LMX) 4 % cream  -     Wound cleansing and dressings; Future  -     XR tibia fibula 2 vw left; Future  -     ciprofloxacin (CIPRO) 500 mg tablet; Take 1 tablet (500 mg total) by mouth every 24 hours for 7 days  -     Debridement    Type 2 diabetes mellitus with diabetic neuropathy, with long-term current use of insulin (HCC)  -     lidocaine (LMX) 4 % cream  -     Wound cleansing and dressings; Future    Cutaneous abscess of left lower extremity  -     ciprofloxacin (CIPRO) 500 mg tablet; Take 1 tablet (500 mg total) by mouth every 24 hours for 7 days  -     Debridement    Other orders  -     Cancel: Debridement          Debridement   Universal Protocol:  Consent: Verbal consent obtained  Risks and benefits: risks, benefits and alternatives were discussed  Consent given by: patient  Time out: Immediately prior to procedure a "time out" was called to verify the correct patient, procedure, equipment, support staff and site/side marked as required    Timeout called at: 4/14/2022 11:19 AM   Patient understanding: patient states understanding of the procedure being performed  Patient identity confirmed: verbally with patient      Performed by: physician  Debridement type: surgical  Level of debridement: muscle  Pain control: lidocaine 4%  Post-debridement measurements  Length (cm): 1  Width (cm): 0 8  Depth (cm): 0 5  Percent debrided: 100%  Surface Area (cm^2): 0 8  Area debrided (cm^2): 0 8  Volume (cm^3): 0 4  Tissue and other material debrided: dermis, epidermis, fascia and subcutaneous tissue  Devitalized tissue debrided: biofilm, exudate, fibrin and slough  Instrument(s) utilized: blade  Bleeding: medium  Hemostasis obtained with: pressure  Procedural pain (0-10): 0  Post-procedural pain: 0   Response to treatment: procedure was tolerated well    Debridement   Wound 04/07/22 Abscess Leg Left;Medial    Universal Protocol:  Consent: Verbal consent obtained  Risks and benefits: risks, benefits and alternatives were discussed  Consent given by: patient  Time out: Immediately prior to procedure a "time out" was called to verify the correct patient, procedure, equipment, support staff and site/side marked as required  Timeout called at: 4/14/2022 11:21 AM   Patient understanding: patient states understanding of the procedure being performed  Patient identity confirmed: verbally with patient      Performed by: physician  Debridement type: surgical  Level of debridement: muscle  Pain control: lidocaine 4%  Post-debridement measurements  Length (cm): 0 7  Width (cm): 1  Depth (cm): 0 6  Percent debrided: 100%  Surface Area (cm^2): 0 7  Area debrided (cm^2): 0 7  Volume (cm^3): 0 42  Tissue and other material debrided: dermis, epidermis, muscle and subcutaneous tissue  Devitalized tissue debrided: biofilm, exudate, fibrin and slough  Instrument(s) utilized: blade  Bleeding: medium  Hemostasis obtained with: pressure  Procedural pain (0-10): 0  Post-procedural pain: 0   Response to treatment: procedure was tolerated well            PLAN:  1  Patient was counseled on the condition and diagnosis  2  There was still slight purulence on proximal and medial left leg wound  Wound was debrided to remove all purulence  Will start packing with AMD packing  Will set up VNA  3  Reviewed wound culture  Start him on Cipro    Sent him for left leg X-ray  4  NWB left foot with crutches  5  Stressed on pt compliant about proper compression, resting, and elevation on left leg  6  RA in 1 week  Wound 03/31/22 Venous Ulcer Leg Left; Lower; Anterior;Proximal (Active)   Wound Image Images linked 04/14/22 1100   Wound Description Yellow;Pink;Slough;Granulation tissue 04/14/22 1033   Suzy-wound Assessment Hyperpigmented;Edema;Pink 04/14/22 1033   Wound Length (cm) 0 5 cm 04/14/22 1033   Wound Width (cm) 0 3 cm 04/14/22 1033   Wound Depth (cm) 0 2 cm 04/14/22 1033   Wound Surface Area (cm^2) 0 15 cm^2 04/14/22 1033   Wound Volume (cm^3) 0 03 cm^3 04/14/22 1033   Calculated Wound Volume (cm^3) 0 03 cm^3 04/14/22 1033   Change in Wound Size % 70 04/14/22 1033   Drainage Amount Small 04/14/22 1033   Drainage Description Serosanguineous; Bloody 04/14/22 1033   Non-staged Wound Description Full thickness 04/14/22 1033       Wound 03/31/22 Diabetic Ulcer Foot Left;Plantar (Active)   Wound Image Images linked 04/14/22 1059   Wound Description Granulation tissue; Yellow;Slough 04/14/22 1030   Suzy-wound Assessment Callus;Black 04/14/22 1030   Wound Length (cm) 0 6 cm 04/14/22 1030   Wound Width (cm) 0 4 cm 04/14/22 1030   Wound Depth (cm) 0 3 cm 04/14/22 1030   Wound Surface Area (cm^2) 0 24 cm^2 04/14/22 1030   Wound Volume (cm^3) 0 072 cm^3 04/14/22 1030   Calculated Wound Volume (cm^3) 0 07 cm^3 04/14/22 1030   Change in Wound Size % 53 33 04/14/22 1030   Undermining 0 3 04/14/22 1030   Undermining is depth extending from 9-12 oclock 04/14/22 1030   Drainage Amount Small 04/14/22 1030   Drainage Description Serosanguineous 04/14/22 1030   Non-staged Wound Description Full thickness 04/14/22 1030       Wound 03/31/22 Venous Ulcer Leg Distal;Left; Anterior; Lower (Active)   Wound Image Images linked 04/14/22 1100   Wound Description Yellow;Pink;Slough 04/14/22 1032   Suzy-wound Assessment Edema; Hyperpigmented;Pink 04/14/22 1032   Wound Length (cm) 0 3 cm 04/14/22 1032   Wound Width (cm) 0 2 cm 04/14/22 1032   Wound Depth (cm) 0 1 cm 04/14/22 1032   Wound Surface Area (cm^2) 0 06 cm^2 04/14/22 1032   Wound Volume (cm^3) 0 006 cm^3 04/14/22 1032   Calculated Wound Volume (cm^3) 0 01 cm^3 04/14/22 1032   Change in Wound Size % 66 67 04/14/22 1032   Drainage Amount Small 04/14/22 1032   Drainage Description Serosanguineous 04/14/22 1032   Non-staged Wound Description Full thickness 04/14/22 1032       Wound 04/07/22 Abscess Leg Left;Medial (Active)   Wound Image Images linked 04/14/22 1101   Wound Description Granulation tissue;Slough; Yellow 04/14/22 1034   Suzy-wound Assessment Pink;Edema; Hyperpigmented 04/14/22 1034   Wound Length (cm) 0 5 cm 04/14/22 1034   Wound Width (cm) 0 7 cm 04/14/22 1034   Wound Depth (cm) 0 3 cm 04/14/22 1034   Wound Surface Area (cm^2) 0 35 cm^2 04/14/22 1034   Wound Volume (cm^3) 0 105 cm^3 04/14/22 1034   Calculated Wound Volume (cm^3) 0 11 cm^3 04/14/22 1034   Change in Wound Size % 50 04/14/22 1034   Drainage Amount Small 04/14/22 1034   Drainage Description Serosanguineous; Bloody 04/14/22 1034   Non-staged Wound Description Full thickness 04/14/22 1034       Wound 03/31/22 Venous Ulcer Leg Left; Lower; Anterior;Proximal (Active)   Date First Assessed/Time First Assessed: 03/31/22 0832   Pre-Existing Wound: Yes  Primary Wound Type: Venous Ulcer  Location: Leg  Wound Location Orientation: Left; Lower; Anterior;Proximal       Wound 03/31/22 Diabetic Ulcer Foot Left;Plantar (Active)   Date First Assessed/Time First Assessed: 03/31/22 0832   Pre-Existing Wound: Yes  Primary Wound Type: Diabetic Ulcer  Location: Foot  Wound Location Orientation: Left;Plantar       Wound 03/31/22 Venous Ulcer Leg Distal;Left; Anterior; Lower (Active)   Date First Assessed/Time First Assessed: 03/31/22 0856   Pre-Existing Wound: Yes  Primary Wound Type: Venous Ulcer  Location: Leg  Wound Location Orientation: Distal;Left; Anterior; Lower       Wound 04/07/22 Abscess Leg Left;Medial (Active)   Date First Assessed/Time First Assessed: 04/07/22 1121   Primary Wound Type: Abscess  Location: Leg  Wound Location Orientation: Left;Medial       Subjective:        HPI  Yuliana James presents for wound care  He has left DFU left submet 5 and left leg wound  No pain  BS is still high  The following portions of the patient's history were reviewed and updated as appropriate: allergies, current medications, past family history, past medical history, past social history, past surgical history and problem list     PAST MEDICAL HISTORY:  Past Medical History:   Diagnosis Date    CAD (coronary artery disease)     Chronic combined systolic and diastolic CHF (congestive heart failure) ([facility] )     Diabetes mellitus ([facility] )     type 2    Dyslipidemia     Hypertension     Ischemic cardiomyopathy     MI (myocardial infarction) ([facility] )     Osteomyelitis ([facility] )     Pancreatitis        PAST SURGICAL HISTORY:  Past Surgical History:   Procedure Laterality Date    ANGIOPLASTY      ballon    CORONARY ANGIOPLASTY WITH STENT PLACEMENT      LULA to proximal and mid LAD, Proximal RCA   HERNIA REPAIR      INCISION AND DRAINAGE OF WOUND Left 2/14/2020    Procedure: INCISION AND DRAINAGE (I&D) EXTREMITY left foot (cpt 54740); Surgeon: Shilpi Carrizales DPM;  [facility];  Service: Podiatry    NE EXPLORE [facility] Left 2/14/2020    Procedure: EXPLORATION EXTREMITY;  Surgeon: Shilpi Carrizales DPM;  [facility];  Service: Podiatry   [facility], < 50 CM Left 2/14/2020    Procedure: APPLICATION VAC DRESSING;  Surgeon: Shilpi Carrizales DPM;  [facility];  Service: Podiatry        ALLERGIES:  Patient has no known allergies      MEDICATIONS:  Current Outpatient Medications   Medication Sig Dispense Refill    aspirin 81 mg chewable tablet Chew 1 tablet daily for 30 days 30 tablet 0    atorvastatin (LIPITOR) 40 mg tablet TAKE 1 TABLET BY MOUTH EVERY DAY 90 tablet 3    Blood Pressure Monitoring ([employer] Blood Pressure Monitor) KIT Use 2 (two) times a day Automatic BP cuff  Measure BP twice daily  1 kit 0    ciprofloxacin (CIPRO) 500 mg tablet Take 1 tablet (500 mg total) by mouth every 24 hours for 7 days 7 tablet 0    furosemide (LASIX) 40 mg tablet Take 40 mg by mouth daily      Lantus SoloStar 100 units/mL injection pen Inject 10 Units under the skin every morning (Patient taking differently: Inject 20 Units under the skin every morning  )  0    lisinopril (ZESTRIL) 5 mg tablet Take 1 tablet (5 mg total) by mouth daily 90 tablet 1    metoprolol tartrate (LOPRESSOR) 25 mg tablet TAKE 1 TABLET (25 MG TOTAL) BY MOUTH EVERY 12 (TWELVE) HOURS 180 tablet 3    NovoLIN 70/30 FlexPen (70-30) 100 UNIT/ML injection pen  (Patient not taking: Reported on 1/14/2022 )      potassium chloride (Klor-Con M20) 20 mEq tablet Take 1 tablet (20 mEq total) by mouth 2 (two) times a day with meals (Patient not taking: Reported on 4/7/2022 ) 60 tablet 0    Sodium Fluoride 5000 PPM 1 1 % PSTE BRUSH TWICE A DAY FOR 2 TO 3 MINUTES AND SPIT NO RINISING FOR 30 MINUTES      triamcinolone (KENALOG) 0 5 % cream Apply topically 2 (two) times a day 454 g 0    TRUE METRIX BLOOD GLUCOSE TEST test strip TEST FOUR TIMES DAILY  11    Unifine Pentips 31G X 8 MM MISC USE 4 TMES A DAY       No current facility-administered medications for this visit         SOCIAL HISTORY:  Social History     Socioeconomic History    Marital status: /Civil Union     Spouse name: None    Number of children: None    Years of education: None    Highest education level: None   Occupational History    None   Tobacco Use    Smoking status: Never Smoker    Smokeless tobacco: Never Used   Vaping Use    Vaping Use: Never used   Substance and Sexual Activity    Alcohol use: Not Currently    Drug use: No    Sexual activity: None   Other Topics Concern    None   Social History Narrative    None     Social Determinants of Health Financial Resource Strain: Not on file   Food Insecurity: Not on file   Transportation Needs: Not on file   Physical Activity: Not on file   Stress: Not on file   Social Connections: Not on file   Intimate Partner Violence: Not on file   Housing Stability: Not on file      Review of Systems   Constitutional: Negative for appetite change, chills and fever  Respiratory: Negative for cough and shortness of breath  Cardiovascular: Negative for chest pain  Gastrointestinal: Negative for diarrhea, nausea and vomiting  Musculoskeletal: Negative for gait problem  Skin: Positive for wound  Neurological: Positive for numbness  Objective:       Wound 03/31/22 Venous Ulcer Leg Left; Lower; Anterior;Proximal (Active)   Wound Image Images linked 04/14/22 1100   Wound Description Yellow;Pink;Slough;Granulation tissue 04/14/22 1033   Suzy-wound Assessment Hyperpigmented;Edema;Pink 04/14/22 1033   Wound Length (cm) 0 5 cm 04/14/22 1033   Wound Width (cm) 0 3 cm 04/14/22 1033   Wound Depth (cm) 0 2 cm 04/14/22 1033   Wound Surface Area (cm^2) 0 15 cm^2 04/14/22 1033   Wound Volume (cm^3) 0 03 cm^3 04/14/22 1033   Calculated Wound Volume (cm^3) 0 03 cm^3 04/14/22 1033   Change in Wound Size % 70 04/14/22 1033   Drainage Amount Small 04/14/22 1033   Drainage Description Serosanguineous; Bloody 04/14/22 1033   Non-staged Wound Description Full thickness 04/14/22 1033       Wound 03/31/22 Diabetic Ulcer Foot Left;Plantar (Active)   Wound Image Images linked 04/14/22 1059   Wound Description Granulation tissue; Yellow;Slough 04/14/22 1030   Suzy-wound Assessment Callus;Black 04/14/22 1030   Wound Length (cm) 0 6 cm 04/14/22 1030   Wound Width (cm) 0 4 cm 04/14/22 1030   Wound Depth (cm) 0 3 cm 04/14/22 1030   Wound Surface Area (cm^2) 0 24 cm^2 04/14/22 1030   Wound Volume (cm^3) 0 072 cm^3 04/14/22 1030   Calculated Wound Volume (cm^3) 0 07 cm^3 04/14/22 1030   Change in Wound Size % 53 33 04/14/22 1030   Undermining 0 3 04/14/22 1030   Undermining is depth extending from 9-12 oclock 04/14/22 1030   Drainage Amount Small 04/14/22 1030   Drainage Description Serosanguineous 04/14/22 1030   Non-staged Wound Description Full thickness 04/14/22 1030       Wound 03/31/22 Venous Ulcer Leg Distal;Left; Anterior; Lower (Active)   Wound Image Images linked 04/14/22 1100   Wound Description Yellow;Pink;Slough 04/14/22 1032   Suzy-wound Assessment Edema; Hyperpigmented;Pink 04/14/22 1032   Wound Length (cm) 0 3 cm 04/14/22 1032   Wound Width (cm) 0 2 cm 04/14/22 1032   Wound Depth (cm) 0 1 cm 04/14/22 1032   Wound Surface Area (cm^2) 0 06 cm^2 04/14/22 1032   Wound Volume (cm^3) 0 006 cm^3 04/14/22 1032   Calculated Wound Volume (cm^3) 0 01 cm^3 04/14/22 1032   Change in Wound Size % 66 67 04/14/22 1032   Drainage Amount Small 04/14/22 1032   Drainage Description Serosanguineous 04/14/22 1032   Non-staged Wound Description Full thickness 04/14/22 1032       Wound 04/07/22 Abscess Leg Left;Medial (Active)   Wound Image Images linked 04/14/22 1101   Wound Description Granulation tissue;Slough; Yellow 04/14/22 1034   Suzy-wound Assessment Pink;Edema; Hyperpigmented 04/14/22 1034   Wound Length (cm) 0 5 cm 04/14/22 1034   Wound Width (cm) 0 7 cm 04/14/22 1034   Wound Depth (cm) 0 3 cm 04/14/22 1034   Wound Surface Area (cm^2) 0 35 cm^2 04/14/22 1034   Wound Volume (cm^3) 0 105 cm^3 04/14/22 1034   Calculated Wound Volume (cm^3) 0 11 cm^3 04/14/22 1034   Change in Wound Size % 50 04/14/22 1034   Drainage Amount Small 04/14/22 1034   Drainage Description Serosanguineous; Bloody 04/14/22 1034   Non-staged Wound Description Full thickness 04/14/22 1034       /76   Pulse 69   Temp (!) 96 9 °F (36 1 °C)   Resp 18     Physical Exam  Vitals reviewed  Constitutional:       General: He is not in acute distress  Appearance: He is not toxic-appearing or diaphoretic  Cardiovascular:      Rate and Rhythm: Normal rate and regular rhythm  Pulses:           Dorsalis pedis pulses are 2+ on the right side and 1+ on the left side  Posterior tibial pulses are 0 on the right side and 0 on the left side  Comments: Venous stasis skin changes LLE with edema  Pulmonary:      Effort: Pulmonary effort is normal    Musculoskeletal:         General: Deformity present  No tenderness or signs of injury  Cervical back: Normal range of motion and neck supple  Left lower leg: Edema present  Right foot: No Charcot foot or foot drop  Left foot: No Charcot foot or foot drop  Skin:     General: Skin is warm and dry  Capillary Refill: Capillary refill takes less than 2 seconds  Coloration: Skin is not cyanotic or mottled  Findings: Wound present  No abscess or ecchymosis  Nails: There is no clubbing  Comments: Full thickness ulcer left submet 5  Callus noted around the ulcer  No deep probing  No infection  Left leg VSU left matt noted  Sill has mild purulence with mild tunneling  Distal left leg wound is stable  Left calf wound still shows slight purulence with undermining  No obvious cellulitis  See wound assessment  Neurological:      General: No focal deficit present  Mental Status: He is alert and oriented to person, place, and time  Cranial Nerves: No cranial nerve deficit  Sensory: Sensory deficit present  Coordination: Coordination normal    Psychiatric:         Mood and Affect: Mood normal          Behavior: Behavior normal          Thought Content: Thought content normal          Judgment: Judgment normal            Wound Instructions:  Orders Placed This Encounter   Procedures    Wound cleansing and dressings     Left Foot and Left Leg wounds:  Wash your hands with soap and water  Cleanse the wound with normal saline prior to applying a clean dressing  Pat dry      Shower no     Left foot and left distal leg wound:  Apply Silver Alginate to the wounds    Cover with gauze  Secure with toni and tape  Change dressing every other day and as needed for excessive drainage     Left leg proximal and medial wound:  Loosely pack one continuous piece of AMD ribbon into wound  Tape tail to skin  Cover with gauze  Secure with toni and tape  Change dressing every other day and as needed for excessive drainage        Treatments above were completed [facility] at the [facility]                          Wound compression and edema control                          Elastic Tubular Stocking----Spandagrip size F followed by ace wrap to the LLE     Tubular elastic bandage: Apply from base of toes to behind the knee  Apply in AM, may remove for sleep      Avoid prolonged standing in one place      Elevate leg(s) above the level of the heart when sitting or as much as possible                                                  Wound off loading                          Continue to wear the Darco shoe to the Left foot for all ambulation and transfers                    Standing Status:   Future     Standing Expiration Date:   4/14/2023    Debridement     This order was created via procedure documentation    Debridement     This order was created via procedure documentation    XR tibia fibula 2 vw left     Standing Status:   Future     Standing Expiration Date:   4/14/2026     Scheduling Instructions:      Bring along any outside films relating to this procedure  Diagnosis ICD-10-CM Associated Orders   1  Ulcer of left foot, with fat layer exposed ([facility] )  L97 522 lidocaine (LMX) 4 % cream     Wound cleansing and dressings   2  Chronic venous hypertension (idiopathic) with ulcer of left lower extremity ([facility])  I87 312 lidocaine (LMX) 4 % cream    L97 929 Wound cleansing and dressings     XR tibia fibula 2 vw left     ciprofloxacin (CIPRO) 500 mg tablet     Debridement   3   Type 2 diabetes mellitus with diabetic neuropathy, with long-term current use of insulin ([facility])  E11 40 lidocaine (LMX) 4 % cream    Z79 4 Wound cleansing and dressings   4   Cutaneous abscess of left lower extremity  L02 416 ciprofloxacin (CIPRO) 500 mg tablet     Debridement

## 2022-04-14 NOTE — PATIENT INSTRUCTIONS
Orders Placed This Encounter   Procedures    Wound cleansing and dressings     Left Foot and Left Leg wounds:  Wash your hands with soap and water  Cleanse the wound with normal saline prior to applying a clean dressing  Pat dry      Shower no     Left foot and left distal leg wound:  Apply Silver Alginate to the wounds  Cover with gauze  Secure with toni and tape  Change dressing every other day and as needed for excessive drainage     Left leg proximal and medial wound:  Loosely pack one continuous piece of AMD ribbon into wound  Tape tail to skin  Cover with gauze  Secure with toni and tape  Change dressing every other day and as needed for excessive drainage        Treatments above were completed today at the [facility]                          Wound compression and edema control                          Elastic Tubular Stocking----Spandagrip size F followed by ace wrap to the LLE     Tubular elastic bandage: Apply from base of toes to behind the knee   Apply in AM, may remove for sleep      Avoid prolonged standing in one place      Elevate leg(s) above the level of the heart when sitting or as much as possible                                                  Wound off loading                          Continue to wear the Darco shoe to the Left foot for all ambulation and transfers                    Standing Status:   Future     Standing Expiration Date:   4/14/2023

## 2022-04-15 ENCOUNTER — TELEPHONE (OUTPATIENT)
Dept: WOUND CARE | Facility: HOSPITAL | Age: 62
End: 2022-04-15

## 2022-04-15 NOTE — TELEPHONE ENCOUNTER
Call placed to the patient to make aware of no [facility] available to assist with wound care at this time  Patient feels he and his wife are comfortable with completing the wound care  Reviewed wound care instructions with the patient and verbalized understanding of same

## 2022-04-21 ENCOUNTER — OFFICE VISIT (OUTPATIENT)
Dept: WOUND CARE | Facility: HOSPITAL | Age: 62
End: 2022-04-21
Payer: COMMERCIAL

## 2022-04-21 VITALS
SYSTOLIC BLOOD PRESSURE: 132 MMHG | RESPIRATION RATE: 16 BRPM | TEMPERATURE: 97.4 F | DIASTOLIC BLOOD PRESSURE: 77 MMHG | HEART RATE: 73 BPM

## 2022-04-21 DIAGNOSIS — Z79.4 TYPE 2 DIABETES MELLITUS WITH DIABETIC NEUROPATHY, WITH LONG-TERM CURRENT USE OF INSULIN (HCC): ICD-10-CM

## 2022-04-21 DIAGNOSIS — L97.929 CHRONIC VENOUS HYPERTENSION (IDIOPATHIC) WITH ULCER OF LEFT LOWER EXTREMITY (HCC): ICD-10-CM

## 2022-04-21 DIAGNOSIS — I87.312 CHRONIC VENOUS HYPERTENSION (IDIOPATHIC) WITH ULCER OF LEFT LOWER EXTREMITY (HCC): ICD-10-CM

## 2022-04-21 DIAGNOSIS — L97.522 ULCER OF LEFT FOOT, WITH FAT LAYER EXPOSED (HCC): Primary | ICD-10-CM

## 2022-04-21 DIAGNOSIS — E11.40 TYPE 2 DIABETES MELLITUS WITH DIABETIC NEUROPATHY, WITH LONG-TERM CURRENT USE OF INSULIN (HCC): ICD-10-CM

## 2022-04-21 DIAGNOSIS — L02.416 CUTANEOUS ABSCESS OF LEFT LOWER EXTREMITY: ICD-10-CM

## 2022-04-21 PROCEDURE — 99213 OFFICE O/P EST LOW 20 MIN: CPT | Performed by: PODIATRIST

## 2022-04-21 PROCEDURE — 11042 DBRDMT SUBQ TIS 1ST 20SQCM/<: CPT | Performed by: PODIATRIST

## 2022-04-21 RX ORDER — LIDOCAINE 40 MG/G
CREAM TOPICAL ONCE
Status: COMPLETED | OUTPATIENT
Start: 2022-04-21 | End: 2022-04-21

## 2022-04-21 RX ADMIN — LIDOCAINE: 40 CREAM TOPICAL at 09:24

## 2022-04-21 NOTE — PROGRESS NOTES
Patient ID: Marco Antonio Walton is a 57 y o  male Date of Birth 1960     Chief Complaint  Chief Complaint   Patient presents with    Follow Up Wound Care Visit     LLE wound x4       Allergies  Patient has no known allergies  Assessment:    No problem-specific Assessment & Plan notes found for this encounter  Diagnoses and all orders for this visit:    Ulcer of left foot, with fat layer exposed ([facility] [address] )  -     lidocaine (LMX) 4 % cream  -     Wound cleansing and dressings; Future  -     Wound compression and edema control; Future  -     Wound off loading; Future  -     Debridement    Chronic venous hypertension (idiopathic) with ulcer of left lower extremity (HCC)  -     lidocaine (LMX) 4 % cream  -     Wound cleansing and dressings; Future  -     Wound compression and edema control; Future  -     Wound off loading; Future  -     Debridement    Cutaneous abscess of left lower extremity  -     Wound cleansing and dressings; Future  -     Wound compression and edema control; Future  -     Wound off loading; Future  -     Debridement    Type 2 diabetes mellitus with diabetic neuropathy, with long-term current use of insulin ([facility] [address] )  -     Debridement          Debridement   Wound 03/31/22 Venous Ulcer Leg Left; Lower; Anterior;Proximal    Universal Protocol:  Consent: Verbal consent obtained  Risks and benefits: risks, benefits and alternatives were discussed  Consent given by: patient  Time out: Immediately prior to procedure a "time out" was called to verify the correct patient, procedure, equipment, support staff and site/side marked as required    Timeout called at: 4/21/2022 9:11 AM   Patient understanding: patient states understanding of the procedure being performed  Patient identity confirmed: verbally with patient      Performed by: physician  Debridement type: surgical  Level of debridement: subcutaneous tissue  Pain control: lidocaine 4%  Post-debridement measurements  Length (cm): 1  Width (cm): 0 8  Depth (cm): 0 6  Percent debrided: 100%  Surface Area (cm^2): 0 8  Area debrided (cm^2): 0 8  Volume (cm^3): 0 48  Tissue and other material debrided: dermis, epidermis and subcutaneous tissue  Devitalized tissue debrided: fibrin and slough  Instrument(s) utilized: blade  Bleeding: small  Hemostasis obtained with: pressure  Procedural pain (0-10): 0  Post-procedural pain: 0   Response to treatment: procedure was tolerated well    Debridement   Wound 04/07/22 Abscess Leg Left;Medial    Universal Protocol:  Consent: Verbal consent obtained  Risks and benefits: risks, benefits and alternatives were discussed  Consent given by: patient  Time out: Immediately prior to procedure a "time out" was called to verify the correct patient, procedure, equipment, support staff and site/side marked as required  Timeout called at: 4/21/2022 9:12 AM   Patient understanding: patient states understanding of the procedure being performed  Patient identity confirmed: verbally with patient      Performed by: physician  Debridement type: surgical  Level of debridement: subcutaneous tissue  Pain control: lidocaine 4%  Post-debridement measurements  Length (cm): 0 6  Width (cm): 0 7  Depth (cm): 0 5  Percent debrided: 100%  Surface Area (cm^2): 0 42  Area debrided (cm^2): 0 42  Volume (cm^3): 0 21  Tissue and other material debrided: dermis, epidermis and subcutaneous tissue  Devitalized tissue debrided: fibrin and slough  Instrument(s) utilized: blade  Bleeding: small  Hemostasis obtained with: pressure  Procedural pain (0-10): 0  Post-procedural pain: 0   Response to treatment: procedure was tolerated well    Debridement   Wound 03/31/22 Diabetic Ulcer Foot Left;Plantar    Universal Protocol:  Consent: Verbal consent obtained    Risks and benefits: risks, benefits and alternatives were discussed  Consent given by: patient  Time out: Immediately prior to procedure a "time out" was called to verify the correct patient, procedure, equipment, support staff and site/side marked as required  Timeout called at: 4/21/2022 9:13 AM   Patient understanding: patient states understanding of the procedure being performed  Patient identity confirmed: verbally with patient      Performed by: physician  Debridement type: surgical  Level of debridement: subcutaneous tissue  Pain control: lidocaine 4%  Post-debridement measurements  Length (cm): 0 5  Width (cm): 0 5  Depth (cm): 0 5  Percent debrided: 100%  Surface Area (cm^2): 0 25  Area debrided (cm^2): 0 25  Volume (cm^3): 0 13  Tissue and other material debrided: dermis, epidermis and subcutaneous tissue  Devitalized tissue debrided: callus, fibrin and slough  Instrument(s) utilized: blade  Bleeding: small  Hemostasis obtained with: pressure  Procedural pain (0-10): 0  Post-procedural pain: 0   Response to treatment: procedure was tolerated well            PLAN:  1  X-ray of left leg reviewed and discussed  Patient was counseled on the condition and diagnosis  2  No purulence or undermining at this time  Continue serial debridement, compression, and local care  3  NWB left foot with crutches  4  Stressed on pt compliant about proper compression, resting, and elevation on left leg  5  RA in 1 week  Wound 03/31/22 Venous Ulcer Leg Left; Lower; Anterior;Proximal (Active)   Wound Image Images linked 04/21/22 0940   Wound Description Yellow;Pink;Slough;Granulation tissue 04/21/22 0919   Suyz-wound Assessment Hyperpigmented;Edema;Pink 04/21/22 0919   Wound Length (cm) 0 9 cm 04/21/22 0919   Wound Width (cm) 0 7 cm 04/21/22 0919   Wound Depth (cm) 0 6 cm 04/21/22 0919   Wound Surface Area (cm^2) 0 63 cm^2 04/21/22 0919   Wound Volume (cm^3) 0 378 cm^3 04/21/22 0919   Calculated Wound Volume (cm^3) 0 38 cm^3 04/21/22 0919   Change in Wound Size % -280 04/21/22 0919   Drainage Amount Small 04/21/22 0919   Drainage Description Serosanguineous 04/21/22 0919   Non-staged Wound Description Full thickness 04/21/22 0919   Dressing Status Intact 04/21/22 0919       Wound 03/31/22 Diabetic Ulcer Foot Left;Plantar (Active)   Wound Image Images linked 04/21/22 0941   Wound Description Granulation tissue; Yellow;Slough 04/21/22 0919   Suzy-wound Assessment Callus 04/21/22 0919   Wound Length (cm) 0 5 cm 04/21/22 0919   Wound Width (cm) 0 3 cm 04/21/22 0919   Wound Depth (cm) 0 5 cm 04/21/22 0919   Wound Surface Area (cm^2) 0 15 cm^2 04/21/22 0919   Wound Volume (cm^3) 0 075 cm^3 04/21/22 0919   Calculated Wound Volume (cm^3) 0 08 cm^3 04/21/22 0919   Change in Wound Size % 46 67 04/21/22 0919   Drainage Amount Small 04/21/22 0919   Drainage Description Serosanguineous 04/21/22 0919   Non-staged Wound Description Full thickness 04/21/22 0919   Dressing Status Intact 04/21/22 0919       Wound 03/31/22 Venous Ulcer Leg Distal;Left; Anterior; Lower (Active)   Wound Image Images linked 04/21/22 0908   Wound Description Epithelialization 04/21/22 0917   Wound Length (cm) 0 cm 04/21/22 0917   Wound Width (cm) 0 cm 04/21/22 0917   Wound Depth (cm) 0 cm 04/21/22 0917   Wound Surface Area (cm^2) 0 cm^2 04/21/22 0917   Wound Volume (cm^3) 0 cm^3 04/21/22 0917   Calculated Wound Volume (cm^3) 0 cm^3 04/21/22 0917   Change in Wound Size % 100 04/21/22 0917   Drainage Amount None 04/21/22 0917   Dressing Status Intact 04/21/22 0917       Wound 04/07/22 Abscess Leg Left;Medial (Active)   Wound Image Images linked 04/21/22 0940   Wound Description Slough; Yellow;Pink 04/21/22 0920   Suzy-wound Assessment Edema; Hyperpigmented 04/21/22 0920   Wound Length (cm) 0 5 cm 04/21/22 0920   Wound Width (cm) 0 6 cm 04/21/22 0920   Wound Depth (cm) 0 4 cm 04/21/22 0920   Wound Surface Area (cm^2) 0 3 cm^2 04/21/22 0920   Wound Volume (cm^3) 0 12 cm^3 04/21/22 0920   Calculated Wound Volume (cm^3) 0 12 cm^3 04/21/22 0920   Change in Wound Size % 45 45 04/21/22 0920   Drainage Amount Small 04/21/22 0920   Drainage Description Serosanguineous 04/21/22 0920 Non-staged Wound Description Full thickness 04/21/22 0920   Dressing Status Intact 04/21/22 0920       Wound 03/31/22 Venous Ulcer Leg Left; Lower; Anterior;Proximal (Active)   Date First Assessed/Time First Assessed: 03/31/22 0832   Pre-Existing Wound: Yes  Primary Wound Type: Venous Ulcer  Location: Leg  Wound Location Orientation: Left; Lower; Anterior;Proximal       Wound 03/31/22 Diabetic Ulcer Foot Left;Plantar (Active)   Date First Assessed/Time First Assessed: 03/31/22 0832   Pre-Existing Wound: Yes  Primary Wound Type: Diabetic Ulcer  Location: Foot  Wound Location Orientation: Left;Plantar       Wound 03/31/22 Venous Ulcer Leg Distal;Left; Anterior; Lower (Active)   Date First Assessed/Time First Assessed: 03/31/22 0856   Pre-Existing Wound: Yes  Primary Wound Type: Venous Ulcer  Location: Leg  Wound Location Orientation: Distal;Left; Anterior; Lower       Wound 04/07/22 Abscess Leg Left;Medial (Active)   Date First Assessed/Time First Assessed: 04/07/22 1121   Primary Wound Type: Abscess  Location: Leg  Wound Location Orientation: Left;Medial       Subjective:        HPI  Kristie Reyes presents for wound care  He has left DFU left submet 5 and left leg wound  No pain  Tolerates antibiotics well          The following portions of the patient's history were reviewed and updated as appropriate: allergies, current medications, past family history, past medical history, past social history, past surgical history and problem list     PAST MEDICAL HISTORY:  Past Medical History:   Diagnosis Date    CAD (coronary artery disease)     Chronic combined systolic and diastolic CHF (congestive heart failure) ([place] [address] )     Diabetes mellitus ([place] [address] )     type 2    Dyslipidemia     Hypertension     Ischemic cardiomyopathy     MI (myocardial infarction) ([place] [address] )     Osteomyelitis ([place] [address] )     Pancreatitis        PAST SURGICAL HISTORY:  Past Surgical History:   Procedure Laterality Date    ANGIOPLASTY      ballon    CORONARY ANGIOPLASTY WITH STENT PLACEMENT      LULA to proximal and mid LAD, Proximal RCA   HERNIA REPAIR      INCISION AND DRAINAGE OF WOUND Left 2/14/2020    Procedure: INCISION AND DRAINAGE (I&D) EXTREMITY left foot (cpt 24094); Surgeon: Linden Ceja DPM;  [facility];  Service: Podiatry    HI EXPLORE [facility] Left 2/14/2020    Procedure: EXPLORATION EXTREMITY;  Surgeon: Linden Ceja DPM;  [facility];  Service: Podiatry   [facility], < 50 CM Left 2/14/2020    Procedure: APPLICATION VAC DRESSING;  Surgeon: Linden Ceja DPM;  [facility];  Service: Podiatry        ALLERGIES:  Patient has no known allergies  MEDICATIONS:  Current Outpatient Medications   Medication Sig Dispense Refill    aspirin 81 mg chewable tablet Chew 1 tablet daily for 30 days 30 tablet 0    atorvastatin (LIPITOR) 40 mg tablet TAKE 1 TABLET BY MOUTH EVERY DAY 90 tablet 3    Blood Pressure Monitoring ([employer] Blood Pressure Monitor) KIT Use 2 (two) times a day Automatic BP cuff  Measure BP twice daily   1 kit 0    ciprofloxacin (CIPRO) 500 mg tablet Take 1 tablet (500 mg total) by mouth every 24 hours for 7 days 7 tablet 0    furosemide (LASIX) 40 mg tablet Take 40 mg by mouth daily      Lantus SoloStar 100 units/mL injection pen Inject 10 Units under the skin every morning (Patient taking differently: Inject 20 Units under the skin every morning  )  0    lisinopril (ZESTRIL) 5 mg tablet Take 1 tablet (5 mg total) by mouth daily 90 tablet 1    metoprolol tartrate (LOPRESSOR) 25 mg tablet TAKE 1 TABLET (25 MG TOTAL) BY MOUTH EVERY 12 (TWELVE) HOURS 180 tablet 3    NovoLIN 70/30 FlexPen (70-30) 100 UNIT/ML injection pen  (Patient not taking: Reported on 1/14/2022 )      potassium chloride (Klor-Con M20) 20 mEq tablet Take 1 tablet (20 mEq total) by mouth 2 (two) times a day with meals (Patient not taking: Reported on 4/7/2022 ) 60 tablet 0    Sodium Fluoride 5000 PPM 1 1 % PSTE BRUSH TWICE A DAY FOR 2 TO 3 MINUTES AND SPIT NO RINISING FOR 30 MINUTES      triamcinolone (KENALOG) 0 5 % cream Apply topically 2 (two) times a day 454 g 0    TRUE METRIX BLOOD GLUCOSE TEST test strip TEST FOUR TIMES DAILY  11    Unifine Pentips 31G X 8 MM MISC USE 4 TMES A DAY       No current facility-administered medications for this visit  SOCIAL HISTORY:  Social History     Socioeconomic History    Marital status: /Civil Union     Spouse name: None    Number of children: None    Years of education: None    Highest education level: None   Occupational History    None   Tobacco Use    Smoking status: Never Smoker    Smokeless tobacco: Never Used   Vaping Use    Vaping Use: Never used   Substance and Sexual Activity    Alcohol use: Not Currently    Drug use: No    Sexual activity: None   Other Topics Concern    None   Social History Narrative    None     Social Determinants of Health     Financial Resource Strain: Not on file   Food Insecurity: Not on file   Transportation Needs: Not on file   Physical Activity: Not on file   Stress: Not on file   Social Connections: Not on file   Intimate Partner Violence: Not on file   Housing Stability: Not on file      Review of Systems   Constitutional: Negative for appetite change, chills and fever  Respiratory: Negative for cough and shortness of breath  Cardiovascular: Negative for chest pain  Gastrointestinal: Negative for diarrhea, nausea and vomiting  Musculoskeletal: Negative for gait problem  Skin: Positive for wound  Neurological: Positive for numbness  Objective:       Wound 03/31/22 Venous Ulcer Leg Left; Lower; Anterior;Proximal (Active)   Wound Image Images linked 04/21/22 0940   Wound Description Yellow;Pink;Slough;Granulation tissue 04/21/22 0919   Suzy-wound Assessment Hyperpigmented;Edema;Pink 04/21/22 0919   Wound Length (cm) 0 9 cm 04/21/22 0919   Wound Width (cm) 0 7 cm 04/21/22 0919   Wound Depth (cm) 0 6 cm 04/21/22 0919   Wound Surface Area (cm^2) 0 63 cm^2 04/21/22 0919   Wound Volume (cm^3) 0 378 cm^3 04/21/22 0919   Calculated Wound Volume (cm^3) 0 38 cm^3 04/21/22 0919   Change in Wound Size % -280 04/21/22 0919   Drainage Amount Small 04/21/22 0919   Drainage Description Serosanguineous 04/21/22 0919   Non-staged Wound Description Full thickness 04/21/22 0919   Dressing Status Intact 04/21/22 0919       Wound 03/31/22 Diabetic Ulcer Foot Left;Plantar (Active)   Wound Image Images linked 04/21/22 0941   Wound Description Granulation tissue; Yellow;Slough 04/21/22 0919   Suzy-wound Assessment Callus 04/21/22 0919   Wound Length (cm) 0 5 cm 04/21/22 0919   Wound Width (cm) 0 3 cm 04/21/22 0919   Wound Depth (cm) 0 5 cm 04/21/22 0919   Wound Surface Area (cm^2) 0 15 cm^2 04/21/22 0919   Wound Volume (cm^3) 0 075 cm^3 04/21/22 0919   Calculated Wound Volume (cm^3) 0 08 cm^3 04/21/22 0919   Change in Wound Size % 46 67 04/21/22 0919   Drainage Amount Small 04/21/22 0919   Drainage Description Serosanguineous 04/21/22 0919   Non-staged Wound Description Full thickness 04/21/22 0919   Dressing Status Intact 04/21/22 0919       Wound 03/31/22 Venous Ulcer Leg Distal;Left; Anterior; Lower (Active)   Wound Image Images linked 04/21/22 0908   Wound Description Epithelialization 04/21/22 0917   Wound Length (cm) 0 cm 04/21/22 0917   Wound Width (cm) 0 cm 04/21/22 0917   Wound Depth (cm) 0 cm 04/21/22 0917   Wound Surface Area (cm^2) 0 cm^2 04/21/22 0917   Wound Volume (cm^3) 0 cm^3 04/21/22 0917   Calculated Wound Volume (cm^3) 0 cm^3 04/21/22 0917   Change in Wound Size % 100 04/21/22 0917   Drainage Amount None 04/21/22 0917   Dressing Status Intact 04/21/22 0917       Wound 04/07/22 Abscess Leg Left;Medial (Active)   Wound Image Images linked 04/21/22 0940   Wound Description Slough; Yellow;Pink 04/21/22 0920   Suzy-wound Assessment Edema; Hyperpigmented 04/21/22 0920   Wound Length (cm) 0 5 cm 04/21/22 0920   Wound Width (cm) 0 6 cm 04/21/22 0920 Wound Depth (cm) 0 4 cm 04/21/22 0920   Wound Surface Area (cm^2) 0 3 cm^2 04/21/22 0920   Wound Volume (cm^3) 0 12 cm^3 04/21/22 0920   Calculated Wound Volume (cm^3) 0 12 cm^3 04/21/22 0920   Change in Wound Size % 45 45 04/21/22 0920   Drainage Amount Small 04/21/22 0920   Drainage Description Serosanguineous 04/21/22 0920   Non-staged Wound Description Full thickness 04/21/22 0920   Dressing Status Intact 04/21/22 0920       /77   Pulse 73   Temp (!) 97 4 °F (36 3 °C)   Resp 16     Physical Exam  Vitals and nursing note reviewed  Constitutional:       General: He is not in acute distress  Appearance: He is not toxic-appearing or diaphoretic  Cardiovascular:      Rate and Rhythm: Normal rate and regular rhythm  Pulses:           Dorsalis pedis pulses are 2+ on the right side and 1+ on the left side  Posterior tibial pulses are 0 on the right side and 0 on the left side  Comments: Venous stasis skin changes LLE with edema  Pulmonary:      Effort: Pulmonary effort is normal    Musculoskeletal:         General: Deformity present  No tenderness or signs of injury  Cervical back: Normal range of motion and neck supple  Left lower leg: Edema present  Right foot: No Charcot foot or foot drop  Left foot: No Charcot foot or foot drop  Skin:     General: Skin is warm and dry  Capillary Refill: Capillary refill takes less than 2 seconds  Coloration: Skin is not cyanotic or mottled  Findings: Wound present  No abscess or ecchymosis  Nails: There is no clubbing  Comments: Full thickness ulcer left submet 5  Callus noted around the ulcer  Wound is smaller  No deep probing  No infection  Left leg VSU left shin noted  No purulence or undermining / tunnel  Distal left leg wound looks healed  Left calf wound is stable without purulence / undermining  No cellulitis LLE  No new abscess or wound  See wound assessment     Neurological: General: No focal deficit present  Mental Status: He is alert and oriented to person, place, and time  Cranial Nerves: No cranial nerve deficit  Sensory: Sensory deficit present  Coordination: Coordination normal    Psychiatric:         Mood and Affect: Mood normal          Behavior: Behavior normal          Thought Content: Thought content normal          Judgment: Judgment normal            Wound Instructions:  Orders Placed This Encounter   Procedures    Wound cleansing and dressings     Left Foot and Left Leg wounds:  Wound care every other day and prn soilage/dislodgement  Do not get wounds wet with shower water  Cleanse the wound with normal saline or wound cleanser prior to applying a clean dressing  Pat dry  Left foot and left leg wounds:  Apply Silver Alginate to the wounds  Cover with gauze  Secure with toni and tape     Treatments above were completed today at the [facility]                                     Standing Status:   Future     Standing Expiration Date:   4/21/2023    Wound compression and edema control     Elastic Tubular Stocking----Spandagrip size F followed by ace wrap to the LLE     Tubular elastic bandage: Apply from base of toes to behind the knee  Apply in AM, may remove for sleep      Avoid prolonged standing in one place      Elevate leg(s) above the level of the heart when sitting or as much as possible      This was applied today             Standing Status:   Future     Standing Expiration Date:   4/21/2023    Wound off loading                                              Continue to wear the Darco shoe to the Left foot for all ambulation and transfers     Standing Status:   Future     Standing Expiration Date:   4/21/2023    Debridement     This order was created via procedure documentation    Debridement     This order was created via procedure documentation    Debridement     This order was created via procedure documentation        Diagnosis ICD-10-CM Associated Orders   1  Ulcer of left foot, with fat layer exposed ([facility])  L97 522 lidocaine (LMX) 4 % cream     Wound cleansing and dressings     Wound compression and edema control     Wound off loading     Debridement   2  Chronic venous hypertension (idiopathic) with ulcer of left lower extremity ([facility])  I87 312 lidocaine (LMX) 4 % cream    L97 929 Wound cleansing and dressings     Wound compression and edema control     Wound off loading     Debridement   3  Cutaneous abscess of left lower extremity  L02 416 Wound cleansing and dressings     Wound compression and edema control     Wound off loading     Debridement   4   Type 2 diabetes mellitus with diabetic neuropathy, with long-term current use of insulin ([facility])  E11 40 Debridement    Z79 4

## 2022-04-21 NOTE — PATIENT INSTRUCTIONS
Orders Placed This Encounter   Procedures    Wound cleansing and dressings     Left Foot and Left Leg wounds:  Wound care every other day and prn soilage/dislodgement  Do not get wounds wet with shower water  Cleanse the wound with normal saline or wound cleanser prior to applying a clean dressing  Pat dry  Left foot and left leg wounds:  Apply Silver Alginate to the wounds  Cover with gauze  Secure with toni and tape     Treatments above were completed today at the [facility]                                     Standing Status:   Future     Standing Expiration Date:   4/21/2023    Wound compression and edema control     Elastic Tubular Stocking----Spandagrip size F followed by ace wrap to the LLE     Tubular elastic bandage: Apply from base of toes to behind the knee  Apply in AM, may remove for sleep      Avoid prolonged standing in one place      Elevate leg(s) above the level of the heart when sitting or as much as possible      This was applied today             Standing Status:   Future     Standing Expiration Date:   4/21/2023    Wound off loading                                              Continue to wear the Darco shoe to the Left foot for all ambulation and transfers     Standing Status:   Future     Standing Expiration Date:   4/21/2023

## 2022-04-28 ENCOUNTER — OFFICE VISIT (OUTPATIENT)
Dept: WOUND CARE | Facility: HOSPITAL | Age: 62
End: 2022-04-28
Payer: COMMERCIAL

## 2022-04-28 VITALS
SYSTOLIC BLOOD PRESSURE: 157 MMHG | TEMPERATURE: 97.5 F | HEART RATE: 81 BPM | DIASTOLIC BLOOD PRESSURE: 89 MMHG | RESPIRATION RATE: 16 BRPM

## 2022-04-28 DIAGNOSIS — E11.40 TYPE 2 DIABETES MELLITUS WITH DIABETIC NEUROPATHY, WITH LONG-TERM CURRENT USE OF INSULIN (HCC): ICD-10-CM

## 2022-04-28 DIAGNOSIS — I87.312 CHRONIC VENOUS HYPERTENSION (IDIOPATHIC) WITH ULCER OF LEFT LOWER EXTREMITY (HCC): ICD-10-CM

## 2022-04-28 DIAGNOSIS — L97.522 ULCER OF LEFT FOOT, WITH FAT LAYER EXPOSED (HCC): Primary | ICD-10-CM

## 2022-04-28 DIAGNOSIS — Z79.4 TYPE 2 DIABETES MELLITUS WITH DIABETIC NEUROPATHY, WITH LONG-TERM CURRENT USE OF INSULIN (HCC): ICD-10-CM

## 2022-04-28 DIAGNOSIS — L02.416 CUTANEOUS ABSCESS OF LEFT LOWER EXTREMITY: ICD-10-CM

## 2022-04-28 DIAGNOSIS — L97.929 CHRONIC VENOUS HYPERTENSION (IDIOPATHIC) WITH ULCER OF LEFT LOWER EXTREMITY (HCC): ICD-10-CM

## 2022-04-28 PROCEDURE — 11042 DBRDMT SUBQ TIS 1ST 20SQCM/<: CPT | Performed by: PODIATRIST

## 2022-04-28 PROCEDURE — 99213 OFFICE O/P EST LOW 20 MIN: CPT | Performed by: PODIATRIST

## 2022-04-28 RX ORDER — LIDOCAINE 40 MG/G
CREAM TOPICAL ONCE
Status: COMPLETED | OUTPATIENT
Start: 2022-04-28 | End: 2022-04-28

## 2022-04-28 RX ADMIN — LIDOCAINE 1 APPLICATION: 40 CREAM TOPICAL at 10:48

## 2022-04-28 NOTE — PROGRESS NOTES
Patient ID: Rock Farias is a 57 y o  male Date of Birth 1960     Chief Complaint  Chief Complaint   Patient presents with    Follow Up Wound Care Visit     LLE and Left foot wound       Allergies  Patient has no known allergies  Assessment:    No problem-specific Assessment & Plan notes found for this encounter  Diagnoses and all orders for this visit:    Ulcer of left foot, with fat layer exposed ([address] )  -     lidocaine (LMX) 4 % cream  -     Wound cleansing and dressings; Future  -     Wound compression and edema control; Future    Chronic venous hypertension (idiopathic) with ulcer of left lower extremity (HCC)  -     lidocaine (LMX) 4 % cream  -     Wound cleansing and dressings; Future  -     Wound compression and edema control; Future  -     Debridement    Type 2 diabetes mellitus with diabetic neuropathy, with long-term current use of insulin (HCC)    Cutaneous abscess of left lower extremity  -     Debridement          Debridement   Wound 03/31/22 Venous Ulcer Leg Left; Lower; Anterior;Proximal    Universal Protocol:  Consent: Verbal consent obtained  Risks and benefits: risks, benefits and alternatives were discussed  Consent given by: patient  Time out: Immediately prior to procedure a "time out" was called to verify the correct patient, procedure, equipment, support staff and site/side marked as required    Timeout called at: 4/28/2022 10:50 AM   Patient understanding: patient states understanding of the procedure being performed  Patient identity confirmed: verbally with patient      Performed by: physician  Debridement type: surgical  Level of debridement: subcutaneous tissue  Pain control: lidocaine 4%  Post-debridement measurements  Length (cm): 0 5  Width (cm): 0 5  Depth (cm): 0 3  Percent debrided: 100%  Surface Area (cm^2): 0 25  Area debrided (cm^2): 0 25  Volume (cm^3): 0 08  Tissue and other material debrided: dermis, epidermis and subcutaneous tissue  Devitalized tissue debrided: biofilm, fibrin and slough  Instrument(s) utilized: blade  Bleeding: small  Hemostasis obtained with: pressure  Procedural pain (0-10): 0  Post-procedural pain: 0   Response to treatment: procedure was tolerated well    Debridement   Wound 04/07/22 Abscess Leg Left;Medial    Universal Protocol:  Consent: Verbal consent obtained  Risks and benefits: risks, benefits and alternatives were discussed  Consent given by: patient  Time out: Immediately prior to procedure a "time out" was called to verify the correct patient, procedure, equipment, support staff and site/side marked as required  Timeout called at: 4/28/2022 10:51 AM   Patient understanding: patient states understanding of the procedure being performed  Patient identity confirmed: verbally with patient      Performed by: physician  Debridement type: surgical  Level of debridement: subcutaneous tissue  Pain control: lidocaine 4%  Post-debridement measurements  Length (cm): 0 6  Width (cm): 0 5  Depth (cm): 0 5  Percent debrided: 100%  Surface Area (cm^2): 0 3  Area debrided (cm^2): 0 3  Volume (cm^3): 0 15  Tissue and other material debrided: dermis, epidermis and subcutaneous tissue  Devitalized tissue debrided: biofilm, fibrin and slough  Instrument(s) utilized: blade  Bleeding: small  Hemostasis obtained with: pressure  Procedural pain (0-10): 0  Post-procedural pain: 0   Response to treatment: procedure was tolerated well            PLAN:  1  Patient was counseled on the condition and diagnosis  2  No purulence or undermining  Wounds are healing well  Continue serial debridement, compression, and local care  3  NWB left foot with crutches  4  Stressed on pt compliant about proper compression, resting, and elevation on left leg  5  RA in 2 weeks  Wound 03/31/22 Venous Ulcer Leg Left; Lower; Anterior;Proximal (Active)   Wound Image Images linked 04/28/22 1103   Wound Description Yellow;Pink;Slough 04/28/22 1046   Suzy-wound Assessment Hyperpigmented;Edema;Pink 04/28/22 1046   Wound Length (cm) 0 4 cm 04/28/22 1046   Wound Width (cm) 0 3 cm 04/28/22 1046   Wound Depth (cm) 0 3 cm 04/28/22 1046   Wound Surface Area (cm^2) 0 12 cm^2 04/28/22 1046   Wound Volume (cm^3) 0 036 cm^3 04/28/22 1046   Calculated Wound Volume (cm^3) 0 04 cm^3 04/28/22 1046   Change in Wound Size % 60 04/28/22 1046   Drainage Amount Small 04/28/22 1046   Drainage Description Serosanguineous 04/28/22 1046   Non-staged Wound Description Full thickness 04/28/22 1046   Dressing Status Intact 04/28/22 1046       Wound 03/31/22 Diabetic Ulcer Foot Left;Plantar (Active)   Wound Image Images linked 04/28/22 1044   Wound Description Eschar;Brown 04/28/22 1044   Suzy-wound Assessment Callus 04/28/22 1044   Wound Length (cm) 0 1 cm 04/28/22 1044   Wound Width (cm) 0 1 cm 04/28/22 1044   Wound Depth (cm) 0 cm 04/28/22 1044   Wound Surface Area (cm^2) 0 01 cm^2 04/28/22 1044   Wound Volume (cm^3) 0 cm^3 04/28/22 1044   Calculated Wound Volume (cm^3) 0 cm^3 04/28/22 1044   Change in Wound Size % 100 04/28/22 1044   Drainage Amount None 04/28/22 1044   Non-staged Wound Description Not applicable 57/87/48 9528   Dressing Status Intact 04/28/22 1044       Wound 04/07/22 Abscess Leg Left;Medial (Active)   Wound Image Images linked 04/28/22 1102   Wound Description Yellow;Pink 04/28/22 1045   Suzy-wound Assessment Edema; Hyperpigmented 04/28/22 1045   Wound Length (cm) 0 5 cm 04/28/22 1045   Wound Width (cm) 0 4 cm 04/28/22 1045   Wound Depth (cm) 0 4 cm 04/28/22 1045   Wound Surface Area (cm^2) 0 2 cm^2 04/28/22 1045   Wound Volume (cm^3) 0 08 cm^3 04/28/22 1045   Calculated Wound Volume (cm^3) 0 08 cm^3 04/28/22 1045   Change in Wound Size % 63 64 04/28/22 1045   Drainage Amount Small 04/28/22 1045   Drainage Description Serosanguineous 04/28/22 1045   Non-staged Wound Description Full thickness 04/28/22 1045   Dressing Status Intact 04/28/22 1045       Wound 03/31/22 Venous Ulcer Leg Left;Lower; Anterior;Proximal (Active)   Date First Assessed/Time First Assessed: 03/31/22 0832   Pre-Existing Wound: Yes  Primary Wound Type: Venous Ulcer  Location: Leg  Wound Location Orientation: Left; Lower; Anterior;Proximal       Wound 03/31/22 Diabetic Ulcer Foot Left;Plantar (Active)   Date First Assessed/Time First Assessed: 03/31/22 0832   Pre-Existing Wound: Yes  Primary Wound Type: Diabetic Ulcer  Location: Foot  Wound Location Orientation: Left;Plantar       Wound 04/07/22 Abscess Leg Left;Medial (Active)   Date First Assessed/Time First Assessed: 04/07/22 1121   Primary Wound Type: Abscess  Location: Leg  Wound Location Orientation: Left;Medial       [REMOVED] Wound 03/31/22 Venous Ulcer Leg Distal;Left; Anterior; Lower (Removed)   Resolved Date/Resolved Time: 04/21/22 1335  Date First Assessed/Time First Assessed: 03/31/22 0856   Pre-Existing Wound: Yes  Primary Wound Type: Venous Ulcer  Location: Leg  Wound Location Orientation: Distal;Left; Anterior; Lower  Wound Outcome: Healed       Subjective:        SUSAN Medina presents for wound care  He has left DFU left submet 5 and left leg wound  No pain  Tolerates antibiotics well          The following portions of the patient's history were reviewed and updated as appropriate: allergies, current medications, past family history, past medical history, past social history, past surgical history and problem list     PAST MEDICAL HISTORY:  Past Medical History:   Diagnosis Date    CAD (coronary artery disease)     Chronic combined systolic and diastolic CHF (congestive heart failure) ([place] [address] )     Diabetes mellitus ([place] [address] )     type 2    Dyslipidemia     Hypertension     Ischemic cardiomyopathy     MI (myocardial infarction) ([place] [address] )     Osteomyelitis ([place] [address] )     Pancreatitis        PAST SURGICAL HISTORY:  Past Surgical History:   Procedure Laterality Date    ANGIOPLASTY      ballon    CORONARY ANGIOPLASTY WITH STENT PLACEMENT      LULA to proximal and mid LAD, Proximal RCA   HERNIA REPAIR      INCISION AND DRAINAGE OF WOUND Left 2/14/2020    Procedure: INCISION AND DRAINAGE (I&D) EXTREMITY left foot (cpt 78510); Surgeon: Beckie Guzman DPM;  [facility];  Service: Podiatry    [place] EXPLORE [facility] Left 2/14/2020    Procedure: EXPLORATION EXTREMITY;  Surgeon: Beckie Guzman DPM;  [facility];  Service: Podiatry   [facility], < 50 CM Left 2/14/2020    Procedure: APPLICATION VAC DRESSING;  Surgeon: Beckie Guzman DPM;  [facility];  Service: Podiatry        ALLERGIES:  Patient has no known allergies  MEDICATIONS:  Current Outpatient Medications   Medication Sig Dispense Refill    aspirin 81 mg chewable tablet Chew 1 tablet daily for 30 days 30 tablet 0    atorvastatin (LIPITOR) 40 mg tablet TAKE 1 TABLET BY MOUTH EVERY DAY 90 tablet 3    Blood Pressure Monitoring ([employer] Blood Pressure Monitor) KIT Use 2 (two) times a day Automatic BP cuff  Measure BP twice daily   1 kit 0    furosemide (LASIX) 40 mg tablet Take 40 mg by mouth daily      Lantus SoloStar 100 units/mL injection pen Inject 10 Units under the skin every morning (Patient taking differently: Inject 20 Units under the skin every morning  )  0    lisinopril (ZESTRIL) 5 mg tablet Take 1 tablet (5 mg total) by mouth daily 90 tablet 1    metoprolol tartrate (LOPRESSOR) 25 mg tablet TAKE 1 TABLET (25 MG TOTAL) BY MOUTH EVERY 12 (TWELVE) HOURS 180 tablet 3    NovoLIN 70/30 FlexPen (70-30) 100 UNIT/ML injection pen  (Patient not taking: Reported on 1/14/2022 )      potassium chloride (Klor-Con M20) 20 mEq tablet Take 1 tablet (20 mEq total) by mouth 2 (two) times a day with meals (Patient not taking: Reported on 4/7/2022 ) 60 tablet 0    Sodium Fluoride 5000 PPM 1 1 % PSTE BRUSH TWICE A DAY FOR 2 TO 3 MINUTES AND SPIT NO RINISING FOR 30 MINUTES      triamcinolone (KENALOG) 0 5 % cream Apply topically 2 (two) times a day 454 g 0    TRUE METRIX BLOOD GLUCOSE TEST test strip TEST FOUR TIMES DAILY  11    Unifine Pentips 31G X 8 MM MISC USE 4 TMES A DAY       No current facility-administered medications for this visit  SOCIAL HISTORY:  Social History     Socioeconomic History    Marital status: /Civil Union     Spouse name: None    Number of children: None    Years of education: None    Highest education level: None   Occupational History    None   Tobacco Use    Smoking status: Never Smoker    Smokeless tobacco: Never Used   Vaping Use    Vaping Use: Never used   Substance and Sexual Activity    Alcohol use: Not Currently    Drug use: No    Sexual activity: None   Other Topics Concern    None   Social History Narrative    None     Social Determinants of Health     Financial Resource Strain: Not on file   Food Insecurity: Not on file   Transportation Needs: Not on file   Physical Activity: Not on file   Stress: Not on file   Social Connections: Not on file   Intimate Partner Violence: Not on file   Housing Stability: Not on file      Review of Systems   Constitutional: Negative for appetite change, chills and fever  Respiratory: Negative for cough and shortness of breath  Cardiovascular: Negative for chest pain  Gastrointestinal: Negative for diarrhea, nausea and vomiting  Musculoskeletal: Negative for gait problem  Skin: Positive for wound  Neurological: Positive for numbness  Objective:       Wound 03/31/22 Venous Ulcer Leg Left; Lower; Anterior;Proximal (Active)   Wound Image Images linked 04/28/22 1103   Wound Description Yellow;Pink;Slough 04/28/22 1046   Suzy-wound Assessment Hyperpigmented;Edema;Pink 04/28/22 1046   Wound Length (cm) 0 4 cm 04/28/22 1046   Wound Width (cm) 0 3 cm 04/28/22 1046   Wound Depth (cm) 0 3 cm 04/28/22 1046   Wound Surface Area (cm^2) 0 12 cm^2 04/28/22 1046   Wound Volume (cm^3) 0 036 cm^3 04/28/22 1046   Calculated Wound Volume (cm^3) 0 04 cm^3 04/28/22 1046   Change in Wound Size % 60 04/28/22 1046 Drainage Amount Small 04/28/22 1046   Drainage Description Serosanguineous 04/28/22 1046   Non-staged Wound Description Full thickness 04/28/22 1046   Dressing Status Intact 04/28/22 1046       Wound 03/31/22 Diabetic Ulcer Foot Left;Plantar (Active)   Wound Image Images linked 04/28/22 1044   Wound Description Eschar;Brown 04/28/22 1044   Suzy-wound Assessment Callus 04/28/22 1044   Wound Length (cm) 0 1 cm 04/28/22 1044   Wound Width (cm) 0 1 cm 04/28/22 1044   Wound Depth (cm) 0 cm 04/28/22 1044   Wound Surface Area (cm^2) 0 01 cm^2 04/28/22 1044   Wound Volume (cm^3) 0 cm^3 04/28/22 1044   Calculated Wound Volume (cm^3) 0 cm^3 04/28/22 1044   Change in Wound Size % 100 04/28/22 1044   Drainage Amount None 04/28/22 1044   Non-staged Wound Description Not applicable 15/43/07 8716   Dressing Status Intact 04/28/22 1044       Wound 04/07/22 Abscess Leg Left;Medial (Active)   Wound Image Images linked 04/28/22 1102   Wound Description Yellow;Pink 04/28/22 1045   Suzy-wound Assessment Edema; Hyperpigmented 04/28/22 1045   Wound Length (cm) 0 5 cm 04/28/22 1045   Wound Width (cm) 0 4 cm 04/28/22 1045   Wound Depth (cm) 0 4 cm 04/28/22 1045   Wound Surface Area (cm^2) 0 2 cm^2 04/28/22 1045   Wound Volume (cm^3) 0 08 cm^3 04/28/22 1045   Calculated Wound Volume (cm^3) 0 08 cm^3 04/28/22 1045   Change in Wound Size % 63 64 04/28/22 1045   Drainage Amount Small 04/28/22 1045   Drainage Description Serosanguineous 04/28/22 1045   Non-staged Wound Description Full thickness 04/28/22 1045   Dressing Status Intact 04/28/22 1045       /89   Pulse 81   Temp 97 5 °F (36 4 °C)   Resp 16     Physical Exam  Vitals and nursing note reviewed  Constitutional:       General: He is not in acute distress  Appearance: He is not toxic-appearing or diaphoretic  Cardiovascular:      Rate and Rhythm: Normal rate and regular rhythm  Pulses:           Dorsalis pedis pulses are 2+ on the right side and 1+ on the left side  Posterior tibial pulses are 0 on the right side and 0 on the left side  Comments: Venous stasis skin changes LLE with edema  Pulmonary:      Effort: Pulmonary effort is normal    Musculoskeletal:         General: Deformity present  No tenderness or signs of injury  Cervical back: Normal range of motion and neck supple  Left lower leg: Edema present  Right foot: No Charcot foot or foot drop  Left foot: No Charcot foot or foot drop  Skin:     General: Skin is warm and dry  Capillary Refill: Capillary refill takes less than 2 seconds  Coloration: Skin is not cyanotic or mottled  Findings: Wound present  No abscess or ecchymosis  Nails: There is no clubbing  Comments: Ulcer is mostly healed left submet 5  No deep probing  No infection  Left leg VSU left shin noted  No purulence or undermining / tunnel  Left calf wound is stable without purulence  No cellulitis LLE  See wound assessment  Neurological:      General: No focal deficit present  Mental Status: He is alert and oriented to person, place, and time  Cranial Nerves: No cranial nerve deficit  Sensory: Sensory deficit present  Coordination: Coordination normal    Psychiatric:         Mood and Affect: Mood normal          Behavior: Behavior normal          Thought Content: Thought content normal          Judgment: Judgment normal            Wound Instructions:  Orders Placed This Encounter   Procedures    Wound cleansing and dressings     Left Foot and Left Leg wounds:  Wound care every other day and prn soilage/dislodgement  Do not get wounds wet with shower water  Cleanse the wound with normal saline or wound cleanser prior to applying a clean dressing  Pat dry       Left foot and left leg wounds:  Apply Silver Alginate to the wounds      Cover with gauze  Secure with toni and tape     Treatments above were completed today at the [facility]                                        Standing Status:   Future     Standing Expiration Date:   4/28/2023    Wound compression and edema control     Elastic Tubular Stocking----Spandagrip size F followed by ace wrap to the LLE     Tubular elastic bandage: Apply from base of toes to behind the knee  Apply in AM, may remove for sleep      Avoid prolonged standing in one place      Elevate leg(s) above the level of the heart when sitting or as much as possible          This was applied today  Standing Status:   Future     Standing Expiration Date:   4/28/2023    Debridement     This order was created via procedure documentation    Debridement     This order was created via procedure documentation        Diagnosis ICD-10-CM Associated Orders   1  Ulcer of left foot, with fat layer exposed ([facility])  L97 522 lidocaine (LMX) 4 % cream     Wound cleansing and dressings     Wound compression and edema control   2  Chronic venous hypertension (idiopathic) with ulcer of left lower extremity ([facility])  I87 312 lidocaine (LMX) 4 % cream    L97 929 Wound cleansing and dressings     Wound compression and edema control     Debridement   3  Type 2 diabetes mellitus with diabetic neuropathy, with long-term current use of insulin ([facility])  E11 40     Z79 4    4   Cutaneous abscess of left lower extremity  L02 416 Debridement

## 2022-04-28 NOTE — PATIENT INSTRUCTIONS
Orders Placed This Encounter   Procedures    Wound cleansing and dressings     Left Foot and Left Leg wounds:  Wound care every other day and prn soilage/dislodgement  Do not get wounds wet with shower water  Cleanse the wound with normal saline or wound cleanser prior to applying a clean dressing  Pat dry       Left foot and left leg wounds:  Apply Silver Alginate to the wounds  Cover with gauze  Secure with toni and tape     Treatments above were completed today at the [facility]                                            Standing Status:   Future     Standing Expiration Date:   4/28/2023    Wound compression and edema control     Elastic Tubular Stocking----Spandagrip size F followed by ace wrap to the LLE     Tubular elastic bandage: Apply from base of toes to behind the knee  Apply in AM, may remove for sleep      Avoid prolonged standing in one place      Elevate leg(s) above the level of the heart when sitting or as much as possible          This was applied today       Standing Status:   Future     Standing Expiration Date:   4/28/2023

## 2022-05-09 ENCOUNTER — TELEPHONE (OUTPATIENT)
Dept: PODIATRY | Facility: CLINIC | Age: 62
End: 2022-05-09

## 2022-05-09 ENCOUNTER — TELEPHONE (OUTPATIENT)
Dept: WOUND CARE | Facility: HOSPITAL | Age: 62
End: 2022-05-09

## 2022-05-09 NOTE — TELEPHONE ENCOUNTER
Patient called Podiatry office c/o increased wound drainage and green discoloration  I called the patient for clarification and patient seemed to have difficulty articulating answers: I asked if he was walking more, he said I don't know  I asked if he had a fever he said I don't know I think so  I asked where the green discoloration was and he said not on the wound, on the foot  I asked if he minded if I spoke with his wife as she changes the dressings; he agreed  I called Claudia who states that patient walks on foot more than he should  They do not have a thermometer but he did seem feverish with chills over the weekend; slept most of yesterday and woke up feeling better this morning  States that the greenish discoloration is on the bottom of the 5th toe, not around the wound and no green drainage from the wound  I asked if perhaps it looked like when a bruise is just starting and she said, yes it could be  I sent a tiger text to Dr Parish Zimmer advising of same  Dr Parish Zimmer said if they are uncomfortable with how the wound looks they should go to [facility] ED  I called Claudia back and advised of same  I further advised that if fever, chills, fatigue returns to call PCP and/or COVID test  Claudia verbalized understanding and thanks

## 2022-05-09 NOTE — TELEPHONE ENCOUNTER
Patient called  His wound is draining and changing color  What should he do? Please advise  Thank you

## 2022-05-12 ENCOUNTER — APPOINTMENT (INPATIENT)
Dept: RADIOLOGY | Facility: HOSPITAL | Age: 62
DRG: 574 | End: 2022-05-12
Payer: COMMERCIAL

## 2022-05-12 ENCOUNTER — HOSPITAL ENCOUNTER (INPATIENT)
Facility: HOSPITAL | Age: 62
LOS: 3 days | Discharge: HOME HEALTH | DRG: 574 | End: 2022-05-15
Attending: PODIATRIST | Admitting: PODIATRIST
Payer: COMMERCIAL

## 2022-05-12 ENCOUNTER — OFFICE VISIT (OUTPATIENT)
Dept: WOUND CARE | Facility: HOSPITAL | Age: 62
End: 2022-05-12
Payer: COMMERCIAL

## 2022-05-12 VITALS
TEMPERATURE: 96.9 F | RESPIRATION RATE: 18 BRPM | SYSTOLIC BLOOD PRESSURE: 114 MMHG | HEART RATE: 66 BPM | DIASTOLIC BLOOD PRESSURE: 69 MMHG

## 2022-05-12 DIAGNOSIS — E11.40 TYPE 2 DIABETES MELLITUS WITH DIABETIC NEUROPATHY, WITH LONG-TERM CURRENT USE OF INSULIN (HCC): ICD-10-CM

## 2022-05-12 DIAGNOSIS — E11.65 TYPE 2 DIABETES MELLITUS WITH HYPERGLYCEMIA, WITH LONG-TERM CURRENT USE OF INSULIN (HCC): ICD-10-CM

## 2022-05-12 DIAGNOSIS — L02.416 CUTANEOUS ABSCESS OF LEFT LOWER EXTREMITY: ICD-10-CM

## 2022-05-12 DIAGNOSIS — N18.32 STAGE 3B CHRONIC KIDNEY DISEASE (HCC): Primary | ICD-10-CM

## 2022-05-12 DIAGNOSIS — I50.42 CHRONIC COMBINED SYSTOLIC AND DIASTOLIC CHF (CONGESTIVE HEART FAILURE) (HCC): ICD-10-CM

## 2022-05-12 DIAGNOSIS — S91.302A WOUND OF LEFT FOOT: ICD-10-CM

## 2022-05-12 DIAGNOSIS — I87.312 CHRONIC VENOUS HYPERTENSION (IDIOPATHIC) WITH ULCER OF LEFT LOWER EXTREMITY (HCC): ICD-10-CM

## 2022-05-12 DIAGNOSIS — L97.929 CHRONIC VENOUS HYPERTENSION (IDIOPATHIC) WITH ULCER OF LEFT LOWER EXTREMITY (HCC): ICD-10-CM

## 2022-05-12 DIAGNOSIS — Z79.4 TYPE 2 DIABETES MELLITUS WITH HYPERGLYCEMIA, WITH LONG-TERM CURRENT USE OF INSULIN (HCC): ICD-10-CM

## 2022-05-12 DIAGNOSIS — L03.116 CELLULITIS OF LEFT FOOT: ICD-10-CM

## 2022-05-12 DIAGNOSIS — L97.522 ULCER OF LEFT FOOT, WITH FAT LAYER EXPOSED (HCC): Primary | ICD-10-CM

## 2022-05-12 DIAGNOSIS — Z98.890 POST-OPERATIVE STATE: ICD-10-CM

## 2022-05-12 DIAGNOSIS — Z79.4 TYPE 2 DIABETES MELLITUS WITH DIABETIC NEUROPATHY, WITH LONG-TERM CURRENT USE OF INSULIN (HCC): ICD-10-CM

## 2022-05-12 LAB
GLUCOSE SERPL-MCNC: 244 MG/DL (ref 65–140)
PLATELET # BLD AUTO: 248 THOUSANDS/UL (ref 149–390)
PMV BLD AUTO: 9.7 FL (ref 8.9–12.7)

## 2022-05-12 PROCEDURE — 82948 REAGENT STRIP/BLOOD GLUCOSE: CPT

## 2022-05-12 PROCEDURE — 11043 DBRDMT MUSC&/FSCA 1ST 20/<: CPT | Performed by: PODIATRIST

## 2022-05-12 PROCEDURE — 87186 SC STD MICRODIL/AGAR DIL: CPT | Performed by: PODIATRIST

## 2022-05-12 PROCEDURE — 87077 CULTURE AEROBIC IDENTIFY: CPT | Performed by: PODIATRIST

## 2022-05-12 PROCEDURE — 87070 CULTURE OTHR SPECIMN AEROBIC: CPT | Performed by: PODIATRIST

## 2022-05-12 PROCEDURE — 73630 X-RAY EXAM OF FOOT: CPT

## 2022-05-12 PROCEDURE — 85049 AUTOMATED PLATELET COUNT: CPT

## 2022-05-12 PROCEDURE — 11046 DBRDMT MUSC&/FSCA EA ADDL: CPT | Performed by: PODIATRIST

## 2022-05-12 PROCEDURE — 87205 SMEAR GRAM STAIN: CPT | Performed by: PODIATRIST

## 2022-05-12 PROCEDURE — 99222 1ST HOSP IP/OBS MODERATE 55: CPT | Performed by: PODIATRIST

## 2022-05-12 RX ORDER — HEPARIN SODIUM 5000 [USP'U]/ML
5000 INJECTION, SOLUTION INTRAVENOUS; SUBCUTANEOUS EVERY 8 HOURS SCHEDULED
Status: DISCONTINUED | OUTPATIENT
Start: 2022-05-12 | End: 2022-05-15 | Stop reason: HOSPADM

## 2022-05-12 RX ORDER — ACETAMINOPHEN 325 MG/1
650 TABLET ORAL EVERY 4 HOURS PRN
Status: DISCONTINUED | OUTPATIENT
Start: 2022-05-12 | End: 2022-05-15 | Stop reason: HOSPADM

## 2022-05-12 RX ORDER — SODIUM HYPOCHLORITE 2.5 MG/ML
1 SOLUTION TOPICAL DAILY
Status: DISCONTINUED | OUTPATIENT
Start: 2022-05-13 | End: 2022-05-15 | Stop reason: HOSPADM

## 2022-05-12 RX ORDER — CEFAZOLIN SODIUM 2 G/50ML
2000 SOLUTION INTRAVENOUS EVERY 8 HOURS
Status: DISCONTINUED | OUTPATIENT
Start: 2022-05-12 | End: 2022-05-15 | Stop reason: HOSPADM

## 2022-05-12 RX ORDER — INSULIN LISPRO 100 [IU]/ML
1-6 INJECTION, SOLUTION INTRAVENOUS; SUBCUTANEOUS
Status: DISCONTINUED | OUTPATIENT
Start: 2022-05-12 | End: 2022-05-14

## 2022-05-12 RX ORDER — FUROSEMIDE 40 MG/1
40 TABLET ORAL DAILY
Status: DISCONTINUED | OUTPATIENT
Start: 2022-05-13 | End: 2022-05-13

## 2022-05-12 RX ORDER — HEPARIN SODIUM 5000 [USP'U]/ML
7500 INJECTION, SOLUTION INTRAVENOUS; SUBCUTANEOUS EVERY 8 HOURS SCHEDULED
Status: DISCONTINUED | OUTPATIENT
Start: 2022-05-12 | End: 2022-05-12

## 2022-05-12 RX ORDER — MAGNESIUM HYDROXIDE/ALUMINUM HYDROXICE/SIMETHICONE 120; 1200; 1200 MG/30ML; MG/30ML; MG/30ML
30 SUSPENSION ORAL EVERY 6 HOURS PRN
Status: DISCONTINUED | OUTPATIENT
Start: 2022-05-12 | End: 2022-05-15 | Stop reason: HOSPADM

## 2022-05-12 RX ORDER — CEPHALEXIN 500 MG/1
1 CAPSULE ORAL EVERY 6 HOURS
COMMUNITY
Start: 2022-05-09 | End: 2022-05-28

## 2022-05-12 RX ORDER — ACETAMINOPHEN 325 MG/1
650 TABLET ORAL EVERY 6 HOURS PRN
Status: DISCONTINUED | OUTPATIENT
Start: 2022-05-12 | End: 2022-05-12

## 2022-05-12 RX ORDER — LISINOPRIL 5 MG/1
5 TABLET ORAL DAILY
Status: DISCONTINUED | OUTPATIENT
Start: 2022-05-13 | End: 2022-05-15 | Stop reason: HOSPADM

## 2022-05-12 RX ORDER — ATORVASTATIN CALCIUM 40 MG/1
40 TABLET, FILM COATED ORAL DAILY
Status: DISCONTINUED | OUTPATIENT
Start: 2022-05-13 | End: 2022-05-15 | Stop reason: HOSPADM

## 2022-05-12 RX ORDER — ASPIRIN 81 MG/1
81 TABLET, CHEWABLE ORAL DAILY
Status: DISCONTINUED | OUTPATIENT
Start: 2022-05-13 | End: 2022-05-15 | Stop reason: HOSPADM

## 2022-05-12 RX ORDER — OXYCODONE HYDROCHLORIDE 5 MG/1
5 TABLET ORAL EVERY 4 HOURS PRN
Status: DISCONTINUED | OUTPATIENT
Start: 2022-05-12 | End: 2022-05-15 | Stop reason: HOSPADM

## 2022-05-12 RX ORDER — ENOXAPARIN SODIUM 100 MG/ML
30 INJECTION SUBCUTANEOUS DAILY
Status: DISCONTINUED | OUTPATIENT
Start: 2022-05-13 | End: 2022-05-12

## 2022-05-12 RX ORDER — ONDANSETRON 2 MG/ML
4 INJECTION INTRAMUSCULAR; INTRAVENOUS EVERY 6 HOURS PRN
Status: DISCONTINUED | OUTPATIENT
Start: 2022-05-12 | End: 2022-05-15 | Stop reason: HOSPADM

## 2022-05-12 RX ORDER — DOCUSATE SODIUM 100 MG/1
100 CAPSULE, LIQUID FILLED ORAL 2 TIMES DAILY
Status: DISCONTINUED | OUTPATIENT
Start: 2022-05-12 | End: 2022-05-15 | Stop reason: HOSPADM

## 2022-05-12 RX ADMIN — CEFAZOLIN SODIUM 2000 MG: 2 SOLUTION INTRAVENOUS at 19:34

## 2022-05-12 RX ADMIN — METOPROLOL TARTRATE 25 MG: 25 TABLET, FILM COATED ORAL at 22:19

## 2022-05-12 RX ADMIN — INSULIN LISPRO 3 UNITS: 100 INJECTION, SOLUTION INTRAVENOUS; SUBCUTANEOUS at 18:34

## 2022-05-12 NOTE — H&P
Podiatry - H&P  Aylin Khan 57 y o  male MRN: [id number]  Unit/Bed#: [facility] 913-01 Encounter: [id number]  Admission Date: 05/12/22    ASSESSMENT:    Deisi Washington is a 57 y o  male with:    1  Left foot wound with cellulitis  2  Bilateral chronic venous hypertension  3  T2DM  4  CKD stage 3  5  CAD    PLAN:    · Patient to be admitted under podiatry service of Dr Tyler Brooke for left foot wound with cellulitis  · Local wound care consisting of Dakin's wet-to-dry, Appreciate nursing assistance with dressing changes  · IV antibiotics started  Will continue to monitor LLE cellulitis and trend labs  · Left foot XR ordered to r/o osteomyelitis  · Wound cultures pending  · Continuation of all home medications aside from oral hyperglycemics  · Plan discussed with Dr Tyler Brooke  Antibiotics started: Ancef  Pharmacologic VTE Prophylaxis: Heparin   Mechanical VTE Prophylaxis: sequential compression device   Weightbearing status: as tolerated      Disposition:  Patient requires >2 midnight stay for reasons stated above  SUBJECTIVE:    History of Present Illness:    Deisi Washington is a 57 y o  male who is being admitted 5/12/2022 due to left foot wound with cellulitis  Patient has a past medical history of hypertension, coronary artery disease, type 2 diabetes, left foot wound, CKD stage 3, chronic heart failure  Patient reports that he has been seeing Lilly Sellers in wound care for the last several months for treatment what was a left foot sub metatarsal 5 wound  Patient states that 2 days ago he noticed worsening of wound with redness, swelling, drainage and odor  He states he called his primary care doctor and was given a prescription for Keflex 2 days ago  He then presented to Wound Care with Dr Tyler Brooke today, at today's visit Dr Tyler Brooke debrided wound and recommended patient be admitted to hospital for IV antibiotics and further evaluation    Patient denies any nausea, vomiting, fever, chills, chest pain or shortness of breath at this time  Review of Systems:    Constitutional: Negative  HENT: Negative  Eyes: Negative  Respiratory: Negative  Cardiovascular: Negative  Gastrointestinal: Negative  Musculoskeletal: Negative   Skin:left foot and leg wounds  Neurological:  Paresthesias bilateral feet  Psych: Negative  Past Medical and Surgical History:     Past Medical History:   Diagnosis Date    CAD (coronary artery disease)     Chronic combined systolic and diastolic CHF [facility]congestive heart failure) [facility] [address] [facility]     Diabetes mellitus ([facility] )     type 2    Dyslipidemia     Hypertension     Ischemic cardiomyopathy     MI (myocardial infarction) ([facility] )     Osteomyelitis ([facility] )     Pancreatitis        Past Surgical History:   Procedure Laterality Date    ANGIOPLASTY      ballon    CORONARY ANGIOPLASTY WITH STENT PLACEMENT      LULA to proximal and mid LAD, Proximal RCA   HERNIA REPAIR      INCISION AND DRAINAGE OF WOUND Left 2/14/2020    Procedure: INCISION AND DRAINAGE (I&D) EXTREMITY left foot (cpt 64228);   Surgeon: Mohinder Singh DPM;  [facility];  Service: Podiatry    HI EXPLORE [facility] Left 2/14/2020    Procedure: EXPLORATION EXTREMITY;  Surgeon: Mohinder Singh DPM;  [facility];  Service: Podiatry    HI NEG PRESS WOUND [address], < 50 CM Left 2/14/2020    Procedure: APPLICATION VAC DRESSING;  Surgeon: Mohinder Singh DPM;  [facility];  Service: Podiatry       Meds/Allergies:    Medications Prior to Admission   Medication    aspirin 81 mg chewable tablet    atorvastatin (LIPITOR) 40 mg tablet    Blood Pressure Monitoring ([employer] Blood Pressure Monitor) KIT    cephalexin (KEFLEX) 500 mg capsule    furosemide (LASIX) 40 mg tablet    Lantus SoloStar 100 units/mL injection pen    lisinopril (ZESTRIL) 5 mg tablet    metoprolol tartrate (LOPRESSOR) 25 mg tablet    NovoLIN 70/30 FlexPen (70-30) 100 UNIT/ML injection pen    potassium chloride (Klor-Con M20) 20 mEq tablet    Sodium Fluoride 5000 PPM 1 1 % PSTE    triamcinolone (KENALOG) 0 5 % cream    TRUE METRIX BLOOD GLUCOSE TEST test strip    Unifine Pentips 31G X 8 MM MISC       No Known Allergies    Social History:    Social History     Marital Status: /Civil Union    Substance Use History:   Social History     Substance and Sexual Activity   Alcohol Use Not Currently     Social History     Tobacco Use   Smoking Status Never Smoker   Smokeless Tobacco Never Used     Social History     Substance and Sexual Activity   Drug Use No       Family History:    Family History   Problem Relation Age of Onset    Heart attack Father     Hyperlipidemia Father     Cancer Father     Diabetes Father     Diabetes Mother     Heart failure Mother         poss    Kidney disease Mother     Cancer Paternal Grandfather     Stroke Neg Hx     Anuerysm Neg Hx     Clotting disorder Neg Hx     Arrhythmia Neg Hx     Coronary artery disease Neg Hx     Hypertension Neg Hx     Sudden death Neg Hx         scd         OBJECTIVE:    First Vitals:        Current Vitals:        Physical Exam    General Appearance: Alert, cooperative, no distress  HEENT: Head normocephalic, atraumatic, without obvious abnormality  Heart: Normal rate and rhythm  No murmurs or gallops noted  Lungs: Non-labored breathing  No respiratory distress  No wheezing, rhonchi, or rales  Abdomen:  Soft, nontender, without distension  Psychiatric: AAOx3  Lower Extremity:  Vascular:   Pedal pulses are dopplerable  CRT < 3 seconds at the digits  +1/4 edema noted at bilateral lower extremities, L>R  Pedal hair is absent  Skin temperature is WNL bilaterally  Musculoskeletal:  MMT is 5/5 in all muscle compartments bilaterally  ROM at the 1st MPJ and ankle joint are decreased bilaterally with the leg extended  No Pain on palpation of left foot wound  No gross deformities noted       Dermatological:  Wound #: 1  Location: left 5th digit  Length 5cm: Width 4 5cm: Depth 0 5cm:   Deepest Tissue Noted in Base: bone  Probe to Bone: Yes  Peripheral Skin Description: Attached  Granulation: 90% Fibrotic Tissue: 10% Necrotic Tissue: 0%   Drainage Amount: minimal, bloody  Signs of Infection: Yes probe to bone, edema, cellulitis    Neurological:  Gross sensation is diminished  Protective sensation is diminished  Patient Reports numbness and/or paresthesias  Clinical Images 05/12/22:       left foot        Additional Data:    Lab Results:   No visits with results within 1 Day(s) from this visit  Latest known visit with results is:   Office Visit on 04/07/2022   Component Date Value    Wound Culture 04/07/2022 2+ Growth of Serratia marcescens (A)    Gram Stain Result 04/07/2022 No Polys or Bacteria seen                Imaging: I have personally reviewed pertinent reports in PACS  No results found  EKG, Pathology, and Other Studies: I have personally reviewed pertinent reports        Code Status: Prior

## 2022-05-12 NOTE — PATIENT INSTRUCTIONS
Orders Placed This Encounter   Procedures    Wound cleansing and dressings     Patient is being directly admitted to the hospital due to infection to left foot/left 5th toe  Cleansed wound with normal saline  Applied prophase soaked gauze to the wound  Covered with ABD pad and secured with kerlix and tape  Continue to wear offloading shoe  Done today at [facility]  Left lower leg wounds healed at appointment today  Patient is scheduled to return to appointment in 1 week  Wound dressing orders with come based on hospital admission and discharge instructions       Standing Status:   Future     Standing Expiration Date:   5/12/2023

## 2022-05-12 NOTE — PROGRESS NOTES
Patient ID: Myah Mueller is a 57 y o  male Date of Birth 1960     Chief Complaint  Chief Complaint   Patient presents with    Follow Up Wound Care Visit     Left lower leg and left foot wounds       Allergies  Patient has no known allergies  Assessment:    No problem-specific Assessment & Plan notes found for this encounter  Diagnoses and all orders for this visit:    Ulcer of left foot, with fat layer exposed ([address] )  -     Wound cleansing and dressings; Future  -     Transfer to other facility  -     Wound culture and Gram stain  -     Debridement    Chronic venous hypertension (idiopathic) with ulcer of left lower extremity (HCC)  -     Wound cleansing and dressings; Future    Cutaneous abscess of left lower extremity    Type 2 diabetes mellitus with diabetic neuropathy, with long-term current use of insulin ([facility])  -     Wound cleansing and dressings; Future  -     Transfer to other facility  -     Debridement    Cellulitis of left foot  -     Transfer to other facility    Other orders  -     cephalexin (KEFLEX) 500 mg capsule; Take 1 capsule by mouth every 6 (six) hours          Debridement   Wound 03/31/22 Diabetic Ulcer Foot Left;Plantar    Universal Protocol:  Consent: Verbal consent obtained  Risks and benefits: risks, benefits and alternatives were discussed  Consent given by: patient  Time out: Immediately prior to procedure a "time out" was called to verify the correct patient, procedure, equipment, support staff and site/side marked as required    Timeout called at: 5/12/2022 11:03 AM   Patient understanding: patient states understanding of the procedure being performed  Patient identity confirmed: verbally with patient      Performed by: physician  Debridement type: surgical  Level of debridement: muscle    Post-debridement measurements  Length (cm): 5  Width (cm): 4 5  Depth (cm): 0 5  Percent debrided: 100%  Surface Area (cm^2): 22 5  Area debrided (cm^2): 22 5  Volume (cm^3): 11 25  Tissue and other material debrided: dermis, epidermis, fascia, muscle and subcutaneous tissue  Devitalized tissue debrided: biofilm, fibrin, necrotic debris and slough  Instrument(s) utilized: blade and nippers  Bleeding: medium  Hemostasis obtained with: pressure  Procedural pain (0-10): 0  Post-procedural pain: 0   Response to treatment: procedure was tolerated well  Debridement Comments: He has significant necrosis of soft tissues and skin on left 5th toe  Wound is closed to periosteum / capsule after debridement  No bone exposure  Wound does not communicate with left submet 5 ulcer  Tissues were taken for culture  PLAN:  1  Patient was counseled on the condition and diagnosis  2  Necrotic wound around left 5th toe and wound debrided  See procedure  It does not seem to communicate to left submet 5 ulcer  Significant soft tissue infection with cellulitis left foot  Will admit him to [facility] for further treatment of foot infection  See admit H&P for more details  Wound 03/31/22 Venous Ulcer Leg Left; Lower; Anterior;Proximal (Active)   Wound Image Images linked 05/12/22 1022   Wound Description Epithelialization;Eschar 05/12/22 1021   Suzy-wound Assessment Yellow-brown 05/12/22 1021   Wound Length (cm) 0 cm 05/12/22 1021   Wound Width (cm) 0 cm 05/12/22 1021   Wound Depth (cm) 0 cm 05/12/22 1021   Wound Surface Area (cm^2) 0 cm^2 05/12/22 1021   Wound Volume (cm^3) 0 cm^3 05/12/22 1021   Calculated Wound Volume (cm^3) 0 cm^3 05/12/22 1021   Change in Wound Size % 100 05/12/22 1021   Drainage Amount None 05/12/22 1021   Non-staged Wound Description Not applicable 86/90/92 9764   Dressing Status Intact 05/12/22 1021       Wound 03/31/22 Diabetic Ulcer Foot Left;Plantar (Active)   Wound Image Images linked 05/12/22 1036   Wound Description Brown;Yellow;Slough 05/12/22 1024   Suzy-wound Assessment Erythema;Callus;Pink;Edema 05/12/22 1024   Wound Length (cm) 4 5 cm 05/12/22 1024   Wound Width (cm) 4 cm 05/12/22 1024   Wound Depth (cm) 0 2 cm 05/12/22 1024   Wound Surface Area (cm^2) 18 cm^2 05/12/22 1024   Wound Volume (cm^3) 3 6 cm^3 05/12/22 1024   Calculated Wound Volume (cm^3) 3 6 cm^3 05/12/22 1024   Change in Wound Size % -2300 05/12/22 1024   Drainage Amount Moderate 05/12/22 1024   Drainage Description Serosanguineous; Foul smelling;Yellow 05/12/22 1024   Non-staged Wound Description Full thickness 05/12/22 1024   Dressing Status Leaking (Comment) 05/12/22 1024       Wound 04/07/22 Abscess Leg Left;Medial (Active)   Wound Image Images linked 05/12/22 1023   Wound Description Epithelialization 05/12/22 1023   Suzy-wound Assessment Pale 05/12/22 1023   Wound Length (cm) 0 cm 05/12/22 1023   Wound Width (cm) 0 cm 05/12/22 1023   Wound Depth (cm) 0 cm 05/12/22 1023   Wound Surface Area (cm^2) 0 cm^2 05/12/22 1023   Wound Volume (cm^3) 0 cm^3 05/12/22 1023   Calculated Wound Volume (cm^3) 0 cm^3 05/12/22 1023   Change in Wound Size % 100 05/12/22 1023   Drainage Amount None 05/12/22 1023   Non-staged Wound Description Not applicable 97/36/06 6156   Dressing Status Intact 05/12/22 1023       Wound 03/31/22 Venous Ulcer Leg Left; Lower; Anterior;Proximal (Active)   Date First Assessed/Time First Assessed: 03/31/22 0832   Pre-Existing Wound: Yes  Primary Wound Type: Venous Ulcer  Location: Leg  Wound Location Orientation: Left; Lower; Anterior;Proximal       Wound 03/31/22 Diabetic Ulcer Foot Left;Plantar (Active)   Date First Assessed/Time First Assessed: 03/31/22 0832   Pre-Existing Wound: Yes  Primary Wound Type: Diabetic Ulcer  Location: Foot  Wound Location Orientation: Left;Plantar       Wound 04/07/22 Abscess Leg Left;Medial (Active)   Date First Assessed/Time First Assessed: 04/07/22 1121   Primary Wound Type: Abscess  Location: Leg  Wound Location Orientation: Left;Medial       [REMOVED] Wound 03/31/22 Venous Ulcer Leg Distal;Left; Anterior; Lower (Removed)   Resolved Date/Resolved Time: 04/21/22 1335  Date First Assessed/Time First Assessed: 03/31/22 0856   Pre-Existing Wound: Yes  Primary Wound Type: Venous Ulcer  Location: Leg  Wound Location Orientation: Distal;Left; Anterior; Lower  Wound Outcome: Healed       Subjective:        HPI  April Sondra presents for wound care  He noticed redness of left 5th toe and saw his PCP  He was put on oral antibiotics  Increased redness and discoloration of left 5th toe in the last 24 hours  He had some chills last night as well  The following portions of the patient's history were reviewed and updated as appropriate: allergies, current medications, past family history, past medical history, past social history, past surgical history and problem list     PAST MEDICAL HISTORY:  Past Medical History:   Diagnosis Date    CAD (coronary artery disease)     Chronic combined systolic and diastolic CHF (congestive heart failure) ([place] [address] )     Diabetes mellitus ([place] [address] )     type 2    Dyslipidemia     Hypertension     Ischemic cardiomyopathy     MI (myocardial infarction) ([place] [address] )     Osteomyelitis ([place] [address] )     Pancreatitis        PAST SURGICAL HISTORY:  Past Surgical History:   Procedure Laterality Date    ANGIOPLASTY      ballon    CORONARY ANGIOPLASTY WITH STENT PLACEMENT      LULA to proximal and mid LAD, Proximal RCA   HERNIA REPAIR      INCISION AND DRAINAGE OF WOUND Left 2/14/2020    Procedure: INCISION AND DRAINAGE (I&D) EXTREMITY left foot (cpt 85749); Surgeon: Shaista Mckay DPM;  [facility];  Service: Podiatry    [place] [facility] Left 2/14/2020    Procedure: EXPLORATION EXTREMITY;  Surgeon: Shaista Mckay DPM;  [facility];  Service: Podiatry   [facility], < 50 CM Left 2/14/2020    Procedure: APPLICATION VAC DRESSING;  Surgeon: Shaista Mckay DPM;  [facility];  Service: Podiatry        ALLERGIES:  Patient has no known allergies      MEDICATIONS:  Current Outpatient Medications   Medication Sig Dispense Refill    cephalexin (KEFLEX) 500 mg capsule Take 1 capsule by mouth every 6 (six) hours      aspirin 81 mg chewable tablet Chew 1 tablet daily for 30 days 30 tablet 0    atorvastatin (LIPITOR) 40 mg tablet TAKE 1 TABLET BY MOUTH EVERY DAY 90 tablet 3    Blood Pressure Monitoring ([employer] Blood Pressure Monitor) KIT Use 2 (two) times a day Automatic BP cuff  Measure BP twice daily  1 kit 0    furosemide (LASIX) 40 mg tablet Take 40 mg by mouth daily      Lantus SoloStar 100 units/mL injection pen Inject 10 Units under the skin every morning (Patient taking differently: Inject 20 Units under the skin every morning  )  0    lisinopril (ZESTRIL) 5 mg tablet Take 1 tablet (5 mg total) by mouth daily 90 tablet 1    metoprolol tartrate (LOPRESSOR) 25 mg tablet TAKE 1 TABLET (25 MG TOTAL) BY MOUTH EVERY 12 (TWELVE) HOURS 180 tablet 3    NovoLIN 70/30 FlexPen (70-30) 100 UNIT/ML injection pen  (Patient not taking: Reported on 1/14/2022 )      potassium chloride (Klor-Con M20) 20 mEq tablet Take 1 tablet (20 mEq total) by mouth 2 (two) times a day with meals (Patient not taking: Reported on 4/7/2022 ) 60 tablet 0    Sodium Fluoride 5000 PPM 1 1 % PSTE BRUSH TWICE A DAY FOR 2 TO 3 MINUTES AND SPIT NO RINISING FOR 30 MINUTES      triamcinolone (KENALOG) 0 5 % cream Apply topically 2 (two) times a day 454 g 0    TRUE METRIX BLOOD GLUCOSE TEST test strip TEST FOUR TIMES DAILY  11    Unifine Pentips 31G X 8 MM MISC USE 4 TMES A DAY       No current facility-administered medications for this visit  SOCIAL HISTORY:  Social History     Socioeconomic History    Marital status: /Civil Union     Spouse name: None    Number of children: None    Years of education: None    Highest education level: None   Occupational History    None   Tobacco Use    Smoking status: Never Smoker    Smokeless tobacco: Never Used   Vaping Use    Vaping Use: Never used   Substance and Sexual Activity    Alcohol use: Not Currently    Drug use:  No  Sexual activity: None   Other Topics Concern    None   Social History Narrative    None     Social Determinants of Health     Financial Resource Strain: Not on file   Food Insecurity: Not on file   Transportation Needs: Not on file   Physical Activity: Not on file   Stress: Not on file   Social Connections: Not on file   Intimate Partner Violence: Not on file   Housing Stability: Not on file      Review of Systems   Constitutional: Negative for appetite change, chills and fever  Respiratory: Negative for cough and shortness of breath  Cardiovascular: Negative for chest pain  Gastrointestinal: Negative for diarrhea, nausea and vomiting  Musculoskeletal: Negative for gait problem  Skin: Positive for wound  Neurological: Positive for numbness  Objective:       Wound 03/31/22 Venous Ulcer Leg Left; Lower; Anterior;Proximal (Active)   Wound Image Images linked 05/12/22 1022   Wound Description Epithelialization;Eschar 05/12/22 1021   Suzy-wound Assessment Yellow-brown 05/12/22 1021   Wound Length (cm) 0 cm 05/12/22 1021   Wound Width (cm) 0 cm 05/12/22 1021   Wound Depth (cm) 0 cm 05/12/22 1021   Wound Surface Area (cm^2) 0 cm^2 05/12/22 1021   Wound Volume (cm^3) 0 cm^3 05/12/22 1021   Calculated Wound Volume (cm^3) 0 cm^3 05/12/22 1021   Change in Wound Size % 100 05/12/22 1021   Drainage Amount None 05/12/22 1021   Non-staged Wound Description Not applicable 25/60/47 1800   Dressing Status Intact 05/12/22 1021       Wound 03/31/22 Diabetic Ulcer Foot Left;Plantar (Active)   Wound Image Images linked 05/12/22 1036   Wound Description Brown;Yellow;Slough 05/12/22 1024   Suzy-wound Assessment Erythema;Callus;Pink;Edema 05/12/22 1024   Wound Length (cm) 4 5 cm 05/12/22 1024   Wound Width (cm) 4 cm 05/12/22 1024   Wound Depth (cm) 0 2 cm 05/12/22 1024   Wound Surface Area (cm^2) 18 cm^2 05/12/22 1024   Wound Volume (cm^3) 3 6 cm^3 05/12/22 1024   Calculated Wound Volume (cm^3) 3 6 cm^3 05/12/22 1024   Change in Wound Size % -2300 05/12/22 1024   Drainage Amount Moderate 05/12/22 1024   Drainage Description Serosanguineous; Foul smelling;Yellow 05/12/22 1024   Non-staged Wound Description Full thickness 05/12/22 1024   Dressing Status Leaking (Comment) 05/12/22 1024       Wound 04/07/22 Abscess Leg Left;Medial (Active)   Wound Image Images linked 05/12/22 1023   Wound Description Epithelialization 05/12/22 1023   Suzy-wound Assessment Pale 05/12/22 1023   Wound Length (cm) 0 cm 05/12/22 1023   Wound Width (cm) 0 cm 05/12/22 1023   Wound Depth (cm) 0 cm 05/12/22 1023   Wound Surface Area (cm^2) 0 cm^2 05/12/22 1023   Wound Volume (cm^3) 0 cm^3 05/12/22 1023   Calculated Wound Volume (cm^3) 0 cm^3 05/12/22 1023   Change in Wound Size % 100 05/12/22 1023   Drainage Amount None 05/12/22 1023   Non-staged Wound Description Not applicable 34/17/57 2555   Dressing Status Intact 05/12/22 1023       /69   Pulse 66   Temp (!) 96 9 °F (36 1 °C)   Resp 18     Physical Exam  Vitals and nursing note reviewed  Constitutional:       General: He is not in acute distress  Appearance: He is not toxic-appearing or diaphoretic  Cardiovascular:      Rate and Rhythm: Normal rate and regular rhythm  Pulses:           Dorsalis pedis pulses are 2+ on the right side and 1+ on the left side  Posterior tibial pulses are 0 on the right side and 0 on the left side  Comments: Venous stasis skin changes LLE with edema  Pulmonary:      Effort: Pulmonary effort is normal    Musculoskeletal:         General: Deformity present  No tenderness or signs of injury  Cervical back: Normal range of motion and neck supple  Left lower leg: Edema present  Right foot: No Charcot foot or foot drop  Left foot: No Charcot foot or foot drop  Skin:     General: Skin is warm and dry  Capillary Refill: Capillary refill takes less than 2 seconds  Coloration: Skin is not cyanotic or mottled  Findings: Wound present  No abscess or ecchymosis  Nails: There is no clubbing  Comments: Blister / bogginess around the base left 5th toe concerning abscess  No soni pus  Left submet 5 ulcer is stable  Left leg wounds healed  See wound assessment  Neurological:      General: No focal deficit present  Mental Status: He is alert and oriented to person, place, and time  Cranial Nerves: No cranial nerve deficit  Sensory: Sensory deficit present  Coordination: Coordination normal    Psychiatric:         Mood and Affect: Mood normal          Behavior: Behavior normal          Thought Content: Thought content normal          Judgment: Judgment normal            Wound Instructions:  Orders Placed This Encounter   Procedures    Wound cleansing and dressings     Patient is being directly admitted to the hospital due to infection to left foot/left 5th toe  Cleansed wound with normal saline  Applied prophase soaked gauze to the wound  Covered with ABD pad and secured with kerlix and tape  Continue to wear offloading shoe  Done today at [facility]  Left lower leg wounds healed at appointment today  Patient is scheduled to return to appointment in 1 week  Wound dressing orders with come based on hospital admission and discharge instructions  Standing Status:   Future     Standing Expiration Date:   5/12/2023    Debridement     This order was created via procedure documentation    Wound culture and Gram stain     Order Specific Question:   Release to patient through [facility]     Answer:   Immediate    Transfer to other facility     Order Specific Question:   Call back Phone Number:     Answer:   [phone]     Order Specific Question:   Request Type: Answer:   Transfer to [address] [place] [address]     Order Specific Question:   Hospital Area:      Answer:   [address] [place] [address] [[id number]]     Order Specific Question:   Reason for Transfer:     Answer: Direct Admission [18]     Order Specific Question:   Service: Answer:   Podiatry [[id number]]     Order Specific Question:   Level of Care: Answer:   Med Surg [16]        Diagnosis ICD-10-CM Associated Orders   1  Ulcer of left foot, with fat layer exposed ([facility] [address] )  L97 522 Wound cleansing and dressings     Transfer to other facility     Wound culture and Gram stain     Debridement   2  Chronic venous hypertension (idiopathic) with ulcer of left lower extremity ([facility])  I87 312 Wound cleansing and dressings    L97 929    3  Cutaneous abscess of left lower extremity  L02 416    4  Type 2 diabetes mellitus with diabetic neuropathy, with long-term current use of insulin ([facility])  E11 40 Wound cleansing and dressings    Z79 4 Transfer to other facility     Debridement   5   Cellulitis of left foot  L03 116 Transfer to other facility

## 2022-05-13 ENCOUNTER — APPOINTMENT (INPATIENT)
Dept: NON INVASIVE DIAGNOSTICS | Facility: HOSPITAL | Age: 62
DRG: 574 | End: 2022-05-13
Payer: COMMERCIAL

## 2022-05-13 PROBLEM — Z01.818 PREOPERATIVE CLEARANCE: Status: ACTIVE | Noted: 2022-05-13

## 2022-05-13 PROBLEM — E87.6 HYPOKALEMIA: Status: ACTIVE | Noted: 2022-05-13

## 2022-05-13 LAB
ANION GAP SERPL CALCULATED.3IONS-SCNC: 5 MMOL/L (ref 4–13)
BUN SERPL-MCNC: 36 MG/DL (ref 5–25)
CALCIUM SERPL-MCNC: 8.9 MG/DL (ref 8.3–10.1)
CHLORIDE SERPL-SCNC: 107 MMOL/L (ref 100–108)
CO2 SERPL-SCNC: 28 MMOL/L (ref 21–32)
CREAT SERPL-MCNC: 1.86 MG/DL (ref 0.6–1.3)
ERYTHROCYTE [DISTWIDTH] IN BLOOD BY AUTOMATED COUNT: 14 % (ref 11.6–15.1)
GFR SERPL CREATININE-BSD FRML MDRD: 37 ML/MIN/1.73SQ M
GLUCOSE SERPL-MCNC: 117 MG/DL (ref 65–140)
GLUCOSE SERPL-MCNC: 163 MG/DL (ref 65–140)
GLUCOSE SERPL-MCNC: 224 MG/DL (ref 65–140)
GLUCOSE SERPL-MCNC: 51 MG/DL (ref 65–140)
GLUCOSE SERPL-MCNC: 55 MG/DL (ref 65–140)
HCT VFR BLD AUTO: 34 % (ref 36.5–49.3)
HGB BLD-MCNC: 11 G/DL (ref 12–17)
MCH RBC QN AUTO: 27.4 PG (ref 26.8–34.3)
MCHC RBC AUTO-ENTMCNC: 32.4 G/DL (ref 31.4–37.4)
MCV RBC AUTO: 85 FL (ref 82–98)
PLATELET # BLD AUTO: 259 THOUSANDS/UL (ref 149–390)
PMV BLD AUTO: 9.7 FL (ref 8.9–12.7)
POTASSIUM SERPL-SCNC: 3.2 MMOL/L (ref 3.5–5.3)
RBC # BLD AUTO: 4.02 MILLION/UL (ref 3.88–5.62)
SODIUM SERPL-SCNC: 140 MMOL/L (ref 136–145)
WBC # BLD AUTO: 9.13 THOUSAND/UL (ref 4.31–10.16)

## 2022-05-13 PROCEDURE — 99232 SBSQ HOSP IP/OBS MODERATE 35: CPT | Performed by: PODIATRIST

## 2022-05-13 PROCEDURE — 93923 UPR/LXTR ART STDY 3+ LVLS: CPT

## 2022-05-13 PROCEDURE — 85027 COMPLETE CBC AUTOMATED: CPT

## 2022-05-13 PROCEDURE — 93005 ELECTROCARDIOGRAM TRACING: CPT

## 2022-05-13 PROCEDURE — 80048 BASIC METABOLIC PNL TOTAL CA: CPT

## 2022-05-13 PROCEDURE — 99222 1ST HOSP IP/OBS MODERATE 55: CPT | Performed by: INTERNAL MEDICINE

## 2022-05-13 PROCEDURE — 82948 REAGENT STRIP/BLOOD GLUCOSE: CPT

## 2022-05-13 PROCEDURE — 93925 LOWER EXTREMITY STUDY: CPT

## 2022-05-13 PROCEDURE — 97166 OT EVAL MOD COMPLEX 45 MIN: CPT

## 2022-05-13 PROCEDURE — 97162 PT EVAL MOD COMPLEX 30 MIN: CPT

## 2022-05-13 PROCEDURE — 87040 BLOOD CULTURE FOR BACTERIA: CPT

## 2022-05-13 RX ORDER — FUROSEMIDE 40 MG/1
40 TABLET ORAL DAILY
Status: DISCONTINUED | OUTPATIENT
Start: 2022-05-14 | End: 2022-05-15 | Stop reason: HOSPADM

## 2022-05-13 RX ORDER — INSULIN GLARGINE 100 [IU]/ML
10 INJECTION, SOLUTION SUBCUTANEOUS EVERY MORNING
Status: DISCONTINUED | OUTPATIENT
Start: 2022-05-14 | End: 2022-05-13

## 2022-05-13 RX ORDER — INSULIN GLARGINE 100 [IU]/ML
10 INJECTION, SOLUTION SUBCUTANEOUS EVERY MORNING
Status: DISCONTINUED | OUTPATIENT
Start: 2022-05-15 | End: 2022-05-15

## 2022-05-13 RX ORDER — POTASSIUM CHLORIDE 750 MG/1
10 TABLET, EXTENDED RELEASE ORAL ONCE
Status: COMPLETED | OUTPATIENT
Start: 2022-05-13 | End: 2022-05-13

## 2022-05-13 RX ORDER — POTASSIUM CHLORIDE 20 MEQ/1
20 TABLET, EXTENDED RELEASE ORAL ONCE
Status: COMPLETED | OUTPATIENT
Start: 2022-05-13 | End: 2022-05-13

## 2022-05-13 RX ADMIN — FUROSEMIDE 40 MG: 40 TABLET ORAL at 08:18

## 2022-05-13 RX ADMIN — HEPARIN SODIUM 5000 UNITS: 5000 INJECTION INTRAVENOUS; SUBCUTANEOUS at 05:49

## 2022-05-13 RX ADMIN — HEPARIN SODIUM 5000 UNITS: 5000 INJECTION INTRAVENOUS; SUBCUTANEOUS at 13:58

## 2022-05-13 RX ADMIN — ASPIRIN 81 MG CHEWABLE TABLET 81 MG: 81 TABLET CHEWABLE at 08:17

## 2022-05-13 RX ADMIN — ATORVASTATIN CALCIUM 40 MG: 40 TABLET, FILM COATED ORAL at 08:18

## 2022-05-13 RX ADMIN — HEPARIN SODIUM 5000 UNITS: 5000 INJECTION INTRAVENOUS; SUBCUTANEOUS at 22:38

## 2022-05-13 RX ADMIN — POTASSIUM CHLORIDE 20 MEQ: 1500 TABLET, EXTENDED RELEASE ORAL at 22:53

## 2022-05-13 RX ADMIN — METOPROLOL TARTRATE 25 MG: 25 TABLET, FILM COATED ORAL at 20:43

## 2022-05-13 RX ADMIN — POTASSIUM CHLORIDE 10 MEQ: 750 TABLET, EXTENDED RELEASE ORAL at 12:11

## 2022-05-13 RX ADMIN — CEFAZOLIN SODIUM 2000 MG: 2 SOLUTION INTRAVENOUS at 04:26

## 2022-05-13 RX ADMIN — LISINOPRIL 5 MG: 5 TABLET ORAL at 08:18

## 2022-05-13 RX ADMIN — SODIUM HYPOCHLORITE 1 APPLICATION: 2.5 SOLUTION TOPICAL at 08:19

## 2022-05-13 RX ADMIN — CEFAZOLIN SODIUM 2000 MG: 2 SOLUTION INTRAVENOUS at 19:53

## 2022-05-13 RX ADMIN — INSULIN LISPRO 1 UNITS: 100 INJECTION, SOLUTION INTRAVENOUS; SUBCUTANEOUS at 18:43

## 2022-05-13 RX ADMIN — CEFAZOLIN SODIUM 2000 MG: 2 SOLUTION INTRAVENOUS at 12:17

## 2022-05-13 RX ADMIN — METOPROLOL TARTRATE 25 MG: 25 TABLET, FILM COATED ORAL at 08:18

## 2022-05-13 RX ADMIN — INSULIN LISPRO 1 UNITS: 100 INJECTION, SOLUTION INTRAVENOUS; SUBCUTANEOUS at 12:20

## 2022-05-13 NOTE — NURSING NOTE
Approached patient in order to change dressing on left foot  Patient states he just came from the podiatry office and pictures were taken  He will resume dressing changes 5/13  Will continue to monitor

## 2022-05-13 NOTE — PHYSICAL THERAPY NOTE
Physical Therapy Evaluation     Patient's Name: Alejandrina Khan    Admitting Diagnosis  Ulcer of left foot ([place] [address] ) [L92 321]    Problem List  Patient Active Problem List   Diagnosis    Orthopnea    Benign essential hypertension    Type 2 diabetes mellitus with hyperglycemia, with long-term current use of insulin ([place] [address] )    Coronary artery disease    CKD (chronic kidney disease) stage 3, GFR 30-59 ml/min ([facility])    Chronic combined systolic and diastolic CHF (congestive heart failure) ([place] [address] )    Dyslipidemia    Ischemic cardiomyopathy    Presence of drug coated stent in LAD coronary artery    Presence of drug coated stent in right coronary artery    Abscess of left foot    Dislocation of proximal interphalangeal joint of left little finger    Sepsis ([place] [address] )    Abscess of tendon of left foot    LLQ pain    Acute kidney injury    URI due to influenza A virus    Cirrhosis ([place] [address] )    AVN (avascular necrosis of bone) ([place] [address] )    Presacral mass    Proteinuria    Wound of left foot       Past Medical History  Past Medical History:   Diagnosis Date    CAD (coronary artery disease)     Chronic combined systolic and diastolic CHF (congestive heart failure) ([facility])     Diabetes mellitus ([place] [address] )     type 2    Dyslipidemia     Hypertension     Ischemic cardiomyopathy     MI (myocardial infarction) ([place] [address] )     Osteomyelitis ([place] [address] )     Pancreatitis        Past Surgical History  Past Surgical History:   Procedure Laterality Date    ANGIOPLASTY      ballon    CORONARY ANGIOPLASTY WITH STENT PLACEMENT      LULA to proximal and mid LAD, Proximal RCA  HERNIA REPAIR      INCISION AND DRAINAGE OF WOUND Left 2/14/2020    Procedure: INCISION AND DRAINAGE (I&D) EXTREMITY left foot (cpt 77326);   Surgeon: Shilpi Carrizales DPM;  [facility];  Service: Susi Umanzor Left 2/14/2020    Procedure: EXPLORATION EXTREMITY;  Surgeon: Shilpi Carrizales DPM;  [facility];  Service: Podiatry    IA NEG PRESS WOUND TX, < 50 CM Left 2/14/2020 Procedure: APPLICATION VAC DRESSING;  Surgeon: Cintia Garcia DPM;  [facility];  Service: Podiatry        05/13/22 1100   PT Last Visit   PT Visit Date 05/13/22   Note Type   Note type Evaluation   Pain Assessment   Pain Assessment Tool 0-10   Pain Score No Pain   Restrictions/Precautions   Weight Bearing Precautions Per Order Yes   LLE Weight Bearing Per Order WBAT  (per podiatry via TT)   Braces or Orthoses   (darco shoe LLE)   Other Precautions Impulsive; Fall Risk   Home Living   Type of [address] Two level;Performs ADLs on one level; Able to live on main level with bedroom/bathroom;Stairs to enter with rails  (2 vs 1 ANA CRISTINA)   Bathroom Shower/Tub Walk-in shower   Bathroom Toilet Standard   Bathroom Equipment   (denies)   Home Equipment Walker;Cane;Crutches  (was using crutches PTA)   Prior Function   Level of [place] Independent with ADLs and functional mobility   Lives With Spouse;Daughter   Receives Help From Family   ADL Assistance Independent   IADLs Independent   Falls in the last 6 months 0   Vocational Full time employment   Comments (+) drives   General   Family/Caregiver Present No   Cognition   Overall Cognitive Status WFL   Arousal/Participation Alert   Orientation Level Oriented X4   Following Commands Follows all commands and directions without difficulty   Comments pt pleasant and cooperative, frequent cues given for pacing   RLE Assessment   RLE Assessment WFL   LLE Assessment   LLE Assessment WFL   Light Touch   RLE Light Touch Grossly intact   LLE Light Touch Grossly intact   Bed Mobility   Supine to Sit 6  Modified independent   Additional items HOB elevated   Sit to Supine 6  Modified independent   Additional items HOB elevated   Transfers   Sit to Stand 7  Independent   Stand to Sit 7  Independent   Additional Comments transfers with crutches held in R hand   Ambulation/Elevation   Gait pattern Decreased L stance  (alternated between hopping on R foot and normal gait pattern)   Gait Assistance 7  Independent   Assistive Device Axillary crutches   Distance 50 ft x 2   Stair Management Assistance 5  Supervision   Additional items Assist x 1;Verbal cues   Stair Management Technique One rail L;With crutches; Step to pattern; Foreward   Number of Stairs 7   Ambulation/Elevation Additional Comments pt educated on appropriate stair sequencing with use of crutches in R hand and use of L hand rail   Balance   Static Sitting Good   Dynamic Sitting Fair +   Static Standing Fair +   Dynamic Standing Fair   Ambulatory Fair   Activity Tolerance   Activity Tolerance Patient tolerated treatment well   Medical Staff Made Aware co-eval with OT 2* medical complexity   Nurse Made Aware RN cleared pt to participate in therapy session   Assessment   Prognosis Good   Assessment Pt seen for moderate complexity PT evaluation to assess functional status and discharge needs  Pt with active PT eval/treat orders as well as up and OOB as tolerated orders  Pt is a 56 y/o male admitted on 5/12/22 with wound of L foot  Per podiatry Dr Angelo Schmitz, [facility] to mobilize using WBAT in [address] on LLE  Pt's active comorbidities include orthopnea, HTN, T2DM, CAD, CKD, CHF  Pt resides with spouse and daughter in [address] [place] [address]  PTA, pt was independent with ADLS/IADLS  Upon evaluation, pt was able to perform bed mobility tasks with mod I, transfers independently, and ambulation independently with B/L axillary crutches  Pt varies between hoping on RLE and performing step to pattern with crutches  Pt was educated on proper sequencing for transcending stairs for improved safety, pt demonstrated understanding  Pt is performing near/at baseline mobility levels, and reports no questions or concerns regarding d/c  Given the results of the evaluation, pt with no acute needs for PT at this time  PT to sign off and recommends home no needs  Please re-consult if needed  The patient's AM-PAC Basic Mobility Inpatient Short Form Raw Score is 23  A Raw score of greater than 16 suggests the patient may benefit from discharge to home  Please also refer to the recommendation of the Physical Therapist for safe discharge planning  Goals   Patient Goals to go home   Plan   Treatment/Interventions Functional transfer training;Elevations; Endurance training;Cognitive reorientation;Patient/family training;Equipment eval/education;Gait training;Spoke to nursing;OT   PT Frequency   (eval only)   Recommendation   PT Discharge Recommendation No rehabilitation needs   Equipment Recommended Crutches  (owns)   [address] in Bed Without Bedrails 4   Lying on Back to Sitting on Edge of Flat Bed 4   Moving Bed to Chair 4   Standing Up From Chair 4   Walk in Room 4   Climb 3-5 Stairs 3   Basic Mobility Inpatient Raw Score 23   Basic Mobility Standardized Score 50 88   Highest Level Of Mobility   JH-HLM Goal 7: Walk 25 feet or more   JH-HLM Achieved 7: Walk 25 feet or more   End of Consult   Patient Position at End of Consult Seated edge of bed; All needs within reach           [facility], SPT

## 2022-05-13 NOTE — PROGRESS NOTES
[facility] Podiatry - Progress Note  Patient: Concepcion Javier 57 y o  male   MRN: [id number]  PCP: Wei Gipson DO  Unit/Bed#: PPHP 913-01 Encounter: [id number]  Date Of Visit: 22    ASSESSMENT:    Marlon Khan is a 57 y o  male with:    1  Left foot wound with cellulitis  2  Bilateral LE chronic venous hypertension  3  T2DM  4  CKD stage 3  5  CAD      PLAN:    · Plan for OR tomorrow with Dr Tarik Coto for left 5th digit amputation vs partial 5th ray vs wound debridement/washout pending MRI  NPO at midnight, consent was signed  The patient was agreeable to the procedure  Benefits, risks, complications and concerns of procedure discussed with the patients understanding  · LEADs ordered to evaluate LE healing potential   · Left foot MRI ordered to r/o osteomyelitis  · Left foot xray reviewed, negative for acute osteomyelitis  · Continue local wound care, Dakin's wet to dry DSD  Appreciate nursing assistance with dressing changes  · Continue IV antibiotics  · F/u wound cultures  · Elevation on green foam wedges or pillows when non-ambulatory  · Appreciate consulting services for recommendations and management  Antibiotics: Ancef  Pharmacologic VTE Prophylaxis: Heparin   Mechanical VTE Prophylaxis: sequential compression device   Weightbearing status: as tolerated in forefoot offloading shoe to left foot    Disposition: Patient will require continued inpatient stay for the above    SUBJECTIVE:     The patient was seen, evaluated, and assessed at bedside today  The patient was awake, alert, and in no acute distress  No acute events overnight  The patient reports no pain to the left foot  Patient denies N/V/F/chills/SOB/CP        OBJECTIVE:     Vitals:   /74   Pulse 70   Temp 98 4 °F (36 9 °C)   Resp 16   Wt 84 2 kg (185 lb 10 oz)   SpO2 95%   BMI 26 63 kg/m²     Temp (24hrs), Av °F (36 7 °C), Min:96 9 °F (36 1 °C), Max:98 6 °F (37 °C)      Physical Exam:     General:  Alert, cooperative, and in no distress  Lungs: Non labored breathing  Abdomen: Soft, non-tender  Lower extremity exam:  Cardiovascular status at baseline  Neurological status at baseline  Musculoskeletal status at baseline  No calf tenderness noted  Lower extremity wound(s) as noted below:  Wound #: 1  Location: left 5th toe  Length 5cm: Width 4 5cm: Depth 0 5cm:   Deepest Tissue Noted in Base: bone  Probe to Bone: Yes  Peripheral Skin Description: Attached  Granulation: 90% Fibrotic Tissue: 10% Necrotic Tissue: 0%   Drainage Amount: minimal, bloody  Signs of Infection: Yes     Cellulitis remains present to left lateral foot, however is improving  Additional Data:     Labs:    Results from last 7 days   Lab Units 05/13/22  0551   WBC Thousand/uL 9 13   HEMOGLOBIN g/dL 11 0*   HEMATOCRIT % 34 0*   PLATELETS Thousands/uL 259     Results from last 7 days   Lab Units 05/13/22  0551   POTASSIUM mmol/L 3 2*   CHLORIDE mmol/L 107   CO2 mmol/L 28   BUN mg/dL 36*   CREATININE mg/dL 1 86*   CALCIUM mg/dL 8 9           * I Have Reviewed All Lab Data Listed Above  Recent Cultures (last 7 days):     Results from last 7 days   Lab Units 05/12/22  1100   GRAM STAIN RESULT  2+ Gram positive cocci in pairs, chains and clusters*  1+ Gram negative rods*  No polys seen*           Imaging: I have personally reviewed pertinent films in PACS  EKG, Pathology, and Other Studies: I have personally reviewed pertinent reports  ** Please Note: Portions of the record may have been created with voice recognition software  Occasional wrong word or "sound a like" substitutions may have occurred due to the inherent limitations of voice recognition software  Read the chart carefully and recognize, using context, where substitutions have occurred   **

## 2022-05-13 NOTE — OCCUPATIONAL THERAPY NOTE
Occupational Therapy Evaluation     Patient Name: Camilo Murcia  ACWFC'G Date: 5/13/2022  Problem List  Principal Problem:    Wound of left foot  Active Problems:    Orthopnea    Benign essential hypertension    Type 2 diabetes mellitus with hyperglycemia, with long-term current use of insulin ([facility])    Coronary artery disease    CKD (chronic kidney disease) stage 3, GFR 30-59 ml/min ([facility])    Chronic combined systolic and diastolic CHF (congestive heart failure) ([email or web address] 75 )    Past Medical History  Past Medical History:   Diagnosis Date    CAD (coronary artery disease)     Chronic combined systolic and diastolic CHF (congestive heart failure) ([facility])     Diabetes mellitus ([place] [address] )     type 2    Dyslipidemia     Hypertension     Ischemic cardiomyopathy     MI (myocardial infarction) ([place] [address] )     Osteomyelitis ([place] [address] )     Pancreatitis      Past Surgical History  Past Surgical History:   Procedure Laterality Date    ANGIOPLASTY      ballon    CORONARY ANGIOPLASTY WITH STENT PLACEMENT      LULA to proximal and mid LAD, Proximal RCA  HERNIA REPAIR      INCISION AND DRAINAGE OF WOUND Left 2/14/2020    Procedure: INCISION AND DRAINAGE (I&D) EXTREMITY left foot (cpt 88902);   Surgeon: Francisco Amezcua DPM;  [facility];  Service: Halle Almonte Left 2/14/2020    Procedure: EXPLORATION EXTREMITY;  Surgeon: Francisco Amezcua DPM;  [facility];  Service: Podiatry    TX NEG PRESS WOUND TX, < 50 CM Left 2/14/2020    Procedure: APPLICATION VAC DRESSING;  Surgeon: Francisco Amezcua DPM;  [facility];  Service: Podiatry      05/13/22 1101   OT Last Visit   OT Visit Date 05/13/22   Note Type   Note type Evaluation   Restrictions/Precautions   Weight Bearing Precautions Per Order Yes   RUE Weight Bearing Per Order WBAT   LUE Weight Bearing Per Order WBAT   RLE Weight Bearing Per Order WBAT   LLE Weight Bearing Per Order WBAT   Pain Assessment   Pain Assessment Tool 0-10   Pain Score No Pain   Home Living   Type of Home House   Home Layout Two level; Able to live on main level with bedroom/bathroom;Stairs to enter with rails   Bathroom Shower/Tub Walk-in shower   [address] Walker;Cane   Prior Function   Level of [place] Independent with ADLs and functional mobility   Lives With Spouse;Daughter   Receives Help From Family   ADL Assistance Independent   IADLs Independent   Falls in the last 6 months 0   Vocational Full time employment   Lifestyle   Autonomy I adls and mobility- i iadls - shares homemaking with family   Reciprocal Relationships supportive family - pt reports they are able to assist prn   Service to Others retired   Intrinsic Gratification mostly sedentary pta   Subjective   Subjective "I can pretty much do everything"   ADL   Eating Assistance 7  Independent   Grooming Assistance 5  Supervision/Setup   UB [address] 5  Supervision/Setup   LB [address] 5  Supervision/Setup   UB Dressing Assistance 5  Supervision/Setup   LB Dressing Assistance 5  [address]  5  Supervision/Setup   Bed Mobility   Supine to Sit 6  Modified independent   Sit to Supine 6  Modified independent   Transfers   Sit to Stand 5  Supervision   Stand to Sit 5  Supervision   Stand pivot 5  Supervision   Functional Mobility   Functional Mobility 5  Supervision   Additional items Crutches   Balance   Static Sitting Good   Dynamic Sitting Fair +   Static Standing Fair +   Dynamic Standing [address]   Activity Tolerance   Activity Tolerance Patient tolerated treatment well   [address] with PT 2* medical complexity and limited tolerance to tasks   RUE Assessment   RUE Assessment WFL   LUE Assessment   LUE Assessment WFL   Cognition   Overall Cognitive Status WFL   Assessment   Limitation Decreased endurance;Decreased self-care trans;Decreased high-level ADLs   Prognosis Good   Assessment Pt is a 57 y o  male who was admitted to [facility] on 5/12/2022 with Wound of left foot   Pt's problem list also includes PMH of DM, HTN, previous surgery and CAD, CKD, CHF  At baseline pt was completing adls and mobility - I iadls - shares homemaking with family  Pt lives with spouse in 2 level home with FFSU PRN  Currently pt requires sba for overall ADLS and sba for functional mobility/transfers  Pt currently presents with impairments in the following categories -steps to enter environment, difficulty performing IADLS  and environment activity tolerance, endurance and standing balance/tolerance  These impairments, as well as pt's fatigue, pain, orthopedic restricitions , WBS , risk for falls and home environment  limit pt's ability to safely engage in all baseline areas of occupation, includingfunctional mobility/transfers, community mobility, laundry , driving, house maintenance, meal prep, cleaning, social participation  and leisure activities however pt has supportive family who are able to provide assist prn -  From OT standpoint, recommend home with family support upon D/C   No acute OT needs indicated at this time- d/c from caseload   Goals   Patient Goals go home   Plan   OT Frequency Eval only   Recommendation   OT Discharge Recommendation No rehabilitation needs   OT - OK to Discharge Yes   AM-PAC Daily Activity Inpatient   Lower Body Dressing 4   Bathing 4   Toileting 4   Upper Body Dressing 4   Grooming 4   Eating 4   Daily Activity Raw Score 24   Daily Activity Standardized Score (Calc for Raw Score >=11) 57 54   AM-[facility] Applied Cognition Inpatient   Following a Speech/Presentation 4   Understanding Ordinary Conversation 4   Taking Medications 4   Remembering Where Things Are Placed or Put Away 4   Remembering List of 4-5 Errands 4   Taking Care of Complicated Tasks 4   Applied Cognition Raw Score 24   Applied Cognition Standardized Score 62 21     The patient's raw score on the AM-PAC Daily Activity inpatient short form is 24, standardized score is 57 54, greater than 39 4  Patients at this level are likely to benefit from discharge to home  Please refer to the recommendation of the Occupational Therapist for safe discharge planning      Xiao Doss

## 2022-05-13 NOTE — UTILIZATION REVIEW
Initial Clinical Review    Admission: Date/Time/Statement:   Admission Orders (From admission, onward)     Ordered        05/12/22 1517  Inpatient Admission  Once                      Orders Placed This Encounter   Procedures    Inpatient Admission     Standing Status:   Standing     Number of Occurrences:   1     Order Specific Question:   Level of Care     Answer:   Med Surg [16]     Order Specific Question:   Estimated length of stay     Answer:   More than 2 Midnights     Order Specific Question:   Certification     Answer:   I certify that inpatient services are medically necessary for this patient for a duration of greater than two midnights  See H&P and MD Progress Notes for additional information about the patient's course of treatment  Initial Presentation: 57 y o  male presents to [facility] referred by Podiatrist as a direct admission ADMITTED INPATIENT to M/S UNIT with L FOOT WOUND and CELLULITIS  Pt with PMH of HTN, CAD, T2 DM, L foot wound, CKD III, CHF  Pt has been following with wound care for last several months being tx'd for a L foot submetatarsal 5 wound and states that 2 days ago he noticed worsening of the wound with redness, swelling, drainage and odor  Seen by his PCP who started him on Keflex 2 days ago  Was seen in podiatrist office today and referred to the hospital for IV abx and further tx  Plan: Local wound care consisting of Dakin's wet-to-dry  IV cefazolin started  Wound and blood cxs pending  Continue to monitor LLE cellulitis and trend labs  Left foot XR to r/o osteomyelitis  Wound cultures pending  Continuation of all home medications aside from oral hyperglycemics  Fingerstick glucose checks w/ ssi  Intern med consulted for medical management  Date: 5/13  Day 2:  Pt denies pain to left foot  Plan for OR tomorrow with Podiatry for left 5th digit amputation vs partial 5th ray vs wound debridement/washout pending MRI  NPO @ [facility]   LEADs ordered to evaluate LE healing potential  Left foot xray negative for acute osteomyelitis  Left foot MRI ordered to r/o osteomyelitis  Continue IV cefazolin  F/u wound cxs  Elevation to L foot on green foam wedges or pillows when non-ambulatory  Wt Readings from Last 1 Encounters:   05/13/22 84 2 kg (185 lb 10 oz)     Vital Signs:   Date/Time Temp Pulse Resp BP MAP (mmHg) SpO2 O2 Device   05/13/22 07:47:05 98 4 °F (36 9 °C) 70 16 123/74 90 95 % --   05/12/22 2203 -- 73 -- -- -- 96 % --   05/12/22 2202 98 6 °F (37 °C) 69 -- 121/73 89 97 % --   05/12/22 1715 -- -- -- -- -- -- None (Room air)   05/12/22 15:31:10 98 °F (36 7 °C) 65 -- 111/71 84 98 % --   05/12/22 15:26:34 98 °F (36 7 °C) 65 -- 111/71 84 97 % --       Pertinent Labs/Diagnostic Test Results:   XR foot 3+ vw left   Final Result by Valentin Ortega DO (05/12 1700)      Deep soft tissue ulceration lateral margin of the 5th toe without discernible evidence for osteomyelitis  New 2 mm radiopaque foreign body plantar soft tissues 1st toe distally              Results from last 7 days   Lab Units 05/13/22  0551 05/12/22  1741   WBC Thousand/uL 9 13  --    HEMOGLOBIN g/dL 11 0*  --    HEMATOCRIT % 34 0*  --    PLATELETS Thousands/uL 259 248     Results from last 7 days   Lab Units 05/13/22  0551   SODIUM mmol/L 140   POTASSIUM mmol/L 3 2*   CHLORIDE mmol/L 107   CO2 mmol/L 28   ANION GAP mmol/L 5   BUN mg/dL 36*   CREATININE mg/dL 1 86*   EGFR ml/min/1 73sq m 37   CALCIUM mg/dL 8 9     Results from last 7 days   Lab Units 05/13/22  0816 05/13/22  0558 05/12/22  1659   POC GLUCOSE mg/dl 117 55* 244*     Results from last 7 days   Lab Units 05/13/22  0551   GLUCOSE RANDOM mg/dL 51*       Results from last 7 days   Lab Units 05/12/22  1100   GRAM STAIN RESULT  2+ Gram positive cocci in pairs, chains and clusters*  1+ Gram negative rods*  No polys seen*       Past Medical History:   Diagnosis Date    CAD (coronary artery disease)     Chronic combined systolic and diastolic CH[facility] (congestive heart failure) ([facility])     Diabetes mellitus ([place] [address] )     type 2    Dyslipidemia     Hypertension     Ischemic cardiomyopathy     MI (myocardial infarction) ([place] [address] )     Osteomyelitis ([place] [address] )     Pancreatitis      Present on Admission:   Orthopnea   Benign essential hypertension   Coronary artery disease   CKD (chronic kidney disease) stage 3, GFR 30-59 ml/min ([facility])   Chronic combined systolic and diastolic CHF (congestive heart failure) ([facility])      Admitting Diagnosis: Ulcer of left foot ([place] [address] ) [[id number] [id number]]  Age/Sex: 57 y o  male  Admission Orders:  Scheduled Medications:  aspirin, 81 mg, Oral, Daily  atorvastatin, 40 mg, Oral, Daily  cefazolin, 2,000 mg, Intravenous, Q8H  docusate sodium, 100 mg, Oral, BID  furosemide, 40 mg, Oral, Daily  heparin (porcine), 5,000 Units, Subcutaneous, Q8H ALESSANDRO  insulin lispro, 1-6 Units, Subcutaneous, TID AC  lisinopril, 5 mg, Oral, Daily  metoprolol tartrate, 25 mg, Oral, Q12H [address]  potassium chloride, 10 mEq, Oral, Once  sodium hypochlorite, 1 application, Irrigation, Daily    PRN Meds:  acetaminophen, 650 mg, Oral, Q4H PRN  aluminum-magnesium hydroxide-simethicone, 30 mL, Oral, Q6H PRN  ondansetron, 4 mg, Intravenous, Q6H PRN  oxyCODONE, 5 mg, Oral, Q4H PRN          Network Utilization Review Department  ATTENTION: Please call with any questions or concerns to [phone] and carefully listen to the prompts so that you are directed to the right person  All voicemails are confidential   Maribel Muss all requests for admission clinical reviews, approved or denied determinations and any other requests to dedicated fax number below belonging to the campus where the patient is receiving treatment   List of dedicated fax numbers for the Facilities:  [address] DENIALS (Administrative/Medical Necessity) [phone]   [address] (Maternity/NICU/Pediatrics) [phone]   5000 [facility] [phone]   8049 [facility] [phone]   [address] 150 [facility] [place] [address] [address] [place] [address] [address] [phone]   [address] [place] [address] [address] [phone]

## 2022-05-13 NOTE — CASE MANAGEMENT
Case Management Assessment & Discharge Planning Note    Patient name Corby Omalley  Location [facility] 913/[facility] 206-27 MRN [id number]  : 1960 Date 2022       Current Admission Date: 2022  Current Admission Diagnosis:Wound of left foot   Patient Active Problem List    Diagnosis Date Noted    Wound of left foot 2022    Proteinuria 2022    Cirrhosis ([place] [address] ) 2021    AVN (avascular necrosis of bone) ([place] [address] ) 2021    Presacral mass 2021    LLQ pain 2021    Acute kidney injury 2021    URI due to influenza A virus 2021    Sepsis ([place] [address] ) 02/10/2020    Abscess of tendon of left foot 02/10/2020    Dislocation of proximal interphalangeal joint of left little finger 10/02/2018    Abscess of left foot 2018    Presence of drug coated stent in LAD coronary artery 2018    Presence of drug coated stent in right coronary artery 2018    Orthopnea 2017    CKD (chronic kidney disease) stage 3, GFR 30-59 ml/min ([facility] 2017    Benign essential hypertension     Type 2 diabetes mellitus with hyperglycemia, with long-term current use of insulin ([place] [address] )     Coronary artery disease     Dyslipidemia 01/15/2015    Chronic combined systolic and diastolic CHF (congestive heart failure) ([place] [address] ) 2014    Ischemic cardiomyopathy 2014      LOS (days): 1  Geometric Mean LOS (GMLOS) (days):   Days to GMLOS:     OBJECTIVE:    Risk of Unplanned Readmission Score: 15 08         Current admission status: Inpatient       Preferred Pharmacy:   [facility] [address] [place] [address]  Phone: [phone] Fax: [phone]    Primary Care Provider: Aron Kaufman DO    Primary Insurance: BLUE CROSS  Secondary Insurance:     ASSESSMENT:  Active Health Care Proxies     [facility] [address] [place] [address] 43 Representative - Spouse   Primary Phone: [phone] (Mobile)  Home Phone: [phone] Patient Information  Admitted from[de-identified] Home  Mental Status: Alert  During Assessment patient was accompanied by: Not accompanied during assessment  Assessment information provided by[de-identified] Patient  Primary Caregiver: Self  Support Systems: Spouse/significant other, Daughter, Family members  What city do you live in?: [address] entry access options  Select all that apply : Stairs  Number of steps to enter home : 3  Do the steps have railings?: Yes  Type of Current Residence: [place]  In the last 12 months, was there a time when you were not able to pay the mortgage or rent on time?: No  In the last 12 months, how many places have you lived?: 1  In the last 12 months, was there a time when you did not have a steady place to sleep or slept in a shelter (including now)?: No  Homeless/housing insecurity resource given?: No  Living Arrangements: Lives w/ Spouse/significant other, Lives w/ Daughter    Activities of Daily Living Prior to Admission  Functional Status: Independent  Completes ADLs independently?: Yes  Ambulates independently?: Yes  Does patient use assisted devices?: Yes (at times)  Assisted Devices (DME) used: Crutches  Does patient currently own DME?: Yes  What DME does the patient currently own?: [employer] [place], Walker, Crutches  Does patient have a history of Outpatient Therapy (PT/OT)?: Yes ([facility] - states he went there for about 3 years straight years ago)  Does the patient have a history of Short-Term Rehab?: No  Does patient have a history of HHC?: No  Does patient currently have [facility]?: No         Patient Information Continued  Income Source: Employed (states he is partially retired but partially working)  Does patient have prescription coverage?: Yes  Within the past 12 months, you worried that your food would run out before you got the money to buy more : Never true  Within the past 12 months, the food you bought just didn't last and you didn't have money to get more  Ladene Crass Never true  Does patient receive dialysis treatments?: No  Does patient have a history of substance abuse?: Yes  Historical substance use preference: Alcohol/ETOH (states that this was over 20 years ago)  History of Withdrawal Symptoms: Denies past symptoms  Is patient currently in treatment for substance abuse?: No  Patient declined treatment information  Does patient have a history of Mental Health Diagnosis?: No         Means of Transportation  Means of Transport to Appts[de-identified] Drives Self  In the past 12 months, has lack of transportation kept you from medical appointments or from getting medications?: No  In the past 12 months, has lack of transportation kept you from meetings, work, or from getting things needed for daily living?: No        DISCHARGE DETAILS:    Discharge planning discussed with[de-identified] patient  Freedom of Choice: Yes  Comments - Freedom of Choice: discussed FOC  CM contacted family/caregiver?: No- see comments (alert and oriented)  Were Treatment Team discharge recommendations reviewed with patient/caregiver?: Yes  Did patient/caregiver verbalize understanding of patient care needs?: Yes  Were patient/caregiver advised of the risks associated with not following Treatment Team discharge recommendations?: Yes                   Other Referral/Resources/Interventions Provided:  Referral Comments: awaiting recommendations at this time                                CM reviewed d/c planning process including the following: identifying help at home, patient preference for d/c planning needs, Discharge Lounge, [facility] Meds to Bed program, availability of treatment team to discuss questions or concerns patient and/or family may have regarding understanding medications and recognizing signs and symptoms once discharged  CM also encouraged patient to follow up with all recommended appointments after discharge   Patient advised of importance for patient and family to participate in managing patients medical well being  Patient/caregiver received discharge checklist  Content reviewed  Patient/caregiver encouraged to participate in discharge plan of care prior to discharge home

## 2022-05-14 ENCOUNTER — ANESTHESIA (INPATIENT)
Dept: PERIOP | Facility: HOSPITAL | Age: 62
DRG: 574 | End: 2022-05-14
Payer: COMMERCIAL

## 2022-05-14 ENCOUNTER — ANESTHESIA EVENT (INPATIENT)
Dept: PERIOP | Facility: HOSPITAL | Age: 62
DRG: 574 | End: 2022-05-14
Payer: COMMERCIAL

## 2022-05-14 ENCOUNTER — APPOINTMENT (INPATIENT)
Dept: RADIOLOGY | Facility: HOSPITAL | Age: 62
DRG: 574 | End: 2022-05-14
Payer: COMMERCIAL

## 2022-05-14 LAB
ANION GAP SERPL CALCULATED.3IONS-SCNC: 6 MMOL/L (ref 4–13)
ATRIAL RATE: 74 BPM
BACTERIA WND AEROBE CULT: ABNORMAL
BASOPHILS # BLD AUTO: 0.08 THOUSANDS/ΜL (ref 0–0.1)
BASOPHILS NFR BLD AUTO: 1 % (ref 0–1)
BUN SERPL-MCNC: 35 MG/DL (ref 5–25)
CALCIUM SERPL-MCNC: 8.7 MG/DL (ref 8.3–10.1)
CHLORIDE SERPL-SCNC: 103 MMOL/L (ref 100–108)
CO2 SERPL-SCNC: 27 MMOL/L (ref 21–32)
CREAT SERPL-MCNC: 1.88 MG/DL (ref 0.6–1.3)
EOSINOPHIL # BLD AUTO: 0.51 THOUSAND/ΜL (ref 0–0.61)
EOSINOPHIL NFR BLD AUTO: 6 % (ref 0–6)
ERYTHROCYTE [DISTWIDTH] IN BLOOD BY AUTOMATED COUNT: 14 % (ref 11.6–15.1)
GFR SERPL CREATININE-BSD FRML MDRD: 36 ML/MIN/1.73SQ M
GLUCOSE SERPL-MCNC: 241 MG/DL (ref 65–140)
GLUCOSE SERPL-MCNC: 250 MG/DL (ref 65–140)
GLUCOSE SERPL-MCNC: 253 MG/DL (ref 65–140)
GLUCOSE SERPL-MCNC: 268 MG/DL (ref 65–140)
GLUCOSE SERPL-MCNC: 420 MG/DL (ref 65–140)
GRAM STN SPEC: ABNORMAL
HCT VFR BLD AUTO: 35.5 % (ref 36.5–49.3)
HGB BLD-MCNC: 11.6 G/DL (ref 12–17)
IMM GRANULOCYTES # BLD AUTO: 0.07 THOUSAND/UL (ref 0–0.2)
IMM GRANULOCYTES NFR BLD AUTO: 1 % (ref 0–2)
LYMPHOCYTES # BLD AUTO: 1.23 THOUSANDS/ΜL (ref 0.6–4.47)
LYMPHOCYTES NFR BLD AUTO: 14 % (ref 14–44)
MAGNESIUM SERPL-MCNC: 2 MG/DL (ref 1.6–2.6)
MCH RBC QN AUTO: 27.2 PG (ref 26.8–34.3)
MCHC RBC AUTO-ENTMCNC: 32.7 G/DL (ref 31.4–37.4)
MCV RBC AUTO: 83 FL (ref 82–98)
MONOCYTES # BLD AUTO: 0.94 THOUSAND/ΜL (ref 0.17–1.22)
MONOCYTES NFR BLD AUTO: 11 % (ref 4–12)
NEUTROPHILS # BLD AUTO: 6.13 THOUSANDS/ΜL (ref 1.85–7.62)
NEUTS SEG NFR BLD AUTO: 67 % (ref 43–75)
NRBC BLD AUTO-RTO: 0 /100 WBCS
P AXIS: 60 DEGREES
PLATELET # BLD AUTO: 267 THOUSANDS/UL (ref 149–390)
PMV BLD AUTO: 9.9 FL (ref 8.9–12.7)
POTASSIUM SERPL-SCNC: 3.8 MMOL/L (ref 3.5–5.3)
PR INTERVAL: 172 MS
QRS AXIS: -66 DEGREES
QRSD INTERVAL: 88 MS
QT INTERVAL: 440 MS
QTC INTERVAL: 488 MS
RBC # BLD AUTO: 4.27 MILLION/UL (ref 3.88–5.62)
SODIUM SERPL-SCNC: 136 MMOL/L (ref 136–145)
T WAVE AXIS: -40 DEGREES
VENTRICULAR RATE: 74 BPM
WBC # BLD AUTO: 8.96 THOUSAND/UL (ref 4.31–10.16)

## 2022-05-14 PROCEDURE — NC001 PR NO CHARGE: Performed by: PODIATRIST

## 2022-05-14 PROCEDURE — 93925 LOWER EXTREMITY STUDY: CPT | Performed by: SURGERY

## 2022-05-14 PROCEDURE — 0Y6Y0Z1 DETACHMENT AT LEFT 5TH TOE, HIGH, OPEN APPROACH: ICD-10-PCS | Performed by: PODIATRIST

## 2022-05-14 PROCEDURE — 85025 COMPLETE CBC W/AUTO DIFF WBC: CPT | Performed by: INTERNAL MEDICINE

## 2022-05-14 PROCEDURE — 88311 DECALCIFY TISSUE: CPT | Performed by: PATHOLOGY

## 2022-05-14 PROCEDURE — 93922 UPR/L XTREMITY ART 2 LEVELS: CPT | Performed by: SURGERY

## 2022-05-14 PROCEDURE — 93010 ELECTROCARDIOGRAM REPORT: CPT | Performed by: INTERNAL MEDICINE

## 2022-05-14 PROCEDURE — 28820 AMPUTATION OF TOE: CPT | Performed by: PODIATRIST

## 2022-05-14 PROCEDURE — 73630 X-RAY EXAM OF FOOT: CPT

## 2022-05-14 PROCEDURE — 88305 TISSUE EXAM BY PATHOLOGIST: CPT | Performed by: PATHOLOGY

## 2022-05-14 PROCEDURE — 0HXNXZZ TRANSFER LEFT FOOT SKIN, EXTERNAL APPROACH: ICD-10-PCS | Performed by: PODIATRIST

## 2022-05-14 PROCEDURE — 83735 ASSAY OF MAGNESIUM: CPT | Performed by: INTERNAL MEDICINE

## 2022-05-14 PROCEDURE — 82948 REAGENT STRIP/BLOOD GLUCOSE: CPT

## 2022-05-14 PROCEDURE — 14350 FILLETED FINGER/TOE FLAP: CPT | Performed by: PODIATRIST

## 2022-05-14 PROCEDURE — 99232 SBSQ HOSP IP/OBS MODERATE 35: CPT | Performed by: INTERNAL MEDICINE

## 2022-05-14 PROCEDURE — 80048 BASIC METABOLIC PNL TOTAL CA: CPT | Performed by: INTERNAL MEDICINE

## 2022-05-14 RX ORDER — PROPOFOL 10 MG/ML
INJECTION, EMULSION INTRAVENOUS CONTINUOUS PRN
Status: DISCONTINUED | OUTPATIENT
Start: 2022-05-14 | End: 2022-05-14

## 2022-05-14 RX ORDER — FENTANYL CITRATE/PF 50 MCG/ML
25 SYRINGE (ML) INJECTION
Status: DISCONTINUED | OUTPATIENT
Start: 2022-05-14 | End: 2022-05-14

## 2022-05-14 RX ORDER — INSULIN LISPRO 100 [IU]/ML
1-6 INJECTION, SOLUTION INTRAVENOUS; SUBCUTANEOUS
Status: DISCONTINUED | OUTPATIENT
Start: 2022-05-14 | End: 2022-05-15 | Stop reason: HOSPADM

## 2022-05-14 RX ORDER — LIDOCAINE HYDROCHLORIDE 10 MG/ML
INJECTION, SOLUTION EPIDURAL; INFILTRATION; INTRACAUDAL; PERINEURAL AS NEEDED
Status: DISCONTINUED | OUTPATIENT
Start: 2022-05-14 | End: 2022-05-14

## 2022-05-14 RX ORDER — HYDROMORPHONE HCL IN WATER/PF 6 MG/30 ML
0.2 PATIENT CONTROLLED ANALGESIA SYRINGE INTRAVENOUS
Status: DISCONTINUED | OUTPATIENT
Start: 2022-05-14 | End: 2022-05-14

## 2022-05-14 RX ORDER — PROPOFOL 10 MG/ML
INJECTION, EMULSION INTRAVENOUS AS NEEDED
Status: DISCONTINUED | OUTPATIENT
Start: 2022-05-14 | End: 2022-05-14

## 2022-05-14 RX ORDER — BUPIVACAINE HYDROCHLORIDE 5 MG/ML
INJECTION, SOLUTION PERINEURAL AS NEEDED
Status: DISCONTINUED | OUTPATIENT
Start: 2022-05-14 | End: 2022-05-14 | Stop reason: HOSPADM

## 2022-05-14 RX ORDER — INSULIN GLARGINE 100 [IU]/ML
5 INJECTION, SOLUTION SUBCUTANEOUS ONCE
Status: COMPLETED | OUTPATIENT
Start: 2022-05-14 | End: 2022-05-14

## 2022-05-14 RX ORDER — ONDANSETRON 2 MG/ML
4 INJECTION INTRAMUSCULAR; INTRAVENOUS ONCE AS NEEDED
Status: DISCONTINUED | OUTPATIENT
Start: 2022-05-14 | End: 2022-05-14

## 2022-05-14 RX ORDER — SODIUM CHLORIDE, SODIUM LACTATE, POTASSIUM CHLORIDE, CALCIUM CHLORIDE 600; 310; 30; 20 MG/100ML; MG/100ML; MG/100ML; MG/100ML
INJECTION, SOLUTION INTRAVENOUS CONTINUOUS PRN
Status: DISCONTINUED | OUTPATIENT
Start: 2022-05-14 | End: 2022-05-14

## 2022-05-14 RX ORDER — SODIUM CHLORIDE, SODIUM LACTATE, POTASSIUM CHLORIDE, CALCIUM CHLORIDE 600; 310; 30; 20 MG/100ML; MG/100ML; MG/100ML; MG/100ML
20 INJECTION, SOLUTION INTRAVENOUS CONTINUOUS
Status: DISCONTINUED | OUTPATIENT
Start: 2022-05-14 | End: 2022-05-15 | Stop reason: HOSPADM

## 2022-05-14 RX ORDER — LIDOCAINE HYDROCHLORIDE 10 MG/ML
INJECTION, SOLUTION EPIDURAL; INFILTRATION; INTRACAUDAL; PERINEURAL AS NEEDED
Status: DISCONTINUED | OUTPATIENT
Start: 2022-05-14 | End: 2022-05-14 | Stop reason: HOSPADM

## 2022-05-14 RX ORDER — MIDAZOLAM HYDROCHLORIDE 2 MG/2ML
INJECTION, SOLUTION INTRAMUSCULAR; INTRAVENOUS AS NEEDED
Status: DISCONTINUED | OUTPATIENT
Start: 2022-05-14 | End: 2022-05-14

## 2022-05-14 RX ADMIN — ATORVASTATIN CALCIUM 40 MG: 40 TABLET, FILM COATED ORAL at 10:47

## 2022-05-14 RX ADMIN — FUROSEMIDE 40 MG: 40 TABLET ORAL at 10:45

## 2022-05-14 RX ADMIN — INSULIN LISPRO 6 UNITS: 100 INJECTION, SOLUTION INTRAVENOUS; SUBCUTANEOUS at 21:37

## 2022-05-14 RX ADMIN — PROPOFOL 50 MG: 10 INJECTION, EMULSION INTRAVENOUS at 07:56

## 2022-05-14 RX ADMIN — INSULIN LISPRO 3 UNITS: 100 INJECTION, SOLUTION INTRAVENOUS; SUBCUTANEOUS at 08:49

## 2022-05-14 RX ADMIN — CEFAZOLIN SODIUM 2000 MG: 2 SOLUTION INTRAVENOUS at 12:05

## 2022-05-14 RX ADMIN — HEPARIN SODIUM 5000 UNITS: 5000 INJECTION INTRAVENOUS; SUBCUTANEOUS at 15:34

## 2022-05-14 RX ADMIN — PROPOFOL 70 MCG/KG/MIN: 10 INJECTION, EMULSION INTRAVENOUS at 07:58

## 2022-05-14 RX ADMIN — LISINOPRIL 5 MG: 5 TABLET ORAL at 10:46

## 2022-05-14 RX ADMIN — MIDAZOLAM 1 MG: 1 INJECTION INTRAMUSCULAR; INTRAVENOUS at 07:48

## 2022-05-14 RX ADMIN — SODIUM CHLORIDE, SODIUM LACTATE, POTASSIUM CHLORIDE, AND CALCIUM CHLORIDE: .6; .31; .03; .02 INJECTION, SOLUTION INTRAVENOUS at 07:51

## 2022-05-14 RX ADMIN — METOPROLOL TARTRATE 25 MG: 25 TABLET, FILM COATED ORAL at 20:13

## 2022-05-14 RX ADMIN — METOPROLOL TARTRATE 25 MG: 25 TABLET, FILM COATED ORAL at 10:47

## 2022-05-14 RX ADMIN — INSULIN GLARGINE 5 UNITS: 100 INJECTION, SOLUTION SUBCUTANEOUS at 21:37

## 2022-05-14 RX ADMIN — ASPIRIN 81 MG CHEWABLE TABLET 81 MG: 81 TABLET CHEWABLE at 10:44

## 2022-05-14 RX ADMIN — INSULIN LISPRO 3 UNITS: 100 INJECTION, SOLUTION INTRAVENOUS; SUBCUTANEOUS at 17:31

## 2022-05-14 RX ADMIN — CEFAZOLIN SODIUM 2000 MG: 2 SOLUTION INTRAVENOUS at 04:09

## 2022-05-14 RX ADMIN — CEFAZOLIN SODIUM 2000 MG: 2 SOLUTION INTRAVENOUS at 20:09

## 2022-05-14 RX ADMIN — LIDOCAINE HYDROCHLORIDE 100 MG: 10 INJECTION, SOLUTION EPIDURAL; INFILTRATION; INTRACAUDAL at 07:56

## 2022-05-14 RX ADMIN — INSULIN LISPRO 3 UNITS: 100 INJECTION, SOLUTION INTRAVENOUS; SUBCUTANEOUS at 12:05

## 2022-05-14 RX ADMIN — HEPARIN SODIUM 5000 UNITS: 5000 INJECTION INTRAVENOUS; SUBCUTANEOUS at 21:04

## 2022-05-14 NOTE — ASSESSMENT & PLAN NOTE
· History of coronary artery disease, status post LULA  · Continue aspirin, statin, metoprolol  · Denies chest pain

## 2022-05-14 NOTE — ASSESSMENT & PLAN NOTE
· Patient is 66-year-old male with a past medical history of hypertension, hyperlipidemia, insulin-dependent type 2 diabetes, CKD stage 3, coronary artery disease who presented to the hospital for nonhealing left foot ulcer under Podiatry Service  · Patient has a history of coronary artery disease, status post LULA  Patient has a history of combined systolic and diastolic heart failure, per last echo ejection fraction 50%  Patient denies any chest pain at rest or exertion  Denies any palpitations  On physical exam clinically he is euvolemic  Regular rate and rhythm  Lungs are clear to auscultation  · EKG showed normal sinus rhythm, left anterior fascicular block, nonspecific ST-T changes  Compared to EKG done in March 2021, no change  · Revised cardiac risk index, class IV risk    Low risk surgery, patient is stable for per the procedure

## 2022-05-14 NOTE — RESTORATIVE TECHNICIAN NOTE
Restorative Technician Note      Patient Name: Kira Bell     Restorative Tech Visit Date: 5/14/2022      Received orders for toe unloading shoe for pt's left foot  Pt states he has been wearing a toe unloading shoe on his left foot and currently has it here at the hospital with him  States he does not need a new one  Please call Mobility Coordinator at ext  [phone] or on Narvon text " [facility]-PT-Restorative Tech" role in regards to bracing instruction and/or adjustment      [employer],

## 2022-05-14 NOTE — ASSESSMENT & PLAN NOTE
· Left foot nonhealing wound with cellulitis  · S/p 5th toe ulcer excision w/ filet flap (POD #1)  · Antibiotic therapy (IV Ancef) per primary service (podiatry)  · PRN pain control

## 2022-05-14 NOTE — ASSESSMENT & PLAN NOTE
Lab Results   Component Value Date    HGBA1C 10 4 (H) 04/06/2022     · Continue basal insulin w/ additional SSI coverage per Accu-Cheks  · Carbohydrate restricted diet  · Needs aggressive blood sugar control in the setting of infection

## 2022-05-14 NOTE — ASSESSMENT & PLAN NOTE
Lab Results   Component Value Date    EGFR 37 05/13/2022    EGFR 36 04/06/2022    EGFR 41 02/09/2022    CREATININE 1 86 (H) 05/13/2022    CREATININE 1 89 (H) 04/06/2022    CREATININE 1 70 (H) 02/09/2022   · Baseline creatinine seems to be from 1 7-2 0  · [facility] as outpatient  · Creatinine at baseline  · Hold Lasix tomorrow morning, patient will be NPO  · Restart Lasix 5/15

## 2022-05-14 NOTE — ANESTHESIA POSTPROCEDURE EVALUATION
Post-Op Assessment Note    CV Status:  Stable  Pain Score: 0    Pain management: adequate     Mental Status:  Awake and alert   Hydration Status:  Stable   PONV Controlled:  None   Airway Patency:  Patent and adequate      Post Op Vitals Reviewed: Yes      Staff: CRNA, Anesthesiologist         No complications documented      BP   120/77   Temp   98 4   Pulse  69   Resp   16   SpO2   100%

## 2022-05-14 NOTE — ASSESSMENT & PLAN NOTE
· Potassium 3 2 this morning  · Received potassium chloride 10 mEq  · Will give another 20 mEq today  · Recheck BMP tomorrow

## 2022-05-14 NOTE — PLAN OF CARE
Problem: MOBILITY - ADULT  Goal: Maintain or return to baseline ADL function  Description: INTERVENTIONS:  -  Assess patient's ability to carry out ADLs; assess patient's baseline for ADL function and identify physical deficits which impact ability to perform ADLs (bathing, care of mouth/teeth, toileting, grooming, dressing, etc )  - Assess/evaluate cause of self-care deficits   - Assess range of motion  - Assess patient's mobility; develop plan if impaired  - Assess patient's need for assistive devices and provide as appropriate  - Encourage maximum independence but intervene and supervise when necessary  - Involve family in performance of ADLs  - Assess for home care needs following discharge   - Consider OT consult to assist with ADL evaluation and planning for discharge  - Provide patient education as appropriate  Outcome: Progressing  Goal: Maintains/Returns to pre admission functional level  Description: INTERVENTIONS:  - Perform BMAT or MOVE assessment daily    - Set and communicate daily mobility goal to care team and patient/family/caregiver     - Collaborate with rehabilitation services on mobility goals if consulted  -  Out of bed for toileting  - Record patient progress and toleration of activity level   Outcome: Progressing

## 2022-05-14 NOTE — PLAN OF CARE
Problem: MOBILITY - ADULT  Goal: Maintain or return to baseline ADL function  Description: INTERVENTIONS:  -  Assess patient's ability to carry out ADLs; assess patient's baseline for ADL function and identify physical deficits which impact ability to perform ADLs (bathing, care of mouth/teeth, toileting, grooming, dressing, etc )  - Assess/evaluate cause of self-care deficits   - Assess range of motion  - Assess patient's mobility; develop plan if impaired  - Assess patient's need for assistive devices and provide as appropriate  - Encourage maximum independence but intervene and supervise when necessary  - Involve family in performance of ADLs  - Assess for home care needs following discharge   - Consider OT consult to assist with ADL evaluation and planning for discharge  - Provide patient education as appropriate  Outcome: Progressing  Goal: Maintains/Returns to pre admission functional level  Description: INTERVENTIONS:  - Perform BMAT or MOVE assessment daily    - Set and communicate daily mobility goal to care team and patient/family/caregiver  - Collaborate with rehabilitation services on mobility goals if consulted  - Perform Range of Motion 3 times a day  - Reposition patient every 2 hours    - Dangle patient 3 times a day  - Stand patient 3 times a day  - Ambulate patient 3 times a day  - Out of bed to chair 3 times a day   - Out of bed for meals 3 times a day  - Out of bed for toileting  - Record patient progress and toleration of activity level   Outcome: Progressing     Problem: Prexisting or High Potential for Compromised Skin Integrity  Goal: Skin integrity is maintained or improved  Description: INTERVENTIONS:  - Identify patients at risk for skin breakdown  - Assess and monitor skin integrity  - Assess and monitor nutrition and hydration status  - Monitor labs   - Assess for incontinence   - Turn and reposition patient  - Assist with mobility/ambulation  - Relieve pressure over bony prominences  - Avoid friction and shearing  - Provide appropriate hygiene as needed including keeping skin clean and dry  - Evaluate need for skin moisturizer/barrier cream  - Collaborate with interdisciplinary team   - Patient/family teaching  - Consider wound care consult   Outcome: Progressing     Problem: Potential for Falls  Goal: Patient will remain free of falls  Description: INTERVENTIONS:  - Educate patient/family on patient safety including physical limitations  - Instruct patient to call for assistance with activity   - Consult OT/PT to assist with strengthening/mobility   - Keep Call bell within reach  - Keep bed low and locked with side rails adjusted as appropriate  - Keep care items and personal belongings within reach  - Initiate and maintain comfort rounds  - Make Fall Risk Sign visible to staff  - Offer Toileting every 2 Hours, in advance of need  - Initiate/Maintain alarm  - Obtain necessary fall risk management equipment  - Apply yellow socks and bracelet for high fall risk patients  - Consider moving patient to room near nurses station  Outcome: Progressing

## 2022-05-14 NOTE — PROGRESS NOTES
[facility]  Consult Progress Note Rosey Finger 1960, 57 y o  male MRN: [id number]  Unit/Bed#: [facility] 913-01 Encounter: [id number]  Primary Care Provider: Juliet Meza DO   Date and time admitted to hospital: 5/12/2022  3:15 PM      * Wound of left foot  Assessment & Plan  · Left foot nonhealing wound with cellulitis  · S/p 5th toe ulcer excision w/ filet flap (POD #1)  · Antibiotic therapy (IV Ancef) per primary service (podiatry)  · PRN pain control    CKD (chronic kidney disease) stage 3  Assessment & Plan  · Baseline creatinine approximately 1 7-2 0 - currently stable @ 1 88  · Monitor renal function and urine output - limit/avoid nephrotoxins if possible - avoid hypotension as possible - note Zestril/Lasix use    Essential hypertension  Assessment & Plan  · Continue Lopressor/Zestril  · Low-sodium diet    Chronic combined systolic and diastolic CHF  Assessment & Plan  · Ischemic cardiomyopathy - last EF of 50%  · On diuresis w/ Lasix - on beta-blockade with Lopressor - on afterload reduction with Zestril  · Low-sodium diet w/ fluid restrictions    Coronary artery disease  Assessment & Plan  · S/p prior coronary stenting  · Continue ASA/statin/Lopressor/Zestril    Insulin-dependent diabetes mellitus  Assessment & Plan  Lab Results   Component Value Date    HGBA1C 10 4 (H) 04/06/2022     · Continue basal insulin w/ additional SSI coverage per Accu-Cheks  · Carbohydrate restricted diet  · Needs aggressive blood sugar control in the setting of infection    Cirrhosis  Assessment & Plan  · Incidental finding on previous hospitalizations  · Outpatient follow-up  · Continue Lasix    Hypokalemia  Assessment & Plan  · Normalized status post repletion  · Continue serum potassium and magnesium monitoring      DVT Prophylaxis:  Heparin SC       Patient Centered Rounds:  I have performed bedside rounds and discussed plan of care with nursing today      Discussions with Specialists or Other Care Team Provider:  see above assessments if applicable    Education and Discussions with Family / Patient:  Patient at bedside - family to be updated by primary service as necessary    Time Spent for Care:  32 minutes  More than 50% of total time spent on counseling and coordination of care as described above  Current Length of Stay: 2 day(s)    Current Patient Status: Inpatient   Certification Statement:  Patient will continue to require additional hospital stay due to assessments as noted above  Code Status: Level 1 - Full Code        Subjective:     Seen/examined earlier today postoperatively  Tolerated procedure well  Currently denying pain at this time  Objective:     Vitals:   Temp (24hrs), Av 2 °F (36 8 °C), Min:97 6 °F (36 4 °C), Max:98 7 °F (37 1 °C)    Temp:  [97 6 °F (36 4 °C)-98 7 °F (37 1 °C)] 97 6 °F (36 4 °C)  HR:  [67-73] 72  Resp:  [11-18] 18  BP: (120-144)/(75-93) 135/83  SpO2:  [89 %-99 %] 98 %  Body mass index is 27 9 kg/m²  Input and Output Summary (last 24 hours):        Intake/Output Summary (Last 24 hours) at 2022 1325  Last data filed at 2022 0835  Gross per 24 hour   Intake 400 ml   Output --   Net 400 ml       Physical Exam:     GENERAL:  Well-developed/nourished - no immediate distress at rest  HEAD:  Normocephalic - atraumatic  EYES: PERRL - EOMI   MOUTH:  Mucosa moist  NECK:  Supple - full range of motion  CARDIAC:  Rate controlled - S1/S2 positive  PULMONARY:  Fairly clear to auscultation - nonlabored respirations  ABDOMEN:  Soft - nontender/nondistended - active bowel sounds  MUSCULOSKELETAL:  Motor strength/range of motion deconditioned - left foot dressed/wrapped postoperatively  NEUROLOGIC:  Alert/oriented at baseline   PSYCHIATRIC:  Mood/affect stable      Additional Data:     Labs & Recent Cultures:    Results from last 7 days   Lab Units 22  0515   WBC Thousand/uL 8 96   HEMOGLOBIN g/dL 11 6*   HEMATOCRIT % 35 5*   PLATELETS Thousands/uL 267   NEUTROS PCT % 67   LYMPHS PCT % 14   MONOS PCT % 11   EOS PCT % 6     Results from last 7 days   Lab Units 05/14/22  0515   POTASSIUM mmol/L 3 8   CHLORIDE mmol/L 103   CO2 mmol/L 27   BUN mg/dL 35*   CREATININE mg/dL 1 88*   CALCIUM mg/dL 8 7         Results from last 7 days   Lab Units 05/14/22  1109 05/14/22  0845 05/13/22  1621 05/13/22  1215 05/13/22  0816 05/13/22  0558 05/12/22  1659   POC GLUCOSE mg/dl 250* 253* 224* 163* 117 55* 244*                 Results from last 7 days   Lab Units 05/13/22  1216 05/12/22  1100   BLOOD CULTURE  Received in Microbiology Lab  Culture in Progress  Received in Microbiology Lab  Culture in Progress    --    GRAM STAIN RESULT   --  2+ Gram positive cocci in pairs, chains and clusters*  1+ Gram negative rods*  No polys seen*   WOUND CULTURE   --  2+ Growth of Escherichia coli*         Last 24 Hours Medication List:   Current Facility-Administered Medications   Medication Dose Route Frequency Provider Last Rate    acetaminophen  650 mg Oral Q4H PRN [facility], DPM      aluminum-magnesium hydroxide-simethicone  30 mL Oral Q6H PRN Alleen Place, DPM      aspirin  81 mg Oral Daily Alleen Place, DPM      atorvastatin  40 mg Oral Daily [place], [place]      cefazolin  2,000 mg Intravenous Q8H [facility], DPM 2,000 mg (05/14/22 1205)    docusate sodium  100 mg Oral BID Alleen Place, DPM      furosemide  40 mg Oral Daily [facility], DPM      heparin (porcine)  5,000 Units Subcutaneous [place], [place]      [START ON 5/15/2022] insulin glargine  10 Units Subcutaneous QAM Alleen Place, DPM      insulin lispro  1-6 Units Subcutaneous TID [facility], DPM      lactated ringers  20 mL/hr Intravenous Continuous Alleen Place, DPM Stopped (05/14/22 1005)    lisinopril  5 mg Oral Daily Al[facility] Place, DPM      metoprolol tartrate  25 mg Oral Q12H [facility], DPM      ondansetron  4 mg Intravenous Q6H PRN [facility], DPM      oxyCODONE  5 mg Oral Q4H PRN [facility] NicoleM      sodium hypochlorite  1 application Irrigation Daily Philippe Medel [place]                      ** Please Note: This note is constructed using a voice recognition dictation system  An occasional wrong word/phrase or sound-a-like substitution may have been picked up by dictation device due to the inherent limitations of voice recognition software  Read the chart carefully and recognize, using reasonable context, where substitutions may have occurred  **

## 2022-05-14 NOTE — OP NOTE
OPERATIVE REPORT - Podiatry  PATIENT NAME: Gunnar Colby    :  1960  MRN: [id number]  Pt Location: BE OR ROOM 05    SURGERY DATE: 2022    Surgeon(s) and Role:     * Meagan Stark DPM - Primary     * Neelam Hopson DPM - Assisting    Pre-op Diagnosis:  Wound of left foot [S91 302A]    Post-Op Diagnosis Codes:     * Wound of left foot [S91 302A]    Procedure(s) (LRB):  EXCISION OF LEFT 5TH TOE ULCER WITH FILET FLAP (Left)    Specimen(s):  ID Type Source Tests Collected by Time Destination   1 : left 5th toe Tissue Toe, Left TISSUE EXAM Meagan Stark DPM 2022 0806        Estimated Blood Loss:   Minimal    Drains:  * No LDAs found *    Anesthesia Type:   Choice with 10 ml of 1% Lidocaine and 0 5% Bupivacaine in a 1:1 mixture    Hemostasis:  Direct compression, electrocautery    Materials:  * No implants in log *      Injectables:  None    Operative Findings:  Upon disarticulation of 5th proximal phalanx, base of proximal phalanx had a noted pathologic fracture osteomyelitis     Complications:   None    Procedure and Technique:     Under mild sedation, the patient was brought into the operating room and remained on hospital bed in the supine position  IV sedation was achieved by anesthesia team and a universal timeout was performed where all parties are in agreement of correct patient, correct procedure and correct site  A V block was performed consisting of 10 ml of local anesthetic  The foot was then prepped and draped in the usual aseptic manner  Attention was directed to the left 5th digit  Utilizing a sterile 15 blade, the left foot wound was excised  There was a significant soft tissue deficit it was decided that a full a flap could be created using the healthy tissue in order to flap down over the area of the excised ulcer  The 5th toe filet flap was then created taking careful dissection to only remove the bone spur keep all digital arteries intact    Soft tissue structures were then reflected off the 5th proximal phalanx  All 3 phalanx were removed  Of note the proximal phalanx had a pathologic fracture and evidence of necrosis within the bone itself    It was noted that all tissue margins were bleeding and viable  No deep sinus tracts or areas of purulence were visualized  The remaining bone on the proximal aspect of the joint was noted to be of hard and viable quality  The remaining skin from the dorsal 5th digit was rotated to cover soft tissue deficit on lateral plantar foot  Skin edges were reapproximated and closure was obtained utilizing nylon  The flap had good capillary refill upon completion  The foot was then cleansed and dried  The incision site was dressed with xeroform, gauze  This was then covered with a Denny and an ACE wrap  The patient tolerated the procedure and anesthesia well without immediate complications and transferred to PACU with vital signs stable  As with many limb salvage procedures, we contemplate the possibility of performing further stages to this procedure  Procedures may include debridements, delayed closure, plastic surgery techniques, or more proximal amputations  This procedure may be considered part of a multi-staged limb salvage treatment plan  Dr Yovany Marques was present during the entire procedure and participated in all key aspects  SIGNATURE: Karen Clemons DPM  DATE: May 14, 2022  TIME: 8:45 AM      Portions of the record may have been created with voice recognition software  Occasional wrong word or "sound a like" substitutions may have occurred due to the inherent limitations of voice recognition software  Read the chart carefully and recognize, using context, where substitutions have occurred

## 2022-05-14 NOTE — CONSULTS
[address] [facility] [address] Hitesh Khan 1960, 57 y o  male MRN: [id number]  Unit/Bed#: [facility] 913-01 Encounter: [id number]  Primary Care Provider: Donavan Schmitt DO   Date and time admitted to hospital: 5/12/2022  3:15 PM    Inpatient consult to Internal Medicine  Consult performed by: Jose Trinh MD  Consult ordered by: Bello Stubbs DPM          * Wound of left foot  Assessment & Plan  · Left foot nonhealing wound with cellulitis  · Time for OR tomorrow for left 5th digit amputation versus partial 5th ray versus wound debridement/washout  · NPO after midnight  · Antibiotics per Podiatry    Preoperative clearance  Assessment & Plan  · Patient is 75-year-old male with a past medical history of hypertension, hyperlipidemia, insulin-dependent type 2 diabetes, CKD stage 3, coronary artery disease who presented to the hospital for nonhealing left foot ulcer under Podiatry Service  · Patient has a history of coronary artery disease, status post LULA  Patient has a history of combined systolic and diastolic heart failure, per last echo ejection fraction 50%  Patient denies any chest pain at rest or exertion  Denies any palpitations  On physical exam clinically he is euvolemic  Regular rate and rhythm  Lungs are clear to auscultation  · EKG showed normal sinus rhythm, left anterior fascicular block, nonspecific ST-T changes  Compared to EKG done in March 2021, no change  · Revised cardiac risk index, class IV risk    Low risk surgery, patient is stable for per the procedure    Coronary artery disease  Assessment & Plan  · History of coronary artery disease, status post LULA  · Continue aspirin, statin, metoprolol  · Denies chest pain    Chronic combined systolic and diastolic CHF (congestive heart failure) (HCC)  Assessment & Plan  Wt Readings from Last 3 Encounters:   05/13/22 84 2 kg (185 lb 10 oz)   04/07/22 80 7 kg (178 lb)   03/31/22 79 8 kg (176 lb)     · Ischemic cardiomyopathy  Her last echo ejection fraction 50%  · On Lasix 40 mg daily  · Patient will be NPO after midnight, recommend holding Lasix tomorrow morning and restart 5/15  · Clinically euvolemic        Hypokalemia  Assessment & Plan  · Potassium 3 2 this morning  · Received potassium chloride 10 mEq  · Will give another 20 mEq today  · Recheck BMP tomorrow    Cirrhosis ([address] )  Assessment & Plan  · History of cirrhosis, incidental finding on his previous admissions  · Outpatient follow-up    CKD (chronic kidney disease) stage 3, GFR 30-59 ml/min [facility])  Assessment & Plan  Lab Results   Component Value Date    EGFR 37 05/13/2022    EGFR 36 04/06/2022    EGFR 41 02/09/2022    CREATININE 1 86 (H) 05/13/2022    CREATININE 1 89 (H) 04/06/2022    CREATININE 1 70 (H) 02/09/2022   · Baseline creatinine seems to be from 1 7-2 0  · [facility] as outpatient  · Creatinine at baseline  · Hold Lasix tomorrow morning, patient will be NPO  · Restart Lasix 5/15    Type 2 diabetes mellitus with hyperglycemia, with long-term current use of insulin [facility])  Assessment & Plan  Lab Results   Component Value Date    HGBA1C 10 4 (H) 04/06/2022       Recent Labs     05/13/22  0558 05/13/22  0816 05/13/22  1215 05/13/22  1621   POCGLU 55* 117 163* 224*       Blood Sugar Average: Last 72 hrs:  (P) 160 6   · Patient states he is on Lantus 30 units daily in the morning  · Restart Lantus 10 units in the morning with sliding scale  · Diabetic diet    Benign essential hypertension  Assessment & Plan  · Continue metoprolol and lisinopril      VTE Prophylaxis:   Moderate Risk (Score 3-4) - Pharmacological DVT Prophylaxis Ordered: heparin  Recommendations for Discharge:  · Upon discharge    Counseling / Coordination of Care Time: 45 minutes Greater than 50% of total time spent on patient counseling and coordination of care  Collaboration of Care:  Were Recommendations Directly Discussed with Primary Treatment Team? No    History of Present Illness:  Hans Oconnor is a 57 y o  male who is originally admitted to the Podiatry service due to left foot nonhealing ulcer  We are consulted for preop clearance  Patient is a 68-year-old male with a past medical history of insulin-dependent type 2 diabetes, CKD stage 3, hypertension, hyperlipidemia, coronary artery disease  Patient is scheduled to undergo procedure by Podiatry tomorrow  Patient has a history of coronary artery disease status post LULA, follows Cardiology as outpatient  Currently on aspirin, statin and metoprolol  Patient denies any chest pain palpitation shortness of breath at rest or exertion  He is clinically euvolemic  Review of Systems:  Review of Systems   Constitutional: Negative for activity change, appetite change and fatigue  HENT: Negative  Eyes: Negative  Respiratory: Negative for cough, chest tightness and shortness of breath  Cardiovascular: Negative for chest pain, palpitations and leg swelling  Gastrointestinal: Negative for abdominal distention, abdominal pain, nausea and vomiting  Endocrine: Negative  Genitourinary: Negative for dysuria  Musculoskeletal: Negative  Skin: Positive for wound  Neurological: Negative  Hematological: Negative  Psychiatric/Behavioral: Negative  Past Medical and Surgical History:   Past Medical History:   Diagnosis Date    CAD (coronary artery disease)     Chronic combined systolic and diastolic CHF (congestive heart failure) ([facility] )     Diabetes mellitus ([facility] )     type 2    Dyslipidemia     Hypertension     Ischemic cardiomyopathy     MI (myocardial infarction) ([facility] )     Osteomyelitis ([facility] )     Pancreatitis        Past Surgical History:   Procedure Laterality Date    ANGIOPLASTY      ballon    CORONARY ANGIOPLASTY WITH STENT PLACEMENT      LULA to proximal and mid LAD, Proximal RCA       HERNIA REPAIR      INCISION AND DRAINAGE OF WOUND Left 2/14/2020    Procedure: INCISION AND DRAINAGE (I&D) EXTREMITY left foot (cpt [id number]); Surgeon: Chantelle Purdy DPM;  [facility];  Service: Osvaldo Keane Left 2/14/2020    Procedure: EXPLORATION EXTREMITY;  Surgeon: Chantelle Purdy DPM;  [facility];  Service: Podiatry    [place] [address], < 50 CM Left 2/14/2020    Procedure: APPLICATION VAC DRESSING;  Surgeon: Chantelle Purdy DPM;  [facility];  Service: Podiatry       Meds/Allergies:  all medications and allergies reviewed    Allergies: No Known Allergies    Social History:  Marital Status: /Civil Union  Substance Use History:   Social History     Substance and Sexual Activity   Alcohol Use Not Currently    Comment: 20 years of sobriety     Social History     Tobacco Use   Smoking Status Never Smoker   Smokeless Tobacco Never Used     Social History     Substance and Sexual Activity   Drug Use Never       Family History:  Family History   Problem Relation Age of Onset    Heart attack Father     Hyperlipidemia Father     Cancer Father     Diabetes Father     Diabetes Mother     Heart failure Mother         poss    Kidney disease Mother     Cancer Paternal Grandfather     Stroke Neg Hx     Anuerysm Neg Hx     Clotting disorder Neg Hx     Arrhythmia Neg Hx     Coronary artery disease Neg Hx     Hypertension Neg Hx     Sudden death Neg Hx         scd       Physical Exam:   Vitals:   Blood Pressure: 130/79 (05/13/22 2145)  Pulse: 70 (05/13/22 2145)  Temperature: 98 7 °F (37 1 °C) (05/13/22 2145)  Respirations: 16 (05/13/22 0747)  Weight - Scale: 84 2 kg (185 lb 10 oz) (05/13/22 0600)  SpO2: 95 % (05/13/22 2145)    Physical Exam  HENT:      Head: Atraumatic  Cardiovascular:      Rate and Rhythm: Normal rate and regular rhythm  Heart sounds: No murmur heard  No friction rub  No gallop  Pulmonary:      Effort: Pulmonary effort is normal  No respiratory distress  Breath sounds: Normal breath sounds  No wheezing     Abdominal:      General: Bowel sounds are normal  There is no distension  Palpations: Abdomen is soft  Tenderness: There is no abdominal tenderness  Musculoskeletal:         General: No swelling  Cervical back: Neck supple  Comments: Left foot dressing   Neurological:      General: No focal deficit present  Mental Status: He is alert  Psychiatric:         Mood and Affect: Mood normal           Additional Data:   Lab Results:    Results from last 7 days   Lab Units 05/13/22  0551   WBC Thousand/uL 9 13   HEMOGLOBIN g/dL 11 0*   HEMATOCRIT % 34 0*   PLATELETS Thousands/uL 259     Results from last 7 days   Lab Units 05/13/22  0551   SODIUM mmol/L 140   POTASSIUM mmol/L 3 2*   CHLORIDE mmol/L 107   CO2 mmol/L 28   BUN mg/dL 36*   CREATININE mg/dL 1 86*   ANION GAP mmol/L 5   CALCIUM mg/dL 8 9   GLUCOSE RANDOM mg/dL 51*             Lab Results   Component Value Date/Time    HGBA1C 10 4 (H) 04/06/2022 08:24 AM    HGBA1C 9 1 (H) 01/21/2022 07:45 AM    HGBA1C 9 2 (H) 12/24/2021 05:24 AM    HGBA1C 12 4 (H) 06/25/2015 10:38 AM    HGBA1C 10 9 (H) 12/17/2014 06:12 AM     Results from last 7 days   Lab Units 05/13/22  1621 05/13/22  1215 05/13/22  0816 05/13/22  0558 05/12/22  1659   POC GLUCOSE mg/dl 224* 163* 117 55* 244*           Imaging:   XR foot 3+ vw left   Final Result by Valentin Ortega DO (05/12 1700)      Deep soft tissue ulceration lateral margin of the 5th toe without discernible evidence for osteomyelitis  New 2 mm radiopaque foreign body plantar soft tissues 1st toe distally  Workstation performed: [id number]         VAS lower limb arterial duplex, complete bilateral    (Results Pending)     XR foot 3+ vw left   Final Result by Valentin Ortega DO (05/12 1700)      Deep soft tissue ulceration lateral margin of the 5th toe without discernible evidence for osteomyelitis  New 2 mm radiopaque foreign body plantar soft tissues 1st toe distally          Workstation performed: [id number] VAS lower limb arterial duplex, complete bilateral    (Results Pending)       EKG, Pathology, and Other Studies Reviewed on Admission:   · EKG: Normal sinus rhythm, left anterior fascicular block, nonspecific ST-T change  No acute ischemic changes  ** Please Note: This note may have been constructed using a voice recognition system   **

## 2022-05-14 NOTE — PLAN OF CARE
Remained A&O x4 denied pain  Wound care done to left foot wound, patient tolerated well   CHG done, pt remained NPO for surgical this AM

## 2022-05-14 NOTE — ASSESSMENT & PLAN NOTE
Lab Results   Component Value Date    HGBA1C 10 4 (H) 04/06/2022       Recent Labs     05/13/22  0558 05/13/22  0816 05/13/22  1215 05/13/22  1621   POCGLU 55* 117 163* 224*       Blood Sugar Average: Last 72 hrs:  (P) 160 6   · Patient states he is on Lantus 30 units daily in the morning  · Restart Lantus 10 units in the morning with sliding scale  · Diabetic diet

## 2022-05-14 NOTE — PROGRESS NOTES
Podiatry - Progress Note  Patient: Ashely Barr 57 y o  male   MRN: [id number]  PCP: Mary Ann Godinez,   Unit/Bed#: [facility] 913-01 Encounter: [id number]  Date Of Visit: 22    ASSESSMENT:    Debbie Khan is a 57 y o  male with:      1  Left foot wound with cellulitis  2  Bilateral LE chronic venous hypertension  3  T2DM  4  CKD stage 3  5  CAD        PLAN:    · Patient to go to OR today,22, for  left 5th digit amputation with Dr Yesy Elliott  · Consent to be signed with surgeon prior to procedure  · Confirmed NPO status  · H&P, vitals, and current labs reviewed  No acute changes noted  · Alternatives, risks, and complications discussed with patient  · All questions answered  No guarantees given of outcome  · Rest of medical care per primary team        SUBJECTIVE:     The patient was seen, evaluated, and assessed at bedside today  The patient was awake, alert, and in no acute distress  Patient confirmed NPO status  All questions and concerns regarding the surgical procedure addressed  Patient understands risks vs benefits of procedure and remains amenable with plan for surgery today  Patient denies N/V/F/chills/SOB/CP  OBJECTIVE:     Vitals:   /80   Pulse 68   Temp 98 2 °F (36 8 °C)   Resp 16   Wt 88 2 kg (194 lb 7 1 oz)   SpO2 96%   BMI 27 90 kg/m²     Temp (24hrs), Av 4 °F (36 9 °C), Min:98 2 °F (36 8 °C), Max:98 7 °F (37 1 °C)      Physical Exam:     General:  Alert, cooperative, and in no distress  Lower extremity exam:  Cardiovascular status at baseline  Neurological status at baseline  Musculoskeletal status at baseline  No calf tenderness noted bilaterally  Dressing left intact to the Operating Room     Additional Data:     Labs:    Results from last 7 days   Lab Units 22  0515   WBC Thousand/uL 8 96   HEMOGLOBIN g/dL 11 6*   HEMATOCRIT % 35 5*   PLATELETS Thousands/uL 267   NEUTROS PCT % 67   LYMPHS PCT % 14   MONOS PCT % 11   EOS PCT % 6     Results from last 7 days   Lab Units 05/14/22  0515   POTASSIUM mmol/L 3 8   CHLORIDE mmol/L 103   CO2 mmol/L 27   BUN mg/dL 35*   CREATININE mg/dL 1 88*   CALCIUM mg/dL 8 7           * I Have Reviewed All Lab Data Listed Above  Recent Cultures (last 7 days):     Results from last 7 days   Lab Units 05/13/22  1216 05/12/22  1100   BLOOD CULTURE  Received in Microbiology Lab  Culture in Progress  Received in Microbiology Lab  Culture in Progress  --    GRAM STAIN RESULT   --  2+ Gram positive cocci in pairs, chains and clusters*  1+ Gram negative rods*  No polys seen*   WOUND CULTURE   --  2+ Growth of Escherichia coli*           Imaging: I have personally reviewed pertinent films in PACS  EKG, Pathology, and Other Studies: I have personally reviewed pertinent reports  ** Please Note: Portions of the record may have been created with voice recognition software  Occasional wrong word or "sound a like" substitutions may have occurred due to the inherent limitations of voice recognition software  Read the chart carefully and recognize, using context, where substitutions have occurred   **

## 2022-05-14 NOTE — ASSESSMENT & PLAN NOTE
· Left foot nonhealing wound with cellulitis  · Time for OR tomorrow for left 5th digit amputation versus partial 5th ray versus wound debridement/washout  · NPO after midnight  · Antibiotics per Podiatry

## 2022-05-14 NOTE — ASSESSMENT & PLAN NOTE
· Ischemic cardiomyopathy - last EF of 50%  · On diuresis w/ Lasix - on beta-blockade with Lopressor - on afterload reduction with Zestril  · Low-sodium diet w/ fluid restrictions

## 2022-05-14 NOTE — ANESTHESIA PREPROCEDURE EVALUATION
Procedure:  possible AMPUTATION of left 5th TOE vs possible debridement of left 5th toe (Left Fifth Toe)    Relevant Problems   CARDIO   (+) Benign essential hypertension   (+) Coronary artery disease   (+) Orthopnea      ENDO   (+) Type 2 diabetes mellitus with hyperglycemia, with long-term current use of insulin (HCC)      GI/HEPATIC   (+) Cirrhosis (HCC)      /RENAL   (+) CKD (chronic kidney disease) stage 3, GFR 30-59 ml/min (HCC)      PULMONARY   (+) Orthopnea   (+) URI due to influenza A virus        Lab Results   Component Value Date    SODIUM 136 05/14/2022    K 3 8 05/14/2022    BUN 35 (H) 05/14/2022    CREATININE 1 88 (H) 05/14/2022    EGFR 36 05/14/2022    GLUCOSE 84 06/25/2015     Lab Results   Component Value Date    HGBA1C 10 4 (H) 04/06/2022       Lab Results   Component Value Date    HGB 11 6 (L) 05/14/2022    HGB 11 0 (L) 05/13/2022    HGB 12 5 04/06/2022     05/14/2022     05/13/2022     05/12/2022     Lab Results   Component Value Date    WBC 8 96 05/14/2022       Lab Results   Component Value Date    CREATININE 1 88 (H) 05/14/2022    CREATININE 1 86 (H) 05/13/2022    CREATININE 1 89 (H) 04/06/2022       Lab Results   Component Value Date    INR 1 16 02/10/2020    INR 1 07 09/02/2018     Lab Results   Component Value Date    PTT 32 02/10/2020    PTT 32 09/02/2018       No results found for: LACTATE      7/2021:      MYOCARDIAL PERFUSION IMAGING:  The image quality was good  Rotating projection images reveal mild diaphragmatic attenuation  Left ventricular size was top normal      PERFUSION DEFECTS:  -  There was a moderate-sized, complete, fixed myocardial perfusion defect of the apical anterior wall and apex suggestive of a prior anteroapical MI  Significant reversibility/ischemia was not present      GATED SPECT:  The calculated left ventricular ejection fraction was 30 %   There was severely reduced myocardial thickening and motion of the alexx-apical wall of the left ventricle  Study date:  2021     Patient: Emerson Sagastume  MR number: [id number]  Account number: [de-identified]  : 1960  Age: 59 years  Gender: Male  Status: Outpatient  Location: [address] [place] [address]  Height: 70 in  Weight: 178 6 lb  BP: 130/ 72 mmHg     Indications: Assess left ventricular function      Diagnoses: I25 10 - Atherosclerotic heart disease of native coronary artery without angina pectoris     Sonographer:  Mancel Lundborg, RCS  Primary Physician:  Tracey Hopkins MD  Referring Physician:  Mao Boothe DO  [facility]  Interpreting Physician:  Roverto Nelson MD     SUMMARY     LEFT VENTRICLE:  Systolic function was at the lower limits of normal  Ejection fraction was estimated to be 50 %  Global longitudinal strain was reduced at -9 3%  There was dyskinesis of the mid anterolateral, apical septal, apical lateral, and apical wall(s)  Doppler parameters were consistent with restrictive physiology, indicative of decreased left ventricular diastolic compliance and/or increased left atrial pressure  Doppler parameters were consistent with high ventricular filling pressure      MITRAL VALVE:  There was mild regurgitation      HISTORY: PRIOR HISTORY: CAD s/p stent, Ischemic cardiomyopathy, Heart failure, Hypertension, Dyslipidemia                    Physical Exam    Airway    Mallampati score: II  TM Distance: >3 FB       Dental       Cardiovascular      Pulmonary      Other Findings        Anesthesia Plan  ASA Score- 4     Anesthesia Type- IV sedation with anesthesia with ASA Monitors  Additional Monitors:   Airway Plan:     Comment: FINESSE Edwards , have personally seen and evaluated the patient prior to anesthetic care  I have reviewed the pre-anesthetic record, and other medical records if appropriate to the anesthetic care  If a CRNA is involved in the case, I have reviewed the CRNA assessment, if present, and agree  Risks/benefits and alternatives discussed with patient including possible PONV, sore throat, and possibility of rare anesthetic and surgical emergencies          Plan Factors-    Chart reviewed  EKG reviewed  Imaging results reviewed  Existing labs reviewed  Patient summary reviewed  Patient instructed to abstain from smoking on day of procedure  Obstructive sleep apnea risk education given perioperatively  Induction-     Postoperative Plan-     Informed Consent- Anesthetic plan and risks discussed with patient  I personally reviewed this patient with the CRNA  Discussed and agreed on the Anesthesia Plan with the CRNA  Kt Braun

## 2022-05-14 NOTE — ASSESSMENT & PLAN NOTE
· Baseline creatinine approximately 1 7-2 0 - currently stable @ 1 88  · Monitor renal function and urine output - limit/avoid nephrotoxins if possible - avoid hypotension as possible - note Zestril/Lasix use

## 2022-05-14 NOTE — ASSESSMENT & PLAN NOTE
Wt Readings from Last 3 Encounters:   05/13/22 84 2 kg (185 lb 10 oz)   04/07/22 80 7 kg (178 lb)   03/31/22 79 8 kg (176 lb)     · Ischemic cardiomyopathy    Her last echo ejection fraction 50%  · On Lasix 40 mg daily  · Patient will be NPO after midnight, recommend holding Lasix tomorrow morning and restart 5/15  · Clinically euvolemic

## 2022-05-15 VITALS
WEIGHT: 177.91 LBS | RESPIRATION RATE: 18 BRPM | OXYGEN SATURATION: 97 % | TEMPERATURE: 98.7 F | SYSTOLIC BLOOD PRESSURE: 130 MMHG | HEART RATE: 72 BPM | BODY MASS INDEX: 25.53 KG/M2 | DIASTOLIC BLOOD PRESSURE: 75 MMHG

## 2022-05-15 PROBLEM — S91.302A WOUND OF LEFT FOOT: Status: RESOLVED | Noted: 2022-05-12 | Resolved: 2022-05-15

## 2022-05-15 LAB
ANION GAP SERPL CALCULATED.3IONS-SCNC: 3 MMOL/L (ref 4–13)
BASOPHILS # BLD AUTO: 0.07 THOUSANDS/ΜL (ref 0–0.1)
BASOPHILS NFR BLD AUTO: 1 % (ref 0–1)
BUN SERPL-MCNC: 31 MG/DL (ref 5–25)
CALCIUM SERPL-MCNC: 8.9 MG/DL (ref 8.3–10.1)
CHLORIDE SERPL-SCNC: 104 MMOL/L (ref 100–108)
CO2 SERPL-SCNC: 30 MMOL/L (ref 21–32)
CREAT SERPL-MCNC: 1.7 MG/DL (ref 0.6–1.3)
EOSINOPHIL # BLD AUTO: 0.34 THOUSAND/ΜL (ref 0–0.61)
EOSINOPHIL NFR BLD AUTO: 3 % (ref 0–6)
ERYTHROCYTE [DISTWIDTH] IN BLOOD BY AUTOMATED COUNT: 13.8 % (ref 11.6–15.1)
GFR SERPL CREATININE-BSD FRML MDRD: 41 ML/MIN/1.73SQ M
GLUCOSE SERPL-MCNC: 166 MG/DL (ref 65–140)
GLUCOSE SERPL-MCNC: 178 MG/DL (ref 65–140)
GLUCOSE SERPL-MCNC: 226 MG/DL (ref 65–140)
GLUCOSE SERPL-MCNC: 227 MG/DL (ref 65–140)
HCT VFR BLD AUTO: 35 % (ref 36.5–49.3)
HGB BLD-MCNC: 11.5 G/DL (ref 12–17)
IMM GRANULOCYTES # BLD AUTO: 0.05 THOUSAND/UL (ref 0–0.2)
IMM GRANULOCYTES NFR BLD AUTO: 1 % (ref 0–2)
LYMPHOCYTES # BLD AUTO: 1.22 THOUSANDS/ΜL (ref 0.6–4.47)
LYMPHOCYTES NFR BLD AUTO: 12 % (ref 14–44)
MAGNESIUM SERPL-MCNC: 2 MG/DL (ref 1.6–2.6)
MCH RBC QN AUTO: 27.4 PG (ref 26.8–34.3)
MCHC RBC AUTO-ENTMCNC: 32.9 G/DL (ref 31.4–37.4)
MCV RBC AUTO: 84 FL (ref 82–98)
MONOCYTES # BLD AUTO: 1.11 THOUSAND/ΜL (ref 0.17–1.22)
MONOCYTES NFR BLD AUTO: 11 % (ref 4–12)
NEUTROPHILS # BLD AUTO: 7.66 THOUSANDS/ΜL (ref 1.85–7.62)
NEUTS SEG NFR BLD AUTO: 72 % (ref 43–75)
NRBC BLD AUTO-RTO: 0 /100 WBCS
PLATELET # BLD AUTO: 305 THOUSANDS/UL (ref 149–390)
PMV BLD AUTO: 9.7 FL (ref 8.9–12.7)
POTASSIUM SERPL-SCNC: 3.7 MMOL/L (ref 3.5–5.3)
RBC # BLD AUTO: 4.19 MILLION/UL (ref 3.88–5.62)
SODIUM SERPL-SCNC: 137 MMOL/L (ref 136–145)
WBC # BLD AUTO: 10.45 THOUSAND/UL (ref 4.31–10.16)

## 2022-05-15 PROCEDURE — 99232 SBSQ HOSP IP/OBS MODERATE 35: CPT | Performed by: INTERNAL MEDICINE

## 2022-05-15 PROCEDURE — 82948 REAGENT STRIP/BLOOD GLUCOSE: CPT

## 2022-05-15 PROCEDURE — 97164 PT RE-EVAL EST PLAN CARE: CPT

## 2022-05-15 PROCEDURE — 80048 BASIC METABOLIC PNL TOTAL CA: CPT | Performed by: INTERNAL MEDICINE

## 2022-05-15 PROCEDURE — 83735 ASSAY OF MAGNESIUM: CPT | Performed by: INTERNAL MEDICINE

## 2022-05-15 PROCEDURE — 85025 COMPLETE CBC W/AUTO DIFF WBC: CPT | Performed by: INTERNAL MEDICINE

## 2022-05-15 PROCEDURE — 99024 POSTOP FOLLOW-UP VISIT: CPT | Performed by: PODIATRIST

## 2022-05-15 RX ORDER — INSULIN LISPRO 100 [IU]/ML
3 INJECTION, SOLUTION INTRAVENOUS; SUBCUTANEOUS
Status: DISCONTINUED | OUTPATIENT
Start: 2022-05-15 | End: 2022-05-15 | Stop reason: HOSPADM

## 2022-05-15 RX ORDER — INSULIN GLARGINE 100 [IU]/ML
12 INJECTION, SOLUTION SUBCUTANEOUS EVERY MORNING
Status: DISCONTINUED | OUTPATIENT
Start: 2022-05-16 | End: 2022-05-15 | Stop reason: HOSPADM

## 2022-05-15 RX ORDER — INSULIN GLARGINE 100 [IU]/ML
30 INJECTION, SOLUTION SUBCUTANEOUS EVERY MORNING
Start: 2022-05-15

## 2022-05-15 RX ADMIN — LISINOPRIL 5 MG: 5 TABLET ORAL at 08:08

## 2022-05-15 RX ADMIN — INSULIN GLARGINE 10 UNITS: 100 INJECTION, SOLUTION SUBCUTANEOUS at 08:08

## 2022-05-15 RX ADMIN — FUROSEMIDE 40 MG: 40 TABLET ORAL at 08:08

## 2022-05-15 RX ADMIN — HEPARIN SODIUM 5000 UNITS: 5000 INJECTION INTRAVENOUS; SUBCUTANEOUS at 05:19

## 2022-05-15 RX ADMIN — ASPIRIN 81 MG CHEWABLE TABLET 81 MG: 81 TABLET CHEWABLE at 08:09

## 2022-05-15 RX ADMIN — ATORVASTATIN CALCIUM 40 MG: 40 TABLET, FILM COATED ORAL at 08:09

## 2022-05-15 RX ADMIN — CEFAZOLIN SODIUM 2000 MG: 2 SOLUTION INTRAVENOUS at 11:38

## 2022-05-15 RX ADMIN — INSULIN LISPRO 2 UNITS: 100 INJECTION, SOLUTION INTRAVENOUS; SUBCUTANEOUS at 11:38

## 2022-05-15 RX ADMIN — INSULIN LISPRO 1 UNITS: 100 INJECTION, SOLUTION INTRAVENOUS; SUBCUTANEOUS at 08:09

## 2022-05-15 RX ADMIN — CEFAZOLIN SODIUM 2000 MG: 2 SOLUTION INTRAVENOUS at 05:18

## 2022-05-15 RX ADMIN — METOPROLOL TARTRATE 25 MG: 25 TABLET, FILM COATED ORAL at 08:09

## 2022-05-15 NOTE — CASE MANAGEMENT
Case Management Discharge Planning Note    Patient name Tank Kinney  Location [facility] 339-09 MRN [id number]  : 1960 Date 5/15/2022       Current Admission Date: 2022  Current Admission Diagnosis:Wound of left foot   Patient Active Problem List    Diagnosis Date Noted    Preoperative clearance 2022    Hypokalemia 2022    Wound of left foot 2022    Proteinuria 2022    Cirrhosis 2021    AVN [facility] of bone) [facility] 2021    Presacral mass 2021    LLQ pain 2021    Acute kidney injury 2021    URI due to influenza A virus 2021    Sepsis ([facility] ) 02/10/2020    Abscess of tendon of left foot 02/10/2020    Dislocation of proximal interphalangeal joint of left little finger 10/02/2018    Abscess of left foot 2018    Presence of drug coated stent in LAD coronary artery 2018    Presence of drug coated stent in right coronary artery 2018    Orthopnea 2017    CKD (chronic kidney disease) stage 3 2017    Essential hypertension     Insulin-dependent diabetes mellitus     Coronary artery disease     Dyslipidemia 01/15/2015    Chronic combined systolic and diastolic CHF     Ischemic cardiomyopathy 2014      LOS (days): 3  Geometric Mean LOS (GMLOS) (days): 2 90  Days to GMLOS:-0 1     OBJECTIVE:  Risk of Unplanned Readmission Score: 17 6         Current admission status: Inpatient   Preferred Pharmacy:   [facility] [address]  Phone: [phone] Fax: [phone]    Primary Care Provider: Valeria Villafuerte DO    Primary Insurance: BLUE CROSS  Secondary Insurance:     DISCHARGE DETAILS:    Requested  [employer]         Is the patient interested in [facility] at discharge?: Yes  [address] requested[de-identified] [address] Name[de-identified] Other ([facility])  [address] [place] [address] [place] Provider[de-identified] PCP  Home Health Services Needed[de-identified] Evaluate Functional Status and Safety, Post-Op Care and Assessment, Wound/Ostomy Care  Homebound Criteria Met[de-identified] Uses an Assist Device (i e  cane, walker, etc), Requires the Assistance of Another Person for Safe Ambulation or to Leave the Home  Supporting Clincal Findings[de-identified] Limited Endurance    Other Referral/Resources/Interventions Provided:  Interventions: C  Referral Comments: -[facility] unable to accept patient at this time  [facility] has accepted patient for SN, SOC within 48hours  Patient informed of same and has accepted SOC with [facility]  CM updated Dr Crissy Gonzales of same      Treatment Team Recommendation: Home with [facility]  Discharge Destination Plan[de-identified] Home with [place] at Discharge : Automobile, Family

## 2022-05-15 NOTE — ASSESSMENT & PLAN NOTE
Lab Results   Component Value Date    HGBA1C 10 4 (H) 04/06/2022     · Continue basal insulin w/ additional SSI coverage per [employer] - optimized basal insulin w/ addition of prandial insulin dosing while hospitalized -> resume home regimen on discharge  · Carbohydrate restricted diet  · Needs aggressive blood sugar control in the setting of infection

## 2022-05-15 NOTE — DISCHARGE SUMMARY
PODIATRY DISCHARGE SUMMARY     Patient Name: Karen Khan   Age & Sex: 57 y o  male   MRN: [id number]  Unit/Bed#: [facility] 913-01   Encounter: [id number]  Length of Stay: 3 days    ASSESSMENT:    Myah Mueller is a 57 y o  male with:    1  Left foot wound with cellulitis-Farr 3-POA              -S/p 5th digit amputation with filet flap (DOS 5/14/22)  2  Bilateral LE chronic venous hypertension  3  T2DM  4  CKD stage 3  5  CAD      PLAN:    · Left foot incision site stable with all sutures intact and no acute clinical signs of infection  Patient is stable for discharge from podiatric standpoint  Follow up outpatient with Podiatry within 1 week of discharge  · Elevation and offloading on green foam wedges or pillows when non-ambulatory  · Appreciate consulting services for recommendations and management  Antibiotics: Ancef  Pharmacologic VTE Prophylaxis: Heparin   Mechanical VTE Prophylaxis: sequential compression device   Weightbearing status:  Weight-bearing to left heel for transfers only and Darco wedge shoe    Disposition:  Patient stable for discharge today  SUBJECTIVE     The patient was seen, evaluated, and assessed at bedside today  The patient was awake, alert, and in no acute distress  No acute events overnight  The patient reports no pain to surgical foot  Patient denies N/V/F/chills/SOB/CP  Review of Systems  Constitutional: Negative  HENT: Negative  Eyes: Negative  Respiratory: Negative  Cardiovascular: Negative  Gastrointestinal: Negative  Musculoskeletal:  Negative  Skin:  Left foot incision site  Neurological:  Diminished sensation bilateral feet  Psych: Negative  OBJECTIVE     Physical Exam:     General: Alert, cooperative and no distress  Lungs: Non labored breathing  Abdomen: Soft, non-tender  Lower extremity exam:  Cardiovascular status at baseline  Neurological status at baseline  Musculoskeletal status at baseline  No calf tenderness noted bilaterally  Left foot incision site stable with all sutures intact and skin well aligned without signs of active infection: no purulence, no malodor, no ascending erythema, no crepitus, no fluctuance  Skin blanching at plantar aspect of flap site  Clinical Images 05/15/22:                  Varghese Khan is a 57 y o  male who was admitted on 5/12/2022 due to left foot wound with cellulitis  Patient was a direct admission from Dr Swetha Carter after being seen in [address] [place] [address]  He was admitted for IV antibiotics and toe amputation  Left foot x-rays taken, negative for any acute osteomyelitis, noted incidental finding of small foreign body distal left great toe  On 05/14 patient underwent left 5th digit amputation with toe filet flap with Dr Geri Pérez  Incision site stable today and patient is stable for discharge  He will have visiting nurses for dressing changes multiple times weekly and follow up with Dr Swetha Carter outpatient within 1 week of discharge  New Medications:  - continue keflex for 5 days following discharge    DISCHARGE INFORMATION     PCP at Discharge: Dee Carlin DO    Admitting Provider: Swetha Carter  Admission Date: 5/12/2022     Discharge Provider: Geri Pérez  Discharge Date: 05/15/22    Discharge Disposition: Home with [facility] (Reminder: Complete face to face encounter)  Discharge Condition: Stable  Discharge with Lines: No  Discharge Diet: none  Activity Restrictions:  Weight-bearing to left heel for transfers only in Darco wedge shoe  Test Results Pending at Discharge: none  Medications at Discharge: See after visit summary for reconciled discharge medications provided to patient and family        Discharge Diagnoses:  Principal Problem:    Wound of left foot  Active Problems:    Essential hypertension    Insulin-dependent diabetes mellitus    Coronary artery disease    CKD (chronic kidney disease) stage 3    Chronic combined systolic and diastolic CHF    Cirrhosis Hypokalemia      Consulting Providers:  Internal Medicine  Physical therapy  Occupational therapy    Diagnostic & Therapeutic Procedures Performed:  XR foot left 3+ views    Result Date: 5/14/2022  Impression: Status post amputation of the 5th toe phalanges  Workstation performed: [id number]     XR foot 3+ vw left    Result Date: 5/12/2022  Impression: Deep soft tissue ulceration lateral margin of the 5th toe without discernible evidence for osteomyelitis  New 2 mm radiopaque foreign body plantar soft tissues 1st toe distally  Workstation performed: [id number]       Code Status: Level 1 - Full Code  Advance Directive and Living Will:    yes  Power of :    POLST:      FOLLOW-UP     DPM Outpatient Follow-up: Dr Chau      Discharge Statement:  I spent 25 minutes discharging the patient  This time was spent on the day of discharge  I had direct contact with the patient on the day of discharge  Additional documentation is required if more than 30 minutes were spent on discharge  Portions of the record may have been created with voice recognition software  Occasional wrong word or "sound a like" substitutions may have occurred due to the inherent limitations of voice recognition software    Read the chart carefully and recognize, using context, where substitutions have occurred   ==  Asia Barcenas, [facility]  Podiatric Medicine & Surgery

## 2022-05-15 NOTE — CASE MANAGEMENT
Case Management Discharge Planning Note    Patient name Ashely Barr  Location [facility] 913/[facility] 760-25 MRN [id number]  : 1960 Date 5/15/2022       Current Admission Date: 2022  Current Admission Diagnosis:Essential hypertension   Patient Active Problem List    Diagnosis Date Noted    Preoperative clearance 2022    Hypokalemia 2022    Proteinuria 2022    Cirrhosis 2021    AVN (avascular necrosis of bone) ([facility] ) 2021    Presacral mass 2021    LLQ pain 2021    Acute kidney injury 2021    URI due to influenza A virus 2021    Sepsis ([facility] ) 02/10/2020    Abscess of tendon of left foot 02/10/2020    Dislocation of proximal interphalangeal joint of left little finger 10/02/2018    Abscess of left foot 2018    Presence of drug coated stent in LAD coronary artery 2018    Presence of drug coated stent in right coronary artery 2018    Orthopnea 2017    CKD (chronic kidney disease) stage 3 2017    Essential hypertension     Insulin-dependent diabetes mellitus     Coronary artery disease     Dyslipidemia 01/15/2015    Chronic combined systolic and diastolic CHF     Ischemic cardiomyopathy 2014      LOS (days): 3  Geometric Mean LOS (GMLOS) (days): 2 90  Days to GMLOS:-0 1     OBJECTIVE:  Risk of Unplanned Readmission Score: 17 6         Current admission status: Inpatient   Preferred Pharmacy:   [address]  [address]  Phone: [phone] Fax: [phone]    Primary Care Provider: Mary Ann Godinez DO    Primary Insurance: BLUE CROSS  Secondary Insurance:     DISCHARGE DETAILS:    Discharge planning discussed with[de-identified] patient at bedside  Freedom of Choice: Yes  Comments - Freedom of Choice: [facility] recommendations for SN services discussed including [facility]       Were Treatment Team discharge recommendations reviewed with patient/caregiver?: Yes  Did patient/caregiver verbalize understanding of patient care needs?: Yes  Were patient/caregiver advised of the risks associated with not following Treatment Team discharge recommendations?: Yes    Other Referral/Resources/Interventions Provided:  Interventions: [facility]  Referral Comments: Referral placed via [facility] to [facility] for SN: Dressing changes and post of care per patients request  Per discussion w/ Dr Shepherd-Podiatry pt is medically cleared for d/c home today and will require nursing visit on Tuesday 5/17 or Wednesday 5/18 for dressing changes  CM requested same on referral and will await formal [facility] acceptance        Treatment Team Recommendation: Home with [facility]  Discharge Destination Plan[de-identified] Home with [place] at Discharge : Automobile, Family

## 2022-05-15 NOTE — PHYSICAL THERAPY NOTE
Physical Therapy Re-evaluation    Patient Name: Deisi Washington    TNSXP'R Date: 5/15/2022     Problem List  Active Problems:    Essential hypertension    Insulin-dependent diabetes mellitus    Coronary artery disease    CKD (chronic kidney disease) stage 3    Chronic combined systolic and diastolic CHF    Cirrhosis    Hypokalemia       Past Medical History  Past Medical History:   Diagnosis Date    CAD (coronary artery disease)     Chronic combined systolic and diastolic CHF (congestive heart failure) (HCC)     Diabetes mellitus ([facility] )     type 2    Dyslipidemia     Hypertension     Ischemic cardiomyopathy     MI (myocardial infarction) ([facility] )     Osteomyelitis ([facility] )     Pancreatitis         Past Surgical History  Past Surgical History:   Procedure Laterality Date    ANGIOPLASTY      ballon    CORONARY ANGIOPLASTY WITH STENT PLACEMENT      LULA to proximal and mid LAD, Proximal RCA  HERNIA REPAIR      INCISION AND DRAINAGE OF WOUND Left 2/14/2020    Procedure: INCISION AND DRAINAGE (I&D) EXTREMITY left foot (cpt 66437);   Surgeon: Isabell Castro DPM;  [facility];  Service: Nini Flores Left 2/14/2020    Procedure: EXPLORATION EXTREMITY;  Surgeon: Isabell Castro DPM;  [facility];  Service: Podiatry    NC NEG PRESS WOUND TX, < 50 CM Left 2/14/2020    Procedure: APPLICATION VAC DRESSING;  Surgeon: Isabell Castro DPM;  [facility];  Service: Podiatry         05/15/22 1000   PT Last Visit   PT Visit Date 05/15/22   Note Type   Note type Re-Evaluation   Pain Assessment   Pain Assessment Tool 0-10   Pain Score No Pain   Restrictions/Precautions   Weight Bearing Precautions Per Order Yes   LLE Weight Bearing Per Order (S)  PWB  ("heel touch WB for transfers only" per orders)   Braces or Orthoses (S)    (heal unloading shoe (LLE))   Other Precautions Fall Risk;WBS   Home Living   Type of [address] [place] [address] Two level;Performs ADLs on one level; Able to live on main level with bedroom/bathroom;Stairs to enter with rails  (1 vs 2 ANA CRISTINA, full bath main floor)   Bathroom Shower/Tub Walk-in shower   Bathroom Toilet Standard   Home Equipment Walker;Cane;Crutches   Additional Comments pt reports ambulating with crutches PTA   Prior Function   Level of [place] Independent with ADLs and functional mobility   Lives With Spouse;Daughter   Receives Help From Family   ADL Assistance Independent   IADLs Independent   Falls in the last 6 months 0   Comments family able to assist as needed   General   Additional Pertinent History pt initally evaluation by PT on 5/13/2022 and d/c from caseload 2* being I/S level for all mobility  Pt now s/p L 5th toe excision and PWB on LLE   Family/Caregiver Present No   Cognition   Overall Cognitive Status WFL   Arousal/Participation Cooperative   Orientation Level Oriented X4   Memory Within functional limits   Following Commands Follows all commands and directions without difficulty   Comments very pleasant to work with   RLE Assessment   RLE Assessment WFL   LLE Assessment   LLE Assessment WFL   Bed Mobility   Supine to Sit 7  Independent   Sit to Supine Unable to assess   Additional Comments remained seated in chair upon conclusion   Transfers   Sit to Stand 5  Supervision   Additional items Increased time required;Verbal cues   Stand to Sit 5  Supervision   Additional items Increased time required;Verbal cues   Stand pivot 6  Modified independent   Additional items Increased time required;Verbal cues   Additional Comments maintained heel touch PWB with transfers  Ambulation/Elevation   Gait pattern   (swing through pattern)   Gait Assistance 5  Supervision   Additional items Verbal cues   Assistive Device Crutches   Distance [de-identified]'   Stair Management Assistance Not tested  (denies concerns)   Ambulation/Elevation Additional Comments pt maintained NWB on LLE and ambulated with swing through pattern using crutches  VC to slow down as pt ambulates very fast with crutches and had one LOB which he was able to self correct without A  Balance   Static Sitting Normal   Dynamic Sitting Good   Static Standing Fair +   Dynamic Standing Fair   Ambulatory Fair   Endurance Deficit   Endurance Deficit No   Activity Tolerance   Activity Tolerance Patient tolerated treatment well   Nurse Made Aware pt cleared to see and mobilize per nursing   Assessment   Prognosis Excellent   Problem List Orthopedic restrictions;Pain   Assessment Pt initially evaluated by PT on 5/13/2022 and was d/c from caseload 2* being I/S level for all functional mobility  He is seen today for PT re-evaluation s/p "EXCISION OF LEFT 5TH TOE ULCER WITH FILET FLAP (Left)" on 5/14/2022 and having LLE PWB orders  Upon re-eval, pt performed all mobility at I/S level as outlined above  He requires cues for pacing and safety throughout 2* moving quickly using swing through pattern on crutches  1 LOB 2* rushing however pt was able to self correct without A  At this time, pt has no further acute care PT needs 2* being at least S level for all functional mobility and having a supportive family who can assist as needed  Pt denies any concerns regarding his current functional mobility or ability to return home safely at this time  Recommend pt continues to mobilize with staff during hospital stay  The patient's AM-PAC Basic Mobility Inpatient Short Form Raw Score is 21  A Raw score of greater than 16 suggests the patient may benefit from discharge to home  Please also refer to the recommendation of the Physical Therapist for safe discharge planning  D/C recommendations are home with support of family, no immediate PT needs anticipated     Barriers to Discharge None   Goals   Patient Goals to go home   Plan   PT Frequency Other (Comment)  (d/c acute care PT)   Recommendation   PT Discharge Recommendation No rehabilitation needs  (home with support)   Equipment Recommended Crutches  (pt has PTA)   Additional Comments pt denies any concerns regarding his current functional mobility or ability to return home safely at this time     AM-PAC Basic Mobility Inpatient   Turning in Bed Without Bedrails 4   Lying on Back to Sitting on Edge of Flat Bed 4   Moving Bed to Chair 4   Standing Up From Chair 3   Walk in Room 3   Climb 3-5 Stairs 3   Basic Mobility Inpatient Raw Score 21   Basic Mobility Standardized Score 45 55   Highest Level Of Mobility   JH-HLM Goal 6: Walk 10 steps or more   JH-HLM Achieved 7: Walk 25 feet or more   Modified [place] Scale   Modified [place] Scale 3   Anselmo Jacobo, PT, DPT

## 2022-05-15 NOTE — DISCHARGE INSTR - OTHER ORDERS
Discharge Instructions - Podiatry    Weight Bearing Status:  Weight-bearing to left heel for transfers only in Darco wedge shoe           Pain: Continue analgesics as directed    Follow-up appointment instructions: Please make an appointment within one week of discharge with Dr Clifton Redmond  Contact sooner if any increase in pain, or signs of infection occur    Wound Care: Leave dressings clean, dry, and intact between professional dressing changes    Nursing Instructions: Please apply Betadine soaked adaptic  Then cover with Gauze and secure with Kerlix and tape  Please change dressing every Monday, Wednesday, and Friday

## 2022-05-15 NOTE — PROGRESS NOTES
[facility]  Consult Progress Note Ayana Abarca 1960, 57 y o  male MRN: [id number]  Unit/Bed#: [facility] 913-01 Encounter: [id number]  Primary Care Provider: Rob Lott DO   Date and time admitted to hospital: 5/12/2022  3:15 PM      * Wound of left foot  Assessment & Plan  · Left foot nonhealing wound with cellulitis  · S/p 5th toe ulcer excision w/ filet flap (POD #2)  · Antibiotic therapy (IV Ancef) per primary service (podiatry)  · PRN pain control  · Weight-bearing status and wound care instructions per podiatry    CKD (chronic kidney disease) stage 3  Assessment & Plan  · Baseline creatinine approximately 1 7-2 0 - currently stable @ 1 7  · Monitor renal function and urine output - limit/avoid nephrotoxins if possible - avoid hypotension as possible - note Zestril/Lasix use    Essential hypertension  Assessment & Plan  · Continue Lopressor/Zestril  · Low-sodium diet    Chronic combined systolic and diastolic CHF  Assessment & Plan  · Ischemic cardiomyopathy - last EF of 50%  · On diuresis w/ Lasix - on beta-blockade with Lopressor - on afterload reduction with Zestril  · Low-sodium diet w/ fluid restrictions    Coronary artery disease  Assessment & Plan  · S/p prior coronary stenting  · Continue ASA/statin/Lopressor/Zestril    Insulin-dependent diabetes mellitus  Assessment & Plan  Lab Results   Component Value Date    HGBA1C 10 4 (H) 04/06/2022     · Continue basal insulin w/ additional SSI coverage per Accu-Cheks - optimized basal insulin w/ addition of prandial insulin dosing while hospitalized -> resume home regimen on discharge  · Carbohydrate restricted diet  · Needs aggressive blood sugar control in the setting of infection    Cirrhosis  Assessment & Plan  · Incidental finding on previous hospitalizations  · Outpatient follow-up  · Continue Lasix    Hypokalemia  Assessment & Plan  · Normalized status post repletion  · Continue serum potassium and magnesium monitoring      DVT Prophylaxis:  Heparin SC      Patient Centered Rounds:  I have performed bedside rounds and discussed plan of care with nursing today  Discussions with Specialists or Other Care Team Provider:  see above assessments if applicable    Education and Discussions with Family / Patient:  Patient at bedside - family to be updated, if necessary, by primary service    Time Spent for Care:  32 minutes  More than 50% of total time spent on counseling and coordination of care as described above  Current Length of Stay: 3 day(s)    Current Patient Status: Inpatient   Certification Statement:  Patient will continue to require additional hospital stay due to assessments as noted above  Code Status: Level 1 - Full Code        Subjective:     No new complaints this time  Denies uncontrolled pain  Overall, he remains in pleasant and hopeful spirits  Uses crutches for ambulation  Objective:     Vitals:   Temp (24hrs), Av 7 °F (37 1 °C), Min:98 4 °F (36 9 °C), Max:99 °F (37 2 °C)    Temp:  [98 4 °F (36 9 °C)-99 °F (37 2 °C)] 99 °F (37 2 °C)  HR:  [72-83] 72  Resp:  [18] 18  BP: (130-146)/(75-85) 130/75  SpO2:  [94 %-97 %] 97 %  Body mass index is 25 53 kg/m²  Input and Output Summary (last 24 hours):        Intake/Output Summary (Last 24 hours) at 5/15/2022 1513  Last data filed at 5/15/2022 1242  Gross per 24 hour   Intake 520 ml   Output --   Net 520 ml       Physical Exam:     GENERAL:  No immediate distress at rest  HEAD:  Normocephalic - atraumatic  EYES: PERRL - EOMI   MOUTH:  Mucosa moist  NECK:  Supple - full range of motion  CARDIAC:  Rate controlled  PULMONARY:  Nonlabored respirations at rest  ABDOMEN:  Soft - nontender/nondistended - active bowel sounds  MUSCULOSKELETAL:  Motor strength/range of motion mildly deconditioned s/p left foot procedure  NEUROLOGIC:  Alert/oriented at baseline  SKIN:  Chronic wrinkles/blemishes - distal left foot dressed/wrapped  PSYCHIATRIC: Mood/affect pleasant      Additional Data:     Labs & Recent Cultures:    Results from last 7 days   Lab Units 05/15/22  0654   WBC Thousand/uL 10 45*   HEMOGLOBIN g/dL 11 5*   HEMATOCRIT % 35 0*   PLATELETS Thousands/uL 305   NEUTROS PCT % 72   LYMPHS PCT % 12*   MONOS PCT % 11   EOS PCT % 3     Results from last 7 days   Lab Units 05/15/22  0654   POTASSIUM mmol/L 3 7   CHLORIDE mmol/L 104   CO2 mmol/L 30   BUN mg/dL 31*   CREATININE mg/dL 1 70*   CALCIUM mg/dL 8 9         Results from last 7 days   Lab Units 05/15/22  1117 05/15/22  0736 05/15/22  0009 05/14/22  2043 05/14/22  1642 05/14/22  1109 05/14/22  0845 05/13/22  1621 05/13/22  1215 05/13/22  0816 05/13/22  0558 05/12/22  1659   POC GLUCOSE mg/dl 226* 166* 227* 420* 268* 250* 253* 224* 163* 117 55* 244*                 Results from last 7 days   Lab Units 05/13/22  1216 05/12/22  1100   BLOOD CULTURE  No Growth at 24 hrs    No Growth at 24 hrs   --    GRAM STAIN RESULT   --  2+ Gram positive cocci in pairs, chains and clusters*  1+ Gram negative rods*  No polys seen*   WOUND CULTURE   --  2+ Growth of Escherichia coli*         Last 24 Hours Medication List:   Current Facility-Administered Medications   Medication Dose Route Frequency Provider Last Rate    acetaminophen  650 mg Oral Q4H PRN Sergiole Enoc, DPM      aluminum-magnesium hydroxide-simethicone  30 mL Oral Q6H PRN Bello Stubbs DPFINESSE      aspirin  81 mg Oral Daily Bello Stubbs DPM      atorvastatin  40 mg Oral Daily [place], [place]      cefazolin  2,000 mg Intravenous Q8H Sherle Enoc, DPM 2,000 mg (05/15/22 1138)    docusate sodium  100 mg Oral BID Bello Stubbs DPFINESSE      furosemide  40 mg Oral Daily Bello Enoc, DPM      heparin (porcine)  5,000 Units Subcutaneous [place], [place]      [START ON 5/16/2022] insulin glargine  12 Units Subcutaneous GURPREET Blackwell MD      insulin lispro  1-6 Units Subcutaneous 4x Daily (AC & HS) Corey Gore PA-C      insulin lispro 3 Units Subcutaneous TID With Meals Felicia Robertson MD      lactated ringers  20 mL/hr Intravenous Continuous Jayson Bettencourt DPM Stopped (05/14/22 1005)    lisinopril  5 mg Oral Daily Jayson Bettencourt DPM      metoprolol tartrate  25 mg Oral Q12H [facility] Tootie Baltazar      ondansetron  4 mg Intravenous Q6H PRN Jayson Bettencourt DPM      oxyCODONE  5 mg Oral Q4H PRN Jayson Bettencourt DPM      sodium hypochlorite  1 application Irrigation Daily Tootie Baltazar                    ** Please Note: This note is constructed using a voice recognition dictation system  An occasional wrong word/phrase or sound-a-like substitution may have been picked up by dictation device due to the inherent limitations of voice recognition software  Read the chart carefully and recognize, using reasonable context, where substitutions may have occurred  **

## 2022-05-15 NOTE — ASSESSMENT & PLAN NOTE
· Baseline creatinine approximately 1 7-2 0 - currently stable @ 1 7  · Monitor renal function and urine output - limit/avoid nephrotoxins if possible - avoid hypotension as possible - note Zestril/Lasix use

## 2022-05-15 NOTE — ASSESSMENT & PLAN NOTE
· Left foot nonhealing wound with cellulitis  · S/p 5th toe ulcer excision w/ filet flap (POD #2)  · Antibiotic therapy (IV Ancef) per primary service (podiatry)  · PRN pain control  · Weight-bearing status and wound care instructions per podiatry

## 2022-05-16 NOTE — UTILIZATION REVIEW
Notification of Discharge   This is a Notification of Discharge from our facility [address]  Please be advised that this patient has been discharge from our facility  Below you will find the admission and discharge date and time including the patients disposition  UTILIZATION REVIEW CONTACT:  Lien Argueta  Utilization   Network Utilization Review Department  Phone: [phone] x carefully listen to the prompts  All voicemails are confidential   Email: [email or web address]     PHYSICIAN ADVISORY SERVICES:  FOR [id number] REVIEW - MEDICAL NECESSITY DENIAL  Phone: [phone]  Fax: [phone]  Email: [email or web address]     PRESENTATION DATE: 5/12/2022  3:15 PM  OBERVATION ADMISSION DATE:   INPATIENT ADMISSION DATE: 5/12/22  3:15 PM   DISCHARGE DATE: 5/15/2022  4:37 PM  DISPOSITION: Home with [place] with [address] [place] [address] Road INFORMATION:  Send all requests for admission clinical reviews, approved or denied determinations and any other requests to dedicated fax number below belonging to the campus where the patient is receiving treatment   List of dedicated fax numbers:  [address] DENIALS (Administrative/Medical Necessity) [phone]   [address] (Maternity/NICU/Pediatrics) [phone]   Vandana Benavidez [phone]   130 [facility] [phone]   [address] [place] [address] [phone]   [address] [phone]   [facility]  [phone]   [address] [place] [address], S W  2401 [facility] 1000 W [facility] [phone]

## 2022-05-17 ENCOUNTER — TRANSITIONAL CARE MANAGEMENT (OUTPATIENT)
Dept: FAMILY MEDICINE CLINIC | Facility: CLINIC | Age: 62
End: 2022-05-17

## 2022-05-18 LAB
BACTERIA BLD CULT: NORMAL
BACTERIA BLD CULT: NORMAL

## 2022-05-19 ENCOUNTER — OFFICE VISIT (OUTPATIENT)
Dept: WOUND CARE | Facility: HOSPITAL | Age: 62
End: 2022-05-19
Payer: COMMERCIAL

## 2022-05-19 VITALS
SYSTOLIC BLOOD PRESSURE: 122 MMHG | HEART RATE: 69 BPM | RESPIRATION RATE: 16 BRPM | DIASTOLIC BLOOD PRESSURE: 70 MMHG | TEMPERATURE: 97 F

## 2022-05-19 DIAGNOSIS — E11.40 TYPE 2 DIABETES MELLITUS WITH DIABETIC NEUROPATHY, WITH LONG-TERM CURRENT USE OF INSULIN (HCC): ICD-10-CM

## 2022-05-19 DIAGNOSIS — Z79.4 TYPE 2 DIABETES MELLITUS WITH DIABETIC NEUROPATHY, WITH LONG-TERM CURRENT USE OF INSULIN (HCC): ICD-10-CM

## 2022-05-19 DIAGNOSIS — L97.929 CHRONIC VENOUS HYPERTENSION (IDIOPATHIC) WITH ULCER OF LEFT LOWER EXTREMITY (HCC): ICD-10-CM

## 2022-05-19 DIAGNOSIS — I87.312 CHRONIC VENOUS HYPERTENSION (IDIOPATHIC) WITH ULCER OF LEFT LOWER EXTREMITY (HCC): ICD-10-CM

## 2022-05-19 DIAGNOSIS — Z48.89 ENCOUNTER FOR POST SURGICAL WOUND CHECK: Primary | ICD-10-CM

## 2022-05-19 PROCEDURE — 99213 OFFICE O/P EST LOW 20 MIN: CPT | Performed by: PODIATRIST

## 2022-05-19 PROCEDURE — 99024 POSTOP FOLLOW-UP VISIT: CPT | Performed by: PODIATRIST

## 2022-05-19 RX ORDER — LIDOCAINE 40 MG/G
CREAM TOPICAL ONCE
Status: COMPLETED | OUTPATIENT
Start: 2022-05-19 | End: 2022-05-19

## 2022-05-19 RX ADMIN — LIDOCAINE 1 APPLICATION: 40 CREAM TOPICAL at 10:14

## 2022-05-19 NOTE — PROGRESS NOTES
Patient ID: Alysia Skelton is a 57 y o  male Date of Birth 1960     Chief Complaint  Chief Complaint   Patient presents with    Follow Up Wound Care Visit     5TH TOE AMP SITE       Allergies  Patient has no known allergies  Assessment:    No problem-specific Assessment & Plan notes found for this encounter  Diagnoses and all orders for this visit:    Encounter for post surgical wound check  -     lidocaine (LMX) 4 % cream  -     Wound cleansing and dressings; Future  -     Wound home care; Future    Chronic venous hypertension (idiopathic) with ulcer of left lower extremity (HCC)    Type 2 diabetes mellitus with diabetic neuropathy, with long-term current use of insulin ([facility] [address] )          Procedures      PLAN:  1  Reviewed the OP note and post-op X-ray  Patient was counseled on the condition and diagnosis  2  Instructed strict NWB left foot  Local care per VNA at home  3  Elevate compression LLE  4  Reviewed wound culture  Finish antibiotics as prescribed  5  RA in 1 week  Wound 05/19/22 Surgical Toe (Comment  which one) Anterior; Left (Active)   Wound Image Images linked 05/19/22 1000   Wound Description Other (Comment); Yellow;Eschar;Beefy red (SUTURES) 05/19/22 0958   Suzy-wound Assessment Edema 05/19/22 0958   Wound Length (cm) 3 8 cm 05/19/22 0958   Wound Width (cm) 5 3 cm 05/19/22 0958   Wound Depth (cm) 0 1 cm 05/19/22 0958   Wound Surface Area (cm^2) 20 14 cm^2 05/19/22 0958   Wound Volume (cm^3) 2 014 cm^3 05/19/22 0958   Calculated Wound Volume (cm^3) 2 01 cm^3 05/19/22 0958   Drainage Amount Moderate 05/19/22 0958   Drainage Description Serosanguineous; Bloody 05/19/22 0958   Non-staged Wound Description Full thickness 05/19/22 0958   Dressing Status Intact 05/19/22 0958       Wound 05/19/22 Surgical Toe (Comment  which one) Anterior; Left (Active)   Date First Assessed/Time First Assessed: 05/19/22 0958   Pre-Existing Wound: Yes  Primary Wound Type: Surgical  Location: (c) Toe (Comment  which one)  Wound Location Orientation: Anterior; Left  Wound Description (Comments): 5TH TOE AMP SITE       [REMOVED] Wound 03/31/22 Venous Ulcer Leg Left; Lower; Anterior;Proximal (Removed)   Resolved Date: 05/12/22  Date First Assessed/Time First Assessed: 03/31/22 0832   Pre-Existing Wound: Yes  Primary Wound Type: Venous Ulcer  Location: Leg  Wound Location Orientation: Left; Lower; Anterior;Proximal  Wound Outcome: Healed       [REMOVED] Wound 03/31/22 Diabetic Ulcer Foot Left;Plantar (Removed)   Resolved Date: 05/19/22  Date First Assessed/Time First Assessed: 03/31/22 0832   Pre-Existing Wound: Yes  Primary Wound Type: Diabetic Ulcer  Location: Foot  Wound Location Orientation: Left;Plantar  Wound Outcome: (c) Converged       [REMOVED] Wound 03/31/22 Venous Ulcer Leg Distal;Left; Anterior; Lower (Removed)   Resolved Date/Resolved Time: 04/21/22 1335  Date First Assessed/Time First Assessed: 03/31/22 0856   Pre-Existing Wound: Yes  Primary Wound Type: Venous Ulcer  Location: Leg  Wound Location Orientation: Distal;Left; Anterior; Lower  Wound Outcome: Healed       [REMOVED] Wound 04/07/22 Abscess Leg Left;Medial (Removed)   Resolved Date: 05/12/22  Date First Assessed/Time First Assessed: 04/07/22 1121   Primary Wound Type: Abscess  Location: Leg  Wound Location Orientation: Left;Medial  Wound Outcome: Healed       [REMOVED] Wound 05/14/22 Foot Left (Removed)   Resolved Date: 05/19/22  Date First Assessed/Time First Assessed: 05/14/22 0819   Location: Foot  Wound Location Orientation: Left  Incision's 1st Dressing: DRESSING XEROFORM 5 X 9 (x1), SPONGE GAUZE 4 X 4 16 PLY STRL PLASTIC TRAY LF (x1), JH 6 IN     Subjective:        HPI  Taffy Goltz presents for wound care  S/P left 5th toe amputation  He feels well  BS has been better  He presents with crutches and orthowedge shoe  He was putting some pressure on left foot        The following portions of the patient's history were reviewed and updated as appropriate: allergies, current medications, past family history, past medical history, past social history, past surgical history and problem list     PAST MEDICAL HISTORY:  Past Medical History:   Diagnosis Date    CAD (coronary artery disease)     Chronic combined systolic and diastolic CHF (congestive heart failure) ([facility] )     Diabetes mellitus ([facility] )     type 2    Dyslipidemia     Hypertension     Ischemic cardiomyopathy     MI (myocardial infarction) ([facility] )     Osteomyelitis ([place] [address] )     Pancreatitis        PAST SURGICAL HISTORY:  Past Surgical History:   Procedure Laterality Date    ANGIOPLASTY      ballon    CORONARY ANGIOPLASTY WITH STENT PLACEMENT      LULA to proximal and mid LAD, Proximal RCA   HERNIA REPAIR      INCISION AND DRAINAGE OF WOUND Left 2/14/2020    Procedure: INCISION AND DRAINAGE (I&D) EXTREMITY left foot (cpt 53514); Surgeon: Chantelle Purdy DPM;  [facility];  Service: Osvaldo Freud Left 2/14/2020    Procedure: EXPLORATION EXTREMITY;  Surgeon: Chantelle Purdy DPM;  [facility];  Service: Podiatry   111 [facility], < 50 CM Left 2/14/2020    Procedure: APPLICATION VAC DRESSING;  Surgeon: Chantelle Purdy DPM;  [facility];  Service: Podiatry    TOE AMPUTATION Left 5/14/2022    Procedure: EXCISION OF LEFT 5TH TOE ULCER WITH FILET FLAP;  Surgeon: Richar Gamboa DPM;  [facility];  Service: Podiatry        ALLERGIES:  Patient has no known allergies  MEDICATIONS:  Current Outpatient Medications   Medication Sig Dispense Refill    aspirin 81 mg chewable tablet Chew 1 tablet daily for 30 days 30 tablet 0    atorvastatin (LIPITOR) 40 mg tablet TAKE 1 TABLET BY MOUTH EVERY DAY 90 tablet 3    Blood Pressure Monitoring ([employer] Blood Pressure Monitor) KIT Use 2 (two) times a day Automatic BP cuff  Measure BP twice daily   1 kit 0    cephalexin (KEFLEX) 500 mg capsule Take 1 capsule by mouth every 6 (six) hours      furosemide (LASIX) 40 mg tablet Take 40 mg by mouth daily      Lantus SoloStar 100 units/mL injection pen Inject 30 Units under the skin every morning      lisinopril (ZESTRIL) 5 mg tablet Take 1 tablet (5 mg total) by mouth daily 90 tablet 1    metoprolol tartrate (LOPRESSOR) 25 mg tablet TAKE 1 TABLET (25 MG TOTAL) BY MOUTH EVERY 12 (TWELVE) HOURS 180 tablet 3    NovoLIN 70/30 FlexPen (70-30) 100 UNIT/ML injection pen       TRUE METRIX BLOOD GLUCOSE TEST test strip TEST FOUR TIMES DAILY  11    Unifine Pentips 31G X 8 MM MISC USE 4 TMES A DAY (Patient not taking: Reported on 5/12/2022)       No current facility-administered medications for this visit  SOCIAL HISTORY:  Social History     Socioeconomic History    Marital status: /Civil Union     Spouse name: None    Number of children: None    Years of education: None    Highest education level: None   Occupational History    None   Tobacco Use    Smoking status: Never Smoker    Smokeless tobacco: Never Used   Vaping Use    Vaping Use: Never used   Substance and Sexual Activity    Alcohol use: Not Currently     Comment: 20 years of sobriety    Drug use: Never    Sexual activity: Yes     Partners: Female     Birth control/protection: None   Other Topics Concern    None   Social History Narrative    None     Social Determinants of Health     Financial Resource Strain: Not on file   Food Insecurity: No Food Insecurity    Worried About Running Out of Food in the Last Year: Never true    Claire of Food in the Last Year: Never true   Transportation Needs: No Transportation Needs    Lack of Transportation (Medical): No    Lack of Transportation (Non-Medical):  No   Physical Activity: Not on file   Stress: Not on file   Social Connections: Not on file   Intimate Partner Violence: Not on file   Housing Stability: Low Risk     Unable to Pay for Housing in the Last Year: No    Number of Places Lived in the Last Year: 1    Unstable Housing in the Last Year: No      Review of Systems   Constitutional: Negative for appetite change, chills and fever  Respiratory: Negative for cough and shortness of breath  Cardiovascular: Negative for chest pain  Gastrointestinal: Negative for diarrhea, nausea and vomiting  Musculoskeletal: Negative for gait problem  Skin: Positive for wound  Neurological: Positive for numbness  Objective:       Wound 05/19/22 Surgical Toe (Comment  which one) Anterior; Left (Active)   Wound Image Images linked 05/19/22 1000   Wound Description Other (Comment); Yellow;Eschar;Beefy red (SUTURES) 05/19/22 0958   Suzy-wound Assessment Edema 05/19/22 0958   Wound Length (cm) 3 8 cm 05/19/22 0958   Wound Width (cm) 5 3 cm 05/19/22 0958   Wound Depth (cm) 0 1 cm 05/19/22 0958   Wound Surface Area (cm^2) 20 14 cm^2 05/19/22 0958   Wound Volume (cm^3) 2 014 cm^3 05/19/22 0958   Calculated Wound Volume (cm^3) 2 01 cm^3 05/19/22 0958   Drainage Amount Moderate 05/19/22 0958   Drainage Description Serosanguineous; Bloody 05/19/22 0958   Non-staged Wound Description Full thickness 05/19/22 0958   Dressing Status Intact 05/19/22 0958       /70   Pulse 69   Temp (!) 97 °F (36 1 °C)   Resp 16     Physical Exam  Vitals and nursing note reviewed  Constitutional:       General: He is not in acute distress  Appearance: He is not toxic-appearing or diaphoretic  Cardiovascular:      Rate and Rhythm: Normal rate and regular rhythm  Pulses:           Dorsalis pedis pulses are 2+ on the right side and 1+ on the left side  Posterior tibial pulses are 0 on the right side and 0 on the left side  Comments: Venous stasis skin changes LLE with edema  Pulmonary:      Effort: Pulmonary effort is normal    Musculoskeletal:         General: Deformity present  No tenderness or signs of injury  Cervical back: Normal range of motion and neck supple  Left lower leg: Edema present        Right foot: No Charcot foot or foot drop  Left foot: No Charcot foot or foot drop  Skin:     General: Skin is warm and dry  Capillary Refill: Capillary refill takes less than 2 seconds  Coloration: Skin is not cyanotic or mottled  Findings: Wound present  No abscess or ecchymosis  Nails: There is no clubbing  Comments: Left leg wounds remain closed  Incision is coapted  Moderate amount of serosanguinous drainage on the dressing  Infection resolved left foot  See wound assessment  Neurological:      General: No focal deficit present  Mental Status: He is alert and oriented to person, place, and time  Cranial Nerves: No cranial nerve deficit  Sensory: Sensory deficit present  Coordination: Coordination normal    Psychiatric:         Mood and Affect: Mood normal          Behavior: Behavior normal          Thought Content: Thought content normal          Judgment: Judgment normal            Wound Instructions:  Orders Placed This Encounter   Procedures    Wound cleansing and dressings     Left 5th toe wound  Wash your hands with soap and water  Cleanse the wound with NSS prior to applying a clean dressing  Do not scrub the wound  Pat dry using gauze  Shower no ---Do not get the wound wet in the shower    Apply Silver Alginate to the wound  Cover with gauze/ABD Pad  Secure with toni and tape  Change dressing 3 x weekly and as needed for excessive drainage    Light ace wrap to the left foot    Non weight bearing to the left foot     Standing Status:   Future     Standing Expiration Date:   5/19/2023    Wound home care     Continue with [facility] 3 x weekly and as needed for dressing changes     Standing Status:   Future     Standing Expiration Date:   5/19/2023        Diagnosis ICD-10-CM Associated Orders   1  Encounter for post surgical wound check  Z48 89 lidocaine (LMX) 4 % cream     Wound cleansing and dressings     Wound home care   2   Chronic venous hypertension (idiopathic) with ulcer of left lower extremity ([facility] [address] )  I87 312     L97 929    3   Type 2 diabetes mellitus with diabetic neuropathy, with long-term current use of insulin ([facility])  E11 40     Z79 4

## 2022-05-19 NOTE — PATIENT INSTRUCTIONS
Orders Placed This Encounter   Procedures    Wound cleansing and dressings     Left 5th toe wound  Wash your hands with soap and water  Cleanse the wound with NSS prior to applying a clean dressing  Do not scrub the wound  Pat dry using gauze  Shower no ---Do not get the wound wet in the shower    Apply Silver Alginate to the wound    Cover with gauze/ABD Pad  Secure with toni and tape  Change dressing 3 x weekly and as needed for excessive drainage    Light ace wrap to the left foot    Non weight bearing to the left foot     Standing Status:   Future     Standing Expiration Date:   5/19/2023    Wound home care     Continue with [facility] 3 x weekly and as needed for dressing changes     Standing Status:   Future     Standing Expiration Date:   5/19/2023

## 2022-05-19 NOTE — LETTER
[facility]  [address] [place] [address]  Phone#  [phone]  Fax#  [phone]    Patient:  Ashely Barr  YOB: 1960  Phone:  [phone]  Date of Visit:  5/19/2022    Orders Placed This Encounter   Procedures    Wound cleansing and dressings     Left 5th toe wound  Wash your hands with soap and water  Cleanse the wound with NSS prior to applying a clean dressing  Do not scrub the wound  Pat dry using gauze  Shower no ---Do not get the wound wet in the shower    Apply Silver Alginate to the wound    Cover with gauze/ABD Pad  Secure with toni and tape  Change dressing 3 x weekly and as needed for excessive drainage    Light ace wrap to the left foot    Non weight bearing to the left foot     Standing Status:   Future     Standing Expiration Date:   5/19/2023    Wound home care     Continue with [facility] 3 x weekly and as needed for dressing changes     Standing Status:   Future     Standing Expiration Date:   5/19/2023         Electronically signed by Chantelle Purdy DPM

## 2022-05-24 ENCOUNTER — HOSPITAL ENCOUNTER (INPATIENT)
Facility: HOSPITAL | Age: 62
LOS: 4 days | Discharge: HOME HEALTH | DRG: 857 | End: 2022-05-28
Attending: EMERGENCY MEDICINE | Admitting: INTERNAL MEDICINE
Payer: COMMERCIAL

## 2022-05-24 ENCOUNTER — APPOINTMENT (EMERGENCY)
Dept: RADIOLOGY | Facility: HOSPITAL | Age: 62
DRG: 857 | End: 2022-05-24
Payer: COMMERCIAL

## 2022-05-24 DIAGNOSIS — T81.49XA SURGICAL WOUND INFECTION: ICD-10-CM

## 2022-05-24 DIAGNOSIS — N17.9 AKI (ACUTE KIDNEY INJURY) (HCC): ICD-10-CM

## 2022-05-24 DIAGNOSIS — R52 PAIN: ICD-10-CM

## 2022-05-24 DIAGNOSIS — S91.302A WOUND OF LEFT FOOT: ICD-10-CM

## 2022-05-24 DIAGNOSIS — M86.172 ACUTE OSTEOMYELITIS OF METATARSAL BONE OF LEFT FOOT (HCC): Primary | ICD-10-CM

## 2022-05-24 LAB
ALBUMIN SERPL BCP-MCNC: 3.5 G/DL (ref 3.5–5)
ALP SERPL-CCNC: 87 U/L (ref 34–104)
ALT SERPL W P-5'-P-CCNC: 17 U/L (ref 7–52)
ANION GAP SERPL CALCULATED.3IONS-SCNC: 9 MMOL/L (ref 4–13)
AST SERPL W P-5'-P-CCNC: 20 U/L (ref 13–39)
BASOPHILS # BLD AUTO: 0.06 THOUSANDS/ΜL (ref 0–0.1)
BASOPHILS NFR BLD AUTO: 1 % (ref 0–1)
BILIRUB SERPL-MCNC: 1.56 MG/DL (ref 0.2–1)
BUN SERPL-MCNC: 39 MG/DL (ref 5–25)
CALCIUM SERPL-MCNC: 9.1 MG/DL (ref 8.4–10.2)
CHLORIDE SERPL-SCNC: 95 MMOL/L (ref 96–108)
CO2 SERPL-SCNC: 31 MMOL/L (ref 21–32)
CREAT SERPL-MCNC: 2.02 MG/DL (ref 0.6–1.3)
CRP SERPL QL: 50.5 MG/L
EOSINOPHIL # BLD AUTO: 0.16 THOUSAND/ΜL (ref 0–0.61)
EOSINOPHIL NFR BLD AUTO: 2 % (ref 0–6)
ERYTHROCYTE [DISTWIDTH] IN BLOOD BY AUTOMATED COUNT: 14.4 % (ref 11.6–15.1)
ERYTHROCYTE [SEDIMENTATION RATE] IN BLOOD: 77 MM/HOUR (ref 0–19)
GFR SERPL CREATININE-BSD FRML MDRD: 33 ML/MIN/1.73SQ M
GLUCOSE SERPL-MCNC: 228 MG/DL (ref 65–140)
GLUCOSE SERPL-MCNC: 275 MG/DL (ref 65–140)
HCT VFR BLD AUTO: 39.8 % (ref 36.5–49.3)
HGB BLD-MCNC: 12.5 G/DL (ref 12–17)
IMM GRANULOCYTES # BLD AUTO: 0.05 THOUSAND/UL (ref 0–0.2)
IMM GRANULOCYTES NFR BLD AUTO: 1 % (ref 0–2)
LYMPHOCYTES # BLD AUTO: 1.17 THOUSANDS/ΜL (ref 0.6–4.47)
LYMPHOCYTES NFR BLD AUTO: 11 % (ref 14–44)
MCH RBC QN AUTO: 27 PG (ref 26.8–34.3)
MCHC RBC AUTO-ENTMCNC: 31.4 G/DL (ref 31.4–37.4)
MCV RBC AUTO: 86 FL (ref 82–98)
MONOCYTES # BLD AUTO: 1.05 THOUSAND/ΜL (ref 0.17–1.22)
MONOCYTES NFR BLD AUTO: 10 % (ref 4–12)
NEUTROPHILS # BLD AUTO: 8.08 THOUSANDS/ΜL (ref 1.85–7.62)
NEUTS SEG NFR BLD AUTO: 75 % (ref 43–75)
NRBC BLD AUTO-RTO: 0 /100 WBCS
PLATELET # BLD AUTO: 358 THOUSANDS/UL (ref 149–390)
PMV BLD AUTO: 9.6 FL (ref 8.9–12.7)
POTASSIUM SERPL-SCNC: 4.3 MMOL/L (ref 3.5–5.3)
PROT SERPL-MCNC: 8 G/DL (ref 6.4–8.4)
RBC # BLD AUTO: 4.63 MILLION/UL (ref 3.88–5.62)
SODIUM SERPL-SCNC: 135 MMOL/L (ref 135–147)
WBC # BLD AUTO: 10.57 THOUSAND/UL (ref 4.31–10.16)

## 2022-05-24 PROCEDURE — 99223 1ST HOSP IP/OBS HIGH 75: CPT | Performed by: INTERNAL MEDICINE

## 2022-05-24 PROCEDURE — 99285 EMERGENCY DEPT VISIT HI MDM: CPT | Performed by: PHYSICIAN ASSISTANT

## 2022-05-24 PROCEDURE — 85652 RBC SED RATE AUTOMATED: CPT | Performed by: PHYSICIAN ASSISTANT

## 2022-05-24 PROCEDURE — 99284 EMERGENCY DEPT VISIT MOD MDM: CPT

## 2022-05-24 PROCEDURE — 80053 COMPREHEN METABOLIC PANEL: CPT | Performed by: PHYSICIAN ASSISTANT

## 2022-05-24 PROCEDURE — 82948 REAGENT STRIP/BLOOD GLUCOSE: CPT

## 2022-05-24 PROCEDURE — 36415 COLL VENOUS BLD VENIPUNCTURE: CPT | Performed by: PHYSICIAN ASSISTANT

## 2022-05-24 PROCEDURE — 86140 C-REACTIVE PROTEIN: CPT | Performed by: PHYSICIAN ASSISTANT

## 2022-05-24 PROCEDURE — 85025 COMPLETE CBC W/AUTO DIFF WBC: CPT | Performed by: PHYSICIAN ASSISTANT

## 2022-05-24 PROCEDURE — 87040 BLOOD CULTURE FOR BACTERIA: CPT | Performed by: PHYSICIAN ASSISTANT

## 2022-05-24 PROCEDURE — 73630 X-RAY EXAM OF FOOT: CPT

## 2022-05-24 RX ORDER — INSULIN LISPRO 100 [IU]/ML
1-6 INJECTION, SOLUTION INTRAVENOUS; SUBCUTANEOUS
Status: DISCONTINUED | OUTPATIENT
Start: 2022-05-24 | End: 2022-05-28 | Stop reason: HOSPADM

## 2022-05-24 RX ORDER — INSULIN LISPRO 100 [IU]/ML
1-5 INJECTION, SOLUTION INTRAVENOUS; SUBCUTANEOUS
Status: DISCONTINUED | OUTPATIENT
Start: 2022-05-24 | End: 2022-05-28 | Stop reason: HOSPADM

## 2022-05-24 RX ORDER — ASPIRIN 81 MG/1
81 TABLET, CHEWABLE ORAL DAILY
Status: DISCONTINUED | OUTPATIENT
Start: 2022-05-25 | End: 2022-05-28 | Stop reason: HOSPADM

## 2022-05-24 RX ORDER — CALCIUM CARBONATE 200(500)MG
1000 TABLET,CHEWABLE ORAL DAILY PRN
Status: DISCONTINUED | OUTPATIENT
Start: 2022-05-24 | End: 2022-05-28 | Stop reason: HOSPADM

## 2022-05-24 RX ORDER — ONDANSETRON 2 MG/ML
4 INJECTION INTRAMUSCULAR; INTRAVENOUS EVERY 6 HOURS PRN
Status: DISCONTINUED | OUTPATIENT
Start: 2022-05-24 | End: 2022-05-28 | Stop reason: HOSPADM

## 2022-05-24 RX ORDER — LISINOPRIL 5 MG/1
5 TABLET ORAL DAILY
Status: DISCONTINUED | OUTPATIENT
Start: 2022-05-25 | End: 2022-05-25

## 2022-05-24 RX ORDER — INSULIN GLARGINE 100 [IU]/ML
30 INJECTION, SOLUTION SUBCUTANEOUS EVERY MORNING
Status: DISCONTINUED | OUTPATIENT
Start: 2022-05-25 | End: 2022-05-25

## 2022-05-24 RX ORDER — ATORVASTATIN CALCIUM 40 MG/1
40 TABLET, FILM COATED ORAL DAILY
Status: DISCONTINUED | OUTPATIENT
Start: 2022-05-25 | End: 2022-05-28 | Stop reason: HOSPADM

## 2022-05-24 RX ORDER — DOCUSATE SODIUM 100 MG/1
100 CAPSULE, LIQUID FILLED ORAL 2 TIMES DAILY PRN
Status: DISCONTINUED | OUTPATIENT
Start: 2022-05-24 | End: 2022-05-28 | Stop reason: HOSPADM

## 2022-05-24 RX ORDER — HEPARIN SODIUM 5000 [USP'U]/ML
5000 INJECTION, SOLUTION INTRAVENOUS; SUBCUTANEOUS EVERY 8 HOURS SCHEDULED
Status: DISCONTINUED | OUTPATIENT
Start: 2022-05-24 | End: 2022-05-28 | Stop reason: HOSPADM

## 2022-05-24 RX ADMIN — CEFEPIME HYDROCHLORIDE 2000 MG: 2 INJECTION, POWDER, FOR SOLUTION INTRAVENOUS at 16:52

## 2022-05-24 RX ADMIN — SODIUM CHLORIDE 1000 ML: 0.9 INJECTION, SOLUTION INTRAVENOUS at 16:52

## 2022-05-24 RX ADMIN — METOPROLOL TARTRATE 25 MG: 25 TABLET, FILM COATED ORAL at 21:57

## 2022-05-24 RX ADMIN — VANCOMYCIN HYDROCHLORIDE 1500 MG: 5 INJECTION, POWDER, LYOPHILIZED, FOR SOLUTION INTRAVENOUS at 18:01

## 2022-05-24 RX ADMIN — INSULIN LISPRO 3 UNITS: 100 INJECTION, SOLUTION INTRAVENOUS; SUBCUTANEOUS at 21:58

## 2022-05-24 RX ADMIN — HEPARIN SODIUM 5000 UNITS: 5000 INJECTION INTRAVENOUS; SUBCUTANEOUS at 21:57

## 2022-05-24 NOTE — H&P
[facility]  H&P- Silvio Danger Pagats 1960, 57 y o  male MRN: [id number]  Unit/Bed#: MAIN Encounter: [id number]  Primary Care Provider: Josepha Gaucher, DO   Date and time admitted to hospital: 5/24/2022  2:47 PM    Surgical wound infection, cellulitis and osteomyelitis-left foot  Assessment & Plan  · S/p 5th digit amputation with filet flap (DOS 5/14/22)  · Patient presents with wound discharge/discomfort, redness and black discoloration  · Concern for wound infection  · X-ray show evidence of osteomyelitis left 5th metatarsal  · Received cefepime and vancomycin-will continue  · Podiatry has been informed by ED physician  · Consult Infectious Disease  · Follow blood cultures    Insulin-dependent diabetes mellitus  Assessment & Plan  Lab Results   Component Value Date    HGBA1C 10 4 (H) 04/06/2022       No results for input(s): POCGLU in the last 72 hours  Blood Sugar Average: Last 72 hrs:   uncontrolled diabetes with hyperglycemia  Patient reports blood sugars have been running around 200  A1c noted to be at 10 4  On Lantus 30 units a m  Novolin 70 /30 5 units at dinner    Will continue Lantus  And add sliding scale    Chronic combined systolic and diastolic CHF  Assessment & Plan  Wt Readings from Last 3 Encounters:   05/24/22 79 4 kg (175 lb)   05/15/22 80 7 kg (177 lb 14 6 oz)   04/07/22 80 7 kg (178 lb)   Appears to be close to baseline weight  Continue beta-blockers and ACEI  Creatinine slightly elevated  Hold Lasix, restart once creatinine returns to baseline    Coronary artery disease  Assessment & Plan  · History of MI    Status post stents x3 several years ago  · Denies any chest pain or shortness of breath  · Continue statin and aspirin    CKD (chronic kidney disease) stage 3  Assessment & Plan  Lab Results   Component Value Date    EGFR 33 05/24/2022    EGFR 41 05/15/2022    EGFR 36 05/14/2022    CREATININE 2 02 (H) 05/24/2022    CREATININE 1 70 (H) 05/15/2022    CREATININE 1 88 (H) 05/14/2022   Creatinine slightly above baseline  Hold Lasix  Received IV fluids in ED  Recheck labs a    Essential hypertension  Assessment & Plan  · Continue metoprolol lisinopril  · Holding Lasix    VTE Prophylaxis: Heparin  / sequential compression device   Code Status:  Full code  POLST: There is no POLST form on file for this patient (pre-hospital)  Discussion with family:    Anticipated Length of Stay:  Patient will be admitted on an Inpatient basis with an anticipated length of stay of  > 2 midnights  Justification for Hospital Stay:  Osteomyelitis    Total Time for Visit, including Counseling / Coordination of Care: 70 minutes  Greater than 50% of this total time spent on direct patient counseling and coordination of care  Chief Complaint:   * wound infection    History of Present Illness:    Tami Nunez is a 57 y o  male who presents with left foot surgical  wound infection  Patient had left 5th digit amputation on 05/14/2022  He had follow-up visit with wound care on 05/19/2022  He was discharged home with VNA services  It was noted that there was redness swelling and purulent discharge from the wound  He was sent to the emergency department for evaluation  He denies any fever chills or rigors  He reports previous history of infections  He has been able to ambulate with surgical boots  Sensation in lower extremities impaired due to longstanding diabetes and neuropathy  Reports that blood sugars have not been controlled well for last 1 week despite using his insulin       Review of Systems:    Review of Systems  Twelve point review systems negative except noted  Past Medical and Surgical History:     Past Medical History:   Diagnosis Date    CAD (coronary artery disease)     Chronic combined systolic and diastolic CHF (congestive [facility] failure) [facility] [address] [facility]     Diabetes mellitus ([facility] )     type 2    Dyslipidemia     Hypertension     Ischemic cardiomyopathy     [facility] infarction) [facility] [address] [facility]     Osteomyelitis [facility] [address] [facility]     Pancreatitis        Past Surgical History:   Procedure Laterality Date    ANGIOPLASTY      ballon    CORONARY ANGIOPLASTY WITH STENT PLACEMENT      LULA to proximal and mid LAD, Proximal RCA   HERNIA REPAIR      INCISION AND DRAINAGE OF WOUND Left 2/14/2020    Procedure: INCISION AND DRAINAGE (I&D) EXTREMITY left foot (cpt 64927); Surgeon: Marshal Bliss DPM;  [facility];  Service: Darcus Michelle Left 2/14/2020    Procedure: EXPLORATION EXTREMITY;  Surgeon: Marshal Bliss DPM;  Location: [facility];  Service: Podiatry   [facility], < 50 CM Left 2/14/2020    Procedure: APPLICATION VAC DRESSING;  Surgeon: Marshal Bliss DPM;  [facility];  Service: Podiatry    TOE AMPUTATION Left 5/14/2022    Procedure: EXCISION OF LEFT 5TH TOE ULCER WITH FILET FLAP;  Surgeon: Gypsy Patel DPM;  [facility];  Service: Podiatry       Meds/Allergies:    Prior to Admission medications    Medication Sig Start Date End Date Taking? Authorizing Provider   aspirin 81 mg chewable tablet Chew 1 tablet daily for 30 days 5/19/17 5/12/22  LATASHA Perkins   atorvastatin (LIPITOR) 40 mg tablet TAKE 1 TABLET BY MOUTH EVERY DAY 12/2/20   Kaushik Farrar MD   Blood Pressure Monitoring ([employer] Blood Pressure Monitor) KIT Use 2 (two) times a day Automatic BP cuff  Measure BP twice daily   12/7/21   Lissa Helm DO   cephalexin (KEFLEX) 500 mg capsule Take 1 capsule by mouth every 6 (six) hours 5/9/22   Historical Provider, MD   furosemide (LASIX) 40 mg tablet Take 40 mg by mouth daily    Historical Provider, MD Haris Hummel 100 units/mL injection pen Inject 30 Units under the skin every morning 5/15/22   Kalpesh Traore DPM   lisinopril (ZESTRIL) 5 mg tablet Take 1 tablet (5 mg total) by mouth daily 1/27/22   Lissa Thomas DO   metoprolol tartrate (LOPRESSOR) 25 mg tablet TAKE 1 TABLET (25 MG TOTAL) BY MOUTH EVERY 12 (TWELVE) HOURS 1/21/21 5/12/22  Tito Boothe MD   NovoLIN 70/30 FlexPen (70-30) 100 UNIT/ML injection pen  12/31/21   Historical Provider, MD   TRUE METRIX BLOOD GLUCOSE TEST test strip TEST FOUR TIMES DAILY 8/20/18   Historical Provider, MD Pastor Pentips 31G X 8 MM MISC USE 4 TMES A DAY  Patient not taking: Reported on 5/12/2022 9/8/20   Historical Provider, MD     I have reviewed home medications with patient personally  Allergies: No Known Allergies    Social History:     Marital Status: /Civil Union   Occupation:   Patient Pre-hospital Living Situation:  Lives with family  Patient Pre-hospital Level of Mobility:  Limited  Patient Pre-hospital Diet Restrictions:  Diabetic  Substance Use History:   Social History     Substance and Sexual Activity   Alcohol Use Not Currently    Comment: 20 years of sobriety     Social History     Tobacco Use   Smoking Status Never Smoker   Smokeless Tobacco Never Used     Social History     Substance and Sexual Activity   Drug Use Never       Family History:    non-contributory    Physical Exam:     Vitals:   Blood Pressure: 97/56 (05/24/22 1449)  Pulse: 75 (05/24/22 1449)  Temperature: 97 9 °F (36 6 °C) (05/24/22 1449)  Temp Source: Oral (05/24/22 1449)  Respirations: 16 (05/24/22 1449)  Height: 5' 10" (177 8 cm) (05/24/22 1449)  Weight - Scale: 79 4 kg (175 lb) (05/24/22 1449)  SpO2: 99 % (05/24/22 1449)    Physical Exam     Gen -Patient comfortable at rest  Neck- Supple  No thyromegaly or lymphadenopathy  Lungs-Clear bilaterally without any wheeze or rales   Heart S1-S2, regular rate and rhythm, no murmurs  Abdomen-soft nontender, no organomegaly  Bowel sounds present  Extremities-no cyanosi,  clubbing or edema  Skin- left 5th toe surgically amputated  Sutures in place  Erythema and swelling noted  No active discharge currently  Neuro-nonfocal       Additional Data:     Lab Results: I have personally reviewed pertinent reports        Results from last 7 days Lab Units 05/24/22  1536   WBC Thousand/uL 10 57*   HEMOGLOBIN g/dL 12 5   HEMATOCRIT % 39 8   PLATELETS Thousands/uL 358   NEUTROS PCT % 75   LYMPHS PCT % 11*   MONOS PCT % 10   EOS PCT % 2     Results from last 7 days   Lab Units 05/24/22  1536   SODIUM mmol/L 135   POTASSIUM mmol/L 4 3   CHLORIDE mmol/L 95*   CO2 mmol/L 31   BUN mg/dL 39*   CREATININE mg/dL 2 02*   ANION GAP mmol/L 9   CALCIUM mg/dL 9 1   ALBUMIN g/dL 3 5   TOTAL BILIRUBIN mg/dL 1 56*   ALK PHOS U/L 87   ALT U/L 17   AST U/L 20   GLUCOSE RANDOM mg/dL 228*                       Imaging: I have personally reviewed pertinent reports  and I have personally reviewed pertinent films in PACS    XR foot 3+ views LEFT   Final Result by Les Gaxiola MD (05/24 1608)      Osteomyelitis of the head of the 5th metatarsal post resection of the 5th digit phalanges  The study was marked in EPIC for significant notification  Workstation performed: [id number]             EKG, Pathology, and Other Studies Reviewed on Admission:   · EKG:     Allscripts / Epic Records Reviewed: Yes     ** Please Note: This note has been constructed using a voice recognition system   **

## 2022-05-24 NOTE — ASSESSMENT & PLAN NOTE
· History of MI    Status post stents x3 several years ago  · Denies any chest pain or shortness of breath  · Continue statin and aspirin

## 2022-05-24 NOTE — ASSESSMENT & PLAN NOTE
Wt Readings from Last 3 Encounters:   05/24/22 79 4 kg (175 lb)   05/15/22 80 7 kg (177 lb 14 6 oz)   04/07/22 80 7 kg (178 lb)   Appears to be close to baseline weight  Continue beta-blockers and ACEI  Creatinine slightly elevated  Hold Lasix, restart once creatinine returns to baseline

## 2022-05-24 NOTE — ED PROVIDER NOTES
History  Chief Complaint   Patient presents with    Post-op Problem     Pt reports was told by visiting nurse to come in d/t wound not healing well, had L pinky toe removed approx 1 5 weeks ago, denies fevers      Patient is a 58 y/o male with a PMHx of CAD, CKD 3, CHF, DM2 and HTN, presenting to the ED for evaluation of a wound infection  Patient had a 5th digit amputation with filet flap with Dr Reji Weathers on 5/14/22  Over the past 2-3 days he has noticed a black discoloration of the overlying skin as well as foul-smelling purulent dischcarge  He denies any significantly worsening pain but says he has poor sensation in the foot secondary to neuropathy  He denies any fevers or chills  His wound care nurse did a dressing change today and recommended he go to the ED for further evaluation  Prior to Admission Medications   Prescriptions Last Dose Informant Patient Reported? Taking? Blood Pressure Monitoring ([employer] Blood Pressure Monitor) KIT 5/23/2022 at Unknown time  No Yes   Sig: Use 2 (two) times a day Automatic BP cuff  Measure BP twice daily     Lantus SoloStar 100 units/mL injection pen 5/24/2022 at Unknown time Self No Yes   Sig: Inject 30 Units under the skin every morning   NovoLIN 70/30 FlexPen (70-30) 100 UNIT/ML injection pen 5/23/2022 at Unknown time  Yes Yes   TRUE METRIX BLOOD GLUCOSE TEST test strip 5/24/2022 at Unknown time Self Yes Yes   Sig: TEST FOUR TIMES DAILY   Unifine Pentips 31G X 8 MM MISC 5/24/2022 at Unknown time  Yes Yes   aspirin 81 mg chewable tablet 5/24/2022 at Unknown time Self No Yes   Sig: Chew 1 tablet daily for 30 days   atorvastatin (LIPITOR) 40 mg tablet 5/24/2022 at Unknown time Self No Yes   Sig: TAKE 1 TABLET BY MOUTH EVERY DAY   cephalexin (KEFLEX) 500 mg capsule Not Taking at Unknown time  Yes No   Sig: Take 1 capsule by mouth every 6 (six) hours   Patient not taking: Reported on 5/24/2022   furosemide (LASIX) 40 mg tablet 5/24/2022 at Unknown time  Yes Yes Sig: Take 40 mg by mouth daily   lisinopril (ZESTRIL) 5 mg tablet 5/24/2022 at Unknown time  No Yes   Sig: Take 1 tablet (5 mg total) by mouth daily   metoprolol tartrate (LOPRESSOR) 25 mg tablet 5/24/2022 at Unknown time Self No Yes   Sig: TAKE 1 TABLET (25 MG TOTAL) BY MOUTH EVERY 12 (TWELVE) HOURS      Facility-Administered Medications: None       Past Medical History:   Diagnosis Date    CAD (coronary artery disease)     Chronic combined systolic and diastolic CHF (congestive heart failure) ([facility])     Diabetes mellitus ([place] [address] )     type 2    Dyslipidemia     Hypertension     Ischemic cardiomyopathy     MI (myocardial infarction) ([place] [address] )     Osteomyelitis ([place] [address] )     Pancreatitis        Past Surgical History:   Procedure Laterality Date    ANGIOPLASTY      ballon    CORONARY ANGIOPLASTY WITH STENT PLACEMENT      LULA to proximal and mid LAD, Proximal RCA   HERNIA REPAIR      INCISION AND DRAINAGE OF WOUND Left 2/14/2020    Procedure: INCISION AND DRAINAGE (I&D) EXTREMITY left foot (cpt 53525);   Surgeon: Mohinder Singh DPM;  [facility];  Service: Bharathi Mckay Left 2/14/2020    Procedure: EXPLORATION EXTREMITY;  Surgeon: Mohinder Singh DPM;  [facility];  Service: Podiatry    OK Luite Gerson 87, < 50 CM Left 2/14/2020    Procedure: APPLICATION VAC DRESSING;  Surgeon: Mohinder Singh DPM;  [facility];  Service: Podiatry    TOE AMPUTATION Left 5/14/2022    Procedure: EXCISION OF LEFT 5TH TOE ULCER WITH FILET FLAP;  Surgeon: Randolph Hamilton DPM;  [facility];  Service: Podiatry       Family History   Problem Relation Age of Onset    Heart attack Father     Hyperlipidemia Father     Cancer Father     Diabetes Father     Diabetes Mother     Heart failure Mother         poss    Kidney disease Mother     Cancer Paternal Grandfather     Stroke Neg Hx     Anuerysm Neg Hx     Clotting disorder Neg Hx     Arrhythmia Neg Hx     Coronary artery disease Neg Hx     Hypertension Neg Hx     Sudden death Neg Hx         scd     I have reviewed and agree with the history as documented  E-Cigarette/Vaping    E-Cigarette Use Never User      E-Cigarette/Vaping Substances    Nicotine No     THC No     CBD No     Flavoring No     Other No     Unknown No      Social History     Tobacco Use    Smoking status: Never Smoker    Smokeless tobacco: Never Used   Vaping Use    Vaping Use: Never used   Substance Use Topics    Alcohol use: Not Currently     Comment: 20 years of sobriety    Drug use: Never       Review of Systems   Constitutional: Negative for chills, diaphoresis, fatigue and fever  HENT: Negative for congestion, ear pain, mouth sores, rhinorrhea, sinus pain, sore throat and trouble swallowing  Eyes: Negative for photophobia and visual disturbance  Respiratory: Negative for cough, chest tightness, shortness of breath and wheezing  Cardiovascular: Negative for chest pain, palpitations and leg swelling  Gastrointestinal: Negative for abdominal pain, blood in stool, constipation, diarrhea, nausea and vomiting  Genitourinary: Negative for dysuria, flank pain, frequency, hematuria and urgency  Musculoskeletal: Negative for arthralgias, back pain, gait problem, joint swelling, myalgias and neck pain  Skin: Positive for wound  Negative for color change, pallor and rash  Neurological: Negative for dizziness, syncope, speech difficulty, weakness, light-headedness, numbness and headaches  Psychiatric/Behavioral: Negative for confusion and sleep disturbance  All other systems reviewed and are negative  Physical Exam  Physical Exam  Vitals and nursing note reviewed  Constitutional:       General: He is awake  He is not in acute distress  Appearance: Normal appearance  He is well-developed  He is not ill-appearing or diaphoretic  HENT:      Head: Normocephalic and atraumatic        Right Ear: External ear normal       Left Ear: External ear normal       Nose: Nose normal       Mouth/Throat:      Lips: Pink  Mouth: Mucous membranes are moist    Eyes:      General: Lids are normal  No scleral icterus  Conjunctiva/sclera: Conjunctivae normal       Pupils: Pupils are equal, round, and reactive to light  Cardiovascular:      Rate and Rhythm: Normal rate and regular rhythm  Pulses: Normal pulses  Radial pulses are 2+ on the right side and 2+ on the left side  Heart sounds: Normal heart sounds, S1 normal and S2 normal    Pulmonary:      Effort: Pulmonary effort is normal  No accessory muscle usage  Breath sounds: Normal breath sounds  No stridor  No decreased breath sounds, wheezing, rhonchi or rales  Abdominal:      General: Abdomen is flat  Bowel sounds are normal  There is no distension  Palpations: Abdomen is soft  Tenderness: There is no abdominal tenderness  There is no right CVA tenderness, left CVA tenderness, guarding or rebound  Musculoskeletal:      Cervical back: Full passive range of motion without pain, normal range of motion and neck supple  No signs of trauma  No pain with movement  Normal range of motion  Right lower leg: No edema  Left lower leg: No edema  Feet:    Lymphadenopathy:      Cervical: No cervical adenopathy  Skin:     General: Skin is warm and dry  Capillary Refill: Capillary refill takes less than 2 seconds  Coloration: Skin is not cyanotic, jaundiced or pale  Neurological:      Mental Status: He is alert and oriented to person, place, and time  GCS: GCS eye subscore is 4  GCS verbal subscore is 5  GCS motor subscore is 6  Cranial Nerves: No dysarthria or facial asymmetry  Gait: Gait normal    Psychiatric:         Attention and Perception: Attention normal          Mood and Affect: Mood normal          Speech: Speech normal          Behavior: Behavior normal  Behavior is cooperative                   Vital Signs  ED Triage Vitals   Temperature Pulse Respirations Blood Pressure SpO2   05/24/22 1449 05/24/22 1449 05/24/22 1449 05/24/22 1449 05/24/22 1449   97 9 °F (36 6 °C) 75 16 97/56 99 %      Temp Source Heart Rate Source Patient Position - Orthostatic VS BP Location FiO2 (%)   05/24/22 1449 05/24/22 1449 05/24/22 1734 05/24/22 1449 --   Oral Monitor Lying Left arm       Pain Score       05/24/22 1449       4           Vitals:    05/24/22 1449 05/24/22 1734 05/24/22 2141   BP: 97/56 135/76 115/72   Pulse: 75 79 81   Patient Position - Orthostatic VS:  Lying          Visual Acuity      ED Medications  Medications   aspirin chewable tablet 81 mg (has no administration in time range)   atorvastatin (LIPITOR) tablet 40 mg (has no administration in time range)   insulin glargine (LANTUS) subcutaneous injection 30 Units 0 3 mL (has no administration in time range)   lisinopril (ZESTRIL) tablet 5 mg (has no administration in time range)   metoprolol tartrate (LOPRESSOR) tablet 25 mg (25 mg Oral Given 5/24/22 2157)   calcium carbonate (TUMS) chewable tablet 1,000 mg (has no administration in time range)   docusate sodium (COLACE) capsule 100 mg (has no administration in time range)   ondansetron (ZOFRAN) injection 4 mg (has no administration in time range)   heparin (porcine) subcutaneous injection 5,000 Units (5,000 Units Subcutaneous Given 5/24/22 2157)   insulin lispro (HumaLOG) 100 units/mL subcutaneous injection 1-6 Units (1 Units Subcutaneous Not Given 5/24/22 1730)   insulin lispro (HumaLOG) 100 units/mL subcutaneous injection 1-5 Units (3 Units Subcutaneous Given 5/24/22 2158)   cefepime (MAXIPIME) 1,000 mg in dextrose 5 % 50 mL IVPB (has no administration in time range)   vancomycin (VANCOCIN) 1500 mg in sodium chloride 0 9% 250 mL IVPB (has no administration in time range)   vancomycin (VANCOCIN) 1500 mg in sodium chloride 0 9% 250 mL IVPB (0 mg Intravenous Stopped 5/24/22 2201)   cefepime (MAXIPIME) 2 g/50 mL dextrose IVPB (0 mg Intravenous Stopped 5/24/22 2201)   sodium chloride 0 9 % bolus 1,000 mL (1,000 mL Intravenous New Bag 5/24/22 1652)       Diagnostic Studies  Results Reviewed     Procedure Component Value Units Date/Time    Blood culture #1 [[id number]] Collected: 05/24/22 1646    Lab Status: Preliminary result Specimen: Blood from Arm, Left Updated: 05/24/22 2104     Blood Culture Received in Microbiology Lab  Culture in Progress  Blood culture #2 [[id number]] Collected: 05/24/22 1646    Lab Status: Preliminary result Specimen: Blood from Arm, Right Updated: 05/24/22 2104     Blood Culture Received in Microbiology Lab  Culture in Progress      Sedimentation rate, automated [[id number]]  (Abnormal) Collected: 05/24/22 1536    Lab Status: Final result Specimen: Blood from Arm, Left Updated: 05/24/22 1634     Sed Rate 77 mm/hour     Comprehensive metabolic panel [[id number]]  (Abnormal) Collected: 05/24/22 1536    Lab Status: Final result Specimen: Blood from Arm, Left Updated: 05/24/22 1614     Sodium 135 mmol/L      Potassium 4 3 mmol/L      Chloride 95 mmol/L      CO2 31 mmol/L      ANION GAP 9 mmol/L      BUN 39 mg/dL      Creatinine 2 02 mg/dL      Glucose 228 mg/dL      Calcium 9 1 mg/dL      AST 20 U/L      ALT 17 U/L      Alkaline Phosphatase 87 U/L      Total Protein 8 0 g/dL      Albumin 3 5 g/dL      Total Bilirubin 1 56 mg/dL      eGFR 33 ml/min/1 73sq m     Narrative:      [place] guidelines for Chronic Kidney Disease (CKD):     Stage 1 with normal or high GFR (GFR > 90 mL/min/1 73 square meters)    Stage 2 Mild CKD (GFR = 60-89 mL/min/1 73 square meters)    Stage 3A Moderate CKD (GFR = 45-59 mL/min/1 73 square meters)    Stage 3B Moderate CKD (GFR = 30-44 mL/min/1 73 square meters)    Stage 4 Severe CKD (GFR = 15-29 mL/min/1 73 square meters)    Stage 5 End Stage CKD (GFR <15 mL/min/1 73 square meters)  Note: GFR calculation is accurate only with a steady state creatinine C-reactive protein [[id number]]  (Abnormal) Collected: 05/24/22 1536    Lab Status: Final result Specimen: Blood from Arm, Left Updated: 05/24/22 1614     CRP 50 5 mg/L     CBC and differential [[id number]]  (Abnormal) Collected: 05/24/22 1536    Lab Status: Final result Specimen: Blood from Arm, Left Updated: 05/24/22 1605     WBC 10 57 Thousand/uL      RBC 4 63 Million/uL      Hemoglobin 12 5 g/dL      Hematocrit 39 8 %      MCV 86 fL      MCH 27 0 pg      MCHC 31 4 g/dL      RDW 14 4 %      MPV 9 6 fL      Platelets 542 Thousands/uL      nRBC 0 /100 WBCs      Neutrophils Relative 75 %      Immat GRANS % 1 %      Lymphocytes Relative 11 %      Monocytes Relative 10 %      Eosinophils Relative 2 %      Basophils Relative 1 %      Neutrophils Absolute 8 08 Thousands/µL      Immature Grans Absolute 0 05 Thousand/uL      Lymphocytes Absolute 1 17 Thousands/µL      Monocytes Absolute 1 05 Thousand/µL      Eosinophils Absolute 0 16 Thousand/µL      Basophils Absolute 0 06 Thousands/µL                  XR foot 3+ views LEFT   Final Result by Jacqueline Martinez MD (05/24 1608)      Osteomyelitis of the head of the 5th metatarsal post resection of the 5th digit phalanges  The study was marked in EPIC for significant notification  Workstation performed: [id number]                    Procedures  Procedures         ED Course  ED Course as of 05/24/22 2258   Tue May 24, 2022   1634 C-REACTIVE PROTEIN(!): 50 5   1634 Sed Rate(!): 77   1634 Creatinine(!): 2 02                               SBIRT 20yo+    Flowsheet Row Most Recent Value   SBIRT (25 yo +)    In order to provide better care to our patients, we are screening all of our patients for alcohol and drug use  Would it be okay to ask you these screening questions?  Unable to answer at this time Filed at: 05/24/2022 1519                    MDM  Number of Diagnoses or Management Options  Acute osteomyelitis of metatarsal bone of left foot (HCC)  LOBO (acute kidney injury) [facility])  Diagnosis management comments: Patient is a 58 y/o male with a PMHx of CAD, CKD 3, CHF, DM2 and HTN, presenting to the ED for evaluation of a wound infection  X-ray consistent with osteomyelitis of the 5th metatarsal  Elevated ESR/CRP  Discussed with podiatry who recommend IV antibiotics/admission  Antibiotics started in the ED  The management plan was discussed in detail with the patient at bedside and all questions were answered  Patient was admitted to medicine for continued evaluation/management          Amount and/or Complexity of Data Reviewed  Clinical lab tests: ordered and reviewed  Tests in the radiology section of CPT®: ordered and reviewed  Discuss the patient with other providers: yes (Dr Melly Joseph)        Disposition  Final diagnoses:   Acute osteomyelitis of metatarsal bone of left foot ([facility] [address] )   LOBO (acute kidney injury) ([place] [address] )     Time reflects when diagnosis was documented in both MDM as applicable and the Disposition within this note     Time User Action Codes Description Comment    5/24/2022  4:29 PM Vince Banks Add [[id number]] Acute osteomyelitis of metatarsal bone of left foot ([facility] )     5/24/2022  4:40 PM Julianne Henley Add [N17 [id number]] [facility]     5/24/2022  5:21 PM Penny Carroll Add [[id number]] Surgical wound infection, cellulitis and osteomyelitis-left foot       ED Disposition     ED Disposition   Admit    Condition   Stable    Date/Time   Tue May 24, 2022  4:29 PM    Comment   Case was discussed with MO and the patient's admission status was agreed to be Admission Status: inpatient status to the service of   ?             Follow-up Information    None         Current Discharge Medication List      CONTINUE these medications which have NOT CHANGED    Details   aspirin 81 mg chewable tablet Chew 1 tablet daily for 30 days  Qty: 30 tablet, Refills: 0      atorvastatin (LIPITOR) 40 mg tablet TAKE 1 TABLET BY MOUTH EVERY DAY  Qty: 90 tablet, Refills: 3    Comments: DX Code Needed    Associated Diagnoses: Dyslipidemia      Blood Pressure Monitoring ([employer] Blood Pressure Monitor) KIT Use 2 (two) times a day Automatic BP cuff  Measure BP twice daily  Qty: 1 kit, Refills: 0    Associated Diagnoses: Benign essential hypertension      furosemide (LASIX) 40 mg tablet Take 40 mg by mouth daily      Lantus SoloStar 100 units/mL injection pen Inject 30 Units under the skin every morning    Associated Diagnoses: Type 2 diabetes mellitus with hyperglycemia, with long-term current use of insulin ([facility])      lisinopril (ZESTRIL) 5 mg tablet Take 1 tablet (5 mg total) by mouth daily  Qty: 90 tablet, Refills: 1    Associated Diagnoses: Stage 3b chronic kidney disease ([facility])      metoprolol tartrate (LOPRESSOR) 25 mg tablet TAKE 1 TABLET (25 MG TOTAL) BY MOUTH EVERY 12 (TWELVE) HOURS  Qty: 180 tablet, Refills: 3    Associated Diagnoses: Chronic combined systolic and diastolic CHF (congestive heart failure) ([facility])      NovoLIN 70/30 FlexPen (70-30) 100 UNIT/ML injection pen       TRUE METRIX BLOOD GLUCOSE TEST test strip TEST FOUR TIMES DAILY  Refills: 11      Unifine Pentips 31G X 8 MM MISC       cephalexin (KEFLEX) 500 mg capsule Take 1 capsule by mouth every 6 (six) hours             No discharge procedures on file      PDMP Review       Value Time User    PDMP Reviewed  Yes 2/10/2020 12:40 AM Zacarias Campos MD          ED Provider  Electronically Signed by           Dawood Espino PA-C  05/24/22 1754

## 2022-05-24 NOTE — ASSESSMENT & PLAN NOTE
Lab Results   Component Value Date    EGFR 33 05/24/2022    EGFR 41 05/15/2022    EGFR 36 05/14/2022    CREATININE 2 02 (H) 05/24/2022    CREATININE 1 70 (H) 05/15/2022    CREATININE 1 88 (H) 05/14/2022   Creatinine slightly above baseline  Hold Lasix  Received IV fluids in ED  Recheck labs a

## 2022-05-24 NOTE — ASSESSMENT & PLAN NOTE
Lab Results   Component Value Date    HGBA1C 10 4 (H) 04/06/2022       No results for input(s): POCGLU in the last 72 hours  Blood Sugar Average: Last 72 hrs:   uncontrolled diabetes with hyperglycemia  Patient reports blood sugars have been running around 200  A1c noted to be at 10 4  On Lantus 30 units a m  Novolin 70 /30 5 units at dinner    Will continue Lantus    And add sliding scale

## 2022-05-24 NOTE — ASSESSMENT & PLAN NOTE
· S/p 5th digit amputation with filet flap (DOS 5/14/22)  · Patient presents with wound discharge/discomfort, redness and black discoloration  · Concern for wound infection  · X-ray show evidence of osteomyelitis left 5th metatarsal  · Received cefepime and vancomycin-will continue  · Podiatry has been informed by ED physician  · Consult Infectious Disease  · Follow blood cultures

## 2022-05-25 LAB
ANION GAP SERPL CALCULATED.3IONS-SCNC: 9 MMOL/L (ref 4–13)
BUN SERPL-MCNC: 39 MG/DL (ref 5–25)
CALCIUM SERPL-MCNC: 8.8 MG/DL (ref 8.4–10.2)
CHLORIDE SERPL-SCNC: 101 MMOL/L (ref 96–108)
CO2 SERPL-SCNC: 27 MMOL/L (ref 21–32)
CREAT SERPL-MCNC: 1.75 MG/DL (ref 0.6–1.3)
ERYTHROCYTE [DISTWIDTH] IN BLOOD BY AUTOMATED COUNT: 14.5 % (ref 11.6–15.1)
GFR SERPL CREATININE-BSD FRML MDRD: 40 ML/MIN/1.73SQ M
GLUCOSE SERPL-MCNC: 151 MG/DL (ref 65–140)
GLUCOSE SERPL-MCNC: 160 MG/DL (ref 65–140)
GLUCOSE SERPL-MCNC: 70 MG/DL (ref 65–140)
GLUCOSE SERPL-MCNC: 79 MG/DL (ref 65–140)
HCT VFR BLD AUTO: 39.2 % (ref 36.5–49.3)
HGB BLD-MCNC: 12.5 G/DL (ref 12–17)
MAGNESIUM SERPL-MCNC: 2.2 MG/DL (ref 1.9–2.7)
MCH RBC QN AUTO: 27.1 PG (ref 26.8–34.3)
MCHC RBC AUTO-ENTMCNC: 31.9 G/DL (ref 31.4–37.4)
MCV RBC AUTO: 85 FL (ref 82–98)
PLATELET # BLD AUTO: 374 THOUSANDS/UL (ref 149–390)
PMV BLD AUTO: 9.9 FL (ref 8.9–12.7)
POTASSIUM SERPL-SCNC: 3.9 MMOL/L (ref 3.5–5.3)
RBC # BLD AUTO: 4.61 MILLION/UL (ref 3.88–5.62)
SODIUM SERPL-SCNC: 137 MMOL/L (ref 135–147)
WBC # BLD AUTO: 10.61 THOUSAND/UL (ref 4.31–10.16)

## 2022-05-25 PROCEDURE — 82948 REAGENT STRIP/BLOOD GLUCOSE: CPT

## 2022-05-25 PROCEDURE — 99232 SBSQ HOSP IP/OBS MODERATE 35: CPT | Performed by: INTERNAL MEDICINE

## 2022-05-25 PROCEDURE — 85027 COMPLETE CBC AUTOMATED: CPT | Performed by: INTERNAL MEDICINE

## 2022-05-25 PROCEDURE — 99024 POSTOP FOLLOW-UP VISIT: CPT | Performed by: PODIATRIST

## 2022-05-25 PROCEDURE — 83735 ASSAY OF MAGNESIUM: CPT | Performed by: INTERNAL MEDICINE

## 2022-05-25 PROCEDURE — 80048 BASIC METABOLIC PNL TOTAL CA: CPT | Performed by: INTERNAL MEDICINE

## 2022-05-25 PROCEDURE — 99255 IP/OBS CONSLTJ NEW/EST HI 80: CPT | Performed by: INTERNAL MEDICINE

## 2022-05-25 RX ORDER — INSULIN GLARGINE 100 [IU]/ML
20 INJECTION, SOLUTION SUBCUTANEOUS EVERY MORNING
Status: DISCONTINUED | OUTPATIENT
Start: 2022-05-26 | End: 2022-05-25

## 2022-05-25 RX ORDER — METRONIDAZOLE 500 MG/1
500 TABLET ORAL EVERY 8 HOURS SCHEDULED
Status: DISCONTINUED | OUTPATIENT
Start: 2022-05-25 | End: 2022-05-27

## 2022-05-25 RX ORDER — INSULIN GLARGINE 100 [IU]/ML
15 INJECTION, SOLUTION SUBCUTANEOUS EVERY MORNING
Status: DISCONTINUED | OUTPATIENT
Start: 2022-05-26 | End: 2022-05-26

## 2022-05-25 RX ORDER — CEFAZOLIN SODIUM 1 G/50ML
1000 SOLUTION INTRAVENOUS EVERY 8 HOURS
Status: DISCONTINUED | OUTPATIENT
Start: 2022-05-25 | End: 2022-05-28 | Stop reason: HOSPADM

## 2022-05-25 RX ADMIN — INSULIN LISPRO 1 UNITS: 100 INJECTION, SOLUTION INTRAVENOUS; SUBCUTANEOUS at 17:20

## 2022-05-25 RX ADMIN — CEFAZOLIN SODIUM 1000 MG: 1 SOLUTION INTRAVENOUS at 11:32

## 2022-05-25 RX ADMIN — INSULIN LISPRO 1 UNITS: 100 INJECTION, SOLUTION INTRAVENOUS; SUBCUTANEOUS at 11:32

## 2022-05-25 RX ADMIN — ATORVASTATIN CALCIUM 40 MG: 40 TABLET, FILM COATED ORAL at 08:40

## 2022-05-25 RX ADMIN — CEFAZOLIN SODIUM 1000 MG: 1 SOLUTION INTRAVENOUS at 17:20

## 2022-05-25 RX ADMIN — METRONIDAZOLE 500 MG: 500 TABLET ORAL at 21:33

## 2022-05-25 RX ADMIN — METRONIDAZOLE 500 MG: 500 TABLET ORAL at 14:20

## 2022-05-25 RX ADMIN — HEPARIN SODIUM 5000 UNITS: 5000 INJECTION INTRAVENOUS; SUBCUTANEOUS at 05:32

## 2022-05-25 RX ADMIN — METOPROLOL TARTRATE 25 MG: 25 TABLET, FILM COATED ORAL at 21:33

## 2022-05-25 RX ADMIN — HEPARIN SODIUM 5000 UNITS: 5000 INJECTION INTRAVENOUS; SUBCUTANEOUS at 14:20

## 2022-05-25 RX ADMIN — INSULIN LISPRO 1 UNITS: 100 INJECTION, SOLUTION INTRAVENOUS; SUBCUTANEOUS at 22:30

## 2022-05-25 RX ADMIN — HEPARIN SODIUM 5000 UNITS: 5000 INJECTION INTRAVENOUS; SUBCUTANEOUS at 21:33

## 2022-05-25 RX ADMIN — CEFEPIME HYDROCHLORIDE 1000 MG: 1 INJECTION, POWDER, FOR SOLUTION INTRAMUSCULAR; INTRAVENOUS at 05:32

## 2022-05-25 RX ADMIN — INSULIN GLARGINE 30 UNITS: 100 INJECTION, SOLUTION SUBCUTANEOUS at 08:42

## 2022-05-25 RX ADMIN — ASPIRIN 81 MG CHEWABLE TABLET 81 MG: 81 TABLET CHEWABLE at 08:40

## 2022-05-25 NOTE — OCCUPATIONAL THERAPY NOTE
Occupational Therapy Cancellation       05/25/22 1249   OT Last Visit   OT Visit Date 05/25/22   Note Type   Note type Cancelled Session   Additional Comments OT orders received and chart review performed  Per chart review, pt (I) with mobility and ADLs PTA  Pt to OR tommorow for revision amputation, washout, and removal of 5th metatarsal head/neck  Will hold OT evaluation until after surgery   Appreciate WBS and activity orders post-op       Yocasta Tello, OT

## 2022-05-25 NOTE — ASSESSMENT & PLAN NOTE
· S/p 5th digit amputation with filet flap (DOS 5/14/22)  · Patient presents with wound discharge/discomfort, redness and black discoloration  · Concern for wound infection  · X-ray show evidence of osteomyelitis left 5th metatarsal  · Received cefepime and vancomycin in the ER and this morning  These were stopped by the Infectious Disease doctor  · Podiatry on board: For revisional amputation, washout and removal of 5th metatarsal head and neck tomorrow  · Infectious Disease on board:  Deescalate antibiotic regimen to IV cefazolin and Flagyl  · Follow blood cultures  · PT OT evaluation

## 2022-05-25 NOTE — ASSESSMENT & PLAN NOTE
Wt Readings from Last 3 Encounters:   05/25/22 80 1 kg (176 lb 9 4 oz)   05/15/22 80 7 kg (177 lb 14 6 oz)   04/07/22 80 7 kg (178 lb)     Stable and compensated    Appears to be close to baseline weight  Continue beta-blockers  Hold off on Lasix and lisinopril for now due to low normal blood pressures and surgery/anesthesia tomorrow

## 2022-05-25 NOTE — PROGRESS NOTES
[facility]  Progress Note Antonette Khan 1960, 57 y o  male MRN: [id number]  Unit/Bed#: LISA JARAMILLO 410-01 Encounter: [id number]  Primary Care Provider: Amanda Guido DO   Date and time admitted to hospital: 5/24/2022  2:47 PM    * Surgical wound infection, cellulitis and osteomyelitis-left foot  Assessment & Plan  · S/p 5th digit amputation with filet flap (DOS 5/14/22)  · Patient presents with wound discharge/discomfort, redness and black discoloration  · Concern for wound infection  · X-ray show evidence of osteomyelitis left 5th metatarsal  · Received cefepime and vancomycin in the ER and this morning  These were stopped by the Infectious Disease doctor  · Podiatry on board: For revisional amputation, washout and removal of 5th metatarsal head and neck tomorrow  · Infectious Disease on board:  Deescalate antibiotic regimen to IV cefazolin and Flagyl  · Follow blood cultures  · PT OT evaluation  Chronic combined systolic and diastolic CHF  Assessment & Plan  Wt Readings from Last 3 Encounters:   05/25/22 80 1 kg (176 lb 9 4 oz)   05/15/22 80 7 kg (177 lb 14 6 oz)   04/07/22 80 7 kg (178 lb)     Stable and compensated  Appears to be close to baseline weight  Continue beta-blockers  Hold off on Lasix and lisinopril for now due to low normal blood pressures and surgery/anesthesia tomorrow      CKD (chronic kidney disease) stage 3  Assessment & Plan  Lab Results   Component Value Date    EGFR 40 05/25/2022    EGFR 33 05/24/2022    EGFR 41 05/15/2022    CREATININE 1 75 (H) 05/25/2022    CREATININE 2 02 (H) 05/24/2022    CREATININE 1 70 (H) 05/15/2022   Creatinine slightly above baseline on admission; now back at his baseline  Hold Lasix  Will hold off on patient's lisinopril as patient's blood pressures have been low normal and will have surgery with anesthesia tomorrow  Received IV fluids in ED  Recheck labs in a [facility]     Coronary artery disease  Assessment & Plan  · History of MI  Status post stents x3 several years ago  · Denies any chest pain or shortness of breath  · Continue statin and aspirin    Insulin-dependent diabetes mellitus  Assessment & Plan  Lab Results   Component Value Date    HGBA1C 10 4 (H) 04/06/2022       Recent Labs     05/24/22  2139 05/25/22  0717 05/25/22  1132 05/25/22  1612   POCGLU 275* 79 160* 151*       Blood Sugar Average: Last 72 hrs:  (P) 166 25 uncontrolled diabetes with hyperglycemia  Patient reports blood sugars have been running around 200  A1c noted to be at 10 4  On Lantus 30 units a m  Novolin 70 /30 5 units at dinner  Presently with acceptable blood sugars  Will continue Lantus and sliding scale  Adjust treatment accordingly  Essential hypertension  Assessment & Plan  · Continue metoprolol   · Holding Lasix  · Also, with patient's blood pressures at low normal and patient having surgery with anesthesia, I will also hold off patient's lisinopril for now  VTE Pharmacologic Prophylaxis: VTE Score: 4 Moderate Risk (Score 3-4) - Pharmacological DVT Prophylaxis Ordered: heparin  Patient Centered Rounds: I performed bedside rounds with nursing staff today  Discussions with Specialists or Other Care Team Provider:  Case management  Infectious Disease doctor  Education and Discussions with Family / Patient: Patient declined call to   Time Spent for Care: 30 minutes  More than 50% of total time spent on counseling and coordination of care as described above  Current Length of Stay: 1 day(s)  Current Patient Status: Inpatient   Certification Statement: The patient will continue to require additional inpatient hospital stay due to Above findings and plans  Discharge Plan: Anticipate discharge in 48 hrs to discharge location to be determined pending rehab evaluations  Code Status: Level 1 - Full Code    Subjective:   Patient has occasional pains on his left 5th toe/foot  Otherwise no other pains  No other complaints  No shortness of breath  No nausea or vomiting    Objective:     Vitals:   Temp (24hrs), Av 4 °F (36 9 °C), Min:98 3 °F (36 8 °C), Max:98 5 °F (36 9 °C)    Temp:  [98 3 °F (36 8 °C)-98 5 °F (36 9 °C)] 98 3 °F (36 8 °C)  HR:  [66-81] 73  Resp:  [16-18] 16  BP: (103-115)/(61-72) 115/66  SpO2:  [97 %-98 %] 98 %  Body mass index is 25 34 kg/m²  Input and Output Summary (last 24 hours): Intake/Output Summary (Last 24 hours) at 2022 180  Last data filed at 2022 1750  Gross per 24 hour   Intake 2710 ml   Output 575 ml   Net 2135 ml       Physical Exam:   Physical Exam  Vitals and nursing note reviewed  Constitutional:       General: He is not in acute distress  Appearance: He is not toxic-appearing or diaphoretic  Cardiovascular:      Rate and Rhythm: Normal rate and regular rhythm  Heart sounds: Normal heart sounds  No murmur heard  No friction rub  No gallop  Pulmonary:      Effort: Pulmonary effort is normal  No respiratory distress  Breath sounds: Normal breath sounds  No stridor  No wheezing, rhonchi or rales  Abdominal:      General: Bowel sounds are normal  There is no distension  Palpations: Abdomen is soft  Tenderness: There is no abdominal tenderness  There is no guarding  Musculoskeletal:      Right lower leg: No edema  Left lower leg: No edema  Skin:     General: Skin is warm  Coloration: Skin is not pale  Findings: No rash  Neurological:      General: No focal deficit present  Mental Status: He is alert and oriented to person, place, and time  Psychiatric:         Mood and Affect: Mood normal          Behavior: Behavior normal          Thought Content: Thought content normal       please see pictures below of patient's left foot:                   Additional Data:     Labs:  Results from last 7 days   Lab Units 22  0541 22  1536   WBC Thousand/uL 10 61* 10 57*   HEMOGLOBIN g/dL 12 5 12 5   HEMATOCRIT % 39 2 39 8 PLATELETS Thousands/uL 374 358   NEUTROS PCT %  --  75   LYMPHS PCT %  --  11*   MONOS PCT %  --  10   EOS PCT %  --  2     Results from last 7 days   Lab Units 05/25/22  0541 05/24/22  1536   SODIUM mmol/L 137 135   POTASSIUM mmol/L 3 9 4 3   CHLORIDE mmol/L 101 95*   CO2 mmol/L 27 31   BUN mg/dL 39* 39*   CREATININE mg/dL 1 75* 2 02*   ANION GAP mmol/L 9 9   CALCIUM mg/dL 8 8 9 1   ALBUMIN g/dL  --  3 5   TOTAL BILIRUBIN mg/dL  --  1 56*   ALK PHOS U/L  --  87   ALT U/L  --  17   AST U/L  --  20   GLUCOSE RANDOM mg/dL 70 228*         Results from last 7 days   Lab Units 05/25/22  1612 05/25/22  1132 05/25/22  0717 05/24/22  2139   POC GLUCOSE mg/dl 151* 160* 79 275*               Lines/Drains:  Invasive Devices  Report    Peripheral Intravenous Line  Duration           Peripheral IV 05/24/22 Left Antecubital 1 day    Peripheral IV 05/24/22 Right Antecubital 1 day                      Imaging: Reviewed radiology reports from this admission including: xray(s)    Recent Cultures (last 7 days):   Results from last 7 days   Lab Units 05/24/22  1646   BLOOD CULTURE  Received in Microbiology Lab  Culture in Progress  Received in Microbiology Lab  Culture in Progress         Last 24 Hours Medication List:   Current Facility-Administered Medications   Medication Dose Route Frequency Provider Last Rate    aspirin  81 mg Oral Daily Penny Carroll MD      atorvastatin  40 mg Oral Daily Penny Carroll MD      calcium carbonate  1,000 mg Oral Daily PRN Penny Carroll MD      cefazolin  1,000 mg Intravenous Q8H Savanah Jimenez MD Stopped (05/25/22 1750)    docusate sodium  100 mg Oral BID PRN Penny Carroll MD      heparin (porcine)  5,000 Units Subcutaneous Q8H [facility] Penny Carroll MD      insulin glargine  30 Units Subcutaneous QAM Penny Carroll MD      insulin lispro  1-5 Units Subcutaneous HS Penny Carroll MD      insulin lispro  1-6 Units Subcutaneous TID TITUS Francis MD Carly      lisinopril  5 mg Oral Daily Penny Carroll MD      metoprolol tartrate  25 mg Oral Q12H [facility] Penny Carroll MD      metroNIDAZOLE  500 mg Oral [facility] Steve Stark MD      ondansetron  4 mg Intravenous Q6H PRN Penny Carroll MD          Today, Patient Was Seen By: Derrick Rasheed MD    **Please Note: This note may have been constructed using a voice recognition system  **

## 2022-05-25 NOTE — ASSESSMENT & PLAN NOTE
Lab Results   Component Value Date    EGFR 40 05/25/2022    EGFR 33 05/24/2022    EGFR 41 05/15/2022    CREATININE 1 75 (H) 05/25/2022    CREATININE 2 02 (H) 05/24/2022    CREATININE 1 70 (H) 05/15/2022   Creatinine slightly above baseline on admission; now back at his baseline  Hold Lasix  Will hold off on patient's lisinopril as patient's blood pressures have been low normal and will have surgery with anesthesia tomorrow  Received IV fluids in ED  Recheck labs in [facility]

## 2022-05-25 NOTE — ASSESSMENT & PLAN NOTE
· Continue metoprolol   · Holding Lasix  · Also, with patient's blood pressures at low normal and patient having surgery with anesthesia, I will also hold off patient's lisinopril for now

## 2022-05-25 NOTE — ASSESSMENT & PLAN NOTE
Lab Results   Component Value Date    HGBA1C 10 4 (H) 04/06/2022       Recent Labs     05/24/22  2139 05/25/22  0717 05/25/22  1132 05/25/22  1612   POCGLU 275* 79 160* 151*       Blood Sugar Average: Last 72 hrs:  (P) 166 25 uncontrolled diabetes with hyperglycemia  Patient reports blood sugars have been running around 200  A1c noted to be at 10 4  On Lantus 30 units a m  Novolin 70 /30 5 units at dinner  Presently with acceptable blood sugars  Will continue Lantus and sliding scale  Adjust treatment accordingly

## 2022-05-25 NOTE — PLAN OF CARE
Problem: Nutrition/Hydration-ADULT  Goal: Nutrient/Hydration intake appropriate for improving, restoring or maintaining nutritional needs  Description: Monitor and assess patient's nutrition/hydration status for malnutrition  Collaborate with interdisciplinary team and initiate plan and interventions as ordered  Monitor patient's weight and dietary intake as ordered or per policy  Utilize nutrition screening tool and intervene as necessary  Determine patient's food preferences and provide high-protein, high-caloric foods as appropriate       INTERVENTIONS:  - Monitor oral intake, urinary output, labs, and treatment plans  - Assess nutrition and hydration status and recommend course of action  - Evaluate amount of meals eaten  - Assist patient with eating if necessary   - Allow adequate time for meals  - Recommend/ encourage appropriate diets, oral nutritional supplements, and vitamin/mineral supplements  - Order, calculate, and assess calorie counts as needed  - Recommend, monitor, and adjust tube feedings and TPN/PPN based on assessed needs  - Assess need for intravenous fluids  - Provide specific nutrition/hydration education as appropriate  - Include patient/family/caregiver in decisions related to nutrition  Outcome: Progressing     Problem: Potential for Falls  Goal: Patient will remain free of falls  Description: INTERVENTIONS:  - Educate patient/family on patient safety including physical limitations  - Instruct patient to call for assistance with activity   - Consult OT/PT to assist with strengthening/mobility   - Keep Call bell within reach  - Keep bed low and locked with side rails adjusted as appropriate  - Keep care items and personal belongings within reach  - Initiate and maintain comfort rounds  - Make Fall Risk Sign visible to staff  - Obtain necessary fall risk management equipment: crutches  - Apply yellow socks and bracelet for high fall risk patients  - Consider moving patient to room near nurses station  Outcome: Progressing     Problem: MOBILITY - ADULT  Goal: Maintain or return to baseline ADL function  Description: INTERVENTIONS:  -  Assess patient's ability to carry out ADLs; assess patient's baseline for ADL function and identify physical deficits which impact ability to perform ADLs (bathing, care of mouth/teeth, toileting, grooming, dressing, etc )  - Assess/evaluate cause of self-care deficits   - Assess range of motion  - Assess patient's mobility; develop plan if impaired  - Assess patient's need for assistive devices and provide as appropriate  - Encourage maximum independence but intervene and supervise when necessary  - Involve family in performance of ADLs  - Assess for home care needs following discharge   - Consider OT consult to assist with ADL evaluation and planning for discharge  - Provide patient education as appropriate  Outcome: Progressing  Goal: Maintains/Returns to pre admission functional level  Description: INTERVENTIONS:  - Perform BMAT or MOVE assessment daily    - Set and communicate daily mobility goal to care team and patient/family/caregiver     - Collaborate with rehabilitation services on mobility goals if consulted  - Dangle patient 3 times a day  - Stand patient 6 times a day  - Ambulate patient 4 times a day  - Out of bed to chair 3 times a day   - Out of bed for meals 3 times a day  - Out of bed for toileting  - Record patient progress and toleration of activity level   Outcome: Progressing

## 2022-05-25 NOTE — CONSULTS
Consultation - Infectious Disease   Karen Khan 57 y o  male MRN: [id number]  Unit/Bed#: W -01 Encounter: [id number]      IMPRESSION & RECOMMENDATIONS:   Impression/Recommendations: This is a 57 y o  male, with multiple medical problems outlined below, status post left 5th toe amputation with a filet flap closure on 05/14  Patient is now admitted with nonhealing wound with x-ray showing osteomyelitis of 5th MT head  1  Left lateral left foot nonhealing wound with cellulitis  Patient has no fever and WBC at upper range of normal   He is clinically and systemically well  During recent admission for toe amputation, wound culture grew quite susceptible E coli  He is currently on vancomycin/cefepime  Given very stable clinical status, antibiotic regimen can be de-escalated, to avoid potential antibiotic toxicities  Deescalate antibiotic regimen to IV cefazolin  Add Flagyl for anaerobic coverage  Serial foot exam   Monitor temperature/WBC  Follow-up on pending blood cultures  2  Left 5th MT head osteomyelitis, evident on x-ray  This is likely etiology of wound nonhealing  Probability of cure with antibiotic is extremely low  Patient should get bone resection  Podiatry evaluation noted  Plan for bone resection tomorrow noted  If wound continues to show poor wound healing after appropriate bone resection, patient may need vascular workup  Antibiotic plan as in above  Plan for bone resection by Podiatry tomorrow noted  Monitor wound for healing  Consider vascular workup if wound does not heal after bone resection  3   CKD stage 3  Creatinine baseline  Antibiotic at full dose  Monitor creatinine  4  DM with neuropathy  Management per primary service  Recent hospitalization records reviewed in detail  Discussed with patient in detail regarding the above plan  Discussed with Dr Nelson Porras from slim service  Thank you for this consultation    We will follow along with you     HISTORY OF PRESENT ILLNESS:  Reason for Consult:  Left foot osteomyelitis  HPI: Kristan Scott is a 57 y o  male, with multiple medical problems including DM, neuropathy, CAD and CHF, was admitted is [facility] on 05/12 until 5/15 for left 5th toe osteomyelitis and nonhealing wound  He underwent 5th toe amputation with filet flap closure on 5/14  At discharge on 05/15, incision was healing  Patient was discharged on p o  Keflex  Unfortunately, at home, his wound started to breakdown with drainage that mild odorous  Therefore, patient came to ER for evaluation  X-ray of foot on presentation showed osteomyelitis of 5th MT head  Patient was admitted  He was started on vancomycin/cefepime  We are asked to evaluate the patient  At present, patient is comfortable  He has neuropathy and has no pain/sensation in his foot  He has no fever or chills  REVIEW OF SYSTEMS:  A complete system-based review was done  Except for what is noted in HPI above, ROS of systems is otherwise negative  PAST MEDICAL HISTORY:  Past Medical History:   Diagnosis Date    CAD (coronary artery disease)     Chronic combined systolic and diastolic CHF (congestive heart failure) (HCC)     Diabetes mellitus (HCC)     type 2    Dyslipidemia     Hypertension     Ischemic cardiomyopathy     [facility]     Osteomyelitis [facility]     Pancreatitis      Past Surgical History:   Procedure Laterality Date    ANGIOPLASTY      ballon    CORONARY ANGIOPLASTY WITH STENT PLACEMENT      LULA to proximal and mid LAD, Proximal RCA   HERNIA REPAIR      INCISION AND DRAINAGE OF WOUND Left 2/14/2020    Procedure: INCISION AND DRAINAGE (I&D) EXTREMITY left foot (cpt 57310);   Surgeon: Beckie Guzman DPM;  [facility];  Service: Podiatry    [place] EXPLORE [facility] Left 2/14/2020    Procedure: EXPLORATION EXTREMITY;  Surgeon: Beckie Guzman DPM;  [facility];  Service: Sixto Chavez NM NEG PRESS WOUND TX, < 50 CM Left 2020    Procedure: APPLICATION VAC DRESSING;  Surgeon: Mitra Devries DPM;  [facility];  Service: Podiatry    TOE AMPUTATION Left 2022    Procedure: EXCISION OF LEFT 5TH TOE ULCER WITH FILET FLAP;  Surgeon: Pantera Mcmahon DPM;  [facility];  Service: Podiatry     Problem list reviewed  FAMILY HISTORY:  Non-contributory    SOCIAL HISTORY:  Social History     Substance and Sexual Activity   Alcohol Use Not Currently    Comment: 20 years of sobriety     Social History     Substance and Sexual Activity   Drug Use Never     Social History     Tobacco Use   Smoking Status Never Smoker   Smokeless Tobacco Never Used       ALLERGIES:  No Known Allergies    MEDICATIONS:  All current active medications have been reviewed  Patient is currently on vancomycin/cefepime  PHYSICAL EXAM:  Vitals:  Temp:  [97 9 °F (36 6 °C)-98 5 °F (36 9 °C)] 98 3 °F (36 8 °C)  HR:  [66-81] 68  Resp:  [16-18] 18  BP: ()/(56-76) 109/61  SpO2:  [97 %-99 %] 97 %  Temp (24hrs), Av 3 °F (36 8 °C), Min:97 9 °F (36 6 °C), Max:98 5 °F (36 9 °C)  Current: Temperature: 98 3 °F (36 8 °C)     Physical Exam:  General:  Well-nourished, well-developed, in no acute distress  Awake, alert and oriented x 3  Eyes:  Conjunctive clear with no hemorrhages or effusions  Oropharynx:  No ulcers, no lesions, pharynx benign, no tonsillitis  Neck:  Supple, no lymphadenopathy, no mass, nontender  Lungs:  Expansion symmetric, no rales, no wheezing, no accessory muscle use  Cardiac:  Regular rate and rhythm, normal S1, normal S2, no murmurs  Abdomen:  Soft, nondistended, non-tender, no HSM  Extremities:  Trace leg edema  Left lateral foot wound with sutures still in place  There is necrosis on plantar surface  There is serous purulent drainage  Mild erythema without warmth  No tenderness    Skin:  No rashes, no ulcers  Neurological:  Moves all four extremities spontaneously, sensation grossly intact    LABS, IMAGING, & OTHER STUDIES:  Lab Results:  I have personally reviewed pertinent labs  Results from last 7 days   Lab Units 05/25/22  0541 05/24/22  1536   POTASSIUM mmol/L 3 9 4 3   CHLORIDE mmol/L 101 95*   CO2 mmol/L 27 31   BUN mg/dL 39* 39*   CREATININE mg/dL 1 75* 2 02*   EGFR ml/min/1 73sq m 40 33   CALCIUM mg/dL 8 8 9 1   AST U/L  --  20   ALT U/L  --  17   ALK PHOS U/L  --  87     Results from last 7 days   Lab Units 05/25/22  0541 05/24/22  1536   WBC Thousand/uL 10 61* 10 57*   HEMOGLOBIN g/dL 12 5 12 5   PLATELETS Thousands/uL 374 358     Results from last 7 days   Lab Units 05/24/22  1646   BLOOD CULTURE  Received in Microbiology Lab  Culture in Progress  Received in Microbiology Lab  Culture in Progress  Imaging Studies:   I have personally reviewed pertinent imaging study reports and images in PACS  Left foot x-ray reviewed personally  Osteomyelitis of 5th MT head  EKG, Pathology, and Other Studies:   I have personally reviewed pertinent reports

## 2022-05-25 NOTE — UTILIZATION REVIEW
Initial Clinical Review    Admission: Date/Time/Statement:   Admission Orders (From admission, onward)     Ordered        05/24/22 1640  Inpatient Admission  Once                      Orders Placed This Encounter   Procedures    Inpatient Admission     Standing Status:   Standing     Number of Occurrences:   1     Order Specific Question:   Level of Care     Answer:   Med Surg [16]     Order Specific Question:   Estimated length of stay     Answer:   More than 2 Midnights     Order Specific Question:   Certification     Answer:   I certify that inpatient services are medically necessary for this patient for a duration of greater than two midnights  See H&P and MD Progress Notes for additional information about the patient's course of treatment  ED Arrival Information     Expected   -    Arrival   5/24/2022 14:26    Acuity   Urgent            Means of arrival   Walk-In    Escorted by   Family Member    Service   Hospitalist    Admission type   Urgent            Arrival complaint   toe infected/removal           Chief Complaint   Patient presents with    Post-op Problem     Pt reports was told by visiting nurse to come in d/t wound not healing well, had L pinky toe removed approx 1 5 weeks ago, denies fevers        Initial Presentation: 57 y o  male with hx DMon insulin with neuropathy,CAD, HTN,CHF,MI,osteomyelitis,   L 5th digit amputation on 05/14/2022 who presents to EDfrom home with redness, swelling and purulent drainage from wound   Pt reports blood sugars not controlled x 1 week   On exam, left 5th toe surgically amputated,sutures in place with erythema and swelling noted  No active discharge currently  Labs - WBC 10 57, creat 2 02 -slightly above baseline, Glucose 228  XR L ft shows osteomyelitis of the head of the 5th metatarsal post resection of the 5th digit phalanges  Pt given IVF, IV abx in ED  Pt admitted as Inpatient with cellulitis and osteomyelitis L ft   Plan - IV abx- cefepime and vanco, consult ID and Podiatry, follow blood cultures  Continue home lantus, monitor blood sugars, SSI  Hold lasix 2/2/ elevated creat  Continue home BB and ACE-I, statin and ASA  Labs in am    Date: 5/25   Day 2:   Podiatry consult-Failure of filet flap left foot  Acute osteomyelitis left 5th metatarsal  DM2 with neuropathy  Metallic foreign body great toe noted on XR L ft  About 2 days ago the flap began to open up and had a strong malodor   Necrotic wound to the 5th toe amputation stump  The distal half of the Flay flap appears necrotic  There is malodor and purulence with exposed metatarsal head  Mild cellulitis left lateral forefoot   Diminished pinprick and vibratory sensation bilateral lower extremity, palpable pedal pulses   Pt will need revisional amp, washout,removal 5th metatarsal head/neck  Surgery likely Thursday  Recent cultures positive for E  Coli susceptible to ancef  Would also add flagyl  Wt bear to heel on L  WBC 10 61 today  ID consult-stable clinical status, can deescalate abx to IV Ancef, add flagyl for anaerobic coverage  Serial ft exams, monitor temp, WBC's   F/U blood cultures   Plan for OR tomorrow for bone resection by podiatry    Consider vascular workup if wound does not heal after bone resection    ED Triage Vitals   Temperature Pulse Respirations Blood Pressure SpO2   05/24/22 1449 05/24/22 1449 05/24/22 1449 05/24/22 1449 05/24/22 1449   97 9 °F (36 6 °C) 75 16 97/56 99 %      Temp Source Heart Rate Source Patient Position - Orthostatic VS BP Location FiO2 (%)   05/24/22 1449 05/24/22 1449 05/24/22 1734 05/24/22 1449 --   Oral Monitor Lying Left arm       Pain Score       05/24/22 1449       4          Wt Readings from Last 1 Encounters:   05/25/22 80 1 kg (176 lb 9 4 oz)     Additional Vital Signs:   Date/Time Temp Pulse Resp BP MAP (mmHg) SpO2   05/25/22 0840 -- 68 -- 109/61 -- --   05/25/22 07:18:05 98 3 °F (36 8 °C) 66 18 103/61 75 97 %   05/24/22 21:41:30 98 5 °F (36 9 °C) 81 17 115/72 86 97 %   05/24/22 17:34:11 98 5 °F (36 9 °C) 79 16 135/76 96 98 %       Pertinent Labs/Diagnostic Test Results:   XR foot 3+ views LEFT   Final Result by Ambar Martins MD (05/24 1608)      Osteomyelitis of the head of the 5th metatarsal post resection of the 5th digit phalanges  3 mm linear hyperdense foreign body is seen in the soft tissues of the plantar aspect of the 1st digit adjacent to the tuft of the distal phalanx   The study was marked in EPIC for significant notification  Workstation performed: [id number]               Results from last 7 days   Lab Units 05/25/22  0541 05/24/22  1536   WBC Thousand/uL 10 61* 10 57*   HEMOGLOBIN g/dL 12 5 12 5   HEMATOCRIT % 39 2 39 8   PLATELETS Thousands/uL 374 358   NEUTROS ABS Thousands/µL  --  8 08*         Results from last 7 days   Lab Units 05/25/22  0541 05/24/22  1536   SODIUM mmol/L 137 135   POTASSIUM mmol/L 3 9 4 3   CHLORIDE mmol/L 101 95*   CO2 mmol/L 27 31   ANION GAP mmol/L 9 9   BUN mg/dL 39* 39*   CREATININE mg/dL 1 75* 2 02*   EGFR ml/min/1 73sq m 40 33   CALCIUM mg/dL 8 8 9 1   MAGNESIUM mg/dL 2 2  --      Results from last 7 days   Lab Units 05/24/22  1536   AST U/L 20   ALT U/L 17   ALK PHOS U/L 87   TOTAL PROTEIN g/dL 8 0   ALBUMIN g/dL 3 5   TOTAL BILIRUBIN mg/dL 1 56*     Results from last 7 days   Lab Units 05/25/22  0717 05/24/22  2139   POC GLUCOSE mg/dl 79 275*     Results from last 7 days   Lab Units 05/25/22  0541 05/24/22  1536   GLUCOSE RANDOM mg/dL 70 228*               Results from last 7 days   Lab Units 05/24/22  1536   CRP mg/L 50 5*   SED RATE mm/hour 77*               Results from last 7 days   Lab Units 05/24/22  1646   BLOOD CULTURE  Received in Microbiology Lab  Culture in Progress  Received in Microbiology Lab  Culture in Progress                 ED Treatment:   Medication Administration from 05/24/2022 1426 to 05/24/2022 1726       Date/Time Order Dose Route Action     05/24/2022 1652 cefepime (MAXIPIME) 2 g/50 mL dextrose IVPB 2,000 mg Intravenous New Bag     05/24/2022 1652 sodium chloride 0 9 % bolus 1,000 mL 1,000 mL Intravenous New Bag        Past Medical History:   Diagnosis Date    CAD (coronary artery disease)     Chronic combined systolic and diastolic CHF (congestive heart failure) ([facility])     Diabetes mellitus ([facility])     type 2    Dyslipidemia     Hypertension     Ischemic cardiomyopathy     MI (myocardial infarction) ([place] [address] )     Osteomyelitis ([place] [address] )     Pancreatitis      Present on Admission:   Coronary artery disease   Chronic combined systolic and diastolic CHF   CKD (chronic kidney disease) stage 3   Essential hypertension      Admitting Diagnosis: Surgical wound infection [T81 49XA]  LOBO (acute kidney injury) ([place] [address] ) [N17 9]  Acute osteomyelitis of metatarsal bone of left foot ([facility]) [M86 172]  Age/Sex: 57 y o  male  Admission Orders:  Scheduled Medications:  aspirin, 81 mg, Oral, Daily  atorvastatin, 40 mg, Oral, Daily  cefepime, 1,000 mg, Intravenous, [id number]: 05/25/22 1000  heparin (porcine), 5,000 Units, Subcutaneous, Q8H [address]  insulin glargine, 30 Units, Subcutaneous, QAM  insulin lispro, 1-5 Units, Subcutaneous, HS  insulin lispro, 1-6 Units, Subcutaneous, TID AC  lisinopril, 5 mg, Oral, Daily  metoprolol tartrate, 25 mg, Oral, Q12H [address]  vancomycin, 20 mg/kg, Intravenous, Q24H End: 05/25/22 1000  ceFAZolin (ANCEF) IVPB (premix in dextrose) 1,000 mg 50 mL  Dose: 1,000 mg  Freq: Every 8 hours Route: IV  Last Dose: Stopped (05/25/22 1200)  Start: 05/25/22 1015  metroNIDAZOLE (FLAGYL) tablet 500 mg  Dose: 500 mg  Freq: Every 8 hours scheduled Route: PO  Start: 05/25/22 1400Continuous IV Infusions:     PRN Meds:  calcium carbonate, 1,000 mg, Oral, Daily PRN  docusate sodium, 100 mg, Oral, BID PRN  ondansetron, 4 mg, Intravenous, Q6H PRN    poct glucose  OOB as mary  NPO effective 5/26 @0001    IP CONSULT TO PODIATRY  IP CONSULT TO INFECTIOUS DISEASES  IP CONSULT TO PHARMACY    Network Utilization Review Department  ATTENTION: Please call with any questions or concerns to [phone] and carefully listen to the prompts so that you are directed to the right person  All voicemails are confidential   Mindy Mejia all requests for admission clinical reviews, approved or denied determinations and any other requests to dedicated fax number below belonging to the campus where the patient is receiving treatment   List of dedicated fax numbers for the Facilities:  [address] DENIALS (Administrative/Medical Necessity) [phone]   [address] (Maternity/NICU/Pediatrics) [phone]   [address]  [address] [facility] [place] [address] [id number] [place] [address] [address] [place] [address] [phone]

## 2022-05-25 NOTE — CONSULTS
Consult - Podiatry   Debbie Khan 57 y o  male MRN: [id number]  Unit/Bed#: W -01 Encounter: [id number]    Assessment/Plan     Assessment:  1  Failure of filet flap left foot  2  Acute osteomyelitis left 5th metatarsal  3  DM2 with neuropathy  4  Metallic foreign body great toe    Plan:  1  Patient will need revisional amputation, washout, removal 5th metatarsal head/neck  Surgery likely Thursday  2  Recent cultures positive for E  Coli susceptible to ancef  Would also add flagyl  3  WB to heel   4  NPO midnight  5  The metallic foreign body in his great toe is relatively    History of Present Illness     HPI:  Ashely Barr is a 57 y o  male who presents with nonhealing amputation of his 5th toe on the left foot  Patient underwent an amputation of the toe with filet flap for closure on May 14th  He stated it was healing well  He took his postop antibiotic appropriately  About 2 days ago the flap began to open up and had a strong malodor  He was brought to the emergency room  Manish Moore He had a mildly elevated white blood cell count  An x-ray showed new erosive changes to his 5th metatarsal head superficial and not infected  This can be removed at the same time as the ray amputation concerning for osteomyelitis  Otherwise he feels well    Consults  Review of Systems   Constitutional: Negative  HENT: Negative  Eyes: Negative  Respiratory: Negative  Cardiovascular: Negative  Gastrointestinal: Negative  Musculoskeletal:   Fifth toes amputated on the left  No calf pain with compression  Skin:  Foul-smelling wound amputation site   Neurological:  Numbness and tingling in feet      Psych: negative      Historical Information   Past Medical History:   Diagnosis Date    CAD (coronary artery disease)     Chronic combined systolic and diastolic CHF (congestive heart failure) ([facility])     [facility] )     type 2    Dyslipidemia     Hypertension     Ischemic cardiomyopathy     [facility]     Osteomyelitis ([facility] )     Pancreatitis      Past Surgical History:   Procedure Laterality Date    ANGIOPLASTY      ballon    CORONARY ANGIOPLASTY WITH STENT PLACEMENT      LULA to proximal and mid LAD, Proximal RCA   HERNIA REPAIR      INCISION AND DRAINAGE OF WOUND Left 2/14/2020    Procedure: INCISION AND DRAINAGE (I&D) EXTREMITY left foot (cpt 92193);   Surgeon: Beckie Guzman DPM;  [facility];  Service: Anastacio Riley Left 2/14/2020    Procedure: EXPLORATION EXTREMITY;  Surgeon: Beckie Guzman DPM;  [facility];  Service: Podiatry    FL NEG PRESS WOUND TX, < 50 CM Left 2/14/2020    Procedure: APPLICATION VAC DRESSING;  Surgeon: Beckie Guzman DPM;  [facility];  Service: Podiatry    TOE AMPUTATION Left 5/14/2022    Procedure: EXCISION OF LEFT 5TH TOE ULCER WITH FILET FLAP;  Surgeon: Delonte Alegria DPM;  [facility];  Service: Podiatry     Social History   Social History     Substance and Sexual Activity   Alcohol Use Not Currently    Comment: 20 years of sobriety     Social History     Substance and Sexual Activity   Drug Use Never     Social History     Tobacco Use   Smoking Status Never Smoker   Smokeless Tobacco Never Used     Family History:   Family History   Problem Relation Age of Onset    Heart attack Father     Hyperlipidemia Father     Cancer Father     Diabetes Father     Diabetes Mother     Heart failure Mother         poss    Kidney disease Mother     Cancer Paternal Grandfather     Stroke Neg Hx     Anuerysm Neg Hx     Clotting disorder Neg Hx     Arrhythmia Neg Hx     Coronary artery disease Neg Hx     Hypertension Neg Hx     Sudden death Neg Hx         scd       Meds/Allergies   Medications Prior to Admission   Medication    aspirin 81 mg chewable tablet    atorvastatin (LIPITOR) 40 mg tablet    Blood Pressure Monitoring ([employer] Blood Pressure Monitor) KIT    furosemide (LASIX) 40 mg tablet    Lantus SoloStar 100 units/mL injection pen    lisinopril (ZESTRIL) 5 mg tablet    metoprolol tartrate (LOPRESSOR) 25 mg tablet    NovoLIN 70/30 FlexPen (70-30) 100 UNIT/ML injection pen    TRUE METRIX BLOOD GLUCOSE TEST test strip    Unifine Pentips 31G X 8 MM MISC    cephalexin (KEFLEX) 500 mg capsule     No Known Allergies    Objective   First Vitals:   Blood Pressure: 97/56 (05/24/22 1449)  Pulse: 75 (05/24/22 1449)  Temperature: 97 9 °F (36 6 °C) (05/24/22 1449)  Temp Source: Oral (05/24/22 1449)  Respirations: 16 (05/24/22 1449)  Height: 5' 10" (177 8 cm) (05/24/22 1449)  Weight - Scale: 79 4 kg (175 lb) (05/24/22 1449)  SpO2: 99 % (05/24/22 1449)    Current Vitals:   Blood Pressure: 115/72 (05/24/22 2141)  Pulse: 81 (05/24/22 2141)  Temperature: 98 5 °F (36 9 °C) (05/24/22 2141)  Temp Source: Oral (05/24/22 1734)  Respirations: 17 (05/24/22 2141)  Height: 5' 10" (177 8 cm) (05/24/22 1449)  Weight - Scale: 80 2 kg (176 lb 12 9 oz) (05/24/22 1734)  SpO2: 97 % (05/24/22 2141)        /72   Pulse 81   Temp 98 5 °F (36 9 °C)   Resp 17   Ht 5' 10" (1 778 m)   Wt 80 2 kg (176 lb 12 9 oz)   SpO2 97%   BMI 25 37 kg/m²   Physical Exam:  General:    Alert, cooperative, no distress   Head:     Normocephalic, without obvious abnormality, atraumatic                   Skin:   Necrotic wound to the 5th toe amputation stump  The distal half of the Flay flap appears necrotic  There is malodor and purulence with exposed metatarsal head  There is no wound or sign of infection at the great toe on the left       Lungs:     Respirations unlabored   Chest wall:    No tenderness or deformity   Heart/vasc:    Regular rate and rhythm  Palpable pedal pulses  Mild cellulitis left lateral forefoot   Abdomen:     Soft, non-tender, no distention           Lower MSK:   5th toe on the left is amputated  No calf pain with compression     Psychiatric:  AAOx3, no depression           Neurologic:   Diminished pinprick and vibratory sensation bilateral lower extremity     Lab Results:   Admission on 05/24/2022   Component Date Value    Sodium 05/24/2022 135     Potassium 05/24/2022 4 3     Chloride 05/24/2022 95 (A)    CO2 05/24/2022 31     ANION GAP 05/24/2022 9     BUN 05/24/2022 39 (A)    Creatinine 05/24/2022 2 02 (A)    Glucose 05/24/2022 228 (A)    Calcium 05/24/2022 9 1     AST 05/24/2022 20     ALT 05/24/2022 17     Alkaline Phosphatase 05/24/2022 87     Total Protein 05/24/2022 8 0     Albumin 05/24/2022 3 5     Total Bilirubin 05/24/2022 1 56 (A)    eGFR 05/24/2022 33     WBC 05/24/2022 10 57 (A)    RBC 05/24/2022 4 63     Hemoglobin 05/24/2022 12 5     Hematocrit 05/24/2022 39 8     MCV 05/24/2022 86     MCH 05/24/2022 27 0     MCHC 05/24/2022 31 4     RDW 05/24/2022 14 4     MPV 05/24/2022 9 6     Platelets 57/18/2060 358     nRBC 05/24/2022 0     Neutrophils Relative 05/24/2022 75     Immat GRANS % 05/24/2022 1     Lymphocytes Relative 05/24/2022 11 (A)    Monocytes Relative 05/24/2022 10     Eosinophils Relative 05/24/2022 2     Basophils Relative 05/24/2022 1     Neutrophils Absolute 05/24/2022 8 08 (A)    Immature Grans Absolute 05/24/2022 0 05     Lymphocytes Absolute 05/24/2022 1 17     Monocytes Absolute 05/24/2022 1 05     Eosinophils Absolute 05/24/2022 0 16     Basophils Absolute 05/24/2022 0 06     Sed Rate 05/24/2022 77 (A)    CRP 05/24/2022 50 5 (A)    Blood Culture 05/24/2022 Received in Microbiology Lab  Culture in Progress   Blood Culture 05/24/2022 Received in Microbiology Lab  Culture in Progress       POC Glucose 05/24/2022 275 (A)    Sodium 05/25/2022 137     Potassium 05/25/2022 3 9     Chloride 05/25/2022 101     CO2 05/25/2022 27     ANION GAP 05/25/2022 9     BUN 05/25/2022 39 (A)    Creatinine 05/25/2022 1 75 (A)    Glucose 05/25/2022 70     Calcium 05/25/2022 8 8     eGFR 05/25/2022 40     Magnesium 05/25/2022 2 2     WBC 05/25/2022 10 61 (A)    RBC 05/25/2022 4 61     Hemoglobin 05/25/2022 12 5     Hematocrit 05/25/2022 39 2     MCV 05/25/2022 85     MCH 05/25/2022 27 1     MCHC 05/25/2022 31 9     RDW 05/25/2022 14 5     Platelets 23/79/2287 374     MPV 05/25/2022 9 9            Results from last 7 days   Lab Units 05/24/22  1646   BLOOD CULTURE  Received in Microbiology Lab  Culture in Progress  Received in Microbiology Lab  Culture in Progress  Imaging: I have personally reviewed pertinent films in PACS  There are new acute erosive changes to the 5th metatarsal head  The retained foreign body, metallic thread, in his great toe is still present      Code Status: Level 1 - Full Code        Portions of the record may have been created with voice recognition software  Occasional wrong word or "sound a like" substitutions may have occurred due to the inherent limitations of voice recognition software  Read the chart carefully and recognize, using context, where substitutions have occurred

## 2022-05-25 NOTE — PROGRESS NOTES
Vancomycin IV Pharmacy-to-Dose Consultation    Kolby New is a 57 y o  male who is currently receiving Vancomycin IV with management by the Pharmacy Consult service  Assessment/Plan:  The patient was reviewed  Renal function is improving and no signs or symptoms of nephrotoxicity and/or infusion reactions were documented in the chart  Based on todays assessment, continue current vancomycin (day # 2) dosing of 1500 mg every 24 hours, with a plan for trough to be drawn at approximately 1730 on 5/27/22  We will continue to follow the patients culture results and clinical progress daily      Alrfeda Camacho

## 2022-05-25 NOTE — PHYSICAL THERAPY NOTE
PHYSICAL THERAPY CANCELLATION NOTE          Patient Name: Corby Omalley  FVFNU'M Date: 5/25/2022 05/25/22 4895   PT Last Visit   PT Visit Date 05/25/22   Note Type   Note type Evaluation; Cancelled Session   Additional Comments PT eval orders received, chart review performed  Per chart review, pt planned for OR tomorrow for amputation revision, washout, and amp pf R 5th metatarsal head/neck  Will hold PT eval pending OR intervention and updated WBS/activity orders  PT will continue to follow pt           Shannan Jett, PT, DPT  05/25/22

## 2022-05-26 ENCOUNTER — APPOINTMENT (INPATIENT)
Dept: RADIOLOGY | Facility: HOSPITAL | Age: 62
DRG: 857 | End: 2022-05-26
Payer: COMMERCIAL

## 2022-05-26 ENCOUNTER — ANESTHESIA (INPATIENT)
Dept: PERIOP | Facility: HOSPITAL | Age: 62
DRG: 857 | End: 2022-05-26
Payer: COMMERCIAL

## 2022-05-26 ENCOUNTER — ANESTHESIA EVENT (INPATIENT)
Dept: PERIOP | Facility: HOSPITAL | Age: 62
DRG: 857 | End: 2022-05-26
Payer: COMMERCIAL

## 2022-05-26 LAB
ANION GAP SERPL CALCULATED.3IONS-SCNC: 8 MMOL/L (ref 4–13)
BUN SERPL-MCNC: 35 MG/DL (ref 5–25)
CALCIUM SERPL-MCNC: 8.3 MG/DL (ref 8.4–10.2)
CHLORIDE SERPL-SCNC: 103 MMOL/L (ref 96–108)
CO2 SERPL-SCNC: 27 MMOL/L (ref 21–32)
CREAT SERPL-MCNC: 1.62 MG/DL (ref 0.6–1.3)
ERYTHROCYTE [DISTWIDTH] IN BLOOD BY AUTOMATED COUNT: 14.1 % (ref 11.6–15.1)
GFR SERPL CREATININE-BSD FRML MDRD: 44 ML/MIN/1.73SQ M
GLUCOSE SERPL-MCNC: 105 MG/DL (ref 65–140)
GLUCOSE SERPL-MCNC: 113 MG/DL (ref 65–140)
GLUCOSE SERPL-MCNC: 124 MG/DL (ref 65–140)
GLUCOSE SERPL-MCNC: 180 MG/DL (ref 65–140)
GLUCOSE SERPL-MCNC: 296 MG/DL (ref 65–140)
GLUCOSE SERPL-MCNC: 57 MG/DL (ref 65–140)
GLUCOSE SERPL-MCNC: 61 MG/DL (ref 65–140)
GLUCOSE SERPL-MCNC: 95 MG/DL (ref 65–140)
HCT VFR BLD AUTO: 36.3 % (ref 36.5–49.3)
HGB BLD-MCNC: 11.8 G/DL (ref 12–17)
MCH RBC QN AUTO: 27.3 PG (ref 26.8–34.3)
MCHC RBC AUTO-ENTMCNC: 32.5 G/DL (ref 31.4–37.4)
MCV RBC AUTO: 84 FL (ref 82–98)
PLATELET # BLD AUTO: 329 THOUSANDS/UL (ref 149–390)
PMV BLD AUTO: 9.5 FL (ref 8.9–12.7)
POTASSIUM SERPL-SCNC: 3.8 MMOL/L (ref 3.5–5.3)
RBC # BLD AUTO: 4.32 MILLION/UL (ref 3.88–5.62)
SODIUM SERPL-SCNC: 138 MMOL/L (ref 135–147)
WBC # BLD AUTO: 8.91 THOUSAND/UL (ref 4.31–10.16)

## 2022-05-26 PROCEDURE — 28122 PARTIAL REMOVAL OF FOOT BONE: CPT | Performed by: PODIATRIST

## 2022-05-26 PROCEDURE — 0QBP0ZZ EXCISION OF LEFT METATARSAL, OPEN APPROACH: ICD-10-PCS | Performed by: PODIATRIST

## 2022-05-26 PROCEDURE — 80048 BASIC METABOLIC PNL TOTAL CA: CPT | Performed by: INTERNAL MEDICINE

## 2022-05-26 PROCEDURE — 88311 DECALCIFY TISSUE: CPT | Performed by: PATHOLOGY

## 2022-05-26 PROCEDURE — 0JCR0ZZ EXTIRPATION OF MATTER FROM LEFT FOOT SUBCUTANEOUS TISSUE AND FASCIA, OPEN APPROACH: ICD-10-PCS | Performed by: PODIATRIST

## 2022-05-26 PROCEDURE — 82948 REAGENT STRIP/BLOOD GLUCOSE: CPT

## 2022-05-26 PROCEDURE — 73630 X-RAY EXAM OF FOOT: CPT

## 2022-05-26 PROCEDURE — NC001 PR NO CHARGE: Performed by: PODIATRIST

## 2022-05-26 PROCEDURE — 99232 SBSQ HOSP IP/OBS MODERATE 35: CPT | Performed by: INTERNAL MEDICINE

## 2022-05-26 PROCEDURE — 85027 COMPLETE CBC AUTOMATED: CPT | Performed by: INTERNAL MEDICINE

## 2022-05-26 PROCEDURE — 10120 INC&RMVL FB SUBQ TISS SMPL: CPT | Performed by: PODIATRIST

## 2022-05-26 PROCEDURE — 88305 TISSUE EXAM BY PATHOLOGIST: CPT | Performed by: PATHOLOGY

## 2022-05-26 RX ORDER — FENTANYL CITRATE 50 UG/ML
INJECTION, SOLUTION INTRAMUSCULAR; INTRAVENOUS AS NEEDED
Status: DISCONTINUED | OUTPATIENT
Start: 2022-05-26 | End: 2022-05-26

## 2022-05-26 RX ORDER — DEXTROSE MONOHYDRATE 25 G/50ML
INJECTION, SOLUTION INTRAVENOUS
Status: COMPLETED
Start: 2022-05-26 | End: 2022-05-26

## 2022-05-26 RX ORDER — PHENYLEPHRINE HYDROCHLORIDE 10 MG/ML
INJECTION INTRAVENOUS AS NEEDED
Status: DISCONTINUED | OUTPATIENT
Start: 2022-05-26 | End: 2022-05-26

## 2022-05-26 RX ORDER — MAGNESIUM HYDROXIDE 1200 MG/15ML
LIQUID ORAL AS NEEDED
Status: DISCONTINUED | OUTPATIENT
Start: 2022-05-26 | End: 2022-05-26 | Stop reason: HOSPADM

## 2022-05-26 RX ORDER — LIDOCAINE HYDROCHLORIDE 10 MG/ML
INJECTION, SOLUTION EPIDURAL; INFILTRATION; INTRACAUDAL; PERINEURAL AS NEEDED
Status: DISCONTINUED | OUTPATIENT
Start: 2022-05-26 | End: 2022-05-26

## 2022-05-26 RX ORDER — PROPOFOL 10 MG/ML
INJECTION, EMULSION INTRAVENOUS AS NEEDED
Status: DISCONTINUED | OUTPATIENT
Start: 2022-05-26 | End: 2022-05-26

## 2022-05-26 RX ORDER — DEXTROSE MONOHYDRATE 25 G/50ML
INJECTION, SOLUTION INTRAVENOUS
Status: DISPENSED
Start: 2022-05-26 | End: 2022-05-26

## 2022-05-26 RX ORDER — FENTANYL CITRATE/PF 50 MCG/ML
25 SYRINGE (ML) INJECTION
Status: DISCONTINUED | OUTPATIENT
Start: 2022-05-26 | End: 2022-05-26 | Stop reason: HOSPADM

## 2022-05-26 RX ORDER — SODIUM CHLORIDE, SODIUM LACTATE, POTASSIUM CHLORIDE, CALCIUM CHLORIDE 600; 310; 30; 20 MG/100ML; MG/100ML; MG/100ML; MG/100ML
INJECTION, SOLUTION INTRAVENOUS CONTINUOUS PRN
Status: DISCONTINUED | OUTPATIENT
Start: 2022-05-26 | End: 2022-05-26

## 2022-05-26 RX ORDER — PROPOFOL 10 MG/ML
INJECTION, EMULSION INTRAVENOUS CONTINUOUS PRN
Status: DISCONTINUED | OUTPATIENT
Start: 2022-05-26 | End: 2022-05-26

## 2022-05-26 RX ADMIN — METRONIDAZOLE 500 MG: 500 TABLET ORAL at 15:07

## 2022-05-26 RX ADMIN — CEFAZOLIN SODIUM 1000 MG: 1 SOLUTION INTRAVENOUS at 09:32

## 2022-05-26 RX ADMIN — ONDANSETRON 4 MG: 2 INJECTION INTRAMUSCULAR; INTRAVENOUS at 12:44

## 2022-05-26 RX ADMIN — PHENYLEPHRINE HYDROCHLORIDE 100 MCG: 10 INJECTION INTRAVENOUS at 12:55

## 2022-05-26 RX ADMIN — PROPOFOL 60 MCG/KG/MIN: 10 INJECTION, EMULSION INTRAVENOUS at 12:44

## 2022-05-26 RX ADMIN — METOPROLOL TARTRATE 25 MG: 25 TABLET, FILM COATED ORAL at 21:09

## 2022-05-26 RX ADMIN — METRONIDAZOLE 500 MG: 500 TABLET ORAL at 05:08

## 2022-05-26 RX ADMIN — ASPIRIN 81 MG CHEWABLE TABLET 81 MG: 81 TABLET CHEWABLE at 09:32

## 2022-05-26 RX ADMIN — INSULIN LISPRO 3 UNITS: 100 INJECTION, SOLUTION INTRAVENOUS; SUBCUTANEOUS at 21:07

## 2022-05-26 RX ADMIN — HEPARIN SODIUM 5000 UNITS: 5000 INJECTION INTRAVENOUS; SUBCUTANEOUS at 05:08

## 2022-05-26 RX ADMIN — ATORVASTATIN CALCIUM 40 MG: 40 TABLET, FILM COATED ORAL at 09:32

## 2022-05-26 RX ADMIN — LIDOCAINE HYDROCHLORIDE 40 MG: 10 INJECTION, SOLUTION EPIDURAL; INFILTRATION; INTRACAUDAL at 12:44

## 2022-05-26 RX ADMIN — DEXTROSE MONOHYDRATE 50 ML: 25 INJECTION, SOLUTION INTRAVENOUS at 07:51

## 2022-05-26 RX ADMIN — METOPROLOL TARTRATE 25 MG: 25 TABLET, FILM COATED ORAL at 09:32

## 2022-05-26 RX ADMIN — PROPOFOL 20 MG: 10 INJECTION, EMULSION INTRAVENOUS at 12:44

## 2022-05-26 RX ADMIN — CEFAZOLIN SODIUM 1000 MG: 1 SOLUTION INTRAVENOUS at 17:32

## 2022-05-26 RX ADMIN — SODIUM CHLORIDE, SODIUM LACTATE, POTASSIUM CHLORIDE, AND CALCIUM CHLORIDE: .6; .31; .03; .02 INJECTION, SOLUTION INTRAVENOUS at 12:32

## 2022-05-26 RX ADMIN — CEFAZOLIN SODIUM 1000 MG: 1 SOLUTION INTRAVENOUS at 01:17

## 2022-05-26 RX ADMIN — FENTANYL CITRATE 50 MCG: 50 INJECTION INTRAMUSCULAR; INTRAVENOUS at 12:44

## 2022-05-26 RX ADMIN — HEPARIN SODIUM 5000 UNITS: 5000 INJECTION INTRAVENOUS; SUBCUTANEOUS at 21:07

## 2022-05-26 RX ADMIN — METRONIDAZOLE 500 MG: 500 TABLET ORAL at 21:07

## 2022-05-26 NOTE — ASSESSMENT & PLAN NOTE
Wt Readings from Last 3 Encounters:   05/26/22 79 8 kg (176 lb)   05/15/22 80 7 kg (177 lb 14 6 oz)   04/07/22 80 7 kg (178 lb)     Stable and compensated  Appears to be close to baseline weight  Continue beta-blockers  Hold off on Lasix and lisinopril for now due to low normal blood pressures and status post surgery/anesthesia today

## 2022-05-26 NOTE — ANESTHESIA POSTPROCEDURE EVALUATION
Post-Op Assessment Note    CV Status:  Stable  Pain Score: 0    Pain management: adequate     Mental Status:  Alert and awake   Hydration Status:  Euvolemic   PONV Controlled:  Controlled   Airway Patency:  Patent      Post Op Vitals Reviewed: Yes      Staff: CRNA         No complications documented      BP   97/57   Temp      Pulse  66   Resp   16   SpO2   98

## 2022-05-26 NOTE — PHYSICAL THERAPY NOTE
PHYSICAL THERAPY CANCELLATION NOTE          Patient Name: Elvira Andrew  YNMLR'C Date: 5/26/2022 05/26/22 1209   PT Last Visit   PT Visit Date 05/26/22   Note Type   Note type Evaluation; Cancelled Session   Cancel Reasons Patient to operating room   Additional Comments PT eval orders received, chart review performed  Pt currently off floor at OR for washout, 5th metatarsal amputation  Will hold PT eval at this time pending OR intervention and activity orders/WBS post-op  Will continue to follow pt to provide PT intervention and DC recommendation as appropriate and as schedule allows           Kam Bull, PT, DPT  05/26/22

## 2022-05-26 NOTE — OCCUPATIONAL THERAPY NOTE
Occupational Therapy Cancellation       05/26/22 0756   OT Last Visit   OT Visit Date 05/26/22   Note Type   Note type Cancelled Session   Additional Comments OT orders received and chart review performed  Pt to OR today for AMPUTATION left 5th metatarsal; REMOVAL FOREIGN BODY EXTREMITY  Will follow and see pt post-op  Appreciate activity orders and WBS       Dominick Porter, OT

## 2022-05-26 NOTE — ASSESSMENT & PLAN NOTE
· S/p 5th digit amputation with filet flap (DOS 5/14/22)  · Patient presents with wound discharge/discomfort, redness and black discoloration  · Concern for wound infection  · X-ray show evidence of osteomyelitis left 5th metatarsal  · Received cefepime and vancomycin in the ER and this morning  These were stopped by the Infectious Disease doctor  · Podiatry on board  · Status post amputation of the left 5th metatarsal and removal of left lower extremity foreign body, 5/26  · Infectious Disease on board:  Continue IV cefazolin and Flagyl  · Follow blood cultures:  Negative for 24 hours  · PT OT evaluation

## 2022-05-26 NOTE — ASSESSMENT & PLAN NOTE
Lab Results   Component Value Date    EGFR 44 05/26/2022    EGFR 40 05/25/2022    EGFR 33 05/24/2022    CREATININE 1 62 (H) 05/26/2022    CREATININE 1 75 (H) 05/25/2022    CREATININE 2 02 (H) 05/24/2022   Creatinine slightly above baseline on admission; now back at his baseline  Hold Lasix  Continue to hold off on patient's lisinopril as patient's blood pressures have been low normal   Received IV fluids in ED  Recheck labs in anastacia Flores

## 2022-05-26 NOTE — PROGRESS NOTES
Progress Note - Infectious Disease   Annel Khan 57 y o  male MRN: [id number]  Unit/Bed#: W -01 Encounter: [id number]      Impression/Recommendations:  1  Left lateral left foot nonhealing wound with cellulitis  Patient has no fever and WBC at upper range of normal   He is clinically and systemically well  During recent admission for toe amputation, wound culture grew quite susceptible E coli  He is currently on vancomycin/cefepime  Given very stable clinical status, antibiotic regimen can be de-escalated, to avoid potential antibiotic toxicities  Continue IV cefazolin  Transition to p o  Keflex after bone resection  Add Flagyl for anaerobic coverage  Discontinue after bone resection  Serial foot exam   Monitor temperature/WBC  Follow-up on pending blood cultures      2  Left 5th MT head osteomyelitis, evident on x-ray  This is likely etiology of wound nonhealing  Probability of cure with antibiotic is extremely low  Patient should get bone resection  Podiatry evaluation noted  Plan for bone resection tomorrow noted  If wound continues to show poor wound healing after appropriate bone resection, patient may need vascular workup  Antibiotic plan as in above  Plan for bone resection by Podiatry tomorrow noted  Monitor wound for healing  Consider vascular workup if wound does not heal after bone resection      3  CKD stage 3  Creatinine baseline  Antibiotic at full dose  Monitor creatinine      4  DM with neuropathy  Management per primary service      Discussed with patient in detail regarding the above plan  Antibiotics:  Cefazolin/Flagyl  Antibiotic # 3     Subjective:  Patient feels well  No pain in foot  Temperature stays down  No chills  He is tolerating antibiotic well  No nausea, vomiting or diarrhea      Objective:  Vitals:  Temp:  [98 3 °F (36 8 °C)-98 5 °F (36 9 °C)] 98 5 °F (36 9 °C)  HR:  [70-74] 70  Resp:  [16-17] 17  BP: (115-125)/(64-71) 115/64  SpO2:  [95 %-98 %] 95 %  Temp (24hrs), Av 4 °F (36 9 °C), Min:98 3 °F (36 8 °C), Max:98 5 °F (36 9 °C)  Current: Temperature: 98 5 °F (36 9 °C)    Physical Exam:     General: Awake, alert, cooperative, no distress  Neck:  Supple  No mass  No lymphadenopathy  Lungs: Expansion symmetric, no rales, no wheezing, respirations unlabored  Heart:  Regular rate and rhythm, S1 and S2 normal, no murmur  Abdomen: Soft, nondistended, non-tender, bowel sounds active all four quadrants, no masses, no organomegaly  Extremities: No edema  Left foot with dressing in place  Dressing is dry  No erythema/warmth beyond dressing  Nontender  Skin:  No rash  Neuro: Moves all extremities  Invasive Devices  Report    Peripheral Intravenous Line  Duration           Peripheral IV 22 Left Antecubital 1 day    Peripheral IV 22 Right Antecubital 1 day                Labs studies:   I have personally reviewed pertinent labs  Results from last 7 days   Lab Units 22  0607 22  0541 22  1536   POTASSIUM mmol/L 3 8 3 9 4 3   CHLORIDE mmol/L 103 101 95*   CO2 mmol/L 27 27 31   BUN mg/dL 35* 39* 39*   CREATININE mg/dL 1 62* 1 75* 2 02*   EGFR ml/min/1 73sq m 44 40 33   CALCIUM mg/dL 8 3* 8 8 9 1   AST U/L  --   --  20   ALT U/L  --   --  17   ALK PHOS U/L  --   --  87     Results from last 7 days   Lab Units 22  0607 22  0541 22  1536   WBC Thousand/uL 8 91 10 61* 10 57*   HEMOGLOBIN g/dL 11 8* 12 5 12 5   PLATELETS Thousands/uL 329 374 358     Results from last 7 days   Lab Units 22  1646   BLOOD CULTURE  No Growth at 24 hrs  No Growth at 24 hrs  Imaging Studies:   I have personally reviewed pertinent imaging study reports and images in PACS  EKG, Pathology, and Other Studies:   I have personally reviewed pertinent reports

## 2022-05-26 NOTE — PROGRESS NOTES
[facility]  Progress Note Boulderangeli Chairez Pagats 1960, 57 y o  male MRN: [id number]  Unit/Bed#: W -01 Encounter: [id number]  Primary Care Provider: Priya Hernandez DO   Date and time admitted to hospital: 5/24/2022  2:47 PM    * Surgical wound infection, cellulitis and osteomyelitis-left foot  Assessment & Plan  · S/p 5th digit amputation with filet flap (DOS 5/14/22)  · Patient presents with wound discharge/discomfort, redness and black discoloration  · Concern for wound infection  · X-ray show evidence of osteomyelitis left 5th metatarsal  · Received cefepime and vancomycin in the ER and this morning  These were stopped by the Infectious Disease doctor  · Podiatry on board  · Status post amputation of the left 5th metatarsal and removal of left lower extremity foreign body, 5/26  · Infectious Disease on board:  Continue IV cefazolin and Flagyl  · Follow blood cultures:  Negative for 24 hours  · PT OT evaluation  Chronic combined systolic and diastolic CHF  Assessment & Plan  Wt Readings from Last 3 Encounters:   05/26/22 79 8 kg (176 lb)   05/15/22 80 7 kg (177 lb 14 6 oz)   04/07/22 80 7 kg (178 lb)     Stable and compensated  Appears to be close to baseline weight  Continue beta-blockers  Hold off on Lasix and lisinopril for now due to low normal blood pressures and status post surgery/anesthesia today  CKD (chronic kidney disease) stage 3  Assessment & Plan  Lab Results   Component Value Date    EGFR 44 05/26/2022    EGFR 40 05/25/2022    EGFR 33 05/24/2022    CREATININE 1 62 (H) 05/26/2022    CREATININE 1 75 (H) 05/25/2022    CREATININE 2 02 (H) 05/24/2022   Creatinine slightly above baseline on admission; now back at his baseline  Hold Lasix  Continue to hold off on patient's lisinopril as patient's blood pressures have been low normal   Received IV fluids in ED  Recheck labs in anastacia Larios Coronary artery disease  Assessment & Plan  · History of MI    Status post stents x3 several years ago  · Denies any chest pain or shortness of breath  · Continue statin and aspirin    Insulin-dependent diabetes mellitus  Assessment & Plan  Lab Results   Component Value Date    HGBA1C 10 4 (H) 04/06/2022       Recent Labs     05/26/22  0909 05/26/22  1115 05/26/22  1337 05/26/22  1556   POCGLU 124 105 95 113       Blood Sugar Average: Last 72 hrs:  (P) 134 3 uncontrolled diabetes with hyperglycemia  Patient reports blood sugars have been running around 200  A1c noted to be at 10 4  On Lantus 30 units a m  And Novolin 70 /30 5 units at dinner at home  Today, patient had hypoglycemia  D50 was given  Likely due to patient being on NPO  With patient's blood sugars still at low normal and normal levels, continue to hold off on the long-acting Lantus for now  Continue with insulin sliding scale  Adjust treatment accordingly  Essential hypertension  Assessment & Plan  · Today, patient's blood pressures are low normal levels  · Continue metoprolol   · Holding Lasix and lisinopril  · Adjust treatment accordingly  VTE Pharmacologic Prophylaxis: VTE Score: 4 Moderate Risk (Score 3-4) - Pharmacological DVT Prophylaxis Ordered: heparin  Patient Centered Rounds: I performed bedside rounds with nursing staff today  Discussions with Specialists or Other Care Team Provider:  Case management  Education and Discussions with Family / Patient: Patient declined call to   Time Spent for Care: 30 minutes  More than 50% of total time spent on counseling and coordination of care as described above  Current Length of Stay: 2 day(s)  Current Patient Status: Inpatient   Certification Statement: The patient will continue to require additional inpatient hospital stay due to Above findings and plans  Discharge Plan: Anticipate discharge tomorrow to discharge location to be determined pending rehab evaluations      Code Status: Level 1 - Full Code    Subjective:   Patient had hypoglycemic episode earlier this morning  This has resolved  When I saw the patient this morning, he was doing fine  Only has mild and occasional pains on his left foot  Otherwise no other pains  No other complaints  No chest pains or shortness of breath  No dizziness  No nausea or vomiting  Objective:     Vitals:   Temp (24hrs), Av 9 °F (36 6 °C), Min:97 5 °F (36 4 °C), Max:98 5 °F (36 9 °C)    Temp:  [97 5 °F (36 4 °C)-98 5 °F (36 9 °C)] 98 °F (36 7 °C)  HR:  [62-78] 71  Resp:  [12-18] 16  BP: ()/(57-76) 99/60  SpO2:  [95 %-100 %] 98 %  Body mass index is 25 25 kg/m²  Input and Output Summary (last 24 hours): Intake/Output Summary (Last 24 hours) at 2022 1925  Last data filed at 2022 1801  Gross per 24 hour   Intake 560 ml   Output 1100 ml   Net -540 ml       Physical Exam:   Physical Exam  Vitals and nursing note reviewed  Constitutional:       General: He is not in acute distress  Appearance: He is not ill-appearing, toxic-appearing or diaphoretic  Cardiovascular:      Rate and Rhythm: Normal rate and regular rhythm  Heart sounds: Normal heart sounds  Pulmonary:      Effort: Pulmonary effort is normal  No respiratory distress  Breath sounds: Normal breath sounds  Abdominal:      General: Bowel sounds are normal  There is no distension  Palpations: Abdomen is soft  Tenderness: There is no abdominal tenderness  There is no guarding  Musculoskeletal:      Right lower leg: No edema  Left lower leg: No edema  Comments: Left foot with wound dressing  Skin:     General: Skin is warm  Coloration: Skin is not pale  Findings: No erythema or rash  Neurological:      General: No focal deficit present  Mental Status: He is alert and oriented to person, place, and time  Psychiatric:         Mood and Affect: Mood normal          Behavior: Behavior normal          Thought Content:  Thought content normal  Additional Data:     Labs:  Results from last 7 days   Lab Units 05/26/22  0607 05/25/22  0541 05/24/22  1536   WBC Thousand/uL 8 91   < > 10 57*   HEMOGLOBIN g/dL 11 8*   < > 12 5   HEMATOCRIT % 36 3*   < > 39 8   PLATELETS Thousands/uL 329   < > 358   NEUTROS PCT %  --   --  75   LYMPHS PCT %  --   --  11*   MONOS PCT %  --   --  10   EOS PCT %  --   --  2    < > = values in this interval not displayed  Results from last 7 days   Lab Units 05/26/22  0607 05/25/22  0541 05/24/22  1536   SODIUM mmol/L 138   < > 135   POTASSIUM mmol/L 3 8   < > 4 3   CHLORIDE mmol/L 103   < > 95*   CO2 mmol/L 27   < > 31   BUN mg/dL 35*   < > 39*   CREATININE mg/dL 1 62*   < > 2 02*   ANION GAP mmol/L 8   < > 9   CALCIUM mg/dL 8 3*   < > 9 1   ALBUMIN g/dL  --   --  3 5   TOTAL BILIRUBIN mg/dL  --   --  1 56*   ALK PHOS U/L  --   --  87   ALT U/L  --   --  17   AST U/L  --   --  20   GLUCOSE RANDOM mg/dL 57*   < > 228*    < > = values in this interval not displayed  Results from last 7 days   Lab Units 05/26/22  1556 05/26/22  1337 05/26/22  1115 05/26/22  0909 05/26/22  0717 05/25/22  2230 05/25/22  1612 05/25/22  1132 05/25/22  0717 05/24/22  2139   POC GLUCOSE mg/dl 113 95 105 124 61* 180* 151* 160* 79 275*               Lines/Drains:  Invasive Devices  Report    Peripheral Intravenous Line  Duration           Peripheral IV 05/24/22 Left Antecubital 2 days    Peripheral IV 05/24/22 Right Antecubital 2 days                      Imaging: Reviewed radiology reports from this admission including: xray(s)    Recent Cultures (last 7 days):   Results from last 7 days   Lab Units 05/24/22  1646   BLOOD CULTURE  No Growth at 24 hrs  No Growth at 24 hrs         Last 24 Hours Medication List:   Current Facility-Administered Medications   Medication Dose Route Frequency Provider Last Rate    aspirin  81 mg Oral Daily James Iglesias DPM      atorvastatin  40 mg Oral Daily James Iglesias DPM      calcium carbonate  1,000 mg Oral Daily PRN Rodríguez Jimenez DPM      cefazolin  1,000 mg Intravenous Q8H Rodríguez Jimenez DPM Stopped (05/26/22 1801)    dextrose           docusate sodium  100 mg Oral BID PRN Rodríguez Jimenez DPM      heparin (porcine)  5,000 Units Subcutaneous [facility] Rodríguez Jimenez, [place]      insulin lispro  1-5 Units Subcutaneous HS Rodríguez Jimenez DPM      insulin lispro  1-6 Units Subcutaneous TID [facility] Rodríguez Jimenez DPM      metoprolol tartrate  25 mg Oral Q12H [facility] Rodríguez Jimenez DPM      metroNIDAZOLE  500 mg Oral [facility] Rodríguez [place], [place]      ondansetron  4 mg Intravenous Q6H PRN Rodríguez Jimenez DPM          Today, Patient Was Seen By: Carla Matthews MD    **Please Note: This note may have been constructed using a voice recognition system  **

## 2022-05-26 NOTE — ASSESSMENT & PLAN NOTE
Lab Results   Component Value Date    HGBA1C 10 4 (H) 04/06/2022       Recent Labs     05/26/22  0909 05/26/22  1115 05/26/22  1337 05/26/22  1556   POCGLU 124 105 95 113       Blood Sugar Average: Last 72 hrs:  (P) 134 3 uncontrolled diabetes with hyperglycemia  Patient reports blood sugars have been running around 200  A1c noted to be at 10 4  On Lantus 30 units a m  And Novolin 70 /30 5 units at dinner at home  Today, patient had hypoglycemia  D50 was given  Likely due to patient being on NPO  With patient's blood sugars still at low normal and normal levels, continue to hold off on the long-acting Lantus for now  Continue with insulin sliding scale  Adjust treatment accordingly

## 2022-05-26 NOTE — OP NOTE
OPERATIVE REPORT  PATIENT NAME: Jignesh Caceres    :  1960  MRN: [id number]  Pt Location: AN OR ROOM 04    SURGERY DATE: 2022    Surgeon(s) and Role:     * Edelmira Fox DPM - Primary    Preop Diagnosis:  Acute osteomyelitis of metatarsal bone of left foot ([facility] 75 ) [M86 172]  Surgical wound infection [T81 49XA]  Foreign body left great toe    Post-Op Diagnosis Codes:     * Acute osteomyelitis of metatarsal bone of left foot ([facility] ) [W32 066]     * Surgical wound infection [T81 49XA]  Foreign body left great toe    Procedure(s) (LRB):  AMPUTATION left 5th metatarsal (Left)  REMOVAL FOREIGN BODY EXTREMITY (Left)    Specimen(s):  ID Type Source Tests Collected by Time Destination   1 : 5th metatarsal  Tissue Foot, Left TISSUE EXAM Edelmira Fox DPM 2022 1306        Estimated Blood Loss:   Minimal    Drains:  * No LDAs found *    Anesthesia Type:   Choice    Operative Indications:  Acute osteomyelitis of metatarsal bone of left foot ([facility] 75 ) [M86 172]  Surgical wound infection [T81 49XA]      Operative Findings: The 5th metatarsal head was necrotic and there was purulence surrounding that area  All nonviable tissue was removed  I excised the distal 3rd of the metatarsal to healthy bleeding bone  Bone infection is surgically resolved  Able to close primarily    Complications:   None    Procedure and Technique:  Under mild sedation the patient was brought into the operating room on his hospital bed in the supine position  Following IV sedation a time-out was performed  Local anesthetic was infiltrated in a 5th ray block on the left foot  The foot was then scrubbed, prepped, draped in the usual aseptic manner  Attention was 1st directed to the plantar great toe on the left foot  There was a small puncture wound noted  Using a forceps this area was explored and a 0 5 mm metallic fiber was removed in entirety  There was no sign of infection      Attention was directed to a necrotic wound at the 5th metatarsal on the left foot  The necrotic skin and soft tissue were fully excised to reveal purulence and infection changes to the 5th metatarsal head  A linear incision was made along the distal 3rd of the metatarsal and all soft tissue was reflected off the distal metatarsal   The bone was resected and passed off the operating field for pathology in formalin  A rongeur, knife, scissors were then used to remove any nonviable soft tissue  At this point the remainder of the soft tissue appeared healthy and bleeding well  It was flushed with copious amounts of normal sterile saline  The skin was repaired with 2-0 and 3-0 nylon  The foot was then cleaned and dressed  The patient emerged from anesthesia having tolerated the procedure well  He is transferred to PACU stable     I was present for the entire procedure and A qualified resident physician was not available    Patient Disposition:  PACU       SIGNATURE: Jackelyn Boast, DPM  DATE: May 26, 2022  TIME: 1:27 PM

## 2022-05-26 NOTE — PLAN OF CARE
Problem: Nutrition/Hydration-ADULT  Goal: Nutrient/Hydration intake appropriate for improving, restoring or maintaining nutritional needs  Description: Monitor and assess patient's nutrition/hydration status for malnutrition  Collaborate with interdisciplinary team and initiate plan and interventions as ordered  Monitor patient's weight and dietary intake as ordered or per policy  Utilize nutrition screening tool and intervene as necessary  Determine patient's food preferences and provide high-protein, high-caloric foods as appropriate       INTERVENTIONS:  - Monitor oral intake, urinary output, labs, and treatment plans  - Assess nutrition and hydration status and recommend course of action  - Evaluate amount of meals eaten  - Assist patient with eating if necessary   - Allow adequate time for meals  - Recommend/ encourage appropriate diets, oral nutritional supplements, and vitamin/mineral supplements  - Order, calculate, and assess calorie counts as needed  - Recommend, monitor, and adjust tube feedings and TPN/PPN based on assessed needs  - Assess need for intravenous fluids  - Provide specific nutrition/hydration education as appropriate  - Include patient/family/caregiver in decisions related to nutrition  Outcome: Progressing     Problem: Potential for Falls  Goal: Patient will remain free of falls  Description: INTERVENTIONS:  - Educate patient/family on patient safety including physical limitations  - Instruct patient to call for assistance with activity   - Consult OT/PT to assist with strengthening/mobility   - Keep Call bell within reach  - Keep bed low and locked with side rails adjusted as appropriate  - Keep care items and personal belongings within reach  - Initiate and maintain comfort rounds  - Make Fall Risk Sign visible to staff  - Obtain necessary fall risk management equipment: crutches  - Apply yellow socks and bracelet for high fall risk patients  - Consider moving patient to room near nurses station  Outcome: Progressing     Problem: MOBILITY - ADULT  Goal: Maintain or return to baseline ADL function  Description: INTERVENTIONS:  -  Assess patient's ability to carry out ADLs; assess patient's baseline for ADL function and identify physical deficits which impact ability to perform ADLs (bathing, care of mouth/teeth, toileting, grooming, dressing, etc )  - Assess/evaluate cause of self-care deficits   - Assess range of motion  - Assess patient's mobility; develop plan if impaired  - Assess patient's need for assistive devices and provide as appropriate  - Encourage maximum independence but intervene and supervise when necessary  - Involve family in performance of ADLs  - Assess for home care needs following discharge   - Consider OT consult to assist with ADL evaluation and planning for discharge  - Provide patient education as appropriate  Outcome: Progressing  Goal: Maintains/Returns to pre admission functional level  Description: INTERVENTIONS:  - Perform BMAT or MOVE assessment daily    - Set and communicate daily mobility goal to care team and patient/family/caregiver     - Collaborate with rehabilitation services on mobility goals if consulted  - Stand patient 6 times a day  - Ambulate patient 4 times a day  - Out of bed to chair 3 times a day   - Out of bed for meals 3 times a day  - Out of bed for toileting  - Record patient progress and toleration of activity level   Outcome: Progressing

## 2022-05-26 NOTE — ANESTHESIA PREPROCEDURE EVALUATION
Procedure:  AMPUTATION left 5th metatarsal (Left Foot)  REMOVAL FOREIGN BODY EXTREMITY (Left Foot)    Relevant Problems   ANESTHESIA (within normal limits)      CARDIO   (+) Coronary artery disease   (+) Essential hypertension   (+) Orthopnea   (+) Presence of drug coated stent in right coronary artery      ENDO   (+) Insulin-dependent diabetes mellitus      GI/HEPATIC   (+) Cirrhosis      /RENAL   (+) CKD (chronic kidney disease) stage 3      HEMATOLOGY (within normal limits)      NEURO/PSYCH (within normal limits)      PULMONARY   (+) Orthopnea   (+) URI due to influenza A virus      Cardiovascular and Mediastinum   (+) Chronic combined systolic and diastolic CHF   (+) Ischemic cardiomyopathy      Musculoskeletal and Integument   (+) AVN (avascular necrosis of bone) (HCC)      Other   (+) Abscess of left foot   (+) Dyslipidemia   (+) Presence of drug coated stent in LAD coronary artery   (+) Surgical wound infection, cellulitis and osteomyelitis-left foot      EKG 5/13/2022:  Normal sinus rhythm  Left anterior fascicular block  Poor R wave progression  Abnormal ECG  When compared with ECG of 18-MAY-2017 02:48,  No significant change was found    Nuclear Stress 7/2021:  -  Stress results: Duration of exercise was 3 min  There was no chest pain during stress  -  ECG conclusions: The stress ECG was negative for ischemia and normal   -  Perfusion imaging: Left ventricular size was top normal  There was a moderate-sized, complete, fixed myocardial perfusion defect of the apical anterior wall and apex suggestive of a prior anteroapical MI  Significant  reversibility/ischemia was not present   -  Gated SPECT: The calculated left ventricular ejection fraction was 30 %   There was severely reduced myocardial thickening and motion of the alexx-apical wall of the left ventricle      IMPRESSIONS: Abnormal study after pharmacologic vasodilation without reproduction of symptoms due to the presence of an anteroapical MI  Significant ischemia was not present  TTE 7/2021:  LEFT VENTRICLE:  Systolic function was at the lower limits of normal  Ejection fraction was estimated to be 50 %  Global longitudinal strain was reduced at -9 3%  There was dyskinesis of the mid anterolateral, apical septal, apical lateral, and apical wall(s)  Doppler parameters were consistent with restrictive physiology, indicative of decreased left ventricular diastolic compliance and/or increased left atrial pressure  Doppler parameters were consistent with high ventricular filling pressure      MITRAL VALVE:  There was mild regurgitation  Cardiac Cath 12/2014:  Left main: Normal    Proximal LAD: There was a diffuse 100 % stenosis  This is a likely culprit for the patient's clinical presentation  This lesion is a chronic total occlusion  Mid LAD: There was a 90 % stenosis uncovered in mid LAD after recanalizing the proximal 100% occlusion  Proximal RCA: There was a 80 % stenosis  CARDIAC STRUCTURES:   EF calculated by contrast ventriculography was 40 %  1ST LESION INTERVENTIONS:   A balloon angioplasty with stent and balloon angioplasty procedure was   performed on the 100 % lesion in the proximal LAD  Following intervention there was an excellent angiographic appearance with a 0 % residual stenosis  A  Xience Xpedition LL Rx 3 0 x 33mm drug-eluting stent was placed across the lesion and deployed at a maximum inflation pressure of 16 scotty  2ND LESION INTERVENTIONS:   A drug-eluting stent procedure was performed on the 90 % lesion in the mid  LAD  Following intervention there was a 0 % residual stenosis  A Xience Xpedition  Rx 2 5 x 28mm drug-eluting stent was placed across the lesion and deployed at a maximum inflation pressure of 14 scotty  3RD LESION INTERVENTIONS:   A drug-eluting stent procedure was performed on the 80 % lesion in the  proximal RCA  Following intervention there was a 0 % residual stenosis   A Xience Xpedition Rx 4 0 x 23mm drug-eluting stent was placed across the lesion and deployed at a maximum inflation pressure of 16 scotty  Lab Results   Component Value Date    WBC 8 91 05/26/2022    HGB 11 8 (L) 05/26/2022    HCT 36 3 (L) 05/26/2022    MCV 84 05/26/2022     05/26/2022     Lab Results   Component Value Date    SODIUM 138 05/26/2022    K 3 8 05/26/2022     05/26/2022    CO2 27 05/26/2022    BUN 35 (H) 05/26/2022    CREATININE 1 62 (H) 05/26/2022    GLUC 57 (L) 05/26/2022    CALCIUM 8 3 (L) 05/26/2022     Lab Results   Component Value Date    INR 1 16 02/10/2020    INR 1 07 09/02/2018    PROTIME 14 2 02/10/2020    PROTIME 13 6 09/02/2018     Lab Results   Component Value Date    HGBA1C 10 4 (H) 04/06/2022            Physical Exam    Airway    Mallampati score: II  TM Distance: >3 FB  Neck ROM: full     Dental       Cardiovascular  Cardiovascular exam normal    Pulmonary  Pulmonary exam normal     Other Findings        Anesthesia Plan  ASA Score- 4     Anesthesia Type- IV sedation with anesthesia with ASA Monitors  Additional Monitors:   Airway Plan:           Plan Factors-    Chart reviewed  EKG reviewed  Existing labs reviewed  Patient summary reviewed  Induction- intravenous  Postoperative Plan-     Informed Consent- Anesthetic plan and risks discussed with patient  I personally reviewed this patient with the CRNA  Discussed and agreed on the Anesthesia Plan with the CRNA  Yuliana Santillan

## 2022-05-26 NOTE — PROGRESS NOTES
Patient seen in preop holding  He is NPO  Proceed with planned washout, 5th ray/metatarsal amputation  Alternatives risks and complications discussed  Consent signed

## 2022-05-26 NOTE — ASSESSMENT & PLAN NOTE
· Today, patient's blood pressures are low normal levels  · Continue metoprolol   · Holding Lasix and lisinopril  · Adjust treatment accordingly

## 2022-05-27 PROBLEM — E87.1 HYPONATREMIA: Status: ACTIVE | Noted: 2022-05-27

## 2022-05-27 PROBLEM — D64.9 ANEMIA: Status: ACTIVE | Noted: 2022-05-27

## 2022-05-27 LAB
ANION GAP SERPL CALCULATED.3IONS-SCNC: 6 MMOL/L (ref 4–13)
BUN SERPL-MCNC: 36 MG/DL (ref 5–25)
CALCIUM SERPL-MCNC: 8.1 MG/DL (ref 8.4–10.2)
CHLORIDE SERPL-SCNC: 99 MMOL/L (ref 96–108)
CO2 SERPL-SCNC: 29 MMOL/L (ref 21–32)
CREAT SERPL-MCNC: 1.68 MG/DL (ref 0.6–1.3)
ERYTHROCYTE [DISTWIDTH] IN BLOOD BY AUTOMATED COUNT: 14.1 % (ref 11.6–15.1)
GFR SERPL CREATININE-BSD FRML MDRD: 42 ML/MIN/1.73SQ M
GLUCOSE SERPL-MCNC: 223 MG/DL (ref 65–140)
GLUCOSE SERPL-MCNC: 240 MG/DL (ref 65–140)
GLUCOSE SERPL-MCNC: 241 MG/DL (ref 65–140)
GLUCOSE SERPL-MCNC: 257 MG/DL (ref 65–140)
GLUCOSE SERPL-MCNC: 258 MG/DL (ref 65–140)
GLUCOSE SERPL-MCNC: 283 MG/DL (ref 65–140)
HCT VFR BLD AUTO: 36 % (ref 36.5–49.3)
HGB BLD-MCNC: 11.7 G/DL (ref 12–17)
MCH RBC QN AUTO: 27.3 PG (ref 26.8–34.3)
MCHC RBC AUTO-ENTMCNC: 32.5 G/DL (ref 31.4–37.4)
MCV RBC AUTO: 84 FL (ref 82–98)
PLATELET # BLD AUTO: 315 THOUSANDS/UL (ref 149–390)
PMV BLD AUTO: 9.5 FL (ref 8.9–12.7)
POTASSIUM SERPL-SCNC: 4.7 MMOL/L (ref 3.5–5.3)
RBC # BLD AUTO: 4.28 MILLION/UL (ref 3.88–5.62)
SODIUM SERPL-SCNC: 134 MMOL/L (ref 135–147)
WBC # BLD AUTO: 8.91 THOUSAND/UL (ref 4.31–10.16)

## 2022-05-27 PROCEDURE — 82948 REAGENT STRIP/BLOOD GLUCOSE: CPT

## 2022-05-27 PROCEDURE — 80048 BASIC METABOLIC PNL TOTAL CA: CPT | Performed by: INTERNAL MEDICINE

## 2022-05-27 PROCEDURE — 99232 SBSQ HOSP IP/OBS MODERATE 35: CPT | Performed by: INTERNAL MEDICINE

## 2022-05-27 PROCEDURE — 85027 COMPLETE CBC AUTOMATED: CPT | Performed by: INTERNAL MEDICINE

## 2022-05-27 PROCEDURE — 97163 PT EVAL HIGH COMPLEX 45 MIN: CPT

## 2022-05-27 PROCEDURE — 99024 POSTOP FOLLOW-UP VISIT: CPT | Performed by: PODIATRIST

## 2022-05-27 RX ORDER — INSULIN LISPRO 100 [IU]/ML
3 INJECTION, SOLUTION INTRAVENOUS; SUBCUTANEOUS
Status: DISCONTINUED | OUTPATIENT
Start: 2022-05-27 | End: 2022-05-28 | Stop reason: HOSPADM

## 2022-05-27 RX ORDER — INSULIN GLARGINE 100 [IU]/ML
30 INJECTION, SOLUTION SUBCUTANEOUS EVERY MORNING
Status: DISCONTINUED | OUTPATIENT
Start: 2022-05-27 | End: 2022-05-28 | Stop reason: HOSPADM

## 2022-05-27 RX ADMIN — INSULIN LISPRO 2 UNITS: 100 INJECTION, SOLUTION INTRAVENOUS; SUBCUTANEOUS at 08:00

## 2022-05-27 RX ADMIN — INSULIN LISPRO 3 UNITS: 100 INJECTION, SOLUTION INTRAVENOUS; SUBCUTANEOUS at 11:35

## 2022-05-27 RX ADMIN — INSULIN LISPRO 2 UNITS: 100 INJECTION, SOLUTION INTRAVENOUS; SUBCUTANEOUS at 22:13

## 2022-05-27 RX ADMIN — ATORVASTATIN CALCIUM 40 MG: 40 TABLET, FILM COATED ORAL at 09:37

## 2022-05-27 RX ADMIN — CEFAZOLIN SODIUM 1000 MG: 1 SOLUTION INTRAVENOUS at 09:37

## 2022-05-27 RX ADMIN — INSULIN LISPRO 3 UNITS: 100 INJECTION, SOLUTION INTRAVENOUS; SUBCUTANEOUS at 16:58

## 2022-05-27 RX ADMIN — ASPIRIN 81 MG CHEWABLE TABLET 81 MG: 81 TABLET CHEWABLE at 09:36

## 2022-05-27 RX ADMIN — METRONIDAZOLE 500 MG: 500 TABLET ORAL at 05:10

## 2022-05-27 RX ADMIN — METOPROLOL TARTRATE 25 MG: 25 TABLET, FILM COATED ORAL at 09:37

## 2022-05-27 RX ADMIN — METOPROLOL TARTRATE 25 MG: 25 TABLET, FILM COATED ORAL at 22:14

## 2022-05-27 RX ADMIN — INSULIN LISPRO 3 UNITS: 100 INJECTION, SOLUTION INTRAVENOUS; SUBCUTANEOUS at 18:09

## 2022-05-27 RX ADMIN — CEFAZOLIN SODIUM 1000 MG: 1 SOLUTION INTRAVENOUS at 01:19

## 2022-05-27 RX ADMIN — HEPARIN SODIUM 5000 UNITS: 5000 INJECTION INTRAVENOUS; SUBCUTANEOUS at 22:14

## 2022-05-27 RX ADMIN — INSULIN GLARGINE 30 UNITS: 100 INJECTION, SOLUTION SUBCUTANEOUS at 09:37

## 2022-05-27 RX ADMIN — CEFAZOLIN SODIUM 1000 MG: 1 SOLUTION INTRAVENOUS at 17:25

## 2022-05-27 RX ADMIN — HEPARIN SODIUM 5000 UNITS: 5000 INJECTION INTRAVENOUS; SUBCUTANEOUS at 05:10

## 2022-05-27 RX ADMIN — HEPARIN SODIUM 5000 UNITS: 5000 INJECTION INTRAVENOUS; SUBCUTANEOUS at 14:34

## 2022-05-27 NOTE — PROGRESS NOTES
Progress Note - Infectious Disease   Juan Khan 57 y o  male MRN: [id number]  Unit/Bed#: W -01 Encounter: [id number]      Impression/Recommendations:  1  Left lateral left foot nonhealing wound with cellulitis   Patient has no fever and WBC at upper range of normal   He is clinically and systemically well   During recent admission for toe amputation, wound culture grew quite susceptible E coli  Patient is clinically improved  Patient is status post left 5th MP head resection with ID  There was extensive purulence  Given extensive purulence, will keep patient on IV cefazolin for the next 24 hours  Blood cultures have no growth thus far  Continue IV cefazolin  Transition to p o  Keflex in the next 24 hours  No further need for Flagyl  Serial foot exam   Monitor temperature/WBC  Follow-up on pending blood cultures  Treat x7 days postop      2  Left 5th MT head osteomyelitis, evident on x-ray   This is likely etiology of wound nonhealing   Probability of cure with antibiotic is extremely low   Patient should get bone resection   Podiatry evaluation noted  Patient is status post MT head resection, for surgical cure   If wound continues to show poor wound healing after appropriate bone resection, patient may need vascular workup  No further antibiotic needed for this  Monitor wound for healing  Consider vascular workup if wound does not heal after bone resection      3   CKD stage 3  Creatinine baseline  Antibiotic at full dose  Monitor creatinine      4  DM with neuropathy  Management per primary service      Discussed with patient in detail regarding the above plan      Antibiotics:  Cefazolin/Flagyl  Antibiotic # 4  POD # 1      Subjective:  Patient feels well  No pain in foot  Temperature stays down  No chills  He is tolerating antibiotic well    No nausea, vomiting or diarrhea      Objective:  Vitals:  Temp:  [97 5 °F (36 4 °C)-98 8 °F (37 1 °C)] 97 5 °F (36 4 °C)  HR:  [62-79] 68  Resp: [12-18] 17  BP: ()/(57-76) 118/67  SpO2:  [94 %-100 %] 98 %  Temp (24hrs), Av 8 °F (36 6 °C), Min:97 5 °F (36 4 °C), Max:98 8 °F (37 1 °C)  Current: Temperature: 97 5 °F (36 4 °C)    Physical Exam:     General: Awake, alert, cooperative, no distress  Neck:  Supple  No mass  No lymphadenopathy  Lungs: Expansion symmetric, no rales, no wheezing, respirations unlabored  Heart:  Regular rate and rhythm, S1 and S2 normal, no murmur  Abdomen: Soft, nondistended, non-tender, bowel sounds active all four quadrants, no masses, no organomegaly  Extremities: Foot with dressing in place  Dressing is dry  No erythema/warmth beyond dressing  Minimal tenderness  Skin:  No rash  Neuro: Moves all extremities  Invasive Devices  Report    Peripheral Intravenous Line  Duration           Peripheral IV 22 Left Antecubital 2 days    Peripheral IV 22 Right Antecubital 2 days                Labs studies:   I have personally reviewed pertinent labs  Results from last 7 days   Lab Units 22  04322  0607 22  0541 22  1536   POTASSIUM mmol/L 4 7 3 8 3 9 4 3   CHLORIDE mmol/L 99 103 101 95*   CO2 mmol/L 29 27 27 31   BUN mg/dL 36* 35* 39* 39*   CREATININE mg/dL 1 68* 1 62* 1 75* 2 02*   EGFR ml/min/1 73sq m 42 44 40 33   CALCIUM mg/dL 8 1* 8 3* 8 8 9 1   AST U/L  --   --   --  20   ALT U/L  --   --   --  17   ALK PHOS U/L  --   --   --  87     Results from last 7 days   Lab Units 22  0439 22  0607 22  0541   WBC Thousand/uL 8 91 8 91 10 61*   HEMOGLOBIN g/dL 11 7* 11 8* 12 5   PLATELETS Thousands/uL 315 329 374     Results from last 7 days   Lab Units 22  1646   BLOOD CULTURE  No Growth at 48 hrs  No Growth at 48 hrs  Imaging Studies:   I have personally reviewed pertinent imaging study reports and images in PACS  EKG, Pathology, and Other Studies:   I have personally reviewed pertinent reports

## 2022-05-27 NOTE — CASE MANAGEMENT
Case Management Assessment & Discharge Planning Note    Patient name Stewart Flores  Location W MS 65/W -01 MRN [id number]  : 1960 Date 2022       Current Admission Date: 2022  Current Admission Diagnosis:Surgical wound infection, cellulitis and osteomyelitis-left foot   Patient Active Problem List    Diagnosis Date Noted    Hyponatremia 2022    Anemia 2022    Surgical wound infection, cellulitis and osteomyelitis-left foot 2022    Preoperative clearance 2022    Hypokalemia 2022    Wound of left foot 2022    Proteinuria 2022    Cirrhosis 2021    [facility] necrosis of bone) [facility] ) 2021    Presacral mass 2021    LLQ pain 2021    Acute kidney injury 2021    URI due to influenza A virus 2021    Sepsis ([facility] [facility] ) 02/10/2020    Abscess of tendon of left foot 02/10/2020    Dislocation of proximal interphalangeal joint of left little finger 10/02/2018    Abscess of left foot 2018    Presence of drug coated stent in LAD coronary artery 2018    Presence of drug coated stent in right coronary artery 2018    Orthopnea 2017    CKD (chronic kidney disease) stage 3 2017    Essential hypertension     Insulin-dependent diabetes mellitus     Coronary artery disease     Dyslipidemia 01/15/2015    Chronic combined systolic and diastolic CHF     Ischemic cardiomyopathy 2014      LOS (days): 3  Geometric Mean LOS (GMLOS) (days):   Days to GMLOS:     OBJECTIVE:  PATIENT READMITTED TO HOSPITAL  Risk of Unplanned Readmission Score: 20 8         Current admission status: Inpatient       Preferred Pharmacy:   [address] [place] - [address] [facility]  [address]  Phone: [phone] Fax: [phone]    Primary Care Provider: Jd Louie DO    Primary Insurance: BLUE CROSS  Secondary Insurance:     ASSESSMENT:  Active Health Care Proxies     Erin [address] [facility] [address] Representative - Spouse   Primary Phone: [phone] (Mobile)  Home Phone: [phone] Directives  Does patient have a [address]?: Yes  Does patient have Advance Directives?: Yes  Advance Directives: Living will, Power of  for health care  Primary Contact: Claudia         Readmission Root Cause  30 Day Readmission: Yes  Who directed you to return to the hospital?: Self  Did you understand whom to contact if you had questions or problems?: Yes  Did you get your prescriptions before you left the hospital?: Yes  Were you able to get your prescriptions filled when you left the hospital?: Yes  Did you take your medications as prescribed?: Yes  Were you able to get to your follow-up appointments?: Yes  During previous admission, was a post-acute recommendation made?: No  Patient was readmitted due to: Infection  Action Plan: Amputation    Patient Information  Admitted from[de-identified] Home  Mental Status: Alert  During Assessment patient was accompanied by: Not accompanied during assessment  Assessment information provided by[de-identified] Patient  Primary Caregiver: [facility] of Residence: [address] [facility] do you live in?: [employer] [facility]   Select all that apply : Stairs  Number of steps to enter home : 3  Do the steps have railings?: Yes  Type of Current Residence: [place]  In the last 12 months, was there a time when you were not able to pay the mortgage or rent on time?: No  In the last 12 months, how many places have you lived?: 1  In the last 12 months, was there a time when you did not have a steady place to sleep or slept in a shelter (including now)?: No  Homeless/housing insecurity resource given?: N/A  Living Arrangements: Lives w/ Spouse/significant other  Is patient a ?: No    Activities of Daily Living Prior to Admission  Functional Status: Independent  Completes ADLs independently?: Yes  Ambulates independently?: Yes  Does patient use assisted devices?: Yes  Assisted Devices (DME) used: Straight Cane  Does patient currently own DME?: Yes  What DME does the patient currently own?: Patricia Null  Does patient have a history of Outpatient Therapy (PT/OT)?: Yes  Does the patient have a history of Short-Term Rehab?: No  Does patient have a history of HHC?: Yes ([facility])  Does patient currently have [address]?: Yes    Current Home Health Care  Type of Current Home Care Services: Nurse visit  [address][de-identified] Other (please enter name in comment) ([facility] )  [facility] Provider[de-identified] PCP    Patient Information Continued  Income Source: Employed  Does patient have prescription coverage?: Yes  Food insecurity resource given?: N/A  Does patient receive dialysis treatments?: No  Does patient have a history of substance abuse?: No  Does patient have a history of Mental Health Diagnosis?: No    PHQ 2/9 Screening   Reviewed PHQ 2/9 Depression Screening Score?: No    Means of Transportation  Means of Transport to Appts[de-identified] Drives Self  In the past 12 months, has lack of transportation kept you from medical appointments or from getting medications?: No  In the past 12 months, has lack of transportation kept you from meetings, work, or from getting things needed for daily living?: No  Was application for public transport provided?: N/A        DISCHARGE DETAILS:    Discharge planning discussed with[de-identified] Patient  Freedom of Choice: Yes  Comments - Freedom of Choice: Pt reported being agreeable to the recommendation of [facility] services for an RN for wound care  Pt reported being currently open to [facility] for RN service and would like to resume care with that [address] agency   CM discussed freedom of choice and made the requested referral          [address]         Is the patient interested in [address] at discharge?: Yes  [address] Patricia requested[de-identified] [address] Name[de-identified] Other ([address] [place] [address], [id number])  [address] Provider[de-identified] PCP  Home Health Services Needed[de-identified] Wound/Ostomy Care  Homebound Criteria Met[de-identified] Uses an Assist Device (i e  cane, walker, etc)  Supporting Clincal Findings[de-identified] Fatigues Easliy in Short Distances, Limited Endurance    DME Referral Provided  Referral made for DME?: No    Other Referral/Resources/Interventions Provided:  Interventions: C  Referral Comments: [facility] Larry referral made to [facility] [employer]  for a resumption of care per Pt's request          Treatment Team Recommendation: Home with [facility]  Discharge Destination Plan[de-identified] Home with Erick at Discharge : Family

## 2022-05-27 NOTE — DISCHARGE INSTRUCTIONS
Change dressing 3 times per week    Gently clean incision with sterile saline or wound wash, dry well    Swab incision with betadye    Cover with [id number], rolled gauze, ace bandage

## 2022-05-27 NOTE — PROGRESS NOTES
Progress Note - Podiatry  Eyad Khan 57 y o  male MRN: [id number]  Unit/Bed#: W -01 Encounter: [id number]    Assessment  1  POD #1 left 5th ray amputation secondary to DM foot ulcer with OM    Plan:  1  Amputation stable  Applied dressing  Patient is able to be NWB with crutches  He is also allowed to place the heel on the floor in a wedge shoe but should still use crutches  2  VNA being arranged for dressings  3  F/U office in 2 weeks  4  I stopped his flagyl  Maintain ancef 24 hours postop  Can transition to keflex tomorrow  See ID note, appreciate input  5  I feel patient is stable for DC Saturday  PT consult pending, orders in chart  Discussed with his nurse    Subjective/Objective   Chief Complaint:   Chief Complaint   Patient presents with    Post-op Problem     Pt reports was told by visiting nurse to come in d/t wound not healing well, had L pinky toe removed approx 1 5 weeks ago, denies fevers        Subjective: 57 y o  y/o male seen and evaluated at bedside  He reports some pain but overall feels fine  No acute events overnight  Denies N/F/V/SOB/CP/cough/diarrhea/constipation  Blood pressure 113/60, pulse 71, temperature 97 5 °F (36 4 °C), resp  rate 17, height 5' 10" (1 778 m), weight 77 6 kg (171 lb 1 2 oz), SpO2 95 %  ,Body mass index is 24 55 kg/m²      Lab Results   Component Value Date    WBC 8 91 05/27/2022    HGB 11 7 (L) 05/27/2022    HCT 36 0 (L) 05/27/2022    MCV 84 05/27/2022     05/27/2022     Lab Results   Component Value Date    GLUCOSE 84 06/25/2015    CALCIUM 8 1 (L) 05/27/2022     06/25/2015    K 4 7 05/27/2022    CO2 29 05/27/2022    CL 99 05/27/2022    BUN 36 (H) 05/27/2022    CREATININE 1 68 (H) 05/27/2022         Invasive Devices  Report    Peripheral Intravenous Line  Duration           Peripheral IV 05/24/22 Left Antecubital 2 days    Peripheral IV 05/24/22 Right Antecubital 2 days                Physical Exam:   General: alert, cooperative and no distress  Vascular: Palpable pedal pulses  NO cellulitis  Dermatology: Incision left 5th ray stable  Minimal drainage  Sutures intact  No acute SOI  Neurological: diminished pinprick sensation  MSK: Left 5th ray is amputated  No calf pain with compression  Peroneal tendon on left is intact        Lab, Imaging and other studies:         Results from last 7 days   Lab Units 05/24/22  1646   BLOOD CULTURE  No Growth at 48 hrs  No Growth at 48 hrs  Imaging: I have personally reviewed pertinent films in PACS          Portions of the record may have been created with voice recognition software  Occasional wrong word or "sound a like" substitutions may have occurred due to the inherent limitations of voice recognition software  Read the chart carefully and recognize, using context, where substitutions have occurred

## 2022-05-27 NOTE — PROGRESS NOTES
[facility]  Progress Note Belle Smith Erin 1960, 57 y o  male MRN: [id number]  Unit/Bed#: W -Ceci Encounter: [id number]  Primary Care Provider: Juliet Meza DO   Date and time admitted to hospital: 5/24/2022  2:47 PM    * Surgical wound infection, cellulitis and osteomyelitis-left foot  Assessment & Plan  · S/p 5th digit amputation with filet flap (DOS 5/14/22)  · Patient presents with wound discharge/discomfort, redness and black discoloration  · Concern for wound infection  · X-ray show evidence of osteomyelitis left 5th metatarsal  · Received cefepime and vancomycin in the ER and this morning  These were stopped by the Infectious Disease doctor  · Podiatry on board  · Wound culture grew E coli susceptible to cefazolin  · Status post amputation of the left 5th metatarsal and removal of left lower extremity foreign body, 5/26  · Infectious Disease on board: On IV cefazolin and Flagyl  Today, the Infectious Disease doctor stopped patient's Flagyl  Can transition to Keflex tomorrow as per ID  · Follow blood cultures:  Negative  · None weight-bearing with crutches  · Outpatient follow-up with podiatrist   · PT OT evaluation  Home Health services versus no need for PT at home  Anemia  Assessment & Plan  · Mild  · Likely acute blood loss anemia from the surgery +hemodilution from IV fluids  · Monitor  · For further evaluation and management if this worsens significantly  Hyponatremia  Assessment & Plan  · Mild  · Likely due to hyperglycemia  · Manage patient's hyperglycemia  · Monitor  · For further evaluation and management if this worsens significantly  Chronic combined systolic and diastolic CHF  Assessment & Plan  Wt Readings from Last 3 Encounters:   05/27/22 77 6 kg (171 lb 1 2 oz)   05/15/22 80 7 kg (177 lb 14 6 oz)   04/07/22 80 7 kg (178 lb)     Stable and compensated    Appears to be close to baseline weight  Continue beta-blockers  Hold off on Lasix and lisinopril for now due to low normal blood pressures  CKD (chronic kidney disease) stage 3  Assessment & Plan  Lab Results   Component Value Date    EGFR 42 05/27/2022    EGFR 44 05/26/2022    EGFR 40 05/25/2022    CREATININE 1 68 (H) 05/27/2022    CREATININE 1 62 (H) 05/26/2022    CREATININE 1 75 (H) 05/25/2022   Creatinine slightly above baseline on admission; now back at his baseline  Hold Lasix  Continue to hold off on patient's lisinopril as patient's blood pressures have been low normal   Status post IV fluids  Recheck labs in a earline [place] Coronary artery disease  Assessment & Plan  · History of MI  Status post stents x3 several years ago  · Denies any chest pain or shortness of breath  · Continue statin and aspirin    Insulin-dependent diabetes mellitus  Assessment & Plan  Lab Results   Component Value Date    HGBA1C 10 4 (H) 04/06/2022       Recent Labs     05/26/22  2039 05/27/22  0752 05/27/22  1120 05/27/22  1621   POCGLU 296* 223* 258* 240*       Blood Sugar Average: Last 72 hrs:  (P) 168 [id number] uncontrolled diabetes with hyperglycemia  Patient reports blood sugars have been running around 200  A1c noted to be at 10 4  On Lantus 30 units a m  And Novolin 70 /30 5 units at dinner at home  Status post hypoglycemia, 5/26, likely due to patient being on NPO  Continue with insulin sliding scale  Restart patient's Lantus at 30 units in the morning  Adjust treatment accordingly  Essential hypertension  Assessment & Plan  · Today, patient's blood pressures are still at low normal levels  · Continue metoprolol   · Holding Lasix and lisinopril  · Adjust treatment accordingly  VTE Pharmacologic Prophylaxis: VTE Score: 4 Moderate Risk (Score 3-4) - Pharmacological DVT Prophylaxis Ordered: heparin  Patient Centered Rounds: I performed bedside rounds with nursing staff today  Discussions with Specialists or Other Care Team Provider:  Case management      Education and Discussions with Family / Patient: Patient declined call to   Time Spent for Care: 30 minutes  More than 50% of total time spent on counseling and coordination of care as described above  Current Length of Stay: 3 day(s)  Current Patient Status: Inpatient   Certification Statement: The patient will continue to require additional inpatient hospital stay due to Above findings and plans  Discharge Plan: Anticipate discharge tomorrow to home  Code Status: Level 1 - Full Code    Subjective:   Patient is doing fine  Patient has occasional pains from the surgical site, otherwise no other pains  No other complaints  No shortness breath  No dizziness  No nausea or vomiting  Objective:     Vitals:   Temp (24hrs), Av 2 °F (36 8 °C), Min:97 5 °F (36 4 °C), Max:98 8 °F (37 1 °C)    Temp:  [97 5 °F (36 4 °C)-98 8 °F (37 1 °C)] 98 4 °F (36 9 °C)  HR:  [67-79] 68  Resp:  [16-18] 18  BP: (102-130)/(60-76) 111/72  SpO2:  [94 %-98 %] 97 %  Body mass index is 24 55 kg/m²  Input and Output Summary (last 24 hours): Intake/Output Summary (Last 24 hours) at 2022 1757  Last data filed at 2022 1400  Gross per 24 hour   Intake 880 ml   Output 250 ml   Net 630 ml       Physical Exam:   Physical Exam  Vitals and nursing note reviewed  Constitutional:       General: He is not in acute distress  Appearance: He is not ill-appearing, toxic-appearing or diaphoretic  Cardiovascular:      Rate and Rhythm: Normal rate and regular rhythm  Heart sounds: Normal heart sounds  Pulmonary:      Effort: Pulmonary effort is normal  No respiratory distress  Breath sounds: Normal breath sounds  Abdominal:      General: Bowel sounds are normal  There is no distension  Palpations: Abdomen is soft  Tenderness: There is no abdominal tenderness  Musculoskeletal:      Right lower leg: No edema  Left lower leg: No edema  Skin:     General: Skin is warm        Coloration: Skin is not pale  Findings: No erythema or rash  Comments: Left foot with wound dressing   Neurological:      General: No focal deficit present  Mental Status: He is alert and oriented to person, place, and time  Psychiatric:         Mood and Affect: Mood normal          Behavior: Behavior normal          Thought Content: Thought content normal             Additional Data:     Labs:  Results from last 7 days   Lab Units 05/27/22  0439 05/25/22  0541 05/24/22  1536   WBC Thousand/uL 8 91   < > 10 57*   HEMOGLOBIN g/dL 11 7*   < > 12 5   HEMATOCRIT % 36 0*   < > 39 8   PLATELETS Thousands/uL 315   < > 358   NEUTROS PCT %  --   --  75   LYMPHS PCT %  --   --  11*   MONOS PCT %  --   --  10   EOS PCT %  --   --  2    < > = values in this interval not displayed  Results from last 7 days   Lab Units 05/27/22  0439 05/25/22  0541 05/24/22  1536   SODIUM mmol/L 134*   < > 135   POTASSIUM mmol/L 4 7   < > 4 3   CHLORIDE mmol/L 99   < > 95*   CO2 mmol/L 29   < > 31   BUN mg/dL 36*   < > 39*   CREATININE mg/dL 1 68*   < > 2 02*   ANION GAP mmol/L 6   < > 9   CALCIUM mg/dL 8 1*   < > 9 1   ALBUMIN g/dL  --   --  3 5   TOTAL BILIRUBIN mg/dL  --   --  1 56*   ALK PHOS U/L  --   --  87   ALT U/L  --   --  17   AST U/L  --   --  20   GLUCOSE RANDOM mg/dL 241*   < > 228*    < > = values in this interval not displayed           Results from last 7 days   Lab Units 05/27/22  1621 05/27/22  1120 05/27/22  0752 05/26/22  2039 05/26/22  1556 05/26/22  1337 05/26/22  1115 05/26/22  0909 05/26/22  0717 05/25/22  2230 05/25/22  1612 05/25/22  1132   POC GLUCOSE mg/dl 240* 258* 223* 296* 113 95 105 124 61* 180* 151* 160*               Lines/Drains:  Invasive Devices  Report    Peripheral Intravenous Line  Duration           Peripheral IV 05/24/22 Left Antecubital 3 days    Peripheral IV 05/24/22 Right Antecubital 3 days                      Imaging: Reviewed radiology reports from this admission including: xray(s)    Recent Cultures (last 7 days):   Results from last 7 days   Lab Units 05/24/22  1646   BLOOD CULTURE  No Growth at 48 hrs  No Growth at 48 hrs  Last 24 Hours Medication List:   Current Facility-Administered Medications   Medication Dose Route Frequency Provider Last Rate    aspirin  81 mg Oral Daily Delmus Palm, DPM      atorvastatin  40 mg Oral Daily Delmus Palm, DPM      calcium carbonate  1,000 mg Oral Daily PRN Delmus Palm, DPM      cefazolin  1,000 mg Intravenous Q8H Delmus Palm, DPM 1,000 mg (05/27/22 1725)    docusate sodium  100 mg Oral BID PRN Delmus Palm, DPM      heparin (porcine)  5,000 Units Subcutaneous [place], [place]      insulin glargine  30 Units Subcutaneous QAM Sho López MD      insulin lispro  1-5 Units Subcutaneous HS Delmus Palm, DPM      insulin lispro  1-6 Units Subcutaneous TID [facility] Delmus Palm, DPM      metoprolol tartrate  25 mg Oral Q12H [address] Delmus Palm, DPM      ondansetron  4 mg Intravenous Q6H PRN Zuleika Palm, DPM          Today, Patient Was Seen By: Yvon Padron MD    **Please Note: This note may have been constructed using a voice recognition system  **

## 2022-05-27 NOTE — ASSESSMENT & PLAN NOTE
· S/p 5th digit amputation with filet flap (DOS 5/14/22)  · Patient presents with wound discharge/discomfort, redness and black discoloration  · Concern for wound infection  · X-ray show evidence of osteomyelitis left 5th metatarsal  · Received cefepime and vancomycin in the ER and this morning  These were stopped by the Infectious Disease doctor  · Podiatry on board  · Wound culture grew E coli susceptible to cefazolin  · Status post amputation of the left 5th metatarsal and removal of left lower extremity foreign body, 5/26  · Infectious Disease on board: On IV cefazolin and Flagyl  Today, the Infectious Disease doctor stopped patient's Flagyl  Can transition to Keflex tomorrow as per ID  · Follow blood cultures:  Negative  · None weight-bearing with crutches  · Outpatient follow-up with podiatrist   · PT OT evaluation  Home Health services versus no need for PT at home

## 2022-05-27 NOTE — ASSESSMENT & PLAN NOTE
· Mild  · Likely acute blood loss anemia from the surgery +hemodilution from IV fluids  · Monitor  · For further evaluation and management if this worsens significantly

## 2022-05-27 NOTE — ASSESSMENT & PLAN NOTE
· Mild  · Likely due to hyperglycemia  · Manage patient's hyperglycemia  · Monitor  · For further evaluation and management if this worsens significantly

## 2022-05-27 NOTE — PLAN OF CARE
Problem: Nutrition/Hydration-ADULT  Goal: Nutrient/Hydration intake appropriate for improving, restoring or maintaining nutritional needs  Description: Monitor and assess patient's nutrition/hydration status for malnutrition  Collaborate with interdisciplinary team and initiate plan and interventions as ordered  Monitor patient's weight and dietary intake as ordered or per policy  Utilize nutrition screening tool and intervene as necessary  Determine patient's food preferences and provide high-protein, high-caloric foods as appropriate       INTERVENTIONS:  - Monitor oral intake, urinary output, labs, and treatment plans  - Assess nutrition and hydration status and recommend course of action  - Evaluate amount of meals eaten  - Assist patient with eating if necessary   - Allow adequate time for meals  - Recommend/ encourage appropriate diets, oral nutritional supplements, and vitamin/mineral supplements  - Order, calculate, and assess calorie counts as needed  - Recommend, monitor, and adjust tube feedings and TPN/PPN based on assessed needs  - Assess need for intravenous fluids  - Provide specific nutrition/hydration education as appropriate  - Include patient/family/caregiver in decisions related to nutrition  Outcome: Progressing     Problem: Potential for Falls  Goal: Patient will remain free of falls  Description: INTERVENTIONS:  - Educate patient/family on patient safety including physical limitations  - Instruct patient to call for assistance with activity   - Consult OT/PT to assist with strengthening/mobility   - Keep Call bell within reach  - Keep bed low and locked with side rails adjusted as appropriate  - Keep care items and personal belongings within reach  - Initiate and maintain comfort rounds  - Make Fall Risk Sign visible to staff  - Obtain necessary fall risk management equipment: crutches  - Apply yellow socks and bracelet for high fall risk patients  - Consider moving patient to room near nurses station  Outcome: Progressing     Problem: MOBILITY - ADULT  Goal: Maintain or return to baseline ADL function  Description: INTERVENTIONS:  -  Assess patient's ability to carry out ADLs; assess patient's baseline for ADL function and identify physical deficits which impact ability to perform ADLs (bathing, care of mouth/teeth, toileting, grooming, dressing, etc )  - Assess/evaluate cause of self-care deficits   - Assess range of motion  - Assess patient's mobility; develop plan if impaired  - Assess patient's need for assistive devices and provide as appropriate  - Encourage maximum independence but intervene and supervise when necessary  - Involve family in performance of ADLs  - Assess for home care needs following discharge   - Consider OT consult to assist with ADL evaluation and planning for discharge  - Provide patient education as appropriate  Outcome: Progressing

## 2022-05-27 NOTE — DISCHARGE INSTR - AVS FIRST PAGE
Change dressing 3 times per week    Gently clean incision with sterile saline or wound wash, dry well    Swab incision with betadye    Cover with [id number], rolled gauze, ace bandage        Dear Gil Khan,     It was our pleasure to care for you here at [facility]  It is our hope that we were always able to exceed the expected standards for your care during your stay  You were hospitalized due to wound and bone infection  You were cared for on the 4th floor under the service of Renée Lopez MD with the [facility] who covers for your primary care physician (PCP), Juliet Meza DO, while you were hospitalized  If you have any questions or concerns related to this hospitalization, you may contact us at [phone]  For follow up as well as medication refills, we recommend that you follow up with your primary care physician  A registered nurse will reach out to you by phone within a few days after your discharge to answer any additional questions that you may have after going home  However, at this time we provide for you here, the most important instructions / recommendations at discharge:     Notable Medication Adjustments -   Continue with your maintenance medications as previously prescribed by your doctors  As discussed with you, you will be on Keflex for 5 more days  May have Tylenol as needed for pain  Testing Required after Discharge -   None  However I am recommending to your primary care physician to monitor your fasting BMP, blood sugars, A1c and CBC in the outpatient  Important follow up information -   Outpatient follow-up with your primary care physician  Outpatient follow-up with the podiatrist   Other Instructions -   Please see the podiatrist discharge instructions about your local wound care and dressing as stated above    Monitor your blood pressures at home and record and bring this record on follow-up with your primary care physician  Call your primary care physician if your blood pressures are too high or too low (upper number or systolic blood pressure greater 150 or less than 100)  Monitor your blood sugars at home and record and bring this record on follow-up with your primary care physician  Call your primary care physician if your blood sugars are too high or too low (greater than 250 or less than 70)  Diabetic and heart healthy diet with low fat and low salt diet  Non weight-bearing with crutches on the left lower extremity  Please review this entire after visit summary as additional general instructions including medication list, appointments, activity, diet, any pertinent wound care, and other additional recommendations from your care team that may be provided for you        Sincerely,     Arleth Thomas MD

## 2022-05-27 NOTE — ASSESSMENT & PLAN NOTE
Lab Results   Component Value Date    HGBA1C 10 4 (H) 04/06/2022       Recent Labs     05/26/22 2039 05/27/22  0752 05/27/22  1120 05/27/22  1621   POCGLU 296* 223* 258* 240*       Blood Sugar Average: Last 72 hrs:  (P) 168 [id number] uncontrolled diabetes with hyperglycemia  Patient reports blood sugars have been running around 200  A1c noted to be at 10 4  On Lantus 30 units a m  And Novolin 70 /30 5 units at dinner at home  Status post hypoglycemia, 5/26, likely due to patient being on NPO  Continue with insulin sliding scale  Restart patient's Lantus at 30 units in the morning  Adjust treatment accordingly

## 2022-05-27 NOTE — PLAN OF CARE
Problem: PHYSICAL THERAPY ADULT  Goal: Performs mobility at highest level of function for planned discharge setting  See evaluation for individualized goals  Description: Treatment/Interventions: Functional transfer training, LE strengthening/ROM, Elevations, Endurance training, Patient/family training, Equipment eval/education, Bed mobility, Gait training  Equipment Recommended: Crutches (pt has crutches)       See flowsheet documentation for full assessment, interventions and recommendations  5/27/2022 1305 by Susy Almeida PT  Note: Prognosis: Good  Problem List: Decreased range of motion, Impaired balance, Decreased mobility, Decreased skin integrity, Orthopedic restrictions, Pain  Assessment: Preeti Ceja is a 57 y o  Male who presents [facility] on 5/24/2022 w/ c/o nonhealing surgical wound and diagnosis of surgical wound infection, cellulitis and osteomyelitis L foot  Pt POD 1: L 5th ray amputation  Orders for PT eval and treat received, w/ activity orders of partial weight-bearing LLE in wedge shoe (to heel only)  Comorbidities affecting pt at time of evaluation include: CAD, CHF, DM, HTN  Personal factors affecting DC include: ambulating w/ assistive device, stairs to enter home, inability to navigate community distances and inability to perform IADLs  At baseline, pt mobilizes independently w/o AD vs bilateral crutches, and reports 0 fall(s) in the previous 6 months  Upon evaluation, pt presents w/ the following deficits: impaired balance, decreased endurance, pain limiting functional mobility and gait deviations  Pt currently requires mod I for transfers, supervision w/ crutches for gait, and supervision for stair negotiation  Pt's clinical presentation is unstable/unpredictable due to pain impacting overall mobility status, need for input for mobility technique/safety, WBS restrictions, ongoing medical monitoring/management, and recent readmission (within 30 days)   Pt is at an increased risk of falls due to impaired balance and weight-bearing restrictions  From a PT/mobility standpoint, given the above findings, DC recommendation is for Home w/ HHPT vs no rehab needs pending progress w/ functional mobility  During this admission, pt would benefit from continued skilled inpatient PT in the acute care setting in order to address the above deficits to maximize function and mobility before DC from acute care  PT Discharge Recommendation: Home with home health rehabilitation (vs no rehab needs pending progress)          See flowsheet documentation for full assessment

## 2022-05-27 NOTE — PHYSICAL THERAPY NOTE
PHYSICAL THERAPY EVALUATION NOTE          Patient Name: Alysia VAZQUEZ'R Date: 2022      AGE:   57 y o  Mrn:   [id number]  ADMIT DX:  Surgical wound infection [T81 49XA]  LOBO (acute kidney injury) ([place] [address] ) [N17 9]  Acute osteomyelitis of metatarsal bone of left foot ([place] [address] ) [M23 477]    Past Medical History:   Diagnosis Date    CAD (coronary artery disease)     Chronic combined systolic and diastolic CHF (congestive heart failure) ([place] [address] )     Diabetes mellitus ([place] [address] )     type 2    Dyslipidemia     Hypertension     Ischemic cardiomyopathy     MI (myocardial infarction) ([place] [address] )     Osteomyelitis ([place] [address] )     Pancreatitis      Length Of Stay: 3  PHYSICAL THERAPY EVALUATION :   Patient's identity confirmed via 2 patient identifiers (full name and ) at start of session       22 1144   PT Last Visit   PT Visit Date 22   Note Type   Note type Evaluation   Pain Assessment   Pain Assessment Tool 0-10   Pain Score 5   Pain Location/Orientation Orientation: Left; Location: Foot   Pain Onset/Description Onset: Ongoing   Restrictions/Precautions   Weight Bearing Precautions Per Order Yes   LLE Weight Bearing Per Order PWB  (in wedge shoe)   Braces or Orthoses Other (Comment)  (Wedge Shoe provided by Podiatry)   Other Precautions WBS;Pain   Home Living   Type of [address] [place] [address] Two level;Performs ADLs on one level; Able to live on main level with bedroom/bathroom;Stairs to enter without rails  (2+1 ANA CRISTINA)   Home Equipment Walker;Crutches  (RW)   Additional Comments Pt reports residing w/ family in a 2 level house w/ 2+1 ANA CRISTINA and 1st floor set-up   Prior Function   Level of [place] Independent with ADLs and functional mobility; Needs assistance with IADLs   Lives With Spouse;Daughter  (wife and 2 daughters)   Falls in the last 6 months 0   Vocational Retired  (worked in a [employer])   Comments At baseline, pt reports ambulating independently w/o AD vs bilateral crutches  Pt states he has been using crutches on/off for approx 25 years   General   Additional Pertinent History POD 1: L 5th ray amputation  Per Podiatry: Partial WB LLE active orders w/ comment, "WB to heel only in wedge shoe  IF able to be NWB with crutches that's even better"   Family/Caregiver Present No   Cognition   Overall Cognitive Status WFL   Arousal/Participation Alert   Orientation Level Oriented X4  (generally oriented to month/year)   Memory Decreased recall of recent events   Following Commands Follows one step commands without difficulty   Comments Pt ID via name and : pt agreeable to PT eval   Strength RLE   R Knee Flexion 4/5   R Knee Extension 4/5   R Ankle Dorsiflexion 4/5   R Ankle Plantar Flexion 4/5   LLE Assessment   LLE Assessment   (ankle MMT not assessed at this time)   Strength LLE   L Knee Flexion 4/5   L Knee Extension 4/5   Vision-Basic Assessment   Current Vision Wears glasses all the time   Bed Mobility   Supine to Sit Unable to assess   Sit to Supine Unable to assess   Additional Comments Pt sitting at EOB upon arrival and returned to EOB at end of session   Transfers   Sit to Stand 6  Modified independent   Additional items Increased time required;Verbal cues   Stand to Sit 6  Modified independent   Additional items Increased time required;Verbal cues   Additional Comments Pt performed 3 STS transfers to/from EOB and WC  Pt educated on placement of crutches and technique to stabilize crutches while transferring   Ambulation/Elevation   Gait pattern Decreased foot clearance; Inconsistent tresa  (hopping method on RLE to maintain LLE NWB  2-3 episodes of pt touching L heel to floor for stabilization)   Gait Assistance 5  Supervision   Additional items Assist x 1;Verbal cues   Assistive Device Crutches   Distance 120'+5'   Stair Management Assistance 5  Supervision   Additional items Assist x 1;Verbal cues   Stair Management Technique No rails; With crutches   Number of Stairs 2 Ambulation/Elevation Additional Comments PT demonstrated stair negotiation using bilateral crutches and maintaining LLE NWB  Pt able to ascend/descend 2 steps w/ 2 episodes of L heel touching floor for stabilization   Balance   Static Sitting Good   Dynamic Sitting Fair +   Static Standing Fair   Dynamic Standing Fair   Ambulatory Fair -   Endurance Deficit   Endurance Deficit Yes   Endurance Deficit Description pt w/ limited ambulatory endurance due to reported cardiac history   Activity Tolerance   Activity Tolerance Patient tolerated treatment well;Patient limited by pain   Medical Staff Made Aware TT Podiatrist [address]; CM [place]   Nurse Made Aware ESTEVAN Cole   Assessment   Prognosis Good   Problem List Decreased range of motion; Impaired balance;Decreased mobility; Decreased skin integrity;Orthopedic restrictions;Pain   Assessment Rolando Kahn is a 57 y o  Male who [facility]2022 w/ c/o nonhealing surgical wound and diagnosis of surgical wound infection, cellulitis and osteomyelitis L foot  Pt POD 1: L 5th ray amputation  Orders for PT eval and treat received, w/ activity orders of partial weight-bearing LLE in wedge shoe (to heel only)  Comorbidities affecting pt at time of evaluation include: CAD, CHF, DM, HTN  Personal factors affecting DC include: ambulating w/ assistive device, stairs to enter home, inability to navigate community distances and inability to perform IADLs  At baseline, pt mobilizes independently w/o AD vs bilateral crutches, and reports 0 fall(s) in the previous 6 months  Upon evaluation, pt presents w/ the following deficits: impaired balance, decreased endurance, pain limiting functional mobility and gait deviations  Pt currently requires mod I for transfers, supervision w/ crutches for gait, and supervision for stair negotiation   Pt's clinical presentation is unstable/unpredictable due to pain impacting overall mobility status, need for input for mobility technique/safety, WBS restrictions, ongoing medical monitoring/management, and recent readmission (within 30 days)  Pt is at an increased risk of falls due to impaired balance and weight-bearing restrictions  From a PT/mobility standpoint, given the above findings, DC recommendation is for Home w/ HHPT vs no rehab needs pending progress w/ functional mobility  During this admission, pt would benefit from continued skilled inpatient PT in the acute care setting in order to address the above deficits to maximize function and mobility before DC from acute care  Goals   Patient Goals to go home   STG Expiration Date 06/06/22   Short Term Goal #1 Pt will: perform bed mobility independently to decrease pt's burden of care; perform transfers independently to increase pt's OOB mobility; ambulate at least 250' w/ LRAD while maintaining WBS w/ mod I to increase pt's ambulatory endurance; negotiate 2+1 steps w/ bilateral crutches and mod I to facilitate pt returning to home living environment; increase all balance ratings by at least 1 grade to decrease pt's risk of falls   PT Treatment Day 0   Plan   Treatment/Interventions Functional transfer training;LE strengthening/ROM; Elevations; Endurance training;Patient/family training;Equipment eval/education; Bed mobility;Gait training   PT Frequency 2-3x/wk   Recommendation   PT Discharge Recommendation Home with home health rehabilitation  (vs no rehab needs pending progress)   Equipment Recommended Crutches  (pt has crutches)   AM-PAC Basic Mobility Inpatient   Turning in Bed Without Bedrails 4   Lying on Back to Sitting on Edge of Flat Bed 4   Moving Bed to Chair 4   Standing Up From Chair 4   Walk in Room 3   Climb 3-5 Stairs 3   Basic Mobility Inpatient Raw Score 22   Basic Mobility Standardized Score 47 4   Highest Level Of Mobility   JH-HLM Goal 7: Walk 25 feet or more   JH-HLM Achieved 7: Walk 25 feet or more   End of Consult   Patient Position at End of Consult Seated edge of bed; All needs within reach       The patient's AM-PAC Basic Mobility Inpatient Short Form Raw Score is 22  A Raw score of greater than 16 suggests the patient may benefit from discharge to home  Please also refer to the recommendation of the Physical Therapist for safe discharge planning      Pt would benefit from skilled inpatient PT during this admission in order to facilitate progress towards goals to maximize functional independence    DC rec: HHPT vs no rehab needs pending progress w/ mobility       Barbie Napier, PT, DPT  05/27/22

## 2022-05-27 NOTE — ASSESSMENT & PLAN NOTE
Lab Results   Component Value Date    EGFR 42 05/27/2022    EGFR 44 05/26/2022    EGFR 40 05/25/2022    CREATININE 1 68 (H) 05/27/2022    CREATININE 1 62 (H) 05/26/2022    CREATININE 1 75 (H) 05/25/2022   Creatinine slightly above baseline on admission; now back at his baseline  Hold Lasix  Continue to hold off on patient's lisinopril as patient's blood pressures have been low normal   Status post IV fluids  Recheck labs in [facility]

## 2022-05-27 NOTE — ASSESSMENT & PLAN NOTE
Wt Readings from Last 3 Encounters:   05/27/22 77 6 kg (171 lb 1 2 oz)   05/15/22 80 7 kg (177 lb 14 6 oz)   04/07/22 80 7 kg (178 lb)     Stable and compensated  Appears to be close to baseline weight  Continue beta-blockers  Hold off on Lasix and lisinopril for now due to low normal blood pressures

## 2022-05-28 VITALS
TEMPERATURE: 98.4 F | HEIGHT: 70 IN | WEIGHT: 176.37 LBS | SYSTOLIC BLOOD PRESSURE: 107 MMHG | HEART RATE: 67 BPM | DIASTOLIC BLOOD PRESSURE: 64 MMHG | BODY MASS INDEX: 25.25 KG/M2 | RESPIRATION RATE: 18 BRPM | OXYGEN SATURATION: 96 %

## 2022-05-28 LAB
GLUCOSE SERPL-MCNC: 150 MG/DL (ref 65–140)
GLUCOSE SERPL-MCNC: 99 MG/DL (ref 65–140)

## 2022-05-28 PROCEDURE — 82948 REAGENT STRIP/BLOOD GLUCOSE: CPT

## 2022-05-28 PROCEDURE — 99239 HOSP IP/OBS DSCHRG MGMT >30: CPT | Performed by: INTERNAL MEDICINE

## 2022-05-28 PROCEDURE — 99232 SBSQ HOSP IP/OBS MODERATE 35: CPT | Performed by: INTERNAL MEDICINE

## 2022-05-28 RX ORDER — ACETAMINOPHEN 325 MG/1
650 TABLET ORAL EVERY 6 HOURS PRN
Refills: 0
Start: 2022-05-28

## 2022-05-28 RX ORDER — CEPHALEXIN 500 MG/1
500 CAPSULE ORAL EVERY 6 HOURS SCHEDULED
Qty: 20 CAPSULE | Refills: 0 | Status: SHIPPED | OUTPATIENT
Start: 2022-05-28 | End: 2022-06-02

## 2022-05-28 RX ADMIN — HEPARIN SODIUM 5000 UNITS: 5000 INJECTION INTRAVENOUS; SUBCUTANEOUS at 05:31

## 2022-05-28 RX ADMIN — INSULIN LISPRO 3 UNITS: 100 INJECTION, SOLUTION INTRAVENOUS; SUBCUTANEOUS at 08:18

## 2022-05-28 RX ADMIN — INSULIN GLARGINE 30 UNITS: 100 INJECTION, SOLUTION SUBCUTANEOUS at 08:18

## 2022-05-28 RX ADMIN — METOPROLOL TARTRATE 25 MG: 25 TABLET, FILM COATED ORAL at 08:18

## 2022-05-28 RX ADMIN — ASPIRIN 81 MG CHEWABLE TABLET 81 MG: 81 TABLET CHEWABLE at 08:18

## 2022-05-28 RX ADMIN — CEFAZOLIN SODIUM 1000 MG: 1 SOLUTION INTRAVENOUS at 11:17

## 2022-05-28 RX ADMIN — ATORVASTATIN CALCIUM 40 MG: 40 TABLET, FILM COATED ORAL at 08:18

## 2022-05-28 RX ADMIN — CEFAZOLIN SODIUM 1000 MG: 1 SOLUTION INTRAVENOUS at 01:26

## 2022-05-28 NOTE — PLAN OF CARE
Problem: Nutrition/Hydration-ADULT  Goal: Nutrient/Hydration intake appropriate for improving, restoring or maintaining nutritional needs  Description: Monitor and assess patient's nutrition/hydration status for malnutrition  Collaborate with interdisciplinary team and initiate plan and interventions as ordered  Monitor patient's weight and dietary intake as ordered or per policy  Utilize nutrition screening tool and intervene as necessary  Determine patient's food preferences and provide high-protein, high-caloric foods as appropriate       INTERVENTIONS:  - Monitor oral intake, urinary output, labs, and treatment plans  - Assess nutrition and hydration status and recommend course of action  - Evaluate amount of meals eaten  - Assist patient with eating if necessary   - Allow adequate time for meals  - Recommend/ encourage appropriate diets, oral nutritional supplements, and vitamin/mineral supplements  - Order, calculate, and assess calorie counts as needed  - Recommend, monitor, and adjust tube feedings and TPN/PPN based on assessed needs  - Assess need for intravenous fluids  - Provide specific nutrition/hydration education as appropriate  - Include patient/family/caregiver in decisions related to nutrition  Outcome: Progressing     Problem: Potential for Falls  Goal: Patient will remain free of falls  Description: INTERVENTIONS:  - Educate patient/family on patient safety including physical limitations  - Instruct patient to call for assistance with activity   - Consult OT/PT to assist with strengthening/mobility   - Keep Call bell within reach  - Keep bed low and locked with side rails adjusted as appropriate  - Keep care items and personal belongings within reach  - Initiate and maintain comfort rounds  - Make Fall Risk Sign visible to staff  - Offer Toileting every  Hours, in advance of need  - Initiate/Maintain alarm  - Obtain necessary fall risk management equipment:   - Apply yellow socks and bracelet for high fall risk patients  - Consider moving patient to room near nurses station  Outcome: Progressing     Problem: MOBILITY - ADULT  Goal: Maintain or return to baseline ADL function  Description: INTERVENTIONS:  -  Assess patient's ability to carry out ADLs; assess patient's baseline for ADL function and identify physical deficits which impact ability to perform ADLs (bathing, care of mouth/teeth, toileting, grooming, dressing, etc )  - Assess/evaluate cause of self-care deficits   - Assess range of motion  - Assess patient's mobility; develop plan if impaired  - Assess patient's need for assistive devices and provide as appropriate  - Encourage maximum independence but intervene and supervise when necessary  - Involve family in performance of ADLs  - Assess for home care needs following discharge   - Consider OT consult to assist with ADL evaluation and planning for discharge  - Provide patient education as appropriate  Outcome: Progressing  Goal: Maintains/Returns to pre admission functional level  Description: INTERVENTIONS:  - Perform BMAT or MOVE assessment daily    - Set and communicate daily mobility goal to care team and patient/family/caregiver  - Collaborate with rehabilitation services on mobility goals if consulted  - Perform Range of Motion  times a day  - Reposition patient every  hours    - Dangle patient  times a day  - Stand patient  times a day  - Ambulate patient  times a day  - Out of bed to chair  times a day   - Out of bed for meals  times a day  - Out of bed for toileting  - Record patient progress and toleration of activity level   Outcome: Progressing

## 2022-05-28 NOTE — PROGRESS NOTES
Progress Note - Infectious Disease   Eyad Khan 57 y o  male MRN: [id number]  Unit/Bed#: W -01 Encounter: [id number]      Impression/Recommendations:  1  Left lateral left foot nonhealing wound with cellulitis   Patient has no fever and WBC at upper range of normal   He is clinically and systemically well   During recent admission for toe amputation, wound culture grew quite susceptible E coli  Patient is clinically improved  Patient is status post left 5th MP head resection with ID  There was extensive purulence  Blood cultures have no growth thus far  Change antibiotic to p o  Keflex  Serial foot exam   Monitor temperature/WBC  Follow-up on pending blood cultures  Treat x7 days postop      2  Left 5th MT head osteomyelitis, evident on x-ray   This is likely etiology of wound nonhealing   Probability of cure with antibiotic is extremely low   Patient should get bone resection   Podiatry evaluation noted  Patient is status post MT head resection, for surgical cure   If wound continues to show poor wound healing after appropriate bone resection, patient may need vascular workup  No further antibiotic needed for this  Monitor wound for healing  Consider vascular workup if wound does not heal after bone resection  Podiatry follow-up      3   CKD stage 3  Creatinine baseline  Antibiotic at full dose  Monitor creatinine      4  DM with neuropathy  Management per primary service      Discussed with patient in detail regarding the above plan  Discussed with Dr Serge Angel from slim service  Okay for discharge from ID viewpoint     Antibiotics:  Cefazolin  Antibiotic # 5  POD # 2     Subjective:  Patient feels well  No pain in foot  Temperature stays down   No chills    He is tolerating antibiotic well   No nausea, vomiting or diarrhea      Objective:  Vitals:  Temp:  [98 3 °F (36 8 °C)-98 4 °F (36 9 °C)] 98 4 °F (36 9 °C)  HR:  [67-76] 67  Resp:  [17-18] 18  BP: (107-130)/(64-76) 107/64  SpO2:  [96 %-97 %] 96 %  Temp (24hrs), Av 4 °F (36 9 °C), Min:98 3 °F (36 8 °C), Max:98 4 °F (36 9 °C)  Current: Temperature: 98 4 °F (36 9 °C)    Physical Exam:     General: Awake, alert, cooperative, no distress  Neck:  Supple  No mass  No lymphadenopathy  Lungs: Expansion symmetric, no rales, no wheezing, respirations unlabored  Heart:  Regular rate and rhythm, S1 and S2 normal, no murmur  Abdomen: Soft, nondistended, non-tender, bowel sounds active all four quadrants, no masses, no organomegaly  Extremities: Stable leg edema  Left foot with dressing in place  Dressing is dry  No erythema/warmth beyond dressing  Nontender to palpation  Skin:  No rash  Neuro: Moves all extremities  Invasive Devices  Report    Peripheral Intravenous Line  Duration           Peripheral IV 22 Left Antecubital 3 days    Peripheral IV 22 Right Antecubital 3 days                Labs studies:   I have personally reviewed pertinent labs  Results from last 7 days   Lab Units 22  0607 22  0541 22  1536   POTASSIUM mmol/L 4 7 3 8 3 9 4 3   CHLORIDE mmol/L 99 103 101 95*   CO2 mmol/L 29 27 27 31   BUN mg/dL 36* 35* 39* 39*   CREATININE mg/dL 1 68* 1 62* 1 75* 2 02*   EGFR ml/min/1 73sq m 42 44 40 33   CALCIUM mg/dL 8 1* 8 3* 8 8 9 1   AST U/L  --   --   --  20   ALT U/L  --   --   --  17   ALK PHOS U/L  --   --   --  87     Results from last 7 days   Lab Units 22  04322  0607 22  0541   WBC Thousand/uL 8 91 8 91 10 61*   HEMOGLOBIN g/dL 11 7* 11 8* 12 5   PLATELETS Thousands/uL 315 329 374     Results from last 7 days   Lab Units 22  1646   BLOOD CULTURE  No Growth at 72 hrs  No Growth at 72 hrs  Imaging Studies:   I have personally reviewed pertinent imaging study reports and images in PACS  EKG, Pathology, and Other Studies:   I have personally reviewed pertinent reports

## 2022-05-28 NOTE — DISCHARGE SUMMARY
[facility]  Discharge- Mahsa Khan 1960, 57 y o  male MRN: [id number]  Unit/Bed#: W -01 Encounter: [id number]  Primary Care Provider: Jd Louie DO   Date and time admitted to hospital: 5/24/2022  2:47 PM    * Surgical wound infection, cellulitis and osteomyelitis-left foot  Assessment & Plan  · S/p 5th digit amputation with filet flap (DOS 5/14/22)  · Patient presents with wound discharge/discomfort, redness and black discoloration  · Concern for wound infection  · X-ray show evidence of osteomyelitis left 5th metatarsal  · Received cefepime and vancomycin in the ER and this morning  These were stopped by the Infectious Disease doctor  · Podiatry on board  · Wound culture grew E coli susceptible to cefazolin  · Status post amputation of the left 5th metatarsal and removal of left lower extremity foreign body, 5/26  · Infectious Disease on board:  Was eventually placed on IV cefazolin and Flagyl  When results of the wound culture came back, Flagyl was discontinued  · Follow blood cultures:  Negative x4 days  · Non weight-bearing with crutches  · Podiatrist is okay with discharge of the patient today  Continue local wound care and dressing at home  Instructions were provided by the podiatrist   · Outpatient follow-up with podiatrist   · Infectious Disease doctor is okay with discharge of the patient today  Patient will be on Keflex for 5 more days to complete 7 days of antibiotic treatment postoperatively  · PT OT evaluation  Home Health services versus no need for PT at home  Patient does not want any home PT   · Outpatient follow-up with primary care physician  Anemia  Assessment & Plan  · Mild  · Likely acute blood loss anemia from the surgery +hemodilution from IV fluids  · Monitor in the outpatient  · Outpatient follow-up with primary care physician  I am recommending rechecking patient's CBC in the outpatient    Patient's primary care physician may facilitate this  Hyponatremia  Assessment & Plan  · Mild  · Likely due to hyperglycemia  · Manage patient's hyperglycemia  · Monitor in the outpatient  · Outpatient follow-up with primary care physician  I am recommending rechecking patient's BMP in the outpatient  Patient's primary care physician may facilitate this  Chronic combined systolic and diastolic CHF  Assessment & Plan  Wt Readings from Last 3 Encounters:   05/28/22 80 kg (176 lb 5 9 oz)   05/15/22 80 7 kg (177 lb 14 6 oz)   04/07/22 80 7 kg (178 lb)     Stable and compensated  Appears to be close to baseline weight  Continue beta-blocker, lisinopril and Lasix  Outpatient follow-up with primary care physician  CKD (chronic kidney disease) stage 3  Assessment & Plan  Lab Results   Component Value Date    EGFR 42 05/27/2022    EGFR 44 05/26/2022    EGFR 40 05/25/2022    CREATININE 1 68 (H) 05/27/2022    CREATININE 1 62 (H) 05/26/2022    CREATININE 1 75 (H) 05/25/2022   Creatinine slightly above baseline on admission; now back at his baseline  Status post holding off Lasix and lisinopril  Status post IV fluids  Outpatient follow-up with primary care physician  I am recommending monitoring patient's BMP in the outpatient  Patient's primary care physician may facilitate this  Coronary artery disease  Assessment & Plan  · History of MI  Status post stents x3 several years ago  · Denies any chest pain or shortness of breath  · Continue statin and aspirin and metoprolol  · Outpatient follow-up with primary care physician      Insulin-dependent diabetes mellitus  Assessment & Plan  Lab Results   Component Value Date    HGBA1C 10 4 (H) 04/06/2022       Recent Labs     05/27/22  2153 05/27/22  2213 05/28/22  0733 05/28/22  1118   POCGLU 283* 257* 99 150*       Blood Sugar Average: Last 72 hrs:  (P) 902 [id number] uncontrolled diabetes with hyperglycemia  Patient reports blood sugars have been running around 200  A1c noted to be at 10 4  On Lantus 30 units a m  And Novolin 70 /30 5 units at dinner at home  Status post hypoglycemia, 5/26, likely due to patient being on NPO, resolved  Status post insulin sliding scale  On discharge, patient will continue with his regimen as mentioned above  This was discussed with the patient  Adjust treatment accordingly in the outpatient  Patient was instructed to monitor his blood sugars at home and record and bring this record on follow-up with primary care physician  Patient was instructed to call his primary care physician if his blood sugars are too low or too high  Specifics were provided  Outpatient follow-up with primary care physician  I am recommending monitoring patient's fasting BMP/CMP, electrolytes, A1c and blood sugars  Primary care physician may facilitate these  Essential hypertension  Assessment & Plan  · As per discussion with the patient, he is being maintained on Lasix, lisinopril and metoprolol, as patient also has history of CHF  Will continue on discharge  · Patient was instructed to monitor his blood pressures at home and record and bring this record on follow-up with his primary care physician  I also instructed him to call his primary care physician if his blood pressures are too low or too high  Specifics were provided to the patient  · Outpatient follow-up with primary care physician  Discharging Physician / Practitioner: Marya Champagne MD  PCP: Aron Kaufman DO  Admission Date:   Admission Orders (From admission, onward)     Ordered        05/24/22 [facility]  Inpatient Admission  Once                      Discharge Date: 05/28/22    Consultations During Hospital Stay:  · Infectious Disease doctor  · Podiatrist     Procedures Performed:   · Please see diagnosis and notes above  Significant Findings / Test Results:   · Please see diagnosis and notes above  Incidental Findings:   · None      Test Results Pending at Discharge (will require follow up): · Blood cultures  Negative for 4 days  Primary care physician should follow up the final results  Outpatient Tests Requested:  · None  However I am recommending monitoring/rechecking patient's CBC, BMP, blood sugars, A1c  Complications:  None  Reason for Admission:  Wound infection  Hospital Course:   Deisi Washington is a 57 y o  male patient who originally presented to the hospital on 5/24/2022 due to wound infection  On this admission, patient was found to have surgical wound infection with cellulitis and osteomyelitis of the left 5th digit that was amputated recently  Podiatrist and Infectious Disease doctor are on board  Patient was on IV antibiotics  Wound culture eventually grew E coli  Patient eventually underwent amputation of the left 5th metatarsal and removal of left lower extremity foreign body  Both podiatrist and Infectious Disease doctor are okay with discharge of the patient today  Patient will be on Keflex for 5 more days to complete 1 week antibiotic treatment postoperatively  Patient will follow-up with podiatrist   Local wound care and dressing instructions were provided by the podiatrist   Nonweightbearing with crutches on the left lower extremity  Patient's condition improved  No complaints today  Patient is okay with home [facility]NA particularly for wound care  Please see above list of diagnoses and related plan for additional information  Condition at Discharge: stable    Discharge Day Visit / Exam:   Subjective:    Patient is doing fine and feels a lot better  Patient denies any pains  No shortness of breath  No complaints  No nausea or vomiting  Patient is okay with his discharge to home today      Vitals: Blood Pressure: 107/64 (05/28/22 0734)  Pulse: 67 (05/28/22 0734)  Temperature: 98 4 °F (36 9 °C) (05/28/22 0734)  Temp Source: Temporal (05/26/22 1157)  Respirations: 18 (05/28/22 0734)  Height: 5' 10" (177 8 cm) (05/26/22 1157)  Weight - Scale: 80 kg (176 lb 5 9 oz) (05/28/22 0600)  SpO2: 96 % (05/28/22 0734)  Exam:   Physical Exam  Vitals and nursing note reviewed  Constitutional:       General: He is not in acute distress  Appearance: He is not ill-appearing, toxic-appearing or diaphoretic  Cardiovascular:      Rate and Rhythm: Normal rate and regular rhythm  Heart sounds: Normal heart sounds  Pulmonary:      Effort: Pulmonary effort is normal  No respiratory distress  Breath sounds: Normal breath sounds  Abdominal:      General: Abdomen is flat  Bowel sounds are normal  There is no distension  Palpations: Abdomen is soft  Tenderness: There is no abdominal tenderness  Musculoskeletal:         General: No tenderness  Right lower leg: No edema  Left lower leg: No edema  Skin:     General: Skin is warm  Coloration: Skin is not pale  Findings: No erythema or rash  Comments: Left foot with wound dressing  Neurological:      General: No focal deficit present  Mental Status: He is alert and oriented to person, place, and time  Psychiatric:         Mood and Affect: Mood normal          Behavior: Behavior normal          Thought Content: Thought content normal           Discussion with Family: Patient declined call to   Discharge instructions/Information to patient and family:   See after visit summary for information provided to patient and family  Provisions for Follow-Up Care:  See after visit summary for information related to follow-up care and any pertinent home health orders  Disposition:   Home with [facility] Services (Reminder: Complete face to face encounter)    Planned Readmission:  None  Discharge Statement:  I spent 45 minutes discharging the patient  This time was spent on the day of discharge  I had direct contact with the patient on the day of discharge   Greater than 50% of the total time was spent examining patient, answering all patient questions, arranging and discussing plan of care with patient as well as directly providing post-discharge instructions  Additional time then spent on discharge activities  Discharge Medications:  See after visit summary for reconciled discharge medications provided to patient and/or family        **Please Note: This note may have been constructed using a voice recognition system**

## 2022-05-29 LAB
BACTERIA BLD CULT: NORMAL
BACTERIA BLD CULT: NORMAL

## 2022-05-29 NOTE — ASSESSMENT & PLAN NOTE
· Mild  · Likely acute blood loss anemia from the surgery +hemodilution from IV fluids  · Monitor in the outpatient  · Outpatient follow-up with primary care physician  I am recommending rechecking patient's CBC in the outpatient  Patient's primary care physician may facilitate this

## 2022-05-29 NOTE — ASSESSMENT & PLAN NOTE
Wt Readings from Last 3 Encounters:   05/28/22 80 kg (176 lb 5 9 oz)   05/15/22 80 7 kg (177 lb 14 6 oz)   04/07/22 80 7 kg (178 lb)     Stable and compensated  Appears to be close to baseline weight  Continue beta-blocker, lisinopril and Lasix  Outpatient follow-up with primary care physician

## 2022-05-29 NOTE — ASSESSMENT & PLAN NOTE
· Mild  · Likely due to hyperglycemia  · Manage patient's hyperglycemia  · Monitor in the outpatient  · Outpatient follow-up with primary care physician  I am recommending rechecking patient's BMP in the outpatient  Patient's primary care physician may facilitate this

## 2022-05-29 NOTE — ASSESSMENT & PLAN NOTE
· As per discussion with the patient, he is being maintained on Lasix, lisinopril and metoprolol, as patient also has history of CHF  Will continue on discharge  · Patient was instructed to monitor his blood pressures at home and record and bring this record on follow-up with his primary care physician  I also instructed him to call his primary care physician if his blood pressures are too low or too high  Specifics were provided to the patient  · Outpatient follow-up with primary care physician

## 2022-05-29 NOTE — ASSESSMENT & PLAN NOTE
· History of MI  Status post stents x3 several years ago  · Denies any chest pain or shortness of breath  · Continue statin and aspirin and metoprolol  · Outpatient follow-up with primary care physician

## 2022-05-29 NOTE — ASSESSMENT & PLAN NOTE
Lab Results   Component Value Date    EGFR 42 05/27/2022    EGFR 44 05/26/2022    EGFR 40 05/25/2022    CREATININE 1 68 (H) 05/27/2022    CREATININE 1 62 (H) 05/26/2022    CREATININE 1 75 (H) 05/25/2022   Creatinine slightly above baseline on admission; now back at his baseline  Status post holding off Lasix and lisinopril  Status post IV fluids  Outpatient follow-up with primary care physician  I am recommending monitoring patient's BMP in the outpatient  Patient's primary care physician may facilitate this

## 2022-05-29 NOTE — ASSESSMENT & PLAN NOTE
Lab Results   Component Value Date    HGBA1C 10 4 (H) 04/06/2022       Recent Labs     05/27/22  2153 05/27/22  2213 05/28/22  0733 05/28/22  1118   POCGLU 283* 257* 99 150*       Blood Sugar Average: Last 72 hrs:  (P) 610 [id number] uncontrolled diabetes with hyperglycemia  Patient reports blood sugars have been running around 200  A1c noted to be at 10 4  On Lantus 30 units a m  And Novolin 70 /30 5 units at dinner at home  Status post hypoglycemia, 5/26, likely due to patient being on NPO, resolved  Status post insulin sliding scale  On discharge, patient will continue with his regimen as mentioned above  This was discussed with the patient  Adjust treatment accordingly in the outpatient  Patient was instructed to monitor his blood sugars at home and record and bring this record on follow-up with primary care physician  Patient was instructed to call his primary care physician if his blood sugars are too low or too high  Specifics were provided  Outpatient follow-up with primary care physician  I am recommending monitoring patient's fasting BMP/CMP, electrolytes, A1c and blood sugars  Primary care physician may facilitate these

## 2022-06-01 ENCOUNTER — TELEPHONE (OUTPATIENT)
Dept: PODIATRY | Facility: CLINIC | Age: 62
End: 2022-06-01

## 2022-06-01 NOTE — TELEPHONE ENCOUNTER
This note was faxed with his signature, as he cannot just put an order like that in [address] per Dr Marylen Place

## 2022-06-01 NOTE — TELEPHONE ENCOUNTER
Can you please put orders in Epic so the office can fax them to the [facility]-they will not even consider a verbal order  Fax to [phone]  Thanks!

## 2022-06-08 ENCOUNTER — OFFICE VISIT (OUTPATIENT)
Dept: PODIATRY | Facility: CLINIC | Age: 62
End: 2022-06-08

## 2022-06-08 ENCOUNTER — TELEPHONE (OUTPATIENT)
Dept: OBGYN CLINIC | Facility: HOSPITAL | Age: 62
End: 2022-06-08

## 2022-06-08 VITALS — BODY MASS INDEX: 25.25 KG/M2 | WEIGHT: 176.4 LBS | HEIGHT: 70 IN

## 2022-06-08 DIAGNOSIS — L97.529 ULCER OF LEFT FOOT, UNSPECIFIED ULCER STAGE (HCC): Primary | ICD-10-CM

## 2022-06-08 DIAGNOSIS — Z79.4 TYPE 2 DIABETES MELLITUS WITH DIABETIC NEUROPATHY, WITH LONG-TERM CURRENT USE OF INSULIN (HCC): ICD-10-CM

## 2022-06-08 DIAGNOSIS — E11.40 TYPE 2 DIABETES MELLITUS WITH DIABETIC NEUROPATHY, WITH LONG-TERM CURRENT USE OF INSULIN (HCC): ICD-10-CM

## 2022-06-08 PROCEDURE — 99024 POSTOP FOLLOW-UP VISIT: CPT | Performed by: PODIATRIST

## 2022-06-08 NOTE — TELEPHONE ENCOUNTER
I called Martin Mckay and gave her a verbal to discontinue home health care  She will do so per Dr Kristen Barton

## 2022-06-08 NOTE — TELEPHONE ENCOUNTER
She is asking if she is able to get orders stating that home care is no longer needed for this patient

## 2022-06-08 NOTE — TELEPHONE ENCOUNTER
Patient sees Dr Armida Torres is calling in from [address] [place] [address] Road his home health agency, she is calling in stating that the Dr no longer wants him to have home wound care as per the patient  She is calling in wanting to confirm if home wound care is still needed for this patient  Please advise          Call back# [phone]

## 2022-06-08 NOTE — PROGRESS NOTES
Assessment/Plan:      Diagnoses and all orders for this visit:    Ulcer of left foot, unspecified ulcer stage ([facility] )    Type 2 diabetes mellitus with diabetic neuropathy, with long-term current use of insulin ([facility] )      Patient is stable postop 2 weeks    Incision: stable, sutures left intact    Dressing instructions given to patient  Rest the foot as much as possible and elevate/ice  if swollen  Ambulatory status: WB in flat surgical shoe    Images reviewed today: none    RTC: 2 weeks suture removal        Subjective:     Patient ID: Jignesh Caceres is a 57 y o  male  DOS: 5/26/22     Procedure: left 5th ray amputation and removal foreign body from great toe     Condition: Dressing C/D/I  He states there is no drainge  He is in wedge shoe  He feels well         Review of Systems      Objective:     Physical Exam    Left 5th ray amputation, no drainage or SOI  Sutures intact  CRT brisk to toes  Palpable pedal pulses  Normal ankle ROM, no calf pain

## 2022-06-17 ENCOUNTER — TELEPHONE (OUTPATIENT)
Dept: NEPHROLOGY | Facility: CLINIC | Age: 62
End: 2022-06-17

## 2022-06-22 ENCOUNTER — OFFICE VISIT (OUTPATIENT)
Dept: PODIATRY | Facility: CLINIC | Age: 62
End: 2022-06-22

## 2022-06-22 VITALS
DIASTOLIC BLOOD PRESSURE: 78 MMHG | BODY MASS INDEX: 25.34 KG/M2 | HEART RATE: 70 BPM | HEIGHT: 70 IN | SYSTOLIC BLOOD PRESSURE: 117 MMHG | WEIGHT: 177 LBS

## 2022-06-22 DIAGNOSIS — Z89.422 HISTORY OF PARTIAL RAY AMPUTATION OF FIFTH TOE OF LEFT FOOT (HCC): ICD-10-CM

## 2022-06-22 DIAGNOSIS — Z79.4 TYPE 2 DIABETES MELLITUS WITH DIABETIC NEUROPATHY, WITH LONG-TERM CURRENT USE OF INSULIN (HCC): ICD-10-CM

## 2022-06-22 DIAGNOSIS — Z09 POSTOP CHECK: Primary | ICD-10-CM

## 2022-06-22 DIAGNOSIS — E11.40 TYPE 2 DIABETES MELLITUS WITH DIABETIC NEUROPATHY, WITH LONG-TERM CURRENT USE OF INSULIN (HCC): ICD-10-CM

## 2022-06-22 PROCEDURE — 99024 POSTOP FOLLOW-UP VISIT: CPT | Performed by: PODIATRIST

## 2022-06-22 NOTE — PROGRESS NOTES
Assessment/Plan:      Diagnoses and all orders for this visit:    Postop check    History of partial ray amputation of fifth toe of left foot ([facility] )  -     Diabetic Shoe    Type 2 diabetes mellitus with diabetic neuropathy, with long-term current use of insulin ([facility] )  -     Diabetic Shoe      Patient is stable postop 4 weeks    Incision: healed, apply 1 steri strip over central incision for another week, cover with bandaid    Dressing instructions given to patient  Rest the foot as much as possible and elevate/ice  if swollen  Ambulatory status: Wear DM inserts, Rx for new ones given  Images reviewed today: none    RTC: begin at risk foot care      Subjective:     Patient ID: Gumaro Mcmahon is a 57 y o  male  DOS: 5/26/22     Procedure: left 5th ray amputation and removal foreign body from great toe     Condition: he deneies drainage  He is healed  No pain      Review of Systems      Objective:     Physical Exam      Pedal pulses intact    Incision is essentially healed  This is small area of skin disruption but no open wound or drainage

## 2022-07-06 ENCOUNTER — OFFICE VISIT (OUTPATIENT)
Dept: CARDIOLOGY CLINIC | Facility: CLINIC | Age: 62
End: 2022-07-06
Payer: COMMERCIAL

## 2022-07-06 VITALS
BODY MASS INDEX: 27.39 KG/M2 | SYSTOLIC BLOOD PRESSURE: 122 MMHG | WEIGHT: 191.3 LBS | HEART RATE: 86 BPM | HEIGHT: 70 IN | DIASTOLIC BLOOD PRESSURE: 78 MMHG | OXYGEN SATURATION: 98 %

## 2022-07-06 DIAGNOSIS — I25.5 ISCHEMIC CARDIOMYOPATHY: ICD-10-CM

## 2022-07-06 DIAGNOSIS — Z95.5 PRESENCE OF DRUG COATED STENT IN RIGHT CORONARY ARTERY: ICD-10-CM

## 2022-07-06 DIAGNOSIS — E78.5 DYSLIPIDEMIA: ICD-10-CM

## 2022-07-06 DIAGNOSIS — I50.42 CHRONIC COMBINED SYSTOLIC AND DIASTOLIC CHF (CONGESTIVE HEART FAILURE) (HCC): ICD-10-CM

## 2022-07-06 DIAGNOSIS — I10 BENIGN ESSENTIAL HYPERTENSION: ICD-10-CM

## 2022-07-06 DIAGNOSIS — I25.10 CORONARY ARTERY DISEASE INVOLVING NATIVE CORONARY ARTERY OF NATIVE HEART WITHOUT ANGINA PECTORIS: Primary | ICD-10-CM

## 2022-07-06 DIAGNOSIS — Z95.5 PRESENCE OF DRUG COATED STENT IN LAD CORONARY ARTERY: ICD-10-CM

## 2022-07-06 PROCEDURE — 99214 OFFICE O/P EST MOD 30 MIN: CPT | Performed by: INTERNAL MEDICINE

## 2022-07-06 NOTE — PROGRESS NOTES
Cardiology Follow Up    Solis Khan  1960  [id number]  [facility] [address]  [address] [address]  Phone#  [phone]  Fax#  [phone]    1  Coronary artery disease involving native coronary artery of native heart without angina pectoris     2  Presence of drug coated stent in LAD coronary artery     3  Presence of drug coated stent in right coronary artery     4  Ischemic cardiomyopathy     5  Chronic combined systolic and diastolic CHF     6  Benign essential hypertension     7  Dyslipidemia          Discussion/Summary: Bekah Khan is a pleasant 75-year-old gentleman who presents to the office today for routine follow-up  Despite his weight he does not appear volume overloaded on exam today  I have asked that he continue Lasix as prescribed  Instructions for additional use were provided  A low-salt diet was reinforced      His blood pressure control is acceptable  However given his diabetes and LV dysfunction along with proteinuria we had discussed the addition of an ACE-inhibitor at his last visit which he declined  This was initiated since his last visit       His most recent lipids reveal acceptable numbers on his current statin regimen to which no changes were made  He underwent an echocardiogram last year revealing a stable EF at 86% with diastolic dysfunction and elevated filling pressures  He underwent a stress test at that time due to shortness of breath with exertion revealing apical scar but no ischemia  I will see him back in the office in six months for re-evaluation      Interval History:  Mr Khan is a pleasant 75-year-old gentleman who presents to the office today for routine follow-up  Since his last visit with me he was admitted with LOBO in the setting of LLQ abdominal pain and influenza A  He was hydrated with IV fluids and his diuretics were held at discharge    He did resume Lasix 40 mg daily after discharge  He has been weighing about 186 lb on his home scale  He denies any other signs or symptoms of congestive heart failure including lower extremity edema, paroxysmal nocturnal dyspnea or orthopnea  He denies acute weight gain and states this is more of a gradual weight gain given inactivity from recent surgery on his foot  He denies any worsening exertional shortness of breath or exertional chest pain  He denies lightheadedness, syncope or presyncope  He denies palpitations   He denies symptoms of claudication      Problem List     Orthopnea    Type 2 diabetes mellitus with hyperglycemia, with long-term current use of insulin ([facility] )    Overview Signed 5/18/2017  6:20 AM by Peter Aguilar PA-C     type 1           Lab Results   Component Value Date    HGBA1C 10 4 (H) 04/06/2022         Coronary artery disease    CKD (chronic kidney disease) stage 3, GFR 30-59 ml/min ([facility])    Lab Results   Component Value Date    EGFR 42 05/27/2022    EGFR 44 05/26/2022    EGFR 40 05/25/2022    CREATININE 1 68 (H) 05/27/2022    CREATININE 1 62 (H) 05/26/2022    CREATININE 1 75 (H) 05/25/2022         Chronic combined systolic and diastolic CHF (congestive heart failure) ([facility] )    Wt Readings from Last 3 Encounters:   06/22/22 80 3 kg (177 lb)   06/08/22 80 kg (176 lb 6 4 oz)   05/28/22 80 kg (176 lb 5 9 oz)                 Dyslipidemia    Ischemic cardiomyopathy    Presence of drug coated stent in LAD coronary artery    Presence of drug coated stent in right coronary artery    Abscess of left foot    Dislocation of proximal interphalangeal joint of left little finger    Sepsis ([facility] )    Abscess of tendon of left foot    Overview Signed 2/13/2020 12:47 PM by Erik Hodge DPM     Added automatically from request for surgery [id number]         Benign essential hypertension        Past Medical History:   Diagnosis Date    CAD (coronary artery disease)     Chronic combined systolic and diastolic CHF (congestive heart failure) ([place] [address] )     Diabetes mellitus ([place] [address] )     type 2    Dyslipidemia     Hypertension     Ischemic cardiomyopathy     MI (myocardial infarction) ([place] [address] )     Osteomyelitis ([place] [address] )     Pancreatitis      Social History     Socioeconomic History    Marital status: /Civil Union     Spouse name: Not on file    Number of children: Not on file    Years of education: Not on file    Highest education level: Not on file   Occupational History    Not on file   Tobacco Use    Smoking status: Never Smoker    Smokeless tobacco: Never Used   Vaping Use    Vaping Use: Never used   Substance and Sexual Activity    Alcohol use: Not Currently     Comment: 20 years of sobriety    Drug use: Never    Sexual activity: Yes     Partners: Female     Birth control/protection: None   Other Topics Concern    Not on file   Social History Narrative    Not on file     Social Determinants of Health     Financial Resource Strain: Not on file   Food Insecurity: No Food Insecurity    Worried About Running Out of Food in the Last Year: Never true    Claire of Food in the Last Year: Never true   Transportation Needs: No Transportation Needs    Lack of Transportation (Medical): No    Lack of Transportation (Non-Medical):  No   Physical Activity: Not on file   Stress: Not on file   Social Connections: Not on file   Intimate Partner Violence: Not on file   Housing Stability: Low Risk     Unable to Pay for Housing in the Last Year: No    Number of Places Lived in the Last Year: 1    Unstable Housing in the Last Year: No      Family History   Problem Relation Age of Onset    Heart attack Father     Hyperlipidemia Father     Cancer Father     Diabetes Father     Diabetes Mother     Heart failure Mother         poss    Kidney disease Mother     Cancer Paternal Grandfather     Stroke Neg Hx     Anuerysm Neg Hx     Clotting disorder Neg Hx     Arrhythmia Neg Hx     Coronary artery disease Neg Hx     Hypertension Neg Hx     Sudden death Neg Hx         scd     Past Surgical History:   Procedure Laterality Date    ANGIOPLASTY      ballon    CORONARY ANGIOPLASTY WITH STENT PLACEMENT      LULA to proximal and mid LAD, Proximal RCA   FOREIGN BODY REMOVAL Left 5/26/2022    Procedure: REMOVAL FOREIGN BODY EXTREMITY;  Surgeon: Brittney Soto DPM;  [facility];  Service: Podiatry    HERNIA REPAIR      INCISION AND DRAINAGE OF WOUND Left 2/14/2020    Procedure: INCISION AND DRAINAGE (I&D) EXTREMITY left foot (cpt 44451); Surgeon: Palmer Richey DPM;  [facility];  Service: Podiatry   [address] 60 Left 5/26/2022    Procedure: AMPUTATION left 5th metatarsal;  Surgeon: Brittney Soto DPM;  [facility];  Service: Podiatry   Marciano Guardado 59 Left 2/14/2020    Procedure: EXPLORATION EXTREMITY;  Surgeon: Palmer Richey DPM;  [facility];  Service: Podiatry    IN NEG PRESS WOUND [address], < 50 CM Left 2/14/2020    Procedure: APPLICATION VAC DRESSING;  Surgeon: Palmer Richey DPM;  [facility];  Service: Podiatry    TOE AMPUTATION Left 5/14/2022    Procedure: EXCISION OF LEFT 5TH TOE ULCER WITH FILET FLAP;  Surgeon: Brittney Soto DPM;  [facility];  Service: Podiatry       Current Outpatient Medications:     acetaminophen (TYLENOL) 325 mg tablet, Take 2 tablets (650 mg total) by mouth every 6 (six) hours as needed (pain), Disp: , Rfl: 0    aspirin 81 mg chewable tablet, Chew 1 tablet daily for 30 days, Disp: 30 tablet, Rfl: 0    atorvastatin (LIPITOR) 40 mg tablet, TAKE 1 TABLET BY MOUTH EVERY DAY, Disp: 90 tablet, Rfl: 3    Blood Pressure Monitoring ([employer] Blood Pressure Monitor) KIT, Use 2 (two) times a day Automatic BP cuff  Measure BP twice daily  , Disp: 1 kit, Rfl: 0    furosemide (LASIX) 40 mg tablet, Take 40 mg by mouth daily, Disp: , Rfl:     Lantus SoloStar 100 units/mL injection pen, Inject 30 Units under the skin every morning, Disp: , Rfl:     lisinopril (ZESTRIL) 5 mg tablet, Take 1 tablet (5 mg total) by mouth daily, Disp: 90 tablet, Rfl: 1    metoprolol tartrate (LOPRESSOR) 25 mg tablet, TAKE 1 TABLET (25 MG TOTAL) BY MOUTH EVERY 12 (TWELVE) HOURS, Disp: 180 tablet, Rfl: 3    NovoLIN 70/30 FlexPen (70-30) 100 UNIT/ML injection pen, , Disp: , Rfl:     TRUE METRIX BLOOD GLUCOSE TEST test strip, TEST FOUR TIMES DAILY, Disp: , Rfl: 11    Unifine Pentips 31G X 8 MM MISC, , Disp: , Rfl:   No Known Allergies    Labs:     Chemistry        Component Value Date/Time     06/25/2015 1038    K 4 7 05/27/2022 0439    K 4 3 06/25/2015 1038    CL 99 05/27/2022 0439     06/25/2015 1038    CO2 29 05/27/2022 0439    CO2 27 06/25/2015 1038    BUN 36 (H) 05/27/2022 0439    BUN 30 (H) 06/25/2015 1038    CREATININE 1 68 (H) 05/27/2022 0439    CREATININE 1 16 06/25/2015 1038        Component Value Date/Time    CALCIUM 8 1 (L) 05/27/2022 0439    CALCIUM 8 7 06/25/2015 1038    ALKPHOS 87 05/24/2022 1536    ALKPHOS 61 06/25/2015 1038    AST 20 05/24/2022 1536    AST 15 06/25/2015 1038    ALT 17 05/24/2022 1536    ALT 22 06/25/2015 1038    BILITOT 1 3 (H) 06/25/2015 1038            Lab Results   Component Value Date    CHOL 98 06/25/2015    CHOL 102 01/29/2015    CHOL 150 12/18/2014     Lab Results   Component Value Date    HDL 64 04/06/2022    HDL 56 06/01/2021    HDL 55 03/20/2019     Lab Results   Component Value Date    LDLCALC 42 04/06/2022    LDLCALC 50 06/01/2021    LDLCALC 60 03/20/2019     Lab Results   Component Value Date    TRIG 38 04/06/2022    TRIG 43 06/01/2021    TRIG 31 03/20/2019     No results found for: CHOLHDL    Imaging: No results found  Review of Systems   Cardiovascular: Negative for chest pain, claudication, cyanosis, dyspnea on exertion, irregular heartbeat, leg swelling, near-syncope and orthopnea  All other systems reviewed and are negative  There were no vitals filed for this visit    There were no vitals filed for this visit  There is no height or weight on file to calculate BMI       Physical Exam:  General:  Alert and cooperative, appears stated age  HEENT:  PERRLA, EOMI, no scleral icterus, no conjunctival pallor  Neck:  No lymphadenopathy, no thyromegaly, no carotid bruits, no elevated JVP  Heart:  Regular rate and rhythm, normal S1/S2, no S3/S4, no murmur  Lungs:  Clear to auscultation bilaterally   Abdomen:  Soft, non-tender, positive bowel sounds, no rebound or guarding,   no organomegaly   Extremities:  1+ right lower extremity edema, chronic venous stasis changes bilaterally   Skin:  No rashes or lesions on exposed skin  Neurologic:  Cranial nerves II-XII grossly intact without focal deficits

## 2022-07-08 ENCOUNTER — HOSPITAL ENCOUNTER (EMERGENCY)
Facility: HOSPITAL | Age: 62
Discharge: HOME | End: 2022-07-09
Attending: EMERGENCY MEDICINE | Admitting: EMERGENCY MEDICINE
Payer: COMMERCIAL

## 2022-07-08 ENCOUNTER — APPOINTMENT (EMERGENCY)
Dept: RADIOLOGY | Facility: HOSPITAL | Age: 62
End: 2022-07-08
Payer: COMMERCIAL

## 2022-07-08 ENCOUNTER — VBI (OUTPATIENT)
Dept: ADMINISTRATIVE | Facility: OTHER | Age: 62
End: 2022-07-08

## 2022-07-08 VITALS
TEMPERATURE: 98.3 F | HEART RATE: 80 BPM | SYSTOLIC BLOOD PRESSURE: 143 MMHG | OXYGEN SATURATION: 100 % | RESPIRATION RATE: 20 BRPM | DIASTOLIC BLOOD PRESSURE: 86 MMHG

## 2022-07-08 DIAGNOSIS — S91.302A WOUND OF LEFT FOOT: ICD-10-CM

## 2022-07-08 DIAGNOSIS — S98.132A AMPUTATION OF FIFTH TOE OF LEFT FOOT (HCC): ICD-10-CM

## 2022-07-08 DIAGNOSIS — L24.A9 DRAINAGE FROM WOUND: Primary | ICD-10-CM

## 2022-07-08 LAB
ALBUMIN SERPL BCP-MCNC: 3.7 G/DL (ref 3.5–5)
ALP SERPL-CCNC: 59 U/L (ref 34–104)
ALT SERPL W P-5'-P-CCNC: 12 U/L (ref 7–52)
ANION GAP SERPL CALCULATED.3IONS-SCNC: 8 MMOL/L (ref 4–13)
AST SERPL W P-5'-P-CCNC: 12 U/L (ref 13–39)
BASOPHILS # BLD AUTO: 0.05 THOUSANDS/ΜL (ref 0–0.1)
BASOPHILS NFR BLD AUTO: 1 % (ref 0–1)
BILIRUB SERPL-MCNC: 1.22 MG/DL (ref 0.2–1)
BUN SERPL-MCNC: 37 MG/DL (ref 5–25)
CALCIUM SERPL-MCNC: 8.7 MG/DL (ref 8.4–10.2)
CHLORIDE SERPL-SCNC: 102 MMOL/L (ref 96–108)
CO2 SERPL-SCNC: 26 MMOL/L (ref 21–32)
CREAT SERPL-MCNC: 1.91 MG/DL (ref 0.6–1.3)
CRP SERPL QL: 2.4 MG/L
EOSINOPHIL # BLD AUTO: 0.34 THOUSAND/ΜL (ref 0–0.61)
EOSINOPHIL NFR BLD AUTO: 6 % (ref 0–6)
ERYTHROCYTE [DISTWIDTH] IN BLOOD BY AUTOMATED COUNT: 15.4 % (ref 11.6–15.1)
ERYTHROCYTE [SEDIMENTATION RATE] IN BLOOD: 22 MM/HOUR (ref 0–19)
GFR SERPL CREATININE-BSD FRML MDRD: 36 ML/MIN/1.73SQ M
GLUCOSE SERPL-MCNC: 311 MG/DL (ref 65–140)
HCT VFR BLD AUTO: 34.3 % (ref 36.5–49.3)
HGB BLD-MCNC: 10.6 G/DL (ref 12–17)
IMM GRANULOCYTES # BLD AUTO: 0.03 THOUSAND/UL (ref 0–0.2)
IMM GRANULOCYTES NFR BLD AUTO: 1 % (ref 0–2)
LACTATE SERPL-SCNC: 1.1 MMOL/L (ref 0.5–2)
LYMPHOCYTES # BLD AUTO: 1.26 THOUSANDS/ΜL (ref 0.6–4.47)
LYMPHOCYTES NFR BLD AUTO: 21 % (ref 14–44)
MCH RBC QN AUTO: 27.6 PG (ref 26.8–34.3)
MCHC RBC AUTO-ENTMCNC: 30.9 G/DL (ref 31.4–37.4)
MCV RBC AUTO: 89 FL (ref 82–98)
MONOCYTES # BLD AUTO: 0.6 THOUSAND/ΜL (ref 0.17–1.22)
MONOCYTES NFR BLD AUTO: 10 % (ref 4–12)
NEUTROPHILS # BLD AUTO: 3.82 THOUSANDS/ΜL (ref 1.85–7.62)
NEUTS SEG NFR BLD AUTO: 61 % (ref 43–75)
NRBC BLD AUTO-RTO: 0 /100 WBCS
PLATELET # BLD AUTO: 228 THOUSANDS/UL (ref 149–390)
PMV BLD AUTO: 9.5 FL (ref 8.9–12.7)
POTASSIUM SERPL-SCNC: 4.4 MMOL/L (ref 3.5–5.3)
PROT SERPL-MCNC: 6.9 G/DL (ref 6.4–8.4)
RBC # BLD AUTO: 3.84 MILLION/UL (ref 3.88–5.62)
SODIUM SERPL-SCNC: 136 MMOL/L (ref 135–147)
WBC # BLD AUTO: 6.1 THOUSAND/UL (ref 4.31–10.16)

## 2022-07-08 PROCEDURE — 87205 SMEAR GRAM STAIN: CPT | Performed by: EMERGENCY MEDICINE

## 2022-07-08 PROCEDURE — 85025 COMPLETE CBC W/AUTO DIFF WBC: CPT | Performed by: EMERGENCY MEDICINE

## 2022-07-08 PROCEDURE — 87186 SC STD MICRODIL/AGAR DIL: CPT | Performed by: EMERGENCY MEDICINE

## 2022-07-08 PROCEDURE — 87070 CULTURE OTHR SPECIMN AEROBIC: CPT | Performed by: EMERGENCY MEDICINE

## 2022-07-08 PROCEDURE — 36415 COLL VENOUS BLD VENIPUNCTURE: CPT | Performed by: EMERGENCY MEDICINE

## 2022-07-08 PROCEDURE — 87077 CULTURE AEROBIC IDENTIFY: CPT | Performed by: EMERGENCY MEDICINE

## 2022-07-08 PROCEDURE — 86140 C-REACTIVE PROTEIN: CPT | Performed by: EMERGENCY MEDICINE

## 2022-07-08 PROCEDURE — 80053 COMPREHEN METABOLIC PANEL: CPT | Performed by: EMERGENCY MEDICINE

## 2022-07-08 PROCEDURE — 85652 RBC SED RATE AUTOMATED: CPT | Performed by: EMERGENCY MEDICINE

## 2022-07-08 PROCEDURE — 73630 X-RAY EXAM OF FOOT: CPT

## 2022-07-08 PROCEDURE — 83605 ASSAY OF LACTIC ACID: CPT | Performed by: EMERGENCY MEDICINE

## 2022-07-08 PROCEDURE — 99283 EMERGENCY DEPT VISIT LOW MDM: CPT

## 2022-07-08 PROCEDURE — 87040 BLOOD CULTURE FOR BACTERIA: CPT | Performed by: EMERGENCY MEDICINE

## 2022-07-09 PROCEDURE — 99285 EMERGENCY DEPT VISIT HI MDM: CPT | Performed by: EMERGENCY MEDICINE

## 2022-07-09 RX ORDER — CEPHALEXIN 250 MG/1
500 CAPSULE ORAL ONCE
Status: COMPLETED | OUTPATIENT
Start: 2022-07-09 | End: 2022-07-09

## 2022-07-09 RX ORDER — CEPHALEXIN 500 MG/1
500 CAPSULE ORAL 4 TIMES DAILY
Qty: 28 CAPSULE | Refills: 0 | Status: SHIPPED | OUTPATIENT
Start: 2022-07-09 | End: 2022-07-16

## 2022-07-09 RX ORDER — BACITRACIN, NEOMYCIN, POLYMYXIN B 400; 3.5; 5 [USP'U]/G; MG/G; [USP'U]/G
1 OINTMENT TOPICAL ONCE
Status: COMPLETED | OUTPATIENT
Start: 2022-07-09 | End: 2022-07-09

## 2022-07-09 RX ADMIN — CEPHALEXIN 500 MG: 250 CAPSULE ORAL at 00:45

## 2022-07-09 RX ADMIN — BACITRACIN ZINC, NEOMYCIN, POLYMYXIN B 1 SMALL APPLICATION: 400; 3.5; 5 OINTMENT TOPICAL at 00:46

## 2022-07-09 NOTE — DISCHARGE INSTRUCTIONS
Take cephalexin antibiotic as prescribed to treat bacteria in the wound  Apply topical antibacterial such as Neosporin daily and keep site covered with a bandage to prevent contamination  Schedule follow-up appointment for re-evaluation with Dr Reid Avalos in the next week  Be sure to have the area rechecked if you have any worsening such as development of redness, odor, swelling or increased warmth at the site

## 2022-07-09 NOTE — ED PROVIDER NOTES
History  Chief Complaint   Patient presents with    Post-op Problem     Pt had his toes ambutated on his L foot a couple weeks ago  States that his wife told him that it is draining blood  Pt does not feel any pain, but does have neuropathy      Patient is a 12-year-old male who presents to the emergency department for evaluation after wife appreciated drainage from his left foot  He has diabetes and neuropathy same  In May he underwent 5th ray resection and required revision by his description (5/14 & 5/26)  He had been healing well including at time of last appointment on June 22nd  Sutures had been removed and he reported presence only of a callus  He has minimal sensation of the area and has not had any pain in the foot or leg  Tonight his wife stated that she saw some red drainage and what looks like pus coming from the site  As his last infection had progressed quickly leading to need for amputation he decided to present early for evaluation of the area  He generally feels well and has not had any fevers, nausea or vomiting  Prior to Admission Medications   Prescriptions Last Dose Informant Patient Reported? Taking? Blood Pressure Monitoring ([employer] Blood Pressure Monitor) KIT   No No   Sig: Use 2 (two) times a day Automatic BP cuff  Measure BP twice daily     Lantus SoloStar 100 units/mL injection pen  Self No No   Sig: Inject 30 Units under the skin every morning   NovoLIN 70/30 FlexPen (70-30) 100 UNIT/ML injection pen   Yes No   TRUE METRIX BLOOD GLUCOSE TEST test strip  Self Yes No   Sig: TEST FOUR TIMES DAILY   Unifine Pentips 31G X 8 MM MISC   Yes No   acetaminophen (TYLENOL) 325 mg tablet   No No   Sig: Take 2 tablets (650 mg total) by mouth every 6 (six) hours as needed (pain)   aspirin 81 mg chewable tablet  Self No No   Sig: Chew 1 tablet daily for 30 days   atorvastatin (LIPITOR) 40 mg tablet  Self No No   Sig: TAKE 1 TABLET BY MOUTH EVERY DAY   furosemide (LASIX) 40 mg tablet Yes No   Sig: Take 40 mg by mouth daily   lisinopril (ZESTRIL) 5 mg tablet   No No   Sig: Take 1 tablet (5 mg total) by mouth daily   metoprolol tartrate (LOPRESSOR) 25 mg tablet  Self No No   Sig: TAKE 1 TABLET (25 MG TOTAL) BY MOUTH EVERY 12 (TWELVE) HOURS      Facility-Administered Medications: None       Past Medical History:   Diagnosis Date    CAD (coronary artery disease)     Chronic combined systolic and diastolic CHF [facility]congestive heart failure) ([facility])     Diabetes mellitus ([place] [address] )     type 2    Dyslipidemia     Hypertension     Ischemic cardiomyopathy     MI (myocardial infarction) ([place] [address] )     Neuropathy     Osteomyelitis ([place] [address] )     Pancreatitis        Past Surgical History:   Procedure Laterality Date    ANGIOPLASTY      ballon    CORONARY ANGIOPLASTY WITH STENT PLACEMENT      LULA to proximal and mid LAD, Proximal RCA   FOREIGN BODY REMOVAL Left 5/26/2022    Procedure: REMOVAL FOREIGN BODY EXTREMITY;  Surgeon: Meggan Hines DPM;  [facility];  Service: Podiatry    HERNIA REPAIR      INCISION AND DRAINAGE OF WOUND Left 2/14/2020    Procedure: INCISION AND DRAINAGE (I&D) EXTREMITY left foot (cpt 70935);   Surgeon: Jeanne Johnson DPM;  [facility];  Service: Podiatry    [address] [place] [address] Left 5/26/2022    Procedure: AMPUTATION left 5th metatarsal;  Surgeon: Meggan Hines DPM;  [facility];  Service: Tony Lease Left 2/14/2020    Procedure: EXPLORATION EXTREMITY;  Surgeon: Jeanne Johnson DPM;  [facility];  Service: Podiatry   [facility], < 50 CM Left 2/14/2020    Procedure: APPLICATION VAC DRESSING;  Surgeon: Jeanne Johnson DPM;  [facility];  Service: Podiatry    TOE AMPUTATION Left 5/14/2022    Procedure: EXCISION OF LEFT 5TH TOE ULCER WITH FILET FLAP;  Surgeon: Meggan Hines DPM;  [facility];  Service: Podiatry       Family History   Problem Relation Age of Onset    Heart attack Father     Hyperlipidemia Father     Cancer Father     Diabetes Father     Diabetes Mother     Heart failure Mother         poss    Kidney disease Mother     Cancer Paternal Grandfather     Stroke Neg Hx     Anuerysm Neg Hx     Clotting disorder Neg Hx     Arrhythmia Neg Hx     Coronary artery disease Neg Hx     Hypertension Neg Hx     Sudden death Neg Hx         scd     I have reviewed and agree with the history as documented  E-Cigarette/Vaping    E-Cigarette Use Never User      E-Cigarette/Vaping Substances    Nicotine No     THC No     CBD No     Flavoring No     Other No     Unknown No      Social History     Tobacco Use    Smoking status: Never Smoker    Smokeless tobacco: Never Used   Vaping Use    Vaping Use: Never used   Substance Use Topics    Alcohol use: Not Currently     Comment: 20 years of sobriety    Drug use: Never       Review of Systems   Respiratory: Negative for shortness of breath  Cardiovascular: Negative for chest pain  Skin: Negative for rash  All other systems reviewed and are negative  Physical Exam  Physical Exam  Vitals and nursing note reviewed  Constitutional:       General: He is not in acute distress  Appearance: Normal appearance  He is not ill-appearing  HENT:      Head: Normocephalic  Mouth/Throat:      Mouth: Mucous membranes are moist    Eyes:      Extraocular Movements: Extraocular movements intact  Conjunctiva/sclera: Conjunctivae normal    Cardiovascular:      Rate and Rhythm: Normal rate and regular rhythm  Heart sounds: Normal heart sounds  Pulmonary:      Effort: Pulmonary effort is normal       Breath sounds: Normal breath sounds  Musculoskeletal:      Comments: Plus 1 left DP and PT pulses  Patient with decreased sensation throughout the entirety of the left foot  He is able to range the 4 toes well  Capillary refill is less than 2 seconds    Small fissure appreciated at surgical incision site laterally  Base appears moist without any drainage  Over the plantar aspect of the lateral foot close to healing incision there is a fluctuant area of darker coloring - reddish orange  Palpation leads to drainage from a small site adjacent to it  Cleansing the site with saline gauze led to loosening and removal of thickened skin over circular defect  See images  Slightly cloudy serosanguineous drainage expressed from this site with palpation  No odor appreciated  No tenderness surrounding this area  Skin:     General: Skin is warm and dry  Neurological:      General: No focal deficit present  Mental Status: He is alert and oriented to person, place, and time     Psychiatric:         Mood and Affect: Mood normal          Behavior: Behavior normal                  Vital Signs  ED Triage Vitals [07/08/22 2209]   Temperature Pulse Respirations Blood Pressure SpO2   98 3 °F (36 8 °C) 80 20 143/86 100 %      Temp Source Heart Rate Source Patient Position - Orthostatic VS BP Location FiO2 (%)   Oral Monitor Sitting Left arm --      Pain Score       No Pain           Vitals:    07/08/22 2209   BP: 143/86   Pulse: 80   Patient Position - Orthostatic VS: Sitting         Visual Acuity      ED Medications  Medications   cephalexin (KEFLEX) capsule 500 mg (500 mg Oral Given 7/9/22 0045)   neomycin-bacitracin-polymyxin b (NEOSPORIN) ointment 1 small application (1 small application Topical Given 7/9/22 0046)       Diagnostic Studies  Results Reviewed     Procedure Component Value Units Date/Time    Comprehensive metabolic panel [[id number]]  (Abnormal) Collected: 07/08/22 2321    Lab Status: Final result Specimen: Blood from Arm, Right Updated: 07/08/22 8497     Sodium 136 mmol/L      Potassium 4 4 mmol/L      Chloride 102 mmol/L      CO2 26 mmol/L      ANION GAP 8 mmol/L      BUN 37 mg/dL      Creatinine 1 91 mg/dL      Glucose 311 mg/dL      Calcium 8 7 mg/dL      AST 12 U/L      ALT 12 U/L      Alkaline Phosphatase 59 U/L      Total Protein 6 9 g/dL      Albumin 3 7 g/dL      Total Bilirubin 1 22 mg/dL      eGFR 36 ml/min/1 73sq m     Narrative:      [place] guidelines for Chronic Kidney Disease (CKD):     Stage 1 with normal or high GFR (GFR > 90 mL/min/1 73 square meters)    Stage 2 Mild CKD (GFR = 60-89 mL/min/1 73 square meters)    Stage 3A Moderate CKD (GFR = 45-59 mL/min/1 73 square meters)    Stage 3B Moderate CKD (GFR = 30-44 mL/min/1 73 square meters)    Stage 4 Severe CKD (GFR = 15-29 mL/min/1 73 square meters)    Stage 5 End Stage CKD (GFR <15 mL/min/1 73 square meters)  Note: GFR calculation is accurate only with a steady state creatinine    C-reactive protein [[id number]]  (Normal) Collected: 07/08/22 2324    Lab Status: Final result Specimen: Blood from Arm, Right Updated: 07/08/22 2353     CRP 2 4 mg/L     Lactic acid [[id number]]  (Normal) Collected: 07/08/22 2324    Lab Status: Final result Specimen: Blood from Arm, Right Updated: 07/08/22 2352     LACTIC ACID 1 1 mmol/L     Narrative:      Result may be elevated if tourniquet was used during collection      Sedimentation rate, automated [[id number]]  (Abnormal) Collected: 07/08/22 2324    Lab Status: Final result Specimen: Blood from Arm, Right Updated: 07/08/22 2350     Sed Rate 22 mm/hour     CBC and differential [[id number]]  (Abnormal) Collected: 07/08/22 2324    Lab Status: Final result Specimen: Blood from Arm, Right Updated: 07/08/22 2343     WBC 6 10 Thousand/uL      RBC 3 84 Million/uL      Hemoglobin 10 6 g/dL      Hematocrit 34 3 %      MCV 89 fL      MCH 27 6 pg      MCHC 30 9 g/dL      RDW 15 4 %      MPV 9 5 fL      Platelets 081 Thousands/uL      nRBC 0 /100 WBCs      Neutrophils Relative 61 %      Immat GRANS % 1 %      Lymphocytes Relative 21 %      Monocytes Relative 10 %      Eosinophils Relative 6 %      Basophils Relative 1 %      Neutrophils Absolute 3 82 Thousands/µL      Immature Grans Absolute 0 03 Thousand/uL      Lymphocytes Absolute 1 26 Thousands/µL      Monocytes Absolute 0 60 Thousand/µL      Eosinophils Absolute 0 34 Thousand/µL      Basophils Absolute 0 05 Thousands/µL     Blood culture #2 [[id number]] Collected: 07/08/22 2324    Lab Status: In process Specimen: Blood from Arm, Left Updated: 07/08/22 2331    Blood culture #1 [[id number]] Collected: 07/08/22 2324    Lab Status: In process Specimen: Blood from Arm, Right Updated: 07/08/22 2331    Wound culture and Gram stain [[id number]] Collected: 07/08/22 2326    Lab Status: In process Specimen: Wound from Foot, Left Updated: 07/08/22 2329                 XR foot 3+ views LEFT   ED Interpretation by Georgie Mcleod MD (07/08 2347)   Postsurgical changes of 5th metatarsal   No appreciated fracture or lucency to suggest osteomyelitis                 Procedures  Procedures         ED Course  ED Course as of 07/09/22 0102   Fri Jul 08, 7115   1011 Uncertain whether drainage represents postoperative seroma, interval development blister versus potential infection  Drainage is slightly cloudy/serosanguineous in appearance  No odor detected  Culture has been sent  Labs and x-ray being performed  Images from May 24, 2022 reviewed  Area of discoloration/opening does correspond with area of ecchymosis immediately postoperatively  Sat Jul 09, 2022   0009 Labs reviewed  No leukocytosis or left shift  Lactic acid and CRP are within normal limits  ESR is just above the upper limits of normal   Along with unremarkable x-ray this suggest strongly away from systemic or deep soft tissue infection  Will cover with cephalexin and have patient follow-up with Dr Jaqui Reynaga next week  Patient demonstrates understanding of the care plan  With regard to elevated blood glucose he notes that he forgot to take his morning insulin    He assures that he does have this and will resume its use                                         MDM    Disposition  Final diagnoses:   Wound of left foot   Drainage from wound   Amputation of fifth toe of left foot ([address] )     Time reflects when diagnosis was documented in both MDM as applicable and the Disposition within this note     Time User Action Codes Description Comment    7/9/2022 12:12 AM Bere Shelley A Add [S91 302A] Wound of left foot     7/9/2022 12:13 AM Fortino, Bere Shelley A Add Surjit Calk  A9] Drainage from wound     7/9/2022 12:13 AM Bere Shelley A Modify [S91 302A] Wound of left foot     7/9/2022 12:13 AM Bere Shelley A Modify [T87  A9] Drainage from wound     7/9/2022 12:13 AM Bere Shelley A Add [S94 944Q] Amputation of fifth toe of left foot [facility])       ED Disposition     ED Disposition   Discharge    Condition   Stable    Date/Time   Sat Jul 9, 2022 12:11 AM    Comment   Skip Khan discharge to home/self care  Follow-up Information     Follow up With Specialties Details Why Contact Info    Brittney Soto DPM Podiatry, Wound Care Schedule an appointment as soon as possible for a visit   [address] [facility] [address] [place] [address]  [phone]            Patient's Medications   Discharge Prescriptions    CEPHALEXIN (KEFLEX) 500 MG CAPSULE    Take 1 capsule (500 mg total) by mouth 4 (four) times a day for 7 days       Start Date: 7/9/2022  End Date: 7/16/2022       Order Dose: 500 mg       Quantity: 28 capsule    Refills: 0       No discharge procedures on file      PDMP Review       Value Time User    PDMP Reviewed  Yes 5/28/2022 11:01 AM Anuj Auguste MD          ED Provider  Electronically Signed by           Lilia Jasso MD  07/09/22 1931       Lilia Jasso MD  07/09/22 7521

## 2022-07-11 ENCOUNTER — TELEPHONE (OUTPATIENT)
Dept: PODIATRY | Facility: CLINIC | Age: 62
End: 2022-07-11

## 2022-07-11 NOTE — TELEPHONE ENCOUNTER
Daniel Reese and he is scheduled with Dr Nile Anton next Wednesday 7/20/22 @ [facility]

## 2022-07-12 ENCOUNTER — TELEPHONE (OUTPATIENT)
Dept: NEPHROLOGY | Facility: CLINIC | Age: 62
End: 2022-07-12

## 2022-07-12 LAB
BACTERIA WND AEROBE CULT: ABNORMAL
GRAM STN SPEC: ABNORMAL
GRAM STN SPEC: ABNORMAL

## 2022-07-12 RX ORDER — CIPROFLOXACIN 500 MG/1
500 TABLET, FILM COATED ORAL 2 TIMES DAILY
Qty: 14 TABLET | Refills: 0 | Status: SHIPPED | OUTPATIENT
Start: 2022-07-12 | End: 2022-07-20

## 2022-07-12 NOTE — TELEPHONE ENCOUNTER
Patient called the office stating he is currently back on lisinopril and would like to schedule a f/u appt  Pt scheduled for 07/14/22 with Kibry  Pt confirmed he will obtain labs tomorrow at a [facility]

## 2022-07-13 ENCOUNTER — APPOINTMENT (OUTPATIENT)
Dept: LAB | Facility: CLINIC | Age: 62
End: 2022-07-13
Payer: COMMERCIAL

## 2022-07-13 DIAGNOSIS — N18.32 STAGE 3B CHRONIC KIDNEY DISEASE (HCC): ICD-10-CM

## 2022-07-13 LAB
ANION GAP SERPL CALCULATED.3IONS-SCNC: 4 MMOL/L (ref 4–13)
BASOPHILS # BLD AUTO: 0.07 THOUSANDS/ΜL (ref 0–0.1)
BASOPHILS NFR BLD AUTO: 1 % (ref 0–1)
BUN SERPL-MCNC: 31 MG/DL (ref 5–25)
CALCIUM SERPL-MCNC: 8.6 MG/DL (ref 8.4–10.2)
CHLORIDE SERPL-SCNC: 106 MMOL/L (ref 96–108)
CO2 SERPL-SCNC: 29 MMOL/L (ref 21–32)
CREAT SERPL-MCNC: 1.94 MG/DL (ref 0.6–1.3)
CREAT UR-MCNC: 43.2 MG/DL
EOSINOPHIL # BLD AUTO: 0.34 THOUSAND/ΜL (ref 0–0.61)
EOSINOPHIL NFR BLD AUTO: 6 % (ref 0–6)
ERYTHROCYTE [DISTWIDTH] IN BLOOD BY AUTOMATED COUNT: 15.1 % (ref 11.6–15.1)
GFR SERPL CREATININE-BSD FRML MDRD: 35 ML/MIN/1.73SQ M
GLUCOSE P FAST SERPL-MCNC: 238 MG/DL (ref 65–99)
HCT VFR BLD AUTO: 35.4 % (ref 36.5–49.3)
HGB BLD-MCNC: 11 G/DL (ref 12–17)
IMM GRANULOCYTES # BLD AUTO: 0.02 THOUSAND/UL (ref 0–0.2)
IMM GRANULOCYTES NFR BLD AUTO: 0 % (ref 0–2)
LYMPHOCYTES # BLD AUTO: 1.31 THOUSANDS/ΜL (ref 0.6–4.47)
LYMPHOCYTES NFR BLD AUTO: 22 % (ref 14–44)
MCH RBC QN AUTO: 27.6 PG (ref 26.8–34.3)
MCHC RBC AUTO-ENTMCNC: 31.1 G/DL (ref 31.4–37.4)
MCV RBC AUTO: 89 FL (ref 82–98)
MONOCYTES # BLD AUTO: 0.64 THOUSAND/ΜL (ref 0.17–1.22)
MONOCYTES NFR BLD AUTO: 11 % (ref 4–12)
NEUTROPHILS # BLD AUTO: 3.6 THOUSANDS/ΜL (ref 1.85–7.62)
NEUTS SEG NFR BLD AUTO: 60 % (ref 43–75)
NRBC BLD AUTO-RTO: 0 /100 WBCS
PHOSPHATE SERPL-MCNC: 4.3 MG/DL (ref 2.3–4.1)
PLATELET # BLD AUTO: 228 THOUSANDS/UL (ref 149–390)
PMV BLD AUTO: 9.6 FL (ref 8.9–12.7)
POTASSIUM SERPL-SCNC: 4.4 MMOL/L (ref 3.5–5.3)
PROT UR-MCNC: 175 MG/DL
PROT/CREAT UR: 4.05 MG/G{CREAT} (ref 0–0.1)
PTH-INTACT SERPL-MCNC: 134.8 PG/ML (ref 18.4–80.1)
RBC # BLD AUTO: 3.98 MILLION/UL (ref 3.88–5.62)
SODIUM SERPL-SCNC: 139 MMOL/L (ref 135–147)
WBC # BLD AUTO: 5.98 THOUSAND/UL (ref 4.31–10.16)

## 2022-07-13 PROCEDURE — 85025 COMPLETE CBC W/AUTO DIFF WBC: CPT

## 2022-07-13 PROCEDURE — 83970 ASSAY OF PARATHORMONE: CPT

## 2022-07-13 PROCEDURE — 84100 ASSAY OF PHOSPHORUS: CPT

## 2022-07-13 PROCEDURE — 82570 ASSAY OF URINE CREATININE: CPT

## 2022-07-13 PROCEDURE — 36415 COLL VENOUS BLD VENIPUNCTURE: CPT

## 2022-07-13 PROCEDURE — 84156 ASSAY OF PROTEIN URINE: CPT

## 2022-07-13 PROCEDURE — 80048 BASIC METABOLIC PNL TOTAL CA: CPT

## 2022-07-14 ENCOUNTER — TELEPHONE (OUTPATIENT)
Dept: NEPHROLOGY | Facility: CLINIC | Age: 62
End: 2022-07-14

## 2022-07-14 PROBLEM — R80.1 PERSISTENT PROTEINURIA: Status: ACTIVE | Noted: 2022-01-12

## 2022-07-14 LAB
BACTERIA BLD CULT: NORMAL
BACTERIA BLD CULT: NORMAL

## 2022-07-14 NOTE — PATIENT INSTRUCTIONS
All questions asked and answered  The patient has been instructed to call office at [phone] with any questions or concerns  Please obtain prescribed blood work and urine studies prior to next appointment   Avoid NSAID products which include:  Motrin, Advil, Ibuprofen, Aleve, Naprosyn, Naproxen, Mobic or Celebrex    Continue on lisinopril 5 mg daily for now  Referred to [facility] class for Chronic Kidney Disease education  Return in three months with updated blood work and urine studies

## 2022-07-14 NOTE — PROGRESS NOTES
OFFICE FOLLOW UP - Nephrology   Tara Khan 57 y o  male MRN: [id number]               ASSESSMENT and PLAN:  Rowan Bassett was seen today for follow-up and chronic kidney disease  Diagnoses and all orders for this visit:    Stage 3b chronic kidney disease ([facility] [address] )  -     Renal function panel; Future  -     Magnesium; Future  -     Phosphorus; Future  -     PTH, intact; Future  -     Vitamin D 25 hydroxy; Future  -     Protein / creatinine ratio, urine; Future  -     Urinalysis with microscopic; Future    Benign essential hypertension    Chronic combined systolic and diastolic CHF    Ischemic cardiomyopathy    Persistent proteinuria  -     Protein / creatinine ratio, urine; Future  -     Urinalysis with microscopic;  Future    Other cirrhosis of liver (HCC)    Insulin-dependent diabetes mellitus        Chronic kidney disease IIIB:  Assessment and Plan:  Etiology:  Suspect secondary to diabetic kidney disease, hypertensive nephrosclerosis, ischemic cardiomyopathy and known cirrhosis  · After review medical records through [facility] everywhere baseline creatinine 1 6-1 9  · Most recently has been more closer to the top of his baseline given recent admissions with infection  · Most recent creatinine on 07/13/2022 was 1 94  · Continues with elevated glucose  · Encourage better glucose control to slow progression of Chronic Kidney Disease  · Completed  Kidney Smart class a year ago  · Remains on lisinopril 5 mg given significant proteinuria, slightly better  · May need to consider increase in Lisinopril with next office visit if able    Hypertension:  Assessment and plan:   Current blood pressure 130/78   Currently on Lasix 40 mg daily, lisinopril 5 mg daily, Lopressor 25 mg BID   Encourage low-sodium diet   Encourage diet and weight loss    Nephrotic range proteinuria  Assessment and plan:   Likely in the setting of diabetic nephropathy and uncontrolled diabetes   Previous serology workup was negative   UPCR 4 05 <4 53   Will repeat U PCR in three months   If proteinuria unchanged may need to increase Lisinopril 10 mg if able    Ischemic cardiomyopathy/DCHF  Assessment and plan:   With most recent echocardiogram 7/2021 revealed EF of 50%   Patient examining euvolemic   On 40 mg Lasix daily   Encourage low-sodium diet7   Follows with cardiology    Diabetes II  Assessment and plan:  · Most recent A1c 10 4 in April of 2022  · Previously admitted to dietary noncompliance  · Again discussed the importance of blood sugar control to slow the progression of Chronic Kidney Disease  · Has followup with Dr Jean Marie Agrawal; endocrinology next month    Cirrhosis-no current issues       Age related screening: Your primary caregiver may do yearly screening for colorectal cancer  It is recommended in all men and women over 48years old  You may have screening earlier if you have colon disease or a family history of colorectal cancer  Patient Instructions    All questions asked and answered  The patient has been instructed to call office at [phone] with any questions or concerns   Please obtain prescribed blood work and urine studies prior to next appointment    Avoid NSAID products which include: Motrin, Advil, Ibuprofen, Aleve, Naprosyn, Naproxen, Mobic or Celebrex     Continue on lisinopril 5 mg daily for now   Referred to Kidney Smart class for Chronic Kidney Disease education   Return in three months with updated blood work and urine studies        HPI:    I had the pleasure of seeing Willard Kamaljit Erin today in the renal clinic for the continued management of Chronic Kidney Disease  He was last seen on 04/07/2022 and normally follows with Dr Thomas  Since last seen he underwent 5th ray resection 7/26/10 complicated by osteomyelitis with e-coli infection  Treated with IV antibiotics  He is now status post amputation of the left 5th metatarsal and removal of left lower extremity foreign body on 5/26/2022     Of note, he recently presented to ER on 7/8/2022 with concerns of drainage; now on oral antibiotics with Keflex and Cipro  The last blood work was done on 7/13/2022, which we have reviewed together  Reports feeling okay and likely to retire soon  ROS:   Review of Systems   Constitutional: Negative  HENT: Negative  Respiratory: Negative  Cardiovascular: Negative  Gastrointestinal: Negative  Genitourinary: Negative  Musculoskeletal: Negative  Skin: Positive for wound  Left foot wrapped     Neurological: Negative  Psychiatric/Behavioral: Negative  Allergies: Patient has no known allergies  Medications:   Current Outpatient Medications:     aspirin 81 mg chewable tablet, Chew 1 tablet daily for 30 days, Disp: 30 tablet, Rfl: 0    atorvastatin (LIPITOR) 40 mg tablet, TAKE 1 TABLET BY MOUTH EVERY DAY, Disp: 90 tablet, Rfl: 3    Blood Pressure Monitoring (CVS Blood Pressure Monitor) KIT, Use 2 (two) times a day Automatic BP cuff  Measure BP twice daily  , Disp: 1 kit, Rfl: 0    cephalexin (KEFLEX) 500 mg capsule, Take 1 capsule (500 mg total) by mouth 4 (four) times a day for 7 days, Disp: 28 capsule, Rfl: 0    ciprofloxacin (CIPRO) 500 mg tablet, Take 1 tablet (500 mg total) by mouth 2 (two) times a day for 7 days, Disp: 14 tablet, Rfl: 0    furosemide (LASIX) 40 mg tablet, Take 40 mg by mouth daily, Disp: , Rfl:     Lantus SoloStar 100 units/mL injection pen, Inject 30 Units under the skin every morning, Disp: , Rfl:     lisinopril (ZESTRIL) 5 mg tablet, Take 1 tablet (5 mg total) by mouth daily, Disp: 90 tablet, Rfl: 1    metoprolol tartrate (LOPRESSOR) 25 mg tablet, TAKE 1 TABLET (25 MG TOTAL) BY MOUTH EVERY 12 (TWELVE) HOURS (Patient taking differently: Take 25 mg by mouth 2 (two) times a day), Disp: 180 tablet, Rfl: 3    NovoLIN 70/30 FlexPen (70-30) 100 UNIT/ML injection pen, , Disp: , Rfl:     TRUE METRIX BLOOD GLUCOSE TEST test strip, TEST FOUR TIMES DAILY, Disp: , Rfl: 11    Unifine Pentips 31G X 8 MM MISC, , Disp: , Rfl:     acetaminophen (TYLENOL) 325 mg tablet, Take 2 tablets (650 mg total) by mouth every 6 (six) hours as needed (pain) (Patient not taking: Reported on 7/15/2022), Disp: , Rfl: 0    Past Medical History:   Diagnosis Date    CAD (coronary artery disease)     Chronic combined systolic and diastolic [facility]congestive [facility] failure[facility])     Diabetes mellitus ([place] [address] )     type 2    Dyslipidemia     Hypertension     Ischemic cardiomyopathy     MI (myocardial infarction) ([place] [address] )     Neuropathy     Osteomyelitis ([place] [address] )     Pancreatitis      Past Surgical History:   Procedure Laterality Date    ANGIOPLASTY      ballon    CORONARY ANGIOPLASTY WITH STENT PLACEMENT      LULA to proximal and mid LAD, Proximal RCA   FOREIGN BODY REMOVAL Left 5/26/2022    Procedure: REMOVAL FOREIGN BODY EXTREMITY;  Surgeon: Amisha Ibrahim DPM;  [facility];  Service: Podiatry    HERNIA REPAIR      INCISION AND DRAINAGE OF WOUND Left 2/14/2020    Procedure: INCISION AND DRAINAGE (I&D) EXTREMITY left foot (cpt 45167);   Surgeon: Jesus Pagan DPM;  [facility];  Service: Podiatry    [address] [place] [address] Left 5/26/2022    Procedure: AMPUTATION left 5th metatarsal;  Surgeon: Amisha Ibrahim DPM;  [facility];  Service: [facility] Left 2/14/2020    Procedure: EXPLORATION EXTREMITY;  Surgeon: Jesus Pagan DPM;  [facility];  Service: Podiatry   [facility], < 50 CM Left 2/14/2020    Procedure: APPLICATION VAC DRESSING;  Surgeon: Jesus Pagan DPM;  [facility];  Service: Podiatry    TOE AMPUTATION Left 5/14/2022    Procedure: EXCISION OF LEFT 5TH TOE ULCER WITH FILET FLAP;  Surgeon: Amisha Ibrahim DPM;  [facility];  Service: Podiatry     Family History   Problem Relation Age of Onset    Heart attack Father     Hyperlipidemia Father     Cancer Father  Diabetes Father     Diabetes Mother     Heart failure Mother         poss    Kidney disease Mother     Cancer Paternal Grandfather     Stroke Neg Hx     Anuerysm Neg Hx     Clotting disorder Neg Hx     Arrhythmia Neg Hx     Coronary artery disease Neg Hx     Hypertension Neg Hx     Sudden death Neg Hx         scd      reports that he has never smoked  He has never used smokeless tobacco  He reports previous alcohol use  He reports that he does not use drugs  Physical Exam:   Vitals:    07/15/22 0926   BP: 130/78   BP Location: Left arm   Patient Position: Sitting   Cuff Size: Adult   Pulse: 66   SpO2: 99%   Weight: 84 1 kg (185 lb 8 oz)   Height: 5' 10" (1 778 m)     Body mass index is 26 62 kg/m²  Physical Exam  Vitals reviewed  Constitutional:       Appearance: Normal appearance  HENT:      Head: Normocephalic and atraumatic  Nose: Nose normal       Mouth/Throat:      Mouth: Mucous membranes are moist       Pharynx: Oropharynx is clear  Eyes:      Extraocular Movements: Extraocular movements intact  Conjunctiva/sclera: Conjunctivae normal    Cardiovascular:      Rate and Rhythm: Normal rate and regular rhythm  Pulses: Normal pulses  Heart sounds: Normal heart sounds  Pulmonary:      Effort: Pulmonary effort is normal       Breath sounds: Normal breath sounds  Abdominal:      General: Bowel sounds are normal       Palpations: Abdomen is soft  Musculoskeletal:         General: Normal range of motion  Cervical back: Normal range of motion and neck supple  Left lower leg: Edema present  Comments: Left leg wrapped   Skin:     General: Skin is dry  Neurological:      General: No focal deficit present  Mental Status: He is alert and oriented to person, place, and time  Psychiatric:         Mood and Affect: Mood normal          Behavior: Behavior normal          Thought Content:  Thought content normal          Judgment: Judgment normal  Lab Results:  Results for orders placed or performed in visit on 75/30/86   Basic metabolic panel   Result Value Ref Range    Sodium 139 135 - 147 mmol/L    Potassium 4 4 3 5 - 5 3 mmol/L    Chloride 106 96 - 108 mmol/L    CO2 29 21 - 32 mmol/L    ANION GAP 4 4 - 13 mmol/L    BUN 31 (H) 5 - 25 mg/dL    Creatinine 1 94 (H) 0 60 - 1 30 mg/dL    Glucose, Fasting 238 (H) 65 - 99 mg/dL    Calcium 8 6 8 4 - 10 2 mg/dL    eGFR 35 ml/min/1 73sq m   Phosphorus   Result Value Ref Range    Phosphorus 4 3 (H) 2 3 - 4 1 mg/dL   Protein / creatinine ratio, urine   Result Value Ref Range    Creatinine, Ur 43 2 mg/dL    Protein Urine Random 175 mg/dL    Prot/Creat Ratio, Ur 4 05 (H) 0 00 - 0 10   CBC and differential   Result Value Ref Range    WBC 5 98 4 31 - 10 16 Thousand/uL    RBC 3 98 3 88 - 5 62 Million/uL    Hemoglobin 11 0 (L) 12 0 - 17 0 g/dL    Hematocrit 35 4 (L) 36 5 - 49 3 %    MCV 89 82 - 98 fL    MCH 27 6 26 8 - 34 3 pg    MCHC 31 1 (L) 31 4 - 37 4 g/dL    RDW 15 1 11 6 - 15 1 %    MPV 9 6 8 9 - 12 7 fL    Platelets 732 019 - 939 Thousands/uL    nRBC 0 /100 WBCs    Neutrophils Relative 60 43 - 75 %    Immat GRANS % 0 0 - 2 %    Lymphocytes Relative 22 14 - 44 %    Monocytes Relative 11 4 - 12 %    Eosinophils Relative 6 0 - 6 %    Basophils Relative 1 0 - 1 %    Neutrophils Absolute 3 60 1 85 - 7 62 Thousands/µL    Immature Grans Absolute 0 02 0 00 - 0 20 Thousand/uL    Lymphocytes Absolute 1 31 0 60 - 4 47 Thousands/µL    Monocytes Absolute 0 64 0 17 - 1 22 Thousand/µL    Eosinophils Absolute 0 34 0 00 - 0 61 Thousand/µL    Basophils Absolute 0 07 0 00 - 0 10 Thousands/µL   PTH, intact   Result Value Ref Range     8 (H) 18 4 - 80 1 pg/mL       Results from last 7 days   Lab Units 07/13/22  0644 07/08/22  2324   WBC Thousand/uL 5 98 6 10   HEMOGLOBIN g/dL 11 0* 10 6*   HEMATOCRIT % 35 4* 34 3*   PLATELETS Thousands/uL 228 228   SODIUM mmol/L 139 136   POTASSIUM mmol/L 4 4 4 4   CHLORIDE mmol/L 106 102 CO2 mmol/L 29 26   BUN mg/dL 31* 37*   CREATININE mg/dL 1 94* 1 91*   CALCIUM mg/dL 8 6 8 7   PHOSPHORUS mg/dL 4 3*  --            Portions of the record may have been created with voice recognition software  Occasional wrong word or "sound a like" substitutions may have occurred due to the inherent limitations of voice recognition software   Read the chart carefully and recognize, None

## 2022-07-14 NOTE — TELEPHONE ENCOUNTER
Appointment Confirmation   Person confirmed appointment with  If not patient, name of the person Voice msg    Date and time of appointment 7/15 0:30    Patient acknowledged and will be at appointment? no    Did you advise the patient that they will need a urine sample if they are a new patient?  N/A    Did you advise the patient to bring their current medications for verification? (including any OTC) Yes    Additional Information

## 2022-07-15 ENCOUNTER — OFFICE VISIT (OUTPATIENT)
Dept: NEPHROLOGY | Facility: CLINIC | Age: 62
End: 2022-07-15
Payer: COMMERCIAL

## 2022-07-15 VITALS
BODY MASS INDEX: 26.56 KG/M2 | HEIGHT: 70 IN | DIASTOLIC BLOOD PRESSURE: 78 MMHG | WEIGHT: 185.5 LBS | SYSTOLIC BLOOD PRESSURE: 130 MMHG | HEART RATE: 66 BPM | OXYGEN SATURATION: 99 %

## 2022-07-15 DIAGNOSIS — E11.65 TYPE 2 DIABETES MELLITUS WITH HYPERGLYCEMIA, WITH LONG-TERM CURRENT USE OF INSULIN (HCC): ICD-10-CM

## 2022-07-15 DIAGNOSIS — I10 BENIGN ESSENTIAL HYPERTENSION: ICD-10-CM

## 2022-07-15 DIAGNOSIS — I25.5 ISCHEMIC CARDIOMYOPATHY: ICD-10-CM

## 2022-07-15 DIAGNOSIS — N18.32 STAGE 3B CHRONIC KIDNEY DISEASE (HCC): Primary | ICD-10-CM

## 2022-07-15 DIAGNOSIS — K74.69 OTHER CIRRHOSIS OF LIVER (HCC): ICD-10-CM

## 2022-07-15 DIAGNOSIS — I50.42 CHRONIC COMBINED SYSTOLIC AND DIASTOLIC CHF (CONGESTIVE HEART FAILURE) (HCC): ICD-10-CM

## 2022-07-15 DIAGNOSIS — R80.1 PERSISTENT PROTEINURIA: ICD-10-CM

## 2022-07-15 DIAGNOSIS — Z79.4 TYPE 2 DIABETES MELLITUS WITH HYPERGLYCEMIA, WITH LONG-TERM CURRENT USE OF INSULIN (HCC): ICD-10-CM

## 2022-07-15 PROCEDURE — 99214 OFFICE O/P EST MOD 30 MIN: CPT | Performed by: NURSE PRACTITIONER

## 2022-07-20 ENCOUNTER — OFFICE VISIT (OUTPATIENT)
Dept: PODIATRY | Facility: CLINIC | Age: 62
End: 2022-07-20
Payer: COMMERCIAL

## 2022-07-20 VITALS
HEIGHT: 70 IN | DIASTOLIC BLOOD PRESSURE: 87 MMHG | WEIGHT: 185 LBS | HEART RATE: 68 BPM | BODY MASS INDEX: 26.48 KG/M2 | SYSTOLIC BLOOD PRESSURE: 127 MMHG

## 2022-07-20 DIAGNOSIS — L03.119 CELLULITIS AND ABSCESS OF FOOT EXCLUDING TOE: ICD-10-CM

## 2022-07-20 DIAGNOSIS — E11.621 DIABETIC ULCER OF LEFT FOOT ASSOCIATED WITH TYPE 2 DIABETES MELLITUS, LIMITED TO BREAKDOWN OF SKIN, UNSPECIFIED PART OF FOOT (HCC): Primary | ICD-10-CM

## 2022-07-20 DIAGNOSIS — L02.619 CELLULITIS AND ABSCESS OF FOOT EXCLUDING TOE: ICD-10-CM

## 2022-07-20 DIAGNOSIS — L97.521 DIABETIC ULCER OF LEFT FOOT ASSOCIATED WITH TYPE 2 DIABETES MELLITUS, LIMITED TO BREAKDOWN OF SKIN, UNSPECIFIED PART OF FOOT (HCC): Primary | ICD-10-CM

## 2022-07-20 PROCEDURE — 11042 DBRDMT SUBQ TIS 1ST 20SQCM/<: CPT | Performed by: PODIATRIST

## 2022-07-20 PROCEDURE — 99213 OFFICE O/P EST LOW 20 MIN: CPT | Performed by: PODIATRIST

## 2022-07-20 PROCEDURE — 11045 DBRDMT SUBQ TISS EACH ADDL: CPT | Performed by: PODIATRIST

## 2022-07-20 RX ORDER — CEPHALEXIN 500 MG/1
500 CAPSULE ORAL EVERY 6 HOURS SCHEDULED
Qty: 28 CAPSULE | Refills: 0 | Status: SHIPPED | OUTPATIENT
Start: 2022-07-20 | End: 2022-07-27

## 2022-07-20 NOTE — PROGRESS NOTES
Assessment/Plan:    Diego Traylor, excisional debridement was performed, ;artial thickness, of devitalized skin and callus from the left foot wound  - OK to start mupirocin ointment/DSD to this partial thickness wound  - Reinforced need to get evaluated for new diabetic shoes with insoles  Discussed need for molded plastazote lined insoles to reduce plantar pressures and prevent recurrent skin breakdown  - He has completed a course of Keflex and Cipro  I will put him on another round of cephalexin, but there is no clinical need to renew Cipro at this time  Cultures were reviewed and was (+) Staph and (+) Enterococcus   - F/U in 10-14 days  Diagnoses and all orders for this visit:    Diabetic ulcer of left foot associated with type 2 diabetes mellitus, limited to breakdown of skin, unspecified part of foot ([address] )  -     cephalexin (KEFLEX) 500 mg capsule; Take 1 capsule (500 mg total) by mouth every 6 (six) hours for 7 days  -     mupirocin (BACTROBAN) 2 % ointment; Apply topically daily    Cellulitis and abscess of foot excluding toe  -     cephalexin (KEFLEX) 500 mg capsule; Take 1 capsule (500 mg total) by mouth every 6 (six) hours for 7 days          Subjective:      Patient ID: Flor Mejia is a 57 y o  male  Patient presents today for evaluation of a new onset left forefoot wound  He is S/P recent partial left 5th ray resection  The surgical wound had healed up, but he developed a callus under his left forefoot, that formed a blister then started draining  He was seen in the ER and started on Cephalexin and Cipro  He notes improvement to the wound since his ER visit  He has no pain to his left foot, but notes fairly dense neuropathy in his lower extremities  He also complains of elongated toenails        The following portions of the patient's history were reviewed and updated as appropriate: allergies, current medications, past family history, past medical history, past social history, past surgical history and problem list     Review of Systems   Constitutional: Negative for chills and fever  HENT: Negative for ear pain and sore throat  Eyes: Negative for pain and visual disturbance  Respiratory: Negative for cough and shortness of breath  Cardiovascular: Negative for chest pain and palpitations  Gastrointestinal: Negative for abdominal pain and vomiting  Musculoskeletal: Negative for arthralgias and back pain  Skin: Negative for color change and rash  Neurological: Negative for seizures and syncope  Psychiatric/Behavioral: Negative  All other systems reviewed and are negative  Objective:      /87   Pulse 68   Ht 5' 10" (1 778 m) Comment: verbal  Wt 83 9 kg (185 lb) Comment: verbal  BMI 26 54 kg/m²          Physical Exam  Constitutional:       Appearance: Normal appearance  He is not diaphoretic  HENT:      Head: Normocephalic and atraumatic  Nose: Nose normal    Cardiovascular:      Pulses: Pulses are weak  Dorsalis pedis pulses are 1+ on the right side and 1+ on the left side  Posterior tibial pulses are 1+ on the right side and 1+ on the left side  Musculoskeletal:        Feet:    Feet:      Right foot:      Skin integrity: No ulcer, skin breakdown, erythema, warmth, callus or dry skin  Left foot: amputated     Skin integrity: Ulcer and callus present  No erythema  Comments: (+) partial thickness sub-MT 4 ulcer, S/P partial 5th ray resection; there is no active drainage; no TTP; no deep tracts nor sinuses; erythema is essentially resolved; surgical site remains closed  Skin:     General: Skin is warm  Capillary Refill: Capillary refill takes less than 2 seconds  Neurological:      General: No focal deficit present  Mental Status: He is alert and oriented to person, place, and time     Psychiatric:         Mood and Affect: Mood normal          Behavior: Behavior normal          Diabetic Foot Exam    Patient's shoes and socks removed  Right Foot/Ankle   Right Foot Inspection  Skin Exam: skin normal and skin intact  No dry skin, no warmth, no callus, no erythema, no maceration, no abnormal color, no pre-ulcer, no ulcer and no callus  Sensory   Vibration: absent  Proprioception: absent  Monofilament testing: absent    Vascular  Capillary refills: < 3 seconds  The right DP pulse is 1+  The right PT pulse is 1+  Left Foot/Ankle  Left Foot Inspection  Skin Exam: ulcer and callus  No erythema and normal color  Ulcer Size (cm): 5Amputation: amputation left foot (Comments: Partial left 5th ray amputation)    Sensory   Vibration: absent  Proprioception: absent  Monofilament testing: absent    Vascular  Capillary refills: < 3 seconds  The left DP pulse is 1+  The left PT pulse is 1+  Assign Risk Category  Deformity present  Loss of protective sensation  Weak pulses  Risk: 2      Debridement   Universal Protocol:  Consent: Verbal consent obtained  Risks and benefits: risks, benefits and alternatives were discussed  Consent given by: patient  Time out: Immediately prior to procedure a "time out" was called to verify the correct patient, procedure, equipment, support staff and site/side marked as required    Timeout called at: 7/20/2022 2:15 PM   Patient understanding: patient states understanding of the procedure being performed  Patient consent: the patient's understanding of the procedure matches consent given  Patient identity confirmed: verbally with patient and provided demographic data      Performed by: physician  Debridement type: surgical  Level of debridement: subcutaneous tissue  Pain control: none  Pre-debridement measurements  Length (cm): 0 2  Width (cm): 0 1  Depth (cm): 0 1  Surface Area (cm^2): 0 02  Volume (cm^3): 0    Post-debridement measurements  Length (cm): 0 3  Width (cm): 0 1  Depth (cm): 0 1  Percent debrided: 100%  Surface Area (cm^2): 0 03  Area debrided (cm^2): 0 03  Volume (cm^3): 0  Tissue and other material debrided: dermis and epidermis  Devitalized tissue debrided: callus, fibrin and slough  Instrument(s) utilized: blade  Bleeding: none  Hemostasis obtained with: not applicable  Procedural pain (0-10): insensate  Post-procedural pain: insensate   Response to treatment: procedure was tolerated well

## 2022-07-20 NOTE — PATIENT INSTRUCTIONS
Wound debrided of callus  Start daily local wound care with thin layer mupirocin ointment/DSD  Reinforced need for new diabetic shoes-- he has a script already  Follow up in 10-14 days

## 2022-08-02 ENCOUNTER — OFFICE VISIT (OUTPATIENT)
Dept: PODIATRY | Facility: CLINIC | Age: 62
End: 2022-08-02
Payer: COMMERCIAL

## 2022-08-02 VITALS
HEIGHT: 70 IN | BODY MASS INDEX: 26.48 KG/M2 | SYSTOLIC BLOOD PRESSURE: 127 MMHG | HEART RATE: 67 BPM | WEIGHT: 185 LBS | DIASTOLIC BLOOD PRESSURE: 82 MMHG

## 2022-08-02 DIAGNOSIS — L84 CALLUS OF FOOT: ICD-10-CM

## 2022-08-02 DIAGNOSIS — E11.621 DIABETIC ULCER OF LEFT MIDFOOT ASSOCIATED WITH TYPE 2 DIABETES MELLITUS, LIMITED TO BREAKDOWN OF SKIN (HCC): Primary | ICD-10-CM

## 2022-08-02 DIAGNOSIS — L97.421 DIABETIC ULCER OF LEFT MIDFOOT ASSOCIATED WITH TYPE 2 DIABETES MELLITUS, LIMITED TO BREAKDOWN OF SKIN (HCC): Primary | ICD-10-CM

## 2022-08-02 PROCEDURE — 99212 OFFICE O/P EST SF 10 MIN: CPT | Performed by: PODIATRIST

## 2022-08-02 PROCEDURE — 11056 PARNG/CUTG B9 HYPRKR LES 2-4: CPT | Performed by: PODIATRIST

## 2022-08-02 NOTE — PROGRESS NOTES
Assessment/Plan:    - Callused lesions were debrided form both feet, totaling 2 lesions    - He is scheduled to be evaluated for his new diabetic shoes this week  - Recommend daily foot inspections and application of a skin emolient qhs   - RTO 9 weeks, sooner if any issues arise  There are no diagnoses linked to this encounter  Subjective:      Patient ID: Merlin Awad is a 57 y o  male  Patient presents for follow up of a left foot ulcer  He notes good improvement since his last visit  He also "feels like there is something under my right foot"  It is not painful, but he feels like something is there  The following portions of the patient's history were reviewed and updated as appropriate: allergies, current medications, past family history, past medical history, past social history, past surgical history and problem list     Review of Systems   Constitutional: Negative  HENT: Negative  Eyes: Negative  Respiratory: Negative  Cardiovascular: Negative  Endocrine: Negative  Musculoskeletal: Negative  Neurological: Negative  Hematological: Negative  Psychiatric/Behavioral: Negative  Objective:      /82   Pulse 67   Ht 5' 10" (1 778 m)   Wt 83 9 kg (185 lb)   BMI 26 54 kg/m²          Physical Exam  Vitals reviewed  Constitutional:       Appearance: Normal appearance  HENT:      Head: Normocephalic and atraumatic  Nose: Nose normal    Pulmonary:      Effort: Pulmonary effort is normal    Musculoskeletal:        Feet:    Feet:      Comments: (+) the left foot ulcer site is healed with no signs of infection noted  (+) callus sub-MT 5 right foot; no open lesions, no signs of infection noted  Skin:     General: Skin is warm and dry  Capillary Refill: Capillary refill takes less than 2 seconds  Neurological:      General: No focal deficit present  Mental Status: He is alert and oriented to person, place, and time     Psychiatric: Mood and Affect: Mood normal          Behavior: Behavior normal          Thought Content:  Thought content normal          Judgment: Judgment normal

## 2022-08-05 DIAGNOSIS — N18.32 STAGE 3B CHRONIC KIDNEY DISEASE (HCC): ICD-10-CM

## 2022-08-05 RX ORDER — LISINOPRIL 5 MG/1
5 TABLET ORAL DAILY
Qty: 90 TABLET | Refills: 1 | Status: SHIPPED | OUTPATIENT
Start: 2022-08-05

## 2022-08-24 ENCOUNTER — APPOINTMENT (OUTPATIENT)
Dept: LAB | Facility: CLINIC | Age: 62
End: 2022-08-24
Payer: COMMERCIAL

## 2022-08-24 DIAGNOSIS — B02.23 POSTHERPETIC POLYNEUROPATHY: ICD-10-CM

## 2022-08-24 DIAGNOSIS — E66.9 OBESITY, UNSPECIFIED CLASSIFICATION, UNSPECIFIED OBESITY TYPE, UNSPECIFIED WHETHER SERIOUS COMORBIDITY PRESENT: ICD-10-CM

## 2022-08-24 DIAGNOSIS — E11.00 TYPE II DIABETES MELLITUS WITH HYPEROSMOLARITY, UNCONTROLLED (HCC): ICD-10-CM

## 2022-08-24 DIAGNOSIS — E03.9 MYXEDEMA HEART DISEASE: ICD-10-CM

## 2022-08-24 DIAGNOSIS — I51.9 MYXEDEMA HEART DISEASE: ICD-10-CM

## 2022-08-24 DIAGNOSIS — E11.22 TYPE 2 DIABETES MELLITUS WITH DIABETIC CHRONIC KIDNEY DISEASE, UNSPECIFIED CKD STAGE, UNSPECIFIED WHETHER LONG TERM INSULIN USE (HCC): ICD-10-CM

## 2022-08-24 DIAGNOSIS — E11.65 TYPE II DIABETES MELLITUS WITH HYPEROSMOLARITY, UNCONTROLLED (HCC): ICD-10-CM

## 2022-08-24 DIAGNOSIS — I73.9 PERIPHERAL VASCULAR DISEASE, UNSPECIFIED (HCC): ICD-10-CM

## 2022-08-24 LAB
ALBUMIN SERPL BCP-MCNC: 3.7 G/DL (ref 3.5–5)
ALP SERPL-CCNC: 61 U/L (ref 34–104)
ALT SERPL W P-5'-P-CCNC: 14 U/L (ref 7–52)
ANION GAP SERPL CALCULATED.3IONS-SCNC: 7 MMOL/L (ref 4–13)
AST SERPL W P-5'-P-CCNC: 13 U/L (ref 13–39)
BASOPHILS # BLD AUTO: 0.08 THOUSANDS/ΜL (ref 0–0.1)
BASOPHILS NFR BLD AUTO: 1 % (ref 0–1)
BILIRUB SERPL-MCNC: 1.17 MG/DL (ref 0.2–1)
BUN SERPL-MCNC: 35 MG/DL (ref 5–25)
CALCIUM SERPL-MCNC: 8.9 MG/DL (ref 8.4–10.2)
CHLORIDE SERPL-SCNC: 104 MMOL/L (ref 96–108)
CO2 SERPL-SCNC: 29 MMOL/L (ref 21–32)
CREAT SERPL-MCNC: 1.89 MG/DL (ref 0.6–1.3)
EOSINOPHIL # BLD AUTO: 0.43 THOUSAND/ΜL (ref 0–0.61)
EOSINOPHIL NFR BLD AUTO: 7 % (ref 0–6)
ERYTHROCYTE [DISTWIDTH] IN BLOOD BY AUTOMATED COUNT: 14 % (ref 11.6–15.1)
EST. AVERAGE GLUCOSE BLD GHB EST-MCNC: 206 MG/DL
GFR SERPL CREATININE-BSD FRML MDRD: 36 ML/MIN/1.73SQ M
GLUCOSE P FAST SERPL-MCNC: 158 MG/DL (ref 65–99)
HBA1C MFR BLD: 8.8 %
HCT VFR BLD AUTO: 38.9 % (ref 36.5–49.3)
HGB BLD-MCNC: 12.4 G/DL (ref 12–17)
IMM GRANULOCYTES # BLD AUTO: 0.01 THOUSAND/UL (ref 0–0.2)
IMM GRANULOCYTES NFR BLD AUTO: 0 % (ref 0–2)
LYMPHOCYTES # BLD AUTO: 1.28 THOUSANDS/ΜL (ref 0.6–4.47)
LYMPHOCYTES NFR BLD AUTO: 20 % (ref 14–44)
MAGNESIUM SERPL-MCNC: 1.9 MG/DL (ref 1.9–2.7)
MCH RBC QN AUTO: 27.1 PG (ref 26.8–34.3)
MCHC RBC AUTO-ENTMCNC: 31.9 G/DL (ref 31.4–37.4)
MCV RBC AUTO: 85 FL (ref 82–98)
MONOCYTES # BLD AUTO: 0.52 THOUSAND/ΜL (ref 0.17–1.22)
MONOCYTES NFR BLD AUTO: 8 % (ref 4–12)
NEUTROPHILS # BLD AUTO: 4.14 THOUSANDS/ΜL (ref 1.85–7.62)
NEUTS SEG NFR BLD AUTO: 64 % (ref 43–75)
NRBC BLD AUTO-RTO: 0 /100 WBCS
PHOSPHATE SERPL-MCNC: 5.4 MG/DL (ref 2.3–4.1)
PLATELET # BLD AUTO: 222 THOUSANDS/UL (ref 149–390)
PMV BLD AUTO: 9.7 FL (ref 8.9–12.7)
POTASSIUM SERPL-SCNC: 3.7 MMOL/L (ref 3.5–5.3)
PROT SERPL-MCNC: 6.9 G/DL (ref 6.4–8.4)
RBC # BLD AUTO: 4.58 MILLION/UL (ref 3.88–5.62)
SODIUM SERPL-SCNC: 140 MMOL/L (ref 135–147)
T4 FREE SERPL-MCNC: 0.97 NG/DL (ref 0.76–1.46)
TSH SERPL DL<=0.05 MIU/L-ACNC: 5.45 UIU/ML (ref 0.45–4.5)
WBC # BLD AUTO: 6.46 THOUSAND/UL (ref 4.31–10.16)

## 2022-08-24 PROCEDURE — 83735 ASSAY OF MAGNESIUM: CPT

## 2022-08-24 PROCEDURE — 36415 COLL VENOUS BLD VENIPUNCTURE: CPT

## 2022-08-24 PROCEDURE — 80053 COMPREHEN METABOLIC PANEL: CPT

## 2022-08-24 PROCEDURE — 84100 ASSAY OF PHOSPHORUS: CPT

## 2022-08-24 PROCEDURE — 85025 COMPLETE CBC W/AUTO DIFF WBC: CPT

## 2022-08-24 PROCEDURE — 84443 ASSAY THYROID STIM HORMONE: CPT

## 2022-08-24 PROCEDURE — 84439 ASSAY OF FREE THYROXINE: CPT

## 2022-08-24 PROCEDURE — 83036 HEMOGLOBIN GLYCOSYLATED A1C: CPT

## 2022-10-12 PROBLEM — J10.1 URI DUE TO INFLUENZA A VIRUS: Status: RESOLVED | Noted: 2021-12-22 | Resolved: 2022-10-12

## 2022-10-14 ENCOUNTER — TELEPHONE (OUTPATIENT)
Dept: NEPHROLOGY | Facility: HOSPITAL | Age: 62
End: 2022-10-14

## 2022-10-17 ENCOUNTER — APPOINTMENT (OUTPATIENT)
Dept: LAB | Facility: CLINIC | Age: 62
End: 2022-10-17
Payer: COMMERCIAL

## 2022-10-17 DIAGNOSIS — N18.32 STAGE 3B CHRONIC KIDNEY DISEASE (HCC): ICD-10-CM

## 2022-10-17 DIAGNOSIS — R80.1 PERSISTENT PROTEINURIA: ICD-10-CM

## 2022-10-17 LAB
25(OH)D3 SERPL-MCNC: 25.5 NG/ML (ref 30–100)
ALBUMIN SERPL BCP-MCNC: 3.8 G/DL (ref 3.5–5)
ANION GAP SERPL CALCULATED.3IONS-SCNC: 6 MMOL/L (ref 4–13)
BACTERIA UR QL AUTO: ABNORMAL /HPF
BILIRUB UR QL STRIP: NEGATIVE
BUN SERPL-MCNC: 41 MG/DL (ref 5–25)
CALCIUM SERPL-MCNC: 8.9 MG/DL (ref 8.4–10.2)
CHLORIDE SERPL-SCNC: 104 MMOL/L (ref 96–108)
CLARITY UR: CLEAR
CO2 SERPL-SCNC: 30 MMOL/L (ref 21–32)
COLOR UR: ABNORMAL
CREAT SERPL-MCNC: 1.85 MG/DL (ref 0.6–1.3)
CREAT UR-MCNC: 49 MG/DL
GFR SERPL CREATININE-BSD FRML MDRD: 37 ML/MIN/1.73SQ M
GLUCOSE P FAST SERPL-MCNC: 211 MG/DL (ref 65–99)
GLUCOSE UR STRIP-MCNC: ABNORMAL MG/DL
HGB UR QL STRIP.AUTO: NEGATIVE
KETONES UR STRIP-MCNC: NEGATIVE MG/DL
LEUKOCYTE ESTERASE UR QL STRIP: ABNORMAL
MAGNESIUM SERPL-MCNC: 2.2 MG/DL (ref 1.9–2.7)
NITRITE UR QL STRIP: NEGATIVE
NON-SQ EPI CELLS URNS QL MICRO: ABNORMAL /HPF
PH UR STRIP.AUTO: 6 [PH]
PHOSPHATE SERPL-MCNC: 3.9 MG/DL (ref 2.3–4.1)
POTASSIUM SERPL-SCNC: 3.8 MMOL/L (ref 3.5–5.3)
PROT UR STRIP-MCNC: ABNORMAL MG/DL
PROT UR-MCNC: 121 MG/DL
PROT/CREAT UR: 2.47 MG/G{CREAT} (ref 0–0.1)
PTH-INTACT SERPL-MCNC: 140.9 PG/ML (ref 18.4–80.1)
RBC #/AREA URNS AUTO: ABNORMAL /HPF
SODIUM SERPL-SCNC: 140 MMOL/L (ref 135–147)
SP GR UR STRIP.AUTO: 1.02 (ref 1–1.03)
UROBILINOGEN UR STRIP-ACNC: <2 MG/DL
WBC #/AREA URNS AUTO: ABNORMAL /HPF

## 2022-10-17 PROCEDURE — 80069 RENAL FUNCTION PANEL: CPT

## 2022-10-17 PROCEDURE — 83970 ASSAY OF PARATHORMONE: CPT

## 2022-10-17 PROCEDURE — 84156 ASSAY OF PROTEIN URINE: CPT

## 2022-10-17 PROCEDURE — 82306 VITAMIN D 25 HYDROXY: CPT

## 2022-10-17 PROCEDURE — 36415 COLL VENOUS BLD VENIPUNCTURE: CPT

## 2022-10-17 PROCEDURE — 83735 ASSAY OF MAGNESIUM: CPT

## 2022-10-17 PROCEDURE — 81001 URINALYSIS AUTO W/SCOPE: CPT

## 2022-10-17 PROCEDURE — 82570 ASSAY OF URINE CREATININE: CPT

## 2022-10-18 ENCOUNTER — TELEPHONE (OUTPATIENT)
Dept: NEPHROLOGY | Facility: CLINIC | Age: 62
End: 2022-10-18

## 2022-10-18 NOTE — TELEPHONE ENCOUNTER
Appointment Confirmation   Person confirmed appointment with  If not patient, name of the person Patient     Date and time of appointment 10/19   Patient acknowledged and will be at appointment?  yes   Did you advise the patient that they will need a urine sample if they are a new patient? no   Did you advise the patient to bring their current medications for verification? (including any OTC) yes   Additional Information

## 2022-10-19 ENCOUNTER — OFFICE VISIT (OUTPATIENT)
Dept: NEPHROLOGY | Facility: CLINIC | Age: 62
End: 2022-10-19
Payer: COMMERCIAL

## 2022-10-19 VITALS
BODY MASS INDEX: 26.92 KG/M2 | WEIGHT: 188 LBS | HEIGHT: 70 IN | SYSTOLIC BLOOD PRESSURE: 130 MMHG | DIASTOLIC BLOOD PRESSURE: 70 MMHG

## 2022-10-19 DIAGNOSIS — I10 BENIGN ESSENTIAL HYPERTENSION: ICD-10-CM

## 2022-10-19 DIAGNOSIS — Z79.4 TYPE 2 DIABETES MELLITUS WITH HYPERGLYCEMIA, WITH LONG-TERM CURRENT USE OF INSULIN (HCC): ICD-10-CM

## 2022-10-19 DIAGNOSIS — N18.32 STAGE 3B CHRONIC KIDNEY DISEASE (HCC): Primary | ICD-10-CM

## 2022-10-19 DIAGNOSIS — R80.1 PERSISTENT PROTEINURIA: ICD-10-CM

## 2022-10-19 DIAGNOSIS — D64.9 ANEMIA, UNSPECIFIED TYPE: ICD-10-CM

## 2022-10-19 DIAGNOSIS — I50.42 CHRONIC COMBINED SYSTOLIC AND DIASTOLIC CHF (CONGESTIVE HEART FAILURE) (HCC): ICD-10-CM

## 2022-10-19 DIAGNOSIS — E11.65 TYPE 2 DIABETES MELLITUS WITH HYPERGLYCEMIA, WITH LONG-TERM CURRENT USE OF INSULIN (HCC): ICD-10-CM

## 2022-10-19 PROCEDURE — 99214 OFFICE O/P EST MOD 30 MIN: CPT | Performed by: INTERNAL MEDICINE

## 2022-10-19 RX ORDER — LEVOTHYROXINE SODIUM 0.03 MG/1
25 TABLET ORAL DAILY
COMMUNITY

## 2022-10-19 NOTE — PROGRESS NOTES
NEPHROLOGY OUTPATIENT PROGRESS NOTE   Claudette Khan 57 y o  male MRN: [id number]  DATE: 10/19/2022  Reason for visit:   Chief Complaint   Patient presents with   • Follow-up   • Chronic Kidney Disease        Patient Instructions   1  CKD stage 3b in setting of diabetes, hypertensive nephrosclerosis, ischemic cardiomyopathy as well as cirrhosis  -diuretics on hold since hospitalization, d/c sCr 2 29, now 1 89  -b/l sCr appears to be 1 4-1 6 previously, with rise in sCr to 2 22 as of Feb 2020  Episode of LOBO likely d/t sepsis in setting of left foot abscess at the time  Suspect slightly higher baseline thereafter with last sCr 1 85 as of Oct  2022  This correlates with eGFR in the mid 30s-40s    -monitor BMP  -recent UA showed glucose, leukocytes, 2+ protein with 1-2 RBCs  -need ongoing glycemic control to slow down progression of CKD  -has been to CKD education class  -CKD education booklet provided previously     2  HTN - BP at goal in office  -on lasix 40mg daily, lisinopril 5mg daily, metoprolol 25mg twice daily   -avoid high salt/sodium diet  -avoid caffeinated beverages     3  DM2, uncontrolled - last A1C improved to 8 8 as of Aug  2022, following with Dr Kan Mary of endocrinology, continue lantus  4  Proteinuria likely d/t diabetic nephropathy in setting of uncontrolled diabetes - microalb:Cr was as high as 3 4g in March 2019, now down to 2 47g as of Oct  2022, monitor this  Improving with low dose lisinopril   -anti PLA2R, dsDNA, HIV, RPR, complements, SPEP/UPEP/FLC, antiGBM, chronic hep panel, ANCA panel, LISA negative  -cryo + in setting of cirrhosis, cryoglobulin and check RF - negative when repeated in Jan 2022     5  Ischemic cardiomyopathy with EF 50% - diuretics as above, monitor daily weight, follows with cardiology outpatient, Dr Bhumi Patel    6  Vitamin D insufficiency - may take 1000u daily over the counter vitamin D as last level 25(low)     Of note, you are not anemic       RTC in in 3 months  Obtain blood and urine testing before next visit  Katie Johnston was seen today for follow-up and chronic kidney disease  Diagnoses and all orders for this visit:    Stage 3b chronic kidney disease ([facility] [address] )  -     Basic metabolic panel; Future  -     Urinalysis with microscopic; Future  -     Protein / creatinine ratio, urine; Future    Benign essential hypertension    Insulin-dependent diabetes mellitus    Chronic combined systolic and diastolic CHF    Anemia, unspecified type    Persistent proteinuria  -     Protein / creatinine ratio, urine; Future        Assessment/Plan:  1  CKD stage 3b in setting of diabetes, hypertensive nephrosclerosis, ischemic cardiomyopathy as well as cirrhosis  -diuretics on hold since hospitalization, d/c sCr 2 29, now 1 89  -b/l sCr appears to be 1 4-1 6 previously, with rise in sCr to 2 22 as of Feb 2020  Episode of LOBO likely d/t sepsis in setting of left foot abscess at the time  Suspect slightly higher baseline thereafter with last sCr 1 85 as of Oct  2022  This correlates with eGFR in the mid 30s-40s    -monitor BMP  -recent UA showed glucose, leukocytes, 2+ protein with 1-2 RBCs  -need ongoing glycemic control to slow down progression of CKD  -has been to CKD education class  -CKD education booklet provided previously     2  HTN - BP at goal in office  -on lasix 40mg daily, lisinopril 5mg daily, metoprolol 25mg twice daily   -avoid high salt/sodium diet  -avoid caffeinated beverages     3  DM2, uncontrolled - last A1C improved to 8 8 as of Aug  2022, following with Dr Carrie Martino of endocrinology, continue lantus      4  Proteinuria likely d/t diabetic nephropathy in setting of uncontrolled diabetes - microalb:Cr was as high as 3 4g in March 2019, now down to 2 47g as of Oct  2022, monitor this   Improving with low dose lisinopril   -anti PLA2R, dsDNA, HIV, RPR, complements, SPEP/UPEP/FLC, antiGBM, chronic hep panel, ANCA panel, LISA negative  -cryo + in setting of cirrhosis, cryoglobulin and check RF - negative when repeated in Jan 2022     5  Ischemic cardiomyopathy with EF 50% - diuretics as above, monitor daily weight, follows with cardiology outpatient, Dr Phong Banegas    6  Vitamin D insufficiency - may take 1000u daily over the counter vitamin D as last level 25(low)     Of note, you are not anemic      RTC in in 3 months  Obtain blood and urine testing before next visit  SUBJECTIVE / INTERVAL HISTORY:  57 y o  male presents in follow up of CKD  Herb Khan denies any recent illness/hospitalizations/medication changes since last office visit  Denies NSAID use  DM2 - blood sugars are labile  HTN - BP at home well controlled  Has been gaining weight gradually  Has been taking extra dose of lasix if needed for shortness of breath  Review of Systems   Constitutional: Negative for chills and fever  HENT: Negative for sore throat  Eyes: Negative for visual disturbance  Respiratory: Positive for shortness of breath  Negative for cough  Cardiovascular: Negative for chest pain and leg swelling  Gastrointestinal: Negative for abdominal pain, constipation, diarrhea, nausea and vomiting  Endocrine: Negative for polyuria  Genitourinary: Negative for decreased urine volume, difficulty urinating, dysuria and hematuria  Musculoskeletal: Negative for back pain and myalgias  Skin: Positive for rash (Shingles)  Neurological: Negative for dizziness, light-headedness and numbness  Psychiatric/Behavioral: Negative for confusion  OBJECTIVE:  /70 (BP Location: Left arm, Patient Position: Sitting, Cuff Size: Large)   Ht 5' 10" (1 778 m)   Wt 85 3 kg (188 lb)   BMI 26 98 kg/m²  Body mass index is 26 98 kg/m²  Physical exam:  Physical Exam  Vitals reviewed  Constitutional:       General: He is not in acute distress  Appearance: Normal appearance  He is well-developed  He is not diaphoretic  HENT:      Head: Normocephalic and atraumatic  Nose: Nose normal       Mouth/Throat:      Mouth: Mucous membranes are moist       Pharynx: No oropharyngeal exudate  Eyes:      General: No scleral icterus  Right eye: No discharge  Left eye: No discharge  Neck:      Thyroid: No thyromegaly  Cardiovascular:      Rate and Rhythm: Normal rate and regular rhythm  Heart sounds: Normal heart sounds  Pulmonary:      Effort: Pulmonary effort is normal       Breath sounds: Normal breath sounds  No wheezing or rales  Abdominal:      General: Bowel sounds are normal  There is no distension  Palpations: Abdomen is soft  Tenderness: There is no abdominal tenderness  Musculoskeletal:         General: No swelling  Normal range of motion  Cervical back: Neck supple  Lymphadenopathy:      Cervical: No cervical adenopathy  Skin:     General: Skin is warm and dry  Findings: Lesion (over b/l LE without erythema) present  No rash  Neurological:      General: No focal deficit present  Mental Status: He is alert  Comments: awake   Psychiatric:         Mood and Affect: Mood normal          Behavior: Behavior normal          Medications:    Current Outpatient Medications:   •  aspirin 81 mg chewable tablet, Chew 1 tablet daily for 30 days, Disp: 30 tablet, Rfl: 0  •  atorvastatin (LIPITOR) 40 mg tablet, TAKE 1 TABLET BY MOUTH EVERY DAY, Disp: 90 tablet, Rfl: 3  •  Blood Pressure Monitoring ([employer] Blood Pressure Monitor) KIT, Use 2 (two) times a day Automatic BP cuff  Measure BP twice daily  , Disp: 1 kit, Rfl: 0  •  furosemide (LASIX) 40 mg tablet, Take 40 mg by mouth daily, Disp: , Rfl:   •  Lantus SoloStar 100 units/mL injection pen, Inject 30 Units under the skin every morning, Disp: , Rfl:   •  levothyroxine 25 mcg tablet, Take 25 mcg by mouth daily 1/2 tablet, Disp: , Rfl:   •  lisinopril (ZESTRIL) 5 mg tablet, Take 1 tablet (5 mg total) by mouth daily, Disp: 90 tablet, Rfl: 1  •  metoprolol tartrate (LOPRESSOR) 25 mg tablet, TAKE 1 TABLET (25 MG TOTAL) BY MOUTH EVERY 12 (TWELVE) HOURS (Patient taking differently: Take 25 mg by mouth 2 (two) times a day), Disp: 180 tablet, Rfl: 3  •  mupirocin (BACTROBAN) 2 % ointment, Apply topically daily, Disp: 22 g, Rfl: 1  •  acetaminophen (TYLENOL) 325 mg tablet, Take 2 tablets (650 mg total) by mouth every 6 (six) hours as needed (pain) (Patient not taking: No sig reported), Disp: , Rfl: 0  •  NovoLIN 70/30 FlexPen (70-30) 100 UNIT/ML injection pen, , Disp: , Rfl:   •  TRUE METRIX BLOOD GLUCOSE TEST test strip, TEST FOUR TIMES DAILY, Disp: , Rfl: 11  •  Unifine Pentips 31G X 8 MM MISC, , Disp: , Rfl:     Allergies: Allergies as of 10/19/2022   • (No Known Allergies)       The following portions of the patient's history were reviewed and updated as appropriate: past family history, past surgical history and problem list     Laboratory Results:  Lab Results   Component Value Date    SODIUM 140 10/17/2022    K 3 8 10/17/2022     10/17/2022    CO2 30 10/17/2022    BUN 41 (H) 10/17/2022    CREATININE 1 85 (H) 10/17/2022    GLUC 311 (H) 07/08/2022    CALCIUM 8 9 10/17/2022        Lab Results   Component Value Date     9 (H) 10/17/2022    CALCIUM 8 9 10/17/2022    PHOS 3 9 10/17/2022       Portions of the record may have been created with voice recognition software   Occasional wrong word or "sound a like" substitutions may have occurred due to the inherent limitations of voice recognition software   Read the chart carefully and recognize, using context, where substitutions have occurred

## 2022-10-19 NOTE — PATIENT INSTRUCTIONS
1  CKD stage 3b in setting of diabetes, hypertensive nephrosclerosis, ischemic cardiomyopathy as well as cirrhosis  -diuretics on hold since hospitalization, d/c sCr 2 29, now 1 89  -b/l sCr appears to be 1 4-1 6 previously, with rise in sCr to 2 22 as of Feb 2020  Episode of LOBO likely d/t sepsis in setting of left foot abscess at the time  Suspect slightly higher baseline thereafter with last sCr 1 85 as of Oct  2022  This correlates with eGFR in the mid 30s-40s    -monitor BMP  -recent UA showed glucose, leukocytes, 2+ protein with 1-2 RBCs  -need ongoing glycemic control to slow down progression of CKD  -has been to CKD education class  -CKD education booklet provided previously     2  HTN - BP at goal in office  -on lasix 40mg daily, lisinopril 5mg daily, metoprolol 25mg twice daily   -avoid high salt/sodium diet  -avoid caffeinated beverages     3  DM2, uncontrolled - last A1C improved to 8 8 as of Aug  2022, following with Dr Swetha Campo of endocrinology, continue lantus  4  Proteinuria likely d/t diabetic nephropathy in setting of uncontrolled diabetes - microalb:Cr was as high as 3 4g in March 2019, now down to 2 47g as of Oct  2022, monitor this  Improving with low dose lisinopril   -anti PLA2R, dsDNA, HIV, RPR, complements, SPEP/UPEP/FLC, antiGBM, chronic hep panel, ANCA panel, LISA negative  -cryo + in setting of cirrhosis, cryoglobulin and check RF - negative when repeated in Jan 2022     5  Ischemic cardiomyopathy with EF 50% - diuretics as above, monitor daily weight, follows with cardiology outpatient, Dr Kacy Aguilar    6  Vitamin D insufficiency - may take 1000u daily over the counter vitamin D as last level 25(low)     Of note, you are not anemic  RTC in in 3 months  Obtain blood and urine testing before next visit

## 2022-11-10 ENCOUNTER — OFFICE VISIT (OUTPATIENT)
Dept: PODIATRY | Facility: CLINIC | Age: 62
End: 2022-11-10

## 2022-11-10 VITALS
HEART RATE: 70 BPM | SYSTOLIC BLOOD PRESSURE: 126 MMHG | HEIGHT: 70 IN | DIASTOLIC BLOOD PRESSURE: 86 MMHG | OXYGEN SATURATION: 99 % | WEIGHT: 189.4 LBS | BODY MASS INDEX: 27.11 KG/M2

## 2022-11-10 DIAGNOSIS — Z89.422 HISTORY OF AMPUTATION OF LESSER TOE OF LEFT FOOT (HCC): ICD-10-CM

## 2022-11-10 DIAGNOSIS — B35.1 ONYCHOMYCOSIS: Primary | ICD-10-CM

## 2022-11-10 DIAGNOSIS — E11.42 DIABETIC POLYNEUROPATHY ASSOCIATED WITH TYPE 2 DIABETES MELLITUS (HCC): ICD-10-CM

## 2022-11-10 PROBLEM — L02.612 ABSCESS OF LEFT FOOT: Status: RESOLVED | Noted: 2018-09-02 | Resolved: 2022-11-10

## 2022-11-10 PROBLEM — A41.9 SEPSIS (HCC): Status: RESOLVED | Noted: 2020-02-10 | Resolved: 2022-11-10

## 2022-11-10 PROBLEM — S91.302A WOUND OF LEFT FOOT: Status: RESOLVED | Noted: 2022-05-12 | Resolved: 2022-11-10

## 2022-11-10 PROBLEM — T81.49XA SURGICAL WOUND INFECTION: Status: RESOLVED | Noted: 2022-05-24 | Resolved: 2022-11-10

## 2022-11-10 RX ORDER — LATANOPROST 50 UG/ML
0 SOLUTION/ DROPS OPHTHALMIC
COMMUNITY
Start: 2022-11-03

## 2022-11-10 NOTE — PROGRESS NOTES
Assessment/Plan:     The patient's pedal nail plates were sharply debrided with a pair of sterile nail clippers and reduced in thickness and girth x 9  There are no other acute pedal issues  No pre trophic changes noted to either foot  Recommend follow-up in 2-3 months for routine foot care and diabetic foot check  Diagnoses and all orders for this visit:    Onychomycosis    Diabetic polyneuropathy associated with type 2 diabetes mellitus ([facility] [address] )    History of amputation of lesser toe of left foot ([facility] [address] )    Other orders  -     latanoprost (XALATAN) 0 005 % ophthalmic solution; Administer 0 0005 drops to both eyes daily at bedtime          Subjective:     Patient ID: Radha Siegel is a 57 y o  male  The patient presents today with a chief complaint of tender elongated toenails of both of his feet  The patient does note a history of a left 5th ray resection secondary to osteomyelitis  He is currently ambulating in his new diabetic shoes that seem to fit him well  He has no other issues today  PAST MEDICAL HISTORY:  Past Medical History:   Diagnosis Date   • CAD (coronary artery disease)    • Chronic combined systolic and diastolic CHF (congestive heart failure) ([facility])    • Diabetes mellitus ([facility])     type 2   • Dyslipidemia    • Hypertension    • Ischemic cardiomyopathy    • MI (myocardial infarction) ([facility] [address] )    • Neuropathy    • [facility] )    • Pancreatitis        PAST SURGICAL HISTORY:  Past Surgical History:   Procedure Laterality Date   • ANGIOPLASTY      ballon   • CORONARY ANGIOPLASTY WITH STENT PLACEMENT      LULA to proximal and mid LAD, Proximal RCA  • FOREIGN BODY REMOVAL Left 5/26/2022    Procedure: REMOVAL FOREIGN BODY EXTREMITY;  Surgeon: Karolina Vargas DPM;  [facility];  Service: Podiatry   • HERNIA REPAIR     • INCISION AND DRAINAGE OF WOUND Left 2/14/2020    Procedure: INCISION AND DRAINAGE (I&D) EXTREMITY left foot (cpt 00912);   Surgeon: Felicitas Johnson DPM; [facility];  Service: Podiatry   • WY AMPUTATION FOOT,TRANSMETATARSAL Left 5/26/2022    Procedure: AMPUTATION left 5th metatarsal;  Surgeon: Usha Skinner DPM;  [facility];  Service: Podiatry   • WY EXPLORE Ronald Shaper Left 2/14/2020    Procedure: EXPLORATION EXTREMITY;  Surgeon: Parris Whiting DPM;  [facility];  Service: Podiatry   • WY NEG PRESS WOUND TX, < 50 CM Left 2/14/2020    Procedure: APPLICATION VAC DRESSING;  Surgeon: Parris Whiting DPM;  [facility];  Service: Podiatry   • TOE AMPUTATION Left 5/14/2022    Procedure: EXCISION OF LEFT 5TH TOE ULCER WITH FILET FLAP;  Surgeon: Usha Skinner DPM;  [facility];  Service: Podiatry        ALLERGIES:  Patient has no known allergies  MEDICATIONS:  Current Outpatient Medications   Medication Sig Dispense Refill   • atorvastatin (LIPITOR) 40 mg tablet TAKE 1 TABLET BY MOUTH EVERY DAY 90 tablet 3   • Blood Pressure Monitoring ([employer]S Blood Pressure Monitor) KIT Use 2 (two) times a day Automatic BP cuff  Measure BP twice daily   1 kit 0   • furosemide (LASIX) 40 mg tablet Take 40 mg by mouth daily     • Lantus SoloStar 100 units/mL injection pen Inject 30 Units under the skin every morning     • latanoprost (XALATAN) 0 005 % ophthalmic solution Administer 0 0005 drops to both eyes daily at bedtime     • levothyroxine 25 mcg tablet Take 25 mcg by mouth daily 1/2 tablet     • lisinopril (ZESTRIL) 5 mg tablet Take 1 tablet (5 mg total) by mouth daily 90 tablet 1   • mupirocin (BACTROBAN) 2 % ointment Apply topically daily 22 g 1   • TRUE METRIX BLOOD GLUCOSE TEST test strip TEST FOUR TIMES DAILY  11   • Unifine Pentips 31G X 8 MM MISC      • acetaminophen (TYLENOL) 325 mg tablet Take 2 tablets (650 mg total) by mouth every 6 (six) hours as needed (pain) (Patient not taking: No sig reported)  0   • aspirin 81 mg chewable tablet Chew 1 tablet daily for 30 days 30 tablet 0   • metoprolol tartrate (LOPRESSOR) 25 mg tablet TAKE 1 TABLET (25 MG TOTAL) BY MOUTH EVERY 12 (TWELVE) HOURS (Patient taking differently: Take 25 mg by mouth 2 (two) times a day) 180 tablet 3   • NovoLIN 70/30 FlexPen (70-30) 100 UNIT/ML injection pen  (Patient not taking: No sig reported)       No current facility-administered medications for this visit  SOCIAL HISTORY:  Social History     Socioeconomic History   • Marital status: /Civil Union     Spouse name: None   • Number of children: None   • Years of education: None   • Highest education level: None   Occupational History   • None   Tobacco Use   • Smoking status: Never Smoker   • Smokeless tobacco: Never Used   Vaping Use   • Vaping Use: Never used   Substance and Sexual Activity   • Alcohol use: Not Currently     Comment: 20 years of sobriety   • Drug use: Never   • Sexual activity: Yes     Partners: Female     Birth control/protection: None   Other Topics Concern   • None   Social History Narrative   • None     Social Determinants of Health     Financial Resource Strain: Not on file   Food Insecurity: No Food Insecurity   • Worried About Running Out of Food in the Last Year: Never true   • Ran Out of Food in the Last Year: Never true   Transportation Needs: No Transportation Needs   • Lack of Transportation (Medical): No   • Lack of Transportation (Non-Medical): No   Physical Activity: Not on file   Stress: Not on file   Social Connections: Not on file   Intimate Partner Violence: Not on file   Housing Stability: Low Risk    • Unable to Pay for Housing in the Last Year: No   • Number of Places Lived in the Last Year: 1   • Unstable Housing in the Last Year: No        Review of Systems   Constitutional: Negative for chills and fever  HENT: Negative for ear pain and sore throat  Eyes: Negative for pain and visual disturbance  Respiratory: Negative for cough and shortness of breath  Cardiovascular: Negative for chest pain and palpitations     Gastrointestinal: Negative for abdominal pain and vomiting  Genitourinary: Negative for dysuria and hematuria  Musculoskeletal: Negative for arthralgias and back pain  Skin: Negative for color change and rash  Neurological: Negative for seizures and syncope  Psychiatric/Behavioral: Negative  All other systems reviewed and are negative  Objective:     Physical Exam  Vitals reviewed  Constitutional:       Appearance: Normal appearance  HENT:      Head: Normocephalic and atraumatic  Nose: Nose normal    Eyes:      Conjunctiva/sclera: Conjunctivae normal       Pupils: Pupils are equal, round, and reactive to light  Cardiovascular:      Pulses:           Dorsalis pedis pulses are 2+ on the right side and 2+ on the left side  Posterior tibial pulses are 1+ on the right side and 1+ on the left side  Pulmonary:      Effort: Pulmonary effort is normal    Musculoskeletal:      Left Lower Extremity: (Partial left 5th ray amputation)  Feet:      Right foot:      Protective Sensation: 7 sites tested  5 sites sensed  Toenail Condition: Right toenails are abnormally thick and long  Fungal disease present  Left foot:      Protective Sensation: 7 sites tested  4 sites sensed  Toenail Condition: Left toenails are abnormally thick and long  Fungal disease present  Comments: The patient's pedal nail plates are thickened and dystrophic x 9; he is status post partial left 5th ray resection secondary to history of osteomyelitis; there are no callosities noted nor any pre trophic changes noted to either foot  Skin:     General: Skin is warm  Capillary Refill: Capillary refill takes less than 2 seconds  Neurological:      General: No focal deficit present  Mental Status: He is alert and oriented to person, place, and time  Psychiatric:         Mood and Affect: Mood normal          Behavior: Behavior normal          Thought Content:  Thought content normal

## 2022-12-02 ENCOUNTER — APPOINTMENT (OUTPATIENT)
Dept: LAB | Facility: CLINIC | Age: 62
End: 2022-12-02

## 2022-12-02 DIAGNOSIS — E11.40 DIABETIC NEUROPATHY WITH NEUROLOGIC COMPLICATION (HCC): ICD-10-CM

## 2022-12-02 DIAGNOSIS — I51.9 MYXEDEMA HEART DISEASE: ICD-10-CM

## 2022-12-02 DIAGNOSIS — B02.23 POSTHERPETIC POLYNEUROPATHY: ICD-10-CM

## 2022-12-02 DIAGNOSIS — E11.65 TYPE II DIABETES MELLITUS WITH HYPEROSMOLARITY, UNCONTROLLED (HCC): ICD-10-CM

## 2022-12-02 DIAGNOSIS — E11.49 DIABETIC NEUROPATHY WITH NEUROLOGIC COMPLICATION (HCC): ICD-10-CM

## 2022-12-02 DIAGNOSIS — E03.9 MYXEDEMA HEART DISEASE: ICD-10-CM

## 2022-12-02 DIAGNOSIS — E11.00 TYPE II DIABETES MELLITUS WITH HYPEROSMOLARITY, UNCONTROLLED (HCC): ICD-10-CM

## 2022-12-02 LAB
ALBUMIN SERPL BCP-MCNC: 3.9 G/DL (ref 3.5–5)
ALP SERPL-CCNC: 65 U/L (ref 34–104)
ALT SERPL W P-5'-P-CCNC: 15 U/L (ref 7–52)
ANION GAP SERPL CALCULATED.3IONS-SCNC: 6 MMOL/L (ref 4–13)
AST SERPL W P-5'-P-CCNC: 13 U/L (ref 13–39)
BASOPHILS # BLD AUTO: 0.08 THOUSANDS/ÂΜL (ref 0–0.1)
BASOPHILS NFR BLD AUTO: 1 % (ref 0–1)
BILIRUB SERPL-MCNC: 0.91 MG/DL (ref 0.2–1)
BUN SERPL-MCNC: 58 MG/DL (ref 5–25)
CALCIUM SERPL-MCNC: 8.8 MG/DL (ref 8.4–10.2)
CHLORIDE SERPL-SCNC: 101 MMOL/L (ref 96–108)
CO2 SERPL-SCNC: 32 MMOL/L (ref 21–32)
CREAT SERPL-MCNC: 2.2 MG/DL (ref 0.6–1.3)
EOSINOPHIL # BLD AUTO: 0.36 THOUSAND/ÂΜL (ref 0–0.61)
EOSINOPHIL NFR BLD AUTO: 6 % (ref 0–6)
ERYTHROCYTE [DISTWIDTH] IN BLOOD BY AUTOMATED COUNT: 13.9 % (ref 11.6–15.1)
GFR SERPL CREATININE-BSD FRML MDRD: 30 ML/MIN/1.73SQ M
GLUCOSE P FAST SERPL-MCNC: 288 MG/DL (ref 65–99)
HCT VFR BLD AUTO: 40 % (ref 36.5–49.3)
HGB BLD-MCNC: 13.1 G/DL (ref 12–17)
IMM GRANULOCYTES # BLD AUTO: 0.03 THOUSAND/UL (ref 0–0.2)
IMM GRANULOCYTES NFR BLD AUTO: 1 % (ref 0–2)
LYMPHOCYTES # BLD AUTO: 1.52 THOUSANDS/ÂΜL (ref 0.6–4.47)
LYMPHOCYTES NFR BLD AUTO: 24 % (ref 14–44)
MAGNESIUM SERPL-MCNC: 2.1 MG/DL (ref 1.9–2.7)
MCH RBC QN AUTO: 28.5 PG (ref 26.8–34.3)
MCHC RBC AUTO-ENTMCNC: 32.8 G/DL (ref 31.4–37.4)
MCV RBC AUTO: 87 FL (ref 82–98)
MONOCYTES # BLD AUTO: 0.63 THOUSAND/ÂΜL (ref 0.17–1.22)
MONOCYTES NFR BLD AUTO: 10 % (ref 4–12)
NEUTROPHILS # BLD AUTO: 3.79 THOUSANDS/ÂΜL (ref 1.85–7.62)
NEUTS SEG NFR BLD AUTO: 58 % (ref 43–75)
NRBC BLD AUTO-RTO: 0 /100 WBCS
PHOSPHATE SERPL-MCNC: 4.8 MG/DL (ref 2.3–4.1)
PLATELET # BLD AUTO: 221 THOUSANDS/UL (ref 149–390)
PMV BLD AUTO: 9.9 FL (ref 8.9–12.7)
POTASSIUM SERPL-SCNC: 4.5 MMOL/L (ref 3.5–5.3)
PROT SERPL-MCNC: 7.1 G/DL (ref 6.4–8.4)
RBC # BLD AUTO: 4.59 MILLION/UL (ref 3.88–5.62)
SODIUM SERPL-SCNC: 139 MMOL/L (ref 135–147)
T4 FREE SERPL-MCNC: 0.9 NG/DL (ref 0.76–1.46)
TSH SERPL DL<=0.05 MIU/L-ACNC: 5.91 UIU/ML (ref 0.45–4.5)
WBC # BLD AUTO: 6.41 THOUSAND/UL (ref 4.31–10.16)

## 2022-12-03 LAB
EST. AVERAGE GLUCOSE BLD GHB EST-MCNC: 255 MG/DL
HBA1C MFR BLD: 10.5 %

## 2023-02-08 NOTE — ASSESSMENT & PLAN NOTE
NOTIFICATION RETURN TO WORK / SCHOOL    2/8/2023 3:00 PM    Ms. Edd Sherman  [address] [id number]      To Whom It May Concern:    Brittaney Mcleod is currently under the care of [facility]. She will return to work/school on: 02/10/23. Please excuse for days missed. If there are questions or concerns please have the patient contact our office.         Sincerely,      Lamont Oviedo MD · S/p 5th digit amputation with filet flap (DOS 5/14/22)  · Patient presents with wound discharge/discomfort, redness and black discoloration  · Concern for wound infection  · X-ray show evidence of osteomyelitis left 5th metatarsal  · Received cefepime and vancomycin in the ER and this morning  These were stopped by the Infectious Disease doctor  · Podiatry on board  · Wound culture grew E coli susceptible to cefazolin  · Status post amputation of the left 5th metatarsal and removal of left lower extremity foreign body, 5/26  · Infectious Disease on board:  Was eventually placed on IV cefazolin and Flagyl  When results of the wound culture came back, Flagyl was discontinued  · Follow blood cultures:  Negative x4 days  · Non weight-bearing with crutches  · Podiatrist is okay with discharge of the patient today  Continue local wound care and dressing at home  Instructions were provided by the podiatrist   · Outpatient follow-up with podiatrist   · Infectious Disease doctor is okay with discharge of the patient today  Patient will be on Keflex for 5 more days to complete 7 days of antibiotic treatment postoperatively  · PT OT evaluation  Home Health services versus no need for PT at home  Patient does not want any home PT   · Outpatient follow-up with primary care physician

## 2023-02-13 ENCOUNTER — OFFICE VISIT (OUTPATIENT)
Dept: PODIATRY | Facility: CLINIC | Age: 63
End: 2023-02-13

## 2023-02-13 VITALS
HEIGHT: 70 IN | BODY MASS INDEX: 28.09 KG/M2 | HEART RATE: 71 BPM | SYSTOLIC BLOOD PRESSURE: 136 MMHG | DIASTOLIC BLOOD PRESSURE: 86 MMHG | WEIGHT: 196.2 LBS | OXYGEN SATURATION: 100 %

## 2023-02-13 DIAGNOSIS — E11.65 TYPE 2 DIABETES MELLITUS WITH HYPERGLYCEMIA, WITH LONG-TERM CURRENT USE OF INSULIN (HCC): ICD-10-CM

## 2023-02-13 DIAGNOSIS — Z79.4 TYPE 2 DIABETES MELLITUS WITH HYPERGLYCEMIA, WITH LONG-TERM CURRENT USE OF INSULIN (HCC): ICD-10-CM

## 2023-02-13 DIAGNOSIS — B35.1 ONYCHOMYCOSIS: Primary | ICD-10-CM

## 2023-02-13 RX ORDER — INSULIN ASPART 100 [IU]/ML
INJECTION, SOLUTION INTRAVENOUS; SUBCUTANEOUS
COMMUNITY
Start: 2022-12-07

## 2023-02-13 NOTE — PROGRESS NOTES
Assessment/Plan:     The patient's clinical examination today is consistent with mycotic pedal nail plates bilaterally x 9; he has a history of prior partial fifth ray amputation secondary to osteomyelitis  There are no pretrophic changes nor callosities noted to either foot today  The pedal nail plates are sharply debrided with a sterile nail clipper without complication and the nails were mechanically reduced in thickness and girth utilizing a rotary bur  Recommend follow-up in 3 months  Diagnoses and all orders for this visit:    Onychomycosis    Insulin-dependent diabetes mellitus    Other orders  -     NovoLOG FlexPen 100 units/mL injection pen          Subjective:     Patient ID: Asaf Webster is a 70 y o  male  The patient presents today for high risk foot care with history of diabetes with peripheral neuropathy and prior fifth ray amputation secondary to osteomyelitis  The patient has been doing well since his last visit, he denies any other acute pedal issues today  PAST MEDICAL HISTORY:  Past Medical History:   Diagnosis Date   • CAD (coronary artery disease)    • Chronic combined systolic and diastolic CHF (congestive heart failure) ([facility])    • Diabetes mellitus ([facility])     type 2   • Dyslipidemia    • Hypertension    • Ischemic cardiomyopathy    • MI (myocardial infarction) ([facility] [address] )    • Neuropathy    • Osteomyelitis ([facility] [address] )    • Pancreatitis        PAST SURGICAL HISTORY:  Past Surgical History:   Procedure Laterality Date   • ANGIOPLASTY      ballon   • CORONARY ANGIOPLASTY WITH STENT PLACEMENT      LULA to proximal and mid LAD, Proximal RCA  • FOREIGN BODY REMOVAL Left 5/26/2022    Procedure: REMOVAL FOREIGN BODY EXTREMITY;  Surgeon: Vinod Jaeger DPM;  [facility];  Service: Podiatry   • HERNIA REPAIR     • INCISION AND DRAINAGE OF WOUND Left 2/14/2020    Procedure: INCISION AND DRAINAGE (I&D) EXTREMITY left foot (cpt 20357);   Surgeon: Rajat Cueto DPM;  [facility]; Service: Podiatry   • ND AMPUTATION FOOT TRANSMETARSAL Left 5/26/2022    Procedure: AMPUTATION left 5th metatarsal;  Surgeon: Roni Cobos DPM;  [facility];  Service: Podiatry   • ND EXPLORATION PENETRATING WOUND [address] EXTREMITY Left 2/14/2020    Procedure: EXPLORATION EXTREMITY;  Surgeon: Brenda Syed DPM;  [facility];  Service: Podiatry   • ND NEGATIVE PRESSURE WOUND THERAPY DME </= 50 SQ CM Left 2/14/2020    Procedure: APPLICATION VAC DRESSING;  Surgeon: Brenda Syed DPM;  [facility];  Service: Podiatry   • TOE AMPUTATION Left 5/14/2022    Procedure: EXCISION OF LEFT 5TH TOE ULCER WITH FILET FLAP;  Surgeon: Roni Cobos DPM;  [facility];  Service: Podiatry        ALLERGIES:  Patient has no known allergies  MEDICATIONS:  Current Outpatient Medications   Medication Sig Dispense Refill   • aspirin 81 mg chewable tablet Chew 1 tablet daily for 30 days 30 tablet 0   • atorvastatin (LIPITOR) 40 mg tablet TAKE 1 TABLET BY MOUTH EVERY DAY 90 tablet 3   • Blood Pressure Monitoring ([employer] Blood Pressure Monitor) KIT Use 2 (two) times a day Automatic BP cuff  Measure BP twice daily   1 kit 0   • furosemide (LASIX) 40 mg tablet Take 40 mg by mouth daily     • Lantus SoloStar 100 units/mL injection pen Inject 30 Units under the skin every morning     • latanoprost (XALATAN) 0 005 % ophthalmic solution Administer 0 0005 drops to both eyes daily at bedtime     • levothyroxine 25 mcg tablet Take 25 mcg by mouth daily 1/2 tablet     • lisinopril (ZESTRIL) 5 mg tablet Take 1 tablet (5 mg total) by mouth daily 90 tablet 1   • mupirocin (BACTROBAN) 2 % ointment Apply topically daily 22 g 1   • NovoLOG FlexPen 100 units/mL injection pen      • TRUE METRIX BLOOD GLUCOSE TEST test strip TEST FOUR TIMES DAILY  11   • Unifine Pentips 31G X 8 MM MISC      • acetaminophen (TYLENOL) 325 mg tablet Take 2 tablets (650 mg total) by mouth every 6 (six) hours as needed (pain) (Patient not taking: Reported on 7/15/2022)  0   • metoprolol tartrate (LOPRESSOR) 25 mg tablet TAKE 1 TABLET (25 MG TOTAL) BY MOUTH EVERY 12 (TWELVE) HOURS (Patient taking differently: Take 25 mg by mouth 2 (two) times a day) 180 tablet 3   • NovoLIN 70/30 FlexPen (70-30) 100 UNIT/ML injection pen  (Patient not taking: Reported on 10/19/2022)       No current facility-administered medications for this visit  SOCIAL HISTORY:  Social History     Socioeconomic History   • Marital status: /Civil Union     Spouse name: None   • Number of children: None   • Years of education: None   • Highest education level: None   Occupational History   • None   Tobacco Use   • Smoking status: Never   • Smokeless tobacco: Never   Vaping Use   • Vaping Use: Never used   Substance and Sexual Activity   • Alcohol use: Not Currently     Comment: 20 years of sobriety   • Drug use: Never   • Sexual activity: Yes     Partners: Female     Birth control/protection: None   Other Topics Concern   • None   Social History Narrative   • None     Social Determinants of Health     Financial Resource Strain: Not on file   Food Insecurity: No Food Insecurity   • Worried About Running Out of Food in the Last Year: Never true   • Ran Out of Food in the Last Year: Never true   Transportation Needs: No Transportation Needs   • Lack of Transportation (Medical): No   • Lack of Transportation (Non-Medical): No   Physical Activity: Not on file   Stress: Not on file   Social Connections: Not on file   Intimate Partner Violence: Not on file   Housing Stability: Low Risk    • Unable to Pay for Housing in the Last Year: No   • Number of Places Lived in the Last Year: 1   • Unstable Housing in the Last Year: No        Review of Systems   Constitutional: Negative  HENT: Negative  Eyes: Negative  Respiratory: Negative  Cardiovascular: Negative  Endocrine: Negative  Musculoskeletal: Negative  Neurological: Negative  Hematological: Negative  Psychiatric/Behavioral: Negative  Objective:     Physical Exam  Vitals reviewed  Constitutional:       Appearance: Normal appearance  HENT:      Head: Normocephalic and atraumatic  Nose: Nose normal    Eyes:      Conjunctiva/sclera: Conjunctivae normal       Pupils: Pupils are equal, round, and reactive to light  Cardiovascular:      Pulses:           Dorsalis pedis pulses are 2+ on the right side and 2+ on the left side  Posterior tibial pulses are 1+ on the right side and 1+ on the left side  Pulmonary:      Effort: Pulmonary effort is normal    Feet:      Right foot:      Skin integrity: Skin integrity normal       Toenail Condition: Right toenails are abnormally thick and long  Fungal disease present  Left foot:      Skin integrity: Skin integrity normal       Toenail Condition: Left toenails are abnormally thick and long  Fungal disease present  Comments: The pedal nail plates are thickened and discolored and dystrophic consistent with onychomycosis; there are no open wounds nor atrophic changes noted to either lower extremity; there are no callus lesions noted to either foot; the interdigital spaces are clear without maceration bilaterally  Skin:     General: Skin is warm  Capillary Refill: Capillary refill takes less than 2 seconds  Neurological:      General: No focal deficit present  Mental Status: He is alert and oriented to person, place, and time  Psychiatric:         Mood and Affect: Mood normal          Behavior: Behavior normal          Thought Content:  Thought content normal

## 2023-02-26 DIAGNOSIS — N18.32 STAGE 3B CHRONIC KIDNEY DISEASE (HCC): ICD-10-CM

## 2023-02-27 RX ORDER — LISINOPRIL 5 MG/1
5 TABLET ORAL DAILY
Qty: 90 TABLET | Refills: 1 | Status: SHIPPED | OUTPATIENT
Start: 2023-02-27

## 2023-02-28 ENCOUNTER — TELEPHONE (OUTPATIENT)
Dept: NEPHROLOGY | Facility: HOSPITAL | Age: 63
End: 2023-02-28

## 2023-02-28 NOTE — TELEPHONE ENCOUNTER
Called and spoke with Patient to complete their bloodwork prior to their appointment on 3/6 with Dr Loera at the [facility]

## 2023-03-01 ENCOUNTER — APPOINTMENT (OUTPATIENT)
Dept: LAB | Facility: CLINIC | Age: 63
End: 2023-03-01

## 2023-03-01 DIAGNOSIS — R80.1 PERSISTENT PROTEINURIA: ICD-10-CM

## 2023-03-01 DIAGNOSIS — N18.32 STAGE 3B CHRONIC KIDNEY DISEASE (HCC): ICD-10-CM

## 2023-03-01 LAB
ANION GAP SERPL CALCULATED.3IONS-SCNC: 6 MMOL/L (ref 4–13)
BACTERIA UR QL AUTO: ABNORMAL /HPF
BILIRUB UR QL STRIP: NEGATIVE
BUN SERPL-MCNC: 44 MG/DL (ref 5–25)
CALCIUM SERPL-MCNC: 9 MG/DL (ref 8.4–10.2)
CHLORIDE SERPL-SCNC: 105 MMOL/L (ref 96–108)
CLARITY UR: CLEAR
CO2 SERPL-SCNC: 29 MMOL/L (ref 21–32)
COLOR UR: COLORLESS
CREAT SERPL-MCNC: 2.13 MG/DL (ref 0.6–1.3)
CREAT UR-MCNC: 48.7 MG/DL
GFR SERPL CREATININE-BSD FRML MDRD: 31 ML/MIN/1.73SQ M
GLUCOSE P FAST SERPL-MCNC: 89 MG/DL (ref 65–99)
GLUCOSE UR STRIP-MCNC: NEGATIVE MG/DL
HGB UR QL STRIP.AUTO: NEGATIVE
KETONES UR STRIP-MCNC: NEGATIVE MG/DL
LEUKOCYTE ESTERASE UR QL STRIP: NEGATIVE
NITRITE UR QL STRIP: NEGATIVE
NON-SQ EPI CELLS URNS QL MICRO: ABNORMAL /HPF
PH UR STRIP.AUTO: 6 [PH]
POTASSIUM SERPL-SCNC: 4.4 MMOL/L (ref 3.5–5.3)
PROT UR STRIP-MCNC: ABNORMAL MG/DL
PROT UR-MCNC: 99 MG/DL
PROT/CREAT UR: 2.03 MG/G{CREAT} (ref 0–0.1)
RBC #/AREA URNS AUTO: ABNORMAL /HPF
SODIUM SERPL-SCNC: 140 MMOL/L (ref 135–147)
SP GR UR STRIP.AUTO: 1.01 (ref 1–1.03)
UROBILINOGEN UR STRIP-ACNC: <2 MG/DL
WBC #/AREA URNS AUTO: ABNORMAL /HPF

## 2023-03-03 ENCOUNTER — TELEPHONE (OUTPATIENT)
Dept: NEPHROLOGY | Facility: CLINIC | Age: 63
End: 2023-03-03

## 2023-03-06 ENCOUNTER — OFFICE VISIT (OUTPATIENT)
Dept: NEPHROLOGY | Facility: CLINIC | Age: 63
End: 2023-03-06

## 2023-03-06 VITALS
HEART RATE: 72 BPM | WEIGHT: 195 LBS | BODY MASS INDEX: 27.92 KG/M2 | DIASTOLIC BLOOD PRESSURE: 80 MMHG | SYSTOLIC BLOOD PRESSURE: 132 MMHG | HEIGHT: 70 IN

## 2023-03-06 DIAGNOSIS — I10 BENIGN ESSENTIAL HYPERTENSION: ICD-10-CM

## 2023-03-06 DIAGNOSIS — E87.1 HYPONATREMIA: ICD-10-CM

## 2023-03-06 DIAGNOSIS — N18.32 STAGE 3B CHRONIC KIDNEY DISEASE (HCC): Primary | ICD-10-CM

## 2023-03-06 DIAGNOSIS — R80.1 PERSISTENT PROTEINURIA: ICD-10-CM

## 2023-03-06 DIAGNOSIS — E87.6 HYPOKALEMIA: ICD-10-CM

## 2023-03-06 DIAGNOSIS — I50.42 CHRONIC COMBINED SYSTOLIC AND DIASTOLIC CHF (CONGESTIVE HEART FAILURE) (HCC): ICD-10-CM

## 2023-03-06 RX ORDER — FUROSEMIDE 40 MG/1
80 TABLET ORAL 2 TIMES DAILY
Qty: 180 TABLET | Refills: 1 | Status: SHIPPED | OUTPATIENT
Start: 2023-03-06

## 2023-03-06 NOTE — PROGRESS NOTES
NEPHROLOGY OUTPATIENT PROGRESS NOTE   Lavell Khan 70 y o  male MRN: [id number]  DATE: 3/6/2023  Reason for visit:   Chief Complaint   Patient presents with   • Chronic Kidney Disease   • Follow-up        Patient Instructions   1  CKD stage 3b in setting of diabetes, hypertensive nephrosclerosis, ischemic cardiomyopathy as well as cirrhosis  -sCr 2 13 - likely new baseline s/p hospitalization  -suspect progressive CKD in part due to uncontrolled blood sugars  -b/l sCr appears to be 1 4-1 6 previously, with rise in sCr to 2 22 as of Feb 2020  Episode of LOBO likely d/t sepsis in setting of left foot abscess at the time  Suspect slightly higher baseline thereafter with last sCr 1 85 as of Oct  2022  This correlates with eGFR in the mid 30s-40s    -monitor BMP  -recent UA showed 1+ protein, UpCr 2 03g  -need ongoing glycemic control to slow down progression of CKD  -has been to CKD education class  -CKD education booklet provided previously     2  HTN - BP well controlled  -on lasix 80mg daily, lisinopril 5mg daily, metoprolol 25mg twice daily   -avoid high salt/sodium diet  -avoid caffeinated beverages     3  DM2, uncontrolled - last A1C 10 5 as of Dec  2022, following with Dr Bridget Ledesma of endocrinology, continue lantus  4  Proteinuria likely d/t diabetic nephropathy in setting of uncontrolled diabetes - microalb:Cr was as high as 3 4g in March 2019, now down to 2 03g as of 3/1/23- monitor this  Improving with low dose lisinopril   -anti PLA2R, dsDNA, HIV, RPR, complements, SPEP/UPEP/FLC, antiGBM, chronic hep panel, ANCA panel, LISA negative  -cryo + in setting of cirrhosis, RF - negative when repeated in Jan 2022     5  Ischemic cardiomyopathy with EF 50% - diuretics as above, monitor daily weight, follows with cardiology outpatient, Dr Carrie Jimenez     6  Vitamin D insufficiency - may take 1000u daily over the counter vitamin D as last level 25(low)    7   Prior hypokalemia and hyponatremia - resolved on lasix, monitor BMP     Of note, you are not anemic  RTC in in 3 months  Obtain blood and urine testing before next visit  Lakesha Ochoa was seen today for chronic kidney disease and follow-up  Diagnoses and all orders for this visit:    Stage 3b chronic kidney disease ([address] )  -     Basic metabolic panel; Future  -     Urinalysis with microscopic; Future  -     Protein / creatinine ratio, urine; Future  -     furosemide (LASIX) 40 mg tablet; Take 2 tablets (80 mg total) by mouth 2 (two) times a day Patient takes two 40 mg tablets to make 80mg daily  Benign essential hypertension    Chronic combined systolic and diastolic CHF  -     furosemide (LASIX) 40 mg tablet; Take 2 tablets (80 mg total) by mouth 2 (two) times a day Patient takes two 40 mg tablets to make 80mg daily  Hypokalemia  -     Basic metabolic panel; Future    Hyponatremia  -     Basic metabolic panel; Future    Persistent proteinuria  -     Protein / creatinine ratio, urine; Future        Assessment/Plan:  1  CKD stage 3b in setting of diabetes, hypertensive nephrosclerosis, ischemic cardiomyopathy as well as cirrhosis  -sCr 2 13 - likely new baseline s/p hospitalization  -suspect progressive CKD in part due to uncontrolled blood sugars  -b/l sCr appears to be 1 4-1 6 previously, with rise in sCr to 2 22 as of Feb 2020  Episode of LOBO likely d/t sepsis in setting of left foot abscess at the time  Suspect slightly higher baseline thereafter with last sCr 1 85 as of Oct  2022  This correlates with eGFR in the mid 30s-40s    -monitor BMP  -recent UA showed 1+ protein, UpCr 2 03g  -need ongoing glycemic control to slow down progression of CKD  -has been to CKD education class  -CKD education booklet provided previously     2  HTN - BP well controlled  -on lasix 80mg daily, lisinopril 5mg daily, metoprolol 25mg twice daily   -avoid high salt/sodium diet  -avoid caffeinated beverages     3   DM2, uncontrolled - last A1C 10 5 as of Dec  2022, following with   Wendi of endocrinology, continue lantus      4  Proteinuria likely d/t diabetic nephropathy in setting of uncontrolled diabetes - microalb:Cr was as high as 3 4g in March 2019, now down to 2 03g as of 3/1/23- monitor this  Improving with low dose lisinopril   -anti PLA2R, dsDNA, HIV, RPR, complements, SPEP/UPEP/FLC, antiGBM, chronic hep panel, ANCA panel, LISA negative  -cryo + in setting of cirrhosis, RF - negative when repeated in Jan 2022     5  Ischemic cardiomyopathy with EF 50% - diuretics as above, monitor daily weight, follows with cardiology outpatient, Dr Rosa Maria Wallace     6  Vitamin D insufficiency - may take 1000u daily over the counter vitamin D as last level 25(low)    7  Prior hypokalemia and hyponatremia - resolved on lasix, monitor BMP     Of note, you are not anemic      RTC in in 3 months  Obtain blood and urine testing before next visit      SUBJECTIVE / INTERVAL HISTORY:  70 y o  male presents in follow up of CKD  Glendy Khan denies any recent illness/hospitalizations/medication changes since last office visit  Denies NSAID use  DM2- blood sugars crash at night  Elevated at other times  Hands have been hurting and feel a little swollen  Gaining weight  Does not think it is water weight  Had been down to 166lbs at time of surgery but now back up to 190lbs  Has gained weight since nursing home  Denies leg edema  Review of Systems   Constitutional: Negative for chills and fever  HENT: Negative for sore throat  Eyes: Negative for visual disturbance  Respiratory: Positive for shortness of breath (chronic)  Negative for cough  Cardiovascular: Negative for chest pain and leg swelling  Gastrointestinal: Negative for abdominal pain, constipation, diarrhea, nausea and vomiting  Endocrine: Negative for polyuria  Genitourinary: Negative for decreased urine volume, difficulty urinating, dysuria and hematuria  Musculoskeletal: Negative for back pain and myalgias          Right hand painful and feels swollen   Skin: Negative for rash  Neurological: Negative for dizziness, light-headedness and numbness  Psychiatric/Behavioral: Negative for confusion  OBJECTIVE:  /80 (BP Location: Left arm, Patient Position: Sitting, Cuff Size: Standard)   Pulse 72   Ht 5' 10" (1 778 m)   Wt 88 5 kg (195 lb)   BMI 27 98 kg/m²  Body mass index is 27 98 kg/m²  Physical exam:  Physical Exam  Vitals reviewed  Constitutional:       General: He is not in acute distress  Appearance: Normal appearance  He is well-developed  He is not diaphoretic  HENT:      Head: Normocephalic and atraumatic  Nose: Nose normal       Mouth/Throat:      Mouth: Mucous membranes are moist       Pharynx: No oropharyngeal exudate  Eyes:      General: No scleral icterus  Right eye: No discharge  Left eye: No discharge  Comments: eyeglasses   Neck:      Thyroid: No thyromegaly  Cardiovascular:      Rate and Rhythm: Normal rate and regular rhythm  Heart sounds: Normal heart sounds  Pulmonary:      Effort: Pulmonary effort is normal       Breath sounds: Normal breath sounds  No wheezing or rales  Abdominal:      General: Bowel sounds are normal  There is no distension  Palpations: Abdomen is soft  Tenderness: There is no abdominal tenderness  Musculoskeletal:         General: Swelling (trace b/l LE) present  Normal range of motion  Cervical back: Neck supple  Lymphadenopathy:      Cervical: No cervical adenopathy  Skin:     General: Skin is warm and dry  Findings: No rash  Neurological:      General: No focal deficit present  Mental Status: He is alert        Comments: awake   Psychiatric:         Mood and Affect: Mood normal          Behavior: Behavior normal          Medications:    Current Outpatient Medications:   •  aspirin 81 mg chewable tablet, Chew 1 tablet daily for 30 days, Disp: 30 tablet, Rfl: 0  •  atorvastatin (LIPITOR) 40 mg tablet, TAKE 1 TABLET BY MOUTH EVERY DAY, Disp: 90 tablet, Rfl: 3  •  furosemide (LASIX) 40 mg tablet, Take 2 tablets (80 mg total) by mouth 2 (two) times a day Patient takes two 40 mg tablets to make 80mg daily  , Disp: 180 tablet, Rfl: 1  •  Lantus SoloStar 100 units/mL injection pen, Inject 30 Units under the skin every morning (Patient taking differently: Inject 35 Units under the skin every morning), Disp: , Rfl:   •  latanoprost (XALATAN) 0 005 % ophthalmic solution, Administer 0 0005 drops to both eyes daily at bedtime, Disp: , Rfl:   •  levothyroxine 25 mcg tablet, Take 25 mcg by mouth daily, Disp: , Rfl:   •  lisinopril (ZESTRIL) 5 mg tablet, TAKE 1 TABLET (5 MG TOTAL) BY MOUTH DAILY  , Disp: 90 tablet, Rfl: 1  •  metoprolol tartrate (LOPRESSOR) 25 mg tablet, TAKE 1 TABLET (25 MG TOTAL) BY MOUTH EVERY 12 (TWELVE) HOURS (Patient taking differently: Take 25 mg by mouth 2 (two) times a day), Disp: 180 tablet, Rfl: 3  •  TRUE METRIX BLOOD GLUCOSE TEST test strip, TEST FOUR TIMES DAILY, Disp: , Rfl: 11  •  Unifine Pentips 31G X 8 MM MISC, , Disp: , Rfl:   •  acetaminophen (TYLENOL) 325 mg tablet, Take 2 tablets (650 mg total) by mouth every 6 (six) hours as needed (pain) (Patient not taking: Reported on 7/15/2022), Disp: , Rfl: 0  •  Blood Pressure Monitoring ([employer] Blood Pressure Monitor) KIT, Use 2 (two) times a day Automatic BP cuff  Measure BP twice daily  (Patient not taking: Reported on 3/6/2023), Disp: 1 kit, Rfl: 0  •  mupirocin (BACTROBAN) 2 % ointment, Apply topically daily (Patient not taking: Reported on 3/6/2023), Disp: 22 g, Rfl: 1  •  NovoLIN 70/30 FlexPen (70-30) 100 UNIT/ML injection pen, , Disp: , Rfl:   •  NovoLOG FlexPen 100 units/mL injection pen, , Disp: , Rfl:     Allergies:   Allergies as of 03/06/2023   • (No Known Allergies)       The following portions of the patient's history were reviewed and updated as appropriate: past family history, past surgical history and problem list     Laboratory Results:  Lab Results   Component Value Date    SODIUM 140 03/01/2023    K 4 4 03/01/2023     03/01/2023    CO2 29 03/01/2023    BUN 44 (H) 03/01/2023    CREATININE 2 13 (H) 03/01/2023    GLUC 311 (H) 07/08/2022    CALCIUM 9 0 03/01/2023        Lab Results   Component Value Date     9 (H) 10/17/2022    CALCIUM 9 0 03/01/2023    PHOS 4 8 (H) 12/02/2022       Portions of the record may have been created with voice recognition software   Occasional wrong word or "sound a like" substitutions may have occurred due to the inherent limitations of voice recognition software   Read the chart carefully and recognize, using context, where substitutions have occurred

## 2023-03-06 NOTE — PATIENT INSTRUCTIONS
1  CKD stage 3b in setting of diabetes, hypertensive nephrosclerosis, ischemic cardiomyopathy as well as cirrhosis  -sCr 2 13 - likely new baseline s/p hospitalization  -suspect progressive CKD in part due to uncontrolled blood sugars  -b/l sCr appears to be 1 4-1 6 previously, with rise in sCr to 2 22 as of Feb 2020  Episode of LOBO likely d/t sepsis in setting of left foot abscess at the time  Suspect slightly higher baseline thereafter with last sCr 1 85 as of Oct  2022  This correlates with eGFR in the mid 30s-40s    -monitor BMP  -recent UA showed 1+ protein, UpCr 2 03g  -need ongoing glycemic control to slow down progression of CKD  -has been to CKD education class  -CKD education booklet provided previously     2  HTN - BP well controlled  -on lasix 80mg daily, lisinopril 5mg daily, metoprolol 25mg twice daily   -avoid high salt/sodium diet  -avoid caffeinated beverages     3  DM2, uncontrolled - last A1C 10 5 as of Dec  2022, following with Dr Jomar Davis of endocrinology, continue lantus  4  Proteinuria likely d/t diabetic nephropathy in setting of uncontrolled diabetes - microalb:Cr was as high as 3 4g in March 2019, now down to 2 03g as of 3/1/23- monitor this  Improving with low dose lisinopril   -anti PLA2R, dsDNA, HIV, RPR, complements, SPEP/UPEP/FLC, antiGBM, chronic hep panel, ANCA panel, LISA negative  -cryo + in setting of cirrhosis, RF - negative when repeated in Jan 2022     5  Ischemic cardiomyopathy with EF 50% - diuretics as above, monitor daily weight, follows with cardiology outpatient, Dr James Norton     6  Vitamin D insufficiency - may take 1000u daily over the counter vitamin D as last level 25(low)    7  Prior hypokalemia and hyponatremia - resolved on lasix, monitor BMP     Of note, you are not anemic  RTC in in 3 months  Obtain blood and urine testing before next visit

## 2023-04-04 ENCOUNTER — APPOINTMENT (OUTPATIENT)
Dept: LAB | Facility: CLINIC | Age: 63
End: 2023-04-04

## 2023-04-04 DIAGNOSIS — E03.9 HYPOTHYROIDISM, ADULT: ICD-10-CM

## 2023-04-04 DIAGNOSIS — E11.65 TYPE II DIABETES MELLITUS WITH HYPEROSMOLARITY, UNCONTROLLED (HCC): ICD-10-CM

## 2023-04-04 DIAGNOSIS — E11.311 DIABETIC RETINAL EDEMA (HCC): ICD-10-CM

## 2023-04-04 DIAGNOSIS — B02.23 POSTHERPETIC POLYNEUROPATHY: ICD-10-CM

## 2023-04-04 DIAGNOSIS — R73.9 BLOOD GLUCOSE ELEVATED: ICD-10-CM

## 2023-04-04 DIAGNOSIS — E11.00 TYPE II DIABETES MELLITUS WITH HYPEROSMOLARITY, UNCONTROLLED (HCC): ICD-10-CM

## 2023-04-04 LAB
ALBUMIN SERPL BCP-MCNC: 3.7 G/DL (ref 3.5–5)
ALP SERPL-CCNC: 59 U/L (ref 34–104)
ALT SERPL W P-5'-P-CCNC: 11 U/L (ref 7–52)
ANION GAP SERPL CALCULATED.3IONS-SCNC: 8 MMOL/L (ref 4–13)
AST SERPL W P-5'-P-CCNC: 11 U/L (ref 13–39)
BASOPHILS # BLD AUTO: 0.07 THOUSANDS/ÂΜL (ref 0–0.1)
BASOPHILS NFR BLD AUTO: 1 % (ref 0–1)
BILIRUB SERPL-MCNC: 0.78 MG/DL (ref 0.2–1)
BUN SERPL-MCNC: 39 MG/DL (ref 5–25)
CALCIUM SERPL-MCNC: 8.5 MG/DL (ref 8.4–10.2)
CHLORIDE SERPL-SCNC: 106 MMOL/L (ref 96–108)
CO2 SERPL-SCNC: 26 MMOL/L (ref 21–32)
CREAT SERPL-MCNC: 2 MG/DL (ref 0.6–1.3)
EOSINOPHIL # BLD AUTO: 0.38 THOUSAND/ÂΜL (ref 0–0.61)
EOSINOPHIL NFR BLD AUTO: 6 % (ref 0–6)
ERYTHROCYTE [DISTWIDTH] IN BLOOD BY AUTOMATED COUNT: 12.9 % (ref 11.6–15.1)
EST. AVERAGE GLUCOSE BLD GHB EST-MCNC: 192 MG/DL
GFR SERPL CREATININE-BSD FRML MDRD: 34 ML/MIN/1.73SQ M
GLUCOSE P FAST SERPL-MCNC: 219 MG/DL (ref 65–99)
HBA1C MFR BLD: 8.3 %
HCT VFR BLD AUTO: 39.4 % (ref 36.5–49.3)
HGB BLD-MCNC: 12.7 G/DL (ref 12–17)
IMM GRANULOCYTES # BLD AUTO: 0.04 THOUSAND/UL (ref 0–0.2)
IMM GRANULOCYTES NFR BLD AUTO: 1 % (ref 0–2)
LYMPHOCYTES # BLD AUTO: 1.49 THOUSANDS/ÂΜL (ref 0.6–4.47)
LYMPHOCYTES NFR BLD AUTO: 23 % (ref 14–44)
MAGNESIUM SERPL-MCNC: 2 MG/DL (ref 1.9–2.7)
MCH RBC QN AUTO: 28.2 PG (ref 26.8–34.3)
MCHC RBC AUTO-ENTMCNC: 32.2 G/DL (ref 31.4–37.4)
MCV RBC AUTO: 87 FL (ref 82–98)
MONOCYTES # BLD AUTO: 0.59 THOUSAND/ÂΜL (ref 0.17–1.22)
MONOCYTES NFR BLD AUTO: 9 % (ref 4–12)
NEUTROPHILS # BLD AUTO: 3.85 THOUSANDS/ÂΜL (ref 1.85–7.62)
NEUTS SEG NFR BLD AUTO: 60 % (ref 43–75)
NRBC BLD AUTO-RTO: 0 /100 WBCS
PHOSPHATE SERPL-MCNC: 4.1 MG/DL (ref 2.3–4.1)
PLATELET # BLD AUTO: 210 THOUSANDS/UL (ref 149–390)
PMV BLD AUTO: 9.8 FL (ref 8.9–12.7)
POTASSIUM SERPL-SCNC: 4 MMOL/L (ref 3.5–5.3)
PROT SERPL-MCNC: 6.5 G/DL (ref 6.4–8.4)
RBC # BLD AUTO: 4.51 MILLION/UL (ref 3.88–5.62)
SODIUM SERPL-SCNC: 140 MMOL/L (ref 135–147)
T4 FREE SERPL-MCNC: 0.99 NG/DL (ref 0.76–1.46)
TSH SERPL DL<=0.05 MIU/L-ACNC: 3.8 UIU/ML (ref 0.45–4.5)
WBC # BLD AUTO: 6.42 THOUSAND/UL (ref 4.31–10.16)

## 2023-04-05 DIAGNOSIS — N18.32 STAGE 3B CHRONIC KIDNEY DISEASE (HCC): ICD-10-CM

## 2023-04-05 RX ORDER — LISINOPRIL 5 MG/1
5 TABLET ORAL DAILY
Qty: 90 TABLET | Refills: 1 | Status: SHIPPED | OUTPATIENT
Start: 2023-04-05

## 2023-05-15 ENCOUNTER — OFFICE VISIT (OUTPATIENT)
Dept: PODIATRY | Facility: CLINIC | Age: 63
End: 2023-05-15

## 2023-05-15 VITALS
DIASTOLIC BLOOD PRESSURE: 77 MMHG | HEIGHT: 70 IN | BODY MASS INDEX: 28.06 KG/M2 | HEART RATE: 63 BPM | WEIGHT: 196 LBS | OXYGEN SATURATION: 99 % | SYSTOLIC BLOOD PRESSURE: 124 MMHG

## 2023-05-15 DIAGNOSIS — E11.42 DIABETIC POLYNEUROPATHY ASSOCIATED WITH TYPE 2 DIABETES MELLITUS (HCC): ICD-10-CM

## 2023-05-15 DIAGNOSIS — Z89.422 HISTORY OF AMPUTATION OF LESSER TOE OF LEFT FOOT (HCC): ICD-10-CM

## 2023-05-15 DIAGNOSIS — B35.1 ONYCHOMYCOSIS: Primary | ICD-10-CM

## 2023-05-15 NOTE — PROGRESS NOTES
Assessment/Plan:     The patient's clinical examination today is significant for thickened and dystrophic nail plates x9 with discoloration and subungual debris consistent with onychomycosis  There are no open lesions  There are no interdigital macerations  There are no pretrophic changes nor callosities  The patient has a history of a partial left fifth ray resection secondary to osteomyelitis  The pedal nail plates were sharply debrided with a sterile nail clipper x9  The pedal nail plates were then chemically reduced in thickness and girth utilizing a rotary bur  There are no other acute pedal issues  His most recent hemoglobin A1c on April 4, 2023 was 8 3, prior to this it was 10 5 in December 2, 2022  Recommend follow-up in 2 to 3 months for continued at risk foot care  Diagnoses and all orders for this visit:    Onychomycosis    History of amputation of lesser toe of left foot ([facility] [address] )    Diabetic polyneuropathy associated with type 2 diabetes mellitus (HCC)          Subjective:     Patient ID: Feng Robin is a 70 y o  male  The patient presents today for follow-up of painful elongated pedal nail plates bilaterally  The patient has been doing fairly well otherwise and has no other acute pedal issues today  PAST MEDICAL HISTORY:  Past Medical History:   Diagnosis Date   • CAD (coronary artery disease)    • Chronic combined systolic and diastolic CHF (congestive heart failure) ([facility])    • Diabetes mellitus ([facility])     type 2   • Dyslipidemia    • Hypertension    • Ischemic cardiomyopathy    • MI (myocardial infarction) ([facility] [address] )    • Neuropathy    • Osteomyelitis ([facility] [address] )    • Pancreatitis        PAST SURGICAL HISTORY:  Past Surgical History:   Procedure Laterality Date   • ANGIOPLASTY      ballon   • CORONARY ANGIOPLASTY WITH STENT PLACEMENT      LULA to proximal and mid LAD, Proximal RCA      • FOREIGN BODY REMOVAL Left 5/26/2022    Procedure: REMOVAL FOREIGN BODY EXTREMITY;  Surgeon: Damaris Tao Torin Alfaro DPM;  [facility];  Service: Podiatry   • HERNIA REPAIR     • INCISION AND DRAINAGE OF WOUND Left 2/14/2020    Procedure: INCISION AND DRAINAGE (I&D) EXTREMITY left foot (cpt 50635); Surgeon: Escobar Tsang DPM;  [facility];  Service: Podiatry   • NV AMPUTATION FOOT TRANSMETARSAL Left 5/26/2022    Procedure: AMPUTATION left 5th metatarsal;  Surgeon: Aure Mclaughlin DPM;  [facility];  Service: Podiatry   • NV EXPLORATION PENETRATING WOUND [address] EXTREMITY Left 2/14/2020    Procedure: EXPLORATION EXTREMITY;  Surgeon: Escobar Tsang DPM;  [facility];  Service: Podiatry   • NV NEGATIVE PRESSURE WOUND THERAPY DME </= 50 SQ CM Left 2/14/2020    Procedure: APPLICATION VAC DRESSING;  Surgeon: Escobar Tsang DPM;  [facility];  Service: Podiatry   • TOE AMPUTATION Left 5/14/2022    Procedure: EXCISION OF LEFT 5TH TOE ULCER WITH FILET FLAP;  Surgeon: Aure Mclaughlin DPM;  [facility];  Service: Podiatry        ALLERGIES:  Patient has no known allergies  MEDICATIONS:  Current Outpatient Medications   Medication Sig Dispense Refill   • aspirin 81 mg chewable tablet Chew 1 tablet daily for 30 days 30 tablet 0   • atorvastatin (LIPITOR) 40 mg tablet TAKE 1 TABLET BY MOUTH EVERY DAY 90 tablet 3   • furosemide (LASIX) 40 mg tablet Take 2 tablets (80 mg total) by mouth 2 (two) times a day Patient takes two 40 mg tablets to make 80mg daily   180 tablet 1   • Lantus SoloStar 100 units/mL injection pen Inject 30 Units under the skin every morning (Patient taking differently: Inject 35 Units under the skin every morning)     • latanoprost (XALATAN) 0 005 % ophthalmic solution Administer 0 0005 drops to both eyes daily at bedtime     • levothyroxine 25 mcg tablet Take 25 mcg by mouth daily     • lisinopril (ZESTRIL) 5 mg tablet Take 1 tablet (5 mg total) by mouth daily 90 tablet 1   • metoprolol tartrate (LOPRESSOR) 25 mg tablet TAKE 1 TABLET (25 MG TOTAL) BY MOUTH EVERY 12 (TWELVE) HOURS (Patient taking differently: Take 25 mg by mouth 2 (two) times a day) 180 tablet 3   • acetaminophen (TYLENOL) 325 mg tablet Take 2 tablets (650 mg total) by mouth every 6 (six) hours as needed (pain) (Patient not taking: Reported on 7/15/2022)  0   • Blood Pressure Monitoring ([employer] Blood Pressure Monitor) KIT Use 2 (two) times a day Automatic BP cuff  Measure BP twice daily  (Patient not taking: Reported on 3/6/2023) 1 kit 0   • mupirocin (BACTROBAN) 2 % ointment Apply topically daily (Patient not taking: Reported on 3/6/2023) 22 g 1   • NovoLIN 70/30 FlexPen (70-30) 100 UNIT/ML injection pen  (Patient not taking: Reported on 10/19/2022)     • NovoLOG FlexPen 100 units/mL injection pen  (Patient not taking: Reported on 3/6/2023)     • TRUE METRIX BLOOD GLUCOSE TEST test strip TEST FOUR TIMES DAILY  11   • Unifine Pentips 31G X 8 MM MISC        No current facility-administered medications for this visit  SOCIAL HISTORY:  Social History     Socioeconomic History   • Marital status: /Civil Union     Spouse name: None   • Number of children: None   • Years of education: None   • Highest education level: None   Occupational History   • None   Tobacco Use   • Smoking status: Never   • Smokeless tobacco: Never   Vaping Use   • Vaping Use: Never used   Substance and Sexual Activity   • Alcohol use: Not Currently     Comment: 20 years of sobriety   • Drug use: Never   • Sexual activity: Yes     Partners: Female     Birth control/protection: None   Other Topics Concern   • None   Social History Narrative   • None     Social Determinants of Health     Financial Resource Strain: Not on file   Food Insecurity: Not on file   Transportation Needs: No Transportation Needs   • Lack of Transportation (Medical): No   • Lack of Transportation (Non-Medical):  No   Physical Activity: Not on file   Stress: Not on file   Social Connections: Not on file   Intimate Partner Violence: Not on file   Housing Stability: Low Risk    • Unable to Pay for Housing in the Last Year: No   • Number of Places Lived in the Last Year: 1   • Unstable Housing in the Last Year: No        Review of Systems   Constitutional: Negative  HENT: Negative  Eyes: Negative  Respiratory: Negative  Cardiovascular: Negative  Endocrine: Negative  Musculoskeletal: Negative  Neurological: Negative  Hematological: Negative  Psychiatric/Behavioral: Negative  Objective:     Physical Exam  Vitals reviewed  Constitutional:       Appearance: Normal appearance  HENT:      Head: Normocephalic and atraumatic  Nose: Nose normal    Eyes:      Conjunctiva/sclera: Conjunctivae normal       Pupils: Pupils are equal, round, and reactive to light  Cardiovascular:      Pulses:           Dorsalis pedis pulses are 2+ on the right side and 2+ on the left side  Posterior tibial pulses are 1+ on the right side and 1+ on the left side  Pulmonary:      Effort: Pulmonary effort is normal    Musculoskeletal:      Left Lower Extremity: (Partial left fifth ray amputation)  Feet:      Right foot:      Skin integrity: Skin integrity normal       Toenail Condition: Right toenails are abnormally thick and long  Fungal disease present  Left foot:      Skin integrity: Skin integrity normal       Toenail Condition: Left toenails are abnormally thick and long  Fungal disease present  Comments: The patient's clinical examination today is significant for thickened and dystrophic nail plates x9 with discoloration and subungual debris consistent with onychomycosis  There are no open lesions  There are no interdigital macerations  There are no pretrophic changes nor callosities  The patient has a history of a partial left fifth ray resection secondary to osteomyelitis  Skin:     General: Skin is warm  Capillary Refill: Capillary refill takes less than 2 seconds  Neurological:      General: No focal deficit present  Mental Status: He is alert and oriented to person, place, and time  Psychiatric:         Mood and Affect: Mood normal          Behavior: Behavior normal          Thought Content:  Thought content normal

## 2023-07-11 ENCOUNTER — APPOINTMENT (OUTPATIENT)
Dept: LAB | Facility: CLINIC | Age: 63
End: 2023-07-11
Payer: COMMERCIAL

## 2023-07-11 DIAGNOSIS — E87.6 HYPOKALEMIA: ICD-10-CM

## 2023-07-11 DIAGNOSIS — R80.1 PERSISTENT PROTEINURIA: ICD-10-CM

## 2023-07-11 DIAGNOSIS — E87.1 HYPONATREMIA: ICD-10-CM

## 2023-07-11 DIAGNOSIS — N18.32 STAGE 3B CHRONIC KIDNEY DISEASE (HCC): ICD-10-CM

## 2023-07-11 LAB
ANION GAP SERPL CALCULATED.3IONS-SCNC: 9 MMOL/L
BACTERIA UR QL AUTO: ABNORMAL /HPF
BILIRUB UR QL STRIP: NEGATIVE
BUN SERPL-MCNC: 50 MG/DL (ref 5–25)
CALCIUM SERPL-MCNC: 8.8 MG/DL (ref 8.4–10.2)
CHLORIDE SERPL-SCNC: 107 MMOL/L (ref 96–108)
CLARITY UR: CLEAR
CO2 SERPL-SCNC: 25 MMOL/L (ref 21–32)
COLOR UR: ABNORMAL
CREAT SERPL-MCNC: 2.09 MG/DL (ref 0.6–1.3)
CREAT UR-MCNC: 61.7 MG/DL
GFR SERPL CREATININE-BSD FRML MDRD: 32 ML/MIN/1.73SQ M
GLUCOSE P FAST SERPL-MCNC: 125 MG/DL (ref 65–99)
GLUCOSE UR STRIP-MCNC: ABNORMAL MG/DL
HGB UR QL STRIP.AUTO: NEGATIVE
KETONES UR STRIP-MCNC: NEGATIVE MG/DL
LEUKOCYTE ESTERASE UR QL STRIP: NEGATIVE
NITRITE UR QL STRIP: NEGATIVE
NON-SQ EPI CELLS URNS QL MICRO: ABNORMAL /HPF
PH UR STRIP.AUTO: 5.5 [PH]
POTASSIUM SERPL-SCNC: 4.1 MMOL/L (ref 3.5–5.3)
PROT UR STRIP-MCNC: ABNORMAL MG/DL
PROT UR-MCNC: 62 MG/DL
PROT/CREAT UR: 1 MG/G{CREAT} (ref 0–0.1)
RBC #/AREA URNS AUTO: ABNORMAL /HPF
SODIUM SERPL-SCNC: 141 MMOL/L (ref 135–147)
SP GR UR STRIP.AUTO: 1.01 (ref 1–1.03)
UROBILINOGEN UR STRIP-ACNC: <2 MG/DL
WBC #/AREA URNS AUTO: ABNORMAL /HPF

## 2023-07-11 PROCEDURE — 80048 BASIC METABOLIC PNL TOTAL CA: CPT

## 2023-07-11 PROCEDURE — 36415 COLL VENOUS BLD VENIPUNCTURE: CPT

## 2023-07-11 PROCEDURE — 82570 ASSAY OF URINE CREATININE: CPT

## 2023-07-11 PROCEDURE — 81001 URINALYSIS AUTO W/SCOPE: CPT

## 2023-07-11 PROCEDURE — 84156 ASSAY OF PROTEIN URINE: CPT

## 2023-07-12 ENCOUNTER — OFFICE VISIT (OUTPATIENT)
Dept: NEPHROLOGY | Facility: CLINIC | Age: 63
End: 2023-07-12
Payer: COMMERCIAL

## 2023-07-12 VITALS
DIASTOLIC BLOOD PRESSURE: 60 MMHG | BODY MASS INDEX: 27.77 KG/M2 | OXYGEN SATURATION: 98 % | WEIGHT: 194 LBS | SYSTOLIC BLOOD PRESSURE: 124 MMHG | HEART RATE: 63 BPM | HEIGHT: 70 IN

## 2023-07-12 DIAGNOSIS — N18.32 STAGE 3B CHRONIC KIDNEY DISEASE (HCC): Primary | ICD-10-CM

## 2023-07-12 DIAGNOSIS — I50.42 CHRONIC COMBINED SYSTOLIC AND DIASTOLIC CHF (CONGESTIVE HEART FAILURE) (HCC): ICD-10-CM

## 2023-07-12 DIAGNOSIS — I10 BENIGN ESSENTIAL HYPERTENSION: ICD-10-CM

## 2023-07-12 DIAGNOSIS — Z79.4 TYPE 2 DIABETES MELLITUS WITH HYPERGLYCEMIA, WITH LONG-TERM CURRENT USE OF INSULIN (HCC): ICD-10-CM

## 2023-07-12 DIAGNOSIS — E11.65 TYPE 2 DIABETES MELLITUS WITH HYPERGLYCEMIA, WITH LONG-TERM CURRENT USE OF INSULIN (HCC): ICD-10-CM

## 2023-07-12 DIAGNOSIS — E87.6 HYPOKALEMIA: ICD-10-CM

## 2023-07-12 DIAGNOSIS — E87.1 HYPONATREMIA: ICD-10-CM

## 2023-07-12 DIAGNOSIS — R80.1 PERSISTENT PROTEINURIA: ICD-10-CM

## 2023-07-12 DIAGNOSIS — D50.8 OTHER IRON DEFICIENCY ANEMIA: ICD-10-CM

## 2023-07-12 PROCEDURE — 99214 OFFICE O/P EST MOD 30 MIN: CPT | Performed by: INTERNAL MEDICINE

## 2023-07-12 NOTE — PATIENT INSTRUCTIONS
1. CKD stage 3b in setting of diabetes, hypertensive nephrosclerosis, ischemic cardiomyopathy as well as cirrhosis  -sCr 2.09 - likely new baseline s/p hospitalization. . This correlates with eGFR in the mid 30s-40s.   -suspect progressive CKD in part due to uncontrolled blood sugars  -b/l sCr appears to be 1.4-1.6 previously, with rise in sCr to 2.22 as of Feb. 2020. Episode of LOBO likely d/t sepsis in setting of left foot abscess at the time.   -monitor BMP  -recent UA showed 1+ protein, UpCr 2.03g-->1g as of 7/11/23  -need ongoing glycemic control to slow down progression of CKD  -has been to CKD education class  -CKD education booklet provided previously     2. HTN - BP well controlled  -on lasix 80mg daily, lisinopril 5mg daily, metoprolol 25mg twice daily   -avoid high salt/sodium diet  -avoid caffeinated beverages     3. DM2, uncontrolled - last A1C 10.5-->8.3 as of April 2023, following with Dr. Diana Masterson of endocrinology, continue lantus. 4. Proteinuria likely d/t diabetic nephropathy in setting of uncontrolled diabetes - microalb:Cr was as high as 3.4g in March 2019, now down to 1g as of 7/11/23- monitor this. Improving with low dose lisinopril   -anti PLA2R, dsDNA, HIV, RPR, complements, SPEP/UPEP/FLC, antiGBM, chronic hep panel, ANCA panel, LISA negative  -cryo + in setting of cirrhosis, RF - negative when repeated in Jan. 2022     5. Ischemic cardiomyopathy with EF 50% - diuretics as above, monitor daily weight, follows with cardiology outpatient, Dr. Girish Solares     6. Vitamin D insufficiency - may take 1000u daily over the counter vitamin D as last level 25. 5(low)     7. Prior hypokalemia and hyponatremia - resolved on lasix, monitor BMP     Of note, you are not anemic. RTC in in 3 months. Obtain blood and urine testing before next visit.

## 2023-07-12 NOTE — PROGRESS NOTES
NEPHROLOGY OUTPATIENT PROGRESS NOTE   Erica Artalejandra Erin 70 y.o. male MRN: [id number]  DATE: 7/12/2023  Reason for visit:   Chief Complaint   Patient presents with   • Chronic Kidney Disease   • Follow-up        Patient Instructions   1. CKD stage 3b in setting of diabetes, hypertensive nephrosclerosis, ischemic cardiomyopathy as well as cirrhosis  -sCr 2.09 - likely new baseline s/p hospitalization. . This correlates with eGFR in the mid 30s-40s.   -suspect progressive CKD in part due to uncontrolled blood sugars  -b/l sCr appears to be 1.4-1.6 previously, with rise in sCr to 2.22 as of Feb. 2020. Episode of LOBO likely d/t sepsis in setting of left foot abscess at the time.   -monitor BMP  -recent UA showed 1+ protein, UpCr 2.03g-->1g as of 7/11/23  -need ongoing glycemic control to slow down progression of CKD  -has been to CKD education class  -CKD education booklet provided previously     2. HTN - BP well controlled  -on lasix 80mg daily, lisinopril 5mg daily, metoprolol 25mg twice daily   -avoid high salt/sodium diet  -avoid caffeinated beverages     3. DM2, uncontrolled - last A1C 10.5-->8.3 as of April 2023, following with Dr. Aisha Egan of endocrinology, continue lantus. 4. Proteinuria likely d/t diabetic nephropathy in setting of uncontrolled diabetes - microalb:Cr was as high as 3.4g in March 2019, now down to 1g as of 7/11/23- monitor this. Improving with low dose lisinopril   -anti PLA2R, dsDNA, HIV, RPR, complements, SPEP/UPEP/FLC, antiGBM, chronic hep panel, ANCA panel, LISA negative  -cryo + in setting of cirrhosis, RF - negative when repeated in Jan. 2022     5. Ischemic cardiomyopathy with EF 50% - diuretics as above, monitor daily weight, follows with cardiology outpatient, Dr. Del Joyner     6. Vitamin D insufficiency - may take 1000u daily over the counter vitamin D as last level 25. 5(low)     7. Prior hypokalemia and hyponatremia - resolved on lasix, monitor BMP     Of note, you are not anemic.      RTC in in 3 months. Obtain blood and urine testing before next visit. Paige Campos was seen today for chronic kidney disease and follow-up. Diagnoses and all orders for this visit:    Stage 3b chronic kidney disease ([address])  -     Basic metabolic panel; Future  -     Urinalysis with microscopic; Future  -     Protein / creatinine ratio, urine; Future  -     Albumin / creatinine urine ratio; Future    Benign essential hypertension    Insulin-dependent diabetes mellitus    Chronic combined systolic and diastolic CHF    Other iron deficiency anemia    Hypokalemia  -     Basic metabolic panel; Future    Hyponatremia  -     Basic metabolic panel; Future    Persistent proteinuria  -     Protein / creatinine ratio, urine; Future  -     Albumin / creatinine urine ratio; Future        Assessment/Plan:  1. CKD stage 3b in setting of diabetes, hypertensive nephrosclerosis, ischemic cardiomyopathy as well as cirrhosis  -sCr 2.09 - likely new baseline s/p hospitalization. . This correlates with eGFR in the mid 30s-40s.   -suspect progressive CKD in part due to uncontrolled blood sugars  -b/l sCr appears to be 1.4-1.6 previously, with rise in sCr to 2.22 as of Feb. 2020. Episode of LOBO likely d/t sepsis in setting of left foot abscess at the time.   -monitor BMP  -recent UA showed 1+ protein, UpCr 2.03g-->1g as of 7/11/23  -need ongoing glycemic control to slow down progression of CKD  -has been to CKD education class  -CKD education booklet provided previously     2. HTN - BP well controlled  -on lasix 80mg daily, lisinopril 5mg daily, metoprolol 25mg twice daily   -avoid high salt/sodium diet  -avoid caffeinated beverages     3. DM2, uncontrolled - last A1C 10.5-->8.3 as of April 2023, following with Dr. Sierra Mccullough of endocrinology, continue lantus.     4. Proteinuria likely d/t diabetic nephropathy in setting of uncontrolled diabetes - microalb:Cr was as high as 3.4g in March 2019, now down to 1g as of 7/11/23- monitor this.  Improving with low dose lisinopril   -anti PLA2R, dsDNA, HIV, RPR, complements, SPEP/UPEP/FLC, antiGBM, chronic hep panel, ANCA panel, LISA negative  -cryo + in setting of cirrhosis, RF - negative when repeated in Jan. 2022     5. Ischemic cardiomyopathy with EF 50% - diuretics as above, monitor daily weight, follows with cardiology outpatient, Dr. Sarahi Rodriguez     6. Vitamin D insufficiency - may take 1000u daily over the counter vitamin D as last level 25. 5(low)     7. Prior hypokalemia and hyponatremia - resolved on lasix, monitor BMP     Of note, you are not anemic.     RTC in in 3 months. Obtain blood and urine testing before next visit.     SUBJECTIVE / INTERVAL HISTORY:  70 y.o. male presents in follow up of CKD. Ashlee Khan denies any recent illness/hospitalizations/medication changes since last office visit. Denies NSAID use. HTN - does not check BP at home   DM2 - blood sugars ok. Denies leg edema. Weight has been stable. Review of Systems   Constitutional: Negative for chills and fever. HENT: Negative for sore throat. Eyes: Negative for visual disturbance. Respiratory: Negative for cough and shortness of breath. Cardiovascular: Negative for chest pain and leg swelling. Gastrointestinal: Negative for abdominal pain, constipation, diarrhea, nausea and vomiting. Endocrine: Negative for polyuria. Genitourinary: Negative for decreased urine volume, difficulty urinating, dysuria and hematuria. Musculoskeletal: Negative for back pain and myalgias. Skin: Negative for rash. Neurological: Positive for numbness (in feet). Negative for dizziness and light-headedness. Psychiatric/Behavioral: Negative for confusion. OBJECTIVE:  /60 (BP Location: Left arm, Patient Position: Sitting, Cuff Size: Adult)   Pulse 63   Ht 5' 10" (1.778 m)   Wt 88 kg (194 lb)   SpO2 98%   BMI 27.84 kg/m²  Body mass index is 27.84 kg/m². Physical exam:  Physical Exam  Vitals reviewed.    Constitutional: General: He is not in acute distress. Appearance: Normal appearance. He is well-developed. He is not diaphoretic. HENT:      Head: Normocephalic and atraumatic. Nose: Nose normal.      Mouth/Throat:      Mouth: Mucous membranes are moist.      Pharynx: No oropharyngeal exudate. Eyes:      General: No scleral icterus. Right eye: No discharge. Left eye: No discharge. Comments: eyeglasses   Neck:      Thyroid: No thyromegaly. Cardiovascular:      Rate and Rhythm: Normal rate and regular rhythm. Heart sounds: Normal heart sounds. Pulmonary:      Effort: Pulmonary effort is normal.      Breath sounds: Normal breath sounds. No wheezing or rales. Abdominal:      General: Bowel sounds are normal. There is no distension. Palpations: Abdomen is soft. Tenderness: There is no abdominal tenderness. Musculoskeletal:         General: Swelling (b/l LE) present. Normal range of motion. Cervical back: Neck supple. Comments: B/l LE lesions   Lymphadenopathy:      Cervical: No cervical adenopathy. Skin:     General: Skin is warm and dry. Coloration: Skin is not jaundiced. Findings: No rash. Neurological:      General: No focal deficit present. Mental Status: He is alert. Comments: awake   Psychiatric:         Mood and Affect: Mood normal.         Behavior: Behavior normal.         Medications:    Current Outpatient Medications:   •  aspirin 81 mg chewable tablet, Chew 1 tablet daily for 30 days, Disp: 30 tablet, Rfl: 0  •  atorvastatin (LIPITOR) 40 mg tablet, TAKE 1 TABLET BY MOUTH EVERY DAY, Disp: 90 tablet, Rfl: 3  •  furosemide (LASIX) 40 mg tablet, Take 2 tablets (80 mg total) by mouth 2 (two) times a day Patient takes two 40 mg tablets to make 80mg daily. , Disp: 180 tablet, Rfl: 1  •  Lantus SoloStar 100 units/mL injection pen, Inject 30 Units under the skin every morning (Patient taking differently: Inject 35 Units under the skin every morning), Disp: , Rfl:   •  latanoprost (XALATAN) 0.005 % ophthalmic solution, Administer 0.0005 drops to both eyes daily at bedtime, Disp: , Rfl:   •  lisinopril (ZESTRIL) 5 mg tablet, Take 1 tablet (5 mg total) by mouth daily, Disp: 90 tablet, Rfl: 1  •  metoprolol tartrate (LOPRESSOR) 25 mg tablet, TAKE 1 TABLET (25 MG TOTAL) BY MOUTH EVERY 12 (TWELVE) HOURS (Patient taking differently: Take 25 mg by mouth 2 (two) times a day), Disp: 180 tablet, Rfl: 3  •  TRUE METRIX BLOOD GLUCOSE TEST test strip, TEST FOUR TIMES DAILY, Disp: , Rfl: 11  •  Unifine Pentips 31G X 8 MM MISC, , Disp: , Rfl:   •  acetaminophen (TYLENOL) 325 mg tablet, Take 2 tablets (650 mg total) by mouth every 6 (six) hours as needed (pain) (Patient not taking: Reported on 7/15/2022), Disp: , Rfl: 0  •  Blood Pressure Monitoring ([employer] Blood Pressure Monitor) KIT, Use 2 (two) times a day Automatic BP cuff. Measure BP twice daily. (Patient not taking: Reported on 3/6/2023), Disp: 1 kit, Rfl: 0  •  levothyroxine 25 mcg tablet, Take 25 mcg by mouth daily, Disp: , Rfl:   •  mupirocin (BACTROBAN) 2 % ointment, Apply topically daily (Patient not taking: Reported on 3/6/2023), Disp: 22 g, Rfl: 1  •  NovoLIN 70/30 FlexPen (70-30) 100 UNIT/ML injection pen, , Disp: , Rfl:   •  NovoLOG FlexPen 100 units/mL injection pen, , Disp: , Rfl:     Allergies:   Allergies as of 07/12/2023   • (No Known Allergies)       The following portions of the patient's history were reviewed and updated as appropriate: past family history, past surgical history and problem list.    Laboratory Results:  Lab Results   Component Value Date    SODIUM 141 07/11/2023    K 4.1 07/11/2023     07/11/2023    CO2 25 07/11/2023    BUN 50 (H) 07/11/2023    CREATININE 2.09 (H) 07/11/2023    GLUC 311 (H) 07/08/2022    CALCIUM 8.8 07/11/2023        Lab Results   Component Value Date    .9 (H) 10/17/2022    CALCIUM 8.8 07/11/2023    PHOS 4.1 04/04/2023       Portions of the record may have been created with voice recognition software.  Occasional wrong word or "sound a like" substitutions may have occurred due to the inherent limitations of voice recognition software.  Read the chart carefully and recognize, using context, where substitutions have occurred.

## 2023-07-24 ENCOUNTER — APPOINTMENT (OUTPATIENT)
Dept: LAB | Facility: AMBULARY SURGERY CENTER | Age: 63
End: 2023-07-24
Payer: COMMERCIAL

## 2023-07-24 DIAGNOSIS — E63.9 NUTRITIONAL AND METABOLIC CARDIOMYOPATHY (HCC): ICD-10-CM

## 2023-07-24 DIAGNOSIS — E11.65 TYPE II DIABETES MELLITUS WITH HYPEROSMOLARITY, UNCONTROLLED (HCC): ICD-10-CM

## 2023-07-24 DIAGNOSIS — B02.23 POSTHERPETIC POLYNEUROPATHY: ICD-10-CM

## 2023-07-24 DIAGNOSIS — E11.29 TYPE II DIABETES MELLITUS WITH RENAL MANIFESTATIONS NOT AT GOAL (HCC): ICD-10-CM

## 2023-07-24 DIAGNOSIS — E88.9 NUTRITIONAL AND METABOLIC CARDIOMYOPATHY (HCC): ICD-10-CM

## 2023-07-24 DIAGNOSIS — E11.00 TYPE II DIABETES MELLITUS WITH HYPEROSMOLARITY, UNCONTROLLED (HCC): ICD-10-CM

## 2023-07-24 DIAGNOSIS — I43 NUTRITIONAL AND METABOLIC CARDIOMYOPATHY (HCC): ICD-10-CM

## 2023-07-24 LAB
ALBUMIN SERPL BCP-MCNC: 3.4 G/DL (ref 3.5–5)
ALP SERPL-CCNC: 73 U/L (ref 46–116)
ALT SERPL W P-5'-P-CCNC: 23 U/L (ref 12–78)
ANION GAP SERPL CALCULATED.3IONS-SCNC: 5 MMOL/L
AST SERPL W P-5'-P-CCNC: 14 U/L (ref 5–45)
BASOPHILS # BLD AUTO: 0.06 THOUSANDS/ÂΜL (ref 0–0.1)
BASOPHILS NFR BLD AUTO: 1 % (ref 0–1)
BILIRUB SERPL-MCNC: 0.75 MG/DL (ref 0.2–1)
BUN SERPL-MCNC: 57 MG/DL (ref 5–25)
CALCIUM ALBUM COR SERPL-MCNC: 9 MG/DL (ref 8.3–10.1)
CALCIUM SERPL-MCNC: 8.5 MG/DL (ref 8.3–10.1)
CHLORIDE SERPL-SCNC: 110 MMOL/L (ref 96–108)
CO2 SERPL-SCNC: 25 MMOL/L (ref 21–32)
CREAT SERPL-MCNC: 2.36 MG/DL (ref 0.6–1.3)
EOSINOPHIL # BLD AUTO: 0.39 THOUSAND/ÂΜL (ref 0–0.61)
EOSINOPHIL NFR BLD AUTO: 6 % (ref 0–6)
ERYTHROCYTE [DISTWIDTH] IN BLOOD BY AUTOMATED COUNT: 13.2 % (ref 11.6–15.1)
EST. AVERAGE GLUCOSE BLD GHB EST-MCNC: 243 MG/DL
GFR SERPL CREATININE-BSD FRML MDRD: 27 ML/MIN/1.73SQ M
GLUCOSE P FAST SERPL-MCNC: 214 MG/DL (ref 65–99)
HBA1C MFR BLD: 10.1 %
HCT VFR BLD AUTO: 34.7 % (ref 36.5–49.3)
HGB BLD-MCNC: 12.4 G/DL (ref 12–17)
IMM GRANULOCYTES # BLD AUTO: 0.02 THOUSAND/UL (ref 0–0.2)
IMM GRANULOCYTES NFR BLD AUTO: 0 % (ref 0–2)
LYMPHOCYTES # BLD AUTO: 1.47 THOUSANDS/ÂΜL (ref 0.6–4.47)
LYMPHOCYTES NFR BLD AUTO: 21 % (ref 14–44)
MAGNESIUM SERPL-MCNC: 2.3 MG/DL (ref 1.6–2.6)
MCH RBC QN AUTO: 32.5 PG (ref 26.8–34.3)
MCHC RBC AUTO-ENTMCNC: 35.7 G/DL (ref 31.4–37.4)
MCV RBC AUTO: 91 FL (ref 82–98)
MONOCYTES # BLD AUTO: 0.63 THOUSAND/ÂΜL (ref 0.17–1.22)
MONOCYTES NFR BLD AUTO: 9 % (ref 4–12)
NEUTROPHILS # BLD AUTO: 4.49 THOUSANDS/ÂΜL (ref 1.85–7.62)
NEUTS SEG NFR BLD AUTO: 63 % (ref 43–75)
NRBC BLD AUTO-RTO: 0 /100 WBCS
PHOSPHATE SERPL-MCNC: 5.3 MG/DL (ref 2.3–4.1)
PLATELET # BLD AUTO: 239 THOUSANDS/UL (ref 149–390)
PMV BLD AUTO: 10.2 FL (ref 8.9–12.7)
POTASSIUM SERPL-SCNC: 4.3 MMOL/L (ref 3.5–5.3)
PROT SERPL-MCNC: 7.2 G/DL (ref 6.4–8.4)
RBC # BLD AUTO: 3.82 MILLION/UL (ref 3.88–5.62)
SODIUM SERPL-SCNC: 140 MMOL/L (ref 135–147)
T4 FREE SERPL-MCNC: 0.92 NG/DL (ref 0.61–1.12)
TSH SERPL DL<=0.05 MIU/L-ACNC: 3.16 UIU/ML (ref 0.45–4.5)
WBC # BLD AUTO: 7.06 THOUSAND/UL (ref 4.31–10.16)

## 2023-07-24 PROCEDURE — 36415 COLL VENOUS BLD VENIPUNCTURE: CPT

## 2023-07-24 PROCEDURE — 83036 HEMOGLOBIN GLYCOSYLATED A1C: CPT

## 2023-07-24 PROCEDURE — 83735 ASSAY OF MAGNESIUM: CPT

## 2023-07-24 PROCEDURE — 84100 ASSAY OF PHOSPHORUS: CPT

## 2023-07-24 PROCEDURE — 85025 COMPLETE CBC W/AUTO DIFF WBC: CPT

## 2023-07-24 PROCEDURE — 80053 COMPREHEN METABOLIC PANEL: CPT

## 2023-07-24 PROCEDURE — 84443 ASSAY THYROID STIM HORMONE: CPT

## 2023-07-24 PROCEDURE — 84439 ASSAY OF FREE THYROXINE: CPT

## 2023-08-28 ENCOUNTER — OFFICE VISIT (OUTPATIENT)
Dept: PODIATRY | Facility: CLINIC | Age: 63
End: 2023-08-28
Payer: COMMERCIAL

## 2023-08-28 VITALS
HEART RATE: 66 BPM | SYSTOLIC BLOOD PRESSURE: 126 MMHG | HEIGHT: 70 IN | BODY MASS INDEX: 27.77 KG/M2 | DIASTOLIC BLOOD PRESSURE: 80 MMHG | OXYGEN SATURATION: 100 % | WEIGHT: 194 LBS

## 2023-08-28 DIAGNOSIS — Z89.422 HISTORY OF AMPUTATION OF LESSER TOE OF LEFT FOOT (HCC): ICD-10-CM

## 2023-08-28 DIAGNOSIS — B35.1 ONYCHOMYCOSIS: Primary | ICD-10-CM

## 2023-08-28 DIAGNOSIS — E11.42 DIABETIC POLYNEUROPATHY ASSOCIATED WITH TYPE 2 DIABETES MELLITUS (HCC): ICD-10-CM

## 2023-08-28 PROCEDURE — 11721 DEBRIDE NAIL 6 OR MORE: CPT | Performed by: PODIATRIST

## 2023-08-28 NOTE — PROGRESS NOTES
Assessment/Plan:     The patient's clinical examination today is significant for thickened and dystrophic nail plates x 9 with discoloration and subungual debris consistent with onychomycosis. There are no open lesions. There are no interdigital macerations. There are no pretrophic changes nor callosities. The patient has a history of a partial left fifth ray resection secondary to osteomyelitis.      The pedal nail plates were sharply debrided with a sterile nail clipper x 9. The pedal nail plates were then mechanically reduced in thickness and girth utilizing a rotary bur. There are no other acute pedal issues. His most recent hemoglobin A1c was 10.1 on July 2023, prior to this it was 8.3 in April 2023.     Recommend follow-up in 2 to 3 months for continued at risk foot care. Diagnoses and all orders for this visit:    Onychomycosis    History of amputation of lesser toe of left foot ([address])    Diabetic polyneuropathy associated with type 2 diabetes mellitus (HCC)          Subjective:     Patient ID: Precious Andrews is a 70 y.o. male. The patient presents today for follow-up of painful elongated pedal nail plates bilaterally. The patient has been doing fairly well and has no other acute pedal issues today. PAST MEDICAL HISTORY:  Past Medical History:   Diagnosis Date   • CAD (coronary artery disease)    • Chronic combined systolic and diastolic CHF (congestive heart failure) (Formerly [facility])    • Diabetes mellitus ([facility])     type 2   • Dyslipidemia    • Hypertension    • Ischemic cardiomyopathy    • MI (myocardial infarction) ([address])    • Neuropathy    • Osteomyelitis ([address])    • Pancreatitis        PAST SURGICAL HISTORY:  Past Surgical History:   Procedure Laterality Date   • ANGIOPLASTY      ballon   • CORONARY ANGIOPLASTY WITH STENT PLACEMENT      LULA to proximal and mid LAD, Proximal RCA.     • FOREIGN BODY REMOVAL Left 5/26/2022    Procedure: REMOVAL FOREIGN BODY EXTREMITY;  Surgeon: Roxanne Tavarez MELANIA;  [facility]: [facility];  Service: Podiatry   • HERNIA REPAIR     • INCISION AND DRAINAGE OF WOUND Left 2/14/2020    Procedure: INCISION AND DRAINAGE (I&D) EXTREMITY left foot (cpt 33080); Surgeon: Oleksandr Galvan DPM;  [facility];  Service: Podiatry   • MA AMPUTATION FOOT TRANSMETARSAL Left 5/26/2022    Procedure: AMPUTATION left 5th metatarsal;  Surgeon: Diamante Odell DPM;  [facility];  Service: Podiatry   • MA EXPLORATION PENETRATING WOUND [address] EXTREMITY Left 2/14/2020    Procedure: EXPLORATION EXTREMITY;  Surgeon: Oleksandr Galvan DPM;  [facility];  Service: Podiatry   • MA NEGATIVE PRESSURE WOUND THERAPY DME </= 50 SQ CM Left 2/14/2020    Procedure: APPLICATION VAC DRESSING;  Surgeon: Oleksandr Galvan DPM;  [facility];  Service: Podiatry   • TOE AMPUTATION Left 5/14/2022    Procedure: EXCISION OF LEFT 5TH TOE ULCER WITH FILET FLAP;  Surgeon: Diamante Odell DPM;  [facility];  Service: Podiatry        ALLERGIES:  Patient has no known allergies. MEDICATIONS:  Current Outpatient Medications   Medication Sig Dispense Refill   • atorvastatin (LIPITOR) 40 mg tablet TAKE 1 TABLET BY MOUTH EVERY DAY 90 tablet 3   • furosemide (LASIX) 40 mg tablet Take 2 tablets (80 mg total) by mouth 2 (two) times a day Patient takes two 40 mg tablets to make 80mg daily.  180 tablet 1   • Lantus SoloStar 100 units/mL injection pen Inject 30 Units under the skin every morning (Patient taking differently: Inject 35 Units under the skin every morning)     • latanoprost (XALATAN) 0.005 % ophthalmic solution Administer 0.0005 drops to both eyes daily at bedtime     • levothyroxine 25 mcg tablet Take 25 mcg by mouth daily     • lisinopril (ZESTRIL) 5 mg tablet Take 1 tablet (5 mg total) by mouth daily 90 tablet 1   • TRUE METRIX BLOOD GLUCOSE TEST test strip TEST FOUR TIMES DAILY  11   • Unifine Pentips 31G X 8 MM MISC      • acetaminophen (TYLENOL) 325 mg tablet Take 2 tablets (650 mg total) by mouth every 6 (six) hours as needed (pain) (Patient not taking: Reported on 7/15/2022)  0   • aspirin 81 mg chewable tablet Chew 1 tablet daily for 30 days 30 tablet 0   • Blood Pressure Monitoring ([employer] Blood Pressure Monitor) KIT Use 2 (two) times a day Automatic BP cuff. Measure BP twice daily. (Patient not taking: Reported on 3/6/2023) 1 kit 0   • metoprolol tartrate (LOPRESSOR) 25 mg tablet TAKE 1 TABLET (25 MG TOTAL) BY MOUTH EVERY 12 (TWELVE) HOURS (Patient taking differently: Take 25 mg by mouth 2 (two) times a day) 180 tablet 3   • mupirocin (BACTROBAN) 2 % ointment Apply topically daily (Patient not taking: Reported on 3/6/2023) 22 g 1   • NovoLIN 70/30 FlexPen (70-30) 100 UNIT/ML injection pen  (Patient not taking: Reported on 10/19/2022)     • NovoLOG FlexPen 100 units/mL injection pen  (Patient not taking: Reported on 3/6/2023)       No current facility-administered medications for this visit. SOCIAL HISTORY:  Social History     Socioeconomic History   • Marital status: /Civil Union     Spouse name: None   • Number of children: None   • Years of education: None   • Highest education level: None   Occupational History   • None   Tobacco Use   • Smoking status: Never   • Smokeless tobacco: Never   Vaping Use   • Vaping Use: Never used   Substance and Sexual Activity   • Alcohol use: Not Currently     Comment: 20 years of sobriety   • Drug use: Never   • Sexual activity: Yes     Partners: Female     Birth control/protection: None   Other Topics Concern   • None   Social History Narrative   • None     Social Determinants of Health     Financial Resource Strain: Not on file   Food Insecurity: No Food Insecurity (5/13/2022)    Hunger Vital Sign    • Worried About Running Out of Food in the Last Year: Never true    • Ran Out of Food in the Last Year: Never true   Transportation Needs: No Transportation Needs (5/27/2022)    PRAPARE - Transportation    • Lack of Transportation (Medical):  No    • Lack of Transportation (Non-Medical): No   Physical Activity: Not on file   Stress: Not on file   Social Connections: Not on file   Intimate Partner Violence: Not on file   Housing Stability: Low Risk  (5/27/2022)    Housing Stability Vital Sign    • Unable to Pay for Housing in the Last Year: No    • Number of Places Lived in the Last Year: 1    • Unstable Housing in the Last Year: No        Review of Systems   Constitutional: Negative. HENT: Negative. Eyes: Negative. Respiratory: Negative. Cardiovascular: Negative. Endocrine: Negative. Musculoskeletal: Negative. Skin: Negative. Neurological: Negative. Hematological: Negative. Psychiatric/Behavioral: Negative. Objective:     Physical Exam  Vitals reviewed. Constitutional:       Appearance: Normal appearance. HENT:      Head: Normocephalic and atraumatic. Nose: Nose normal.   Eyes:      Conjunctiva/sclera: Conjunctivae normal.      Pupils: Pupils are equal, round, and reactive to light. Cardiovascular:      Pulses:           Dorsalis pedis pulses are 2+ on the left side. Posterior tibial pulses are 1+ on the left side. Pulmonary:      Effort: Pulmonary effort is normal.   Musculoskeletal:      Left Lower Extremity: (History of partial left fifth ray resection)  Feet:      Comments: The patient's clinical examination today is significant for thickened and dystrophic nail plates x 9 with discoloration and subungual debris consistent with onychomycosis. There are no open lesions. There are no interdigital macerations. There are no pretrophic changes nor callosities. The patient has a history of a partial left fifth ray resection secondary to osteomyelitis. Skin:     General: Skin is warm. Capillary Refill: Capillary refill takes less than 2 seconds. Neurological:      General: No focal deficit present. Mental Status: He is alert and oriented to person, place, and time.    Psychiatric:         Mood and Affect: Mood normal.         Behavior: Behavior normal.         Thought Content:  Thought content normal.

## 2023-10-07 ENCOUNTER — APPOINTMENT (EMERGENCY)
Dept: RADIOLOGY | Facility: HOSPITAL | Age: 63
End: 2023-10-07
Payer: COMMERCIAL

## 2023-10-07 ENCOUNTER — HOSPITAL ENCOUNTER (EMERGENCY)
Facility: HOSPITAL | Age: 63
Discharge: HOME | End: 2023-10-07
Attending: EMERGENCY MEDICINE
Payer: COMMERCIAL

## 2023-10-07 VITALS
RESPIRATION RATE: 18 BRPM | HEART RATE: 85 BPM | BODY MASS INDEX: 27.26 KG/M2 | WEIGHT: 190 LBS | DIASTOLIC BLOOD PRESSURE: 72 MMHG | OXYGEN SATURATION: 100 % | SYSTOLIC BLOOD PRESSURE: 128 MMHG | TEMPERATURE: 98.7 F

## 2023-10-07 DIAGNOSIS — B34.9 VIRAL SYNDROME: ICD-10-CM

## 2023-10-07 DIAGNOSIS — R05.1 ACUTE COUGH: Primary | ICD-10-CM

## 2023-10-07 LAB
FLUAV RNA RESP QL NAA+PROBE: NEGATIVE
FLUBV RNA RESP QL NAA+PROBE: NEGATIVE
RSV RNA RESP QL NAA+PROBE: NEGATIVE
SARS-COV-2 RNA RESP QL NAA+PROBE: NEGATIVE

## 2023-10-07 PROCEDURE — 0241U HB NFCT DS VIR RESP RNA 4 TRGT: CPT

## 2023-10-07 PROCEDURE — 99284 EMERGENCY DEPT VISIT MOD MDM: CPT | Performed by: EMERGENCY MEDICINE

## 2023-10-07 PROCEDURE — 99283 EMERGENCY DEPT VISIT LOW MDM: CPT

## 2023-10-07 PROCEDURE — 71046 X-RAY EXAM CHEST 2 VIEWS: CPT

## 2023-10-07 RX ORDER — GUAIFENESIN 600 MG/1
1200 TABLET, EXTENDED RELEASE ORAL 2 TIMES DAILY
Qty: 28 TABLET | Refills: 0 | Status: SHIPPED | OUTPATIENT
Start: 2023-10-07

## 2023-10-07 RX ORDER — PROMETHAZINE HYDROCHLORIDE 25 MG/1
25 TABLET ORAL EVERY 6 HOURS PRN
Qty: 30 TABLET | Refills: 0 | Status: SHIPPED | OUTPATIENT
Start: 2023-10-07 | End: 2023-10-17

## 2023-10-07 NOTE — DISCHARGE INSTRUCTIONS
Return sooner to the Emergency Department if increased shortness of breath, fever, pain, vomiting, bleeding, weakness, dizziness. Based off of your presenting complaints, please utilize the provided medications to help with mucus control and cough suppression. Please follow-up with your primary care provider soon as possible for your symptoms.

## 2023-10-07 NOTE — ED PROVIDER NOTES
History  Chief Complaint   Patient presents with   • URI     Cough, sore throat, runny nose X 7 days; negative Covid test yesterday      Surendra Saini is a 40-year-old male with a past medical history of CHF and coronary artery disease who presents to the emergency department today with cough, sore throat and runny nose for 7 days. He states that he has been having a persistence of the symptoms for 1 week and due to this persistence of symptoms, wanted to come in for evaluation. He states that he has a persistence of a cough with intermittently productive/produces minimal amounts of sputum. Sputum when produced is nonbloody and not dark. He states that when he "will let it go for too long" he will have exacerbation of symptomology and worsening of pneumonia or viral symptoms. He wanted to try and get it checked out earlier in his course to make sure he was not missing anything more serious that would require hospitalization. He states he has not had any recent weight changes. He denies any fevers or chills at home. He denies any shortness of breath or chest pain. He states the cough is more predominantly "annoying" that is preventing him from getting a good night sleep. He states the medications that he has been trying at home have been ineffective at containing his symptomology. Patient denies any numbness, or paresthesias. History provided by:  Patient   used: No        Prior to Admission Medications   Prescriptions Last Dose Informant Patient Reported? Taking? Blood Pressure Monitoring ([employer] Blood Pressure Monitor) KIT  Self No No   Sig: Use 2 (two) times a day Automatic BP cuff. Measure BP twice daily.    Patient not taking: Reported on 3/6/2023   Lantus SoloStar 100 units/mL injection pen  Self No No   Sig: Inject 30 Units under the skin every morning   Patient taking differently: Inject 35 Units under the skin every morning   NovoLIN 70/30 FlexPen (70-30) 100 UNIT/ML injection pen Self Yes No   Patient not taking: Reported on 10/19/2022   NovoLOG FlexPen 100 units/mL injection pen  Self Yes No   Patient not taking: Reported on 3/6/2023   TRUE METRIX BLOOD GLUCOSE TEST test strip  Self Yes No   Sig: TEST FOUR TIMES DAILY   Unifine Pentips 31G X 8 MM MISC  Self Yes No   acetaminophen (TYLENOL) 325 mg tablet  Self No No   Sig: Take 2 tablets (650 mg total) by mouth every 6 (six) hours as needed (pain)   Patient not taking: Reported on 7/15/2022   aspirin 81 mg chewable tablet  Self No No   Sig: Chew 1 tablet daily for 30 days   atorvastatin (LIPITOR) 40 mg tablet  Self No No   Sig: TAKE 1 TABLET BY MOUTH EVERY DAY   furosemide (LASIX) 40 mg tablet  Self No No   Sig: Take 2 tablets (80 mg total) by mouth 2 (two) times a day Patient takes two 40 mg tablets to make 80mg daily.    latanoprost (XALATAN) 0.005 % ophthalmic solution  Self Yes No   Sig: Administer 0.0005 drops to both eyes daily at bedtime   levothyroxine 25 mcg tablet  Self Yes No   Sig: Take 25 mcg by mouth daily   lisinopril (ZESTRIL) 5 mg tablet  Self No No   Sig: Take 1 tablet (5 mg total) by mouth daily   metoprolol tartrate (LOPRESSOR) 25 mg tablet  Self No No   Sig: TAKE 1 TABLET (25 MG TOTAL) BY MOUTH EVERY 12 (TWELVE) HOURS   Patient taking differently: Take 25 mg by mouth 2 (two) times a day   mupirocin (BACTROBAN) 2 % ointment  Self No No   Sig: Apply topically daily   Patient not taking: Reported on 3/6/2023      Facility-Administered Medications: None       Past Medical History:   Diagnosis Date   • CAD (coronary artery disease)    • Chronic combined systolic and diastolic CHF (congestive heart failure) (HCC)    • Diabetes mellitus (HCC)     type 2   • Dyslipidemia    • Hypertension    • Ischemic cardiomyopathy    • MI (myocardial infarction) ([address])    • Neuropathy    • Osteomyelitis ([address])    • Pancreatitis        Past Surgical History:   Procedure Laterality Date   • ANGIOPLASTY      ballon   • CORONARY ANGIOPLASTY WITH STENT PLACEMENT      LULA to proximal and mid LAD, Proximal RCA. • FOREIGN BODY REMOVAL Left 5/26/2022    Procedure: REMOVAL FOREIGN BODY EXTREMITY;  Surgeon: Di Jolly DPM;  [facility];  Service: Podiatry   • HERNIA REPAIR     • INCISION AND DRAINAGE OF WOUND Left 2/14/2020    Procedure: INCISION AND DRAINAGE (I&D) EXTREMITY left foot (cpt 10264); Surgeon: Sharona Ibarra DPM;  [facility];  Service: Podiatry   • NH AMPUTATION FOOT TRANSMETARSAL Left 5/26/2022    Procedure: AMPUTATION left 5th metatarsal;  Surgeon: Di Jolly DPM;  [facility];  Service: Podiatry   • NH EXPLORATION PENETRATING WOUND SPX EXTREMITY Left 2/14/2020    Procedure: EXPLORATION EXTREMITY;  Surgeon: Sharona Ibarra DPM;  [facility];  Service: Podiatry   • NH NEGATIVE PRESSURE WOUND THERAPY DME </= 50 SQ CM Left 2/14/2020    Procedure: APPLICATION VAC DRESSING;  Surgeon: Sharona Ibarra DPM;  [facility];  Service: Podiatry   • TOE AMPUTATION Left 5/14/2022    Procedure: EXCISION OF LEFT 5TH TOE ULCER WITH FILET FLAP;  Surgeon: Di Jolly DPM;  [facility];  Service: Podiatry       Family History   Problem Relation Age of Onset   • Heart attack Father    • Hyperlipidemia Father    • Cancer Father    • Diabetes Father    • Diabetes Mother    • Heart failure Mother         poss   • Kidney disease Mother    • Cancer Paternal Grandfather    • Stroke Neg Hx    • Anuerysm Neg Hx    • Clotting disorder Neg Hx    • Arrhythmia Neg Hx    • Coronary artery disease Neg Hx    • Hypertension Neg Hx    • Sudden death Neg Hx         scd     I have reviewed and agree with the history as documented.     E-Cigarette/Vaping   • E-Cigarette Use Never User      E-Cigarette/Vaping Substances   • Nicotine No    • THC No    • CBD No    • Flavoring No    • Other No    • Unknown No      Social History     Tobacco Use   • Smoking status: Never   • Smokeless tobacco: Never   Vaping Use   • Vaping Use: Never used   Substance Use Topics   • Alcohol use: Not Currently     Comment: 20 years of sobriety   • Drug use: Never        Review of Systems   Constitutional: Negative for chills and fever. HENT: Negative for ear pain and sore throat. Eyes: Negative for pain and visual disturbance. Respiratory: Negative for cough and shortness of breath. Cardiovascular: Negative for chest pain and palpitations. Gastrointestinal: Negative for abdominal pain and vomiting. Genitourinary: Negative for dysuria and hematuria. Musculoskeletal: Negative for arthralgias and back pain. Skin: Negative for color change and rash. Neurological: Negative for seizures and syncope. All other systems reviewed and are negative. Physical Exam  ED Triage Vitals [10/07/23 0820]   Temperature Pulse Respirations Blood Pressure SpO2   98.7 °F (37.1 °C) 85 18 128/72 100 %      Temp Source Heart Rate Source Patient Position - Orthostatic VS BP Location FiO2 (%)   Oral -- -- -- --      Pain Score       No Pain             Orthostatic Vital Signs  Vitals:    10/07/23 0820   BP: 128/72   Pulse: 85       Physical Exam  Vitals and nursing note reviewed. Constitutional:       General: He is not in acute distress. Appearance: Normal appearance. He is well-developed. He is not ill-appearing or diaphoretic. Comments: Intermittent cough during my examination. HENT:      Head: Normocephalic and atraumatic. Right Ear: External ear normal.      Left Ear: External ear normal.      Nose: Nose normal.      Mouth/Throat:      Mouth: Mucous membranes are moist.      Pharynx: No oropharyngeal exudate or posterior oropharyngeal erythema. Eyes:      General:         Right eye: No discharge. Left eye: No discharge. Extraocular Movements: Extraocular movements intact. Conjunctiva/sclera: Conjunctivae normal.      Pupils: Pupils are equal, round, and reactive to light.    Cardiovascular:      Rate and Rhythm: Normal rate and regular rhythm. Pulses: Normal pulses. Heart sounds: No murmur heard. Pulmonary:      Effort: Pulmonary effort is normal. No respiratory distress. Breath sounds: Normal breath sounds. No wheezing or rales. Chest:      Chest wall: No tenderness. Abdominal:      Palpations: Abdomen is soft. Tenderness: There is no abdominal tenderness. Musculoskeletal:         General: No swelling, tenderness, deformity or signs of injury. Normal range of motion. Cervical back: Neck supple. Right lower leg: No edema. Left lower leg: No edema. Skin:     General: Skin is warm and dry. Capillary Refill: Capillary refill takes less than 2 seconds. Coloration: Skin is not jaundiced. Findings: No bruising or erythema. Neurological:      General: No focal deficit present. Mental Status: He is alert and oriented to person, place, and time. Cranial Nerves: No cranial nerve deficit. Sensory: No sensory deficit. Motor: No weakness. Coordination: Coordination normal.   Psychiatric:         Mood and Affect: Mood normal.         ED Medications  Medications - No data to display    Diagnostic Studies  Results Reviewed     Procedure Component Value Units Date/Time    FLU/RSV/COVID - if FLU/RSV clinically relevant [[id number]]  (Normal) Collected: 10/07/23 0920    Lab Status: Final result Specimen: Nares from Nose Updated: 10/07/23 1054     SARS-CoV-2 Negative     INFLUENZA A PCR Negative     INFLUENZA B PCR Negative     RSV PCR Negative    Narrative:      FOR PEDIATRIC PATIENTS - copy/paste COVID Guidelines URL to browser: [email or web address]    SARS-CoV-2 assay is a Nucleic Acid Amplification assay intended for the  qualitative detection of nucleic acid from SARS-CoV-2 in nasopharyngeal  swabs. Results are for the presumptive identification of SARS-CoV-2 RNA.     Positive results are indicative of infection with SARS-CoV-2, the virus  causing COVID-19, but do not rule out bacterial infection or co-infection  with other viruses. Laboratories within the [facility] and its  territories are required to report all positive results to the appropriate  public health authorities. Negative results do not preclude SARS-CoV-2  infection and should not be used as the sole basis for treatment or other  patient management decisions. Negative results must be combined with  clinical observations, patient history, and epidemiological information. This test has not been FDA cleared or approved. This test has been authorized by FDA under an Emergency Use Authorization  (EUA). This test is only authorized for the duration of time the  declaration that circumstances exist justifying the authorization of the  emergency use of an in vitro diagnostic tests for detection of SARS-CoV-2  virus and/or diagnosis of COVID-19 infection under section 564(b)(1) of  the Act, 21 U. S.C. 872RPK-1(H)(5), unless the authorization is terminated  or revoked sooner. The test has been validated but independent review by FDA  and CLIA is pending. Test performed using Cepheid GeneXpert: This RT-PCR assay targets N2,  a region unique to SARS-CoV-2. A conserved region in the E-gene was chosen  for pan-Sarbecovirus detection which includes SARS-CoV-2. According to CMS-2020-01-R, this platform meets the definition of high-throughput technology. XR chest 2 views   ED Interpretation by Samaria Charles MD (10/07 1022)   No acute cardiopulmonary pathology appreciated. No focal consolidations, opacities, or effusions. Cardiac silhouette does not appear to be grossly enlarged when compared to previous radiographs. Bilateral costophrenic angles can be appreciated. Trachea midline. No soni or obvious osseous pathology. Independently read and assessed by Sidney Patrick.             Procedures  Procedures      ED Course                             SBIRT 22yo+    Flowsheet Row Most Recent Value   Initial Alcohol Screen: US AUDIT-C     1. How often do you have a drink containing alcohol? 0 Filed at: 10/07/2023 0842   2. How many drinks containing alcohol do you have on a typical day you are drinking? 0 Filed at: 10/07/2023 0842   3b. FEMALE Any Age, or MALE 65+: How often do you have 4 or more drinks on one occassion? 0 Filed at: 10/07/2023 0842   Audit-C Score 0 Filed at: 10/07/2023 9250   MARCEL: How many times in the past year have you. .. Used an illegal drug or used a prescription medication for non-medical reasons? Never Filed at: 10/07/2023 [address] [facility] [address] [place] Kennedy is a 24-year-old male with persistent cough for 1 week. DDx including but not limited to:  URI, bronchitis, pneumonia, GERD, aspiration pneumonitis, viral illness, COVID 23, smoke inhalation, CO poisoning, adverse medication reaction. Based of initial presenting complaints, viral upper respiratory panel was swabbed and collected as well as chest x-ray. Chest x-ray appeared to be clear with no exacerbation of symptoms that would be consistent with a CHF exacerbation or pneumonia at this time. I feel that the patient's symptomology is most likely secondary to a viral URI. Viral swab was unremarkable for COVID-19 infection as well as a previous home test that was also negative. Based on the patient's unremarkable vitals as well as unremarkable imaging studies and viral swab, I deemed that it was reasonable for the patient to be discharged home at this time. Patient was also motivated to be discharged home as he wanted to come to the emergency department to "get checked out" and would prefer not to be hospitalized.   Given the patient was satting at 100% with no supplemental oxygen with no elevation of heart rate tachycardia, no tachypnea, and blood pressure was unremarkable and afebrile, I thought it was reasonable for the patient to be discharged home with continue utilization of additional medications to help with mucus production, as well as helping with cough suppression. Patient was counseled to follow-up with primary care provider on this upcoming Monday as he had previously scheduled. Strict red flag symptoms that would warrant continued evaluation of symptomology at the bedside were discussed with patient and patient was agreeable with this plan. Acute cough: acute illness or injury  Viral syndrome: acute illness or injury  Amount and/or Complexity of Data Reviewed  Radiology: ordered and independent interpretation performed. Details: Imaging studies were grossly unremarkable on evaluation of intrathoracic pathology. Risk  OTC drugs. Prescription drug management. Disposition  Final diagnoses:   Acute cough   Viral syndrome     Time reflects when diagnosis was documented in both MDM as applicable and the Disposition within this note     Time User Action Codes Description Comment    10/7/2023 10:02 AM Maricarmen Spore Add [R05.1] Acute cough     10/7/2023 10:03 AM Maricarmen Spore Add [B34.9] Viral syndrome       ED Disposition     ED Disposition   Discharge    Condition   Stable    Date/Time   Sat Oct 7, 2023 10:04 AM    Comment   Teri Khan discharge to home/self care.                Follow-up Information     Follow up With Specialties Details Why Contact Info Additional [id number] [facility], DO Internal Medicine Schedule an appointment as soon as possible for a visit  As needed [address] [facility] [address] [facility] Emergency Medicine  As needed, If symptoms worsen [address] [facility], [place], [place], [address]          Discharge Medication List as of 10/7/2023 10:11 AM START taking these medications    Details   dextromethorphan 15 MG/5ML syrup Take 10 mL (30 mg total) by mouth 4 (four) times a day as needed for cough, Starting Sat 10/7/2023, Normal      guaiFENesin (MUCINEX) 600 mg 12 hr tablet Take 2 tablets (1,200 mg total) by mouth 2 (two) times a day, Starting Sat 10/7/2023, Print      promethazine (PHENERGAN) 25 mg tablet Take 1 tablet (25 mg total) by mouth every 6 (six) hours as needed for nausea or vomiting for up to 10 days, Starting Sat 10/7/2023, Until Tue 10/17/2023 at 2359, Normal         CONTINUE these medications which have NOT CHANGED    Details   acetaminophen (TYLENOL) 325 mg tablet Take 2 tablets (650 mg total) by mouth every 6 (six) hours as needed (pain), Starting Sat 5/28/2022, No Print      aspirin 81 mg chewable tablet Chew 1 tablet daily for 30 days, Starting Fri 5/19/2017, Until Wed 7/12/2023, No Print      atorvastatin (LIPITOR) 40 mg tablet TAKE 1 TABLET BY MOUTH EVERY DAY, Normal      Blood Pressure Monitoring ([employer] Blood Pressure Monitor) KIT Use 2 (two) times a day Automatic BP cuff. Measure BP twice daily. , Starting Tue 12/7/2021, Normal      furosemide (LASIX) 40 mg tablet Take 2 tablets (80 mg total) by mouth 2 (two) times a day Patient takes two 40 mg tablets to make 80mg daily. , Starting Mon 3/6/2023, Normal      Lantus SoloStar 100 units/mL injection pen Inject 30 Units under the skin every morning, Starting Sun 5/15/2022, No Print      latanoprost (XALATAN) 0.005 % ophthalmic solution Administer 0.0005 drops to both eyes daily at bedtime, Starting Thu 11/3/2022, Historical Med      levothyroxine 25 mcg tablet Take 25 mcg by mouth daily, Historical Med      lisinopril (ZESTRIL) 5 mg tablet Take 1 tablet (5 mg total) by mouth daily, Starting Wed 4/5/2023, Normal      metoprolol tartrate (LOPRESSOR) 25 mg tablet TAKE 1 TABLET (25 MG TOTAL) BY MOUTH EVERY 12 (TWELVE) HOURS, Starting Thu 1/21/2021, Until Wed 7/12/2023, Normal      mupirocin (BACTROBAN) 2 % ointment Apply topically daily, Starting Wed 7/20/2022, Normal      NovoLIN 70/30 FlexPen (70-30) 100 UNIT/ML injection pen Starting Fri 12/31/2021, Historical Med      NovoLOG FlexPen 100 units/mL injection pen Starting Wed 12/7/2022, Historical Med      TRUE METRIX BLOOD GLUCOSE TEST test strip TEST FOUR TIMES DAILY, Historical Med      Unifine Pentips 31G X 8 MM MISC Historical Med           No discharge procedures on file. PDMP Review       Value Time User    PDMP Reviewed  Yes 5/28/2022 11:01 AM Darcie Thomas MD           ED Provider  Attending physically available and evaluated Hira Khan. I managed the patient along with the ED Attending.     Electronically Signed by         Gabriela Sandoval MD  10/07/23 0449

## 2023-10-09 ENCOUNTER — VBI (OUTPATIENT)
Dept: FAMILY MEDICINE CLINIC | Facility: CLINIC | Age: 63
End: 2023-10-09

## 2023-10-09 NOTE — TELEPHONE ENCOUNTER
10/09/23 9:50 AM    Patient contacted post ED visit, [facility] department spoke with patient/caregiver and outreach was successful. Thank you.   Avery Higginbotham MA  [facility]

## 2023-10-23 ENCOUNTER — APPOINTMENT (OUTPATIENT)
Dept: LAB | Facility: CLINIC | Age: 63
End: 2023-10-23
Payer: COMMERCIAL

## 2023-10-23 DIAGNOSIS — E03.9 MYXEDEMA HEART DISEASE: ICD-10-CM

## 2023-10-23 DIAGNOSIS — E11.311 DIABETIC RETINAL EDEMA (HCC): ICD-10-CM

## 2023-10-23 DIAGNOSIS — E87.6 HYPOKALEMIA: ICD-10-CM

## 2023-10-23 DIAGNOSIS — I73.9 PERIPHERAL VASCULAR DISEASE, UNSPECIFIED (HCC): ICD-10-CM

## 2023-10-23 DIAGNOSIS — E87.1 HYPONATREMIA: ICD-10-CM

## 2023-10-23 DIAGNOSIS — Z12.5 SPECIAL SCREENING FOR MALIGNANT NEOPLASM OF PROSTATE: ICD-10-CM

## 2023-10-23 DIAGNOSIS — N18.32 STAGE 3B CHRONIC KIDNEY DISEASE (HCC): ICD-10-CM

## 2023-10-23 DIAGNOSIS — R80.1 PERSISTENT PROTEINURIA: ICD-10-CM

## 2023-10-23 DIAGNOSIS — E11.49 DIABETIC NEUROPATHY WITH NEUROLOGIC COMPLICATION (HCC): ICD-10-CM

## 2023-10-23 DIAGNOSIS — B02.23 POSTHERPETIC POLYNEUROPATHY: ICD-10-CM

## 2023-10-23 DIAGNOSIS — E11.40 DIABETIC NEUROPATHY WITH NEUROLOGIC COMPLICATION (HCC): ICD-10-CM

## 2023-10-23 DIAGNOSIS — I51.9 MYXEDEMA HEART DISEASE: ICD-10-CM

## 2023-10-23 LAB
ALBUMIN SERPL BCP-MCNC: 3.8 G/DL (ref 3.5–5)
ALP SERPL-CCNC: 60 U/L (ref 34–104)
ALT SERPL W P-5'-P-CCNC: 15 U/L (ref 7–52)
ANION GAP SERPL CALCULATED.3IONS-SCNC: 8 MMOL/L
AST SERPL W P-5'-P-CCNC: 13 U/L (ref 13–39)
BACTERIA UR QL AUTO: ABNORMAL /HPF
BASOPHILS # BLD AUTO: 0.1 THOUSANDS/ÂΜL (ref 0–0.1)
BASOPHILS NFR BLD AUTO: 1 % (ref 0–1)
BILIRUB SERPL-MCNC: 0.74 MG/DL (ref 0.2–1)
BILIRUB UR QL STRIP: NEGATIVE
BUN SERPL-MCNC: 46 MG/DL (ref 5–25)
CALCIUM SERPL-MCNC: 8.5 MG/DL (ref 8.4–10.2)
CHLORIDE SERPL-SCNC: 104 MMOL/L (ref 96–108)
CLARITY UR: CLEAR
CO2 SERPL-SCNC: 28 MMOL/L (ref 21–32)
COLOR UR: ABNORMAL
CREAT SERPL-MCNC: 2.13 MG/DL (ref 0.6–1.3)
CREAT UR-MCNC: 62.3 MG/DL
EOSINOPHIL # BLD AUTO: 0.38 THOUSAND/ÂΜL (ref 0–0.61)
EOSINOPHIL NFR BLD AUTO: 5 % (ref 0–6)
ERYTHROCYTE [DISTWIDTH] IN BLOOD BY AUTOMATED COUNT: 13 % (ref 11.6–15.1)
EST. AVERAGE GLUCOSE BLD GHB EST-MCNC: 235 MG/DL
GFR SERPL CREATININE-BSD FRML MDRD: 31 ML/MIN/1.73SQ M
GLUCOSE P FAST SERPL-MCNC: 105 MG/DL (ref 65–99)
GLUCOSE UR STRIP-MCNC: ABNORMAL MG/DL
HBA1C MFR BLD: 9.8 %
HCT VFR BLD AUTO: 35.7 % (ref 36.5–49.3)
HGB BLD-MCNC: 11.4 G/DL (ref 12–17)
HGB UR QL STRIP.AUTO: NEGATIVE
IMM GRANULOCYTES # BLD AUTO: 0.03 THOUSAND/UL (ref 0–0.2)
IMM GRANULOCYTES NFR BLD AUTO: 0 % (ref 0–2)
KETONES UR STRIP-MCNC: NEGATIVE MG/DL
LEUKOCYTE ESTERASE UR QL STRIP: NEGATIVE
LYMPHOCYTES # BLD AUTO: 1.66 THOUSANDS/ÂΜL (ref 0.6–4.47)
LYMPHOCYTES NFR BLD AUTO: 21 % (ref 14–44)
MAGNESIUM SERPL-MCNC: 2 MG/DL (ref 1.9–2.7)
MCH RBC QN AUTO: 28.9 PG (ref 26.8–34.3)
MCHC RBC AUTO-ENTMCNC: 31.9 G/DL (ref 31.4–37.4)
MCV RBC AUTO: 91 FL (ref 82–98)
MONOCYTES # BLD AUTO: 0.69 THOUSAND/ÂΜL (ref 0.17–1.22)
MONOCYTES NFR BLD AUTO: 9 % (ref 4–12)
NEUTROPHILS # BLD AUTO: 5.24 THOUSANDS/ÂΜL (ref 1.85–7.62)
NEUTS SEG NFR BLD AUTO: 64 % (ref 43–75)
NITRITE UR QL STRIP: NEGATIVE
NON-SQ EPI CELLS URNS QL MICRO: ABNORMAL /HPF
NRBC BLD AUTO-RTO: 0 /100 WBCS
PH UR STRIP.AUTO: 5.5 [PH]
PHOSPHATE SERPL-MCNC: 4.7 MG/DL (ref 2.3–4.1)
PLATELET # BLD AUTO: 270 THOUSANDS/UL (ref 149–390)
PMV BLD AUTO: 9.7 FL (ref 8.9–12.7)
POTASSIUM SERPL-SCNC: 4.6 MMOL/L (ref 3.5–5.3)
PROT SERPL-MCNC: 7 G/DL (ref 6.4–8.4)
PROT UR STRIP-MCNC: ABNORMAL MG/DL
PROT UR-MCNC: 78 MG/DL
PROT/CREAT UR: 1.25 MG/G{CREAT} (ref 0–0.1)
PSA SERPL-MCNC: 0.98 NG/ML (ref 0–4)
RBC # BLD AUTO: 3.94 MILLION/UL (ref 3.88–5.62)
RBC #/AREA URNS AUTO: ABNORMAL /HPF
SODIUM SERPL-SCNC: 140 MMOL/L (ref 135–147)
SP GR UR STRIP.AUTO: 1.01 (ref 1–1.03)
T4 FREE SERPL-MCNC: 0.91 NG/DL (ref 0.61–1.12)
TSH SERPL DL<=0.05 MIU/L-ACNC: 2.62 UIU/ML (ref 0.45–4.5)
UROBILINOGEN UR STRIP-ACNC: <2 MG/DL
WBC # BLD AUTO: 8.1 THOUSAND/UL (ref 4.31–10.16)
WBC #/AREA URNS AUTO: ABNORMAL /HPF

## 2023-10-23 PROCEDURE — 84156 ASSAY OF PROTEIN URINE: CPT

## 2023-10-23 PROCEDURE — 84439 ASSAY OF FREE THYROXINE: CPT

## 2023-10-23 PROCEDURE — 84100 ASSAY OF PHOSPHORUS: CPT

## 2023-10-23 PROCEDURE — 36415 COLL VENOUS BLD VENIPUNCTURE: CPT

## 2023-10-23 PROCEDURE — 80053 COMPREHEN METABOLIC PANEL: CPT

## 2023-10-23 PROCEDURE — 82570 ASSAY OF URINE CREATININE: CPT

## 2023-10-23 PROCEDURE — 85025 COMPLETE CBC W/AUTO DIFF WBC: CPT

## 2023-10-23 PROCEDURE — 83735 ASSAY OF MAGNESIUM: CPT

## 2023-10-23 PROCEDURE — 84443 ASSAY THYROID STIM HORMONE: CPT

## 2023-10-23 PROCEDURE — 81001 URINALYSIS AUTO W/SCOPE: CPT

## 2023-10-23 PROCEDURE — 83036 HEMOGLOBIN GLYCOSYLATED A1C: CPT

## 2023-10-23 PROCEDURE — G0103 PSA SCREENING: HCPCS

## 2023-10-25 ENCOUNTER — OFFICE VISIT (OUTPATIENT)
Dept: NEPHROLOGY | Facility: CLINIC | Age: 63
End: 2023-10-25
Payer: COMMERCIAL

## 2023-10-25 VITALS
HEIGHT: 70 IN | SYSTOLIC BLOOD PRESSURE: 130 MMHG | BODY MASS INDEX: 27.35 KG/M2 | WEIGHT: 191 LBS | HEART RATE: 64 BPM | DIASTOLIC BLOOD PRESSURE: 74 MMHG

## 2023-10-25 DIAGNOSIS — K74.69 OTHER CIRRHOSIS OF LIVER (HCC): ICD-10-CM

## 2023-10-25 DIAGNOSIS — E11.65 TYPE 2 DIABETES MELLITUS WITH HYPERGLYCEMIA, WITH LONG-TERM CURRENT USE OF INSULIN (HCC): ICD-10-CM

## 2023-10-25 DIAGNOSIS — E55.9 VITAMIN D INSUFFICIENCY: ICD-10-CM

## 2023-10-25 DIAGNOSIS — D64.9 ANEMIA, UNSPECIFIED TYPE: ICD-10-CM

## 2023-10-25 DIAGNOSIS — I50.42 CHRONIC COMBINED SYSTOLIC AND DIASTOLIC CHF (CONGESTIVE HEART FAILURE) (HCC): ICD-10-CM

## 2023-10-25 DIAGNOSIS — N18.32 STAGE 3B CHRONIC KIDNEY DISEASE (CKD) (HCC): Primary | ICD-10-CM

## 2023-10-25 DIAGNOSIS — I10 BENIGN ESSENTIAL HYPERTENSION: ICD-10-CM

## 2023-10-25 DIAGNOSIS — Z79.4 TYPE 2 DIABETES MELLITUS WITH HYPERGLYCEMIA, WITH LONG-TERM CURRENT USE OF INSULIN (HCC): ICD-10-CM

## 2023-10-25 PROCEDURE — 99214 OFFICE O/P EST MOD 30 MIN: CPT | Performed by: INTERNAL MEDICINE

## 2023-10-25 NOTE — PROGRESS NOTES
NEPHROLOGY OUTPATIENT PROGRESS NOTE   Hira Khan 70 y.o. male MRN: [id number]  DATE: 10/25/2023  Reason for visit:   Chief Complaint   Patient presents with    Follow-up    Chronic Kidney Disease        Patient Instructions   1. CKD stage 3b in setting of diabetes, hypertensive nephrosclerosis, ischemic cardiomyopathy as well as cirrhosis  -sCr 2.13 with eGFR low 30s.  -suspect progressive CKD in part due to uncontrolled blood sugars(last A1C 9.8 this month)  -b/l sCr appears to be 1.4-1.6 previously, with rise in sCr to 2.22 as of Feb. 2020. Episode of LOBO likely d/t sepsis in setting of left foot abscess at the time.   -monitor BMP  -recent UA showed 1+ protein, UpCr 2.03g-->1.25g as of 10/23/23  -need ongoing glycemic control to slow down progression of CKD  -has been to CKD education class  -CKD education booklet provided previously     2. HTN - BP well controlled in office  -on lasix 80mg twice daily, lisinopril 5mg daily, metoprolol 25mg twice daily   -avoid high salt/sodium diet  -avoid caffeinated beverages     3. DM2, uncontrolled - last A1C 9.8 as of 10/23/23, following with Dr. Pearley Essex of endocrinology, continue lantus. Needs improved glycemic control. 4. Proteinuria likely d/t diabetic nephropathy in setting of uncontrolled diabetes - microalb:Cr was as high as 3.4g in March 2019, now down to 1.25g as of 10/23/23 with microalb:Cr 607mg/g - monitor this. Improving with low dose lisinopril   -anti PLA2R, dsDNA, HIV, RPR, complements, SPEP/UPEP/FLC, antiGBM, chronic hep panel, ANCA panel, LISA negative  -cryo + in setting of cirrhosis, RF - negative when repeated in Jan. 2022     5. Ischemic cardiomyopathy with EF 50% - diuretics as above, monitor daily weight, follows with cardiology outpatient, Dr. Anna Vargas     6. Vitamin D insufficiency - may take 1000u daily over the counter vitamin D as last level 25. 5(low) as of 10/17/22, monitor vitamin D level annually     7.  Mild anemia - last Hgb 11.4 as of 10/23/23, monitor CBC     RTC in in 3 months. Obtain blood and urine testing before next visit. Amy Chaney was seen today for follow-up and chronic kidney disease. Diagnoses and all orders for this visit:    Stage 3b chronic kidney disease (CKD) ([address])  -     Basic metabolic panel; Future  -     Urinalysis with microscopic; Future  -     Protein / creatinine ratio, urine; Future  -     Albumin / creatinine urine ratio; Future    Benign essential hypertension    Other cirrhosis of liver (HCC)    Chronic combined systolic and diastolic CHF    Anemia, unspecified type  -     CBC and differential; Future  -     Iron Panel (Includes Ferritin, Iron Sat%, Iron, and TIBC); Future    Vitamin D insufficiency    Insulin-dependent diabetes mellitus        Assessment/Plan:  1. CKD stage 3b in setting of diabetes, hypertensive nephrosclerosis, ischemic cardiomyopathy as well as cirrhosis  -sCr 2.13 with eGFR low 30s.  -suspect progressive CKD in part due to uncontrolled blood sugars(last A1C 9.8 this month)  -b/l sCr appears to be 1.4-1.6 previously, with rise in sCr to 2.22 as of Feb. 2020. Episode of LOBO likely d/t sepsis in setting of left foot abscess at the time.   -monitor BMP  -recent UA showed 1+ protein, UpCr 2.03g-->1.25g as of 10/23/23  -need ongoing glycemic control to slow down progression of CKD  -has been to CKD education class  -CKD education booklet provided previously     2. HTN - BP well controlled in office  -on lasix 80mg twice daily, lisinopril 5mg daily, metoprolol 25mg twice daily   -avoid high salt/sodium diet  -avoid caffeinated beverages     3. DM2, uncontrolled - last A1C 9.8 as of 10/23/23, following with Dr. Adriana Lnadon of endocrinology, continue lantus. Needs improved glycemic control. 4. Proteinuria likely d/t diabetic nephropathy in setting of uncontrolled diabetes - microalb:Cr was as high as 3.4g in March 2019, now down to 1.25g as of 10/23/23 with microalb:Cr 607mg/g - monitor this. Improving with low dose lisinopril   -anti PLA2R, dsDNA, HIV, RPR, complements, SPEP/UPEP/FLC, antiGBM, chronic hep panel, ANCA panel, LISA negative  -cryo + in setting of cirrhosis, RF - negative when repeated in Jan. 2022     5. Ischemic cardiomyopathy with EF 50% - diuretics as above, monitor daily weight, follows with cardiology outpatient, Dr. Josse Escalona     6. Vitamin D insufficiency - may take 1000u daily over the counter vitamin D as last level 25. 5(low) as of 10/17/22, monitor vitamin D level annually     7. Mild anemia - last Hgb 11.4 as of 10/23/23     RTC in in 3 months. Obtain blood and urine testing before next visit. SUBJECTIVE / INTERVAL HISTORY:  70 y.o. male presents in follow up of CKD. Robert Khan went to there ER with cough. Was testing for viruses that were negative. Denies NSAID use. HTN - BP at home well controlled  Dm2 - blood sugars still elevated    Review of Systems   Constitutional:  Negative for chills and fever. HENT:  Negative for sore throat. Eyes:  Negative for visual disturbance. Respiratory:  Negative for cough and shortness of breath. Cardiovascular:  Negative for chest pain and leg swelling. Gastrointestinal:  Negative for abdominal pain, constipation, diarrhea, nausea and vomiting. Endocrine: Negative for polyuria. Genitourinary:  Negative for decreased urine volume, difficulty urinating, dysuria and hematuria. Musculoskeletal:  Negative for back pain and myalgias. Skin:  Negative for rash. Neurological:  Negative for dizziness, light-headedness and numbness. Psychiatric/Behavioral:  Negative for confusion. OBJECTIVE:  /74 (BP Location: Left arm, Patient Position: Sitting, Cuff Size: Standard)   Pulse 64   Ht 5' 10" (1.778 m)   Wt 86.6 kg (191 lb)   BMI 27.41 kg/m²  Body mass index is 27.41 kg/m². Physical exam:  Physical Exam  Vitals reviewed. Constitutional:       General: He is not in acute distress.      Appearance: Normal appearance. He is well-developed. He is not diaphoretic. HENT:      Head: Normocephalic and atraumatic. Nose: Nose normal.      Mouth/Throat:      Mouth: Mucous membranes are moist.      Pharynx: No oropharyngeal exudate. Eyes:      General: No scleral icterus. Right eye: No discharge. Left eye: No discharge. Comments: eyeglasses   Neck:      Thyroid: No thyromegaly. Cardiovascular:      Rate and Rhythm: Normal rate and regular rhythm. Heart sounds: Normal heart sounds. Pulmonary:      Effort: Pulmonary effort is normal.      Breath sounds: Normal breath sounds. No wheezing or rales. Abdominal:      General: Bowel sounds are normal. There is no distension. Palpations: Abdomen is soft. Tenderness: There is no abdominal tenderness. Musculoskeletal:         General: No swelling. Normal range of motion. Cervical back: Neck supple. Lymphadenopathy:      Cervical: No cervical adenopathy. Skin:     General: Skin is warm and dry. Coloration: Skin is not jaundiced. Findings: No rash. Comments: Erythema over right lower shin with excoriation noted   Neurological:      General: No focal deficit present. Mental Status: He is alert. Comments: awake   Psychiatric:         Mood and Affect: Mood normal.         Behavior: Behavior normal.         Medications:    Current Outpatient Medications:     aspirin 81 mg chewable tablet, Chew 1 tablet daily for 30 days, Disp: 30 tablet, Rfl: 0    atorvastatin (LIPITOR) 40 mg tablet, TAKE 1 TABLET BY MOUTH EVERY DAY, Disp: 90 tablet, Rfl: 3    dextromethorphan 15 MG/5ML syrup, Take 10 mL (30 mg total) by mouth 4 (four) times a day as needed for cough, Disp: 120 mL, Rfl: 0    furosemide (LASIX) 40 mg tablet, Take 2 tablets (80 mg total) by mouth 2 (two) times a day Patient takes two 40 mg tablets to make 80mg daily. , Disp: 180 tablet, Rfl: 1    guaiFENesin (MUCINEX) 600 mg 12 hr tablet, Take 2 tablets (1,200 mg total) by mouth 2 (two) times a day, Disp: 28 tablet, Rfl: 0    Lantus SoloStar 100 units/mL injection pen, Inject 30 Units under the skin every morning (Patient taking differently: Inject 35 Units under the skin every morning), Disp: , Rfl:     latanoprost (XALATAN) 0.005 % ophthalmic solution, Administer 0.0005 drops to both eyes daily at bedtime, Disp: , Rfl:     levothyroxine 25 mcg tablet, Take 25 mcg by mouth daily, Disp: , Rfl:     lisinopril (ZESTRIL) 5 mg tablet, Take 1 tablet (5 mg total) by mouth daily, Disp: 90 tablet, Rfl: 1    metoprolol tartrate (LOPRESSOR) 25 mg tablet, TAKE 1 TABLET (25 MG TOTAL) BY MOUTH EVERY 12 (TWELVE) HOURS (Patient taking differently: Take 25 mg by mouth 2 (two) times a day), Disp: 180 tablet, Rfl: 3    TRUE METRIX BLOOD GLUCOSE TEST test strip, TEST FOUR TIMES DAILY, Disp: , Rfl: 11    Unifine Pentips 31G X 8 MM MISC, , Disp: , Rfl:     acetaminophen (TYLENOL) 325 mg tablet, Take 2 tablets (650 mg total) by mouth every 6 (six) hours as needed (pain) (Patient not taking: Reported on 7/15/2022), Disp: , Rfl: 0    Blood Pressure Monitoring ([employer] Blood Pressure Monitor) KIT, Use 2 (two) times a day Automatic BP cuff. Measure BP twice daily. (Patient not taking: Reported on 3/6/2023), Disp: 1 kit, Rfl: 0    mupirocin (BACTROBAN) 2 % ointment, Apply topically daily (Patient not taking: Reported on 3/6/2023), Disp: 22 g, Rfl: 1    NovoLIN 70/30 FlexPen (70-30) 100 UNIT/ML injection pen, , Disp: , Rfl:     NovoLOG FlexPen 100 units/mL injection pen, , Disp: , Rfl:     promethazine (PHENERGAN) 25 mg tablet, Take 1 tablet (25 mg total) by mouth every 6 (six) hours as needed for nausea or vomiting for up to 10 days (Patient not taking: Reported on 10/25/2023), Disp: 30 tablet, Rfl: 0    Allergies:   Allergies as of 10/25/2023    (No Known Allergies)       The following portions of the patient's history were reviewed and updated as appropriate: past family history, past surgical history and problem list.    Laboratory Results:  Lab Results   Component Value Date    SODIUM 140 10/23/2023    K 4.6 10/23/2023     10/23/2023    CO2 28 10/23/2023    BUN 46 (H) 10/23/2023    CREATININE 2.13 (H) 10/23/2023    GLUC 311 (H) 07/08/2022    CALCIUM 8.5 10/23/2023        Lab Results   Component Value Date    .9 (H) 10/17/2022    CALCIUM 8.5 10/23/2023    PHOS 4.7 (H) 10/23/2023       Portions of the record may have been created with voice recognition software. Occasional wrong word or "sound a like" substitutions may have occurred due to the inherent limitations of voice recognition software. Read the chart carefully and recognize, using context, where substitutions have occurred.

## 2023-10-25 NOTE — PATIENT INSTRUCTIONS
1. CKD stage 3b in setting of diabetes, hypertensive nephrosclerosis, ischemic cardiomyopathy as well as cirrhosis  -sCr 2.13 with eGFR low 30s.  -suspect progressive CKD in part due to uncontrolled blood sugars(last A1C 9.8 this month)  -b/l sCr appears to be 1.4-1.6 previously, with rise in sCr to 2.22 as of Feb. 2020. Episode of LOBO likely d/t sepsis in setting of left foot abscess at the time.   -monitor BMP  -recent UA showed 1+ protein, UpCr 2.03g-->1.25g as of 10/23/23  -need ongoing glycemic control to slow down progression of CKD  -has been to CKD education class  -CKD education booklet provided previously     2. HTN - BP well controlled in office  -on lasix 80mg twice daily, lisinopril 5mg daily, metoprolol 25mg twice daily   -avoid high salt/sodium diet  -avoid caffeinated beverages     3. DM2, uncontrolled - last A1C 9.8 as of 10/23/23, following with Dr. Adriana Landon of endocrinology, continue lantus. Needs improved glycemic control. 4. Proteinuria likely d/t diabetic nephropathy in setting of uncontrolled diabetes - microalb:Cr was as high as 3.4g in March 2019, now down to 1.25g as of 10/23/23 with microalb:Cr 607mg/g - monitor this. Improving with low dose lisinopril   -anti PLA2R, dsDNA, HIV, RPR, complements, SPEP/UPEP/FLC, antiGBM, chronic hep panel, ANCA panel, LISA negative  -cryo + in setting of cirrhosis, RF - negative when repeated in Jan. 2022     5. Ischemic cardiomyopathy with EF 50% - diuretics as above, monitor daily weight, follows with cardiology outpatient, Dr. Mike Espinal     6. Vitamin D insufficiency - may take 1000u daily over the counter vitamin D as last level 25. 5(low) as of 10/17/22, monitor vitamin D level annually     7. Mild anemia - last Hgb 11.4 as of 10/23/23, monitor CBC     RTC in in 3 months. Obtain blood and urine testing before next visit.

## 2023-11-28 ENCOUNTER — OFFICE VISIT (OUTPATIENT)
Dept: PODIATRY | Facility: CLINIC | Age: 63
End: 2023-11-28
Payer: COMMERCIAL

## 2023-11-28 VITALS
OXYGEN SATURATION: 98 % | DIASTOLIC BLOOD PRESSURE: 85 MMHG | BODY MASS INDEX: 27.49 KG/M2 | HEART RATE: 70 BPM | HEIGHT: 70 IN | SYSTOLIC BLOOD PRESSURE: 131 MMHG | WEIGHT: 192 LBS

## 2023-11-28 DIAGNOSIS — B35.1 ONYCHOMYCOSIS: Primary | ICD-10-CM

## 2023-11-28 DIAGNOSIS — E11.42 DIABETIC POLYNEUROPATHY ASSOCIATED WITH TYPE 2 DIABETES MELLITUS (HCC): ICD-10-CM

## 2023-11-28 DIAGNOSIS — Z89.422 HISTORY OF AMPUTATION OF LESSER TOE OF LEFT FOOT (HCC): ICD-10-CM

## 2023-11-28 PROCEDURE — 11721 DEBRIDE NAIL 6 OR MORE: CPT | Performed by: PODIATRIST

## 2023-11-28 NOTE — PROGRESS NOTES
Assessment/Plan:     The patient's clinical examination today is significant for thickened and dystrophic nail plates x 9 with discoloration and subungual debris consistent with onychomycosis. There are no open lesions. There are no interdigital macerations. There are no pretrophic changes nor callosities. The patient has a history of a partial left fifth ray resection secondary to osteomyelitis. The pedal nail plates were sharply debrided with a sterile nail clipper x 9. The pedal nail plates were then mechanically reduced in thickness and girth utilizing a rotary bur. There are no other acute pedal issues. His most recent hemoglobin A1c was 9.8 on October 2023, prior to this it was 10.1 in July 2023. Recommend follow-up in 2 to 3 months for continued at risk foot care. Diagnoses and all orders for this visit:    Onychomycosis    History of amputation of lesser toe of left foot ([address])    Diabetic polyneuropathy associated with type 2 diabetes mellitus (HCC)          Subjective:     Patient ID: Milderd Tasha is a 70 y.o. male. The patient presents today for follow-up of painful elongated pedal nail plates bilaterally. The patient has been doing fairly well and has no other acute pedal issues today. The patient does have a prior history of a partial left fifth ray resection secondary to osteomyelitis. Review of Systems   Constitutional: Negative. HENT: Negative. Eyes: Negative. Respiratory: Negative. Cardiovascular: Negative. Endocrine: Negative. Musculoskeletal: Negative. Neurological: Negative. Hematological: Negative. Psychiatric/Behavioral: Negative. Objective:     Physical Exam  Vitals reviewed. Constitutional:       Appearance: Normal appearance. HENT:      Head: Normocephalic and atraumatic. Nose: Nose normal.   Eyes:      Conjunctiva/sclera: Conjunctivae normal.      Pupils: Pupils are equal, round, and reactive to light. Pulmonary:      Effort: Pulmonary effort is normal.   Musculoskeletal:      Left Lower Extremity: (Partial left fifth ray amputation)  Feet:      Comments: The patient's clinical examination today is significant for thickened and dystrophic nail plates x 9 with discoloration and subungual debris consistent with onychomycosis. There are no open lesions. There are no interdigital macerations. There are no pretrophic changes nor callosities. The patient has a history of a partial left fifth ray resection secondary to osteomyelitis. Skin:     General: Skin is warm. Capillary Refill: Capillary refill takes less than 2 seconds. Neurological:      General: No focal deficit present. Mental Status: He is alert and oriented to person, place, and time. Psychiatric:         Mood and Affect: Mood normal.         Behavior: Behavior normal.         Thought Content:  Thought content normal.

## 2024-02-07 ENCOUNTER — TELEPHONE (OUTPATIENT)
Dept: NEPHROLOGY | Facility: CLINIC | Age: 64
End: 2024-02-07

## 2024-02-07 NOTE — TELEPHONE ENCOUNTER
Left voicemail for the patient reminding to please complete labwork prior to 2/14 appointment with Dr. Thomas. Advised patient to call back with any questions or  Concerns.

## 2024-02-09 NOTE — TELEPHONE ENCOUNTER
Left voicemail for the patient reminding to please complete labwork prior to 2/14 appointment with Dr. Thomas. Advised patient to call back with any questions or  Concerns

## 2024-02-12 ENCOUNTER — APPOINTMENT (OUTPATIENT)
Dept: LAB | Facility: CLINIC | Age: 64
End: 2024-02-12
Payer: COMMERCIAL

## 2024-02-12 ENCOUNTER — TELEPHONE (OUTPATIENT)
Dept: NEPHROLOGY | Facility: CLINIC | Age: 64
End: 2024-02-12

## 2024-02-12 DIAGNOSIS — N18.32 STAGE 3B CHRONIC KIDNEY DISEASE (CKD) (HCC): ICD-10-CM

## 2024-02-12 DIAGNOSIS — D64.9 ANEMIA, UNSPECIFIED TYPE: ICD-10-CM

## 2024-02-12 LAB
ANION GAP SERPL CALCULATED.3IONS-SCNC: 7 MMOL/L
BACTERIA UR QL AUTO: ABNORMAL /HPF
BASOPHILS # BLD AUTO: 0.07 THOUSANDS/ÂΜL (ref 0–0.1)
BASOPHILS NFR BLD AUTO: 1 % (ref 0–1)
BILIRUB UR QL STRIP: NEGATIVE
BUN SERPL-MCNC: 66 MG/DL (ref 5–25)
CALCIUM SERPL-MCNC: 8.6 MG/DL (ref 8.4–10.2)
CHLORIDE SERPL-SCNC: 104 MMOL/L (ref 96–108)
CLARITY UR: CLEAR
CO2 SERPL-SCNC: 30 MMOL/L (ref 21–32)
COLOR UR: ABNORMAL
CREAT SERPL-MCNC: 2.74 MG/DL (ref 0.6–1.3)
CREAT UR-MCNC: 56.8 MG/DL
CREAT UR-MCNC: 58.3 MG/DL
EOSINOPHIL # BLD AUTO: 0.56 THOUSAND/ÂΜL (ref 0–0.61)
EOSINOPHIL NFR BLD AUTO: 8 % (ref 0–6)
ERYTHROCYTE [DISTWIDTH] IN BLOOD BY AUTOMATED COUNT: 13.4 % (ref 11.6–15.1)
FERRITIN SERPL-MCNC: 128 NG/ML (ref 24–336)
GFR SERPL CREATININE-BSD FRML MDRD: 23 ML/MIN/1.73SQ M
GLUCOSE P FAST SERPL-MCNC: 68 MG/DL (ref 65–99)
GLUCOSE UR STRIP-MCNC: NEGATIVE MG/DL
HCT VFR BLD AUTO: 37.8 % (ref 36.5–49.3)
HGB BLD-MCNC: 12 G/DL (ref 12–17)
HGB UR QL STRIP.AUTO: NEGATIVE
IMM GRANULOCYTES # BLD AUTO: 0.05 THOUSAND/UL (ref 0–0.2)
IMM GRANULOCYTES NFR BLD AUTO: 1 % (ref 0–2)
IRON SATN MFR SERPL: 20 % (ref 15–50)
IRON SERPL-MCNC: 53 UG/DL (ref 50–212)
KETONES UR STRIP-MCNC: NEGATIVE MG/DL
LEUKOCYTE ESTERASE UR QL STRIP: NEGATIVE
LYMPHOCYTES # BLD AUTO: 1.95 THOUSANDS/ÂΜL (ref 0.6–4.47)
LYMPHOCYTES NFR BLD AUTO: 26 % (ref 14–44)
MCH RBC QN AUTO: 28.6 PG (ref 26.8–34.3)
MCHC RBC AUTO-ENTMCNC: 31.7 G/DL (ref 31.4–37.4)
MCV RBC AUTO: 90 FL (ref 82–98)
MICROALBUMIN UR-MCNC: 200.4 MG/L
MICROALBUMIN/CREAT 24H UR: 353 MG/G CREATININE (ref 0–30)
MONOCYTES # BLD AUTO: 0.66 THOUSAND/ÂΜL (ref 0.17–1.22)
MONOCYTES NFR BLD AUTO: 9 % (ref 4–12)
NEUTROPHILS # BLD AUTO: 4.13 THOUSANDS/ÂΜL (ref 1.85–7.62)
NEUTS SEG NFR BLD AUTO: 55 % (ref 43–75)
NITRITE UR QL STRIP: NEGATIVE
NON-SQ EPI CELLS URNS QL MICRO: ABNORMAL /HPF
NRBC BLD AUTO-RTO: 0 /100 WBCS
PH UR STRIP.AUTO: 5.5 [PH]
PLATELET # BLD AUTO: 260 THOUSANDS/UL (ref 149–390)
PMV BLD AUTO: 9.7 FL (ref 8.9–12.7)
POTASSIUM SERPL-SCNC: 4.3 MMOL/L (ref 3.5–5.3)
PROT UR STRIP-MCNC: ABNORMAL MG/DL
PROT UR-MCNC: 44 MG/DL
PROT/CREAT UR: 0.75 MG/G{CREAT} (ref 0–0.1)
RBC # BLD AUTO: 4.2 MILLION/UL (ref 3.88–5.62)
RBC #/AREA URNS AUTO: ABNORMAL /HPF
SODIUM SERPL-SCNC: 141 MMOL/L (ref 135–147)
SP GR UR STRIP.AUTO: 1.01 (ref 1–1.03)
TIBC SERPL-MCNC: 260 UG/DL (ref 250–450)
UIBC SERPL-MCNC: 207 UG/DL (ref 155–355)
UROBILINOGEN UR STRIP-ACNC: <2 MG/DL
WBC # BLD AUTO: 7.42 THOUSAND/UL (ref 4.31–10.16)
WBC #/AREA URNS AUTO: ABNORMAL /HPF

## 2024-02-12 PROCEDURE — 84156 ASSAY OF PROTEIN URINE: CPT

## 2024-02-12 PROCEDURE — 82043 UR ALBUMIN QUANTITATIVE: CPT

## 2024-02-12 PROCEDURE — 83540 ASSAY OF IRON: CPT

## 2024-02-12 PROCEDURE — 83550 IRON BINDING TEST: CPT

## 2024-02-12 PROCEDURE — 82728 ASSAY OF FERRITIN: CPT

## 2024-02-12 PROCEDURE — 85025 COMPLETE CBC W/AUTO DIFF WBC: CPT

## 2024-02-12 PROCEDURE — 36415 COLL VENOUS BLD VENIPUNCTURE: CPT

## 2024-02-12 PROCEDURE — 81001 URINALYSIS AUTO W/SCOPE: CPT

## 2024-02-12 PROCEDURE — 82570 ASSAY OF URINE CREATININE: CPT

## 2024-02-12 PROCEDURE — 80048 BASIC METABOLIC PNL TOTAL CA: CPT

## 2024-02-12 NOTE — TELEPHONE ENCOUNTER
Patient is aware and stating that last week he had diarrhea and lost a little weight. Now he doesn't have diarrhea. No further questions at this time.

## 2024-02-12 NOTE — TELEPHONE ENCOUNTER
----- Message from Lissa Thomas DO sent at 2/12/2024  2:50 PM EST -----  Proteinuria has improved but sCr has risen to 2.74. Please ensure patient is adequately hydrated, denies diarrhea/vomiting/low blood pressure, and is not complaining of rapid weight gain. Thanks.

## 2024-02-14 ENCOUNTER — OFFICE VISIT (OUTPATIENT)
Dept: NEPHROLOGY | Facility: CLINIC | Age: 64
End: 2024-02-14
Payer: COMMERCIAL

## 2024-02-14 VITALS
BODY MASS INDEX: 27.77 KG/M2 | OXYGEN SATURATION: 99 % | DIASTOLIC BLOOD PRESSURE: 66 MMHG | SYSTOLIC BLOOD PRESSURE: 120 MMHG | WEIGHT: 194 LBS | HEART RATE: 65 BPM | HEIGHT: 70 IN

## 2024-02-14 DIAGNOSIS — N18.4 STAGE 4 CHRONIC KIDNEY DISEASE (HCC): Primary | ICD-10-CM

## 2024-02-14 DIAGNOSIS — R80.1 PERSISTENT PROTEINURIA: ICD-10-CM

## 2024-02-14 DIAGNOSIS — Z79.4 TYPE 2 DIABETES MELLITUS WITH HYPERGLYCEMIA, WITH LONG-TERM CURRENT USE OF INSULIN (HCC): ICD-10-CM

## 2024-02-14 DIAGNOSIS — E55.9 VITAMIN D INSUFFICIENCY: ICD-10-CM

## 2024-02-14 DIAGNOSIS — E11.65 TYPE 2 DIABETES MELLITUS WITH HYPERGLYCEMIA, WITH LONG-TERM CURRENT USE OF INSULIN (HCC): ICD-10-CM

## 2024-02-14 DIAGNOSIS — I10 BENIGN ESSENTIAL HYPERTENSION: ICD-10-CM

## 2024-02-14 DIAGNOSIS — E11.42 DIABETIC POLYNEUROPATHY ASSOCIATED WITH TYPE 2 DIABETES MELLITUS (HCC): ICD-10-CM

## 2024-02-14 DIAGNOSIS — I50.42 CHRONIC COMBINED SYSTOLIC AND DIASTOLIC CHF (CONGESTIVE HEART FAILURE) (HCC): ICD-10-CM

## 2024-02-14 PROCEDURE — 99215 OFFICE O/P EST HI 40 MIN: CPT | Performed by: INTERNAL MEDICINE

## 2024-02-14 RX ORDER — FUROSEMIDE 40 MG/1
80 TABLET ORAL DAILY
Start: 2024-02-14

## 2024-02-14 NOTE — PROGRESS NOTES
NEPHROLOGY OUTPATIENT PROGRESS NOTE   Noel Khan 64 y.o. male MRN: [id number]  DATE: 2/14/2024  Reason for visit:   Chief Complaint   Patient presents with    Follow-up    Chronic Kidney Disease        Patient Instructions   1. LOBO vs CKD stage 4 in setting of diabetes, hypertensive nephrosclerosis, ischemic cardiomyopathy as well as cirrhosis  -sCr 2.74 with eGFR low 30s.  -suspect progressive CKD in part due to uncontrolled blood sugars(last A1C 9.8 on last check)  -b/l sCr appears to be 1.4-1.6 previously, with rise in sCr to 2.22 as of Feb. 2020. Episode of LOBO likely d/t sepsis in setting of left foot abscess at the time.  -concern for recurrent LOBO in setting of cough/diarrhea   -monitor BMP  -recent UA showed 1+ protein, UpCr 0.75g as of 2/12/24  -need ongoing glycemic control to slow down progression of CKD  -has been to CKD education class  -CKD education booklet provided previously     2. HTN - BP well controlled in office  -on lasix 80mg daily, lisinopril 5mg daily, metoprolol 25mg twice daily   -avoid high salt/sodium diet  -avoid caffeinated beverages     3. DM2, uncontrolled - last A1C 9.8 as of 10/23/23, following with Dr. Adame of endocrinology, continue lantus. Needs improved glycemic control.     4. Proteinuria likely d/t diabetic nephropathy in setting of uncontrolled diabetes - microalb:Cr was as high as 3.4g in March 2019, now down to 0.75g as of 2/12/24 with microalb:Cr 353mg/g - monitor this. Improving with low dose lisinopril   -anti PLA2R, dsDNA, HIV, RPR, complements, SPEP/UPEP/FLC, antiGBM, chronic hep panel, ANCA panel, LISA negative  -cryo + in setting of cirrhosis, RF - negative when repeated in Jan. 2022     5. Ischemic cardiomyopathy with EF 50% - diuretics as above, monitor daily weight, follows with cardiology outpatient, Dr. Figueroa     6. Vitamin D insufficiency - may take 1000u daily over the counter vitamin D as last level 25.5(low) as of 10/17/22, monitor vitamin D level  annually     7. Mild anemia - resolved, last Hgb 12 as of this month     RTC in in 3 months.  Obtain blood and urine testing next week.      Noel was seen today for follow-up and chronic kidney disease.    Diagnoses and all orders for this visit:    Stage 4 chronic kidney disease (HCC)  -     Basic metabolic panel; Future  -     Chloride, urine, random; Future  -     Sodium, urine, random; Future  -     Creatinine, urine, random; Future  -     Urea nitrogen, urine; Future    Benign essential hypertension    Chronic combined systolic and diastolic CHF  -     furosemide (LASIX) 40 mg tablet; Take 2 tablets (80 mg total) by mouth daily Patient takes two 40 mg tablets to make 80mg daily.    Persistent proteinuria    Vitamin D insufficiency    Insulin-dependent diabetes mellitus    Diabetic polyneuropathy associated with type 2 diabetes mellitus (HCC)        Assessment/Plan:  1. LOBO vs CKD stage 4 in setting of diabetes, hypertensive nephrosclerosis, ischemic cardiomyopathy as well as cirrhosis  -sCr 2.74 with eGFR low 30s.  -suspect progressive CKD in part due to uncontrolled blood sugars(last A1C 9.8 on last check)  -b/l sCr appears to be 1.4-1.6 previously, with rise in sCr to 2.22 as of Feb. 2020. Episode of LOBO likely d/t sepsis in setting of left foot abscess at the time.  -concern for recurrent LOBO in setting of cough/diarrhea   -monitor BMP  -recent UA showed 1+ protein, UpCr 0.75g as of 2/12/24  -need ongoing glycemic control to slow down progression of CKD  -has been to CKD education class  -CKD education booklet provided previously     2. HTN - BP well controlled in office  -on lasix 80mg daily, lisinopril 5mg daily, metoprolol 25mg twice daily   -avoid high salt/sodium diet  -avoid caffeinated beverages     3. DM2, uncontrolled - last A1C 9.8 as of 10/23/23, following with Dr. Adame of endocrinology, continue lantus. Needs improved glycemic control.     4. Proteinuria likely d/t diabetic nephropathy in  "setting of uncontrolled diabetes - microalb:Cr was as high as 3.4g in March 2019, now down to 0.75g as of 2/12/24 with microalb:Cr 353mg/g - monitor this. Improving with low dose lisinopril   -anti PLA2R, dsDNA, HIV, RPR, complements, SPEP/UPEP/FLC, antiGBM, chronic hep panel, ANCA panel, LISA negative  -cryo + in setting of cirrhosis, RF - negative when repeated in Jan. 2022     5. Ischemic cardiomyopathy with EF 50% - diuretics as above, monitor daily weight, follows with cardiology outpatient, Dr. Figueroa     6. Vitamin D insufficiency - may take 1000u daily over the counter vitamin D as last level 25.5(low) as of 10/17/22, monitor vitamin D level annually     7. Mild anemia - resolved, last Hgb 12 as of this month     RTC in in 3 months.  Obtain blood and urine testing before next visit.     SUBJECTIVE / INTERVAL HISTORY:  64 y.o. male presents in follow up of CKD.     Noel Khan denies any recent illness/hospitalizations/medication changes since last office visit.    Denies NSAID use.  HTN - BP at home well controlled  Has lost weight. Is at about 185lbs. Denies leg edema.    Review of Systems   Constitutional:  Negative for chills and fever.   HENT:  Negative for sore throat.    Eyes:  Negative for visual disturbance.   Respiratory:  Negative for cough and shortness of breath.    Cardiovascular:  Negative for chest pain and leg swelling.   Gastrointestinal:  Negative for abdominal pain, constipation, diarrhea, nausea and vomiting.   Endocrine: Negative for polyuria.   Genitourinary:  Negative for decreased urine volume, difficulty urinating, dysuria and hematuria.   Musculoskeletal:  Negative for back pain and myalgias.   Skin:  Negative for rash.   Neurological:  Negative for dizziness, light-headedness and numbness.   Psychiatric/Behavioral:  Negative for confusion.        OBJECTIVE:  /66 (BP Location: Left arm, Patient Position: Sitting, Cuff Size: Adult)   Pulse 65   Ht 5' 10\" (1.778 m)   Wt 88 " kg (194 lb)   SpO2 99%   BMI 27.84 kg/m²  Body mass index is 27.84 kg/m².    Physical exam:  Physical Exam  Vitals reviewed.   Constitutional:       General: He is not in acute distress.     Appearance: Normal appearance. He is well-developed. He is not diaphoretic.   HENT:      Head: Normocephalic and atraumatic.      Nose: Nose normal.      Mouth/Throat:      Mouth: Mucous membranes are moist.      Pharynx: No oropharyngeal exudate.   Eyes:      General: No scleral icterus.        Right eye: No discharge.         Left eye: No discharge.      Comments: eyeglasses   Neck:      Thyroid: No thyromegaly.   Cardiovascular:      Rate and Rhythm: Normal rate and regular rhythm.      Heart sounds: Normal heart sounds.   Pulmonary:      Effort: Pulmonary effort is normal.      Breath sounds: Normal breath sounds. No wheezing or rales.   Abdominal:      General: Bowel sounds are normal. There is no distension.      Palpations: Abdomen is soft.      Tenderness: There is no abdominal tenderness.   Musculoskeletal:         General: No swelling. Normal range of motion.      Cervical back: Neck supple.   Lymphadenopathy:      Cervical: No cervical adenopathy.   Skin:     General: Skin is warm and dry.      Coloration: Skin is not jaundiced.      Findings: No rash.   Neurological:      General: No focal deficit present.      Mental Status: He is alert.      Comments: awake   Psychiatric:         Mood and Affect: Mood normal.         Behavior: Behavior normal.         Medications:    Current Outpatient Medications:     aspirin 81 mg chewable tablet, Chew 1 tablet daily for 30 days, Disp: 30 tablet, Rfl: 0    atorvastatin (LIPITOR) 40 mg tablet, TAKE 1 TABLET BY MOUTH EVERY DAY, Disp: 90 tablet, Rfl: 3    furosemide (LASIX) 40 mg tablet, Take 2 tablets (80 mg total) by mouth daily Patient takes two 40 mg tablets to make 80mg daily., Disp: , Rfl:     Lantus SoloStar 100 units/mL injection pen, Inject 30 Units under the skin every  morning (Patient taking differently: Inject 35 Units under the skin every morning), Disp: , Rfl:     levothyroxine 25 mcg tablet, Take 25 mcg by mouth daily, Disp: , Rfl:     lisinopril (ZESTRIL) 5 mg tablet, Take 1 tablet (5 mg total) by mouth daily, Disp: 90 tablet, Rfl: 1    metoprolol tartrate (LOPRESSOR) 25 mg tablet, TAKE 1 TABLET (25 MG TOTAL) BY MOUTH EVERY 12 (TWELVE) HOURS (Patient taking differently: Take 25 mg by mouth 2 (two) times a day), Disp: 180 tablet, Rfl: 3    TRUE METRIX BLOOD GLUCOSE TEST test strip, TEST FOUR TIMES DAILY, Disp: , Rfl: 11    Unifine Pentips 31G X 8 MM MISC, , Disp: , Rfl:     acetaminophen (TYLENOL) 325 mg tablet, Take 2 tablets (650 mg total) by mouth every 6 (six) hours as needed (pain) (Patient not taking: Reported on 7/15/2022), Disp: , Rfl: 0    Blood Pressure Monitoring ([employer] Blood Pressure Monitor) KIT, Use 2 (two) times a day Automatic BP cuff. Measure BP twice daily. (Patient not taking: Reported on 3/6/2023), Disp: 1 kit, Rfl: 0    dextromethorphan 15 MG/5ML syrup, Take 10 mL (30 mg total) by mouth 4 (four) times a day as needed for cough (Patient not taking: Reported on 11/28/2023), Disp: 120 mL, Rfl: 0    guaiFENesin (MUCINEX) 600 mg 12 hr tablet, Take 2 tablets (1,200 mg total) by mouth 2 (two) times a day (Patient not taking: Reported on 11/28/2023), Disp: 28 tablet, Rfl: 0    latanoprost (XALATAN) 0.005 % ophthalmic solution, Administer 0.0005 drops to both eyes daily at bedtime, Disp: , Rfl:     mupirocin (BACTROBAN) 2 % ointment, Apply topically daily (Patient not taking: Reported on 3/6/2023), Disp: 22 g, Rfl: 1    NovoLIN 70/30 FlexPen (70-30) 100 UNIT/ML injection pen, , Disp: , Rfl:     NovoLOG FlexPen 100 units/mL injection pen, , Disp: , Rfl:     promethazine (PHENERGAN) 25 mg tablet, Take 1 tablet (25 mg total) by mouth every 6 (six) hours as needed for nausea or vomiting for up to 10 days (Patient not taking: Reported on 10/25/2023), Disp: 30 tablet,  "Rfl: 0    Allergies:  Allergies as of 02/14/2024    (No Known Allergies)       The following portions of the patient's history were reviewed and updated as appropriate: past family history, past surgical history and problem list.    Laboratory Results:  Lab Results   Component Value Date    SODIUM 141 02/12/2024    K 4.3 02/12/2024     02/12/2024    CO2 30 02/12/2024    BUN 66 (H) 02/12/2024    CREATININE 2.74 (H) 02/12/2024    GLUC 311 (H) 07/08/2022    CALCIUM 8.6 02/12/2024        Lab Results   Component Value Date    .9 (H) 10/17/2022    CALCIUM 8.6 02/12/2024    PHOS 4.7 (H) 10/23/2023       Portions of the record may have been created with voice recognition software.  Occasional wrong word or \"sound a like\" substitutions may have occurred due to the inherent limitations of voice recognition software.  Read the chart carefully and recognize, using context, where substitutions have occurred.  "

## 2024-02-14 NOTE — PATIENT INSTRUCTIONS
1. LOBO vs CKD stage 4 in setting of diabetes, hypertensive nephrosclerosis, ischemic cardiomyopathy as well as cirrhosis  -sCr 2.74 with eGFR low 30s.  -suspect progressive CKD in part due to uncontrolled blood sugars(last A1C 9.8 on last check)  -b/l sCr appears to be 1.4-1.6 previously, with rise in sCr to 2.22 as of Feb. 2020. Episode of LOBO likely d/t sepsis in setting of left foot abscess at the time.  -concern for recurrent LOBO in setting of cough/diarrhea   -monitor BMP  -recent UA showed 1+ protein, UpCr 0.75g as of 2/12/24  -need ongoing glycemic control to slow down progression of CKD  -has been to CKD education class  -CKD education booklet provided previously     2. HTN - BP well controlled in office  -on lasix 80mg daily, lisinopril 5mg daily, metoprolol 25mg twice daily   -avoid high salt/sodium diet  -avoid caffeinated beverages     3. DM2, uncontrolled - last A1C 9.8 as of 10/23/23, following with Dr. Adame of endocrinology, continue lantus. Needs improved glycemic control.     4. Proteinuria likely d/t diabetic nephropathy in setting of uncontrolled diabetes - microalb:Cr was as high as 3.4g in March 2019, now down to 0.75g as of 2/12/24 with microalb:Cr 353mg/g - monitor this. Improving with low dose lisinopril   -anti PLA2R, dsDNA, HIV, RPR, complements, SPEP/UPEP/FLC, antiGBM, chronic hep panel, ANCA panel, LISA negative  -cryo + in setting of cirrhosis, RF - negative when repeated in Jan. 2022     5. Ischemic cardiomyopathy with EF 50% - diuretics as above, monitor daily weight, follows with cardiology outpatient, Dr. Figueroa     6. Vitamin D insufficiency - may take 1000u daily over the counter vitamin D as last level 25.5(low) as of 10/17/22, monitor vitamin D level annually     7. Mild anemia - resolved, last Hgb 12 as of this month     RTC in in 3 months.  Obtain blood and urine testing next week.

## 2024-02-22 ENCOUNTER — APPOINTMENT (OUTPATIENT)
Dept: LAB | Facility: CLINIC | Age: 64
End: 2024-02-22
Payer: COMMERCIAL

## 2024-02-22 DIAGNOSIS — N18.4 STAGE 4 CHRONIC KIDNEY DISEASE (HCC): ICD-10-CM

## 2024-02-22 LAB
ANION GAP SERPL CALCULATED.3IONS-SCNC: 7 MMOL/L
BUN SERPL-MCNC: 41 MG/DL (ref 5–25)
CALCIUM SERPL-MCNC: 8.8 MG/DL (ref 8.4–10.2)
CHLORIDE SERPL-SCNC: 104 MMOL/L (ref 96–108)
CHLORIDE UR-SCNC: 79 MMOL/L
CO2 SERPL-SCNC: 28 MMOL/L (ref 21–32)
CREAT SERPL-MCNC: 2.03 MG/DL (ref 0.6–1.3)
CREAT UR-MCNC: 38.3 MG/DL
GFR SERPL CREATININE-BSD FRML MDRD: 33 ML/MIN/1.73SQ M
GLUCOSE P FAST SERPL-MCNC: 64 MG/DL (ref 65–99)
POTASSIUM SERPL-SCNC: 4 MMOL/L (ref 3.5–5.3)
SODIUM 24H UR-SCNC: 98 MOL/L
SODIUM SERPL-SCNC: 139 MMOL/L (ref 135–147)
UUN 24H UR-MCNC: 314 MG/DL

## 2024-02-22 PROCEDURE — 84540 ASSAY OF URINE/UREA-N: CPT

## 2024-02-22 PROCEDURE — 80048 BASIC METABOLIC PNL TOTAL CA: CPT

## 2024-02-22 PROCEDURE — 36415 COLL VENOUS BLD VENIPUNCTURE: CPT

## 2024-02-22 PROCEDURE — 82570 ASSAY OF URINE CREATININE: CPT

## 2024-02-22 PROCEDURE — 82436 ASSAY OF URINE CHLORIDE: CPT

## 2024-02-22 PROCEDURE — 84300 ASSAY OF URINE SODIUM: CPT

## 2024-02-28 ENCOUNTER — OFFICE VISIT (OUTPATIENT)
Dept: PODIATRY | Facility: CLINIC | Age: 64
End: 2024-02-28
Payer: COMMERCIAL

## 2024-02-28 VITALS
HEART RATE: 72 BPM | OXYGEN SATURATION: 99 % | BODY MASS INDEX: 27.63 KG/M2 | DIASTOLIC BLOOD PRESSURE: 79 MMHG | SYSTOLIC BLOOD PRESSURE: 123 MMHG | HEIGHT: 70 IN | WEIGHT: 193 LBS

## 2024-02-28 DIAGNOSIS — B35.1 ONYCHOMYCOSIS: Primary | ICD-10-CM

## 2024-02-28 DIAGNOSIS — Z89.422 HISTORY OF AMPUTATION OF LESSER TOE OF LEFT FOOT (HCC): ICD-10-CM

## 2024-02-28 DIAGNOSIS — E11.42 DIABETIC POLYNEUROPATHY ASSOCIATED WITH TYPE 2 DIABETES MELLITUS (HCC): ICD-10-CM

## 2024-02-28 PROCEDURE — 11721 DEBRIDE NAIL 6 OR MORE: CPT | Performed by: PODIATRIST

## 2024-02-28 NOTE — PROGRESS NOTES
Assessment/Plan:     The patient's clinical examination today is significant for thickened and dystrophic nail plates x 8 with discoloration and subungual debris consistent with onychomycosis.  There are no open lesions.  The right second pedal nail plate has autolyzed.  There are no signs of infection.  The nailbed has reepithelialized.  There are no interdigital macerations.  There are no pretrophic changes nor callosities.  The patient has a history of a partial left fifth ray resection secondary to osteomyelitis.      The pedal nail plates were sharply debrided with a sterile nail clipper x 9.  The pedal nail plates were then mechanically reduced in thickness and girth utilizing a rotary bur.  There are no other acute pedal issues. His most recent hemoglobin A1c was 9.8 on October 2023, prior to this it was 10.1 in July 2023.     Recommend follow-up in 2 to 3 months for continued at risk foot care.     Diagnoses and all orders for this visit:    Onychomycosis    History of amputation of lesser toe of left foot (HCC)    Diabetic polyneuropathy associated with type 2 diabetes mellitus (HCC)          Subjective:     Patient ID: Noel Khan is a 64 y.o. male.    The patient presents today for follow-up at risk diabetic footcare.  He does have a history of prior left fifth ray amputation secondary to osteomyelitis.  He did note that recently he noticed that his right second toenail plate fell off.  He cannot recall any injury or trauma to the toe.  He denies any pain or discomfort.  He does have a history of known peripheral neuropathy stemming from his diabetes.        Review of Systems   Constitutional: Negative.    HENT: Negative.     Eyes: Negative.    Respiratory: Negative.     Cardiovascular: Negative.    Endocrine: Negative.    Musculoskeletal: Negative.    Neurological: Negative.    Hematological: Negative.    Psychiatric/Behavioral: Negative.           Objective:     Physical Exam  Vitals reviewed.    Constitutional:       Appearance: Normal appearance.   HENT:      Head: Normocephalic and atraumatic.      Nose: Nose normal.   Eyes:      Conjunctiva/sclera: Conjunctivae normal.      Pupils: Pupils are equal, round, and reactive to light.   Cardiovascular:      Pulses:           Dorsalis pedis pulses are 2+ on the right side and 2+ on the left side.        Posterior tibial pulses are 1+ on the right side and 1+ on the left side.   Pulmonary:      Effort: Pulmonary effort is normal.   Musculoskeletal:      Left Lower Extremity: (Partial left fifth ray resection)  Feet:      Comments: The patient's clinical examination today is significant for thickened and dystrophic nail plates x 8 with discoloration and subungual debris consistent with onychomycosis.  There are no open lesions.  The right second pedal nail plate has autolyzed.  There are no signs of infection.  The nailbed has reepithelialized.  There are no interdigital macerations.  There are no pretrophic changes nor callosities.  The patient has a history of a partial left fifth ray resection secondary to osteomyelitis.   Skin:     General: Skin is warm.      Capillary Refill: Capillary refill takes less than 2 seconds.   Neurological:      General: No focal deficit present.      Mental Status: He is alert and oriented to person, place, and time.   Psychiatric:         Mood and Affect: Mood normal.         Behavior: Behavior normal.         Thought Content: Thought content normal.

## 2024-05-13 ENCOUNTER — TELEPHONE (OUTPATIENT)
Dept: NEPHROLOGY | Facility: CLINIC | Age: 64
End: 2024-05-13

## 2024-05-13 DIAGNOSIS — I10 BENIGN ESSENTIAL HYPERTENSION: Primary | ICD-10-CM

## 2024-05-13 DIAGNOSIS — N18.4 STAGE 4 CHRONIC KIDNEY DISEASE (HCC): ICD-10-CM

## 2024-05-13 DIAGNOSIS — R80.1 PERSISTENT PROTEINURIA: ICD-10-CM

## 2024-05-13 NOTE — TELEPHONE ENCOUNTER
----- Message from Lissa Thomas DO sent at 5/13/2024 10:18 AM EDT -----  BMP, UA, Upcr and microalb:Cr, thanks!  ----- Message -----  From: Mojgan Jose  Sent: 5/9/2024   1:57 PM EDT  To: Lissa Thomas DO    Patient done labs on 2/22. Please  advise if patient needs anything additional before appt on 5/23.       PA sent through [employer]. Waiting for response.

## 2024-05-17 ENCOUNTER — TELEPHONE (OUTPATIENT)
Dept: NEPHROLOGY | Facility: CLINIC | Age: 64
End: 2024-05-17

## 2024-05-17 NOTE — TELEPHONE ENCOUNTER
Left voicemail for the patient reminding to please complete labwork prior to 5/21 appointment with Dr. Thomas. Advised patient to call back with any questions or  Concerns.

## 2024-05-20 ENCOUNTER — APPOINTMENT (OUTPATIENT)
Dept: LAB | Facility: CLINIC | Age: 64
End: 2024-05-20
Payer: COMMERCIAL

## 2024-05-20 DIAGNOSIS — N18.4 STAGE 4 CHRONIC KIDNEY DISEASE (HCC): ICD-10-CM

## 2024-05-20 DIAGNOSIS — I73.9 PERIPHERAL VASCULAR DISEASE, UNSPECIFIED (HCC): ICD-10-CM

## 2024-05-20 DIAGNOSIS — R80.1 PERSISTENT PROTEINURIA: ICD-10-CM

## 2024-05-20 DIAGNOSIS — E03.9 HYPOTHYROIDISM, ADULT: ICD-10-CM

## 2024-05-20 DIAGNOSIS — B02.23 POSTHERPETIC POLYNEUROPATHY: ICD-10-CM

## 2024-05-20 DIAGNOSIS — I10 ESSENTIAL HYPERTENSION, MALIGNANT: ICD-10-CM

## 2024-05-20 DIAGNOSIS — E11.21 DIABETIC GLOMERULOPATHY (HCC): ICD-10-CM

## 2024-05-20 DIAGNOSIS — E11.22 TYPE 2 DIABETES MELLITUS WITH DIABETIC CHRONIC KIDNEY DISEASE, UNSPECIFIED CKD STAGE, UNSPECIFIED WHETHER LONG TERM INSULIN USE (HCC): ICD-10-CM

## 2024-05-20 DIAGNOSIS — E11.65 INADEQUATELY CONTROLLED DIABETES MELLITUS (HCC): ICD-10-CM

## 2024-05-20 DIAGNOSIS — I10 BENIGN ESSENTIAL HYPERTENSION: ICD-10-CM

## 2024-05-20 LAB
ALBUMIN SERPL BCP-MCNC: 3.9 G/DL (ref 3.5–5)
ALP SERPL-CCNC: 56 U/L (ref 34–104)
ALT SERPL W P-5'-P-CCNC: 12 U/L (ref 7–52)
ANION GAP SERPL CALCULATED.3IONS-SCNC: 8 MMOL/L (ref 4–13)
AST SERPL W P-5'-P-CCNC: 10 U/L (ref 13–39)
BACTERIA UR QL AUTO: ABNORMAL /HPF
BASOPHILS # BLD AUTO: 0.06 THOUSANDS/ÂΜL (ref 0–0.1)
BASOPHILS NFR BLD AUTO: 1 % (ref 0–1)
BILIRUB SERPL-MCNC: 0.73 MG/DL (ref 0.2–1)
BILIRUB UR QL STRIP: NEGATIVE
BUN SERPL-MCNC: 58 MG/DL (ref 5–25)
CALCIUM SERPL-MCNC: 8.5 MG/DL (ref 8.4–10.2)
CHLORIDE SERPL-SCNC: 104 MMOL/L (ref 96–108)
CHOLEST SERPL-MCNC: 87 MG/DL
CLARITY UR: CLEAR
CO2 SERPL-SCNC: 26 MMOL/L (ref 21–32)
COLOR UR: ABNORMAL
CREAT SERPL-MCNC: 2.46 MG/DL (ref 0.6–1.3)
CREAT UR-MCNC: 44.8 MG/DL
CREAT UR-MCNC: 45.4 MG/DL
EOSINOPHIL # BLD AUTO: 0.38 THOUSAND/ÂΜL (ref 0–0.61)
EOSINOPHIL NFR BLD AUTO: 5 % (ref 0–6)
ERYTHROCYTE [DISTWIDTH] IN BLOOD BY AUTOMATED COUNT: 13.4 % (ref 11.6–15.1)
EST. AVERAGE GLUCOSE BLD GHB EST-MCNC: 157 MG/DL
GFR SERPL CREATININE-BSD FRML MDRD: 26 ML/MIN/1.73SQ M
GLUCOSE P FAST SERPL-MCNC: 179 MG/DL (ref 65–99)
GLUCOSE UR STRIP-MCNC: ABNORMAL MG/DL
HBA1C MFR BLD: 7.1 %
HCT VFR BLD AUTO: 37.5 % (ref 36.5–49.3)
HDLC SERPL-MCNC: 36 MG/DL
HGB BLD-MCNC: 11.8 G/DL (ref 12–17)
HGB UR QL STRIP.AUTO: NEGATIVE
HYALINE CASTS #/AREA URNS LPF: ABNORMAL /LPF
IMM GRANULOCYTES # BLD AUTO: 0.02 THOUSAND/UL (ref 0–0.2)
IMM GRANULOCYTES NFR BLD AUTO: 0 % (ref 0–2)
KETONES UR STRIP-MCNC: NEGATIVE MG/DL
LDLC SERPL CALC-MCNC: 34 MG/DL (ref 0–100)
LEUKOCYTE ESTERASE UR QL STRIP: NEGATIVE
LYMPHOCYTES # BLD AUTO: 1.55 THOUSANDS/ÂΜL (ref 0.6–4.47)
LYMPHOCYTES NFR BLD AUTO: 22 % (ref 14–44)
MAGNESIUM SERPL-MCNC: 2 MG/DL (ref 1.9–2.7)
MCH RBC QN AUTO: 28.4 PG (ref 26.8–34.3)
MCHC RBC AUTO-ENTMCNC: 31.5 G/DL (ref 31.4–37.4)
MCV RBC AUTO: 90 FL (ref 82–98)
MICROALBUMIN UR-MCNC: 194.2 MG/L
MICROALBUMIN/CREAT 24H UR: 428 MG/G CREATININE (ref 0–30)
MONOCYTES # BLD AUTO: 0.63 THOUSAND/ÂΜL (ref 0.17–1.22)
MONOCYTES NFR BLD AUTO: 9 % (ref 4–12)
NEUTROPHILS # BLD AUTO: 4.55 THOUSANDS/ÂΜL (ref 1.85–7.62)
NEUTS SEG NFR BLD AUTO: 63 % (ref 43–75)
NITRITE UR QL STRIP: NEGATIVE
NON-SQ EPI CELLS URNS QL MICRO: ABNORMAL /HPF
NONHDLC SERPL-MCNC: 51 MG/DL
NRBC BLD AUTO-RTO: 0 /100 WBCS
PH UR STRIP.AUTO: 5.5 [PH]
PHOSPHATE SERPL-MCNC: 4.7 MG/DL (ref 2.3–4.1)
PLATELET # BLD AUTO: 216 THOUSANDS/UL (ref 149–390)
PMV BLD AUTO: 9.9 FL (ref 8.9–12.7)
POTASSIUM SERPL-SCNC: 3.9 MMOL/L (ref 3.5–5.3)
PROT SERPL-MCNC: 7 G/DL (ref 6.4–8.4)
PROT UR STRIP-MCNC: ABNORMAL MG/DL
PROT UR-MCNC: 39 MG/DL
PROT/CREAT UR: 0.87 MG/G{CREAT} (ref 0–0.1)
RBC # BLD AUTO: 4.16 MILLION/UL (ref 3.88–5.62)
RBC #/AREA URNS AUTO: ABNORMAL /HPF
SODIUM SERPL-SCNC: 138 MMOL/L (ref 135–147)
SP GR UR STRIP.AUTO: 1.01 (ref 1–1.03)
T4 FREE SERPL-MCNC: 0.86 NG/DL (ref 0.61–1.12)
TRIGL SERPL-MCNC: 84 MG/DL
TSH SERPL DL<=0.05 MIU/L-ACNC: 3.95 UIU/ML (ref 0.45–4.5)
UROBILINOGEN UR STRIP-ACNC: <2 MG/DL
WBC # BLD AUTO: 7.19 THOUSAND/UL (ref 4.31–10.16)
WBC #/AREA URNS AUTO: ABNORMAL /HPF

## 2024-05-20 PROCEDURE — 85025 COMPLETE CBC W/AUTO DIFF WBC: CPT

## 2024-05-20 PROCEDURE — 84439 ASSAY OF FREE THYROXINE: CPT

## 2024-05-20 PROCEDURE — 80061 LIPID PANEL: CPT

## 2024-05-20 PROCEDURE — 82570 ASSAY OF URINE CREATININE: CPT

## 2024-05-20 PROCEDURE — 84100 ASSAY OF PHOSPHORUS: CPT

## 2024-05-20 PROCEDURE — 36415 COLL VENOUS BLD VENIPUNCTURE: CPT

## 2024-05-20 PROCEDURE — 83036 HEMOGLOBIN GLYCOSYLATED A1C: CPT

## 2024-05-20 PROCEDURE — 83735 ASSAY OF MAGNESIUM: CPT

## 2024-05-20 PROCEDURE — 81001 URINALYSIS AUTO W/SCOPE: CPT

## 2024-05-20 PROCEDURE — 84156 ASSAY OF PROTEIN URINE: CPT

## 2024-05-20 PROCEDURE — 84443 ASSAY THYROID STIM HORMONE: CPT

## 2024-05-20 PROCEDURE — 82043 UR ALBUMIN QUANTITATIVE: CPT

## 2024-05-20 PROCEDURE — 80053 COMPREHEN METABOLIC PANEL: CPT

## 2024-05-23 ENCOUNTER — OFFICE VISIT (OUTPATIENT)
Dept: NEPHROLOGY | Facility: CLINIC | Age: 64
End: 2024-05-23
Payer: COMMERCIAL

## 2024-05-23 VITALS
HEART RATE: 66 BPM | WEIGHT: 192 LBS | SYSTOLIC BLOOD PRESSURE: 126 MMHG | HEIGHT: 70 IN | DIASTOLIC BLOOD PRESSURE: 82 MMHG | BODY MASS INDEX: 27.49 KG/M2

## 2024-05-23 DIAGNOSIS — N18.4 STAGE 4 CHRONIC KIDNEY DISEASE (HCC): Primary | ICD-10-CM

## 2024-05-23 DIAGNOSIS — I10 BENIGN ESSENTIAL HYPERTENSION: ICD-10-CM

## 2024-05-23 DIAGNOSIS — R80.1 PERSISTENT PROTEINURIA: ICD-10-CM

## 2024-05-23 DIAGNOSIS — I50.42 CHRONIC COMBINED SYSTOLIC AND DIASTOLIC CHF (CONGESTIVE HEART FAILURE) (HCC): ICD-10-CM

## 2024-05-23 DIAGNOSIS — E55.9 VITAMIN D INSUFFICIENCY: ICD-10-CM

## 2024-05-23 PROBLEM — E87.6 HYPOKALEMIA: Status: RESOLVED | Noted: 2022-05-13 | Resolved: 2024-05-23

## 2024-05-23 PROBLEM — R79.89 ELEVATED SERUM CREATININE: Status: RESOLVED | Noted: 2021-12-22 | Resolved: 2024-05-23

## 2024-05-23 PROBLEM — E87.1 HYPONATREMIA: Status: RESOLVED | Noted: 2022-05-27 | Resolved: 2024-05-23

## 2024-05-23 PROCEDURE — 99215 OFFICE O/P EST HI 40 MIN: CPT | Performed by: INTERNAL MEDICINE

## 2024-05-23 PROCEDURE — G2211 COMPLEX E/M VISIT ADD ON: HCPCS | Performed by: INTERNAL MEDICINE

## 2024-05-23 RX ORDER — INSULIN ASPART 100 [IU]/ML
INJECTION, SUSPENSION SUBCUTANEOUS AS NEEDED
COMMUNITY
End: 2024-05-29

## 2024-05-23 NOTE — PATIENT INSTRUCTIONS
1. CKD stage 3b/4 in setting of diabetes, hypertensive nephrosclerosis, ischemic cardiomyopathy as well as cirrhosis  -sCr 2.46 with eGFR low 20s.  -suspect progressive CKD in part due to uncontrolled blood sugars although improved as of late, A1C 7.1 as of May 20, 2024  -b/l sCr appears to be 1.4-1.6 previously, with rise in sCr to 2.22 as of Feb. 2020. Episode of LOBO likely d/t sepsis in setting of left foot abscess at the time.  -monitor BMP  -recent UA showed 1+ protein, UpCr 0.87g as of 5/20/24  -need ongoing glycemic control to slow down progression of CKD  -has been to CKD education class  -CKD education booklet provided previously  -repeat renal ultrasound to assess cortical thickness/echogenicity     2. HTN - BP well controlled in office  -on lasix 80mg daily, lisinopril 5mg daily, metoprolol 25mg twice daily   -avoid high salt/sodium diet  -avoid caffeinated beverages     3. DM2, uncontrolled - last A1C 7.1 as of last check, following with Dr. Adame of endocrinology, continue lantus and novolog. Needs improved glycemic control.     4. Proteinuria likely d/t diabetic nephropathy in setting of uncontrolled diabetes - microalb:Cr was as high as 3.4g in March 2019, now down to 0.87g as of 5/20/24 with microalb:Cr 428mg/g - monitor this. Improved with low dose lisinopril   -anti PLA2R, dsDNA, HIV, RPR, complements, SPEP/UPEP/FLC, antiGBM, chronic hep panel, ANCA panel, LISA negative  -cryo + in setting of cirrhosis, RF - negative when repeated in Jan. 2022     5. Ischemic cardiomyopathy with EF 50% - diuretic as above, monitor daily weight, follows with cardiology outpatient, Dr. Figueroa     6. Vitamin D insufficiency - may take 1000u daily over the counter vitamin D as last level 25.5(low) as of 10/17/22, monitor vitamin D level annually     RTC in in 4 months.  Obtain blood and urine testing before next visit.

## 2024-05-23 NOTE — PROGRESS NOTES
NEPHROLOGY OUTPATIENT PROGRESS NOTE   Noel Khan 64 y.o. male MRN: [id number]  DATE: 5/23/2024  Reason for visit:   Chief Complaint   Patient presents with    Chronic Kidney Disease    Follow-up        Patient Instructions   1. CKD stage 3b/4 in setting of diabetes, hypertensive nephrosclerosis, ischemic cardiomyopathy as well as cirrhosis  -sCr 2.46 with eGFR low 20s.  -suspect progressive CKD in part due to uncontrolled blood sugars although improved as of late, A1C 7.1 as of May 20, 2024  -b/l sCr appears to be 1.4-1.6 previously, with rise in sCr to 2.22 as of Feb. 2020. Episode of LOBO likely d/t sepsis in setting of left foot abscess at the time.  -monitor BMP  -recent UA showed 1+ protein, UpCr 0.87g as of 5/20/24  -need ongoing glycemic control to slow down progression of CKD  -has been to CKD education class  -CKD education booklet provided previously  -repeat renal ultrasound to assess cortical thickness/echogenicity     2. HTN - BP well controlled in office  -on lasix 80mg daily, lisinopril 5mg daily, metoprolol 25mg twice daily   -avoid high salt/sodium diet  -avoid caffeinated beverages     3. DM2, uncontrolled - last A1C 7.1 as of last check, following with Dr. Adame of endocrinology, continue lantus and novolog. Needs improved glycemic control.     4. Proteinuria likely d/t diabetic nephropathy in setting of uncontrolled diabetes - microalb:Cr was as high as 3.4g in March 2019, now down to 0.87g as of 5/20/24 with microalb:Cr 428mg/g - monitor this. Improved with low dose lisinopril   -anti PLA2R, dsDNA, HIV, RPR, complements, SPEP/UPEP/FLC, antiGBM, chronic hep panel, ANCA panel, LISA negative  -cryo + in setting of cirrhosis, RF - negative when repeated in Jan. 2022     5. Ischemic cardiomyopathy with EF 50% - diuretic as above, monitor daily weight, follows with cardiology outpatient, Dr. Figueroa     6. Vitamin D insufficiency - may take 1000u daily over the counter vitamin D as last level  25.5(low) as of 10/17/22, monitor vitamin D level annually     RTC in in 4 months.  Obtain blood and urine testing before next visit.      Noel was seen today for chronic kidney disease and follow-up.    Diagnoses and all orders for this visit:    Stage 4 chronic kidney disease (HCC)  -     Basic metabolic panel; Future  -     Urinalysis with microscopic; Future  -     Protein / creatinine ratio, urine; Future  -     Albumin / creatinine urine ratio; Future  -     US kidney and bladder with pvr; Future    Benign essential hypertension    Chronic combined systolic and diastolic CHF    Persistent proteinuria  -     Protein / creatinine ratio, urine; Future  -     Albumin / creatinine urine ratio; Future    Vitamin D insufficiency          Assessment/Plan:  1. CKD stage 3b/4 in setting of diabetes, hypertensive nephrosclerosis, ischemic cardiomyopathy as well as cirrhosis  -sCr 2.46 with eGFR low 20s.  -suspect progressive CKD in part due to uncontrolled blood sugars although improved as of late, A1C 7.1 as of May 20, 2024  -b/l sCr appears to be 1.4-1.6 previously, with rise in sCr to 2.22 as of Feb. 2020. Episode of LOBO likely d/t sepsis in setting of left foot abscess at the time.  -monitor BMP  -recent UA showed 1+ protein, UpCr 0.87g as of 5/20/24  -need ongoing glycemic control to slow down progression of CKD  -has been to CKD education class  -CKD education booklet provided previously  -repeat renal ultrasound to assess cortical thickness/echogenicity     2. HTN - BP well controlled in office  -on lasix 80mg daily, lisinopril 5mg daily, metoprolol 25mg twice daily   -avoid high salt/sodium diet  -avoid caffeinated beverages     3. DM2, uncontrolled - last A1C 7.1 as of last check, following with Dr. Adame of endocrinology, continue lantus and novolog. Needs improved glycemic control.     4. Proteinuria likely d/t diabetic nephropathy in setting of uncontrolled diabetes - microalb:Cr was as high as 3.4g in  "March 2019, now down to 0.87g as of 5/20/24 with microalb:Cr 428mg/g - monitor this. Improved with low dose lisinopril   -anti PLA2R, dsDNA, HIV, RPR, complements, SPEP/UPEP/FLC, antiGBM, chronic hep panel, ANCA panel, LISA negative  -cryo + in setting of cirrhosis, RF - negative when repeated in Jan. 2022     5. Ischemic cardiomyopathy with EF 50% - diuretic as above, monitor daily weight, follows with cardiology outpatient, Dr. Figueroa     6. Vitamin D insufficiency - may take 1000u daily over the counter vitamin D as last level 25.5(low) as of 10/17/22, monitor vitamin D level annually     RTC in in 4 months.  Obtain blood and urine testing before next visit.     SUBJECTIVE / INTERVAL HISTORY:  64 y.o. male presents in follow up of CKD.     Noel TSAI Erin denies any recent illness/hospitalizations/medication changes since last office visit.    Denies NSAID use.  HTN - BP at home well controlled  Weight steady. Denies leg edema.    Review of Systems   Constitutional:  Negative for chills and fever.   HENT:  Negative for sore throat.    Eyes:  Negative for visual disturbance.   Respiratory:  Negative for cough and shortness of breath.    Cardiovascular:  Negative for chest pain and leg swelling.   Gastrointestinal:  Negative for abdominal pain, constipation, diarrhea, nausea and vomiting.   Endocrine: Negative for polyuria.   Genitourinary:  Negative for decreased urine volume, difficulty urinating, dysuria and hematuria.   Musculoskeletal:  Negative for back pain and myalgias.   Skin:  Negative for rash.   Neurological:  Positive for numbness (in feet, chronic). Negative for dizziness and light-headedness.   Psychiatric/Behavioral:  Negative for confusion.        OBJECTIVE:  /82 (BP Location: Left arm, Patient Position: Sitting, Cuff Size: Standard)   Pulse 66   Ht 5' 10\" (1.778 m)   Wt 87.1 kg (192 lb)   BMI 27.55 kg/m²  Body mass index is 27.55 kg/m².    Physical exam:  Physical Exam  Vitals reviewed. "   Constitutional:       General: He is not in acute distress.     Appearance: Normal appearance. He is well-developed. He is not diaphoretic.   HENT:      Head: Normocephalic and atraumatic.      Nose: Nose normal.      Mouth/Throat:      Mouth: Mucous membranes are moist.      Pharynx: No oropharyngeal exudate.   Eyes:      General: No scleral icterus.        Right eye: No discharge.         Left eye: No discharge.      Comments: eyeglasses   Neck:      Thyroid: No thyromegaly.   Cardiovascular:      Rate and Rhythm: Normal rate and regular rhythm.      Heart sounds: Normal heart sounds.   Pulmonary:      Effort: Pulmonary effort is normal.      Breath sounds: Normal breath sounds. No wheezing or rales.   Abdominal:      General: Bowel sounds are normal. There is no distension.      Palpations: Abdomen is soft.      Tenderness: There is no abdominal tenderness.   Musculoskeletal:         General: No swelling. Normal range of motion.      Cervical back: Neck supple.   Lymphadenopathy:      Cervical: No cervical adenopathy.   Skin:     General: Skin is warm and dry.      Coloration: Skin is not jaundiced.      Findings: No rash.   Neurological:      General: No focal deficit present.      Mental Status: He is alert.      Comments: awake   Psychiatric:         Mood and Affect: Mood normal.         Behavior: Behavior normal.         Medications:    Current Outpatient Medications:     aspirin 81 mg chewable tablet, Chew 1 tablet daily for 30 days, Disp: 30 tablet, Rfl: 0    atorvastatin (LIPITOR) 40 mg tablet, TAKE 1 TABLET BY MOUTH EVERY DAY, Disp: 90 tablet, Rfl: 3    furosemide (LASIX) 40 mg tablet, Take 2 tablets (80 mg total) by mouth daily Patient takes two 40 mg tablets to make 80mg daily., Disp: , Rfl:     insulin aspart protamine-insulin aspart (NovoLOG 70/30) 100 units/mL injection, Inject under the skin if needed, Disp: , Rfl:     Lantus SoloStar 100 units/mL injection pen, Inject 30 Units under the skin  every morning (Patient taking differently: Inject 35 Units under the skin every morning), Disp: , Rfl:     latanoprost (XALATAN) 0.005 % ophthalmic solution, Administer 0.0005 drops to both eyes daily at bedtime, Disp: , Rfl:     levothyroxine 25 mcg tablet, Take 25 mcg by mouth daily, Disp: , Rfl:     lisinopril (ZESTRIL) 5 mg tablet, Take 1 tablet (5 mg total) by mouth daily, Disp: 90 tablet, Rfl: 1    metoprolol tartrate (LOPRESSOR) 25 mg tablet, TAKE 1 TABLET (25 MG TOTAL) BY MOUTH EVERY 12 (TWELVE) HOURS (Patient taking differently: Take 25 mg by mouth 2 (two) times a day), Disp: 180 tablet, Rfl: 3    TRUE METRIX BLOOD GLUCOSE TEST test strip, TEST FOUR TIMES DAILY, Disp: , Rfl: 11    Unifine Pentips 31G X 8 MM MISC, , Disp: , Rfl:     acetaminophen (TYLENOL) 325 mg tablet, Take 2 tablets (650 mg total) by mouth every 6 (six) hours as needed (pain) (Patient not taking: Reported on 7/15/2022), Disp: , Rfl: 0    Blood Pressure Monitoring ([employer] Blood Pressure Monitor) KIT, Use 2 (two) times a day Automatic BP cuff. Measure BP twice daily. (Patient not taking: Reported on 3/6/2023), Disp: 1 kit, Rfl: 0    dextromethorphan 15 MG/5ML syrup, Take 10 mL (30 mg total) by mouth 4 (four) times a day as needed for cough (Patient not taking: Reported on 11/28/2023), Disp: 120 mL, Rfl: 0    guaiFENesin (MUCINEX) 600 mg 12 hr tablet, Take 2 tablets (1,200 mg total) by mouth 2 (two) times a day (Patient not taking: Reported on 11/28/2023), Disp: 28 tablet, Rfl: 0    mupirocin (BACTROBAN) 2 % ointment, Apply topically daily (Patient not taking: Reported on 2/28/2024), Disp: 22 g, Rfl: 1    NovoLIN 70/30 FlexPen (70-30) 100 UNIT/ML injection pen, , Disp: , Rfl:     NovoLOG FlexPen 100 units/mL injection pen, , Disp: , Rfl:     promethazine (PHENERGAN) 25 mg tablet, Take 1 tablet (25 mg total) by mouth every 6 (six) hours as needed for nausea or vomiting for up to 10 days (Patient not taking: Reported on 10/25/2023), Disp: 30  "tablet, Rfl: 0    Allergies:  Allergies as of 05/23/2024    (No Known Allergies)       The following portions of the patient's history were reviewed and updated as appropriate: past family history, past surgical history and problem list.    Laboratory Results:  Lab Results   Component Value Date    SODIUM 138 05/20/2024    K 3.9 05/20/2024     05/20/2024    CO2 26 05/20/2024    BUN 58 (H) 05/20/2024    CREATININE 2.46 (H) 05/20/2024    GLUC 311 (H) 07/08/2022    CALCIUM 8.5 05/20/2024        Lab Results   Component Value Date    .9 (H) 10/17/2022    CALCIUM 8.5 05/20/2024    PHOS 4.7 (H) 05/20/2024       Portions of the record may have been created with voice recognition software.  Occasional wrong word or \"sound a like\" substitutions may have occurred due to the inherent limitations of voice recognition software.  Read the chart carefully and recognize, using context, where substitutions have occurred.  "

## 2024-05-29 ENCOUNTER — HOSPITAL ENCOUNTER (INPATIENT)
Facility: HOSPITAL | Age: 64
LOS: 5 days | Discharge: HOME | DRG: 871 | End: 2024-06-03
Attending: EMERGENCY MEDICINE | Admitting: INTERNAL MEDICINE
Payer: COMMERCIAL

## 2024-05-29 ENCOUNTER — APPOINTMENT (EMERGENCY)
Dept: RADIOLOGY | Facility: HOSPITAL | Age: 64
DRG: 871 | End: 2024-05-29
Payer: COMMERCIAL

## 2024-05-29 DIAGNOSIS — I48.91 ATRIAL FIBRILLATION (HCC): ICD-10-CM

## 2024-05-29 DIAGNOSIS — A41.9 SEPSIS (HCC): Primary | ICD-10-CM

## 2024-05-29 DIAGNOSIS — N17.9 AKI (ACUTE KIDNEY INJURY) (HCC): ICD-10-CM

## 2024-05-29 DIAGNOSIS — I48.91 A-FIB (HCC): ICD-10-CM

## 2024-05-29 DIAGNOSIS — N18.4 STAGE 4 CHRONIC KIDNEY DISEASE (HCC): ICD-10-CM

## 2024-05-29 DIAGNOSIS — R80.9 PROTEINURIA: ICD-10-CM

## 2024-05-29 DIAGNOSIS — J18.9 PNEUMONIA: ICD-10-CM

## 2024-05-29 DIAGNOSIS — R10.32 LLQ PAIN: ICD-10-CM

## 2024-05-29 LAB
2HR DELTA HS TROPONIN: -19 NG/L
ALBUMIN SERPL BCP-MCNC: 3.8 G/DL (ref 3.5–5)
ALP SERPL-CCNC: 66 U/L (ref 34–104)
ALT SERPL W P-5'-P-CCNC: 16 U/L (ref 7–52)
ANION GAP SERPL CALCULATED.3IONS-SCNC: 17 MMOL/L (ref 4–13)
APTT PPP: 38 SECONDS (ref 23–37)
AST SERPL W P-5'-P-CCNC: 16 U/L (ref 13–39)
BASOPHILS # BLD AUTO: 0.05 THOUSANDS/ÂΜL (ref 0–0.1)
BASOPHILS NFR BLD AUTO: 0 % (ref 0–1)
BILIRUB SERPL-MCNC: 1.65 MG/DL (ref 0.2–1)
BUN SERPL-MCNC: 74 MG/DL (ref 5–25)
CALCIUM SERPL-MCNC: 9.1 MG/DL (ref 8.4–10.2)
CARDIAC TROPONIN I PNL SERPL HS: 134 NG/L
CARDIAC TROPONIN I PNL SERPL HS: 153 NG/L
CHLORIDE SERPL-SCNC: 94 MMOL/L (ref 96–108)
CO2 SERPL-SCNC: 21 MMOL/L (ref 21–32)
CREAT SERPL-MCNC: 3.58 MG/DL (ref 0.6–1.3)
EOSINOPHIL # BLD AUTO: 0.03 THOUSAND/ÂΜL (ref 0–0.61)
EOSINOPHIL NFR BLD AUTO: 0 % (ref 0–6)
ERYTHROCYTE [DISTWIDTH] IN BLOOD BY AUTOMATED COUNT: 13.6 % (ref 11.6–15.1)
FLUAV RNA RESP QL NAA+PROBE: NEGATIVE
FLUBV RNA RESP QL NAA+PROBE: NEGATIVE
GFR SERPL CREATININE-BSD FRML MDRD: 16 ML/MIN/1.73SQ M
GLUCOSE SERPL-MCNC: 263 MG/DL (ref 65–140)
HCT VFR BLD AUTO: 42.6 % (ref 36.5–49.3)
HGB BLD-MCNC: 13.8 G/DL (ref 12–17)
IMM GRANULOCYTES # BLD AUTO: 0.17 THOUSAND/UL (ref 0–0.2)
IMM GRANULOCYTES NFR BLD AUTO: 1 % (ref 0–2)
INR PPP: 1.17 (ref 0.84–1.19)
LACTATE SERPL-SCNC: 2 MMOL/L (ref 0.5–2)
LIPASE SERPL-CCNC: <6 U/L (ref 11–82)
LYMPHOCYTES # BLD AUTO: 0.49 THOUSANDS/ÂΜL (ref 0.6–4.47)
LYMPHOCYTES NFR BLD AUTO: 2 % (ref 14–44)
MCH RBC QN AUTO: 28.6 PG (ref 26.8–34.3)
MCHC RBC AUTO-ENTMCNC: 32.4 G/DL (ref 31.4–37.4)
MCV RBC AUTO: 88 FL (ref 82–98)
MONOCYTES # BLD AUTO: 1.73 THOUSAND/ÂΜL (ref 0.17–1.22)
MONOCYTES NFR BLD AUTO: 9 % (ref 4–12)
NEUTROPHILS # BLD AUTO: 17.78 THOUSANDS/ÂΜL (ref 1.85–7.62)
NEUTS SEG NFR BLD AUTO: 88 % (ref 43–75)
NRBC BLD AUTO-RTO: 0 /100 WBCS
PLATELET # BLD AUTO: 267 THOUSANDS/UL (ref 149–390)
PMV BLD AUTO: 10.3 FL (ref 8.9–12.7)
POTASSIUM SERPL-SCNC: 4.2 MMOL/L (ref 3.5–5.3)
PROCALCITONIN SERPL-MCNC: 3.57 NG/ML
PROT SERPL-MCNC: 8 G/DL (ref 6.4–8.4)
PROTHROMBIN TIME: 15.5 SECONDS (ref 11.6–14.5)
RBC # BLD AUTO: 4.82 MILLION/UL (ref 3.88–5.62)
RSV RNA RESP QL NAA+PROBE: NEGATIVE
SARS-COV-2 RNA RESP QL NAA+PROBE: NEGATIVE
SODIUM SERPL-SCNC: 132 MMOL/L (ref 135–147)
WBC # BLD AUTO: 20.25 THOUSAND/UL (ref 4.31–10.16)

## 2024-05-29 PROCEDURE — 84484 ASSAY OF TROPONIN QUANT: CPT

## 2024-05-29 PROCEDURE — 80053 COMPREHEN METABOLIC PANEL: CPT

## 2024-05-29 PROCEDURE — 94664 DEMO&/EVAL PT USE INHALER: CPT

## 2024-05-29 PROCEDURE — 99285 EMERGENCY DEPT VISIT HI MDM: CPT

## 2024-05-29 PROCEDURE — 85025 COMPLETE CBC W/AUTO DIFF WBC: CPT

## 2024-05-29 PROCEDURE — 71045 X-RAY EXAM CHEST 1 VIEW: CPT

## 2024-05-29 PROCEDURE — 93005 ELECTROCARDIOGRAM TRACING: CPT

## 2024-05-29 PROCEDURE — 36415 COLL VENOUS BLD VENIPUNCTURE: CPT

## 2024-05-29 PROCEDURE — 85610 PROTHROMBIN TIME: CPT

## 2024-05-29 PROCEDURE — 96361 HYDRATE IV INFUSION ADD-ON: CPT

## 2024-05-29 PROCEDURE — 85730 THROMBOPLASTIN TIME PARTIAL: CPT

## 2024-05-29 PROCEDURE — 87040 BLOOD CULTURE FOR BACTERIA: CPT

## 2024-05-29 PROCEDURE — 83690 ASSAY OF LIPASE: CPT

## 2024-05-29 PROCEDURE — 99285 EMERGENCY DEPT VISIT HI MDM: CPT | Performed by: EMERGENCY MEDICINE

## 2024-05-29 PROCEDURE — 96365 THER/PROPH/DIAG IV INF INIT: CPT

## 2024-05-29 PROCEDURE — 84145 PROCALCITONIN (PCT): CPT

## 2024-05-29 PROCEDURE — 83605 ASSAY OF LACTIC ACID: CPT

## 2024-05-29 PROCEDURE — 0241U HB NFCT DS VIR RESP RNA 4 TRGT: CPT

## 2024-05-29 RX ORDER — LOPERAMIDE HYDROCHLORIDE 2 MG/1
2 CAPSULE ORAL 3 TIMES DAILY PRN
Status: DISCONTINUED | OUTPATIENT
Start: 2024-05-29 | End: 2024-06-03 | Stop reason: HOSPADM

## 2024-05-29 RX ORDER — ATORVASTATIN CALCIUM 40 MG/1
40 TABLET, FILM COATED ORAL DAILY
Status: DISCONTINUED | OUTPATIENT
Start: 2024-05-30 | End: 2024-06-03 | Stop reason: HOSPADM

## 2024-05-29 RX ORDER — INSULIN GLARGINE 100 [IU]/ML
30 INJECTION, SOLUTION SUBCUTANEOUS EVERY MORNING
Status: DISCONTINUED | OUTPATIENT
Start: 2024-05-30 | End: 2024-05-30

## 2024-05-29 RX ORDER — ASPIRIN 81 MG/1
81 TABLET, CHEWABLE ORAL DAILY
Status: DISCONTINUED | OUTPATIENT
Start: 2024-05-30 | End: 2024-06-03 | Stop reason: HOSPADM

## 2024-05-29 RX ORDER — LATANOPROST 50 UG/ML
1 SOLUTION/ DROPS OPHTHALMIC
Status: DISCONTINUED | OUTPATIENT
Start: 2024-05-29 | End: 2024-06-03 | Stop reason: HOSPADM

## 2024-05-29 RX ORDER — INSULIN GLARGINE 100 [IU]/ML
30 INJECTION, SOLUTION SUBCUTANEOUS EVERY MORNING
COMMUNITY

## 2024-05-29 RX ORDER — HEPARIN SODIUM 1000 [USP'U]/ML
4000 INJECTION, SOLUTION INTRAVENOUS; SUBCUTANEOUS ONCE
Status: COMPLETED | OUTPATIENT
Start: 2024-05-29 | End: 2024-05-30

## 2024-05-29 RX ORDER — ONDANSETRON 2 MG/ML
4 INJECTION INTRAMUSCULAR; INTRAVENOUS EVERY 8 HOURS PRN
Status: DISCONTINUED | OUTPATIENT
Start: 2024-05-29 | End: 2024-06-03 | Stop reason: HOSPADM

## 2024-05-29 RX ORDER — HEPARIN SODIUM 10000 [USP'U]/100ML
3-20 INJECTION, SOLUTION INTRAVENOUS
Status: DISPENSED | OUTPATIENT
Start: 2024-05-29 | End: 2024-05-31

## 2024-05-29 RX ORDER — SODIUM CHLORIDE, SODIUM GLUCONATE, SODIUM ACETATE, POTASSIUM CHLORIDE, MAGNESIUM CHLORIDE, SODIUM PHOSPHATE, DIBASIC, AND POTASSIUM PHOSPHATE .53; .5; .37; .037; .03; .012; .00082 G/100ML; G/100ML; G/100ML; G/100ML; G/100ML; G/100ML; G/100ML
125 INJECTION, SOLUTION INTRAVENOUS CONTINUOUS
Status: DISCONTINUED | OUTPATIENT
Start: 2024-05-29 | End: 2024-05-30

## 2024-05-29 RX ORDER — LEVOTHYROXINE SODIUM 0.03 MG/1
25 TABLET ORAL
Status: DISCONTINUED | OUTPATIENT
Start: 2024-05-30 | End: 2024-06-03 | Stop reason: HOSPADM

## 2024-05-29 RX ORDER — INSULIN LISPRO 100 [IU]/ML
1-6 INJECTION, SOLUTION INTRAVENOUS; SUBCUTANEOUS
Status: DISCONTINUED | OUTPATIENT
Start: 2024-05-30 | End: 2024-05-30

## 2024-05-29 RX ORDER — ACETAMINOPHEN 325 MG/1
975 TABLET ORAL ONCE
Status: COMPLETED | OUTPATIENT
Start: 2024-05-29 | End: 2024-05-29

## 2024-05-29 RX ADMIN — AZITHROMYCIN MONOHYDRATE 500 MG: 500 INJECTION, POWDER, LYOPHILIZED, FOR SOLUTION INTRAVENOUS at 21:36

## 2024-05-29 RX ADMIN — SODIUM CHLORIDE 500 ML: 0.9 INJECTION, SOLUTION INTRAVENOUS at 19:53

## 2024-05-29 RX ADMIN — ACETAMINOPHEN 975 MG: 325 TABLET ORAL at 20:54

## 2024-05-29 RX ADMIN — SODIUM CHLORIDE 500 ML: 0.9 INJECTION, SOLUTION INTRAVENOUS at 22:13

## 2024-05-29 RX ADMIN — CEFEPIME 1000 MG: 1 INJECTION, POWDER, FOR SOLUTION INTRAMUSCULAR; INTRAVENOUS at 20:51

## 2024-05-29 NOTE — ED PROVIDER NOTES
History  Chief Complaint   Patient presents with    Weakness - Generalized     Patient started with flu like symptoms, chills, cough, fever, decreased appetite, bodyaches on Saturday. Symptoms worsening since Saturday, patient hypotensive in triage BP 94/54     64-year-old male history of CAD with stent placement, CKD, CHF, liver cirrhosis presenting due to fevers, chills, diaphoresis, fatigue for the last 5 days.  Patient states he feels like he has been having flulike symptoms with chills, body aches, fever and has been in bed for the last few days but denies any cough, respiratory symptoms or shortness of breath.  Patient has been having some slight abdominal pain with diarrhea and has not been able to tolerate food but has been drinking fluids.  Denies any sick contacts or lesions on the skin.        Prior to Admission Medications   Prescriptions Last Dose Informant Patient Reported? Taking?   Blood Pressure Monitoring ([employer] Blood Pressure Monitor) KIT  Self No No   Sig: Use 2 (two) times a day Automatic BP cuff. Measure BP twice daily.   Patient not taking: Reported on 3/6/2023   Lantus SoloStar 100 units/mL injection pen  Self No No   Sig: Inject 30 Units under the skin every morning   Patient taking differently: Inject 35 Units under the skin every morning   NovoLIN 70/30 FlexPen (70-30) 100 UNIT/ML injection pen  Self Yes No   Patient not taking: Reported on 10/19/2022   NovoLOG FlexPen 100 units/mL injection pen  Self Yes No   Patient not taking: Reported on 3/6/2023   TRUE METRIX BLOOD GLUCOSE TEST test strip  Self Yes No   Sig: TEST FOUR TIMES DAILY   Unifine Pentips 31G X 8 MM MISC  Self Yes No   acetaminophen (TYLENOL) 325 mg tablet  Self No No   Sig: Take 2 tablets (650 mg total) by mouth every 6 (six) hours as needed (pain)   Patient not taking: Reported on 7/15/2022   aspirin 81 mg chewable tablet  Self No No   Sig: Chew 1 tablet daily for 30 days   atorvastatin (LIPITOR) 40 mg tablet  Self No No    Sig: TAKE 1 TABLET BY MOUTH EVERY DAY   dextromethorphan 15 MG/5ML syrup  Self No No   Sig: Take 10 mL (30 mg total) by mouth 4 (four) times a day as needed for cough   Patient not taking: Reported on 11/28/2023   furosemide (LASIX) 40 mg tablet  Self No No   Sig: Take 2 tablets (80 mg total) by mouth daily Patient takes two 40 mg tablets to make 80mg daily.   guaiFENesin (MUCINEX) 600 mg 12 hr tablet  Self No No   Sig: Take 2 tablets (1,200 mg total) by mouth 2 (two) times a day   Patient not taking: Reported on 11/28/2023   insulin aspart protamine-insulin aspart (NovoLOG 70/30) 100 units/mL injection   Yes No   Sig: Inject under the skin if needed   latanoprost (XALATAN) 0.005 % ophthalmic solution  Self Yes No   Sig: Administer 0.0005 drops to both eyes daily at bedtime   levothyroxine 25 mcg tablet  Self Yes No   Sig: Take 25 mcg by mouth daily   lisinopril (ZESTRIL) 5 mg tablet  Self No No   Sig: Take 1 tablet (5 mg total) by mouth daily   metoprolol tartrate (LOPRESSOR) 25 mg tablet  Self No No   Sig: TAKE 1 TABLET (25 MG TOTAL) BY MOUTH EVERY 12 (TWELVE) HOURS   Patient taking differently: Take 25 mg by mouth 2 (two) times a day   mupirocin (BACTROBAN) 2 % ointment  Self No No   Sig: Apply topically daily   Patient not taking: Reported on 2/28/2024   promethazine (PHENERGAN) 25 mg tablet   No No   Sig: Take 1 tablet (25 mg total) by mouth every 6 (six) hours as needed for nausea or vomiting for up to 10 days   Patient not taking: Reported on 10/25/2023      Facility-Administered Medications: None       Past Medical History:   Diagnosis Date    CAD (coronary artery disease)     Chronic combined systolic and diastolic CHF (congestive heart failure) (HCC)     Diabetes mellitus (HCC)     type 2    Dyslipidemia     Hypertension     Ischemic cardiomyopathy     MI (myocardial infarction) (HCC)     Neuropathy     Osteomyelitis (HCC)     Pancreatitis        Past Surgical History:   Procedure Laterality Date     ANGIOPLASTY      ballon    CORONARY ANGIOPLASTY WITH STENT PLACEMENT      LULA to proximal and mid LAD, Proximal RCA.     FOREIGN BODY REMOVAL Left 5/26/2022    Procedure: REMOVAL FOREIGN BODY EXTREMITY;  Surgeon: Heron Herrera DPM;  [facility];  Service: Podiatry    HERNIA REPAIR      INCISION AND DRAINAGE OF WOUND Left 2/14/2020    Procedure: INCISION AND DRAINAGE (I&D) EXTREMITY left foot (cpt 48584);  Surgeon: Kermit Chau DPM;  [facility];  Service: Podiatry    NE AMPUTATION FOOT TRANSMETARSAL Left 5/26/2022    Procedure: AMPUTATION left 5th metatarsal;  Surgeon: Heron Herrera DPM;  [facility];  Service: Podiatry    NE EXPLORATION PENETRATING WOUND SPX EXTREMITY Left 2/14/2020    Procedure: EXPLORATION EXTREMITY;  Surgeon: Kermit Chau DPM;  [facility];  Service: Podiatry    NE NEGATIVE PRESSURE WOUND THERAPY DME <= 50 SQ CM Left 2/14/2020    Procedure: APPLICATION VAC DRESSING;  Surgeon: Kermit Chau DPM;  [facility];  Service: Podiatry    TOE AMPUTATION Left 5/14/2022    Procedure: EXCISION OF LEFT 5TH TOE ULCER WITH FILET FLAP;  Surgeon: Heron Herrera DPM;  [facility];  Service: Podiatry       Family History   Problem Relation Age of Onset    Heart attack Father     Hyperlipidemia Father     Cancer Father     Diabetes Father     Diabetes Mother     Heart failure Mother         poss    Kidney disease Mother     Cancer Paternal Grandfather     Stroke Neg Hx     Anuerysm Neg Hx     Clotting disorder Neg Hx     Arrhythmia Neg Hx     Coronary artery disease Neg Hx     Hypertension Neg Hx     Sudden death Neg Hx         scd     I have reviewed and agree with the history as documented.    E-Cigarette/Vaping    E-Cigarette Use Never User      E-Cigarette/Vaping Substances    Nicotine No     THC No     CBD No     Flavoring No     Other No     Unknown No      Social History     Tobacco Use    Smoking status: Never    Smokeless tobacco: Never    Vaping Use    Vaping status: Never Used   Substance Use Topics    Alcohol use: Not Currently     Comment: 20 years of sobriety    Drug use: Never        Review of Systems   Constitutional:  Positive for activity change, appetite change, chills, diaphoresis, fatigue and fever.   HENT:  Negative for congestion, ear pain and sore throat.    Eyes:  Negative for pain and visual disturbance.   Respiratory:  Negative for cough and shortness of breath.    Cardiovascular:  Negative for chest pain and palpitations.   Gastrointestinal:  Positive for abdominal pain and diarrhea. Negative for blood in stool, nausea and vomiting.   Genitourinary:  Negative for dysuria and hematuria.   Musculoskeletal:  Positive for back pain. Negative for arthralgias.   Skin:  Negative for color change and rash.   Neurological:  Positive for weakness. Negative for dizziness and headaches.   All other systems reviewed and are negative.      Physical Exam  ED Triage Vitals   Temperature Pulse Respirations Blood Pressure SpO2   05/29/24 1803 05/29/24 1803 05/29/24 1803 05/29/24 1803 05/29/24 1803   (!) 97.3 °F (36.3 °C) 105 20 94/50 98 %      Temp Source Heart Rate Source Patient Position - Orthostatic VS BP Location FiO2 (%)   05/29/24 1803 05/29/24 1803 05/29/24 1803 05/29/24 1803 --   Oral Monitor Sitting Right arm       Pain Score       05/29/24 2054       Med Not Given for Pain - for MAR use only             Orthostatic Vital Signs  Vitals:    05/29/24 1803 05/29/24 1957 05/29/24 2055 05/29/24 2153   BP: 94/50 132/78 106/60 103/64   Pulse: 105 (!) 122 (!) 115 (!) 120   Patient Position - Orthostatic VS: Sitting Lying  Lying       Physical Exam  Vitals and nursing note reviewed.   Constitutional:       General: He is not in acute distress.     Appearance: He is well-developed.   HENT:      Head: Normocephalic and atraumatic.      Nose: Nose normal. No congestion.      Mouth/Throat:      Pharynx: Oropharynx is clear.   Eyes:      Extraocular  Movements: Extraocular movements intact.      Conjunctiva/sclera: Conjunctivae normal.   Cardiovascular:      Rate and Rhythm: Normal rate and regular rhythm.      Pulses: Normal pulses.      Heart sounds: Normal heart sounds. No murmur heard.  Pulmonary:      Effort: Pulmonary effort is normal. No respiratory distress.      Breath sounds: Normal breath sounds.   Chest:      Chest wall: No tenderness.   Abdominal:      General: Abdomen is flat. Bowel sounds are normal. There is no distension.      Palpations: Abdomen is soft.      Tenderness: There is abdominal tenderness. There is no right CVA tenderness or left CVA tenderness.      Hernia: A hernia is present.      Comments: Mild abdominal tenderness in umbilical region.  Multiple hernias appreciated, all easily reducible.   Genitourinary:     Penis: Normal.       Rectum: Normal.      Comments: Large inguinal hernia and scrotum, nontender to palpation.  Musculoskeletal:         General: No deformity or signs of injury. Normal range of motion.      Cervical back: Normal range of motion and neck supple. No rigidity or tenderness.      Right lower leg: No edema.      Left lower leg: No edema.   Skin:     General: Skin is warm and dry.      Findings: No bruising, lesion or rash.      Comments: Multiple scars on lower extremities from previous infections and surgeries.  Area does not look infected at this time.   Neurological:      General: No focal deficit present.      Mental Status: He is alert.      Cranial Nerves: No cranial nerve deficit.      Sensory: No sensory deficit.         ED Medications  Medications   sodium chloride 0.9 % bolus 500 mL (500 mL Intravenous New Bag 5/29/24 2213)   azithromycin (ZITHROMAX) 500 mg in sodium chloride 0.9% 250mL IVPB 500 mg (500 mg Intravenous New Bag 5/29/24 2136)   apixaban (ELIQUIS) tablet 5 mg (has no administration in time range)   sodium chloride 0.9 % bolus 500 mL (0 mL Intravenous Stopped 5/29/24 2030)   cefepime  (MAXIPIME) 1,000 mg in dextrose 5 % 50 mL IVPB (1,000 mg Intravenous New Bag 5/29/24 2051)   acetaminophen (TYLENOL) tablet 975 mg (975 mg Oral Given 5/29/24 2054)       Diagnostic Studies  Results Reviewed       Procedure Component Value Units Date/Time    HS Troponin I 2hr [[id number]] Collected: 05/29/24 2157    Lab Status: In process Specimen: Blood from Arm, Left Updated: 05/29/24 2208    Procalcitonin [[id number]]  (Abnormal) Collected: 05/29/24 1948    Lab Status: Final result Specimen: Blood from Arm, Left Updated: 05/29/24 2051     Procalcitonin 3.57 ng/ml     HS Troponin I 4hr [[id number]]     Lab Status: No result Specimen: Blood     HS Troponin 0hr (reflex protocol) [[id number]]  (Abnormal) Collected: 05/29/24 1948    Lab Status: Final result Specimen: Blood from Arm, Left Updated: 05/29/24 2048     hs TnI 0hr 153 ng/L     Comprehensive metabolic panel [[id number]]  (Abnormal) Collected: 05/29/24 1948    Lab Status: Final result Specimen: Blood from Arm, Right Updated: 05/29/24 2046     Sodium 132 mmol/L      Potassium 4.2 mmol/L      Chloride 94 mmol/L      CO2 21 mmol/L      ANION GAP 17 mmol/L      BUN 74 mg/dL      Creatinine 3.58 mg/dL      Glucose 263 mg/dL      Calcium 9.1 mg/dL      AST 16 U/L      ALT 16 U/L      Alkaline Phosphatase 66 U/L      Total Protein 8.0 g/dL      Albumin 3.8 g/dL      Total Bilirubin 1.65 mg/dL      eGFR 16 ml/min/1.73sq m     Narrative:      National Kidney Disease Foundation guidelines for Chronic Kidney Disease (CKD):     Stage 1 with normal or high GFR (GFR > 90 mL/min/1.73 square meters)    Stage 2 Mild CKD (GFR = 60-89 mL/min/1.73 square meters)    Stage 3A Moderate CKD (GFR = 45-59 mL/min/1.73 square meters)    Stage 3B Moderate CKD (GFR = 30-44 mL/min/1.73 square meters)    Stage 4 Severe CKD (GFR = 15-29 mL/min/1.73 square meters)    Stage 5 End Stage CKD (GFR <15 mL/min/1.73 square meters)  Note: GFR calculation is accurate only with a steady state creatinine     Lipase [[id number]]  (Abnormal) Collected: 05/29/24 1948    Lab Status: Final result Specimen: Blood from Arm, Right Updated: 05/29/24 2046     Lipase <6 u/L     Lactic acid [[id number]]  (Normal) Collected: 05/29/24 1948    Lab Status: Final result Specimen: Blood from Arm, Left Updated: 05/29/24 2046     LACTIC ACID 2.0 mmol/L     Narrative:      Result may be elevated if tourniquet was used during collection.    FLU/RSV/COVID - if FLU/RSV clinically relevant [[id number]]  (Normal) Collected: 05/29/24 1948    Lab Status: Final result Specimen: Nares from Nose Updated: 05/29/24 2045     SARS-CoV-2 Negative     INFLUENZA A PCR Negative     INFLUENZA B PCR Negative     RSV PCR Negative    Narrative:      FOR PEDIATRIC PATIENTS - copy/paste COVID Guidelines URL to browser: [email or web address]    SARS-CoV-2 assay is a Nucleic Acid Amplification assay intended for the  qualitative detection of nucleic acid from SARS-CoV-2 in nasopharyngeal  swabs. Results are for the presumptive identification of SARS-CoV-2 RNA.    Positive results are indicative of infection with SARS-CoV-2, the virus  causing COVID-19, but do not rule out bacterial infection or co-infection  with other viruses. Laboratories within the [place] and its  territories are required to report all positive results to the appropriate  public health authorities. Negative results do not preclude SARS-CoV-2  infection and should not be used as the sole basis for treatment or other  patient management decisions. Negative results must be combined with  clinical observations, patient history, and epidemiological information.  This test has not been FDA cleared or approved.    This test has been authorized by FDA under an Emergency Use Authorization  (EUA). This test is only authorized for the duration of time the  declaration that circumstances exist justifying the authorization of the  emergency use of an in  vitro diagnostic tests for detection of SARS-CoV-2  virus and/or diagnosis of COVID-19 infection under section 564(b)(1) of  the Act, 21 U.S.C. 360bbb-3(b)(1), unless the authorization is terminated  or revoked sooner. The test has been validated but independent review by FDA  and CLIA is pending.    Test performed using [employer] GeneXpert: This RT-PCR assay targets N2,  a region unique to SARS-CoV-2. A conserved region in the E-gene was chosen  for pan-Sarbecovirus detection which includes SARS-CoV-2.    According to CMS-2020-01-R, this platform meets the definition of high-throughput technology.    Protime-INR [[id number]]  (Abnormal) Collected: 05/29/24 1948    Lab Status: Final result Specimen: Blood from Arm, Left Updated: 05/29/24 2039     Protime 15.5 seconds      INR 1.17    APTT [[id number]]  (Abnormal) Collected: 05/29/24 1948    Lab Status: Final result Specimen: Blood from Arm, Left Updated: 05/29/24 2039     PTT 38 seconds     CBC and differential [[id number]]  (Abnormal) Collected: 05/29/24 1948    Lab Status: Final result Specimen: Blood from Arm, Left Updated: 05/29/24 2008     WBC 20.25 Thousand/uL      RBC 4.82 Million/uL      Hemoglobin 13.8 g/dL      Hematocrit 42.6 %      MCV 88 fL      MCH 28.6 pg      MCHC 32.4 g/dL      RDW 13.6 %      MPV 10.3 fL      Platelets 267 Thousands/uL      nRBC 0 /100 WBCs      Segmented % 88 %      Immature Grans % 1 %      Lymphocytes % 2 %      Monocytes % 9 %      Eosinophils Relative 0 %      Basophils Relative 0 %      Absolute Neutrophils 17.78 Thousands/µL      Absolute Immature Grans 0.17 Thousand/uL      Absolute Lymphocytes 0.49 Thousands/µL      Absolute Monocytes 1.73 Thousand/µL      Eosinophils Absolute 0.03 Thousand/µL      Basophils Absolute 0.05 Thousands/µL     Blood culture #2 [[id number]] Collected: 05/29/24 1948    Lab Status: In process Specimen: Blood from Arm, Right Updated: 05/29/24 2001    Blood culture #1 [[id number]] Collected: 05/29/24 1948     Lab Status: In process Specimen: Blood from Arm, Left Updated: 05/29/24 2001    UA w Reflex to Microscopic w Reflex to Culture [[id number]]     Lab Status: No result Specimen: Urine                    XR chest 1 view portable   ED Interpretation by Mitesh Rojas MD (05/29 2102)   My personal interpretation-right lower lobe consolidation suggestive of pneumonia            Procedures  ECG 12 Lead Documentation Only    Date/Time: 5/29/2024 10:16 PM    Performed by: Mitesh Rojas MD  Authorized by: Mitesh Rojas MD    Patient location:  ED  Interpretation:     Interpretation: abnormal    Rate:     ECG rate:  125    ECG rate assessment: tachycardic    Rhythm:     Rhythm: atrial fibrillation    Ectopy:     Ectopy: none    QRS:     QRS axis:  Left    QRS intervals:  Normal  Conduction:     Conduction: normal    ST segments:     ST segments:  Normal  T waves:     T waves: normal          ED Course  ED Course as of 05/29/24 2217   Wed May 29, 2024   2009 64-year-old male presenting after 5 days of fevers, chills, diaphoresis, diarrhea, inability to tolerate p.o. on arrival patient has soft blood pressures and is tachycardic, will do sepsis workup, unknown source at this time.  Will also evaluate for ACS   2021 EKG- new onset afib RVR   2021 WBC(!): 20.25   2102 Chest x-ray concerning for right lower lobe pneumonia, patient meets sepsis criteria with elevated white count, tachycardia and pneumonia.  Patient started on cefepime and azithromycin.  Patient also meets criteria for an LOBO.  Will also plan to start patient on Eliquis 5 mg due to new onset atrial fibrillation.  Plan to admit to [facility].       [id number] SCORE      Flowsheet Row Most Recent Value   [id number]    Age 1 Filed at: 05/29/2024 2113   Sex 0 Filed at: 05/29/2024 2113   CHF History 1 Filed at: 05/29/2024 2113   HTN History 0 Filed at: 05/29/2024 2113   Stroke or TIA Symptoms Previously 0 Filed at: 05/29/2024 2113   Vascular Disease History 0 Filed at:  "05/29/2024 2113   Diabetes History 1 Filed at: 05/29/2024 2113   [id number] Score 3 Filed at: 05/29/2024 2113              HEART Risk Score      Flowsheet Row Most Recent Value   Heart Score Risk Calculator    History 1 Filed at: 05/29/2024 2104   ECG 1 Filed at: 05/29/2024 2104   Age 2 Filed at: 05/29/2024 2104   Risk Factors 2 Filed at: 05/29/2024 2104   Troponin 2 Filed at: 05/29/2024 2104   HEART Score 8 Filed at: 05/29/2024 2104                     Initial Sepsis Screening       Row Name 05/29/24 2103                Is the patient's history suggestive of a new or worsening infection? Yes (Proceed)  -MR        Suspected source of infection pneumonia  -MR        Indicate SIRS criteria Tachycardia > 90 bpm;Leukocytosis (WBC > 62492 IJL) OR Leukopenia (WBC <4000 IJL) OR Bandemia (WBC >10% bands)  -MR        Are two or more of the above signs & symptoms of infection both present and new to the patient? Yes (Proceed)  -MR        Assess for evidence of organ dysfunction: Are any of the below criteria present within 6 hours of suspected infection and SIRS criteria that are NOT considered to be chronic conditions? Creatinine > 2.0 AND > 0.5 above baseline  -MR        Date of presentation of severe sepsis 05/29/24  -MR        Time of presentation of severe sepsis 2104  -MR        Sepsis Note: Click \"NEXT\" below (NOT \"close\") to generate sepsis note based on above information. YES (proceed by clicking \"NEXT\")  -MR                  User Key  (r) = Recorded By, (t) = Taken By, (c) = Cosigned By      Initials Name Provider Type     Mitesh Rojas MD Resident                    SBIRT 20yo+      Flowsheet Row Most Recent Value   Initial Alcohol Screen: US AUDIT-C     1. How often do you have a drink containing alcohol? 0 Filed at: 05/29/2024 1803   2. How many drinks containing alcohol do you have on a typical day you are drinking?  0 Filed at: 05/29/2024 1803   3a. Male UNDER 65: How often do you have five or more drinks " on one occasion? 0 Filed at: 05/29/2024 1803   3b. FEMALE Any Age, or MALE 65+: How often do you have 4 or more drinks on one occassion? 0 Filed at: 05/29/2024 1803   Audit-C Score 0 Filed at: 05/29/2024 1803   MARCEL: How many times in the past year have you...    Used an illegal drug or used a prescription medication for non-medical reasons? Never Filed at: 05/29/2024 1803                  Medical Decision Making  Amount and/or Complexity of Data Reviewed  Labs: ordered. Decision-making details documented in ED Course.  Radiology: ordered and independent interpretation performed.    Risk  OTC drugs.  Prescription drug management.  Decision regarding hospitalization.          Disposition  Final diagnoses:   Sepsis (HCC)   Pneumonia   LOBO (acute kidney injury) (HCC)   A-fib (HCC)     Time reflects when diagnosis was documented in both MDM as applicable and the Disposition within this note       Time User Action Codes Description Comment    5/29/2024  9:33 PM BobMitesh Add [A41.9] Sepsis (HCC)     5/29/2024  9:33 PM BobMitesh Add [J18.9] Pneumonia     5/29/2024  9:33 PM BobMitesh Add [N17.9] LOBO (acute kidney injury) (HCC)     5/29/2024  9:33 PM Mitesh Rojas Add [I48.91] A-fib (HCC)           ED Disposition       ED Disposition   Admit    Condition   Stable    Date/Time   Wed May 29, 2024 2133    Comment   Case was discussed with resident and the patient's admission status was agreed to be Admission Status: inpatient status to the service of Dr. Gould .               Follow-up Information    None         Patient's Medications   Discharge Prescriptions    No medications on file     No discharge procedures on file.    PDMP Review         Value Time User    PDMP Reviewed  Yes 5/28/2022 11:01 AM Sho Thomas MD             ED Provider  Attending physically available and evaluated Noel EULALIO Khan. I managed the patient along with the ED Attending.    Electronically Signed by            Mitesh Rojas MD  05/29/24 2215       Mitesh Rojas MD  05/31/24 4002

## 2024-05-30 ENCOUNTER — APPOINTMENT (INPATIENT)
Dept: ULTRASOUND IMAGING | Facility: HOSPITAL | Age: 64
DRG: 871 | End: 2024-05-30
Payer: COMMERCIAL

## 2024-05-30 PROBLEM — I48.91 ATRIAL FIBRILLATION (HCC): Status: ACTIVE | Noted: 2024-05-30

## 2024-05-30 LAB
4HR DELTA HS TROPONIN: -30 NG/L
ANION GAP SERPL CALCULATED.3IONS-SCNC: 12 MMOL/L (ref 4–13)
ANION GAP SERPL CALCULATED.3IONS-SCNC: 12 MMOL/L (ref 4–13)
ANION GAP SERPL CALCULATED.3IONS-SCNC: 15 MMOL/L (ref 4–13)
ANION GAP SERPL CALCULATED.3IONS-SCNC: 19 MMOL/L (ref 4–13)
APTT PPP: 100 SECONDS (ref 23–37)
APTT PPP: 74 SECONDS (ref 23–37)
APTT PPP: 83 SECONDS (ref 23–37)
B-OH-BUTYR SERPL-MCNC: 3.46 MMOL/L (ref 0.02–0.27)
BACTERIA UR QL AUTO: ABNORMAL /HPF
BASE EX.OXY STD BLDV CALC-SCNC: 93.3 % (ref 60–80)
BASE EXCESS BLDV CALC-SCNC: -8.4 MMOL/L
BASOPHILS # BLD AUTO: 0.08 THOUSANDS/ÂΜL (ref 0–0.1)
BASOPHILS NFR BLD AUTO: 0 % (ref 0–1)
BILIRUB UR QL STRIP: NEGATIVE
BNP SERPL-MCNC: 1434 PG/ML (ref 0–100)
BUDDING YEAST: PRESENT
BUN SERPL-MCNC: 78 MG/DL (ref 5–25)
BUN SERPL-MCNC: 84 MG/DL (ref 5–25)
BUN SERPL-MCNC: 86 MG/DL (ref 5–25)
BUN SERPL-MCNC: 86 MG/DL (ref 5–25)
CALCIUM SERPL-MCNC: 8.5 MG/DL (ref 8.4–10.2)
CALCIUM SERPL-MCNC: 8.6 MG/DL (ref 8.4–10.2)
CALCIUM SERPL-MCNC: 8.7 MG/DL (ref 8.4–10.2)
CALCIUM SERPL-MCNC: 8.7 MG/DL (ref 8.4–10.2)
CARDIAC TROPONIN I PNL SERPL HS: 123 NG/L
CHLORIDE SERPL-SCNC: 100 MMOL/L (ref 96–108)
CHLORIDE SERPL-SCNC: 102 MMOL/L (ref 96–108)
CHLORIDE SERPL-SCNC: 96 MMOL/L (ref 96–108)
CHLORIDE SERPL-SCNC: 99 MMOL/L (ref 96–108)
CLARITY UR: ABNORMAL
CO2 SERPL-SCNC: 19 MMOL/L (ref 21–32)
CO2 SERPL-SCNC: 19 MMOL/L (ref 21–32)
CO2 SERPL-SCNC: 21 MMOL/L (ref 21–32)
CO2 SERPL-SCNC: 21 MMOL/L (ref 21–32)
COLOR UR: YELLOW
CREAT SERPL-MCNC: 3.12 MG/DL (ref 0.6–1.3)
CREAT SERPL-MCNC: 3.17 MG/DL (ref 0.6–1.3)
CREAT SERPL-MCNC: 3.39 MG/DL (ref 0.6–1.3)
CREAT SERPL-MCNC: 3.59 MG/DL (ref 0.6–1.3)
EOSINOPHIL # BLD AUTO: 0.01 THOUSAND/ÂΜL (ref 0–0.61)
EOSINOPHIL NFR BLD AUTO: 0 % (ref 0–6)
ERYTHROCYTE [DISTWIDTH] IN BLOOD BY AUTOMATED COUNT: 13.7 % (ref 11.6–15.1)
ERYTHROCYTE [DISTWIDTH] IN BLOOD BY AUTOMATED COUNT: 13.7 % (ref 11.6–15.1)
GFR SERPL CREATININE-BSD FRML MDRD: 16 ML/MIN/1.73SQ M
GFR SERPL CREATININE-BSD FRML MDRD: 17 ML/MIN/1.73SQ M
GFR SERPL CREATININE-BSD FRML MDRD: 19 ML/MIN/1.73SQ M
GFR SERPL CREATININE-BSD FRML MDRD: 19 ML/MIN/1.73SQ M
GLUCOSE SERPL-MCNC: 106 MG/DL (ref 65–140)
GLUCOSE SERPL-MCNC: 115 MG/DL (ref 65–140)
GLUCOSE SERPL-MCNC: 120 MG/DL (ref 65–140)
GLUCOSE SERPL-MCNC: 148 MG/DL (ref 65–140)
GLUCOSE SERPL-MCNC: 159 MG/DL (ref 65–140)
GLUCOSE SERPL-MCNC: 185 MG/DL (ref 65–140)
GLUCOSE SERPL-MCNC: 200 MG/DL (ref 65–140)
GLUCOSE SERPL-MCNC: 264 MG/DL (ref 65–140)
GLUCOSE SERPL-MCNC: 277 MG/DL (ref 65–140)
GLUCOSE SERPL-MCNC: 291 MG/DL (ref 65–140)
GLUCOSE SERPL-MCNC: 300 MG/DL (ref 65–140)
GLUCOSE SERPL-MCNC: 302 MG/DL (ref 65–140)
GLUCOSE SERPL-MCNC: 303 MG/DL (ref 65–140)
GLUCOSE UR STRIP-MCNC: NEGATIVE MG/DL
GRAN CASTS #/AREA URNS LPF: ABNORMAL /[LPF]
HCO3 BLDV-SCNC: 15.3 MMOL/L (ref 24–30)
HCT VFR BLD AUTO: 35.1 % (ref 36.5–49.3)
HCT VFR BLD AUTO: 39.5 % (ref 36.5–49.3)
HGB BLD-MCNC: 11.3 G/DL (ref 12–17)
HGB BLD-MCNC: 12.5 G/DL (ref 12–17)
HGB UR QL STRIP.AUTO: NEGATIVE
HYALINE CASTS #/AREA URNS LPF: ABNORMAL /LPF
IMM GRANULOCYTES # BLD AUTO: 0.24 THOUSAND/UL (ref 0–0.2)
IMM GRANULOCYTES NFR BLD AUTO: 1 % (ref 0–2)
INR PPP: 1.23 (ref 0.84–1.19)
KETONES UR STRIP-MCNC: NEGATIVE MG/DL
L PNEUMO1 AG UR QL IA.RAPID: NEGATIVE
LACTATE SERPL-SCNC: 1 MMOL/L (ref 0.5–2)
LEUKOCYTE ESTERASE UR QL STRIP: NEGATIVE
LYMPHOCYTES # BLD AUTO: 0.46 THOUSANDS/ÂΜL (ref 0.6–4.47)
LYMPHOCYTES NFR BLD AUTO: 2 % (ref 14–44)
MCH RBC QN AUTO: 28 PG (ref 26.8–34.3)
MCH RBC QN AUTO: 28.4 PG (ref 26.8–34.3)
MCHC RBC AUTO-ENTMCNC: 31.6 G/DL (ref 31.4–37.4)
MCHC RBC AUTO-ENTMCNC: 32.2 G/DL (ref 31.4–37.4)
MCV RBC AUTO: 88 FL (ref 82–98)
MCV RBC AUTO: 88 FL (ref 82–98)
MONOCYTES # BLD AUTO: 1.57 THOUSAND/ÂΜL (ref 0.17–1.22)
MONOCYTES NFR BLD AUTO: 8 % (ref 4–12)
NEUTROPHILS # BLD AUTO: 18.37 THOUSANDS/ÂΜL (ref 1.85–7.62)
NEUTS SEG NFR BLD AUTO: 89 % (ref 43–75)
NITRITE UR QL STRIP: NEGATIVE
NON-SQ EPI CELLS URNS QL MICRO: ABNORMAL /HPF
NRBC BLD AUTO-RTO: 0 /100 WBCS
O2 CT BLDV-SCNC: 17.5 ML/DL
PCO2 BLDV: 27.3 MM HG (ref 42–50)
PH BLDV: 7.37 [PH] (ref 7.3–7.4)
PH UR STRIP.AUTO: 5.5 [PH]
PLATELET # BLD AUTO: 236 THOUSANDS/UL (ref 149–390)
PLATELET # BLD AUTO: 275 THOUSANDS/UL (ref 149–390)
PMV BLD AUTO: 10.5 FL (ref 8.9–12.7)
PMV BLD AUTO: 11 FL (ref 8.9–12.7)
PO2 BLDV: 81.4 MM HG (ref 35–45)
POTASSIUM SERPL-SCNC: 3.6 MMOL/L (ref 3.5–5.3)
POTASSIUM SERPL-SCNC: 3.7 MMOL/L (ref 3.5–5.3)
POTASSIUM SERPL-SCNC: 3.9 MMOL/L (ref 3.5–5.3)
POTASSIUM SERPL-SCNC: 4 MMOL/L (ref 3.5–5.3)
PROCALCITONIN SERPL-MCNC: 3.32 NG/ML
PROT UR STRIP-MCNC: ABNORMAL MG/DL
PROTHROMBIN TIME: 16.2 SECONDS (ref 11.6–14.5)
RBC # BLD AUTO: 3.98 MILLION/UL (ref 3.88–5.62)
RBC # BLD AUTO: 4.47 MILLION/UL (ref 3.88–5.62)
RBC #/AREA URNS AUTO: ABNORMAL /HPF
RBC CASTS URNS QL MICRO: ABNORMAL /LPF
S PNEUM AG UR QL: NEGATIVE
SODIUM SERPL-SCNC: 133 MMOL/L (ref 135–147)
SODIUM SERPL-SCNC: 133 MMOL/L (ref 135–147)
SODIUM SERPL-SCNC: 134 MMOL/L (ref 135–147)
SODIUM SERPL-SCNC: 135 MMOL/L (ref 135–147)
SP GR UR STRIP.AUTO: 1.01 (ref 1–1.03)
TSH SERPL DL<=0.05 MIU/L-ACNC: 3.46 UIU/ML (ref 0.45–4.5)
UROBILINOGEN UR STRIP-ACNC: <2 MG/DL
WBC # BLD AUTO: 16.31 THOUSAND/UL (ref 4.31–10.16)
WBC # BLD AUTO: 20.73 THOUSAND/UL (ref 4.31–10.16)
WBC #/AREA URNS AUTO: ABNORMAL /HPF

## 2024-05-30 PROCEDURE — 84443 ASSAY THYROID STIM HORMONE: CPT

## 2024-05-30 PROCEDURE — 80048 BASIC METABOLIC PNL TOTAL CA: CPT

## 2024-05-30 PROCEDURE — 36415 COLL VENOUS BLD VENIPUNCTURE: CPT

## 2024-05-30 PROCEDURE — 82805 BLOOD GASES W/O2 SATURATION: CPT

## 2024-05-30 PROCEDURE — 87449 NOS EACH ORGANISM AG IA: CPT

## 2024-05-30 PROCEDURE — 81001 URINALYSIS AUTO W/SCOPE: CPT

## 2024-05-30 PROCEDURE — 85027 COMPLETE CBC AUTOMATED: CPT

## 2024-05-30 PROCEDURE — 83880 ASSAY OF NATRIURETIC PEPTIDE: CPT

## 2024-05-30 PROCEDURE — 97165 OT EVAL LOW COMPLEX 30 MIN: CPT

## 2024-05-30 PROCEDURE — 85610 PROTHROMBIN TIME: CPT

## 2024-05-30 PROCEDURE — 99223 1ST HOSP IP/OBS HIGH 75: CPT | Performed by: INTERNAL MEDICINE

## 2024-05-30 PROCEDURE — 76775 US EXAM ABDO BACK WALL LIM: CPT

## 2024-05-30 PROCEDURE — 82948 REAGENT STRIP/BLOOD GLUCOSE: CPT

## 2024-05-30 PROCEDURE — 85025 COMPLETE CBC W/AUTO DIFF WBC: CPT

## 2024-05-30 PROCEDURE — 85730 THROMBOPLASTIN TIME PARTIAL: CPT

## 2024-05-30 PROCEDURE — 83605 ASSAY OF LACTIC ACID: CPT

## 2024-05-30 PROCEDURE — 84145 PROCALCITONIN (PCT): CPT

## 2024-05-30 PROCEDURE — 84484 ASSAY OF TROPONIN QUANT: CPT

## 2024-05-30 PROCEDURE — 82010 KETONE BODYS QUAN: CPT

## 2024-05-30 PROCEDURE — 85730 THROMBOPLASTIN TIME PARTIAL: CPT | Performed by: INTERNAL MEDICINE

## 2024-05-30 RX ORDER — INSULIN GLARGINE 100 [IU]/ML
34 INJECTION, SOLUTION SUBCUTANEOUS ONCE
Status: COMPLETED | OUTPATIENT
Start: 2024-05-30 | End: 2024-05-30

## 2024-05-30 RX ORDER — POTASSIUM CHLORIDE 20 MEQ/1
40 TABLET, EXTENDED RELEASE ORAL ONCE
Status: COMPLETED | OUTPATIENT
Start: 2024-05-30 | End: 2024-05-30

## 2024-05-30 RX ORDER — POTASSIUM CHLORIDE 20 MEQ/1
20 TABLET, EXTENDED RELEASE ORAL ONCE
Status: COMPLETED | OUTPATIENT
Start: 2024-05-30 | End: 2024-05-30

## 2024-05-30 RX ORDER — INSULIN GLARGINE 100 [IU]/ML
34 INJECTION, SOLUTION SUBCUTANEOUS
Status: DISCONTINUED | OUTPATIENT
Start: 2024-05-31 | End: 2024-05-31

## 2024-05-30 RX ORDER — FAMOTIDINE 20 MG/1
10 TABLET, FILM COATED ORAL ONCE
Status: COMPLETED | OUTPATIENT
Start: 2024-05-30 | End: 2024-05-30

## 2024-05-30 RX ORDER — INSULIN LISPRO 100 [IU]/ML
1-6 INJECTION, SOLUTION INTRAVENOUS; SUBCUTANEOUS
Status: DISCONTINUED | OUTPATIENT
Start: 2024-05-31 | End: 2024-06-03 | Stop reason: HOSPADM

## 2024-05-30 RX ORDER — FAMOTIDINE 20 MG/1
10 TABLET, FILM COATED ORAL DAILY
Status: DISCONTINUED | OUTPATIENT
Start: 2024-05-30 | End: 2024-05-30

## 2024-05-30 RX ORDER — SODIUM CHLORIDE, SODIUM GLUCONATE, SODIUM ACETATE, POTASSIUM CHLORIDE, MAGNESIUM CHLORIDE, SODIUM PHOSPHATE, DIBASIC, AND POTASSIUM PHOSPHATE .53; .5; .37; .037; .03; .012; .00082 G/100ML; G/100ML; G/100ML; G/100ML; G/100ML; G/100ML; G/100ML
50 INJECTION, SOLUTION INTRAVENOUS CONTINUOUS
Status: DISCONTINUED | OUTPATIENT
Start: 2024-05-30 | End: 2024-05-31

## 2024-05-30 RX ORDER — PANTOPRAZOLE SODIUM 40 MG/1
40 TABLET, DELAYED RELEASE ORAL
Status: DISCONTINUED | OUTPATIENT
Start: 2024-05-30 | End: 2024-06-03 | Stop reason: HOSPADM

## 2024-05-30 RX ORDER — INSULIN LISPRO 100 [IU]/ML
4 INJECTION, SOLUTION INTRAVENOUS; SUBCUTANEOUS
Status: DISCONTINUED | OUTPATIENT
Start: 2024-05-30 | End: 2024-05-30

## 2024-05-30 RX ORDER — SODIUM CHLORIDE 9 MG/ML
50 INJECTION, SOLUTION INTRAVENOUS CONTINUOUS
Status: DISCONTINUED | OUTPATIENT
Start: 2024-05-30 | End: 2024-05-30

## 2024-05-30 RX ORDER — AZITHROMYCIN 250 MG/1
500 TABLET, FILM COATED ORAL EVERY 24 HOURS
Status: DISCONTINUED | OUTPATIENT
Start: 2024-05-30 | End: 2024-06-02

## 2024-05-30 RX ADMIN — HEPARIN SODIUM 11.8 UNITS/KG/HR: 10000 INJECTION, SOLUTION INTRAVENOUS at 02:13

## 2024-05-30 RX ADMIN — PANTOPRAZOLE SODIUM 40 MG: 40 TABLET, DELAYED RELEASE ORAL at 05:29

## 2024-05-30 RX ADMIN — HEPARIN SODIUM 9.8 UNITS/KG/HR: 10000 INJECTION, SOLUTION INTRAVENOUS at 21:40

## 2024-05-30 RX ADMIN — METOPROLOL TARTRATE 25 MG: 25 TABLET, FILM COATED ORAL at 08:09

## 2024-05-30 RX ADMIN — SODIUM CHLORIDE 50 ML/HR: 0.9 INJECTION, SOLUTION INTRAVENOUS at 12:59

## 2024-05-30 RX ADMIN — HEPARIN SODIUM 4000 UNITS: 1000 INJECTION INTRAVENOUS; SUBCUTANEOUS at 02:13

## 2024-05-30 RX ADMIN — CEFTRIAXONE SODIUM 1000 MG: 10 INJECTION, POWDER, FOR SOLUTION INTRAVENOUS at 05:58

## 2024-05-30 RX ADMIN — LOPERAMIDE HYDROCHLORIDE 2 MG: 2 CAPSULE ORAL at 01:16

## 2024-05-30 RX ADMIN — POTASSIUM CHLORIDE 20 MEQ: 1500 TABLET, EXTENDED RELEASE ORAL at 13:09

## 2024-05-30 RX ADMIN — INSULIN GLARGINE 34 UNITS: 100 INJECTION, SOLUTION SUBCUTANEOUS at 19:26

## 2024-05-30 RX ADMIN — SODIUM CHLORIDE, SODIUM GLUCONATE, SODIUM ACETATE, POTASSIUM CHLORIDE, MAGNESIUM CHLORIDE, SODIUM PHOSPHATE, DIBASIC, AND POTASSIUM PHOSPHATE 50 ML/HR: .53; .5; .37; .037; .03; .012; .00082 INJECTION, SOLUTION INTRAVENOUS at 14:17

## 2024-05-30 RX ADMIN — AZITHROMYCIN 500 MG: 250 TABLET, FILM COATED ORAL at 21:39

## 2024-05-30 RX ADMIN — LATANOPROST 1 DROP: 50 SOLUTION OPHTHALMIC at 21:39

## 2024-05-30 RX ADMIN — POTASSIUM CHLORIDE 40 MEQ: 1500 TABLET, EXTENDED RELEASE ORAL at 19:26

## 2024-05-30 RX ADMIN — POTASSIUM CHLORIDE 40 MEQ: 1500 TABLET, EXTENDED RELEASE ORAL at 15:47

## 2024-05-30 RX ADMIN — SODIUM CHLORIDE 9 UNITS/HR: 9 INJECTION, SOLUTION INTRAVENOUS at 15:07

## 2024-05-30 RX ADMIN — INSULIN GLARGINE 30 UNITS: 100 INJECTION, SOLUTION SUBCUTANEOUS at 08:09

## 2024-05-30 RX ADMIN — SODIUM CHLORIDE 11 UNITS/HR: 9 INJECTION, SOLUTION INTRAVENOUS at 12:59

## 2024-05-30 RX ADMIN — ASPIRIN 81 MG CHEWABLE TABLET 81 MG: 81 TABLET CHEWABLE at 08:09

## 2024-05-30 RX ADMIN — ATORVASTATIN CALCIUM 40 MG: 40 TABLET, FILM COATED ORAL at 08:09

## 2024-05-30 RX ADMIN — LEVOTHYROXINE SODIUM 25 MCG: 25 TABLET ORAL at 05:29

## 2024-05-30 RX ADMIN — FAMOTIDINE 10 MG: 20 TABLET, FILM COATED ORAL at 11:14

## 2024-05-30 RX ADMIN — INSULIN LISPRO 4 UNITS: 100 INJECTION, SOLUTION INTRAVENOUS; SUBCUTANEOUS at 07:53

## 2024-05-30 RX ADMIN — METOPROLOL TARTRATE 25 MG: 25 TABLET, FILM COATED ORAL at 19:26

## 2024-05-30 RX ADMIN — SODIUM CHLORIDE, SODIUM GLUCONATE, SODIUM ACETATE, POTASSIUM CHLORIDE, MAGNESIUM CHLORIDE, SODIUM PHOSPHATE, DIBASIC, AND POTASSIUM PHOSPHATE 125 ML/HR: .53; .5; .37; .037; .03; .012; .00082 INJECTION, SOLUTION INTRAVENOUS at 01:15

## 2024-05-30 NOTE — ASSESSMENT & PLAN NOTE
Patient endorses fever + chills over last 5 days. Patient met sirs criteria in ED with leukocytosis plus tachycardia. Normal lactate elevated procal of 3.51    Patient given 2 500 mL boluses and started on maintenance fluids.  Given cefepime plus azithromycin 1 dose in the emergency room    Chest x-ray suspicious for right lower lobe pneumonia likely source  UA pending    Patient has chronic diarrhea per history endorsing 2-5 loose stools daily.  Denies any recent antibiotic usage.  Denies any blood or mucus in stool.  Abdomen nontender or distended on exam with no signs of cirrhosis.  Will give symptomatic relief with loperamide.  Low suspicion for infectious gastro enteritis picture.  If symptoms persist can consider stool studies with CT abdomen and pelvis.  Expect symptoms to improve with treatment of pneumonia    Plan  Transition antibiotics to ceftriaxone 1 g daily plus azithromycin 500 mg daily  125 mL/h Isolyte maintenance fluid  Await result of blood cultures x2  Await official read of x-ray  Follow-up UA  See pan for pneumonia below

## 2024-05-30 NOTE — ASSESSMENT & PLAN NOTE
Lab Results   Component Value Date    HGBA1C 7.1 (H) 05/20/2024       Recent Labs     05/31/24  0005 05/31/24  0143 05/31/24  0737 05/31/24  1117   POCGLU 175* 183* 173* 260*       Blood Sugar Average: Last 72 hrs:  (P) [id number]    Endorses recent poor PO intake and has not been taking his insulin for every days prior to admission.   Patient states that at home he normally takes lantus 34 units in AM and 4 units of short acting insulin with meals  Received insulin gtt for anion gap metabolic acidosis in setting of  ketosis, hyperglycemia, and LOBO. Gap closed 5/31 evening with transition back to home regimen.    Plan  Restarted on home regimen of Lantus 32 units qAM and lispro 4 units TID with meals  SSI  Diabetic diet

## 2024-05-30 NOTE — CASE MANAGEMENT
Case Management Discharge Planning Note    Patient name Noel Khan  Location S /S -01 MRN [id number]  : 1960 Date 2024       Current Admission Date: 2024  Current Admission Diagnosis:Sepsis (HCC)   Patient Active Problem List    Diagnosis Date Noted Date Diagnosed    Atrial fibrillation (HCC) 2024     Right lower lobe pneumonia 2024     Diabetic polyneuropathy associated with type 2 diabetes mellitus ([facility]) 2024     Vitamin D insufficiency 10/25/2023     History of amputation of lesser toe of left foot ([facility]) 11/10/2022     Anemia 2022     Preoperative clearance 2022     Persistent proteinuria 2022     LOBO (acute kidney injury) ([facility])      Cirrhosis 2021     AVN (avascular necrosis of bone) ([facility]) 2021     Presacral mass 2021     LLQ pain 2021     Sepsis ([facility]) 02/10/2020     Abscess of tendon of left foot 02/10/2020     Dislocation of proximal interphalangeal joint of left little finger 10/02/2018     Presence of drug coated stent in LAD coronary artery 2018     Presence of drug coated stent in right coronary artery 2018     Orthopnea 2017     Stage 4 chronic kidney disease ([facility]) 2017     Benign essential hypertension      Insulin-dependent diabetes mellitus      Coronary artery disease      Dyslipidemia 01/15/2015     Chronic combined systolic and diastolic CHF 2014     Ischemic cardiomyopathy 2014       LOS (days): 1  Geometric Mean LOS (GMLOS) (days): 5.1  Days to GMLOS:4.3     OBJECTIVE:  Risk of Unplanned Readmission Score: 15.7         Current admission status: Inpatient   Preferred Pharmacy:   [facility][id number] - [place] - [address]  [address]  [place] [address]  Phone: [phone] Fax: [phone]    [facility] - [place] - [address] [facility] [address] [facility]  [address]  [place] [address]  Phone: [phone] Fax:  [phone]    Primary Care Provider: Jere Scruggs DO    Primary Insurance: [facility] REP  Secondary Insurance:     DISCHARGE DETAILS:    Discharge planning discussed with:: Patient    Other Referral/Resources/Interventions Provided:  Interventions: Prescription Price Check  Referral Comments: CM placed call to [facility] pharmacy [phone] to obtain price of Eliquis. As per pharmacy staff, pt will have a $45.60 copay. Pt is elligible for one month free with coupon. CM provided pt with coupon at bedside. CM notified [facility] resident of above.    Would you like to participate in our [facility] Pharm[facility] service program?  : No - Declined    Treatment Team Recommendation: Home  Discharge Destination Plan:: Home

## 2024-05-30 NOTE — PROGRESS NOTES
The azithromycin has / have been converted to Oral per [facility] IV-to-PO Auto-Conversion Protocol for Adults as approved by the Pharmacy and Therapeutics Committee. The patient met all eligible criteria:  1) Age = 18 years old   2) Received at least one dose of the IV form   3) Receiving at least one other scheduled oral/enteral medication   4) Tolerating an oral/enteral diet   and did not have any exclusions:   1) Critical care patient   2) Active GI bleed (IF assessing H2RAs or PPIs)   3) Continuous tube feeding (IF assessing cipro, doxycycline, levofloxacin, minocycline, rifampin, or voriconazole)   4) Receiving PO vancomycin (IF assessing metronidazole)   5) Persistent nausea and/or vomiting   6) Ileus or gastrointestinal obstruction   7) Scotty/nasogastric tube set for continuous suction   8) Specific order not to automatically convert to PO (in the order's comments or if discussed in the most recent Infectious Disease or primary team's progress notes).

## 2024-05-30 NOTE — QUICK NOTE
Patient seen and examined at the bedside. Patient reports that he has no new or worsening issues today. He states that he continues to feel fatigued, diaphoretic, chills, low appetite, mild nausea, and denies vomiting. Continue IV antibiotics for treatment of pneumonia. Remains afebrile. Procal down trending 3.57 to 3.32.     Have consulted nephrology given his history of CKD stage 4, recent rise in creatinine from 2.46 to 3.59, and proteinuria in the setting of T2DM with uncontrolled sugars. Patient has anion gap of 19 this morning, UA without ketones, beta-hydroxybutyrate elevated at 3.46, lactic acid 2.0 to 1.0, last glucose 302. Started on insulin infusion, isolyte @50, BMP q6h, oral potassium 20 once,  stat VBG.    Additionally for new-onset atrial fibrillation, rates improving and will continue on metoprolol tartrate 25 mg BID. Will continue on heparin infusion and price check Eliquis. BNP elevated at 1434. Echo pending.

## 2024-05-30 NOTE — PLAN OF CARE
Problem: PAIN - ADULT  Goal: Verbalizes/displays adequate comfort level or baseline comfort level  Description: Interventions:  - Encourage patient to monitor pain and request assistance  - Assess pain using appropriate pain scale  - Administer analgesics based on type and severity of pain and evaluate response  - Implement non-pharmacological measures as appropriate and evaluate response  - Consider cultural and social influences on pain and pain management  - Notify physician/advanced practitioner if interventions unsuccessful or patient reports new pain  Outcome: Progressing     Problem: INFECTION - ADULT  Goal: Absence or prevention of progression during hospitalization  Description: INTERVENTIONS:  - Assess and monitor for signs and symptoms of infection  - Monitor lab/diagnostic results  - Monitor all insertion sites, i.e. indwelling lines, tubes, and drains  - Monitor endotracheal if appropriate and nasal secretions for changes in amount and color  - [place] appropriate cooling/warming therapies per order  - Administer medications as ordered  - Instruct and encourage patient and family to use good hand hygiene technique  - Identify and instruct in appropriate isolation precautions for identified infection/condition  Outcome: Progressing     Problem: DISCHARGE PLANNING  Goal: Discharge to home or other facility with appropriate resources  Description: INTERVENTIONS:  - Identify barriers to discharge w/patient and caregiver  - Arrange for needed discharge resources and transportation as appropriate  - Identify discharge learning needs (meds, wound care, etc.0  - Refer to Case Management Department for coordinating discharge planning if the patient needs post-hospital services based on physician/advanced practitioner order or complex needs related to functional status, cognitive ability, or social support system  Outcome: Progressing     Problem: CARDIOVASCULAR - ADULT  Goal: Absence of cardiac dysrhythmias  or at baseline rhythm  Description: INTERVENTIONS:  - Continuous cardiac monitoring, vital signs, obtain 12 lead EKG if ordered  - Administer antiarrhythmic and heart rate control medications as ordered  - Monitor electrolytes and administer replacement therapy as ordered  Outcome: Progressing     Problem: GASTROINTESTINAL - ADULT  Goal: Maintains or returns to baseline bowel function  Description: INTERVENTIONS:  - Assess bowel function  - Encourage oral fluids to ensure adequate hydration  - Administer IV fluids if ordered to ensure adequate hydration  - Administer ordered medications as needed  - Encourage mobilization and activity  - Consider nutritional services referral to assist patient with adequate nutrition and appropriate food choices  Outcome: Progressing     Problem: GENITOURINARY - ADULT  Goal: Absence of urinary retention  Description: INTERVENTIONS:  - Assess patient’s ability to void and empty bladder  - Monitor I/O  - Bladder scan as needed  - Discuss with physician/AP medications to alleviate retention as needed  - Discuss catheterization for long term situations as appropriate  Outcome: Progressing

## 2024-05-30 NOTE — CONSULTS
Consultation - Nephrology   Noel EULALIO Khan 64 y.o. male MRN: [id number]  Unit/Bed#: S -01 Encounter: [id number]    ASSESSMENT/PLAN:  LOBO (POA) on CKD IIIB/IV:   -Etiology: suspect prerenal azotemia due to volume depletion, GI losses, poor oral intake, diuretic use +hypotension with autoregulatory failure from ACEi progressing to ATN.  Possible obstructive component with retention  -Admission creatinine 3.58 on 5/29/2024.  Today's creatinine 3.59, unchanged  -Peak creatinine 3.59 on 5/30/2024, trending  -baseline creatinine recently in the mid 2s since 2020.  Previously 1.4-1.6..  Most recent creatinine 2.46 on 5/20/2024  -workup: UA with 2+ protein, 2-4 RBC, 1-2 WBC, 3-5 hyaline casts, 25-50 granular casts, 5-10 RBC casts  -Imaging: No recent renal imaging.  Renal ultrasound 2021 with normal echogenicity and contour without hydronephrosis.  -nonoliguric  -Renal function with acute worsening  -Clinically, no acute indication for renal replacement therapy (dialysis)  -Hold diuretics and lisinopril.  Avoid ACE/ARB  -check renal ultrasound  -Continue IVF.  Was on Isolyte @ 125 ml/hr-->NSS 50 ml/hr +insulin drip now due to concern for DKA   -Bladder scan, urinary retention protocol  -Strict I/Os, daily weights  -Avoid nephrotoxins, NSAIDs, IV contrast if possible  -Avoid hypotension.  Maintain MAP >65  -Trend BMP  -Adjust meds to appropriate GFR  -Optimize hemodynamic status to avoid delay in renal recovery  -Will d/w Dr. Ho    Chronic kidney disease IIIB/IV:    -Etiology:  Suspect due to DM, hypertensive nephrosclerosis, ICM and cirrhosis  -With proteinuria in the setting of diabetic nephropathy  -Prior serologic workup negative  -Suspected recent progression in CKD due to uncontrolled blood sugars  -Baseline creatinine previously 1.4-1.6 but in the low twos since February 2020  -Outpatient Nephrologist:  Dr Thomas, last seen 5/23/2024 in office    Anion gap metabolic acidosis:  -Serum bicarb 19, AG  19  -Lactate normal  -Beta-hydroxybutyrate 3.46, elevated  -UA with no ketones  -IVF changed to NSS with insulin drip per primary team  -Continue to monitor    Sepsis:  -Suspected pneumonia  -BC pending  -COVID/flu/RSV negative  -Urine strep pneumoniae, Legionella negative  -On azithromycin, ceftriaxone  -Management per primary team    Hypertension/Volume status:  -BP normotensive.  Relative hypotension upon admission which improved with fluid bolus  -volume status hypovolemic  -Home medications: Lasix 80 mg daily, lisinopril 5 mg daily, metoprolol 25 mg twice daily  -Current medications: Metoprolol 25 mg twice daily  -Continue to hold lisinopril and diuretics  -was on Isolyte 125 ml/hr--> NSS @ 50 ml/hr per primary team  -Optimize hemodynamic status to avoid delay in renal recovery  -recommend hold parameters on antihypertensive's for SBP <130 mmHg.  -Avoid hypotension or fluctuations in blood pressure.  Maintain MAP >65  -low sodium (2 gm) diet  -continue to trend    Bone Mineral Disease of CKD:  -Calcium stable  -Hyperphosphatemia: In the setting of LOBO.  Phosphorus 4.7.  Recommend low phosphorus diet.  Continue to monitor  -continue to monitor and follow phosphorus, PTH, calcium, magnesium as outpatient    New onset atrial fibrillation with RVR:  -In the setting of sepsis  -Echo pending  -Management per cardiology    Chronic HFpEF/ICM:  -compensated  -echo pending.  Prior history of EF 35% at time of MI in 2014 which improved to 50% on echo in 2021  -home diuretics: Lasix 80 mg daily  -on beta blockers and ACE.  ACE on hold due to LOBO  -fluid restriction, 2 gm low sodium diet  -strict I/Os, daily weights  -management per Cardiology    CAD:  -stable without angina  -hx MI '14,s /p PCI/stent  -followup and management per Cardiology    DM II:  -stable  -HgbA1C 7.1  -continue to optimize glycemic control  -Followed by endocrinology as outpatient  -management per primary team    HISTORY OF PRESENT  ILLNESS:  Requesting Physician: Whitney Gould MD  Reason for Consult: LOBO, POA on CKD IV    Noel Khan is a 64 y.o. year old male with CKD 3B/4 followed by Dr. Thomas, liver cirrhosis, CAD, s/p PCI/stent, ICM, chronic HFpEF, HTN, HLD, DM2 who was admitted to [facility] after presenting with fever,chills,fatigue, poor oral intake and chronic diarrhea.  Patient met SIRS criteria upon presentation.  CXR suspicious for RLL pneumonia..  Patient also noted to be in A-fib with RVR on admission with associated hypotension that improved with 1L NSS bolus followed by Isolyte 125 ml/hr.  Pt required straight cath for 500 ml this am.  A renal consultation is requested today for assistance in the management of LOBO on CKD.    PAST MEDICAL HISTORY:  Past Medical History:   Diagnosis Date    CAD (coronary artery disease)     Chronic combined systolic and diastolic CHF (congestive heart failure) (HCC)     Diabetes mellitus (HCC)     type 2    Dyslipidemia     Hypertension     Ischemic cardiomyopathy     MI (myocardial infarction) (HCC)     Neuropathy     Osteomyelitis (HCC)     Pancreatitis        PAST SURGICAL HISTORY:  Past Surgical History:   Procedure Laterality Date    ANGIOPLASTY      ballon    CORONARY ANGIOPLASTY WITH STENT PLACEMENT      LULA to proximal and mid LAD, Proximal RCA.     FOREIGN BODY REMOVAL Left 5/26/2022    Procedure: REMOVAL FOREIGN BODY EXTREMITY;  Surgeon: Heron Herrera DPM;  [facility];  Service: Podiatry    HERNIA REPAIR      INCISION AND DRAINAGE OF WOUND Left 2/14/2020    Procedure: INCISION AND DRAINAGE (I&D) EXTREMITY left foot (cpt 64175);  Surgeon: Kermit Cahu DPM;  [facility];  Service: Podiatry    TN AMPUTATION FOOT TRANSMETARSAL Left 5/26/2022    Procedure: AMPUTATION left 5th metatarsal;  Surgeon: Heron Herrera DPM;  [facility];  Service: Podiatry    TN EXPLORATION PENETRATING WOUND SPX EXTREMITY Left 2/14/2020    Procedure: EXPLORATION EXTREMITY;   Surgeon: Kermit Chau DPM;  [facility];  Service: Podiatry    AK NEGATIVE PRESSURE WOUND THERAPY DME <= 50 SQ CM Left 2/14/2020    Procedure: APPLICATION VAC DRESSING;  Surgeon: Kermit Chau DPM;  [facility];  Service: Podiatry    TOE AMPUTATION Left 5/14/2022    Procedure: EXCISION OF LEFT 5TH TOE ULCER WITH FILET FLAP;  Surgeon: Heron Herrera DPM;  [facility];  Service: Podiatry       ALLERGIES:  No Known Allergies    SOCIAL HISTORY:  Social History     Substance and Sexual Activity   Alcohol Use Not Currently    Comment: 20 years of sobriety     Social History     Substance and Sexual Activity   Drug Use Never     Social History     Tobacco Use   Smoking Status Never   Smokeless Tobacco Never       FAMILY HISTORY:  Family History   Problem Relation Age of Onset    Heart attack Father     Hyperlipidemia Father     Cancer Father     Diabetes Father     Diabetes Mother     Heart failure Mother         poss    Kidney disease Mother     Cancer Paternal Grandfather     Stroke Neg Hx     Anuerysm Neg Hx     Clotting disorder Neg Hx     Arrhythmia Neg Hx     Coronary artery disease Neg Hx     Hypertension Neg Hx     Sudden death Neg Hx         scd       MEDICATIONS:    Current Facility-Administered Medications:     aspirin chewable tablet 81 mg, 81 mg, Oral, Daily, Brandon Labatch, DO, 81 mg at 05/30/24 0809    atorvastatin (LIPITOR) tablet 40 mg, 40 mg, Oral, Daily, Brandon Labatch, DO, 40 mg at 05/30/24 0809    azithromycin (ZITHROMAX) tablet 500 mg, 500 mg, Oral, Q24H, Brandon Labatch, DO    ceftriaxone (ROCEPHIN) 1 g/50 mL in dextrose IVPB, 1,000 mg, Intravenous, Q24H, Brandon Sy, DO, Last Rate: 100 mL/hr at 05/30/24 0558, 1,000 mg at 05/30/24 0558    heparin (porcine) 25,000 units in 0.45% NaCl 250 mL infusion (premix), 3-20 Units/kg/hr (Order-Specific), Intravenous, Titrated, Whitney Gould MD, Last Rate: 8.3 mL/hr at 05/30/24 0739, 9.8 Units/kg/hr at 05/30/24 0739     insulin regular (HumuLIN R,NovoLIN R) 1 Units/mL in sodium chloride 0.9 % 100 mL infusion, 0.3-21 Units/hr, Intravenous, Titrated, George Phillips DO    latanoprost (XALATAN) 0.005 % ophthalmic solution 1 drop, 1 drop, Both Eyes, HS, Brandon Labatch, DO    levothyroxine tablet 25 mcg, 25 mcg, Oral, Early Morning, Brandon Labatch, DO, 25 mcg at 05/30/24 0529    loperamide (IMODIUM) capsule 2 mg, 2 mg, Oral, TID PRN, Brandon Labatch, DO, 2 mg at 05/30/24 0116    metoprolol tartrate (LOPRESSOR) tablet 25 mg, 25 mg, Oral, Q12H ALESSANDRO, Brandon Labatch, DO, 25 mg at 05/30/24 0809    ondansetron (ZOFRAN) injection 4 mg, 4 mg, Intravenous, Q8H PRN, Brandon Labatch, DO    pantoprazole (PROTONIX) EC tablet 40 mg, 40 mg, Oral, Early Morning, Brandon Labatch, DO, 40 mg at 05/30/24 0529    potassium chloride (Klor-Con M20) CR tablet 20 mEq, 20 mEq, Oral, Once, Whitney Gould MD    sodium chloride 0.9 % infusion, 50 mL/hr, Intravenous, Continuous, Whitney Gould MD    REVIEW OF SYSTEMS:  A complete 10 point review of systems was performed and found to be negative unless otherwise noted in the history of present illness.  General: No fevers, +chills.   Cardiovascular:  No chest pain, No leg edema.  Respiratory: No cough, sputum production,  No shortness of breath.  Gastrointestinal:  + nausea/vomiting,  + diarrhea,  No abdominal pain.  Genitourinary: No hematuria.  No foamy urine.  No dysuria    PHYSICAL EXAM:  Current Weight: Weight - Scale: 83.8 kg (184 lb 11.9 oz)  First Weight: Weight - Scale: 83.8 kg (184 lb 11.9 oz)  Vitals:    05/30/24 0600 05/30/24 0719 05/30/24 0808 05/30/24 0810   BP:  149/83 128/88    BP Location:       Pulse:  (!) 112 (!) 131 (!) 106   Resp:  16     Temp:  98.4 °F (36.9 °C)     TempSrc:       SpO2:  99% 97%    Weight: 83.8 kg (184 lb 11.9 oz)          Intake/Output Summary (Last 24 hours) at 5/30/2024 1237  Last data filed at 5/30/2024 0829  Gross per 24 hour   Intake 1700 ml   Output 525 ml    Net 1175 ml     General:  Awake, alert, appears comfortable and in no acute distress.  Nontoxic.  Skin:  No rash, warm, poor skin turgor   Eyes:  PERRL, EOMI, sclerae nonicteric.  no periorbital edema   ENT:  dry mucous membranes  Neck:  Trachea midline, symmetric.  No JVD.  No carotid bruits.  Chest:  Clear to auscultation bilaterally without wheezes, crackles or rhonchi  CVS:  Regular rate and rhythm without murmur, gallop or rub.  S1 and S2 identified and normal.  No S3, S4.   Abdomen:  Soft, nontender, nondistended without masses.  Normal bowel sounds x 4 quadrants.  No bruit.  Extremities:  Warm, pink, motor and sensory intact and well perfused.  No cyanosis, pallor.  No BLE edema.  Neuro:  Awake, alert, oriented x3.  Grossly intact  Psych:  Appropriate affect.  Mentating appropriately.  Normal mental status exam    Invasive Devices: none       Lab Results:   Results from last 7 days   Lab Units 05/30/24  0528 05/30/24  0211 05/29/24  1948   WBC Thousand/uL 20.73* 16.31* 20.25*   HEMOGLOBIN g/dL 12.5 11.3* 13.8   HEMATOCRIT % 39.5 35.1* 42.6   PLATELETS Thousands/uL 275 236 267   SODIUM mmol/L 134*  --  132*   POTASSIUM mmol/L 3.9  --  4.2   CHLORIDE mmol/L 96  --  94*   CO2 mmol/L 19*  --  21   BUN mg/dL 78*  --  74*   CREATININE mg/dL 3.59*  --  3.58*   CALCIUM mg/dL 8.7  --  9.1   ALK PHOS U/L  --   --  66   ALT U/L  --   --  16   AST U/L  --   --  16       Imaging Studies:  CXR 5/29/2024:  Imaging study and images personally reviewed.  Right lower lobe infiltrate with possible right pleural effusion.  Mild pulmonary venous congestion.    EMR, including Epic and Care Everywhere, reviewed.  I have personally reviewed the blood work as stated above and in my note.  I have personally reviewed CXR imaging studies.  I have personally reviewed internal Medicine, co-consultants and prior nephrology notes.

## 2024-05-30 NOTE — H&P
[facility]  H&P  Name: Noel Khan 64 y.o. male I MRN: [id number]  Unit/Bed#: ED-36 I Date of Admission: 5/29/2024   Date of Service: 5/30/2024 I Hospital Day: 1      Assessment & Plan   * Sepsis (HCC)  Assessment & Plan  Patient endorses fever + chills over last 5 days. Patient met sirs criteria in ED with leukocytosis plus tachycardia. Normal lactate elevated procal of 3.51    Patient given 2 500 mL boluses and started on maintenance fluids.  Given cefepime plus azithromycin 1 dose in the emergency room    Chest x-ray suspicious for right lower lobe pneumonia likely source  UA pending    Patient has chronic diarrhea per history endorsing 2-5 loose stools daily.  Denies any recent antibiotic usage.  Denies any blood or mucus in stool.  Abdomen nontender or distended on exam with no signs of cirrhosis.  Will give symptomatic relief with loperamide.  Low suspicion for infectious gastro enteritis picture.  If symptoms persist can consider stool studies with CT abdomen and pelvis.  Expect symptoms to improve with treatment of pneumonia    Plan  Transition antibiotics to ceftriaxone 1 g daily plus azithromycin 500 mg daily  125 mL/h Isolyte maintenance fluid  Await result of blood cultures x2  Await official read of x-ray  Follow-up UA  See pan for pneumonia below    Right lower lobe pneumonia  Assessment & Plan  Patient endorses fever chills and cough for the last 5 days. Denies any upper respiratory symptoms. Chest x-ray with finding consistent of right lower lobe pneumonia. Leukocytosis of 20,000 elevated procal of 3.5     Drip score- Low risk (0)   cap score- low risk (positive for 2 minor criteria of hypotension+elevated BUN)    -Patient met sepsis criteria see above    Plan  -abx 1 g ceftriaxone daily plus azithromycin 500 mg daily  -Legionella plus strep pneumo urine antigens  -Follow-up official read of x-ray of chest      Atrial fibrillation (HCC)  Assessment & Plan  New onset  afib noted in ED on EKG with rate of 125. Patient denies any chest pain or palpitations, dizziness and lightheaded.     At time of my eval patient still asymptomatic hemodynamically stable with rate of 110; still in afib. [id number] score of 4    Patient was prescribed 25 mg metoprol tartrate BID. Patient states he is only taking 25mg daily. (See CHF above)    Plan  -started on heparin drip consider transition to eliquis   - telemetry   - echo  - cardiology consult  - resume patient on proper dose of 25mg metoprolol tartrate BID  - check tsh       LOBO (acute kidney injury) (HCC)  Assessment & Plan  Recent Labs     05/29/24 1948   CREATININE 3.58*   EGFR 16     Estimated Creatinine Clearance: 21 mL/min (A) (by C-G formula based on SCr of 3.58 mg/dL (H)).    Follows with nephrology outpatient for CKD stage 3b/4 in setting of diabetes, hypertensive nephrosclerosis, ischemic cardiomyopathy as well as cirrhosis     POA 3.58; (baseline 2.0-2.6)  Likely due to underlying sepsis + PNA + LOBO    Plan:  IV Fluids 125cc/hr isolyte   Monitor BMP  Avoid hypoperfusion of the kidneys, minimize nephrotoxins  RAAS Blockers/Diuretics held: lasix 80mg daily + lisinopril       Chronic combined systolic and diastolic CHF  Assessment & Plan  Wt Readings from Last 3 Encounters:   05/23/24 87.1 kg (192 lb)   02/28/24 87.5 kg (193 lb)   02/14/24 88 kg (194 lb)     Patient has history of combined heart failure with EF of 50 percent on echo 2022. Patient denies any chest pain. Patient does endorse fatigue + sob on exertion. Likely due to new onset PNA. Does not examine volume overloaded without edema rales of JVD    Plan  -echo ordered  -trend troponin  -daily weights + monitor I&O  -cardiology consult  -hold lasix + lisinopril in the setting of LOBO  -monitor volume status while giving fluids for sepsis + LOBO          Insulin-dependent diabetes mellitus  Assessment & Plan  Lab Results   Component Value Date    HGBA1C 7.1 (H) 05/20/2024       No  "results for input(s): \"POCGLU\" in the last 72 hours.    Blood Sugar Average: Last 72 hrs:    Patient unclear on insulin dosages and has been modifying in setting of poor PO intake. Prior documentation notes 30 to 40 units Lanatuss qam + NPH 70/30 as needed? Patient did not take any insulin today due to feeling unwell + poor intake. Patient BS in ed was 230    Plan  -schedule Lantuss qam 30 units   -alg 4 sliding scale correction with meals  -diabetic diet  -make adjustments as needed             VTE Pharmacologic Prophylaxis: VTE Score: 6 High Risk (Score >/= 5) - Pharmacological DVT Prophylaxis Ordered: heparin drip. Sequential Compression Devices Ordered.  Code Status: Level 1 - Full Code   Discussion with family: Patient declined call to .  He talked to wife on the phone     Anticipated Length of Stay: Patient will be admitted on an inpatient basis with an anticipated length of stay of greater than 2 midnights secondary to Sepsis and new onset a fib.    Total Time Spent on Date of Encounter in care of patient: 45 mins. This time was spent on one or more of the following: performing physical exam; counseling and coordination of care; obtaining or reviewing history; documenting in the medical record; reviewing/ordering tests, medications or procedures; communicating with other healthcare professionals and discussing with patient's family/caregivers.    Chief Complaint: fatigue and chills     History of Present Illness:  Noel Khan is a 64 y.o. male with a PMH of CAD with stent, CKD, CHF, Liver cirrhosis who presents with fevers, fatigue, chills and poor PO intake over the last 5 days. Symptoms have been progressively worsening. Patient also endorses a cough and but no congestion. Patient denies any sick contacts or recent illnesses. Patient denies any abdominal discomfort but does endorse diarrhea that he says is chronic in nature. He endorses 2 to 5 episodes of watery diarrhea daily. He denies " any blood in his stool, change in diet or mucous in stool. Patient denies any recent abx use. Patient reports not taking insulin today due to his fatigue and inability to eat. Patient denies any chest pain, palpitations dizziness or lightheaded. Patient endorses poor urine output over the last 24 hours.     In Ed, Patient met sirs criteria with tachycardia (new onset afib) + leukocytosis. Elevated procal with normal lactate. Blood cultures collected patient given 2 500ml boluses. Patient also had an episode of hypotension that resolved with fluids. Chest suspicious with right lower lobe pneumonia (awaiting official read). Given cefepime and azithromycin in ED. Admitted to inpatient for further treatment of sepsis and A-fib.     Review of Systems:  Review of Systems   Constitutional:  Positive for activity change, appetite change, chills, fatigue and fever.   HENT:  Negative for postnasal drip, sinus pressure and sinus pain.    Respiratory:  Positive for cough and shortness of breath. Negative for chest tightness and wheezing.    Gastrointestinal:  Positive for diarrhea and nausea. Negative for abdominal pain, blood in stool, constipation and vomiting.   Genitourinary:  Positive for decreased urine volume. Negative for difficulty urinating, dysuria and flank pain.   Musculoskeletal:  Negative for neck pain.   Skin:  Negative for rash.   Neurological:  Negative for dizziness, tremors, syncope, light-headedness and headaches.   Psychiatric/Behavioral:  Negative for behavioral problems and confusion.        Past Medical and Surgical History:   Past Medical History:   Diagnosis Date    CAD (coronary artery disease)     Chronic combined systolic and diastolic CHF (congestive heart failure) (HCC)     Diabetes mellitus (HCC)     type 2    Dyslipidemia     Hypertension     Ischemic cardiomyopathy     MI (myocardial infarction) (HCC)     Neuropathy     Osteomyelitis (HCC)     Pancreatitis        Past Surgical History:    Procedure Laterality Date    ANGIOPLASTY      ballon    CORONARY ANGIOPLASTY WITH STENT PLACEMENT      LULA to proximal and mid LAD, Proximal RCA.     FOREIGN BODY REMOVAL Left 5/26/2022    Procedure: REMOVAL FOREIGN BODY EXTREMITY;  Surgeon: Heron Herrera DPM;  Location: [facility];  Service: Podiatry    HERNIA REPAIR      INCISION AND DRAINAGE OF WOUND Left 2/14/2020    Procedure: INCISION AND DRAINAGE (I&D) EXTREMITY left foot (cpt 66883);  Surgeon: Kermit Chau DPM;  Location: [facility];  Service: Podiatry    UT AMPUTATION FOOT TRANSMETARSAL Left 5/26/2022    Procedure: AMPUTATION left 5th metatarsal;  Surgeon: Heron Herrera DPM;  [facility]: [facility];  Service: Podiatry    UT EXPLORATION PENETRATING WOUND SPX EXTREMITY Left 2/14/2020    Procedure: EXPLORATION EXTREMITY;  Surgeon: Kermit Chau DPM;  [facility];  Service: Podiatry    UT NEGATIVE PRESSURE WOUND THERAPY DME <= 50 SQ CM Left 2/14/2020    Procedure: APPLICATION VAC DRESSING;  Surgeon: Kermit Chau DPM;  [facility]: [facility];  Service: Podiatry    TOE AMPUTATION Left 5/14/2022    Procedure: EXCISION OF LEFT 5TH TOE ULCER WITH FILET FLAP;  Surgeon: Heron Herrera DPM;  Location: [facility];  Service: Podiatry       Meds/Allergies:  Prior to Admission medications    Medication Sig Start Date End Date Taking? Authorizing Provider   atorvastatin (LIPITOR) 40 mg tablet TAKE 1 TABLET BY MOUTH EVERY DAY 12/2/20  Yes Mika Miller MD   Blood Pressure Monitoring ([employer] Blood Pressure Monitor) KIT Use 2 (two) times a day Automatic BP cuff. Measure BP twice daily. 12/7/21  Yes Lissa Thomas,    insulin glargine (LANTUS) 100 units/mL subcutaneous injection Inject 30 Units under the skin every morning   Yes Historical Provider, MD   latanoprost (XALATAN) 0.005 % ophthalmic solution Administer 0.0005 drops to both eyes daily at bedtime 11/3/22  Yes Historical Provider, MD   levothyroxine 25 mcg tablet Take 25 mcg by mouth daily   Yes  Historical Provider, MD   lisinopril (ZESTRIL) 5 mg tablet Take 1 tablet (5 mg total) by mouth daily 4/5/23  Yes Lissa Thomas DO   Unifine Pentips 31G X 8 MM MISC  9/8/20  Yes Historical Provider, MD   acetaminophen (TYLENOL) 325 mg tablet Take 2 tablets (650 mg total) by mouth every 6 (six) hours as needed (pain) 5/28/22 5/29/24 Yes Sho Thomas MD   furosemide (LASIX) 40 mg tablet Take 2 tablets (80 mg total) by mouth daily Patient takes two 40 mg tablets to make 80mg daily. 2/14/24 5/29/24 Yes Lissa Thomas DO   insulin aspart protamine-insulin aspart (NovoLOG 70/30) 100 units/mL injection Inject under the skin if needed  5/29/24 Yes Historical Provider, MD Haris WhitmanoStar 100 units/mL injection pen Inject 30 Units under the skin every morning  Patient taking differently: Inject 35 Units under the skin every morning 5/15/22 5/29/24 Yes Hira Shepherd DPM   TRUE METRIX BLOOD GLUCOSE TEST test strip TEST FOUR TIMES DAILY 8/20/18 5/29/24 Yes Historical Provider, MD   aspirin 81 mg chewable tablet Chew 1 tablet daily for 30 days 5/19/17 5/23/24  LATASHA Funk   insulin NPH-insulin regular (NovoLIN 70/30) 100 units/mL subcutaneous injection Inject under the skin    Historical Provider, MD   metoprolol tartrate (LOPRESSOR) 25 mg tablet TAKE 1 TABLET (25 MG TOTAL) BY MOUTH EVERY 12 (TWELVE) HOURS  Patient taking differently: Take 25 mg by mouth 2 (two) times a day 1/21/21 5/23/24  Mika Miller MD   promethazine (PHENERGAN) 25 mg tablet Take 1 tablet (25 mg total) by mouth every 6 (six) hours as needed for nausea or vomiting for up to 10 days  Patient not taking: Reported on 10/25/2023 10/7/23 10/17/23  Abundio Ricardo MD   dextromethorphan 15 MG/5ML syrup Take 10 mL (30 mg total) by mouth 4 (four) times a day as needed for cough  Patient not taking: Reported on 11/28/2023 10/7/23 5/29/24  Abundio Ricardo MD   guaiFENesin (MUCINEX) 600 mg 12 hr tablet Take 2  tablets (1,200 mg total) by mouth 2 (two) times a day  Patient not taking: Reported on 11/28/2023 10/7/23 5/29/24  Abundio Ricardo MD   mupirocin (BACTROBAN) 2 % ointment Apply topically daily  Patient not taking: Reported on 2/28/2024 7/20/22 5/29/24  Massimo Dallas, DPM   NovoLIN 70/30 FlexPen (70-30) 100 UNIT/ML injection pen  12/31/21 5/29/24  Historical Provider, MD   NovoLOG FlexPen 100 units/mL injection pen  12/7/22 5/29/24  Historical Provider, MD     I have reviewed home medications with patient personally.    Allergies: No Known Allergies    Social History:  Marital Status: /Civil Union   Occupation: retired   Patient Pre-hospital Living Situation: Home  Patient Pre-hospital Level of Mobility: walks  Patient Pre-hospital Diet Restrictions: none  Substance Use History:   Social History     Substance and Sexual Activity   Alcohol Use Not Currently    Comment: 20 years of sobriety     Social History     Tobacco Use   Smoking Status Never   Smokeless Tobacco Never     Social History     Substance and Sexual Activity   Drug Use Never       Family History:  Family History   Problem Relation Age of Onset    Heart attack Father     Hyperlipidemia Father     Cancer Father     Diabetes Father     Diabetes Mother     Heart failure Mother         poss    Kidney disease Mother     Cancer Paternal Grandfather     Stroke Neg Hx     Anuerysm Neg Hx     Clotting disorder Neg Hx     Arrhythmia Neg Hx     Coronary artery disease Neg Hx     Hypertension Neg Hx     Sudden death Neg Hx         scd       Physical Exam:     Vitals:   Blood Pressure: 93/54 (05/29/24 2330)  Pulse: 105 (05/29/24 2330)  Temperature: 99.4 °F (37.4 °C) (05/29/24 2045)  Temp Source: Oral (05/29/24 2045)  Respirations: 18 (05/29/24 2330)  SpO2: 96 % (05/29/24 2330)    Physical Exam  Constitutional:       Appearance: Normal appearance. He is ill-appearing.   HENT:      Head: Normocephalic and atraumatic.      Right Ear: External ear  normal.      Left Ear: External ear normal.      Nose: No congestion or rhinorrhea.      Mouth/Throat:      Mouth: Mucous membranes are dry.   Eyes:      Conjunctiva/sclera: Conjunctivae normal.   Cardiovascular:      Rate and Rhythm: Tachycardia present. Rhythm irregular.      Pulses: Normal pulses.      Heart sounds: Normal heart sounds. No murmur heard.  Pulmonary:      Effort: Pulmonary effort is normal. No respiratory distress.      Breath sounds: Normal breath sounds. No stridor. No wheezing or rhonchi.   Abdominal:      General: Bowel sounds are normal. There is no distension.      Palpations: Abdomen is soft.      Tenderness: There is no abdominal tenderness. There is no right CVA tenderness, left CVA tenderness or guarding.   Musculoskeletal:      Right lower leg: No edema.      Left lower leg: No edema.   Skin:     General: Skin is warm.      Capillary Refill: Capillary refill takes less than 2 seconds.   Neurological:      General: No focal deficit present.      Mental Status: He is alert and oriented to person, place, and time.   Psychiatric:         Mood and Affect: Mood normal.         Behavior: Behavior normal.         Thought Content: Thought content normal.          Additional Data:     Lab Results:  Results from last 7 days   Lab Units 05/29/24 1948   WBC Thousand/uL 20.25*   HEMOGLOBIN g/dL 13.8   HEMATOCRIT % 42.6   PLATELETS Thousands/uL 267   SEGS PCT % 88*   LYMPHO PCT % 2*   MONO PCT % 9   EOS PCT % 0     Results from last 7 days   Lab Units 05/29/24 1948   SODIUM mmol/L 132*   POTASSIUM mmol/L 4.2   CHLORIDE mmol/L 94*   CO2 mmol/L 21   BUN mg/dL 74*   CREATININE mg/dL 3.58*   ANION GAP mmol/L 17*   CALCIUM mg/dL 9.1   ALBUMIN g/dL 3.8   TOTAL BILIRUBIN mg/dL 1.65*   ALK PHOS U/L 66   ALT U/L 16   AST U/L 16   GLUCOSE RANDOM mg/dL 263*     Results from last 7 days   Lab Units 05/29/24 1948   INR  1.17             Results from last 7 days   Lab Units 05/29/24 1948   LACTIC ACID mmol/L 2.0    PROCALCITONIN ng/ml 3.57*       Lines/Drains:  Invasive Devices       Peripheral Intravenous Line  Duration             Peripheral IV 05/29/24 Distal;Left;Upper;Ventral (anterior) Arm <1 day    Peripheral IV 05/30/24 Right;Ventral (anterior) Forearm <1 day                        Imaging: Reviewed radiology reports from this admission including: chest xray  XR chest 1 view portable   ED Interpretation by Mitesh Rojas MD (05/29 2102)   My personal interpretation-right lower lobe consolidation suggestive of pneumonia          EKG and Other Studies Reviewed on Admission:   EKG: Atrial fibrillation. .    Brandon Sy DO  PGY-1 Family Medicine       ** Please Note: This note has been constructed using a voice recognition system. **

## 2024-05-30 NOTE — ASSESSMENT & PLAN NOTE
Recent Labs     05/29/24 1948   CREATININE 3.58*   EGFR 16     Estimated Creatinine Clearance: 21 mL/min (A) (by C-G formula based on SCr of 3.58 mg/dL (H)).    Follows with nephrology outpatient for CKD stage 3b/4 in setting of diabetes, hypertensive nephrosclerosis, ischemic cardiomyopathy as well as cirrhosis     POA 3.58; (baseline 2.0-2.6)  Likely due to underlying sepsis + PNA + LOBO    Plan:  IV Fluids 125cc/hr isolyte   Monitor BMP  Avoid hypoperfusion of the kidneys, minimize nephrotoxins  RAAS Blockers/Diuretics held: lasix 80mg daily + lisinopril

## 2024-05-30 NOTE — ASSESSMENT & PLAN NOTE
65 yo M who presented fatigue, chills, nausea, diarrhea and subjective fevers at home for 5 days prior to admission. States he was having 2-5 watery Bms per day. Denies any recent antibiotic usage, any blood or mucus in stool.    Patient met SIRS criteria in ED with  and WBC 20.25. Lactic acid normal. Procalcitonin elevated 3.51. Suspected pneumonia source.  In ED, received 2.5 L boluses, maintenance fluids, and started on cefepime and azithromycin   CXR (5/29/2024) - Extensive right base pneumonia  UA with proteinuria, granular casts 25-30  FLU/RSV/COVID negative  Urine legionella and strep negative  Blood cultures 2/2 NGTD    Plan  Continue ceftriaxone 1 g daily and azithromycin 500 mg daily  Isolyte @50 mL/h  See pan for pneumonia below

## 2024-05-30 NOTE — ASSESSMENT & PLAN NOTE
Wt Readings from Last 3 Encounters:   05/23/24 87.1 kg (192 lb)   02/28/24 87.5 kg (193 lb)   02/14/24 88 kg (194 lb)     Patient has history of combined heart failure with EF of 50 percent on echo 2022. Patient denies any chest pain. Patient does endorse fatigue + sob on exertion. Likely due to new onset PNA. Does not examine volume overloaded without edema rales of JVD    Plan  -echo ordered  -trend troponin  -daily weights + monitor I&O  -cardiology consult  -hold lasix + lisinopril in the setting of LOBO  -monitor volume status while giving fluids for sepsis + OLBO

## 2024-05-30 NOTE — ASSESSMENT & PLAN NOTE
"Lab Results   Component Value Date    HGBA1C 7.1 (H) 05/20/2024       No results for input(s): \"POCGLU\" in the last 72 hours.    Blood Sugar Average: Last 72 hrs:    Patient unclear on insulin dosages and has been modifying in setting of poor PO intake. Prior documentation notes 30 to 40 units Lanatuss qam + NPH 70/30 as needed? Patient did not take any insulin today due to feeling unwell + poor intake. Patient BS in ed was 230    Plan  -schedule Lantuss qam 30 units   -alg 4 sliding scale correction with meals  -diabetic diet  -make adjustments as needed    "

## 2024-05-30 NOTE — ASSESSMENT & PLAN NOTE
New onset afib noted in ED on EKG with rate of 125. Patient denies any chest pain or palpitations, dizziness and lightheaded.     At time of my eval patient still asymptomatic hemodynamically stable with rate of 110; still in afib. [id number] score of 4    Patient was prescribed 25 mg metoprol tartrate BID. Patient states he is only taking 25mg daily. (See CHF above)    Plan  -started on heparin drip consider transition to eliquis   - telemetry   - echo  - cardiology consult  - resume patient on proper dose of 25mg metoprolol tartrate BID  - check tsh

## 2024-05-30 NOTE — CONSULTS
Consultation - Cardiology Team One  Noel Khan 64 y.o. male MRN: [id number]  Unit/Bed#: S -01 Encounter: [id number]    Inpatient consult to Cardiology  Consult performed by: LATASHA Conroy  Consult ordered by: Brandon Sy DO          Physician Requesting Consult: Whitney Gould MD  Reason for Consult / Principal Problem: New onset atrial fibrillation      Assessment/ Plan    1. New onset atrial fibrillation: In the setting of sepsis.  TSH and electrolytes adequate  Heart rates are adequately controlled in the 90s to 110s (sepsis likely driving)  Blood pressure has improved; agree with beta-blocker, metoprolol tartrate 25mg BID; was taking 25mg daily as OP; HRs better this AM after getting first dose BB.   Check echo  CHADs-Vasc score 5; AC recommended; currently on IV heparin infusion; transition to eliquis once ok per primary team.  2. Sepsis: likely 2/2 PNA per [primary team  WBC up to 20 today from 16  Afebrile, blood Cx pending.   IV abx per primary team  3. Elevated troponin: mild flat elevation 153-->134 --> 123;  Likely nonischemic myocardial injury in setting of sepsis, tachycardia and known underlying CAD  No symptoms to suggest angina  Known CAD  Checking updated echo due to new onset afib  Recommend OP follow up and liekly OP stress testing once recovered.   4. CAD: Cardiac catheterization in 2014 with LULA x2 to  of proximal/mid LAD, LULA to mid LAD, LULA to RCA.    Continue aspirin, statin and beta-blocker  5. History of ischemic cardiomyopathy: LVEF 35% at time of MI and stenting in 2014; improved to 45% after medical therapy and most recent echo 2021 showed EF of 50%.  6. Dm Type II: defer management to primary team  7. HTN: Most recent blood pressure 128/88  8. LOBO: Cr 3.5; possible dehydration; getting IVF      History of Present Illness     HPI: Noel Khan is a 64 y.o. year old male who has a history of CAD with prior stenting, ischemic cardiomyopathy, chronic HFpEF,  hypertension, dyslipidemia, diabetes mellitus type 2. He follows with cardiologist Dr. Traylor, last seen in office 7/2022.            Patient above history presented to the emergency department yesterday with complaints of fever, chills, fatigue, diarrhea and poor p.o. intake over the past 5 days; symptoms worsening.    On presentation he was tachycardic; EKG showed atrial fibrillation with heart rates in the 120s.  Mildly hypotensive with blood pressure 94/50, afebrile, SpO2 98% on room air.  Significant labs:   Creatinine 3.58 (prior baseline 2-2.7 in the past year)  Lactic acid 2.0  WBC 16  Procalcitonin 3.3  High-sensitivity serial troponin 153, 134, 123    Patient met sepsis criteria with tachycardia, elevated WBCs.  He was treated with fluid boluses and continuous IV fluids with improvement in his blood pressure.    Chest x-ray was concerning for right lower lobe pneumonia.  Admitted for further treatment; getting IV antibiotics.  Blood cultures pending.  Cardiology consulted today for new onset A-fib.    At time my exam patient reports feeling okay.  He has not been feeling any chest pain, pressure, palpitations dizziness or lightheadedness.    No prior known history of atrial fibrillation.  He does carry a past history of CAD with prior stenting in 2014.  His EF was low at that time to 35%.  Has subsequently improved to 45 and most recently 50% on echocardiogram in 2021.  He is maintained on aspirin, statin.  He is prescribed metoprolol to tartrate 25 mg twice daily but only takes once daily at home.     No recent cardiac testing    Echocardiogram 2021: Low normal LV systolic function with EF of 50%, dyskinesis of mid anterolateral, apical septal, apical lateral and apical walls,+ diastolic dysfunction, mild mitral regurgitation.   Pharmacological nuclear stress test 2021: Moderate size complete fixed myocardial perfusion defect of the apical anterior wall suggestive of prior anterior apical MI, no significant  reversible ischemia present.     EKG reviewed personally:   5/29/24  Atrial fibrillation  Rate 124    Telemetry reviewed personally:   Atrial fibrillation heart rates 90s to 110s    Review of Systems   Constitutional: Positive for chills, decreased appetite, fever and malaise/fatigue. Negative for weight gain.   Cardiovascular:  Negative for chest pain, dyspnea on exertion, leg swelling, orthopnea and palpitations.   Respiratory:  Negative for cough and shortness of breath.    Gastrointestinal:  Positive for diarrhea.   Genitourinary:  Negative for dysuria.   Neurological:  Negative for dizziness and light-headedness.   Psychiatric/Behavioral:  Negative for altered mental status.    All other systems reviewed and are negative.    Historical Information   Past Medical History:   Diagnosis Date    CAD (coronary artery disease)     Chronic combined systolic and diastolic CHF (congestive heart failure) ([facility])     Diabetes mellitus (HCC)     type 2    Dyslipidemia     Hypertension     Ischemic cardiomyopathy     MI (myocardial infarction) ([facility])     Neuropathy     Osteomyelitis (HCC)     Pancreatitis      Past Surgical History:   Procedure Laterality Date    ANGIOPLASTY      ballon    CORONARY ANGIOPLASTY WITH STENT PLACEMENT      LULA to proximal and mid LAD, Proximal RCA.     FOREIGN BODY REMOVAL Left 5/26/2022    Procedure: REMOVAL FOREIGN BODY EXTREMITY;  Surgeon: Heron Herrera DPM;  [facility];  Service: Podiatry    HERNIA REPAIR      INCISION AND DRAINAGE OF WOUND Left 2/14/2020    Procedure: INCISION AND DRAINAGE (I&D) EXTREMITY left foot (cpt 36930);  Surgeon: Kermit Chau DPM;  [facility];  Service: Podiatry    OR AMPUTATION FOOT TRANSMETARSAL Left 5/26/2022    Procedure: AMPUTATION left 5th metatarsal;  Surgeon: Heron Herrera DPM;  [facility];  Service: Podiatry    OR EXPLORATION PENETRATING WOUND SPX EXTREMITY Left 2/14/2020    Procedure: EXPLORATION EXTREMITY;  Surgeon:  Kermit Chau DPM;  [facility];  Service: Podiatry    SD NEGATIVE PRESSURE WOUND THERAPY DME <= 50 SQ CM Left 2/14/2020    Procedure: APPLICATION VAC DRESSING;  Surgeon: Kermit Chau DPM;  [facility];  Service: Podiatry    TOE AMPUTATION Left 5/14/2022    Procedure: EXCISION OF LEFT 5TH TOE ULCER WITH FILET FLAP;  Surgeon: Heron Herrera DPM;  [facility];  Service: Podiatry     Social History     Substance and Sexual Activity   Alcohol Use Not Currently    Comment: 20 years of sobriety     Social History     Substance and Sexual Activity   Drug Use Never     Social History     Tobacco Use   Smoking Status Never   Smokeless Tobacco Never     Family History:   Family History   Problem Relation Age of Onset    Heart attack Father     Hyperlipidemia Father     Cancer Father     Diabetes Father     Diabetes Mother     Heart failure Mother         poss    Kidney disease Mother     Cancer Paternal Grandfather     Stroke Neg Hx     Anuerysm Neg Hx     Clotting disorder Neg Hx     Arrhythmia Neg Hx     Coronary artery disease Neg Hx     Hypertension Neg Hx     Sudden death Neg Hx         scd       Meds/Allergies   current meds:   Current Facility-Administered Medications   Medication Dose Route Frequency    aspirin chewable tablet 81 mg  81 mg Oral Daily    atorvastatin (LIPITOR) tablet 40 mg  40 mg Oral Daily    azithromycin (ZITHROMAX) tablet 500 mg  500 mg Oral Q24H    ceftriaxone (ROCEPHIN) 1 g/50 mL in dextrose IVPB  1,000 mg Intravenous Q24H    famotidine (PEPCID) tablet 10 mg  10 mg Oral Once    heparin (porcine) 25,000 units in 0.45% NaCl 250 mL infusion (premix)  3-20 Units/kg/hr (Order-Specific) Intravenous Titrated    insulin glargine (LANTUS) subcutaneous injection 30 Units 0.3 mL  30 Units Subcutaneous QAM    insulin lispro (HumALOG/ADMELOG) 100 units/mL subcutaneous injection 1-6 Units  1-6 Units Subcutaneous TID AC    insulin lispro (HumALOG/ADMELOG) 100 units/mL subcutaneous  injection 4 Units  4 Units Subcutaneous TID With Meals    latanoprost (XALATAN) 0.005 % ophthalmic solution 1 drop  1 drop Both Eyes HS    levothyroxine tablet 25 mcg  25 mcg Oral Early Morning    loperamide (IMODIUM) capsule 2 mg  2 mg Oral TID PRN    metoprolol tartrate (LOPRESSOR) tablet 25 mg  25 mg Oral Q12H ALESSANDRO    ondansetron (ZOFRAN) injection 4 mg  4 mg Intravenous Q8H PRN    pantoprazole (PROTONIX) EC tablet 40 mg  40 mg Oral Early Morning     heparin (porcine), 3-20 Units/kg/hr (Order-Specific), Last Rate: 9.8 Units/kg/hr (24 0739)        No Known Allergies    Objective   Vitals: Blood pressure 128/88, pulse (!) 106, temperature 98.4 °F (36.9 °C), resp. rate 16, weight 83.8 kg (184 lb 11.9 oz), SpO2 97%.,     Body mass index is 26.51 kg/m².,     Systolic (24hrs), Av , Min:93 , Max:149     Diastolic (24hrs), Av, Min:50, Max:92            Intake/Output Summary (Last 24 hours) at 2024 0948  Last data filed at 2024 0829  Gross per 24 hour   Intake 1700 ml   Output 525 ml   Net 1175 ml     Weight (last 2 days)       Date/Time Weight    24 0600 83.8 (184.75)          Invasive Devices       Peripheral Intravenous Line  Duration             Peripheral IV 24 Distal;Left;Upper;Ventral (anterior) Arm <1 day    Peripheral IV 24 Right;Ventral (anterior) Forearm <1 day                      Physical Exam  Vitals reviewed.   Constitutional:       Appearance: Normal appearance.      Comments: Patient laying nearly flat in bed in NAD alert and cooperative with exam   HENT:      Head: Normocephalic.      Mouth/Throat:      Mouth: Mucous membranes are moist.   Cardiovascular:      Rate and Rhythm: Normal rate. Rhythm irregularly irregular.      Heart sounds: S1 normal and S2 normal. No murmur heard.  Pulmonary:      Effort: Pulmonary effort is normal.      Breath sounds: Rales (Right lower lobe) present.      Comments: On room air  Musculoskeletal:      Right lower leg: No edema.       Left lower leg: No edema.   Skin:     General: Skin is warm.   Neurological:      Mental Status: He is alert and oriented to person, place, and time.   Psychiatric:         Mood and Affect: Mood normal.       LABORATORY RESULTS:      CBC with diff:   Results from last 7 days   Lab Units 05/30/24 0528 05/30/24 0211 05/29/24 1948   WBC Thousand/uL 20.73* 16.31* 20.25*   HEMOGLOBIN g/dL 12.5 11.3* 13.8   HEMATOCRIT % 39.5 35.1* 42.6   MCV fL 88 88 88   PLATELETS Thousands/uL 275 236 267   RBC Million/uL 4.47 3.98 4.82   MCH pg 28.0 28.4 28.6   MCHC g/dL 31.6 32.2 32.4   RDW % 13.7 13.7 13.6   MPV fL 11.0 10.5 10.3   NRBC AUTO /100 WBCs 0  --  0     CMP:  Results from last 7 days   Lab Units 05/30/24 0528 05/29/24 1948   POTASSIUM mmol/L 3.9 4.2   CHLORIDE mmol/L 96 94*   CO2 mmol/L 19* 21   BUN mg/dL 78* 74*   CREATININE mg/dL 3.59* 3.58*   CALCIUM mg/dL 8.7 9.1   AST U/L  --  16   ALT U/L  --  16   ALK PHOS U/L  --  66   EGFR ml/min/1.73sq m 16 16     BMP:  Results from last 7 days   Lab Units 05/30/24 0528 05/29/24 1948   POTASSIUM mmol/L 3.9 4.2   CHLORIDE mmol/L 96 94*   CO2 mmol/L 19* 21   BUN mg/dL 78* 74*   CREATININE mg/dL 3.59* 3.58*   CALCIUM mg/dL 8.7 9.1     Lab Results   Component Value Date    NTBNP 7,650 (H) 12/22/2021    NTBNP 2,233 (H) 05/18/2017     Results from last 7 days   Lab Units 05/30/24 0528   TSH 3RD GENERATON uIU/mL 3.460       Results from last 7 days   Lab Units 05/30/24 0528 05/29/24 1948   INR  1.23* 1.17     Lipid Profile:   Lab Results   Component Value Date    CHOL 98 06/25/2015    CHOL 102 01/29/2015    CHOL 150 12/18/2014     Lab Results   Component Value Date    HDL 36 (L) 05/20/2024    HDL 64 04/06/2022    HDL 56 06/01/2021     Lab Results   Component Value Date    LDLCALC 34 05/20/2024    LDLCALC 42 04/06/2022    LDLCALC 50 06/01/2021     Lab Results   Component Value Date    TRIG 84 05/20/2024    TRIG 38 04/06/2022    TRIG 43 06/01/2021         Cardiac testing:    Results for orders placed during the hospital encounter of 21    Echo complete with contrast if indicated    Narrative  [facility]  [address], [place] [id number] [phone]    Transthoracic Echocardiogram  2D, M-mode, Doppler, and Color Doppler    Study date:  2021    Patient: LUZ MARINA HUGGINS  MR number: [id number]  Account number: [id number]  : 1960  Age: 61 years  Gender: Male  Status: Outpatient  Location: [facility], [place]  Height: 70 in  Weight: 178.6 lb  BP: 130/ 72 mmHg    Indications: Assess left ventricular function.    Diagnoses: I25.10 - Atherosclerotic heart disease of native coronary artery without angina pectoris    Sonographer:  PALAK Lundy  Primary Physician:  Frank Allen MD  Referring Physician:  Santa Figueroa DO  Group:  [facility]  Interpreting Physician:  Hunter Harmon MD    SUMMARY    LEFT VENTRICLE:  Systolic function was at the lower limits of normal. Ejection fraction was estimated to be 50 %. Global longitudinal strain was reduced at -9.3%.  There was dyskinesis of the mid anterolateral, apical septal, apical lateral, and apical wall(s).  Doppler parameters were consistent with restrictive physiology, indicative of decreased left ventricular diastolic compliance and/or increased left atrial pressure.  Doppler parameters were consistent with high ventricular filling pressure.    MITRAL VALVE:  There was mild regurgitation.    HISTORY: PRIOR HISTORY: CAD s/p stent, Ischemic cardiomyopathy, Heart failure, Hypertension, Dyslipidemia    PROCEDURE: The study was performed in the [facility], [place]. This was a routine study. The transthoracic approach was used. The study included complete 2D imaging, M-mode, complete spectral Doppler, and color Doppler. The  heart rate was 64 bpm, at the start of the study. Images were obtained from the parasternal, apical, subcostal, and suprasternal  notch acoustic windows. Intravenous contrast ( 0.6 ml Definity/NSS) was administered. Image quality was  adequate.    LEFT VENTRICLE: Size was normal. Systolic function was at the lower limits of normal. Ejection fraction was estimated to be 50 %. Global longitudinal strain was reduced at -9.3%. There was dyskinesis of the mid anterolateral, apical  septal, apical lateral, and apical wall(s). Wall thickness was normal. No evidence of apical thrombus. DOPPLER: There was a reduced contribution of atrial contraction to ventricular filling, due to increased ventricular diastolic pressure  or atrial contractile dysfunction. Doppler parameters were consistent with restrictive physiology, indicative of decreased left ventricular diastolic compliance and/or increased left atrial pressure. Doppler parameters were consistent with  high ventricular filling pressure.    RIGHT VENTRICLE: The size was normal. Systolic function was normal. Wall thickness was normal.    LEFT ATRIUM: Size was normal.    RIGHT ATRIUM: Size was normal.    MITRAL VALVE: Valve structure was normal. There was normal leaflet separation. DOPPLER: The transmitral velocity was within the normal range. There was no evidence for stenosis. There was mild regurgitation.    AORTIC VALVE: The valve was trileaflet. Leaflets exhibited normal thickness, mild calcification, normal cuspal separation, and sclerosis. DOPPLER: Transaortic velocity was within the normal range. There was no evidence for stenosis. There  was trace regurgitation.    TRICUSPID VALVE: The valve structure was normal. There was normal leaflet separation. DOPPLER: The transtricuspid velocity was within the normal range. There was no evidence for stenosis. There was trace regurgitation. Pulmonary artery  systolic pressure was within the normal range. Estimated peak PA pressure was 21 mmHg.    PULMONIC VALVE: Leaflets exhibited normal thickness, no calcification, and normal cuspal separation.  DOPPLER: The transpulmonic velocity was within the normal range. There was no significant regurgitation.    PERICARDIUM: There was no pericardial effusion. The pericardium was normal in appearance.    AORTA: The root exhibited normal size.    SYSTEMIC VEINS: IVC: The inferior vena cava was normal in size. Respirophasic changes were normal.    SYSTEM MEASUREMENT TABLES    2D  %FS: 23.42 %  AA PSSL Full: -4.21 %  AAS PSSL Full: -9.76 %  AI PSSL Full: -12.76 %  AL PSSL Full: -2.93 %  AP PSSL Full: -7.24 %  AS PSSL Full: -10.64 %  AVC: 390.77 ms  Ao Diam: 2.94 cm  BA PSSL Full: -3.19 %  BAS PSSL Full: -4.11 %  BI PSSL Full: -16.6 %  BL PSSL Full: -13.1 %  BP PSSL Full: -8.97 %  BS PSSL Full: -13 %  EDV(Teich): 120.73 ml  EF Biplane: 36.09 %  EF(Teich): 46.62 %  ESV(Teich): 64.45 ml  G peak SL Full(A2C): -9.09 %  G peak SL Full(A4C): -10.36 %  G peak SL Full(APLAX): -8.36 %  G peak SL Full(Avg): -9.27 %  IVSd: 0.88 cm  LA Area: 11.8 cm2  LA Diam: 4.49 cm  LVEDV MOD A2C: 130.33 ml  LVEDV MOD A4C: 125.58 ml  LVEDV MOD BP: 128.19 ml  LVEDVInd MOD BP: 64.42 ml/m2  LVEF MOD A2C: 30.77 %  LVEF MOD A4C: 41.81 %  LVESV MOD A2C: 90.22 ml  LVESV MOD A4C: 73.07 ml  LVESV MOD BP: 81.93 ml  LVESVInd MOD BP: 41.17 ml/m2  LVIDd: 5.05 cm  LVIDs: 3.86 cm  LVLd A2C: 8.56 cm  LVLd A4C: 8.65 cm  LVLs A2C: 8.13 cm  LVLs A4C: 7.94 cm  LVPWd: 0.91 cm  MA PSSL Full: -7.09 %  MAS PSSL Full: -9.6 %  MI PSSL Full: -11.42 %  ML PSSL Full: -10.14 %  MP PSSL Full: -10.14 %  MS PSSL Full: -11.92 %  Peak SL Dispersion Full: 64.07 ms  RA Area: 12.98 cm2  RVIDd: 3.2 cm  SV MOD A2C: 40.1 ml  SV MOD A4C: 52.51 ml  SV(Teich): 56.28 ml    CW  TR MaxP.71 mmHg  TR Vmax: 2.1 m/s    MM  TAPSE: 1.99 cm    PW  E' Sept: 0.05 m/s  E/E' Sept: 16.3  LVOT Env.Ti: 289.25 ms  LVOT VTI: 12.63 cm  LVOT Vmax: 0.6 m/s  LVOT Vmean: 0.44 m/s  LVOT maxP.45 mmHg  LVOT meanP.85 mmHg  MV A Marcus: 0.2 m/s  MV Dec Webster: 4.91 m/s2  MV DecT: 165.87 ms  MV E Marcus: 0.81  m/s  MV E/A Ratio: 4.15  MV PHT: 48.1 ms  MVA By PHT: 4.57 cm2    [employer] Accredited Echocardiography Laboratory    Prepared and electronically signed by    Hunter Harmon MD  Signed 30-Jul-2021 13:01:42      Imaging: I have personally reviewed pertinent reports.    No results found.    Assessment  Principal Problem:    Sepsis (HCC)  Active Problems:    Insulin-dependent diabetes mellitus    Chronic combined systolic and diastolic CHF    LOBO (acute kidney injury) (HCC)    Right lower lobe pneumonia    Atrial fibrillation (HCC)      Thank you for allowing us to participate in this patient's care. This pt will follow up with Dr Figueroa at [place] office once discharged.     Counseling / Coordination of Care  Total floor / unit time spent today 45 minutes.  Greater than 50% of total time was spent with the patient and / or family counseling and / or coordination of care.  A description of the counseling / coordination of care: Review of history, current assessment, development of a plan.      Code Status: Level 1 - Full Code    ** Please Note: Dragon 360 Dictation voice to text software may have been used in the creation of this document. **

## 2024-05-30 NOTE — ED ATTENDING ATTESTATION
5/29/2024  I, Snow Brown MD, saw and evaluated the patient. I have discussed the patient with the resident/non-physician practitioner and agree with the resident's/non-physician practitioner's findings, Plan of Care, and MDM as documented in the resident's/non-physician practitioner's note, except where noted. All available labs and Radiology studies were reviewed.  I was present for key portions of any procedure(s) performed by the resident/non-physician practitioner and I was immediately available to provide assistance.       At this point I agree with the current assessment done in the Emergency Department.  I have conducted an independent evaluation of this patient a history and physical is as follows:    Send 4-year-old male presenting to the ER with fever chills body aches cough.  Feel short of breath.  No chest pain.  No vomiting.  Has some nausea.  No abdominal pain.  Has had some diarrhea.  On exam patient has tachycardic and irregular heartbeat.  No history of A-fib.  Initially soft blood pressures but improved.  Abdomen soft nontender.  No calf tenderness or swelling.    Agree with septic workup.    Chest x-ray for pneumonia, IV antibiotics, admission to the hospital.  Anticoagulated for new onset A-fib        ED Course         Critical Care Time  Procedures

## 2024-05-30 NOTE — OCCUPATIONAL THERAPY NOTE
Occupational Therapy Evaluation      Noel Sorensenmihai    5/30/2024    Principal Problem:    Sepsis (HCC)  Active Problems:    Insulin-dependent diabetes mellitus    Chronic combined systolic and diastolic CHF    LOBO (acute kidney injury) (HCC)    Right lower lobe pneumonia    Atrial fibrillation (HCC)      Past Medical History:   Diagnosis Date    CAD (coronary artery disease)     Chronic combined systolic and diastolic CHF (congestive heart failure) (HCC)     Diabetes mellitus (HCC)     type 2    Dyslipidemia     Hypertension     Ischemic cardiomyopathy     MI (myocardial infarction) (HCC)     Neuropathy     Osteomyelitis (HCC)     Pancreatitis        Past Surgical History:   Procedure Laterality Date    ANGIOPLASTY      ballon    CORONARY ANGIOPLASTY WITH STENT PLACEMENT      LULA to proximal and mid LAD, Proximal RCA.     FOREIGN BODY REMOVAL Left 5/26/2022    Procedure: REMOVAL FOREIGN BODY EXTREMITY;  Surgeon: Heron Herrera DPM;  [facility];  Service: Podiatry    HERNIA REPAIR      INCISION AND DRAINAGE OF WOUND Left 2/14/2020    Procedure: INCISION AND DRAINAGE (I&D) EXTREMITY left foot (cpt 88461);  Surgeon: Kermit Chau DPM;  [facility];  Service: Podiatry    MS AMPUTATION FOOT TRANSMETARSAL Left 5/26/2022    Procedure: AMPUTATION left 5th metatarsal;  Surgeon: Heron Herrera DPM;  [facility];  Service: Podiatry    MS EXPLORATION PENETRATING WOUND SPX EXTREMITY Left 2/14/2020    Procedure: EXPLORATION EXTREMITY;  Surgeon: Kermit Chau DPM;  [facility];  Service: Podiatry    MS NEGATIVE PRESSURE WOUND THERAPY DME <= 50 SQ CM Left 2/14/2020    Procedure: APPLICATION VAC DRESSING;  Surgeon: Kermit Chau DPM;  [facility];  Service: Podiatry    TOE AMPUTATION Left 5/14/2022    Procedure: EXCISION OF LEFT 5TH TOE ULCER WITH FILET FLAP;  Surgeon: Heron Herrera DPM;  [facility];  Service: Podiatry        05/30/24 1430   OT Last Visit   OT Visit  "Date 05/30/24   Note Type   Note type Evaluation   Additional Comments Pt agreeable to OT evaluation.   Pain Assessment   Pain Assessment Tool 0-10   Pain Score 6   Pain Location/Orientation Location: Abdomen;Orientation: Mid   Pain Onset/Description Descriptor: Burning  (\"like acid reflux\")   Home Living   Type of Home House   Home Layout Able to live on main level with bedroom/bathroom   Bathroom Shower/Tub Walk-in shower   Bathroom Toilet Standard   Home Equipment Cane;Walker;Crutches  (does not use DME at baseline)   Prior Function   Level of [place] Independent with ADLs;Independent with functional mobility   Lives With Spouse;Daughter  (2 daughters (one is 19).)   Receives Help From Family   IADLs Independent with driving;Independent with meal prep;Independent with medication management   Vocational Retired  (owned a  shop)   Lifestyle   Autonomy PTA, pt reports self to be (I) with basic self cares and mobility. (I) with IADLs as well.   Reciprocal Relationships supportive family.   Intrinsic Gratification taking care of animals at home- chickens and bunnies.   General   Family/Caregiver Present No   Subjective   Subjective \"I don't want to stay out of bed. I am just too tired. I will get back to bed now and get myself all washed up later today.\"   ADL   Eating Assistance 7  Independent   Grooming Assistance 7  Independent   UB Bathing Assistance 7  Independent   LB Bathing Assistance 7  Independent   UB Dressing Assistance 7  Independent   LB Dressing Assistance 7  Independent   Toileting Assistance  7  Independent   Functional Assistance 7  Independent   Additional Comments Pt reports has been taking self to/from restroom and completed oral care (I)ly.   Bed Mobility   Rolling R 7  Independent   Rolling L 7  Independent   Supine to Sit 7  Independent   Sit to Supine 7  Independent   Transfers   Sit to Stand 7  Independent   Stand to Sit 7  Independent   Functional Mobility   Functional Mobility " "7  Independent   Additional Comments ambulated short distance within room however pt fatigued and requesting return to bed. OT encouraged OOB later this afternoon/evening if feeling up to it and explained benefits. Pt verbalized understanding.   Balance   Static Sitting Good   Dynamic Sitting Good   Static Standing Good   Dynamic Standing Good   Activity Tolerance   Activity Tolerance Patient tolerated treatment well;Patient limited by fatigue   Nurse Made Aware RN cleared pt for OT evaluation.   RUE Assessment   RUE Assessment WFL   LUE Assessment   LUE Assessment WFL   Vision - Complex Assessment   Acuity Able to read clock/calendar on wall without difficulty;Able to read employee name badge without difficulty   Cognition   Overall Cognitive Status WFL   Arousal/Participation Alert;Cooperative   Attention Within functional limits   Orientation Level Oriented X4   Memory Within functional limits   Following Commands Follows all commands and directions without difficulty   Comments verified ID by stating name and .   Assessment   Limitation Decreased endurance   Prognosis Good   Assessment Pt is a 64 y.o. male seen for OT evaluation s/p admission to [facility] on 2024  due to having flu like symptoms, fevers, chills, diaphoresis, fatigue x5 days. Pt also with report of abdominal pain with diarrhea and has not been able to tolerate food but has been drinking fluids. Pt diagnosis: Sepsis (HCC). Pt's PMH impacting occupational performance is listed above. Pt's PLOF also listed in above flowsheet. Pt is motivated to return to home. During evaluation pt performed as is outlined above in flowsheet and overall is at baseline level of independence with the exception of feeling generally \"unwell\" and \"tired\".  Standardized assessments used to assist in identifying performance deficits include AMPAC 6-Clicks. Pt's raw score on the AM-PAC Daily activity inpatient short form is 24, standardized score is 57.54, " "which is greater than 39.4. Patients at this level are likely to benefit from DC to home. Despite pt's overall dec'd activity tolerance, pt is performing basic ADLs and transfers at an (I) level and therefore no further skilled OT needs identified. Pt agreeable to be removed from OT caseload at this time. Please continue to encourage OOB often and engagement in ADL tasks and reconsult if pt with a change in status. Thank you.   Goals   Patient Goals \"I want to go home once I am better.\"   Plan   OT Frequency Eval only   Discharge Recommendation   Rehab Resource Intensity Level, OT No post-acute rehabilitation needs   AM-PAC Daily Activity Inpatient   Lower Body Dressing 4   Bathing 4   Toileting 4   Upper Body Dressing 4   Grooming 4   Eating 4   Daily Activity Raw Score 24   Daily Activity Standardized Score (Calc for Raw Score >=11) 57.54   AM-PAC Applied Cognition Inpatient   Following a Speech/Presentation 4   Understanding Ordinary Conversation 4   Taking Medications 4   Remembering Where Things Are Placed or Put Away 4   Remembering List of 4-5 Errands 4   Taking Care of Complicated Tasks 4   Applied Cognition Raw Score 24   Applied Cognition Standardized Score 62.21   End of Consult   Education Provided Yes   Patient Position at End of Consult All needs within reach;Supine   Nurse Communication Nurse aware of consult   Dusty Strong, OT    "

## 2024-05-30 NOTE — ASSESSMENT & PLAN NOTE
Patient endorses fever chills and cough for the last 5 days. Denies any upper respiratory symptoms. Chest x-ray with finding consistent of right lower lobe pneumonia. Leukocytosis of 20,000 elevated procal of 3.5     Drip score- Low risk (0)   cap score- low risk (positive for 2 minor criteria of hypotension+elevated BUN)    -Patient met sepsis criteria see above    Plan  -abx 1 g ceftriaxone daily plus azithromycin 500 mg daily  -Legionella plus strep pneumo urine antigens  -Follow-up official read of x-ray of chest

## 2024-05-30 NOTE — ASSESSMENT & PLAN NOTE
CXR (5/29/2024) - Extensive right base pneumonia  FLU/RSV/COVID negative  Urine legionella and strep negative  Drip score low risk (0)  Patient met sepsis criteria, see above    Plan  Continue ceftriaxone and azithromycin   Aspiration precautions  Incentive spirometry  Offer oxygen supplementation as needed

## 2024-05-30 NOTE — SEPSIS NOTE
"  Sepsis Note   Noel Khan 64 y.o. male MRN: [id number]  Unit/Bed#: ED-36 Encounter: [id number]       Initial Sepsis Screening       Row Name 05/29/24 2103                Is the patient's history suggestive of a new or worsening infection? Yes (Proceed)  -MR        Suspected source of infection pneumonia  -MR        Indicate SIRS criteria Tachycardia > 90 bpm;Leukocytosis (WBC > 55620 IJL) OR Leukopenia (WBC <4000 IJL) OR Bandemia (WBC >10% bands)  -MR        Are two or more of the above signs & symptoms of infection both present and new to the patient? Yes (Proceed)  -MR        Assess for evidence of organ dysfunction: Are any of the below criteria present within 6 hours of suspected infection and SIRS criteria that are NOT considered to be chronic conditions? Creatinine > 2.0 AND > 0.5 above baseline  -MR        Date of presentation of severe sepsis 05/29/24  -MR        Time of presentation of severe sepsis 2104  -MR        Sepsis Note: Click \"NEXT\" below (NOT \"close\") to generate sepsis note based on above information. YES (proceed by clicking \"NEXT\")  -MR                  User Key  (r) = Recorded By, (t) = Taken By, (c) = Cosigned By      Initials Name Provider Type    MR Mitesh Rojas MD Resident                        There is no height or weight on file to calculate BMI.  Wt Readings from Last 1 Encounters:   05/23/24 87.1 kg (192 lb)        Ideal body weight: 73 kg (160 lb 15 oz)  Adjusted ideal body weight: 78.6 kg (173 lb 5.8 oz)    "

## 2024-05-30 NOTE — ASSESSMENT & PLAN NOTE
New onset afib noted in ED on EKG with rate of 125. Patient denies any chest pain or palpitations, dizziness and lightheaded. Patient is asymptomatic.   [id number] score of 4  Patient was prescribed 25 mg metoprol tartrate BID. Patient states he is only taking 25mg daily  TSH WNL 3.460    Plan  Started on Eliquis  Increased metoprolol tartrate from 25 TID to 50 BID  Telemetry   Echo pending  Cardiology following, appreciate recommendations

## 2024-05-30 NOTE — UTILIZATION REVIEW
Initial Clinical Review    Admission: Date/Time/Statement:   Admission Orders (From admission, onward)       Ordered        05/29/24 2134  INPATIENT ADMISSION  Once                          Orders Placed This Encounter   Procedures    INPATIENT ADMISSION     Standing Status:   Standing     Number of Occurrences:   1     Order Specific Question:   Level of Care     Answer:   Med Surg [16]     Order Specific Question:   Estimated length of stay     Answer:   More than 2 Midnights     Order Specific Question:   Certification     Answer:   I certify that inpatient services are medically necessary for this patient for a duration of greater than two midnights. See H&P and MD Progress Notes for additional information about the patient's course of treatment.     ED Arrival Information       Expected   -    Arrival   5/29/2024 17:37    Acuity   Urgent              Means of arrival   Wheelchair    Escorted by   Family Member    Service   Hospitalist    Admission type   Emergency              Arrival complaint   Weak/tired/body aches             Chief Complaint   Patient presents with    Weakness - Generalized     Patient started with flu like symptoms, chills, cough, fever, decreased appetite, bodyaches on Saturday. Symptoms worsening since Saturday, patient hypotensive in triage BP 94/54       Initial Presentation: 64 y.o. male  with hx  CAD with stent, ischemic cradiomyopathy with EF 50 % 2021CKD, CHF, Liver cirrhosis who presents to ED from home with fevers, fatigue, chills and poor PO intake over the last 5 days with poor UOP.  Pt reports not taking insulin today due to his fatigue and inability to eat. Reports cough. Has chronic diarrhea 2-5 x/day. On exam, pt tachycardic , irreg rhythm with episode hypotension .temp 97.3 F .  Dry mucous membranes. [id number] score of 4 .  ECG shows A fib-V rate 125 new onset . Labs WBC elevated , procal elevated . BNP 1434. Troponin 153. Creat 3.58 from baseline 2-2.6 .  Blood sugar in  200's . CXR suspicious with right lower lobe pneumonia on prelim read . Py given IVF , Iv abx in ED . Pt admitted as inpatient  to telemetry with Sepsis,- RLL PNA ,  new A fib , LOBO . Plan- telemetry, Cardiology consult. Echo. Start heparin drip.Resume on proper dose of 25mg metoprolol tartrate BID(pt was only taking daily ) . TSH .  Trend troponin .  IV abx- ceftriaxone and Azithromycin .  F/U blood cx and  UA. F/U official read CXR . Obtain urine antigens . IVF. Monitor BMP . Hold home lasix and Lisinopril. Lantus 30 u q am, SSI.     Anticipated Length of Stay/Certification Statement: Patient will be admitted on an inpatient basis with an anticipated length of stay of greater than 2 midnights secondary to Sepsis and new onset a fib.     Date: 5/30    Day 2:  WBC up to 20 today from 16 . IV Ceftriaxone . Creat remains unchanged with IVF infusing. Nephrology consult placed . Heparin drip .   Co2 19, AG 19.  Glucose 291-303 today Pt started on insulin drip today . Accucheks q2h. BMP q6h . Monitor potassium levels closely and supplement aggressively.      Cardiology consult- New onset atrial fibrillation: In the setting of sepsis. Heart rates are adequately controlled in the 90s to 110s  . CHADs-Vasc score 5; AC recommended; currently on IV heparin infusion; transition to eliquis once ok per primary team. Check Echo .  Elevated troponin: mild flat elevation 153-->134 --> 123; Likely nonischemic myocardial injury in setting of sepsis, tachycardia and known underlying CAD . Recommend OP follow up and likely OP stress testing once recovered. On exam, rales R lower lobe. On RA.      Nephrology consult Acute kidney injury, creat 3.58 on admission. Baseline was 1.4 to 1.6 mg/dL.( Creatinine 2.03 in February 2024 but worsened to 2.46 in May 2024 )Suspected etiology for LOBO : prerenal azotemia from volume depletion due to GI loss, poor oral intake and diuretic use plus hypotension due to sepsis from pneumonia in the setting  of autoregulatory failure from being on ACE inhibitor. Possibility of progression to ATN as finding of granular casts on the urine . Creatinine today 3.59  . Plan- continue to monitor creat , continue current IV fluid but decreased rate to 50 ml/h. Blood pressure has improved. Monitor for urinary retention  Follow-up kidney ultrasound result. Continue to hold diuretics and ACE inhibitor . Anion gap metabolic acidosis: Bicarb level of 19 with anion gap of 19. Lactate level was normal. Beta hydroxybutyrate was elevated but no ketones on UA. Patient treated with insulin drip . Hyperphosphatemia in the setting of acute kidney injury, phosphorus 4.7 recommend low phosphorus diet, continue to monitor.      Date: 5/31   Day 3: Has surpassed a 2nd midnight with active treatments and services. PNA, New A fib, LOBO .   Creat improving to 2.8 today from 3.58 on admission . Currently holding ACE inhibitor and diuretic . Pt on Isolyte at 50 mill per hour , continue for 6 more hrs today then d/c IVF.  Bicarbonate level improved to 20 . Hyponatremia resolved.   BMP in am . Patient reports he feels like he is voiding well. BP controlled. Remains tachycardic .telemetry- A fib  Metoprolol increased to 50 mg q12h today from  25 mg q8h . Pt continues iV ceftriaxone , po Azithromycin. WBC 17.002 today . On RA . Pt reports sensation of food getting stuck with some discomfort in his throat. Loose stools .  Pt started on Lantus insulin last night with insulin drip d/c'ed after . SSI and Lispior 4 U TID w/ meals . Accucheks . Heparin drip infusing overnight, d/c'ed this am-started on Eliquis     ED Triage Vitals   Temperature Pulse Respirations Blood Pressure SpO2   05/29/24 1803 05/29/24 1803 05/29/24 1803 05/29/24 1803 05/29/24 1803   (!) 97.3 °F (36.3 °C) 105 20 94/50 98 %      Temp Source Heart Rate Source Patient Position - Orthostatic VS BP Location FiO2 (%)   05/29/24 1803 05/29/24 1803 05/29/24 1803 05/29/24 1803 --   Oral Monitor  Sitting Right arm       Pain Score       05/29/24 2054       Med Not Given for Pain - for MAR use only          Wt Readings from Last 1 Encounters:   05/31/24 84.1 kg (185 lb 6.5 oz)     Additional Vital Signs:   Date/Time Temp Pulse Resp BP MAP (mmHg) SpO2   05/31/24 1057 -- 101 -- 129/83 -- --   05/31/24 0900 -- -- -- -- -- --   05/31/24 07:39:02 97.6 °F (36.4 °C) 100 16 137/91 106 98 %   05/31/24 0222 -- 120 Abnormal  -- 155/105 Abnormal  -- --   05/31/24 0000 -- -- -- -- -- --   05/30/24 23:27:41 98.1 °F (36.7 °C) 103 -- 128/85 99 95 %   05/30/24 1926 -- 103 -- 152/94 -- --   05/30/24 15:57:22 98.1 °F (36.7 °C) 126 Abnormal  -- 136/82 100 98 %     Date/Time Temp Pulse Resp BP MAP (mmHg) SpO2   05/30/24 0810 -- 106 Abnormal  -- -- -- --   05/30/24 08:08:20 -- 131 Abnormal  -- 128/88 101 97 %   05/30/24 07:19:54 98.4 °F (36.9 °C) 112 Abnormal  16 149/83 105 99 %   05/30/24 0546 -- -- -- -- -- --   05/30/24 03:51:29 98.5 °F (36.9 °C) 120 Abnormal  16 141/92 108 97 %   05/29/24 2330 -- 105 18 93/54 68 96 %   05/29/24 2308 -- -- 18 -- -- --   05/29/24 2252 -- 102 20 103/64 -- 96 %   05/29/24 2248 -- -- -- -- -- --   05/29/24 2227 -- 113 Abnormal  18 105/62 -- 97 %   05/29/24 2153 -- 120 Abnormal  18 103/64 -- 96 %   05/29/24 2055 -- 115 Abnormal  20 106/60 -- 95 %   05/29/24 2045 99.4 °F (37.4 °C) -- -- -- -- --   05/29/24 1957 -- 122 Abnormal  18 132/78 -- 96 %     Pertinent Labs/Diagnostic Test Results:   5/29 ECG- ECG rate:  125     ECG rate assessment: tachycardic    Rhythm:     Rhythm: atrial fibrillation    Ectopy:     Ectopy: none    QRS:     QRS axis:  Left     QRS intervals:  Normal   Conduction:     Conduction: normal    ST segments:     ST segments:  Normal   T waves:     T waves: normal         US kidney and bladder   Final Result by Alverto Medrano DO (05/30 1637)      Visualization of the kidneys and bladder somewhat limited by shadowing. Exam is unremarkable within these confines.             Workstation performed: [id number]         XR chest 1 view portable   ED Interpretation by Mitesh Rojas MD (05/29 2102)   My personal interpretation-right lower lobe consolidation suggestive of pneumonia      Final Result by Katherine Atkins MD (05/30 1511)      Extensive right base pneumonia.            Workstation performed: [id number]           Results from last 7 days   Lab Units 05/29/24 1948   SARS-COV-2  Negative     Results from last 7 days   Lab Units 05/31/24  0447 05/30/24  0528 05/30/24  0211 05/29/24 1948   WBC Thousand/uL 17.02* 20.73* 16.31* 20.25*   HEMOGLOBIN g/dL 11.5* 12.5 11.3* 13.8   HEMATOCRIT % 35.6* 39.5 35.1* 42.6   PLATELETS Thousands/uL 281 275 236 267   TOTAL NEUT ABS Thousands/µL 14.35* 18.37*  --  17.78*         Results from last 7 days   Lab Units 05/31/24  0547 05/31/24  0006 05/30/24  1948 05/30/24  1726 05/30/24  1407   SODIUM mmol/L 135 134* 135 133* 133*   POTASSIUM mmol/L 5.0 5.1 4.0 3.6 3.7   CHLORIDE mmol/L 105 104 102 100 99   CO2 mmol/L 20* 19* 21 21 19*   ANION GAP mmol/L 10 11 12 12 15*   BUN mg/dL 84* 85* 86* 84* 86*   CREATININE mg/dL 2.83* 2.99* 3.17* 3.12* 3.39*   EGFR ml/min/1.73sq m 22 20 19 19 17   CALCIUM mg/dL 8.5 8.5 8.7 8.5 8.6     Results from last 7 days   Lab Units 05/29/24 1948   AST U/L 16   ALT U/L 16   ALK PHOS U/L 66   TOTAL PROTEIN g/dL 8.0   ALBUMIN g/dL 3.8   TOTAL BILIRUBIN mg/dL 1.65*     Results from last 7 days   Lab Units 05/31/24  1117 05/31/24  0737 05/31/24  0143 05/31/24  0005 05/30/24  2200 05/30/24  2158 05/30/24  2056 05/30/24  1852 05/30/24  1716 05/30/24  1507 05/30/24  1306 05/30/24  1112   POC GLUCOSE mg/dl 260* 173* 183* 175* 115 120 106 159* 200* 264* 303* 302*     Results from last 7 days   Lab Units 05/31/24  0547 05/31/24  0006 05/30/24  1948 05/30/24  1726 05/30/24  1407 05/30/24  0528 05/29/24  1948   GLUCOSE RANDOM mg/dL 202* 175* 148* 185* 300* 277* 263*             Beta- Hydroxybutyrate   Date Value Ref Range Status    05/30/2024 3.46 (H) 0.02 - 0.27 mmol/L Final          Results from last 7 days   Lab Units 05/30/24  1356   PH ADAM  7.367   PCO2 ADAM mm Hg 27.3*   PO2 ADAM mm Hg 81.4*   HCO3 ADAM mmol/L 15.3*   BASE EXC ADAM mmol/L -8.4   O2 CONTENT ADAM ml/dL 17.5   O2 HGB, VENOUS % 93.3*             Results from last 7 days   Lab Units 05/30/24  0211 05/29/24 2157 05/29/24 1948   HS TNI 0HR ng/L  --   --  153*   HS TNI 2HR ng/L  --  134*  --    HSTNI D2 ng/L  --  -19  --    HS TNI 4HR ng/L 123*  --   --    HSTNI D4 ng/L -30  --   --          Results from last 7 days   Lab Units 05/31/24  0547 05/30/24  1948 05/30/24  1356 05/30/24 0528 05/29/24 1948   PROTIME seconds  --   --   --  16.2* 15.5*   INR   --   --   --  1.23* 1.17   PTT seconds 78* 83* 74* 100* 38*     Results from last 7 days   Lab Units 05/30/24  0528   TSH 3RD GENERATON uIU/mL 3.460     Results from last 7 days   Lab Units 05/30/24 0528 05/29/24 1948   PROCALCITONIN ng/ml 3.32* 3.57*     Results from last 7 days   Lab Units 05/30/24  1105 05/29/24 1948   LACTIC ACID mmol/L 1.0 2.0             Results from last 7 days   Lab Units 05/30/24 0528   BNP pg/mL 1,434*           Results from last 7 days   Lab Units 05/29/24 1948   LIPASE u/L <6*           Results from last 7 days   Lab Units 05/30/24  0552   CLARITY UA  Turbid   COLOR UA  Yellow   SPEC GRAV UA  1.015   PH UA  5.5   GLUCOSE UA mg/dl Negative   KETONES UA mg/dl Negative   BLOOD UA  Negative   PROTEIN UA mg/dl 100 (2+)*   NITRITE UA  Negative   BILIRUBIN UA  Negative   UROBILINOGEN UA (BE) mg/dl <2.0   LEUKOCYTES UA  Negative   WBC UA /hpf 1-2   RBC UA /hpf 2-4*   BACTERIA UA /hpf Occasional   EPITHELIAL CELLS WET PREP /hpf Occasional     Results from last 7 days   Lab Units 05/30/24  0553 05/29/24  1948   STREP PNEUMONIAE ANTIGEN, URINE  Negative  --    LEGIONELLA URINARY ANTIGEN  Negative  --    INFLUENZA A PCR   --  Negative   INFLUENZA B PCR   --  Negative   RSV PCR   --  Negative       Results  from last 7 days   Lab Units 05/29/24  1948   BLOOD CULTURE  No Growth at 24 hrs.  No Growth at 24 hrs.                   ED Treatment:   Medication Administration from 05/29/2024 1737 to 05/30/2024 0344         Date/Time Order Dose Route Action Comments     05/29/2024 2030 EDT sodium chloride 0.9 % bolus 500 mL 0 mL Intravenous Stopped --     05/29/2024 1953 EDT sodium chloride 0.9 % bolus 500 mL 500 mL Intravenous New Bag --     05/29/2024 2249 EDT cefepime (MAXIPIME) 1,000 mg in dextrose 5 % 50 mL IVPB 0 mg Intravenous Stopped --     05/29/2024 2051 EDT cefepime (MAXIPIME) 1,000 mg in dextrose 5 % 50 mL IVPB 1,000 mg Intravenous New Bag --     05/29/2024 2054 EDT acetaminophen (TYLENOL) tablet 975 mg 975 mg Oral Given --     05/29/2024 2302 EDT sodium chloride 0.9 % bolus 500 mL 0 mL Intravenous Stopped --     05/29/2024 2213 EDT sodium chloride 0.9 % bolus 500 mL 500 mL Intravenous New Bag --     05/29/2024 2303 EDT azithromycin (ZITHROMAX) 500 mg in sodium chloride 0.9% 250mL IVPB 500 mg 0 mg Intravenous Stopped --     05/29/2024 2136 EDT azithromycin (ZITHROMAX) 500 mg in sodium chloride 0.9% 250mL IVPB 500 mg 500 mg Intravenous New Bag --     05/29/2024 2153 EDT apixaban (ELIQUIS) tablet 2.5 mg 2.5 mg Oral Not Given --     05/30/2024 0232 EDT latanoprost (XALATAN) 0.005 % ophthalmic solution 1 drop 0 drop Both Eyes Hold --     05/30/2024 0059 EDT metoprolol tartrate (LOPRESSOR) tablet 25 mg 25 mg Oral Not Given BP less than 110     05/30/2024 0115 EDT multi-electrolyte (PLASMALYTE-A/ISOLYTE-S PH 7.4) IV solution 125 mL/hr Intravenous New Bag --     05/30/2024 0033 EDT heparin (ACS LOW) -- Intravenous Not Given discontinued     05/29/2024 2326 EDT ceftriaxone (ROCEPHIN) 1 g/50 mL in dextrose IVPB -- Intravenous Canceled Entry --     05/30/2024 0116 EDT loperamide (IMODIUM) capsule 2 mg 2 mg Oral Given --     05/30/2024 0213 EDT heparin (porcine) injection 4,000 Units 4,000 Units Intravenous Given --      05/30/2024 0213 EDT heparin (porcine) 25,000 units in 0.45% NaCl 250 mL infusion (premix) 11.8 Units/kg/hr Intravenous New Bag --     05/30/2024 0100 EDT heparin (porcine) 25,000 units in 0.45% NaCl 250 mL infusion (premix) -- Intravenous Not Given see inpatient heparin order          Past Medical History:   Diagnosis Date    CAD (coronary artery disease)     Chronic combined systolic and diastolic CHF (congestive heart failure) ([facility])     Diabetes mellitus ([facility])     type 2    Dyslipidemia     Hypertension     Ischemic cardiomyopathy     MI (myocardial infarction) ([facility])     Neuropathy     Osteomyelitis ([facility])     Pancreatitis      Present on Admission:   Sepsis ([facility])   Acute-on-chronic kidney injury  ([facility])   Ischemic cardiomyopathy   Coronary artery disease   Benign essential hypertension   Elevated troponin      Admitting Diagnosis: A-fib ([facility]) [I48.91]  Pneumonia [J18.9]  Weak [R53.1]  LOBO (acute kidney injury) ([facility]) [N17.9]  Sepsis ([facility]) [A41.9]  Age/Sex: 64 y.o. male  Admission Orders:  Scheduled Medications:  apixaban, 5 mg, Oral, BID  aspirin, 81 mg, Oral, Daily  atorvastatin, 40 mg, Oral, Daily  azithromycin, 500 mg, Oral, Q24H  cefTRIAXone, 1,000 mg, Intravenous, Q24H  insulin glargine, 32 Units, Subcutaneous, Daily With Breakfast  insulin lispro, 1-6 Units, Subcutaneous, TID AC  insulin lispro, 4 Units, Subcutaneous, TID With Meals  latanoprost, 1 drop, Both Eyes, HS  levothyroxine, 25 mcg, Oral, Early Morning  metoprolol tartrate, 50 mg, Oral, Q12H ALESSANDRO  pantoprazole, 40 mg, Oral, Early Morning    potassium chloride (Klor-Con M20) CR tablet 20 mEq  Dose: 20 mEq  Freq: Once Route: PO  Start: 05/30/24 1230 End: 05/30/24 1309  potassium chloride (Klor-Con M20) CR tablet 40 mEq  Dose: 40 mEq  Freq: Once Route: PO x1 5/30 @ 1547, x1 5/30 @ 1926  metoprolol tartrate (LOPRESSOR) tablet 25 mg  Dose: 25 mg  Freq: Every 8 hours Route: PO  Start: 05/30/24 1845   metoprolol tartrate (LOPRESSOR) tablet 25 mg  Dose: 25  mg  Freq: Once Route: PO  Start: 05/31/24 1200   Insulin glargine (LANTUS) subcutaneous injection 34 Units 0.34 mL  Dose: 34 Units  Freq: Once Route: SC x1 5/30 @ 1926     famotidine (PEPCID) tablet 10 mg  Dose: 10 mg  Freq: Once Route: PO  Start: 05/30/24 1015 End: 05/30/24 1114  insulin lispro (HumALOG/ADMELOG) 100 units/mL subcutaneous injection 1-6 Units  Dose: 1-6 Units  Freq: 3 times daily before meals Route: SC  Start: 05/30/24 0700 End: 05/30/24 1142  insulin glargine (LANTUS) subcutaneous injection 30 Units 0.3 mL  Dose: 30 Units  Freq: Every morning Route: SC  Start: 05/30/24 0900 End: 05/30/24 1142   azithromycin (ZITHROMAX) 500 mg in sodium chloride 0.9% 250mL IVPB 500 mg  Dose: 500 mg  Freq: Every 24 hours Route: IV  Start: 05/30/24 2100 End: 05/30/24 0913      Continuous IV Infusions:  heparin (porcine), 3-20 Units/kg/hr (Order-Specific), Intravenous, Titrated    End: 05/31/24 0900   insulin regular (HumuLIN R,NovoLIN R) 1 Units/mL in sodium chloride 0.9 % 100 mL infusion, 0.3-21 Units/hr, Intravenous, Titrated  End: 05/30/24 2020   multi-electrolyte (PLASMALYTE-A/ISOLYTE-S PH 7.4) IV solution  Rate: 50 mL/hr Dose: 50 mL/hr  Freq: Continuous Route: IV  Indications of Use: IV Hydration  Start: 05/30/24 1330    sodium chloride, 50 mL/hr, Intravenous, Continuous   Start: 05/30/24 1230 End: 05/30/24 1322     multi-electrolyte (PLASMALYTE-A/ISOLYTE-S PH 7.4) IV solution  Rate: 125 mL/hr Dose: 125 mL/hr  Freq: Continuous Route: IV  Indications of Use: IV Hydration  Indications Comment: yoselin  Last Dose: Stopped (05/30/24 0829)  Start: 05/29/24 2300 End: 05/30/24 0824    PRN Meds:  loperamide, 2 mg, Oral, TID PRN  ondansetron, 4 mg, Intravenous, Q8H PRN    Level 2 carb diet   BMP q6h   Telemetry   SCD   OOB as mary   POCT glucose q2h    IP CONSULT TO CARDIOLOGY  IP CONSULT TO NEPHROLOGY    Network Utilization Review Department  ATTENTION: Please call with any questions or concerns to [phone] and carefully  listen to the prompts so that you are directed to the right person. All voicemails are confidential.   For Discharge needs, contact Care Management DC Support Team at [phone] opt. [phone]  Send all requests for admission clinical reviews, approved or denied determinations and any other requests to dedicated fax number below belonging to the campus where the patient is receiving treatment. List of dedicated fax numbers for the Facilities:  FACILITY NAME UR FAX NUMBER   ADMISSION DENIALS (Administrative/Medical Necessity) [phone]   DISCHARGE SUPPORT TEAM (NETWORK) [phone]   PARENT CHILD HEALTH (Maternity/NICU/Pediatrics) [phone]   [facility] [phone]   [facility] [phone]   [facility] [phone]   [facility] [phone]   [facility] [phone]   [facility] [phone]   [facility] [phone]   [facility] [phone]   [facility] [phone]   [facility] [phone]   [facility] [phone]   [facility] ([place], [place]) [phone]

## 2024-05-30 NOTE — ASSESSMENT & PLAN NOTE
Recent Labs     05/30/24  1948 05/31/24  0006 05/31/24  0547   CREATININE 3.17* 2.99* 2.83*   EGFR 19 20 22     Estimated Creatinine Clearance: 26.5 mL/min (A) (by C-G formula based on SCr of 2.83 mg/dL (H)).    Follows with Dr. Thomas outpatient nephrology for CKD stage 3b/4 in setting of diabetes, hypertensive nephrosclerosis, ischemic cardiomyopathy, and cirrhosis. Worsening renal function since Feb and May 2024.  POA 3.58; (baseline 1.4 - 1.6)  UA with 2+ protein, 2-4 RBC, granular casts  Suspect multifactorial etiology to prerenal acidemia in setting of poor oral intake, hypotension in setting of sepsis 2/2 pneumonia, ATN with granular casts on UA, urinary retention, lasix and ACE use  Currently improving    Plan  Isolate 50 cc/hr  Nephrology following, appreciate recommendations  Monitor BMP  Urinary retention protocol  Avoid hypoperfusion of the kidneys, minimize nephrotoxins  RAAS Blockers/Diuretics held: lasix 80mg daily + lisinopril

## 2024-05-31 ENCOUNTER — APPOINTMENT (INPATIENT)
Dept: NON INVASIVE DIAGNOSTICS | Facility: HOSPITAL | Age: 64
DRG: 871 | End: 2024-05-31
Payer: COMMERCIAL

## 2024-05-31 PROBLEM — E87.29 INCREASED ANION GAP METABOLIC ACIDOSIS: Status: RESOLVED | Noted: 2024-05-31 | Resolved: 2024-05-31

## 2024-05-31 PROBLEM — R19.7 DIARRHEA: Status: ACTIVE | Noted: 2024-05-31

## 2024-05-31 PROBLEM — R19.7 DIARRHEA: Status: RESOLVED | Noted: 2024-05-31 | Resolved: 2024-05-31

## 2024-05-31 PROBLEM — E87.29 INCREASED ANION GAP METABOLIC ACIDOSIS: Status: ACTIVE | Noted: 2024-05-31

## 2024-05-31 LAB
ANION GAP SERPL CALCULATED.3IONS-SCNC: 10 MMOL/L (ref 4–13)
ANION GAP SERPL CALCULATED.3IONS-SCNC: 11 MMOL/L (ref 4–13)
AORTIC ROOT: 3.1 CM
AORTIC VALVE MEAN VELOCITY: 7.4 M/S
APICAL FOUR CHAMBER EJECTION FRACTION: 28 %
APTT PPP: 78 SECONDS (ref 23–37)
ASCENDING AORTA: 3.2 CM
ATRIAL RATE: 125 BPM
AV LVOT MEAN GRADIENT: 1 MMHG
AV LVOT PEAK GRADIENT: 2 MMHG
AV MEAN GRADIENT: 2 MMHG
AV PEAK GRADIENT: 3 MMHG
AV REGURGITATION PRESSURE HALF TIME: 557 MS
AV VELOCITY RATIO: 0.72
BASOPHILS # BLD AUTO: 0.07 THOUSANDS/ÂΜL (ref 0–0.1)
BASOPHILS NFR BLD AUTO: 0 % (ref 0–1)
BSA FOR ECHO PROCEDURE: 2.02 M2
BUN SERPL-MCNC: 84 MG/DL (ref 5–25)
BUN SERPL-MCNC: 85 MG/DL (ref 5–25)
CALCIUM SERPL-MCNC: 8.5 MG/DL (ref 8.4–10.2)
CALCIUM SERPL-MCNC: 8.5 MG/DL (ref 8.4–10.2)
CHLORIDE SERPL-SCNC: 104 MMOL/L (ref 96–108)
CHLORIDE SERPL-SCNC: 105 MMOL/L (ref 96–108)
CO2 SERPL-SCNC: 19 MMOL/L (ref 21–32)
CO2 SERPL-SCNC: 20 MMOL/L (ref 21–32)
CREAT SERPL-MCNC: 2.83 MG/DL (ref 0.6–1.3)
CREAT SERPL-MCNC: 2.99 MG/DL (ref 0.6–1.3)
DOP CALC AO PEAK VEL: 0.9 M/S
DOP CALC AO VTI: 17.63 CM
DOP CALC LVOT PEAK VEL VTI: 9.69 CM
DOP CALC LVOT PEAK VEL: 0.65 M/S
E WAVE DECELERATION TIME: 153 MS
EOSINOPHIL # BLD AUTO: 0.2 THOUSAND/ÂΜL (ref 0–0.61)
EOSINOPHIL NFR BLD AUTO: 1 % (ref 0–6)
ERYTHROCYTE [DISTWIDTH] IN BLOOD BY AUTOMATED COUNT: 13.9 % (ref 11.6–15.1)
GFR SERPL CREATININE-BSD FRML MDRD: 20 ML/MIN/1.73SQ M
GFR SERPL CREATININE-BSD FRML MDRD: 22 ML/MIN/1.73SQ M
GLUCOSE SERPL-MCNC: 173 MG/DL (ref 65–140)
GLUCOSE SERPL-MCNC: 175 MG/DL (ref 65–140)
GLUCOSE SERPL-MCNC: 175 MG/DL (ref 65–140)
GLUCOSE SERPL-MCNC: 183 MG/DL (ref 65–140)
GLUCOSE SERPL-MCNC: 202 MG/DL (ref 65–140)
GLUCOSE SERPL-MCNC: 220 MG/DL (ref 65–140)
GLUCOSE SERPL-MCNC: 238 MG/DL (ref 65–140)
GLUCOSE SERPL-MCNC: 260 MG/DL (ref 65–140)
HCT VFR BLD AUTO: 35.6 % (ref 36.5–49.3)
HGB BLD-MCNC: 11.5 G/DL (ref 12–17)
IMM GRANULOCYTES # BLD AUTO: 0.18 THOUSAND/UL (ref 0–0.2)
IMM GRANULOCYTES NFR BLD AUTO: 1 % (ref 0–2)
INTERVENTRICULAR SEPTUM IN DIASTOLE (PARASTERNAL SHORT AXIS VIEW): 1.3 CM
INTERVENTRICULAR SEPTUM: 1.3 CM (ref 0.6–1.1)
LAAS-AP2: 25.8 CM2
LAAS-AP4: 13.5 CM2
LEFT ATRIUM SIZE: 5.3 CM
LEFT ATRIUM VOLUME (MOD BIPLANE): 63 ML
LEFT INTERNAL DIMENSION IN SYSTOLE: 4 CM (ref 2.1–4)
LEFT VENTRICLE DIASTOLIC VOLUME (MOD BIPLANE): 129 ML
LEFT VENTRICLE DIASTOLIC VOLUME INDEX (MOD BIPLANE): 63.9 ML/M2
LEFT VENTRICLE SYSTOLIC VOLUME (MOD BIPLANE): 97 ML
LEFT VENTRICLE SYSTOLIC VOLUME INDEX (MOD BIPLANE): 48 ML/M2
LEFT VENTRICULAR POSTERIOR WALL IN END DIASTOLE: 1.5 CM
LEFT VENTRICULAR STROKE VOLUME: 13 ML
LV EF: 25 %
LVSV (TEICH): 13 ML
LYMPHOCYTES # BLD AUTO: 0.75 THOUSANDS/ÂΜL (ref 0.6–4.47)
LYMPHOCYTES NFR BLD AUTO: 4 % (ref 14–44)
MCH RBC QN AUTO: 28.3 PG (ref 26.8–34.3)
MCHC RBC AUTO-ENTMCNC: 32.3 G/DL (ref 31.4–37.4)
MCV RBC AUTO: 88 FL (ref 82–98)
MONOCYTES # BLD AUTO: 1.47 THOUSAND/ÂΜL (ref 0.17–1.22)
MONOCYTES NFR BLD AUTO: 9 % (ref 4–12)
MV E'TISSUE VEL-LAT: 11 CM/S
MV E'TISSUE VEL-SEP: 6 CM/S
MV PEAK A VEL: 0 M/S
MV PEAK E VEL: 84 CM/S
MV STENOSIS PRESSURE HALF TIME: 44 MS
MV VALVE AREA P 1/2 METHOD: 5
NEUTROPHILS # BLD AUTO: 14.35 THOUSANDS/ÂΜL (ref 1.85–7.62)
NEUTS SEG NFR BLD AUTO: 85 % (ref 43–75)
NRBC BLD AUTO-RTO: 0 /100 WBCS
PLATELET # BLD AUTO: 281 THOUSANDS/UL (ref 149–390)
PMV BLD AUTO: 10.4 FL (ref 8.9–12.7)
POTASSIUM SERPL-SCNC: 5 MMOL/L (ref 3.5–5.3)
POTASSIUM SERPL-SCNC: 5.1 MMOL/L (ref 3.5–5.3)
QRS AXIS: -57 DEGREES
QRSD INTERVAL: 82 MS
QT INTERVAL: 302 MS
QTC INTERVAL: 435 MS
RBC # BLD AUTO: 4.06 MILLION/UL (ref 3.88–5.62)
RIGHT ATRIUM AREA SYSTOLE A4C: 21.4 CM2
RIGHT VENTRICLE ID DIMENSION: 5.3 CM
SL CV AV DECELERATION TIME RETROGRADE: 1920 MS
SL CV AV PEAK GRADIENT RETROGRADE: 23 MMHG
SL CV LEFT ATRIUM LENGTH A2C: 6.4 CM
SL CV PED ECHO LEFT VENTRICLE DIASTOLIC VOLUME (MOD BIPLANE) 2D: 82 ML
SL CV PED ECHO LEFT VENTRICLE SYSTOLIC VOLUME (MOD BIPLANE) 2D: 68 ML
SODIUM SERPL-SCNC: 134 MMOL/L (ref 135–147)
SODIUM SERPL-SCNC: 135 MMOL/L (ref 135–147)
T WAVE AXIS: 52 DEGREES
TR MAX PG: 48 MMHG
TR PEAK VELOCITY: 3.5 M/S
TRICUSPID ANNULAR PLANE SYSTOLIC EXCURSION: 1 CM
TRICUSPID VALVE PEAK REGURGITATION VELOCITY: 3.47 M/S
VENTRICULAR RATE: 125 BPM
WBC # BLD AUTO: 17.02 THOUSAND/UL (ref 4.31–10.16)

## 2024-05-31 PROCEDURE — 99232 SBSQ HOSP IP/OBS MODERATE 35: CPT | Performed by: INTERNAL MEDICINE

## 2024-05-31 PROCEDURE — 93010 ELECTROCARDIOGRAM REPORT: CPT | Performed by: INTERNAL MEDICINE

## 2024-05-31 PROCEDURE — 97162 PT EVAL MOD COMPLEX 30 MIN: CPT

## 2024-05-31 PROCEDURE — 82948 REAGENT STRIP/BLOOD GLUCOSE: CPT

## 2024-05-31 PROCEDURE — 93306 TTE W/DOPPLER COMPLETE: CPT | Performed by: INTERNAL MEDICINE

## 2024-05-31 PROCEDURE — 85730 THROMBOPLASTIN TIME PARTIAL: CPT | Performed by: INTERNAL MEDICINE

## 2024-05-31 PROCEDURE — 85025 COMPLETE CBC W/AUTO DIFF WBC: CPT | Performed by: INTERNAL MEDICINE

## 2024-05-31 PROCEDURE — 80048 BASIC METABOLIC PNL TOTAL CA: CPT

## 2024-05-31 PROCEDURE — C8929 TTE W OR WO FOL WCON,DOPPLER: HCPCS

## 2024-05-31 RX ORDER — INSULIN LISPRO 100 [IU]/ML
4 INJECTION, SOLUTION INTRAVENOUS; SUBCUTANEOUS
Status: DISCONTINUED | OUTPATIENT
Start: 2024-05-31 | End: 2024-06-02

## 2024-05-31 RX ORDER — METOPROLOL TARTRATE 50 MG/1
50 TABLET, FILM COATED ORAL EVERY 8 HOURS
Status: DISCONTINUED | OUTPATIENT
Start: 2024-05-31 | End: 2024-06-01

## 2024-05-31 RX ORDER — INSULIN GLARGINE 100 [IU]/ML
32 INJECTION, SOLUTION SUBCUTANEOUS
Status: DISCONTINUED | OUTPATIENT
Start: 2024-05-31 | End: 2024-06-02

## 2024-05-31 RX ORDER — SODIUM CHLORIDE, SODIUM GLUCONATE, SODIUM ACETATE, POTASSIUM CHLORIDE, MAGNESIUM CHLORIDE, SODIUM PHOSPHATE, DIBASIC, AND POTASSIUM PHOSPHATE .53; .5; .37; .037; .03; .012; .00082 G/100ML; G/100ML; G/100ML; G/100ML; G/100ML; G/100ML; G/100ML
50 INJECTION, SOLUTION INTRAVENOUS CONTINUOUS
Status: DISPENSED | OUTPATIENT
Start: 2024-05-31 | End: 2024-05-31

## 2024-05-31 RX ORDER — METOPROLOL TARTRATE 50 MG/1
50 TABLET, FILM COATED ORAL EVERY 12 HOURS SCHEDULED
Status: DISCONTINUED | OUTPATIENT
Start: 2024-05-31 | End: 2024-05-31

## 2024-05-31 RX ADMIN — APIXABAN 5 MG: 5 TABLET, FILM COATED ORAL at 17:43

## 2024-05-31 RX ADMIN — APIXABAN 5 MG: 5 TABLET, FILM COATED ORAL at 09:04

## 2024-05-31 RX ADMIN — LEVOTHYROXINE SODIUM 25 MCG: 25 TABLET ORAL at 04:49

## 2024-05-31 RX ADMIN — INSULIN LISPRO 4 UNITS: 100 INJECTION, SOLUTION INTRAVENOUS; SUBCUTANEOUS at 11:55

## 2024-05-31 RX ADMIN — METOPROLOL TARTRATE 50 MG: 50 TABLET, FILM COATED ORAL at 23:47

## 2024-05-31 RX ADMIN — ATORVASTATIN CALCIUM 40 MG: 40 TABLET, FILM COATED ORAL at 09:04

## 2024-05-31 RX ADMIN — METOPROLOL TARTRATE 25 MG: 25 TABLET, FILM COATED ORAL at 02:22

## 2024-05-31 RX ADMIN — INSULIN LISPRO 1 UNITS: 100 INJECTION, SOLUTION INTRAVENOUS; SUBCUTANEOUS at 09:03

## 2024-05-31 RX ADMIN — INSULIN LISPRO 4 UNITS: 100 INJECTION, SOLUTION INTRAVENOUS; SUBCUTANEOUS at 09:03

## 2024-05-31 RX ADMIN — INSULIN LISPRO 3 UNITS: 100 INJECTION, SOLUTION INTRAVENOUS; SUBCUTANEOUS at 11:55

## 2024-05-31 RX ADMIN — INSULIN LISPRO 3 UNITS: 100 INJECTION, SOLUTION INTRAVENOUS; SUBCUTANEOUS at 16:36

## 2024-05-31 RX ADMIN — METOPROLOL TARTRATE 25 MG: 25 TABLET, FILM COATED ORAL at 13:17

## 2024-05-31 RX ADMIN — METOPROLOL TARTRATE 50 MG: 50 TABLET, FILM COATED ORAL at 16:37

## 2024-05-31 RX ADMIN — CEFTRIAXONE SODIUM 1000 MG: 10 INJECTION, POWDER, FOR SOLUTION INTRAVENOUS at 04:50

## 2024-05-31 RX ADMIN — SODIUM CHLORIDE, SODIUM GLUCONATE, SODIUM ACETATE, POTASSIUM CHLORIDE, MAGNESIUM CHLORIDE, SODIUM PHOSPHATE, DIBASIC, AND POTASSIUM PHOSPHATE 50 ML/HR: .53; .5; .37; .037; .03; .012; .00082 INJECTION, SOLUTION INTRAVENOUS at 04:50

## 2024-05-31 RX ADMIN — INSULIN GLARGINE 32 UNITS: 100 INJECTION, SOLUTION SUBCUTANEOUS at 09:04

## 2024-05-31 RX ADMIN — ASPIRIN 81 MG CHEWABLE TABLET 81 MG: 81 TABLET CHEWABLE at 09:04

## 2024-05-31 RX ADMIN — INSULIN LISPRO 4 UNITS: 100 INJECTION, SOLUTION INTRAVENOUS; SUBCUTANEOUS at 16:36

## 2024-05-31 RX ADMIN — PANTOPRAZOLE SODIUM 40 MG: 40 TABLET, DELAYED RELEASE ORAL at 04:49

## 2024-05-31 RX ADMIN — AZITHROMYCIN 500 MG: 250 TABLET, FILM COATED ORAL at 21:18

## 2024-05-31 RX ADMIN — METOPROLOL TARTRATE 25 MG: 25 TABLET, FILM COATED ORAL at 10:57

## 2024-05-31 RX ADMIN — LATANOPROST 1 DROP: 50 SOLUTION OPHTHALMIC at 21:19

## 2024-05-31 RX ADMIN — SODIUM CHLORIDE, SODIUM GLUCONATE, SODIUM ACETATE, POTASSIUM CHLORIDE, MAGNESIUM CHLORIDE, SODIUM PHOSPHATE, DIBASIC, AND POTASSIUM PHOSPHATE 50 ML/HR: .53; .5; .37; .037; .03; .012; .00082 INJECTION, SOLUTION INTRAVENOUS at 10:57

## 2024-05-31 RX ADMIN — PERFLUTREN 0.4 ML/MIN: 6.52 INJECTION, SUSPENSION INTRAVENOUS at 14:26

## 2024-05-31 NOTE — PROGRESS NOTES
[facility]  Progress Note  Name: Noel Khan I  MRN: [id number]  Unit/Bed#: S -01 I Date of Admission: 5/29/2024   Date of Service: 5/31/2024 I Hospital Day: 2    Assessment & Plan   * Sepsis (HCC)  Assessment & Plan  63 yo M who presented fatigue, chills, nausea, diarrhea and subjective fevers at home for 5 days prior to admission. States he was having 2-5 watery Bms per day. Denies any recent antibiotic usage, any blood or mucus in stool.    Patient met SIRS criteria in ED with  and WBC 20.25. Lactic acid normal. Procalcitonin elevated 3.51. Suspected pneumonia source.  In ED, received 2.5 L boluses, maintenance fluids, and started on cefepime and azithromycin   CXR (5/29/2024) - Extensive right base pneumonia  UA with proteinuria, granular casts 25-30  FLU/RSV/COVID negative  Urine legionella and strep negative  Blood cultures 2/2 NGTD    Plan  Continue ceftriaxone 1 g daily and azithromycin 500 mg daily  Isolyte @50 mL/h  See pan for pneumonia below    Right lower lobe pneumonia  Assessment & Plan  CXR (5/29/2024) - Extensive right base pneumonia  FLU/RSV/COVID negative  Urine legionella and strep negative  Drip score low risk (0)  Patient met sepsis criteria, see above    Plan  Continue ceftriaxone and azithromycin   Aspiration precautions  Incentive spirometry  Offer oxygen supplementation as needed    New onset atrial fibrillation (HCC)  Assessment & Plan  New onset afib noted in ED on EKG with rate of 125. Patient denies any chest pain or palpitations, dizziness and lightheaded. Patient is asymptomatic.   Lpo4ASUa score of 4  Patient was prescribed 25 mg metoprol tartrate BID. Patient states he is only taking 25mg daily  TSH WNL 3.460    Plan  Started on Eliquis  Increased metoprolol tartrate from 25 TID to 50 BID  Telemetry   Echo pending  Cardiology following, appreciate recommendations    Insulin-dependent diabetes mellitus  Assessment & Plan  Lab Results    Component Value Date    HGBA1C 7.1 (H) 05/20/2024       Recent Labs     05/31/24  0005 05/31/24  0143 05/31/24  0737 05/31/24  1117   POCGLU 175* 183* 173* 260*       Blood Sugar Average: Last 72 hrs:  (P) [id number]    Endorses recent poor PO intake and has not been taking his insulin for every days prior to admission.   Patient states that at home he normally takes lantus 34 units in AM and 4 units of short acting insulin with meals  Received insulin gtt for anion gap metabolic acidosis in setting of  ketosis, hyperglycemia, and LOBO. Gap closed 5/31 evening with transition back to home regimen.    Plan  Restarted on home regimen of Lantus 32 units qAM and lispro 4 units TID with meals  SSI  Diabetic diet    Acute-on-chronic kidney injury  (HCC)  Assessment & Plan  Recent Labs     05/30/24  1948 05/31/24  0006 05/31/24  0547   CREATININE 3.17* 2.99* 2.83*   EGFR 19 20 22     Estimated Creatinine Clearance: 26.5 mL/min (A) (by C-G formula based on SCr of 2.83 mg/dL (H)).    Follows with Dr. Thomas outpatient nephrology for CKD stage 3b/4 in setting of diabetes, hypertensive nephrosclerosis, ischemic cardiomyopathy, and cirrhosis. Worsening renal function since Feb and May 2024.  POA 3.58; (baseline 1.4 - 1.6)  UA with 2+ protein, 2-4 RBC, granular casts  Suspect multifactorial etiology to prerenal acidemia in setting of poor oral intake, hypotension in setting of sepsis 2/2 pneumonia, ATN with granular casts on UA, urinary retention, lasix and ACE use  Currently improving    Plan  Isolate 50 cc/hr  Nephrology following, appreciate recommendations  Monitor BMP  Urinary retention protocol  Avoid hypoperfusion of the kidneys, minimize nephrotoxins  RAAS Blockers/Diuretics held: lasix 80mg daily + lisinopril     Ischemic cardiomyopathy  Assessment & Plan  Patient has history of combined heart failure with EF of 50% on echo 2022. Patient denies any chest pain. Patient does endorse fatigue + SOB on exertion.  Likely due to new onset PNA. Does not examine volume overloaded without edema, rales, or JVD  Troponins mildly elevated 153 > 134 > 123     Plan  Continue aspirin, statin, and beta blocker  Holding lasix and lisinopril in setting of LOBO  Echo pending  Daily weights, I/Os  Cardiology following, appreciate recommendations  Elevated troponins likely nonischemic myocardial injury in setting of sepsis, tachycardia and known underlying CAD   Recommend OP follow up and likely OP stress testing once recovered    Coronary artery disease  Assessment & Plan  Cardiac cath (2014) - LULA x2 to  of proximal/mid LAD, LULA to mid LAD, LULA to RCA  Continue aspirin, statin, and BB    Benign essential hypertension  Assessment & Plan  Controlled, stable  Continue BB    Elevated troponin  Assessment & Plan  Troponins mildly elevated 153 > 134 > 123   Likely nonischemic myocardial injury in setting of sepsis, tachycardia and known underlying CAD     Diarrhea-resolved as of 5/31/2024  Assessment & Plan  Initially reports diarrhea for several days prior to admission  Received loperamide as needed  Patient reports resolved on 5/31    Increased anion gap metabolic acidosis-resolved as of 5/31/2024  Assessment & Plan  History of CKD stage 4, recent rise in creatinine from 2.46 to 3.59, and proteinuria in the setting of T2DM with uncontrolled sugars.   Patient reports he has not been taking his insulin for several days prior to admission due to poor oral intake  Initially had anion gap of 19  UA without ketones, beta-hydroxybutyrate elevated at 3.46, lactic acid 2.0 improved to 1.0, hyperglycemic. Received insulin infusion, isolyte @50, potassium repletion  VBG (5/30/2024) - pH 7.367, pCO2 27.3, pO2 81.4, HCO3 15.3  Gap closed 5/31 evening with transition back to home insulin regimen.           VTE Pharmacologic Prophylaxis: VTE Score: 6 High Risk (Score >/= 5) - Pharmacological DVT Prophylaxis Ordered: apixaban (Eliquis). Sequential  Compression Devices Ordered.    Mobility:   Basic Mobility Inpatient Raw Score: 24  JH-HLM Goal: 8: Walk 250 feet or more  JH-HLM Achieved: 7: Walk 25 feet or more  JH-HLM Goal NOT achieved. Continue with multidisciplinary rounding and encourage appropriate mobility to improve upon JH-HLM goals.    Patient Centered Rounds: I performed bedside rounds with nursing staff today.  Discussions with Specialists or Other Care Team Provider: Attending, Cardiology, Nephrology, Nursing    Education and Discussions with Family / Patient: Updated  (wife) via phone.    Current Length of Stay: 2 day(s)  Current Patient Status: Inpatient   Discharge Plan: Anticipate discharge tomorrow to home.    Code Status: Level 1 - Full Code    Subjective:   Patient seen and examined at the bedside. No acute overnight events. Patient reports that he feels well today with no new concerns. He states that prior to admission he had not been taking his insulin for several days due to have low appetite and not eating much. Patient notes that he has not had recurrence of diarrhea. He endorses a sensation of something in his throat when he swallows that feels relieved when he sits up takes a sip of a drink. Patient denies chest pain, shortness of breath, abdominal pain, nausea, vomiting, diarrhea, constipation, fever, and chills.    Objective:     Vitals:   Temp (24hrs), Av.9 °F (36.6 °C), Min:97.6 °F (36.4 °C), Max:98.1 °F (36.7 °C)    Temp:  [97.6 °F (36.4 °C)-98.1 °F (36.7 °C)] 97.6 °F (36.4 °C)  HR:  [100-126] 101  Resp:  [16] 16  BP: (128-155)/() 129/83  SpO2:  [95 %-98 %] 98 %  Body mass index is 26.6 kg/m².     Input and Output Summary (last 24 hours):     Intake/Output Summary (Last 24 hours) at 2024 1256  Last data filed at 2024 0739  Gross per 24 hour   Intake 1687.5 ml   Output 675 ml   Net 1012.5 ml       Physical Exam:   Physical Exam  Vitals and nursing note reviewed.   Constitutional:       General: He  is not in acute distress.     Appearance: He is well-developed. He is ill-appearing. He is not toxic-appearing or diaphoretic.   HENT:      Head: Normocephalic and atraumatic.      Right Ear: External ear normal.      Left Ear: External ear normal.      Nose: Nose normal.      Mouth/Throat:      Mouth: Mucous membranes are dry.   Eyes:      Extraocular Movements: Extraocular movements intact.      Conjunctiva/sclera: Conjunctivae normal.   Cardiovascular:      Rate and Rhythm: Tachycardia present. Rhythm irregularly irregular.      Heart sounds: No murmur heard.  Pulmonary:      Effort: Pulmonary effort is normal. No respiratory distress.      Breath sounds: Rales present. No wheezing or rhonchi.   Abdominal:      General: Bowel sounds are normal. There is no distension.      Palpations: Abdomen is soft.      Tenderness: There is no abdominal tenderness.   Musculoskeletal:         General: No swelling.      Cervical back: Neck supple.      Right lower leg: No edema.      Left lower leg: No edema.   Skin:     General: Skin is warm and dry.      Capillary Refill: Capillary refill takes less than 2 seconds.   Neurological:      Mental Status: He is alert.   Psychiatric:         Mood and Affect: Mood normal.          Additional Data:     Labs:  Results from last 7 days   Lab Units 05/31/24  0447   WBC Thousand/uL 17.02*   HEMOGLOBIN g/dL 11.5*   HEMATOCRIT % 35.6*   PLATELETS Thousands/uL 281   SEGS PCT % 85*   LYMPHO PCT % 4*   MONO PCT % 9   EOS PCT % 1     Results from last 7 days   Lab Units 05/31/24  0547 05/30/24  0528 05/29/24  1948   SODIUM mmol/L 135   < > 132*   POTASSIUM mmol/L 5.0   < > 4.2   CHLORIDE mmol/L 105   < > 94*   CO2 mmol/L 20*   < > 21   BUN mg/dL 84*   < > 74*   CREATININE mg/dL 2.83*   < > 3.58*   ANION GAP mmol/L 10   < > 17*   CALCIUM mg/dL 8.5   < > 9.1   ALBUMIN g/dL  --   --  3.8   TOTAL BILIRUBIN mg/dL  --   --  1.65*   ALK PHOS U/L  --   --  66   ALT U/L  --   --  16   AST U/L  --   --   16   GLUCOSE RANDOM mg/dL 202*   < > 263*    < > = values in this interval not displayed.     Results from last 7 days   Lab Units 05/30/24  0528   INR  1.23*     Results from last 7 days   Lab Units 05/31/24  1117 05/31/24  0737 05/31/24  0143 05/31/24  0005 05/30/24  2200 05/30/24  2158 05/30/24  2056 05/30/24  1852 05/30/24  1716 05/30/24  1507 05/30/24  1306 05/30/24  1112   POC GLUCOSE mg/dl 260* 173* 183* 175* 115 120 106 159* 200* 264* 303* 302*         Results from last 7 days   Lab Units 05/30/24  1105 05/30/24  0528 05/29/24 1948   LACTIC ACID mmol/L 1.0  --  2.0   PROCALCITONIN ng/ml  --  3.32* 3.57*       Lines/Drains:  Invasive Devices       Peripheral Intravenous Line  Duration             Peripheral IV 05/29/24 Distal;Left;Upper;Ventral (anterior) Arm 1 day    Peripheral IV 05/30/24 Right;Ventral (anterior) Forearm 1 day    Peripheral IV 05/30/24 Distal;Left;Upper;Ventral (anterior) Arm <1 day                      Telemetry:  Telemetry Orders (From admission, onward)               24 Hour Telemetry Monitoring  Continuous x 24 Hours (Telem)        Comments: New onset afib   Expiring   Question:  Reason for 24 Hour Telemetry  Answer:  Arrhythmias requiring acute medical intervention / PPM or ICD malfunction                     Telemetry Reviewed: Atrial fibrillation. HR averaging 120  Indication for Continued Telemetry Use: Arrthymias requiring medical therapy             Imaging: Reviewed radiology reports from this admission including: chest xray and ultrasound(s) and Personally reviewed the following imaging: chest xray    Recent Cultures (last 7 days):   Results from last 7 days   Lab Units 05/30/24  0553 05/29/24  1948   BLOOD CULTURE   --  No Growth at 24 hrs.  No Growth at 24 hrs.   LEGIONELLA URINARY ANTIGEN  Negative  --        Last 24 Hours Medication List:   Current Facility-Administered Medications   Medication Dose Route Frequency Provider Last Rate    apixaban  5 mg Oral BID George  DO Alan      aspirin  81 mg Oral Daily Brandon Labatch, DO      atorvastatin  40 mg Oral Daily Brandon Labatch, DO      azithromycin  500 mg Oral Q24H Brandon Labatch, DO      cefTRIAXone  1,000 mg Intravenous Q24H Brandon Labatch, DO 1,000 mg (05/31/24 0450)    insulin glargine  32 Units Subcutaneous Daily With Breakfast George Phillips DO      insulin lispro  1-6 Units Subcutaneous TID AC George Phillips DO      insulin lispro  4 Units Subcutaneous TID With Meals George Phillips DO      latanoprost  1 drop Both Eyes HS Brandon Labatch, DO      levothyroxine  25 mcg Oral Early Morning Brandon Labatch, DO      loperamide  2 mg Oral TID PRN Brandon Labatch, DO      metoprolol tartrate  25 mg Oral Once LATASHA Conroy      metoprolol tartrate  50 mg Oral Q12H [facility] LATASHA Conroy      multi-electrolyte  50 mL/hr Intravenous Continuous Sariah Cordon MD 50 mL/hr (05/31/24 1057)    ondansetron  4 mg Intravenous Q8H PRN Brandon Labatch, DO      pantoprazole  40 mg Oral Early Morning Brandon Labatch, DO          Today, Patient Was Seen By: George Phillips DO    **Please Note: This note may have been constructed using a voice recognition system.**

## 2024-05-31 NOTE — ASSESSMENT & PLAN NOTE
History of CKD stage 4, recent rise in creatinine from 2.46 to 3.59, and proteinuria in the setting of T2DM with uncontrolled sugars.   Patient reports he has not been taking his insulin for several days prior to admission due to poor oral intake  Initially had anion gap of 19  UA without ketones, beta-hydroxybutyrate elevated at 3.46, lactic acid 2.0 improved to 1.0, hyperglycemic. Received insulin infusion, isolyte @50, potassium repletion  VBG (5/30/2024) - pH 7.367, pCO2 27.3, pO2 81.4, HCO3 15.3  Gap closed 5/31 evening with transition back to home insulin regimen.

## 2024-05-31 NOTE — ASSESSMENT & PLAN NOTE
Patient has history of combined heart failure with EF of 50% on echo 2022. Patient denies any chest pain. Patient does endorse fatigue + SOB on exertion. Likely due to new onset PNA. Does not examine volume overloaded without edema, rales, or JVD  Troponins mildly elevated 153 > 134 > 123     Plan  Continue aspirin, statin, and beta blocker  Holding lasix and lisinopril in setting of LOBO  Echo pending  Daily weights, I/Os  Cardiology following, appreciate recommendations  Elevated troponins likely nonischemic myocardial injury in setting of sepsis, tachycardia and known underlying CAD   Recommend OP follow up and likely OP stress testing once recovered

## 2024-05-31 NOTE — PROGRESS NOTES
General Cardiology   Progress Note -  Team One   Noel Khan 64 y.o. male MRN: [id number]    Unit/Bed#: S -01 Encounter: [id number]    Assessment/ Plan:    1. New onset atrial fibrillation: In the setting of sepsis.  Rate controlled for now  TSH and electrolytes adequate  Home beta-blocker Toprol tartrate 25 mg daily.  This was increased to 3 times daily dosing yesterday.  Heart rates are not adequately controlled ranging from 90s to 120s.  Will change Toprol tartrate to 50 mg twice daily, give additional 25 mg now.  Primary team is transitioning from IV heparin to Eliquis; patient reports cost was checked and it is covered  He still needs an updated echocardiogram; should be done today.   No clinical signs or symptoms of heart failure    2. Sepsis: likely 2/2 PNA per [primary team  WBC down to 17 from 20 today  Afebrile  Blood cultures negative to date  IV abx per primary team    3. Elevated troponin: mild flat elevation 153-->134 --> 123;  Likely nonischemic myocardial injury in setting of sepsis, tachycardia and known underlying CAD  No symptoms to suggest angina  Known CAD  Checking updated echo due to new onset afib  Recommend OP follow up and likely OP stress testing once recovered.     4. CAD: Cardiac catheterization in 2014 with LULA x2 to  of proximal/mid LAD, LULA to mid LAD, LULA to RCA.    Continue aspirin, statin and beta-blocker  No symptoms to suggest angina    5. History of ischemic cardiomyopathy: LVEF 35% at time of MI and stenting in 2014; improved to 45% after medical therapy and most recent echo 2021 showed EF of 50%.  Getting updated echocardiogram today    6. Dm Type II: defer management to primary team    7. HTN: Most recent blood pressure 129/83    8. LOBO: Cr 3.5; improving with IV fluids, down to 2.83 today      Subjective: Patient seen for follow-up.  No significant events noted overnight.  He reports feeling better today.  Not experiencing any chest pain shortness of breath  dizziness lightheadedness or palpitations.    Review of Systems   Constitutional: Positive for malaise/fatigue. Negative for decreased appetite and fever.   Cardiovascular:  Negative for chest pain, irregular heartbeat, leg swelling, orthopnea and palpitations.   Respiratory:  Negative for cough and shortness of breath.    Gastrointestinal:  Negative for nausea and vomiting.   Genitourinary:  Negative for dysuria.   Neurological:  Negative for dizziness and light-headedness.   Psychiatric/Behavioral:  Negative for altered mental status.    All other systems reviewed and are negative.    Objective:   Vitals: Blood pressure 129/83, pulse 101, temperature 97.6 °F (36.4 °C), resp. rate 16, weight 84.1 kg (185 lb 6.5 oz), SpO2 98%.,       Body mass index is 26.6 kg/m².,     Systolic (24hrs), Av , Min:128 , Max:155     Diastolic (24hrs), Av, Min:82, Max:105      Intake/Output Summary (Last 24 hours) at 2024 1348  Last data filed at 2024 0739  Gross per 24 hour   Intake 1447.5 ml   Output 675 ml   Net 772.5 ml     Weight (last 2 days)       Date/Time Weight    24 0532 84.1 (185.41)    24 0600 83.8 (184.75)          Telemetry Review: Sinus rhythm, no significant events    Physical Exam  Vitals reviewed.   Constitutional:       General: He is not in acute distress.     Appearance: Normal appearance. He is not ill-appearing.   HENT:      Head: Normocephalic.      Mouth/Throat:      Mouth: Mucous membranes are moist.   Cardiovascular:      Rate and Rhythm: Tachycardia present. Rhythm irregularly irregular.      Heart sounds: S1 normal and S2 normal.      No systolic murmur is present.   Pulmonary:      Effort: Pulmonary effort is normal.      Breath sounds: Rales (Right-sided) present.   Abdominal:      Palpations: Abdomen is soft.   Musculoskeletal:      Right lower leg: No edema.      Left lower leg: No edema.   Skin:     General: Skin is warm.   Neurological:      Mental Status: He is alert  and oriented to person, place, and time.   Psychiatric:         Mood and Affect: Mood normal.       LABORATORY RESULTS      CBC with diff:   Results from last 7 days   Lab Units 05/31/24  0447 05/30/24 0528 05/30/24 0211 05/29/24 1948   WBC Thousand/uL 17.02* 20.73* 16.31* 20.25*   HEMOGLOBIN g/dL 11.5* 12.5 11.3* 13.8   HEMATOCRIT % 35.6* 39.5 35.1* 42.6   MCV fL 88 88 88 88   PLATELETS Thousands/uL 281 275 236 267   RBC Million/uL 4.06 4.47 3.98 4.82   MCH pg 28.3 28.0 28.4 28.6   MCHC g/dL 32.3 31.6 32.2 32.4   RDW % 13.9 13.7 13.7 13.6   MPV fL 10.4 11.0 10.5 10.3   NRBC AUTO /100 WBCs 0 0  --  0     CMP:  Results from last 7 days   Lab Units 05/31/24 0547 05/31/24 0006 05/30/24 1948 05/30/24 1726 05/30/24  1407 05/30/24 0528 05/29/24 1948   POTASSIUM mmol/L 5.0 5.1 4.0 3.6 3.7 3.9 4.2   CHLORIDE mmol/L 105 104 102 100 99 96 94*   CO2 mmol/L 20* 19* 21 21 19* 19* 21   BUN mg/dL 84* 85* 86* 84* 86* 78* 74*   CREATININE mg/dL 2.83* 2.99* 3.17* 3.12* 3.39* 3.59* 3.58*   CALCIUM mg/dL 8.5 8.5 8.7 8.5 8.6 8.7 9.1   AST U/L  --   --   --   --   --   --  16   ALT U/L  --   --   --   --   --   --  16   ALK PHOS U/L  --   --   --   --   --   --  66   EGFR ml/min/1.73sq m 22 20 19 19 17 16 16     BMP:  Results from last 7 days   Lab Units 05/31/24 0547 05/31/24 0006 05/30/24 1948 05/30/24 1726 05/30/24  1407 05/30/24 0528 05/29/24 1948   POTASSIUM mmol/L 5.0 5.1 4.0 3.6 3.7 3.9 4.2   CHLORIDE mmol/L 105 104 102 100 99 96 94*   CO2 mmol/L 20* 19* 21 21 19* 19* 21   BUN mg/dL 84* 85* 86* 84* 86* 78* 74*   CREATININE mg/dL 2.83* 2.99* 3.17* 3.12* 3.39* 3.59* 3.58*   CALCIUM mg/dL 8.5 8.5 8.7 8.5 8.6 8.7 9.1     Lab Results   Component Value Date    NTBNP 7,650 (H) 12/22/2021    NTBNP 2,233 (H) 05/18/2017     Results from last 7 days   Lab Units 05/30/24 0528   TSH 3RD GENERATON uIU/mL 3.460     Results from last 7 days   Lab Units 05/30/24 0528 05/29/24 1948   INR  1.23* 1.17     Lipid Profile:   Lab  Results   Component Value Date    CHOL 98 2015    CHOL 102 2015    CHOL 150 2014     Lab Results   Component Value Date    HDL 36 (L) 2024    HDL 64 2022    HDL 56 2021     Lab Results   Component Value Date    LDLCALC 34 2024    LDLCALC 42 2022    LDLCALC 50 2021     Lab Results   Component Value Date    TRIG 84 2024    TRIG 38 2022    TRIG 43 2021       Cardiac testing:   Results for orders placed during the hospital encounter of 21    Echo complete with contrast if indicated    [facility]  [address], [place] [id number] [phone]    Transthoracic Echocardiogram  2D, M-mode, Doppler, and Color Doppler    Study date:  2021    Patient: LUZ MARINA HUGGINS  MR number: [id number]  Account number: [id number]  : 1960  Age: 61 years  Gender: Male  Status: Outpatient  Location: [facility]  Height: 70 in  Weight: 178.6 lb  BP: 130/ 72 mmHg    Indications: Assess left ventricular function.    Diagnoses: I25.10 - Atherosclerotic heart disease of native coronary artery without angina pectoris    Sonographer:  PALAK Lundy  Primary Physician:  Frank Allen MD  Referring Physician:  Santa Figueroa DO  Group:  [facility]  Interpreting Physician:  Hunter Harmon MD    SUMMARY    LEFT VENTRICLE:  Systolic function was at the lower limits of normal. Ejection fraction was estimated to be 50 %. Global longitudinal strain was reduced at -9.3%.  There was dyskinesis of the mid anterolateral, apical septal, apical lateral, and apical wall(s).  Doppler parameters were consistent with restrictive physiology, indicative of decreased left ventricular diastolic compliance and/or increased left atrial pressure.  Doppler parameters were consistent with high ventricular filling pressure.    MITRAL VALVE:  There was mild regurgitation.    HISTORY: PRIOR HISTORY: CAD  s/p stent, Ischemic cardiomyopathy, Heart failure, Hypertension, Dyslipidemia    PROCEDURE: The study was performed in the [facility]. This was a routine study. The transthoracic approach was used. The study included complete 2D imaging, M-mode, complete spectral Doppler, and color Doppler. The  heart rate was 64 bpm, at the start of the study. Images were obtained from the parasternal, apical, subcostal, and suprasternal notch acoustic windows. Intravenous contrast ( 0.6 ml Definity/NSS) was administered. Image quality was  adequate.    LEFT VENTRICLE: Size was normal. Systolic function was at the lower limits of normal. Ejection fraction was estimated to be 50 %. Global longitudinal strain was reduced at -9.3%. There was dyskinesis of the mid anterolateral, apical  septal, apical lateral, and apical wall(s). Wall thickness was normal. No evidence of apical thrombus. DOPPLER: There was a reduced contribution of atrial contraction to ventricular filling, due to increased ventricular diastolic pressure  or atrial contractile dysfunction. Doppler parameters were consistent with restrictive physiology, indicative of decreased left ventricular diastolic compliance and/or increased left atrial pressure. Doppler parameters were consistent with  high ventricular filling pressure.    RIGHT VENTRICLE: The size was normal. Systolic function was normal. Wall thickness was normal.    LEFT ATRIUM: Size was normal.    RIGHT ATRIUM: Size was normal.    MITRAL VALVE: Valve structure was normal. There was normal leaflet separation. DOPPLER: The transmitral velocity was within the normal range. There was no evidence for stenosis. There was mild regurgitation.    AORTIC VALVE: The valve was trileaflet. Leaflets exhibited normal thickness, mild calcification, normal cuspal separation, and sclerosis. DOPPLER: Transaortic velocity was within the normal range. There was no evidence for stenosis. There  was trace  regurgitation.    TRICUSPID VALVE: The valve structure was normal. There was normal leaflet separation. DOPPLER: The transtricuspid velocity was within the normal range. There was no evidence for stenosis. There was trace regurgitation. Pulmonary artery  systolic pressure was within the normal range. Estimated peak PA pressure was 21 mmHg.    PULMONIC VALVE: Leaflets exhibited normal thickness, no calcification, and normal cuspal separation. DOPPLER: The transpulmonic velocity was within the normal range. There was no significant regurgitation.    PERICARDIUM: There was no pericardial effusion. The pericardium was normal in appearance.    AORTA: The root exhibited normal size.    SYSTEMIC VEINS: IVC: The inferior vena cava was normal in size. Respirophasic changes were normal.    SYSTEM MEASUREMENT TABLES    2D  %FS: 23.42 %  AA PSSL Full: -4.21 %  AAS PSSL Full: -9.76 %  AI PSSL Full: -12.76 %  AL PSSL Full: -2.93 %  AP PSSL Full: -7.24 %  AS PSSL Full: -10.64 %  AVC: 390.77 ms  Ao Diam: 2.94 cm  BA PSSL Full: -3.19 %  BAS PSSL Full: -4.11 %  BI PSSL Full: -16.6 %  BL PSSL Full: -13.1 %  BP PSSL Full: -8.97 %  BS PSSL Full: -13 %  EDV(Teich): 120.73 ml  EF Biplane: 36.09 %  EF(Teich): 46.62 %  ESV(Teich): 64.45 ml  G peak SL Full(A2C): -9.09 %  G peak SL Full(A4C): -10.36 %  G peak SL Full(APLAX): -8.36 %  G peak SL Full(Avg): -9.27 %  IVSd: 0.88 cm  LA Area: 11.8 cm2  LA Diam: 4.49 cm  LVEDV MOD A2C: 130.33 ml  LVEDV MOD A4C: 125.58 ml  LVEDV MOD BP: 128.19 ml  LVEDVInd MOD BP: 64.42 ml/m2  LVEF MOD A2C: 30.77 %  LVEF MOD A4C: 41.81 %  LVESV MOD A2C: 90.22 ml  LVESV MOD A4C: 73.07 ml  LVESV MOD BP: 81.93 ml  LVESVInd MOD BP: 41.17 ml/m2  LVIDd: 5.05 cm  LVIDs: 3.86 cm  LVLd A2C: 8.56 cm  LVLd A4C: 8.65 cm  LVLs A2C: 8.13 cm  LVLs A4C: 7.94 cm  LVPWd: 0.91 cm  MA PSSL Full: -7.09 %  MAS PSSL Full: -9.6 %  MI PSSL Full: -11.42 %  ML PSSL Full: -10.14 %  MP PSSL Full: -10.14 %  MS PSSL Full: -11.92 %  Peak SL  Dispersion Full: 64.07 ms  RA Area: 12.98 cm2  RVIDd: 3.2 cm  SV MOD A2C: 40.1 ml  SV MOD A4C: 52.51 ml  SV(Teich): 56.28 ml    CW  TR MaxP.71 mmHg  TR Vmax: 2.1 m/s    MM  TAPSE: 1.99 cm    PW  E' Sept: 0.05 m/s  E/E' Sept: 16.3  LVOT Env.Ti: 289.25 ms  LVOT VTI: 12.63 cm  LVOT Vmax: 0.6 m/s  LVOT Vmean: 0.44 m/s  LVOT maxP.45 mmHg  LVOT meanP.85 mmHg  MV A Marcus: 0.2 m/s  MV Dec Carson: 4.91 m/s2  MV DecT: 165.87 ms  MV E Marcus: 0.81 m/s  MV E/A Ratio: 4.15  MV PHT: 48.1 ms  MVA By PHT: 4.57 cm2    [facility]redited [facility]    Prepared and electronically signed by    Hunter Harmon MD  Signed 2021 13:01:42    Meds/Allergies   current meds:   Current Facility-Administered Medications   Medication Dose Route Frequency    apixaban (ELIQUIS) tablet 5 mg  5 mg Oral BID    aspirin chewable tablet 81 mg  81 mg Oral Daily    atorvastatin (LIPITOR) tablet 40 mg  40 mg Oral Daily    azithromycin (ZITHROMAX) tablet 500 mg  500 mg Oral Q24H    ceftriaxone (ROCEPHIN) 1 g/50 mL in dextrose IVPB  1,000 mg Intravenous Q24H    insulin glargine (LANTUS) subcutaneous injection 32 Units 0.32 mL  32 Units Subcutaneous Daily With Breakfast    insulin lispro (HumALOG/ADMELOG) 100 units/mL subcutaneous injection 1-6 Units  1-6 Units Subcutaneous TID AC    insulin lispro (HumALOG/ADMELOG) 100 units/mL subcutaneous injection 4 Units  4 Units Subcutaneous TID With Meals    latanoprost (XALATAN) 0.005 % ophthalmic solution 1 drop  1 drop Both Eyes HS    levothyroxine tablet 25 mcg  25 mcg Oral Early Morning    loperamide (IMODIUM) capsule 2 mg  2 mg Oral TID PRN    metoprolol tartrate (LOPRESSOR) tablet 50 mg  50 mg Oral Q12H ALESSANDRO    multi-electrolyte (PLASMALYTE-A/ISOLYTE-S PH 7.4) IV solution  50 mL/hr Intravenous Continuous    ondansetron (ZOFRAN) injection 4 mg  4 mg Intravenous Q8H PRN    pantoprazole (PROTONIX) EC tablet 40 mg  40 mg Oral Early Morning       Medications Prior to  Admission:     atorvastatin (LIPITOR) 40 mg tablet    Blood Pressure Monitoring ([employer] Blood Pressure Monitor) KIT    insulin glargine (LANTUS) 100 units/mL subcutaneous injection    latanoprost (XALATAN) 0.005 % ophthalmic solution    levothyroxine 25 mcg tablet    lisinopril (ZESTRIL) 5 mg tablet    Unifine Pentips 31G X 8 MM MISC    aspirin 81 mg chewable tablet    insulin NPH-insulin regular (NovoLIN 70/30) 100 units/mL subcutaneous injection    metoprolol tartrate (LOPRESSOR) 25 mg tablet    promethazine (PHENERGAN) 25 mg tablet    multi-electrolyte, 50 mL/hr, Last Rate: 50 mL/hr (05/31/24 1057)      Assessment:  Principal Problem:    Sepsis (HCC)  Active Problems:    Elevated troponin    Benign essential hypertension    Insulin-dependent diabetes mellitus    Coronary artery disease    Ischemic cardiomyopathy    Acute-on-chronic kidney injury  (HCC)    Right lower lobe pneumonia    New onset atrial fibrillation (HCC)    Counseling / Coordination of Care  Total floor / unit time spent today 20 minutes.  Greater than 50% of total time was spent with the patient and / or family counseling and / or coordination of care.      ** Please Note: Dragon 360 Dictation voice to text software may have been used in the creation of this document. **

## 2024-05-31 NOTE — ASSESSMENT & PLAN NOTE
Troponins mildly elevated 153 > 134 > 123   Likely nonischemic myocardial injury in setting of sepsis, tachycardia and known underlying CAD

## 2024-05-31 NOTE — PLAN OF CARE
Problem: PAIN - ADULT  Goal: Verbalizes/displays adequate comfort level or baseline comfort level  Description: Interventions:  - Encourage patient to monitor pain and request assistance  - Assess pain using appropriate pain scale  - Administer analgesics based on type and severity of pain and evaluate response  - Implement non-pharmacological measures as appropriate and evaluate response  - Consider cultural and social influences on pain and pain management  - Notify physician/advanced practitioner if interventions unsuccessful or patient reports new pain  Outcome: Progressing     Problem: INFECTION - ADULT  Goal: Absence or prevention of progression during hospitalization  Description: INTERVENTIONS:  - Assess and monitor for signs and symptoms of infection  - Monitor lab/diagnostic results  - Monitor all insertion sites, i.e. indwelling lines, tubes, and drains  - Monitor endotracheal if appropriate and nasal secretions for changes in amount and color  - [place] appropriate cooling/warming therapies per order  - Administer medications as ordered  - Instruct and encourage patient and family to use good hand hygiene technique  - Identify and instruct in appropriate isolation precautions for identified infection/condition  Outcome: Progressing     Problem: DISCHARGE PLANNING  Goal: Discharge to home or other facility with appropriate resources  Description: INTERVENTIONS:  - Identify barriers to discharge w/patient and caregiver  - Arrange for needed discharge resources and transportation as appropriate  - Identify discharge learning needs (meds, wound care, etc.)  - Arrange for interpretive services to assist at discharge as needed  - Refer to Case Management Department for coordinating discharge planning if the patient needs post-hospital services based on physician/advanced practitioner order or complex needs related to functional status, cognitive ability, or social support system  Outcome: Progressing      Problem: CARDIOVASCULAR - ADULT  Goal: Absence of cardiac dysrhythmias or at baseline rhythm  Description: INTERVENTIONS:  - Continuous cardiac monitoring, vital signs, obtain 12 lead EKG if ordered  - Administer antiarrhythmic and heart rate control medications as ordered  - Monitor electrolytes and administer replacement therapy as ordered  Outcome: Progressing     Problem: GASTROINTESTINAL - ADULT  Goal: Maintains or returns to baseline bowel function  Description: INTERVENTIONS:  - Assess bowel function  - Encourage oral fluids to ensure adequate hydration  - Administer IV fluids if ordered to ensure adequate hydration  - Administer ordered medications as needed  - Encourage mobilization and activity  - Consider nutritional services referral to assist patient with adequate nutrition and appropriate food choices  Outcome: Progressing     Problem: GENITOURINARY - ADULT  Goal: Absence of urinary retention  Description: INTERVENTIONS:  - Assess patient’s ability to void and empty bladder  - Monitor I/O  - Bladder scan as needed  - Discuss with physician/AP medications to alleviate retention as needed  - Discuss catheterization for long term situations as appropriate  Outcome: Progressing

## 2024-05-31 NOTE — PROGRESS NOTES
NEPHROLOGY PROGRESS NOTE   Noel Khan 64 y.o. male MRN: [id number]  Unit/Bed#: S -01 Encounter: [id number]  Reason for Consult: LOBO      SUMMARY:    64-year-old male with chronic kidney disease stage IIIb liver cirrhosis, CAD status post stents, hypertension, diabetes mellitus presented with fevers chills fatigue, poor oral intake and chronic diarrhea. Nephrology consulted for acute kidney injury. On presentation he was found to be in A-fib with RVR. Also was found to have urine retention     ASSESSMENT and PLAN:    Acute kidney injury  -- Present on admission with underlying chronic kidney disease stage III  -- Renal function has been worsening prior since February and May  -- Admission creatinine 3.58 mg/dL  -- Urine analysis did show 2+ protein with 2-4 RBCs and granular casts  -- Presumed secondary to initial prerenal acidemia with intravascular volume depletion along with hypotension due to sepsis from pneumonia along with regulatory failure which has resulted in a component of acute tubular necrosis due to the findings of granular casts.  Also component of urinary retention.  There were multifactorial etiologies to the cause of LOBO in this case.  -- Currently holding ACE inhibitor and diuretic  -- Currently on Isolyte at 50 mill per hour  -- Renal function is improving, 2.8 mg/dL  -- Nonoliguric  -- Continue urinary retention protocol.  Patient reports he feels like he is voiding well.  -- Continue Isolyte for 6 more hours then discontinue fluids  -- BMP tomorrow morning    Chronic kidney disease stage IIIB  -- Baseline creatinine historically 1.4-1.6 but had been as high as 2.4 back on May 20  -- May potentially be at a higher baseline creatinine due to poor renal reserve and multiple episodes of LOBO  -- Outpatient nephrologist Dr. Thomas    Hypertension  -- Currently holding lisinopril and diuretics  -- Currently on metoprolol  -- Intravenous fluids plan for 6 more hours then discontinue  --  Examines euvolemic  -- Blood pressure is currently controlled    Low bicarbonate level  -- No anion gap  -- In the setting of acute kidney injury with underlying chronic kidney disease and bicarbonate loss through the stool  -- Currently on Isolyte  -- Bicarbonate level improved to 20    Hyponatremia--resolved      SUBJECTIVE / INTERVAL HISTORY:    Sensation of food getting stuck with some discomfort in his throat.  Loose stools    No nausea no vomiting ate some breakfast.    Reports he is voiding well    OBJECTIVE:  Current Weight: Weight - Scale: 84.1 kg (185 lb 6.5 oz)  Vitals:    05/30/24 2327 05/31/24 0222 05/31/24 0532 05/31/24 0739   BP: 128/85 (!) 155/105  137/91   Pulse: 103 (!) 120  100   Resp:    16   Temp: 98.1 °F (36.7 °C)   97.6 °F (36.4 °C)   TempSrc:       SpO2: 95%   98%   Weight:   84.1 kg (185 lb 6.5 oz)        Intake/Output Summary (Last 24 hours) at 5/31/2024 1038  Last data filed at 5/31/2024 0739  Gross per 24 hour   Intake 1687.5 ml   Output 675 ml   Net 1012.5 ml       Review of Systems:    Constitutional: Negative for chills and fever.   HENT: Negative for ear pain and sore throat.    Eyes: Negative for pain and visual disturbance.   Respiratory: Negative for cough and shortness of breath.    Cardiovascular: Negative for chest pain and palpitations.   Gastrointestinal: Negative for abdominal pain and vomiting.   Genitourinary: Negative for dysuria and hematuria.   Musculoskeletal: Negative for arthralgias and back pain.   Skin: Negative for color change and rash.   Neurological: Negative for seizures and syncope.   12 point ROS has been reviewed.    Physical Exam  Vitals and nursing note reviewed.   Constitutional:       General: He is not in acute distress.     Appearance: He is well-developed.   HENT:      Head: Normocephalic and atraumatic.   Eyes:      General: No scleral icterus.     Conjunctiva/sclera: Conjunctivae normal.      Pupils: Pupils are equal, round, and reactive to light.    Cardiovascular:      Rate and Rhythm: Normal rate and regular rhythm.      Heart sounds: S1 normal and S2 normal. No murmur heard.     No friction rub. No gallop.   Pulmonary:      Effort: Pulmonary effort is normal. No respiratory distress.      Breath sounds: Normal breath sounds. No wheezing or rales.   Abdominal:      General: Bowel sounds are normal.      Palpations: Abdomen is soft.      Tenderness: There is no abdominal tenderness. There is no rebound.   Musculoskeletal:         General: Normal range of motion.      Cervical back: Normal range of motion and neck supple.   Skin:     Findings: No rash.   Neurological:      Mental Status: He is alert and oriented to person, place, and time.   Psychiatric:         Behavior: Behavior normal.         Medications:    Current Facility-Administered Medications:     apixaban (ELIQUIS) tablet 5 mg, 5 mg, Oral, BID, George Phillips DO, 5 mg at 05/31/24 0904    aspirin chewable tablet 81 mg, 81 mg, Oral, Daily, Brandon Labatch, DO, 81 mg at 05/31/24 0904    atorvastatin (LIPITOR) tablet 40 mg, 40 mg, Oral, Daily, Brandon Labatch, DO, 40 mg at 05/31/24 0904    azithromycin (ZITHROMAX) tablet 500 mg, 500 mg, Oral, Q24H, Brandon Labatch, DO, 500 mg at 05/30/24 2139    ceftriaxone (ROCEPHIN) 1 g/50 mL in dextrose IVPB, 1,000 mg, Intravenous, Q24H, Brandon Sy, DO, Last Rate: 100 mL/hr at 05/31/24 0450, 1,000 mg at 05/31/24 0450    insulin glargine (LANTUS) subcutaneous injection 32 Units 0.32 mL, 32 Units, Subcutaneous, Daily With Breakfast, George Phillips DO, 32 Units at 05/31/24 0904    insulin lispro (HumALOG/ADMELOG) 100 units/mL subcutaneous injection 1-6 Units, 1-6 Units, Subcutaneous, TID AC, 1 Units at 05/31/24 0903 **AND** Fingerstick Glucose (POCT), , , TID AC, George Phillips DO    insulin lispro (HumALOG/ADMELOG) 100 units/mL subcutaneous injection 4 Units, 4 Units, Subcutaneous, TID With Meals, George Phillips DO, 4 Units at 05/31/24 0903    latanoprost  (XALATAN) 0.005 % ophthalmic solution 1 drop, 1 drop, Both Eyes, HS, Brandon Labatch, DO, 1 drop at 05/30/24 2139    levothyroxine tablet 25 mcg, 25 mcg, Oral, Early Morning, Brandon Labatch, DO, 25 mcg at 05/31/24 0449    loperamide (IMODIUM) capsule 2 mg, 2 mg, Oral, TID PRN, Brandon Labatch, DO, 2 mg at 05/30/24 0116    metoprolol tartrate (LOPRESSOR) tablet 25 mg, 25 mg, Oral, Q8H, Regino Kirkpatrick MD, 25 mg at 05/31/24 0222    multi-electrolyte (PLASMALYTE-A/ISOLYTE-S PH 7.4) IV solution, 50 mL/hr, Intravenous, Continuous, Sariah Cordon MD    ondansetron (ZOFRAN) injection 4 mg, 4 mg, Intravenous, Q8H PRN, Brandon Labatch, DO    pantoprazole (PROTONIX) EC tablet 40 mg, 40 mg, Oral, Early Morning, Brandon Labatch, DO, 40 mg at 05/31/24 0449    Laboratory Results:  Results from last 7 days   Lab Units 05/31/24  0547 05/31/24  0447 05/31/24  0006 05/30/24  1948 05/30/24  1726 05/30/24  1407 05/30/24  0528 05/30/24  0211 05/29/24  1948   WBC Thousand/uL  --  17.02*  --   --   --   --  20.73* 16.31* 20.25*   HEMOGLOBIN g/dL  --  11.5*  --   --   --   --  12.5 11.3* 13.8   HEMATOCRIT %  --  35.6*  --   --   --   --  39.5 35.1* 42.6   PLATELETS Thousands/uL  --  281  --   --   --   --  275 236 267   POTASSIUM mmol/L 5.0  --  5.1 4.0 3.6 3.7 3.9  --  4.2   CHLORIDE mmol/L 105  --  104 102 100 99 96  --  94*   CO2 mmol/L 20*  --  19* 21 21 19* 19*  --  21   BUN mg/dL 84*  --  85* 86* 84* 86* 78*  --  74*   CREATININE mg/dL 2.83*  --  2.99* 3.17* 3.12* 3.39* 3.59*  --  3.58*   CALCIUM mg/dL 8.5  --  8.5 8.7 8.5 8.6 8.7  --  9.1       PLEASE NOTE:  This encounter was completed utilizing the [employer]Flu[employer] Direct Speech Voice Recognition Software. Grammatical errors, random word insertions, pronoun errors and incomplete sentences are occasional consequences of the system due to software limitations, ambient noise and hardware issues.These may be missed by proof reading prior to affixing electronic signature.  Any questions or concerns about the content, text or information contained within the body of this dictation should be directly addressed to the physician for clarification. Please do not hesitate to call me directly if you have any any questions or concerns.

## 2024-05-31 NOTE — PHYSICAL THERAPY NOTE
PHYSICAL THERAPY EVALUATION  NAME: Noel Khan  AGE:   64 y.o.  MRN:  [id number]  ADMIT DX: A-fib ([facility]) [I48.91]  Pneumonia [J18.9]  Weak [R53.1]  LOBO (acute kidney injury) ([facility]) [N17.9]  Sepsis (HCC) [A41.9]    PMH:   Past Medical History:   Diagnosis Date    CAD (coronary artery disease)     Chronic combined systolic and diastolic CHF (congestive heart failure) ([facility])     Diabetes mellitus (HCC)     type 2    Dyslipidemia     Hypertension     Ischemic cardiomyopathy     MI (myocardial infarction) (HCC)     Neuropathy     Osteomyelitis ([facility])     Pancreatitis      LENGTH OF STAY: 2        24 1255   PT Last Visit   PT Visit Date 24   Note Type   Note type Evaluation   Pain Assessment   Pain Assessment Tool 0-10   Pain Score No Pain   Restrictions/Precautions   Weight Bearing Precautions Per Order No   Home Living   Type of Home House   Home Layout Two level;Able to live on main level with bedroom/bathroom;Stairs to enter with rails  (2 ANA CRISTINA; FFSU with primary bedroom on 1st floor)   Bathroom Shower/Tub Walk-in shower   Bathroom Toilet Standard   Home Equipment Cane;Walker;Crutches   Additional Comments Ambulates independently without AD at baseline.   Prior Function   Level of [place] Independent with ADLs;Independent with functional mobility   Lives With Spouse;Daughter  (2 daughters)   Receives Help From Family   IADLs Independent with driving;Independent with meal prep;Independent with medication management   Falls in the last 6 months 0   Vocational Retired   General   Family/Caregiver Present Yes   Cognition   Overall Cognitive Status WFL   Arousal/Participation Alert   Attention Within functional limits   Orientation Level Oriented X4   Memory Within functional limits   Following Commands Follows all commands and directions without difficulty   Comments Pt identified by name and .   Subjective   Subjective Agrees to PT evaluation and is pleasant and cooperative throughout session.   VERONICA  "Assessment   RLE Assessment X   Strength RLE   RLE Overall Strength 4/5  (functionally)   LLE Assessment   LLE Assessment X   Strength LLE   LLE Overall Strength 4/5  (functionally)   Bed Mobility   Supine to Sit Unable to assess  (sitting EOB pre/post session)   Transfers   Sit to Stand 7  Independent   Stand to Sit 7  Independent   Ambulation/Elevation   Gait pattern Wide ARMAND;Decreased foot clearance;Short stride;Excessively slow   Gait Assistance 5  Supervision   Additional items Verbal cues   Assistive Device None   Distance ~40` x1; declined further distance   Ambulation/Elevation Additional Comments pt reports feeling \"a little wobbly\", however reports no concerns or need for further PT.  \"I just need to walk more.\"   Balance   Static Sitting Good   Dynamic Sitting Good   Static Standing Fair +   Dynamic Standing Fair   Ambulatory Fair -   Endurance Deficit   Endurance Deficit Yes   Endurance Deficit Description limited ambulation distance, fatigue   Activity Tolerance   Activity Tolerance Patient limited by fatigue   Assessment   Prognosis Good   Problem List Decreased strength;Decreased endurance;Impaired balance;Decreased mobility;Decreased safety awareness   Assessment Pt is a 64 y.o. male seen for PT evaluation s/p admit to [facility] on 8/18/2022 w/ Sepsis (HCC).  Order placed for PT. Comorbidities affecting pt's physical performance at time of assessment include: DM, HTN, and CHF. Personal factors affecting pt at time of IE include: inability to perform IADLs, inability to navigate community distances, and limited insight into impairments. Prior to admission, pt was independent w/ all functional mobility w/ out AD, lived in one floor environment, had 2 ANA CRISTINA (+) railing, and lived with spouse and daughters . Upon evaluation: Pt is independent for sit to stand and supervision for ambulation without AD.   (Please find full objective findings from PT assessment regarding body systems outlined " above). Impairments and limitations also listed above, especially due to  weakness, impaired balance, decreased endurance, gait deviations, decreased activity tolerance, decreased safety awareness, and fall risk.  Pt's clinical presentation is currently evolving seen in pt's presentation of fall risk and decline in functional mobility compared to baseline.  No further acute skilled PT needs at this time as pt will likely continue to progress with increased everyday mobility.   Will discharge pt from PT caseload at this time.   Goals   Patient Goals to go home tomorrow   Discharge Recommendation   Rehab Resource Intensity Level, PT No post-acute rehabilitation needs   -[facility] Basic Mobility Inpatient   Turning in Flat Bed Without Bedrails 4   Lying on Back to Sitting on Edge of Flat Bed Without Bedrails 4   Moving Bed to Chair 4   Standing Up From Chair Using Arms 4   Walk in Room 3   Climb 3-5 Stairs With Railing 3   Basic Mobility Inpatient Raw Score 22   Basic Mobility Standardized Score 47.4   [facility] Highest Level Of Mobility   -[facility] Goal 7: Walk 25 feet or more   -HLM Achieved 7: Walk 25 feet or more   End of Consult   Patient Position at End of Consult Seated edge of bed;All needs within reach     The patient's [facility] Basic Mobility Inpatient Short Form Raw Score is 22, Standardized Score is 47.4.  A Raw Score of greater than or equal to 16 suggests the patient may benefit from discharge to home. However please refer to therapist recommendation for discharge planning given other factors that may influence destination.     Adapted from Brayden KILPATRICK, Vianney J, Tesfaye J, Mehnaz BATISTA. Association of [employer] “6-Clicks” Basic Mobility and Daily Activity Scores With Discharge Destination. Physical Therapy, 2021;101:1-9. DOI: [email or web address]      Melita Mclean, PT,DPT

## 2024-05-31 NOTE — PLAN OF CARE
Problem: PAIN - ADULT  Goal: Verbalizes/displays adequate comfort level or baseline comfort level  Description: Interventions:  - Encourage patient to monitor pain and request assistance  - Assess pain using appropriate pain scale  - Administer analgesics based on type and severity of pain and evaluate response  - Implement non-pharmacological measures as appropriate and evaluate response  - Consider cultural and social influences on pain and pain management  - Notify physician/advanced practitioner if interventions unsuccessful or patient reports new pain  Outcome: Progressing     Problem: INFECTION - ADULT  Goal: Absence or prevention of progression during hospitalization  Description: INTERVENTIONS:  - Assess and monitor for signs and symptoms of infection  - Monitor lab/diagnostic results  - Monitor all insertion sites, i.e. indwelling lines, tubes, and drains  - Monitor endotracheal if appropriate and nasal secretions for changes in amount and color  - [place] appropriate cooling/warming therapies per order  - Administer medications as ordered  - Instruct and encourage patient and family to use good hand hygiene technique  - Identify and instruct in appropriate isolation precautions for identified infection/condition  Outcome: Progressing     Problem: DISCHARGE PLANNING  Goal: Discharge to home or other facility with appropriate resources  Description: INTERVENTIONS:  - Identify barriers to discharge w/patient and caregiver  - Arrange for needed discharge resources and transportation as appropriate  - Identify discharge learning needs (meds, wound care, etc.)  - Arrange for interpretive services to assist at discharge as needed  - Refer to Case Management Department for coordinating discharge planning if the patient needs post-hospital services based on physician/advanced practitioner order or complex needs related to functional status, cognitive ability, or social support system  Outcome: Progressing      Problem: CARDIOVASCULAR - ADULT  Goal: Absence of cardiac dysrhythmias or at baseline rhythm  Description: INTERVENTIONS:  - Continuous cardiac monitoring, vital signs, obtain 12 lead EKG if ordered  - Administer antiarrhythmic and heart rate control medications as ordered  - Monitor electrolytes and administer replacement therapy as ordered  Outcome: Progressing     Problem: GASTROINTESTINAL - ADULT  Goal: Maintains or returns to baseline bowel function  Description: INTERVENTIONS:  - Assess bowel function  - Encourage oral fluids to ensure adequate hydration  - Administer IV fluids if ordered to ensure adequate hydration  - Administer ordered medications as needed  - Encourage mobilization and activity  - Consider nutritional services referral to assist patient with adequate nutrition and appropriate food choices  Outcome: Progressing     Problem: GENITOURINARY - ADULT  Goal: Absence of urinary retention  Description: INTERVENTIONS:  - Assess patient’s ability to void and empty bladder  - Monitor I/O  - Bladder scan as needed  - Discuss with physician/AP medications to alleviate retention as needed  - Discuss catheterization for long term situations as appropriate  Outcome: Progressing

## 2024-05-31 NOTE — ASSESSMENT & PLAN NOTE
Cardiac cath (2014) - LULA x2 to  of proximal/mid LAD, LULA to mid LAD, LULA to RCA  Continue aspirin, statin, and BB

## 2024-05-31 NOTE — ASSESSMENT & PLAN NOTE
Initially reports diarrhea for several days prior to admission  Received loperamide as needed  Patient reports resolved on 5/31

## 2024-06-01 LAB
ANION GAP SERPL CALCULATED.3IONS-SCNC: 8 MMOL/L (ref 4–13)
APTT PPP: 41 SECONDS (ref 23–37)
BASOPHILS # BLD MANUAL: 0.19 THOUSAND/UL (ref 0–0.1)
BASOPHILS NFR MAR MANUAL: 2 % (ref 0–1)
BUN SERPL-MCNC: 75 MG/DL (ref 5–25)
CALCIUM SERPL-MCNC: 8.9 MG/DL (ref 8.4–10.2)
CHLORIDE SERPL-SCNC: 108 MMOL/L (ref 96–108)
CO2 SERPL-SCNC: 23 MMOL/L (ref 21–32)
CREAT SERPL-MCNC: 2.31 MG/DL (ref 0.6–1.3)
EOSINOPHIL # BLD MANUAL: 0.09 THOUSAND/UL (ref 0–0.4)
EOSINOPHIL NFR BLD MANUAL: 1 % (ref 0–6)
ERYTHROCYTE [DISTWIDTH] IN BLOOD BY AUTOMATED COUNT: 13.8 % (ref 11.6–15.1)
GFR SERPL CREATININE-BSD FRML MDRD: 28 ML/MIN/1.73SQ M
GLUCOSE SERPL-MCNC: 106 MG/DL (ref 65–140)
GLUCOSE SERPL-MCNC: 135 MG/DL (ref 65–140)
GLUCOSE SERPL-MCNC: 151 MG/DL (ref 65–140)
GLUCOSE SERPL-MCNC: 153 MG/DL (ref 65–140)
GLUCOSE SERPL-MCNC: 154 MG/DL (ref 65–140)
HCT VFR BLD AUTO: 36 % (ref 36.5–49.3)
HGB BLD-MCNC: 11.6 G/DL (ref 12–17)
LYMPHOCYTES # BLD AUTO: 1.6 THOUSAND/UL (ref 0.6–4.47)
LYMPHOCYTES # BLD AUTO: 17 % (ref 14–44)
MCH RBC QN AUTO: 28.9 PG (ref 26.8–34.3)
MCHC RBC AUTO-ENTMCNC: 32.2 G/DL (ref 31.4–37.4)
MCV RBC AUTO: 90 FL (ref 82–98)
METAMYELOCYTE ABSOLUTE CT: 0.19 THOUSAND/UL (ref 0–0.1)
METAMYELOCYTES NFR BLD MANUAL: 2 % (ref 0–1)
MONOCYTES # BLD AUTO: 0.47 THOUSAND/UL (ref 0–1.22)
MONOCYTES NFR BLD: 5 % (ref 4–12)
NEUTROPHILS # BLD MANUAL: 6.88 THOUSAND/UL (ref 1.85–7.62)
NEUTS BAND NFR BLD MANUAL: 4 % (ref 0–8)
NEUTS SEG NFR BLD AUTO: 69 % (ref 43–75)
PLATELET # BLD AUTO: 314 THOUSANDS/UL (ref 149–390)
PLATELET BLD QL SMEAR: ADEQUATE
PMV BLD AUTO: 10 FL (ref 8.9–12.7)
POTASSIUM SERPL-SCNC: 4.6 MMOL/L (ref 3.5–5.3)
RBC # BLD AUTO: 4.02 MILLION/UL (ref 3.88–5.62)
RBC MORPH BLD: NORMAL
SODIUM SERPL-SCNC: 139 MMOL/L (ref 135–147)
WBC # BLD AUTO: 9.42 THOUSAND/UL (ref 4.31–10.16)

## 2024-06-01 PROCEDURE — 99232 SBSQ HOSP IP/OBS MODERATE 35: CPT | Performed by: INTERNAL MEDICINE

## 2024-06-01 PROCEDURE — 82948 REAGENT STRIP/BLOOD GLUCOSE: CPT

## 2024-06-01 PROCEDURE — 85730 THROMBOPLASTIN TIME PARTIAL: CPT | Performed by: INTERNAL MEDICINE

## 2024-06-01 PROCEDURE — 80048 BASIC METABOLIC PNL TOTAL CA: CPT

## 2024-06-01 PROCEDURE — 85007 BL SMEAR W/DIFF WBC COUNT: CPT

## 2024-06-01 PROCEDURE — 85027 COMPLETE CBC AUTOMATED: CPT

## 2024-06-01 RX ORDER — METOPROLOL TARTRATE 50 MG/1
50 TABLET, FILM COATED ORAL EVERY 6 HOURS
Status: DISCONTINUED | OUTPATIENT
Start: 2024-06-01 | End: 2024-06-03

## 2024-06-01 RX ADMIN — INSULIN LISPRO 4 UNITS: 100 INJECTION, SOLUTION INTRAVENOUS; SUBCUTANEOUS at 17:40

## 2024-06-01 RX ADMIN — INSULIN GLARGINE 32 UNITS: 100 INJECTION, SOLUTION SUBCUTANEOUS at 07:37

## 2024-06-01 RX ADMIN — METOPROLOL TARTRATE 50 MG: 50 TABLET, FILM COATED ORAL at 08:13

## 2024-06-01 RX ADMIN — INSULIN LISPRO 4 UNITS: 100 INJECTION, SOLUTION INTRAVENOUS; SUBCUTANEOUS at 07:35

## 2024-06-01 RX ADMIN — INSULIN LISPRO 4 UNITS: 100 INJECTION, SOLUTION INTRAVENOUS; SUBCUTANEOUS at 11:29

## 2024-06-01 RX ADMIN — AZITHROMYCIN 500 MG: 250 TABLET, FILM COATED ORAL at 21:17

## 2024-06-01 RX ADMIN — ATORVASTATIN CALCIUM 40 MG: 40 TABLET, FILM COATED ORAL at 08:13

## 2024-06-01 RX ADMIN — METOPROLOL TARTRATE 50 MG: 50 TABLET, FILM COATED ORAL at 18:19

## 2024-06-01 RX ADMIN — CEFTRIAXONE SODIUM 1000 MG: 10 INJECTION, POWDER, FOR SOLUTION INTRAVENOUS at 04:54

## 2024-06-01 RX ADMIN — APIXABAN 5 MG: 5 TABLET, FILM COATED ORAL at 18:19

## 2024-06-01 RX ADMIN — INSULIN LISPRO 1 UNITS: 100 INJECTION, SOLUTION INTRAVENOUS; SUBCUTANEOUS at 11:29

## 2024-06-01 RX ADMIN — LATANOPROST 1 DROP: 50 SOLUTION OPHTHALMIC at 21:17

## 2024-06-01 RX ADMIN — APIXABAN 5 MG: 5 TABLET, FILM COATED ORAL at 08:13

## 2024-06-01 RX ADMIN — ASPIRIN 81 MG CHEWABLE TABLET 81 MG: 81 TABLET CHEWABLE at 08:13

## 2024-06-01 RX ADMIN — INSULIN LISPRO 1 UNITS: 100 INJECTION, SOLUTION INTRAVENOUS; SUBCUTANEOUS at 07:35

## 2024-06-01 RX ADMIN — PANTOPRAZOLE SODIUM 40 MG: 40 TABLET, DELAYED RELEASE ORAL at 04:54

## 2024-06-01 RX ADMIN — METOPROLOL TARTRATE 50 MG: 50 TABLET, FILM COATED ORAL at 13:00

## 2024-06-01 RX ADMIN — LEVOTHYROXINE SODIUM 25 MCG: 25 TABLET ORAL at 04:54

## 2024-06-01 RX ADMIN — METOPROLOL TARTRATE 50 MG: 50 TABLET, FILM COATED ORAL at 23:32

## 2024-06-01 NOTE — PROGRESS NOTES
[facility]    Cardiology Progress Note  Noel Khan 64 y.o. male   YOB: 1960 MRN: [id number]  Unit/Bed#: S -01 Encounter: [id number]      Subjective:   No significant events overnight.  Heart rate in A-fib remains elevated despite uptitration of medications.  No chest pain or shortness of breath    Assessments  Principal Problem:    Sepsis (HCC)  Active Problems:    Elevated troponin    Benign essential hypertension    Insulin-dependent diabetes mellitus    Coronary artery disease    Ischemic cardiomyopathy    Acute-on-chronic kidney injury  (HCC)    Right lower lobe pneumonia    New onset atrial fibrillation (HCC)    New onset atrial fibrillation  Sepsis secondary to pneumonia  Recent diarrhea  Non-MI troponin elevation  CAD status post prior PCI  Ischemic cardiomyopathy  Hypertension  Diabetes mellitus type 2  LOBO on CKD  Baseline Cr 2-2.4, but has been slowly rising over last year     Plan:  New onset atrial fibrillation  A-fib episode likely triggered by recent diarrhea and presenting infection/pneumonia  Heart rate remains elevated in 1 10-1 30s today despite uptitration of metoprolol  Increase metoprolol to 50 mg every 6 hours  Continue Eliquis  Keep K =4, Mg=2  If remains in A-fib, then can plan on eventual cardioversion attempt post-recovery of ongoing acute issues - timing TBD, possibly as outpatient     Mixed Cardiomyopathy, LVEf 30%  TTE done and images personally reviewed at bedside  LVEF 30%, severe global hypokinesis with apical/apical septal akinesis, Dilated RV, mild MR, mild to moderate TR, IVC not well visualized - ?normal sized, TR PG 48   Echo performed in A-fib with heart rate in the 120s  Reviewed images of prior echocardiogram and nuclear stress test from 2021 as well  Evidence of anteroapical infarct, LVEF 30% on nuclear stress  His EF appears to be at least mildly reduced compared to prior echocardiogram  Suspect this drop in EF is primarily related  "to rapid A-fib  No recent or ongoing ischemic symptoms otherwise  Plan  Optimize rate control therapy for A-fib  Continue metoprolol  Currently not on ACE/ARB/ARNI due to renal dysfunction (home lisinopril 5 on hold)  Can plan to add Isordil/hydralazine if blood pressure allows, after uptitration of rate control therapy  Can plan on eventually repeating nuclear Lexiscan stress test, possibly as outpatient      Review of Systems   All other systems reviewed and are negative.        Telemetry Review: Afib, HR in 100-130    Objective:   Vitals: Blood pressure 126/89, pulse 96, temperature 98.1 °F (36.7 °C), resp. rate 16, height 5' 10\" (1.778 m), weight 83.4 kg (183 lb 14.4 oz), SpO2 98%., Body mass index is 26.39 kg/m².,   Orthostatic Blood Pressures      Flowsheet Row Most Recent Value   Blood Pressure 126/89 filed at 2024 1740   Patient Position - Orthostatic VS Lying filed at 2024 2330           Systolic (24hrs), Av , Min:126 , Max:143     Diastolic (24hrs), Av, Min:89, Max:101    Wt Readings from Last 5 Encounters:   24 83.4 kg (183 lb 14.4 oz)   24 87.1 kg (192 lb)   24 87.5 kg (193 lb)   24 88 kg (194 lb)   23 87.1 kg (192 lb)     I/O          0701   0700  0701   0700  0701   0700    P.O. 480 480 720    I.V. (mL/kg) 1127.5 (13.4)      IV Piggyback       Total Intake(mL/kg) 1607.5 (19.1) 480 (5.8) 720 (8.6)    Urine (mL/kg/hr) 675 (0.3)      Stool       Total Output 675      Net +932.5 +480 +720           Unmeasured Urine Occurrence   1 x                Physical Exam  Vitals and nursing note reviewed.   Constitutional:       General: He is not in acute distress.     Appearance: Normal appearance. He is well-developed. He is not ill-appearing or diaphoretic.   HENT:      Head: Normocephalic and atraumatic.      Nose: No congestion.   Eyes:      General: No scleral icterus.     Conjunctiva/sclera: Conjunctivae normal.   Neck:      " Vascular: No carotid bruit or JVD.   Cardiovascular:      Rate and Rhythm: Tachycardia present. Rhythm irregular.      Heart sounds: Normal heart sounds. No murmur heard.     No friction rub. No gallop.   Pulmonary:      Effort: Pulmonary effort is normal. No respiratory distress.      Breath sounds: Normal breath sounds. No wheezing or rales.   Chest:      Chest wall: No tenderness.   Abdominal:      General: There is no distension.      Palpations: Abdomen is soft.      Tenderness: There is no abdominal tenderness.   Musculoskeletal:         General: No swelling, tenderness or deformity.      Cervical back: Neck supple. No muscular tenderness.      Right lower leg: No edema.      Left lower leg: No edema.   Skin:     General: Skin is warm.   Neurological:      General: No focal deficit present.      Mental Status: He is alert and oriented to person, place, and time. Mental status is at baseline.   Psychiatric:         Mood and Affect: Mood normal.         Behavior: Behavior normal.         Thought Content: Thought content normal.         Laboratory Results: personally reviewed        CBC with diff:   Results from last 7 days   Lab Units 06/01/24  0503 05/31/24  0447 05/30/24  0528 05/30/24  0211 05/29/24 1948   WBC Thousand/uL 9.42 17.02* 20.73* 16.31* 20.25*   HEMOGLOBIN g/dL 11.6* 11.5* 12.5 11.3* 13.8   HEMATOCRIT % 36.0* 35.6* 39.5 35.1* 42.6   MCV fL 90 88 88 88 88   PLATELETS Thousands/uL 314 281 275 236 267   RBC Million/uL 4.02 4.06 4.47 3.98 4.82   MCH pg 28.9 28.3 28.0 28.4 28.6   MCHC g/dL 32.2 32.3 31.6 32.2 32.4   RDW % 13.8 13.9 13.7 13.7 13.6   MPV fL 10.0 10.4 11.0 10.5 10.3   NRBC AUTO /100 WBCs  --  0 0  --  0         CMP:  Results from last 7 days   Lab Units 06/01/24  0503 05/31/24  0547 05/31/24  0006 05/30/24  1948 05/30/24  1726 05/30/24  1407 05/30/24  0528 05/29/24 1948   POTASSIUM mmol/L 4.6 5.0 5.1 4.0 3.6 3.7 3.9 4.2   CHLORIDE mmol/L 108 105 104 102 100 99 96 94*   CO2 mmol/L 23 20*  19* 21 21 19* 19* 21   BUN mg/dL 75* 84* 85* 86* 84* 86* 78* 74*   CREATININE mg/dL 2.31* 2.83* 2.99* 3.17* 3.12* 3.39* 3.59* 3.58*   CALCIUM mg/dL 8.9 8.5 8.5 8.7 8.5 8.6 8.7 9.1   AST U/L  --   --   --   --   --   --   --  16   ALT U/L  --   --   --   --   --   --   --  16   ALK PHOS U/L  --   --   --   --   --   --   --  66   EGFR ml/min/1.73sq m 28 22 20 19 19 17 16 16         BMP:  Results from last 7 days   Lab Units 06/01/24  0503 05/31/24  0547 05/31/24  0006 05/30/24  1948 05/30/24  1726 05/30/24  1407 05/30/24 0528   POTASSIUM mmol/L 4.6 5.0 5.1 4.0 3.6 3.7 3.9   CHLORIDE mmol/L 108 105 104 102 100 99 96   CO2 mmol/L 23 20* 19* 21 21 19* 19*   BUN mg/dL 75* 84* 85* 86* 84* 86* 78*   CREATININE mg/dL 2.31* 2.83* 2.99* 3.17* 3.12* 3.39* 3.59*   CALCIUM mg/dL 8.9 8.5 8.5 8.7 8.5 8.6 8.7       BNP:   Recent Labs     05/30/24 0528   BNP 1,434*       Magnesium:       Coags:   Results from last 7 days   Lab Units 06/01/24  0503 05/31/24  0547 05/30/24  1948 05/30/24  1356 05/30/24  0528 05/29/24 1948   PTT seconds 41* 78* 83* 74* 100* 38*   INR   --   --   --   --  1.23* 1.17       TSH:        Hemoglobin A1C       Lipid Profile:       Meds/Allergies   all current active meds have been reviewed and current meds:   Current Facility-Administered Medications   Medication Dose Route Frequency    apixaban (ELIQUIS) tablet 5 mg  5 mg Oral BID    aspirin chewable tablet 81 mg  81 mg Oral Daily    atorvastatin (LIPITOR) tablet 40 mg  40 mg Oral Daily    azithromycin (ZITHROMAX) tablet 500 mg  500 mg Oral Q24H    ceftriaxone (ROCEPHIN) 1 g/50 mL in dextrose IVPB  1,000 mg Intravenous Q24H    insulin glargine (LANTUS) subcutaneous injection 32 Units 0.32 mL  32 Units Subcutaneous Daily With Breakfast    insulin lispro (HumALOG/ADMELOG) 100 units/mL subcutaneous injection 1-6 Units  1-6 Units Subcutaneous TID AC    insulin lispro (HumALOG/ADMELOG) 100 units/mL subcutaneous injection 4 Units  4 Units Subcutaneous TID With  Meals    latanoprost (XALATAN) 0.005 % ophthalmic solution 1 drop  1 drop Both Eyes HS    levothyroxine tablet 25 mcg  25 mcg Oral Early Morning    loperamide (IMODIUM) capsule 2 mg  2 mg Oral TID PRN    metoprolol tartrate (LOPRESSOR) tablet 50 mg  50 mg Oral Q6H    ondansetron (ZOFRAN) injection 4 mg  4 mg Intravenous Q8H PRN    pantoprazole (PROTONIX) EC tablet 40 mg  40 mg Oral Early Morning       Medications Prior to Admission:     atorvastatin (LIPITOR) 40 mg tablet    Blood Pressure Monitoring ([employer] Blood Pressure Monitor) KIT    insulin glargine (LANTUS) 100 units/mL subcutaneous injection    latanoprost (XALATAN) 0.005 % ophthalmic solution    levothyroxine 25 mcg tablet    lisinopril (ZESTRIL) 5 mg tablet    Unifine Pentips 31G X 8 MM MISC    aspirin 81 mg chewable tablet    insulin NPH-insulin regular (NovoLIN 70/30) 100 units/mL subcutaneous injection    metoprolol tartrate (LOPRESSOR) 25 mg tablet    promethazine (PHENERGAN) 25 mg tablet         Cardiac testing: reviewed  Results for orders placed during the hospital encounter of 21    Echo complete with contrast if indicated    [id number] [place] [address] [phone]    Transthoracic Echocardiogram  2D, M-mode, Doppler, and Color Doppler    Study date:  2021    Patient: LUZ MARINA HUGGINS  MR number: [id number]  Account number: [id number]  : 1960  Age: 61 years  Gender: Male  Status: Outpatient  Location: [facility]  Height: 70 in  Weight: 178.6 lb  BP: 130/ 72 mmHg    Indications: Assess left ventricular function.    Diagnoses: I25.10 - Atherosclerotic heart disease of native coronary artery without angina pectoris    Sonographer:  PALAK Lundy  Primary Physician:  Frank Allen MD  Referring Physician:  Santa Figueroa DO  Group:  [facility]  Interpreting Physician:  Hunter Harmon MD    SUMMARY    LEFT VENTRICLE:  Systolic function  was at the lower limits of normal. Ejection fraction was estimated to be 50 %. Global longitudinal strain was reduced at -9.3%.  There was dyskinesis of the mid anterolateral, apical septal, apical lateral, and apical wall(s).  Doppler parameters were consistent with restrictive physiology, indicative of decreased left ventricular diastolic compliance and/or increased left atrial pressure.  Doppler parameters were consistent with high ventricular filling pressure.    MITRAL VALVE:  There was mild regurgitation.    HISTORY: PRIOR HISTORY: CAD s/p stent, Ischemic cardiomyopathy, Heart failure, Hypertension, Dyslipidemia    PROCEDURE: The study was performed in the [facility]. This was a routine study. The transthoracic approach was used. The study included complete 2D imaging, M-mode, complete spectral Doppler, and color Doppler. The  heart rate was 64 bpm, at the start of the study. Images were obtained from the parasternal, apical, subcostal, and suprasternal notch acoustic windows. Intravenous contrast ( 0.6 ml Definity/NSS) was administered. Image quality was  adequate.    LEFT VENTRICLE: Size was normal. Systolic function was at the lower limits of normal. Ejection fraction was estimated to be 50 %. Global longitudinal strain was reduced at -9.3%. There was dyskinesis of the mid anterolateral, apical  septal, apical lateral, and apical wall(s). Wall thickness was normal. No evidence of apical thrombus. DOPPLER: There was a reduced contribution of atrial contraction to ventricular filling, due to increased ventricular diastolic pressure  or atrial contractile dysfunction. Doppler parameters were consistent with restrictive physiology, indicative of decreased left ventricular diastolic compliance and/or increased left atrial pressure. Doppler parameters were consistent with  high ventricular filling pressure.    RIGHT VENTRICLE: The size was normal. Systolic function was normal. Wall  thickness was normal.    LEFT ATRIUM: Size was normal.    RIGHT ATRIUM: Size was normal.    MITRAL VALVE: Valve structure was normal. There was normal leaflet separation. DOPPLER: The transmitral velocity was within the normal range. There was no evidence for stenosis. There was mild regurgitation.    AORTIC VALVE: The valve was trileaflet. Leaflets exhibited normal thickness, mild calcification, normal cuspal separation, and sclerosis. DOPPLER: Transaortic velocity was within the normal range. There was no evidence for stenosis. There  was trace regurgitation.    TRICUSPID VALVE: The valve structure was normal. There was normal leaflet separation. DOPPLER: The transtricuspid velocity was within the normal range. There was no evidence for stenosis. There was trace regurgitation. Pulmonary artery  systolic pressure was within the normal range. Estimated peak PA pressure was 21 mmHg.    PULMONIC VALVE: Leaflets exhibited normal thickness, no calcification, and normal cuspal separation. DOPPLER: The transpulmonic velocity was within the normal range. There was no significant regurgitation.    PERICARDIUM: There was no pericardial effusion. The pericardium was normal in appearance.    AORTA: The root exhibited normal size.    SYSTEMIC VEINS: IVC: The inferior vena cava was normal in size. Respirophasic changes were normal.    SYSTEM MEASUREMENT TABLES    2D  %FS: 23.42 %  AA PSSL Full: -4.21 %  AAS PSSL Full: -9.76 %  AI PSSL Full: -12.76 %  AL PSSL Full: -2.93 %  AP PSSL Full: -7.24 %  AS PSSL Full: -10.64 %  AVC: 390.77 ms  Ao Diam: 2.94 cm  BA PSSL Full: -3.19 %  BAS PSSL Full: -4.11 %  BI PSSL Full: -16.6 %  BL PSSL Full: -13.1 %  BP PSSL Full: -8.97 %  BS PSSL Full: -13 %  EDV(Teich): 120.73 ml  EF Biplane: 36.09 %  EF(Teich): 46.62 %  ESV(Teich): 64.45 ml  G peak SL Full(A2C): -9.09 %  G peak SL Full(A4C): -10.36 %  G peak SL Full(APLAX): -8.36 %  G peak SL Full(Avg): -9.27 %  IVSd: 0.88 cm  LA Area: 11.8 cm2  LA  Diam: 4.49 cm  LVEDV MOD A2C: 130.33 ml  LVEDV MOD A4C: 125.58 ml  LVEDV MOD BP: 128.19 ml  LVEDVInd MOD BP: 64.42 ml/m2  LVEF MOD A2C: 30.77 %  LVEF MOD A4C: 41.81 %  LVESV MOD A2C: 90.22 ml  LVESV MOD A4C: 73.07 ml  LVESV MOD BP: 81.93 ml  LVESVInd MOD BP: 41.17 ml/m2  LVIDd: 5.05 cm  LVIDs: 3.86 cm  LVLd A2C: 8.56 cm  LVLd A4C: 8.65 cm  LVLs A2C: 8.13 cm  LVLs A4C: 7.94 cm  LVPWd: 0.91 cm  MA PSSL Full: -7.09 %  MAS PSSL Full: -9.6 %  MI PSSL Full: -11.42 %  ML PSSL Full: -10.14 %  MP PSSL Full: -10.14 %  MS PSSL Full: -11.92 %  Peak SL Dispersion Full: 64.07 ms  RA Area: 12.98 cm2  RVIDd: 3.2 cm  SV MOD A2C: 40.1 ml  SV MOD A4C: 52.51 ml  SV(Teich): 56.28 ml    CW  TR MaxP.71 mmHg  TR Vmax: 2.1 m/s    MM  TAPSE: 1.99 cm    PW  E' Sept: 0.05 m/s  E/E' Sept: 16.3  LVOT Env.Ti: 289.25 ms  LVOT VTI: 12.63 cm  LVOT Vmax: 0.6 m/s  LVOT Vmean: 0.44 m/s  LVOT maxP.45 mmHg  LVOT meanP.85 mmHg  MV A [place]: 0.2 m/s  MV Dec [place]: 4.91 m/s2  MV DecT: 165.87 ms  MV E Marcus: 0.81 m/s  MV E/A Ratio: 4.15  MV PHT: 48.1 ms  MVA By PHT: 4.57 cm2    [employer] Accredited Echocardiography Laboratory    Prepared and electronically signed by    Hunter Harmon MD  Signed 2021 13:01:42    No results found for this or any previous visit.    No results found for this or any previous visit.    No results found for this or any previous visit.

## 2024-06-01 NOTE — PROGRESS NOTES
[facility]  Progress Note  Name: Noel Khan I  MRN: [id number]  Unit/Bed#: S -01 I Date of Admission: 5/29/2024   Date of Service: 6/1/2024 I Hospital Day: 3    Assessment & Plan   * Sepsis (HCC)  Assessment & Plan  65 yo M who presented fatigue, chills, nausea, diarrhea and subjective fevers at home for 5 days prior to admission. States he was having 2-5 watery Bms per day. Denies any recent antibiotic usage, any blood or mucus in stool.    Patient met SIRS criteria in ED with  and WBC 20.25. Lactic acid normal. Procalcitonin elevated 3.51. Suspected pneumonia source.  In ED, received 2.5 L boluses, maintenance fluids, and started on cefepime and azithromycin   CXR (5/29/2024) - Extensive right base pneumonia  UA with proteinuria, granular casts 25-30  FLU/RSV/COVID negative  Urine legionella and strep negative  Blood cultures 2/2 NGTD    Plan  Continue ceftriaxone 1 g daily and azithromycin 500 mg daily  See pan for pneumonia below    Right lower lobe pneumonia  Assessment & Plan  CXR (5/29/2024) - Extensive right base pneumonia  FLU/RSV/COVID negative  Urine legionella and strep negative  Drip score low risk (0)  Patient met sepsis criteria, see above    Plan  Continue ceftriaxone and azithromycin   Aspiration precautions  Incentive spirometry  Offer oxygen supplementation as needed    New onset atrial fibrillation (HCC)  Assessment & Plan  New onset afib noted in ED on EKG with rate of 125. Patient denies any chest pain or palpitations, dizziness and lightheaded. Patient is asymptomatic.   Tir6TASp score of 4  Patient was prescribed 25 mg metoprol tartrate BID. Patient states he is only taking 25mg daily  TSH WNL 3.460  Echo (5/31/2024) - EF 25-30%, severe global hypokinesis, LV false tendon near the apex, RV systolic function moderately to severely reduced, LA mildly dilated  Rates remain uncontrolled    Plan  Remains tachycardic ranging 105-120 bpm, may benefit from  cardioversion.   Cardiology following, appreciate recommendations  Continue Eliquis  Continue metoprolol tartrate 50 BID  Telemetry     Insulin-dependent diabetes mellitus  Assessment & Plan  Lab Results   Component Value Date    HGBA1C 7.1 (H) 05/20/2024       Recent Labs     05/31/24  1611 05/31/24  2131 06/01/24  0714 06/01/24  1118   POCGLU 238* 220* 153* 154*         Blood Sugar Average: Last 72 hrs:  (P) [id number]    Endorses recent poor PO intake and has not been taking his insulin for every days prior to admission.   Patient states that at home he normally takes lantus 34 units in AM and 4 units of short acting insulin with meals  Received insulin gtt for anion gap metabolic acidosis in setting of  ketosis, hyperglycemia, and LOBO. Gap closed 5/31 evening with transition back to home regimen.    Plan  Restarted on home regimen of Lantus 32 units qAM and lispro 4 units TID with meals  SSI  Diabetic diet    Acute-on-chronic kidney injury  (HCC)  Assessment & Plan  Recent Labs     05/31/24  0006 05/31/24  0547 06/01/24  0503   CREATININE 2.99* 2.83* 2.31*   EGFR 20 22 28     Estimated Creatinine Clearance: 32.5 mL/min (A) (by C-G formula based on SCr of 2.31 mg/dL (H)).    Follows with Dr. Thomas outpatient nephrology for CKD stage 3b/4 in setting of diabetes, hypertensive nephrosclerosis, ischemic cardiomyopathy, and cirrhosis. Worsening renal function since Feb and May 2024.  POA 3.58; (baseline 1.4 - 1.6)  UA with 2+ protein, 2-4 RBC, granular casts  Suspect multifactorial etiology to prerenal acidemia in setting of poor oral intake, hypotension in setting of sepsis 2/2 pneumonia, ATN with granular casts on UA, urinary retention, lasix and ACE use  Currently improving    Plan  Off fluids  Nephrology following, appreciate recommendations  Monitor BMP  Urinary retention protocol  Avoid hypoperfusion of the kidneys, minimize nephrotoxins  RAAS Blockers/Diuretics held: lasix 80mg daily + lisinopril      Ischemic cardiomyopathy  Assessment & Plan  Patient has history of combined heart failure with EF of 50% on echo 2022. Patient denies any chest pain. Patient does endorse fatigue + SOB on exertion. Likely due to new onset PNA. Does not examine volume overloaded without edema, rales, or JVD  Troponins mildly elevated 153 > 134 > 123   Echo (5/31/2024) - EF 25-30%, severe global hypokinesis, LV false tendon near the apex, RV systolic function moderately to severely reduced, LA mildly dilated    Plan  Continue aspirin, statin, and beta blocker  Holding lasix and lisinopril in setting of LOBO  Daily weights, I/Os  Cardiology following, appreciate recommendations  Elevated troponins likely nonischemic myocardial injury in setting of sepsis, tachycardia and known underlying CAD   Recommend OP follow up and likely OP stress testing once recovered    Coronary artery disease  Assessment & Plan  Cardiac cath (2014) - LULA x2 to  of proximal/mid LAD, LULA to mid LAD, LULA to RCA  Continue aspirin, statin, and BB    Benign essential hypertension  Assessment & Plan  Controlled, stable  Continue BB    Elevated troponin  Assessment & Plan  Troponins mildly elevated 153 > 134 > 123   Likely nonischemic myocardial injury in setting of sepsis, tachycardia and known underlying CAD     Diarrhea-resolved as of 5/31/2024  Assessment & Plan  Initially reports diarrhea for several days prior to admission  Received loperamide as needed  Patient reports resolved on 5/31    Increased anion gap metabolic acidosis-resolved as of 5/31/2024  Assessment & Plan  History of CKD stage 4, recent rise in creatinine from 2.46 to 3.59, and proteinuria in the setting of T2DM with uncontrolled sugars.   Patient reports he has not been taking his insulin for several days prior to admission due to poor oral intake  Initially had anion gap of 19  UA without ketones, beta-hydroxybutyrate elevated at 3.46, lactic acid 2.0 improved to 1.0, hyperglycemic.  Received insulin infusion, isolyte @50, potassium repletion  VBG (2024) - pH 7.367, pCO2 27.3, pO2 81.4, HCO3 15.3  Gap closed  evening with transition back to home insulin regimen.           VTE Pharmacologic Prophylaxis: VTE Score: 6 High Risk (Score >/= 5) - Pharmacological DVT Prophylaxis Ordered: apixaban (Eliquis). Sequential Compression Devices Ordered.    Mobility:   Basic Mobility Inpatient Raw Score: 22  JH-HLM Goal: 7: Walk 25 feet or more  JH-HLM Achieved: 7: Walk 25 feet or more  JH-HLM Goal NOT achieved. Continue with multidisciplinary rounding and encourage appropriate mobility to improve upon JH-HLM goals.    Patient Centered Rounds: I performed bedside rounds with nursing staff today.  Discussions with Specialists or Other Care Team Provider: Attending, Cardiology, Nephrology, Nursing    Education and Discussions with Family / Patient: Updated  (wife) via phone.    Current Length of Stay: 3 day(s)  Current Patient Status: Inpatient   Discharge Plan: Anticipate discharge in 24-48 hrs to home.    Code Status: Level 1 - Full Code    Subjective:   Patient seen and examined at the bedside. No acute overnight events. Patient reports that he feels well and has no new concerns. He states he has not had any recurrence of diarrhea. Discussed that his heart rates remain high. Patient denies chest pain, shortness of breath, abdominal pain, nausea, vomiting, diarrhea, constipation, fever, and chills.    Objective:     Vitals:   Temp (24hrs), Av.9 °F (36.6 °C), Min:97.6 °F (36.4 °C), Max:98.2 °F (36.8 °C)    Temp:  [97.6 °F (36.4 °C)-98.2 °F (36.8 °C)] 97.7 °F (36.5 °C)  HR:  [101-114] 113  Resp:  [15-17] 16  BP: (128-148)/() 134/94  SpO2:  [96 %-99 %] 97 %  Body mass index is 26.39 kg/m².     Input and Output Summary (last 24 hours):     Intake/Output Summary (Last 24 hours) at 2024 1138  Last data filed at 2024 0715  Gross per 24 hour   Intake 720 ml   Output --   Net  720 ml       Physical Exam:   Physical Exam  Vitals and nursing note reviewed.   Constitutional:       General: He is not in acute distress.     Appearance: He is well-developed. He is ill-appearing. He is not toxic-appearing or diaphoretic.   HENT:      Head: Normocephalic and atraumatic.      Right Ear: External ear normal.      Left Ear: External ear normal.      Nose: Nose normal.      Mouth/Throat:      Mouth: Mucous membranes are dry.   Eyes:      Extraocular Movements: Extraocular movements intact.      Conjunctiva/sclera: Conjunctivae normal.   Cardiovascular:      Rate and Rhythm: Tachycardia present. Rhythm irregularly irregular.      Heart sounds: No murmur heard.  Pulmonary:      Effort: Pulmonary effort is normal. No respiratory distress.      Breath sounds: No wheezing, rhonchi or rales.   Abdominal:      General: Bowel sounds are normal. There is no distension.      Palpations: Abdomen is soft.      Tenderness: There is no abdominal tenderness.   Musculoskeletal:         General: No swelling.      Cervical back: Neck supple.      Right lower leg: No edema.      Left lower leg: No edema.   Skin:     General: Skin is warm and dry.      Capillary Refill: Capillary refill takes less than 2 seconds.   Neurological:      Mental Status: He is alert.   Psychiatric:         Mood and Affect: Mood normal.          Additional Data:     Labs:  Results from last 7 days   Lab Units 06/01/24  0503 05/31/24  0447   WBC Thousand/uL 9.42 17.02*   HEMOGLOBIN g/dL 11.6* 11.5*   HEMATOCRIT % 36.0* 35.6*   PLATELETS Thousands/uL 314 281   BANDS PCT % 4  --    SEGS PCT %  --  85*   LYMPHO PCT % 17 4*   MONO PCT % 5 9   EOS PCT % 1 1     Results from last 7 days   Lab Units 06/01/24  0503 05/30/24  0528 05/29/24  1948   SODIUM mmol/L 139   < > 132*   POTASSIUM mmol/L 4.6   < > 4.2   CHLORIDE mmol/L 108   < > 94*   CO2 mmol/L 23   < > 21   BUN mg/dL 75*   < > 74*   CREATININE mg/dL 2.31*   < > 3.58*   ANION GAP mmol/L 8   < >  17*   CALCIUM mg/dL 8.9   < > 9.1   ALBUMIN g/dL  --   --  3.8   TOTAL BILIRUBIN mg/dL  --   --  1.65*   ALK PHOS U/L  --   --  66   ALT U/L  --   --  16   AST U/L  --   --  16   GLUCOSE RANDOM mg/dL 151*   < > 263*    < > = values in this interval not displayed.     Results from last 7 days   Lab Units 05/30/24  0528   INR  1.23*     Results from last 7 days   Lab Units 06/01/24  1118 06/01/24  0714 05/31/24  2131 05/31/24  1611 05/31/24  1117 05/31/24  0737 05/31/24  0143 05/31/24  0005 05/30/24  2200 05/30/24  2158 05/30/24  2056 05/30/24  1852   POC GLUCOSE mg/dl 154* 153* 220* 238* 260* 173* 183* 175* 115 120 106 159*         Results from last 7 days   Lab Units 05/30/24  1105 05/30/24  0528 05/29/24 1948   LACTIC ACID mmol/L 1.0  --  2.0   PROCALCITONIN ng/ml  --  3.32* 3.57*       Lines/Drains:  Invasive Devices       Peripheral Intravenous Line  Duration             Peripheral IV 05/29/24 Distal;Left;Upper;Ventral (anterior) Arm 2 days    Peripheral IV 05/30/24 Right;Ventral (anterior) Forearm 2 days    Peripheral IV 05/30/24 Distal;Left;Upper;Ventral (anterior) Arm 1 day                      Telemetry:  Telemetry Orders (From admission, onward)               24 Hour Telemetry Monitoring  Continuous x 24 Hours (Telem)        Comments: New onset afib   Expiring   Question:  Reason for 24 Hour Telemetry  Answer:  Arrhythmias requiring acute medical intervention / PPM or ICD malfunction                     Telemetry Reviewed: Atrial fibrillation. HR averaging 120  Indication for Continued Telemetry Use: Arrthymias requiring medical therapy             Imaging: Reviewed radiology reports from this admission including: chest xray and ultrasound(s) and Personally reviewed the following imaging: chest xray    Recent Cultures (last 7 days):   Results from last 7 days   Lab Units 05/30/24  0553 05/29/24 1948   BLOOD CULTURE   --  No Growth at 48 hrs.  No Growth at 48 hrs.   LEGIONELLA URINARY ANTIGEN  Negative  --         Last 24 Hours Medication List:   Current Facility-Administered Medications   Medication Dose Route Frequency Provider Last Rate    apixaban  5 mg Oral BID George Phillips DO      aspirin  81 mg Oral Daily Brandon Labatch,       atorvastatin  40 mg Oral Daily Brandon Labatch, DO      azithromycin  500 mg Oral Q24H Brandon Labatch, DO      cefTRIAXone  1,000 mg Intravenous Q24H Brandno Labatch, DO 1,000 mg (06/01/24 7363)    insulin glargine  32 Units Subcutaneous Daily With Breakfast George Phillips DO      insulin lispro  1-6 Units Subcutaneous TID AC George Phillips DO      insulin lispro  4 Units Subcutaneous TID With Meals George Phillips DO      latanoprost  1 drop Both Eyes HS Brandon Labatch, DO      levothyroxine  25 mcg Oral Early Morning Brandon Labatch, DO      loperamide  2 mg Oral TID PRN Brandon Labatch, DO      metoprolol tartrate  50 mg Oral Q8H Regino Kirkpatrick MD      ondansetron  4 mg Intravenous Q8H PRN Brandon Labatch, DO      pantoprazole  40 mg Oral Early Morning Brandon Labatch, DO          Today, Patient Was Seen By: George Phillips DO    **Please Note: This note may have been constructed using a voice recognition system.**

## 2024-06-01 NOTE — ASSESSMENT & PLAN NOTE
Assessment:  New onset afib noted in ED on EKG with rate of 125. Patient denies any chest pain or palpitations, dizziness and lightheaded. Patient is asymptomatic.   Dys3WAPr score of 4  Patient was prescribed 25 mg metoprol tartrate BID. Patient states he is only taking 25mg daily  TSH WNL 3.460  Echo (5/31/2024) - EF 25-30%, severe global hypokinesis, LV false tendon near the apex, RV systolic function moderately to severely reduced, LA mildly dilated    Plan  Remains tachycardic ranging 105-120 bpm at admission.  Status post cardioversion MELANY 6/30/2024  Cardiology following and recommends transitioning to metoprolol 50 mg twice a day

## 2024-06-01 NOTE — ASSESSMENT & PLAN NOTE
Patient has history of combined heart failure with EF of 50% on echo 2022. Patient denies any chest pain. Patient does endorse fatigue + SOB on exertion. Likely due to new onset PNA. Does not examine volume overloaded without edema, rales, or JVD  Troponins mildly elevated 153 > 134 > 123   Echo (5/31/2024) - EF 25-30%, severe global hypokinesis, LV false tendon near the apex, RV systolic function moderately to severely reduced, LA mildly dilated    Plan  Continue aspirin, statin, and beta blocker  Holding lasix and lisinopril in setting of LOBO  Daily weights, I/Os  Cardiology following, appreciate recommendations  Elevated troponins likely nonischemic myocardial injury in setting of sepsis, tachycardia and known underlying CAD   Recommend OP follow up and likely OP stress testing once recovered

## 2024-06-01 NOTE — ASSESSMENT & PLAN NOTE
Recent Labs     05/31/24  0547 06/01/24  0503 06/02/24  0536   CREATININE 2.83* 2.31* 2.01*   EGFR 22 28 33     Estimated Creatinine Clearance: 37.3 mL/min (A) (by C-G formula based on SCr of 2.01 mg/dL (H)).    Follows with Dr. Thomas outpatient nephrology for CKD stage 3b/4 in setting of diabetes, hypertensive nephrosclerosis, ischemic cardiomyopathy, and cirrhosis. Worsening renal function since Feb and May 2024.  POA 3.58; (baseline 1.4 - 1.6)  UA with 2+ protein, 2-4 RBC, granular casts  Suspect multifactorial etiology to prerenal acidemia in setting of poor oral intake, hypotension in setting of sepsis 2/2 pneumonia, ATN with granular casts on UA, urinary retention, lasix and ACE use  Currently improving    Plan  Nephrology following, appreciate recommendations  Monitor BMP  Urinary retention protocol  Avoid hypoperfusion of the kidneys, minimize nephrotoxins  Lisinopril held at discharge.  Lasix decreased to 40 mg daily

## 2024-06-01 NOTE — PROGRESS NOTES
NEPHROLOGY PROGRESS NOTE   Noel Khan 64 y.o. male MRN: [id number]  Unit/Bed#: S -01 Encounter: [id number]    ASSESSMENT & PLAN:  64-year-old male with chronic kidney disease stage IIIb  liver cirrhosis, CAD status post stents, hypertension, diabetes mellitus presented with fevers chills fatigue, poor oral intake and chronic diarrhea.  Nephrology consulted for acute kidney injury.  On presentation he was found to be in A-fib with RVR.  Also was found to have urine retention  Acute kidney injury, POA  -Baseline creatinine: 1.4 to 1.6 mg/dL.  Renal function was at creatinine 2.03 in February 2024 but worsened to 2.46 in May 2024  -Admission creatinine: 3.58 mg/dl  - Work up:   UA with microscopy: 2+ protein, 2-4 RBCs, 1-2 WBC, granular casts  Kidney ultrasound without any hydronephrosis  -Etiology: Suspected ATN from volume depletion due to GI loss, poor oral intake and diuretic use plus hypotension due to sepsis from pneumonia in the setting of autoregulatory failure from being on ACE inhibitor Plus new onset of A-fib with RVR contributing to dynamic changes.  Also there is a component of urinary retention.  Possibility of progression to ATN as finding of granular casts on the urine exam  -Plan:   Renal function improved to creatinine 2.31 mg/dL status post hydration with IV fluids and holding ACE inhibitor.  IV fluids were stopped on 5/31.  Continue to hold diuretics and ACE inhibitor.  Clinically appears euvolemic.  Continue retention protocol  Avoid nephrotoxins and dose all medications per EGFR.    Avoid hypotension.                                             Chronic Kidney Disease Stage 3B  -Outpatient Nephrologist: Dr. Thomas  -Baseline Creatinine: 1.4 to 1.6 mg/dL but recently was 2.46 on 5/20/2024  -Etiology: Chronic kidney disease likely due to diabetic nephropathy, hypertensive nephrosclerosis, chronic hepatorenal syndrome in the setting of liver cirrhosis  -Avoid Nephrotoxins and Dose all  medications per eGFR  -Will need outpatient follow up after discharge.     BP/primary hypertension  -Currently BP stable and is at goal.  Was low on admission.  Home medication include Lasix, lisinopril 5 mg, metoprolol 25 mg twice daily  -Plan: Continue to hold lisinopril and diuretics at this time, okay to continue metoprolol.  Avoid hypotension     Anion gap metabolic acidosis: Bicarb level of 19 with anion gap of 19 on admission.  Lactate level was normal.  Beta hydroxybutyrate was elevated but no ketones on UA.   Was treated initially with insulin drip for hyperglycemia  -There was concern for diabetic ketoacidosis.  Also in the setting of acute kidney injury bicarb level was low.  - Bicarb level now improved to normal range at 23.  Continue to monitor     Combined systolic and diastolic CHF: Echo with ejection fraction 50% on echo from 2021, at home was on Lasix 80 mg daily but currently in the setting of volume depletion from GI loss and in the setting of acute kidney injury, diuretics were held and patient received IV fluids  -.  Clinically appears euvolemic, continue to monitor     Hyperphosphatemia in the setting of acute kidney injury, phosphorus 4.7 recommend low phosphorus diet, continue to monitor.  Recheck with labs tomorrow      Sepsis due to right lower lobe pneumonia: Antibiotic per primary team.       A-fib with RVR: Management per cardiology.   Currently on treatment with metoprolol.  Continue management per cardiology     Diabetes mellitus type 2: Management per primary team.  Found to have hyperglycemia on admission     Discussed with primary team that renal function improving despite stopping IV fluid and would continue to monitor.  Okay for discharge from nephrology side if rate is well-controlled, recommend discussing with cardiology    SUBJECTIVE:  No new complaints.  No chest pain or shortness of breath, nausea vomiting    OBJECTIVE:  Current Weight: Weight - Scale: 83.4 kg (183 lb 14.4  oz)  Vitals:    06/01/24 0715   BP: 134/94   Pulse: (!) 113   Resp: 16   Temp: 97.7 °F (36.5 °C)   SpO2: 97%       Intake/Output Summary (Last 24 hours) at 6/1/2024 1044  Last data filed at 6/1/2024 0715  Gross per 24 hour   Intake 720 ml   Output --   Net 720 ml       Physical Exam  General:  Ill looking, awake.  Eyes: Conjunctivae pink,  Sclera anicteric  ENT: lips and mucous membranes moist  Neck: supple   Chest: Clear to Auscultation both lungs,  no crackles, ronchus or wheezing.  CVS: S1 & S2 present, irregularly irregular, tachycardic, no murmur.  Abdomen: soft, non-tender, non-distended, Bowel sounds normoactive  Extremities: no edema of  legs  Skin: no rash  Neuro: awake, alert, oriented  Psych: Mood and affect appropriate      Medications:    Current Facility-Administered Medications:     apixaban (ELIQUIS) tablet 5 mg, 5 mg, Oral, BID, George Phillips DO, 5 mg at 06/01/24 0813    aspirin chewable tablet 81 mg, 81 mg, Oral, Daily, Brandon Labsuad, DO, 81 mg at 06/01/24 0813    atorvastatin (LIPITOR) tablet 40 mg, 40 mg, Oral, Daily, Brandon Labatch, DO, 40 mg at 06/01/24 0813    azithromycin (ZITHROMAX) tablet 500 mg, 500 mg, Oral, Q24H, Brandon Labatch, DO, 500 mg at 05/31/24 2118    ceftriaxone (ROCEPHIN) 1 g/50 mL in dextrose IVPB, 1,000 mg, Intravenous, Q24H, Brandon Sy DO, Last Rate: 100 mL/hr at 06/01/24 0454, 1,000 mg at 06/01/24 0454    insulin glargine (LANTUS) subcutaneous injection 32 Units 0.32 mL, 32 Units, Subcutaneous, Daily With Breakfast, George Phillips DO, 32 Units at 06/01/24 0737    insulin lispro (HumALOG/ADMELOG) 100 units/mL subcutaneous injection 1-6 Units, 1-6 Units, Subcutaneous, TID AC, 1 Units at 06/01/24 0735 **AND** Fingerstick Glucose (POCT), , , TID AC, George Phillips DO    insulin lispro (HumALOG/ADMELOG) 100 units/mL subcutaneous injection 4 Units, 4 Units, Subcutaneous, TID With Meals, George Phillips DO, 4 Units at 06/01/24 0774    latanoprost (XALATAN) 0.005 %  "ophthalmic solution 1 drop, 1 drop, Both Eyes, HS, Brandon Labatch, DO, 1 drop at 05/31/24 2119    levothyroxine tablet 25 mcg, 25 mcg, Oral, Early Morning, Brandon Labatch, DO, 25 mcg at 06/01/24 0454    loperamide (IMODIUM) capsule 2 mg, 2 mg, Oral, TID PRN, Brandon Labatch, DO, 2 mg at 05/30/24 0116    metoprolol tartrate (LOPRESSOR) tablet 50 mg, 50 mg, Oral, Q8H, Regino Kirkpatrick MD, 50 mg at 06/01/24 0813    ondansetron (ZOFRAN) injection 4 mg, 4 mg, Intravenous, Q8H PRN, Brandon Labatch, DO    pantoprazole (PROTONIX) EC tablet 40 mg, 40 mg, Oral, Early Morning, Brandon Labatch, DO, 40 mg at 06/01/24 0454    Invasive Devices:        Lab Results:   Results from last 7 days   Lab Units 06/01/24  0503 05/31/24  0547 05/31/24  0447 05/31/24  0006 05/30/24  1407 05/30/24  0528 05/30/24  0211 05/29/24  1948   WBC Thousand/uL 9.42  --  17.02*  --   --  20.73*   < > 20.25*   HEMOGLOBIN g/dL 11.6*  --  11.5*  --   --  12.5   < > 13.8   HEMATOCRIT % 36.0*  --  35.6*  --   --  39.5   < > 42.6   PLATELETS Thousands/uL 314  --  281  --   --  275   < > 267   POTASSIUM mmol/L 4.6 5.0  --  5.1   < > 3.9  --  4.2   CHLORIDE mmol/L 108 105  --  104   < > 96  --  94*   CO2 mmol/L 23 20*  --  19*   < > 19*  --  21   BUN mg/dL 75* 84*  --  85*   < > 78*  --  74*   CREATININE mg/dL 2.31* 2.83*  --  2.99*   < > 3.59*  --  3.58*   CALCIUM mg/dL 8.9 8.5  --  8.5   < > 8.7  --  9.1   ALK PHOS U/L  --   --   --   --   --   --   --  66   ALT U/L  --   --   --   --   --   --   --  16   AST U/L  --   --   --   --   --   --   --  16    < > = values in this interval not displayed.       Previous work up:         Portions of the record may have been created with voice recognition software. Occasional wrong word or \"sound a like\" substitutions may have occurred due to the inherent limitations of voice recognition software. Read the chart carefully and recognize, using context, where substitutions have occurred.If you have any " questions, please contact the dictating provider.

## 2024-06-01 NOTE — ASSESSMENT & PLAN NOTE
Lab Results   Component Value Date    HGBA1C 7.1 (H) 05/20/2024       Recent Labs     05/31/24  1611 05/31/24  2131 06/01/24  0714 06/01/24  1118   POCGLU 238* 220* 153* 154*         Blood Sugar Average: Last 72 hrs:  (P) [id number]    Endorses recent poor PO intake and has not been taking his insulin for every days prior to admission. home regimen of Lantus 32 units qAM and lispro 4 units TID with meals  Patient states that at home he normally takes lantus 34 units in AM and 4 units of short acting insulin with meals  Received insulin gtt for anion gap metabolic acidosis in setting of  ketosis, hyperglycemia, and LOBO. Gap closed 5/31 evening with transition back to home regimen.    Plan  Home insulin regimen resumed however given 30 units of Lantus in the a.m. and 3 units of lispro around meals  SSI  Diabetic diet

## 2024-06-01 NOTE — DISCHARGE SUMMARY
[facility]  Discharge- Noel Khan 1960, 64 y.o. male MRN: [id number]  Unit/Bed#: S -01 Encounter: [id number]  Primary Care Provider: Jere Scruggs DO   Date and time admitted to hospital: 5/29/2024  7:24 PM    * Sepsis (HCC)  Assessment & Plan  Assessment:  65 yo M who presented fatigue, chills, nausea, diarrhea and subjective fevers at home for 5 days prior to admission. States he was having 2-5 watery Bms per day. Denies any recent antibiotic usage, any blood or mucus in stool.    Patient met SIRS criteria in ED with  and WBC 20.25. Lactic acid normal. Procalcitonin elevated 3.51. Suspected pneumonia source.  In ED, received 2.5 L boluses, maintenance fluids, and started on cefepime and azithromycin   CXR (5/29/2024) - Extensive right base pneumonia  UA with proteinuria, granular casts 25-30  FLU/RSV/COVID negative  Urine legionella and strep negative.  Azithromycin discontinued after 3 days  Blood cultures 2/2 NGTD    Plan  Continue ceftriaxone 1 g daily for 7 days.  Transition to oral at discharge cefdinir to finish 7-day course of antibiotics  See pan for pneumonia below    New onset atrial fibrillation (HCC) s/p MELANY CV  Assessment & Plan  Assessment:  New onset afib noted in ED on EKG with rate of 125. Patient denies any chest pain or palpitations, dizziness and lightheaded. Patient is asymptomatic.   Hba2HZPl score of 4  Patient was prescribed 25 mg metoprol tartrate BID. Patient states he is only taking 25mg daily  TSH WNL 3.460  Echo (5/31/2024) - EF 25-30%, severe global hypokinesis, LV false tendon near the apex, RV systolic function moderately to severely reduced, LA mildly dilated    Plan  Remains tachycardic ranging 105-120 bpm at admission.  Status post cardioversion MELANY 6/30/2024  Cardiology following and recommends transitioning to metoprolol 50 mg twice a day    Right lower lobe pneumonia  Assessment & Plan  CXR (5/29/2024) - Extensive right base  pneumonia  FLU/RSV/COVID negative  Urine legionella and strep negative  Drip score low risk (0)  Patient met sepsis criteria, see above    Plan  Continue ceftriaxone  Aspiration precautions  Incentive spirometry    Acute-on-chronic kidney injury  (HCC)  Assessment & Plan  Recent Labs     05/31/24  0547 06/01/24  0503 06/02/24  0536   CREATININE 2.83* 2.31* 2.01*   EGFR 22 28 33     Estimated Creatinine Clearance: 37.3 mL/min (A) (by C-G formula based on SCr of 2.01 mg/dL (H)).    Follows with Dr. Thomas outpatient nephrology for CKD stage 3b/4 in setting of diabetes, hypertensive nephrosclerosis, ischemic cardiomyopathy, and cirrhosis. Worsening renal function since Feb and May 2024.  POA 3.58; (baseline 1.4 - 1.6)  UA with 2+ protein, 2-4 RBC, granular casts  Suspect multifactorial etiology to prerenal acidemia in setting of poor oral intake, hypotension in setting of sepsis 2/2 pneumonia, ATN with granular casts on UA, urinary retention, lasix and ACE use  Currently improving    Plan  Nephrology following, appreciate recommendations  Monitor BMP  Urinary retention protocol  Avoid hypoperfusion of the kidneys, minimize nephrotoxins  Lisinopril held at discharge.  Lasix decreased to 40 mg daily    Ischemic cardiomyopathy  Assessment & Plan  Patient has history of combined heart failure with EF of 50% on echo 2022. Patient denies any chest pain. Patient does endorse fatigue + SOB on exertion. Likely due to new onset PNA. Does not examine volume overloaded without edema, rales, or JVD  Troponins mildly elevated 153 > 134 > 123   Echo (5/31/2024) - EF 25-30%, severe global hypokinesis, LV false tendon near the apex, RV systolic function moderately to severely reduced, LA mildly dilated    Plan  Continue aspirin, statin, and beta blocker  Holding lasix and lisinopril in setting of LOBO  Daily weights, I/Os  Cardiology following, appreciate recommendations  Elevated troponins likely nonischemic myocardial injury in  setting of sepsis, tachycardia and known underlying CAD   Recommend OP follow up and likely OP stress testing once recovered    Chronic combined systolic and diastolic CHF  Assessment & Plan  Wt Readings from Last 3 Encounters:   06/02/24 83.1 kg (183 lb 3.2 oz)   05/23/24 87.1 kg (192 lb)   02/28/24 87.5 kg (193 lb)     Patient has history of combined heart failure with EF of 50 percent on echo 2022. Patient denies any chest pain. Patient does endorse fatigue + sob on exertion. Likely due to new onset PNA. Does not examine volume overloaded without edema rales of JVD.  Echo done this admission shows EF 30% with severe global hypokinesis    Plan  -daily weights + monitor I&O  -cardiology consult    Coronary artery disease  Assessment & Plan  Cardiac cath (2014) - LULA x2 to  of proximal/mid LAD, LULA to mid LAD, LULA to RCA  Continue aspirin, statin, and BB    Insulin-dependent diabetes mellitus  Assessment & Plan  Lab Results   Component Value Date    HGBA1C 7.1 (H) 05/20/2024       Recent Labs     05/31/24  1611 05/31/24  2131 06/01/24  0714 06/01/24  1118   POCGLU 238* 220* 153* 154*         Blood Sugar Average: Last 72 hrs:  (P) [id number]    Endorses recent poor PO intake and has not been taking his insulin for every days prior to admission. home regimen of Lantus 32 units qAM and lispro 4 units TID with meals  Patient states that at home he normally takes lantus 34 units in AM and 4 units of short acting insulin with meals  Received insulin gtt for anion gap metabolic acidosis in setting of  ketosis, hyperglycemia, and LOBO. Gap closed 5/31 evening with transition back to home regimen.    Plan  Home insulin regimen resumed however given 30 units of Lantus in the a.m. and 3 units of lispro around meals  SSI  Diabetic diet    Benign essential hypertension  Assessment & Plan  Controlled, stable  Continue BB    Elevated troponin  Assessment & Plan  Troponins mildly elevated 153 > 134 > 123   Likely  nonischemic myocardial injury in setting of sepsis, tachycardia and known underlying CAD     Diarrhea-resolved as of 5/31/2024  Assessment & Plan  Initially reports diarrhea for several days prior to admission  Received loperamide as needed  Patient reports resolved on 5/31    Increased anion gap metabolic acidosis-resolved as of 5/31/2024  Assessment & Plan  History of CKD stage 4, recent rise in creatinine from 2.46 to 3.59, and proteinuria in the setting of T2DM with uncontrolled sugars.   Patient reports he has not been taking his insulin for several days prior to admission due to poor oral intake  Initially had anion gap of 19  UA without ketones, beta-hydroxybutyrate elevated at 3.46, lactic acid 2.0 improved to 1.0, hyperglycemic. Received insulin infusion, isolyte @50, potassium repletion  VBG (5/30/2024) - pH 7.367, pCO2 27.3, pO2 81.4, HCO3 15.3  Gap closed 5/31 evening with transition back to home insulin regimen.      Medical Problems       Resolved Problems  Date Reviewed: 6/1/2024            Resolved    Increased anion gap metabolic acidosis 5/31/2024     Resolved by  George Phillips DO    Diarrhea 5/31/2024     Resolved by  George Phillips DO        Discharging Resident: Kyle Brunner, MD  Discharging Attending: Whitney Gould MD  PCP: Jere Scruggs DO  Admission Date:   Admission Orders (From admission, onward)       Ordered        05/29/24 2134  INPATIENT ADMISSION  Once                          Discharge Date: 06/03/24    Consultations During Hospital Stay:  Cardiology  Nephrology    Procedures Performed:   Cardioversion on 6/3/2024 with MELANY    Significant Findings / Test Results:   US kidney and bladder   Final Result by Alverto Medrano DO (05/30 1637)      Visualization of the kidneys and bladder somewhat limited by shadowing. Exam is unremarkable within these confines.            Workstation performed: [id number]         XR chest 1 view portable   ED Interpretation by Mitesh Rojas MD  (05/29 2102)   My personal interpretation-right lower lobe consolidation suggestive of pneumonia      Final Result by Katherine Atkins MD (05/30 1511)      Extensive right base pneumonia.            Workstation performed: [id number]           Results Reviewed       Procedure Component Value Units Date/Time    Blood culture #1 [[id number]] Collected: 05/29/24 1948    Lab Status: Preliminary result Specimen: Blood from Arm, Left Updated: 06/03/24 0001     Blood Culture No Growth After 4 Days.    Blood culture #2 [[id number]] Collected: 05/29/24 1948    Lab Status: Preliminary result Specimen: Blood from Arm, Right Updated: 06/03/24 0001     Blood Culture No Growth After 4 Days.    Strep Pneumoniae, Urine [[id number]]  (Normal) Collected: 05/30/24 0553    Lab Status: Final result Specimen: Urine, Other Updated: 05/30/24 1031     Strep pneumoniae antigen, urine Negative    Legionella antigen, urine [[id number]]  (Normal) Collected: 05/30/24 0553    Lab Status: Final result Specimen: Urine, Other Updated: 05/30/24 1030     Legionella Urinary Antigen Negative    Basic metabolic panel [[id number]]  (Abnormal) Collected: 05/30/24 0528    Lab Status: Final result Specimen: Blood from Arm, Right Updated: 05/30/24 0721     Sodium 134 mmol/L      Potassium 3.9 mmol/L      Chloride 96 mmol/L      CO2 19 mmol/L      ANION GAP 19 mmol/L      BUN 78 mg/dL      Creatinine 3.59 mg/dL      Glucose 277 mg/dL      Calcium 8.7 mg/dL      eGFR 16 ml/min/1.73sq m     Narrative:      National Kidney Disease Foundation guidelines for Chronic Kidney Disease (CKD):     Stage 1 with normal or high GFR (GFR > 90 mL/min/1.73 square meters)    Stage 2 Mild CKD (GFR = 60-89 mL/min/1.73 square meters)    Stage 3A Moderate CKD (GFR = 45-59 mL/min/1.73 square meters)    Stage 3B Moderate CKD (GFR = 30-44 mL/min/1.73 square meters)    Stage 4 Severe CKD (GFR = 15-29 mL/min/1.73 square meters)    Stage 5 End Stage CKD (GFR <15 mL/min/1.73 square  meters)  Note: GFR calculation is accurate only with a steady state creatinine    TSH, 3rd generation with Free T4 reflex [[id number]]  (Normal) Collected: 05/30/24 0528    Lab Status: Final result Specimen: Blood from Arm, Right Updated: 05/30/24 0719     TSH 3RD GENERATON 3.460 uIU/mL     APTT six (6) hours after Heparin bolus/drip initiation or dosing change [[id number]]  (Abnormal) Collected: 05/30/24 0528    Lab Status: Final result Specimen: Blood from Arm, Right Updated: 05/30/24 0712      seconds     Narrative:      Verified by repeat analysis.    Protime-INR [[id number]]  (Abnormal) Collected: 05/30/24 0528    Lab Status: Final result Specimen: Blood from Arm, Right Updated: 05/30/24 0712     Protime 16.2 seconds      INR 1.23    Procalcitonin [[id number]]  (Abnormal) Collected: 05/30/24 0528    Lab Status: Final result Specimen: Blood from Arm, Right Updated: 05/30/24 0712     Procalcitonin 3.32 ng/ml     CBC and differential [[id number]]  (Abnormal) Collected: 05/30/24 0528    Lab Status: Final result Specimen: Blood from Arm, Right Updated: 05/30/24 0645     WBC 20.73 Thousand/uL      RBC 4.47 Million/uL      Hemoglobin 12.5 g/dL      Hematocrit 39.5 %      MCV 88 fL      MCH 28.0 pg      MCHC 31.6 g/dL      RDW 13.7 %      MPV 11.0 fL      Platelets 275 Thousands/uL      nRBC 0 /100 WBCs      Segmented % 89 %      Immature Grans % 1 %      Lymphocytes % 2 %      Monocytes % 8 %      Eosinophils Relative 0 %      Basophils Relative 0 %      Absolute Neutrophils 18.37 Thousands/µL      Absolute Immature Grans 0.24 Thousand/uL      Absolute Lymphocytes 0.46 Thousands/µL      Absolute Monocytes 1.57 Thousand/µL      Eosinophils Absolute 0.01 Thousand/µL      Basophils Absolute 0.08 Thousands/µL     UA w Reflex to Microscopic w Reflex to Culture [[id number]]  (Abnormal) Collected: 05/30/24 0552    Lab Status: Final result Specimen: Urine, Straight Cath Updated: 05/30/24 0606     Color, UA Yellow      Clarity, UA Turbid     Specific Gravity, UA 1.015     pH, UA 5.5     Leukocytes, UA Negative     Nitrite, UA Negative     Protein,  (2+) mg/dl      Glucose, UA Negative mg/dl      Ketones, UA Negative mg/dl      Urobilinogen, UA <2.0 mg/dl      Bilirubin, UA Negative     Occult Blood, UA Negative    HS Troponin I 4hr [[id number]]  (Abnormal) Collected: 05/30/24 0211    Lab Status: Final result Specimen: Blood from Arm, Left Updated: 05/30/24 0306     hs TnI 4hr 123 ng/L      Delta 4hr hsTnI -30 ng/L     CBC [[id number]]  (Abnormal) Collected: 05/30/24 0211    Lab Status: Final result Specimen: Blood from Arm, Left Updated: 05/30/24 0239     WBC 16.31 Thousand/uL      RBC 3.98 Million/uL      Hemoglobin 11.3 g/dL      Hematocrit 35.1 %      MCV 88 fL      MCH 28.4 pg      MCHC 32.2 g/dL      RDW 13.7 %      Platelets 236 Thousands/uL      MPV 10.5 fL     HS Troponin I 2hr [[id number]]  (Abnormal) Collected: 05/29/24 2157    Lab Status: Final result Specimen: Blood from Arm, Left Updated: 05/29/24 2233     hs TnI 2hr 134 ng/L      Delta 2hr hsTnI -19 ng/L     Procalcitonin [[id number]]  (Abnormal) Collected: 05/29/24 1948    Lab Status: Final result Specimen: Blood from Arm, Left Updated: 05/29/24 2051     Procalcitonin 3.57 ng/ml     HS Troponin 0hr (reflex protocol) [[id number]]  (Abnormal) Collected: 05/29/24 1948    Lab Status: Final result Specimen: Blood from Arm, Left Updated: 05/29/24 2048     hs TnI 0hr 153 ng/L     Comprehensive metabolic panel [[id number]]  (Abnormal) Collected: 05/29/24 1948    Lab Status: Final result Specimen: Blood from Arm, Right Updated: 05/29/24 2046     Sodium 132 mmol/L      Potassium 4.2 mmol/L      Chloride 94 mmol/L      CO2 21 mmol/L      ANION GAP 17 mmol/L      BUN 74 mg/dL      Creatinine 3.58 mg/dL      Glucose 263 mg/dL      Calcium 9.1 mg/dL      AST 16 U/L      ALT 16 U/L      Alkaline Phosphatase 66 U/L      Total Protein 8.0 g/dL      Albumin 3.8 g/dL      Total  Bilirubin 1.65 mg/dL      eGFR 16 ml/min/1.73sq m     Narrative:      National Kidney Disease Foundation guidelines for Chronic Kidney Disease (CKD):     Stage 1 with normal or high GFR (GFR > 90 mL/min/1.73 square meters)    Stage 2 Mild CKD (GFR = 60-89 mL/min/1.73 square meters)    Stage 3A Moderate CKD (GFR = 45-59 mL/min/1.73 square meters)    Stage 3B Moderate CKD (GFR = 30-44 mL/min/1.73 square meters)    Stage 4 Severe CKD (GFR = 15-29 mL/min/1.73 square meters)    Stage 5 End Stage CKD (GFR <15 mL/min/1.73 square meters)  Note: GFR calculation is accurate only with a steady state creatinine    Lipase [[id number]]  (Abnormal) Collected: 05/29/24 1948    Lab Status: Final result Specimen: Blood from Arm, Right Updated: 05/29/24 2046     Lipase <6 u/L     Lactic acid [[id number]]  (Normal) Collected: 05/29/24 1948    Lab Status: Final result Specimen: Blood from Arm, Left Updated: 05/29/24 2046     LACTIC ACID 2.0 mmol/L     Narrative:      Result may be elevated if tourniquet was used during collection.    FLU/RSV/COVID - if FLU/RSV clinically relevant [[id number]]  (Normal) Collected: 05/29/24 1948    Lab Status: Final result Specimen: Nares from Nose Updated: 05/29/24 2045     SARS-CoV-2 Negative     INFLUENZA A PCR Negative     INFLUENZA B PCR Negative     RSV PCR Negative    Narrative:      FOR PEDIATRIC PATIENTS - copy/paste COVID Guidelines URL to browser: [email or web address]    SARS-CoV-2 assay is a Nucleic Acid Amplification assay intended for the  qualitative detection of nucleic acid from SARS-CoV-2 in nasopharyngeal  swabs. Results are for the presumptive identification of SARS-CoV-2 RNA.    Positive results are indicative of infection with SARS-CoV-2, the virus  causing COVID-19, but do not rule out bacterial infection or co-infection  with other viruses. Laboratories within the [place] and its  territories are required to report all  positive results to the appropriate  public health authorities. Negative results do not preclude SARS-CoV-2  infection and should not be used as the sole basis for treatment or other  patient management decisions. Negative results must be combined with  clinical observations, patient history, and epidemiological information.  This test has not been FDA cleared or approved.    This test has been authorized by FDA under an Emergency Use Authorization  (EUA). This test is only authorized for the duration of time the  declaration that circumstances exist justifying the authorization of the  emergency use of an in vitro diagnostic tests for detection of SARS-CoV-2  virus and/or diagnosis of COVID-19 infection under section 564(b)(1) of  the Act, 21 U.S.C. 360bbb-3(b)(1), unless the authorization is terminated  or revoked sooner. The test has been validated but independent review by FDA  and CLIA is pending.    Test performed using [employer] GeneXpert: This RT-PCR assay targets N2,  a region unique to SARS-CoV-2. A conserved region in the E-gene was chosen  for pan-Sarbecovirus detection which includes SARS-CoV-2.    According to CMS-2020-01-R, this platform meets the definition of high-throughput technology.    Protime-INR [[id number]]  (Abnormal) Collected: 05/29/24 1948    Lab Status: Final result Specimen: Blood from Arm, Left Updated: 05/29/24 2039     Protime 15.5 seconds      INR 1.17    APTT [[id number]]  (Abnormal) Collected: 05/29/24 1948    Lab Status: Final result Specimen: Blood from Arm, Left Updated: 05/29/24 2039     PTT 38 seconds     CBC and differential [[id number]]  (Abnormal) Collected: 05/29/24 1948    Lab Status: Final result Specimen: Blood from Arm, Left Updated: 05/29/24 2008     WBC 20.25 Thousand/uL      RBC 4.82 Million/uL      Hemoglobin 13.8 g/dL      Hematocrit 42.6 %      MCV 88 fL      MCH 28.6 pg      MCHC 32.4 g/dL      RDW 13.6 %      MPV 10.3 fL      Platelets 267 Thousands/uL      nRBC 0  /100 WBCs      Segmented % 88 %      Immature Grans % 1 %      Lymphocytes % 2 %      Monocytes % 9 %      Eosinophils Relative 0 %      Basophils Relative 0 %      Absolute Neutrophils 17.78 Thousands/µL      Absolute Immature Grans 0.17 Thousand/uL      Absolute Lymphocytes 0.49 Thousands/µL      Absolute Monocytes 1.73 Thousand/µL      Eosinophils Absolute 0.03 Thousand/µL      Basophils Absolute 0.05 Thousands/µL             Incidental Findings:   None     Test Results Pending at Discharge (will require follow up):  None     Outpatient Tests Requested:  BMP in 1 week  Repeat echo    Complications:  None    Reason for Admission: Sepsis and new-onset atrial fibrillation    Hospital Course:   Noel Khan is a 64 y.o. male patient who originally presented to the hospital on 5/29/2024 due to fatigue, chills, nausea, diarrhea, and subjective fevers at home for past 5 days prior to admission.  Denies any recent antibiotic use.  Patient met SIRS criteria in the ED with heart rate 122 and WBC 20.25, lactic acid was normal, procalcitonin elevated at 3.51.  Chest x-ray revealed extensive right base pneumonia.  In ED received 2.5 L IV fluid bolus and was placed on maintenance fluid.  Patient was treated with ceftriaxone and azithromycin for right lower lobe pneumonia.  FLU/RSV/COVID negative, urine Legionella and strep testing negative, blood cultures x 2 without growth.  Patient reports that he has not been taking his insulin for several days prior to admission due to poor oral intake.  He was found to have an anion gap of 19, UA without ketones, elevated beta hydroxybutyrate at 3.46, lactic acid 2.0 that improved to 1.0 after fluids, and was hyperglycemic.  He received insulin infusion, isolate fluids, and potassium repletion.  On 5/31 his anion gap was closed and he was transitioned back to his home insulin regimen.  In the ED he was found in new onset atrial fibrillation at a rate of 125 and denies any chest pain,  palpitations, dizziness, or lightheadedness.  Cardiology was consulted and patient was started on beta-blocker and Eliquis.  Echocardiogram was performed on 5/31/2024 that showed an EF of 25-30%, severe global hypokinesis with regional variation, LV false tendon near apex, dilated right ventricle, moderate to severely reduced RV systolic function, mildly dilated left atrium.  Given his history of CKD stage IV, diabetes, hypertensive nephrosclerosis, and worsening renal function since February 2024 and metabolic acidosis nephrology was consulted during this hospitalization.  UA showed 2+ protein and granular casts.  LOBO was treated with IV fluids which improved renal function.  Troponins were noted to be mildly elevated likely due to nonischemic myocardial injury in the setting of sepsis, tachycardia, and known underlying CAD.     Patient was noted to be in A-fib with RVR despite medical therapy.  He underwent cardioversion with MELANY on 6 3/24 and tolerated procedure well.  He will be discharged on metoprolol 50 mg twice a day and his lisinopril will be held at discharge.  Continue the function is still above his baseline range however it is improving.  Will defer to outpatient primary care provider on when to restart.    Patient will require a repeat echocardiogram in a few weeks to determine if he is ejection fraction was secondary to his A-fib.  He may benefit from outpatient stress test pending further discussion with his cardiologist at discharge.    Please see above list of diagnoses and related plan for additional information.     Condition at Discharge: stable    Discharge Day Visit / Exam:   Subjective: Patient feels well.  He reports that he would like to go home after the procedure.  Patient tolerated the procedure and was back in normal sinus rhythm.  He will be discharged require outpatient follow-up.  Vitals: Blood Pressure: 127/73 (06/03/24 1133)  Pulse: 69 (06/03/24 1133)  Temperature: (!) 97.2 °F  "(36.2 °C) (06/03/24 1117)  Temp Source: Temporal (06/03/24 1117)  Respirations: 18 (06/03/24 1133)  Height: 5' 10\" (177.8 cm) (06/03/24 1102)  Weight - Scale: 83 kg (183 lb) (06/03/24 1102)  SpO2: 98 % (06/03/24 1133)  Exam:   Physical Exam  Vitals and nursing note reviewed.   Constitutional:       General: He is not in acute distress.     Appearance: He is well-developed. He is not ill-appearing, toxic-appearing or diaphoretic.   HENT:      Head: Normocephalic and atraumatic.      Right Ear: External ear normal.      Left Ear: External ear normal.      Nose: Nose normal.      Mouth/Throat:      Mouth: Mucous membranes are moist.   Eyes:      General: No scleral icterus.     Extraocular Movements: Extraocular movements intact.      Conjunctiva/sclera: Conjunctivae normal.   Cardiovascular:      Rate and Rhythm: Normal rate and regular rhythm.      Heart sounds: No murmur heard.  Pulmonary:      Effort: Pulmonary effort is normal. No respiratory distress.      Breath sounds: No wheezing, rhonchi or rales.   Abdominal:      General: Bowel sounds are normal. There is no distension.      Palpations: Abdomen is soft.      Tenderness: There is no abdominal tenderness.   Musculoskeletal:         General: No swelling.      Cervical back: Neck supple.      Right lower leg: No edema.      Left lower leg: No edema.   Skin:     General: Skin is warm and dry.      Capillary Refill: Capillary refill takes less than 2 seconds.   Neurological:      Mental Status: He is alert.   Psychiatric:         Mood and Affect: Mood normal.          Discussion with Family: Updated  (wife and daughter) at bedside.  By attending physician    Discharge instructions/Information to patient and family:   See after visit summary for information provided to patient and family.      Provisions for Follow-Up Care:  See after visit summary for information related to follow-up care and any pertinent home health orders.      Mobility at time " of Discharge:   Basic Mobility Inpatient Raw Score: 24  JH-HLM Goal: 8: Walk 250 feet or more  JH-HLM Achieved: 8: Walk 250 feet ot more  HLM Goal achieved. Continue to encourage appropriate mobility.     Disposition:   Home    Planned Readmission: no    Discharge Medications:  See after visit summary for reconciled discharge medications provided to patient and/or family.      **Please Note: This note may have been constructed using a voice recognition system**

## 2024-06-01 NOTE — PLAN OF CARE
Problem: PAIN - ADULT  Goal: Verbalizes/displays adequate comfort level or baseline comfort level  Description: Interventions:  - Encourage patient to monitor pain and request assistance  - Assess pain using appropriate pain scale  - Administer analgesics based on type and severity of pain and evaluate response  - Implement non-pharmacological measures as appropriate and evaluate response  - Consider cultural and social influences on pain and pain management  - Notify physician/advanced practitioner if interventions unsuccessful or patient reports new pain  Outcome: Progressing     Problem: INFECTION - ADULT  Goal: Absence or prevention of progression during hospitalization  Description: INTERVENTIONS:  - Assess and monitor for signs and symptoms of infection  - Monitor lab/diagnostic results  - Monitor all insertion sites, i.e. indwelling lines, tubes, and drains  - Monitor endotracheal if appropriate and nasal secretions for changes in amount and color  - [place] appropriate cooling/warming therapies per order  - Administer medications as ordered  - Instruct and encourage patient and family to use good hand hygiene technique  - Identify and instruct in appropriate isolation precautions for identified infection/condition  Outcome: Progressing     Problem: DISCHARGE PLANNING  Goal: Discharge to home or other facility with appropriate resources  Description: INTERVENTIONS:  - Identify barriers to discharge w/patient and caregiver  - Arrange for needed discharge resources and transportation as appropriate  - Identify discharge learning needs (meds, wound care, etc.)  - Arrange for interpretive services to assist at discharge as needed  - Refer to Case Management Department for coordinating discharge planning if the patient needs post-hospital services based on physician/advanced practitioner order or complex needs related to functional status, cognitive ability, or social support system  Outcome: Progressing      Problem: CARDIOVASCULAR - ADULT  Goal: Absence of cardiac dysrhythmias or at baseline rhythm  Description: INTERVENTIONS:  - Continuous cardiac monitoring, vital signs, obtain 12 lead EKG if ordered  - Administer antiarrhythmic and heart rate control medications as ordered  - Monitor electrolytes and administer replacement therapy as ordered  Outcome: Progressing     Problem: GASTROINTESTINAL - ADULT  Goal: Maintains or returns to baseline bowel function  Description: INTERVENTIONS:  - Assess bowel function  - Encourage oral fluids to ensure adequate hydration  - Administer IV fluids if ordered to ensure adequate hydration  - Administer ordered medications as needed  - Encourage mobilization and activity  - Consider nutritional services referral to assist patient with adequate nutrition and appropriate food choices  Outcome: Progressing     Problem: GENITOURINARY - ADULT  Goal: Absence of urinary retention  Description: INTERVENTIONS:  - Assess patient’s ability to void and empty bladder  - Monitor I/O  - Bladder scan as needed  - Discuss with physician/AP medications to alleviate retention as needed  - Discuss catheterization for long term situations as appropriate  Outcome: Progressing

## 2024-06-01 NOTE — ASSESSMENT & PLAN NOTE
Assessment:  65 yo M who presented fatigue, chills, nausea, diarrhea and subjective fevers at home for 5 days prior to admission. States he was having 2-5 watery Bms per day. Denies any recent antibiotic usage, any blood or mucus in stool.    Patient met SIRS criteria in ED with  and WBC 20.25. Lactic acid normal. Procalcitonin elevated 3.51. Suspected pneumonia source.  In ED, received 2.5 L boluses, maintenance fluids, and started on cefepime and azithromycin   CXR (5/29/2024) - Extensive right base pneumonia  UA with proteinuria, granular casts 25-30  FLU/RSV/COVID negative  Urine legionella and strep negative.  Azithromycin discontinued after 3 days  Blood cultures 2/2 NGTD    Plan  Continue ceftriaxone 1 g daily for 7 days.  Transition to oral at discharge cefdinir to finish 7-day course of antibiotics  See pan for pneumonia below

## 2024-06-01 NOTE — ASSESSMENT & PLAN NOTE
CXR (5/29/2024) - Extensive right base pneumonia  FLU/RSV/COVID negative  Urine legionella and strep negative  Drip score low risk (0)  Patient met sepsis criteria, see above    Plan  Continue ceftriaxone  Aspiration precautions  Incentive spirometry

## 2024-06-02 LAB
ANION GAP SERPL CALCULATED.3IONS-SCNC: 7 MMOL/L (ref 4–13)
BUN SERPL-MCNC: 62 MG/DL (ref 5–25)
CALCIUM SERPL-MCNC: 8.6 MG/DL (ref 8.4–10.2)
CHLORIDE SERPL-SCNC: 108 MMOL/L (ref 96–108)
CO2 SERPL-SCNC: 24 MMOL/L (ref 21–32)
CREAT SERPL-MCNC: 2.01 MG/DL (ref 0.6–1.3)
ERYTHROCYTE [DISTWIDTH] IN BLOOD BY AUTOMATED COUNT: 13.7 % (ref 11.6–15.1)
GFR SERPL CREATININE-BSD FRML MDRD: 33 ML/MIN/1.73SQ M
GLUCOSE SERPL-MCNC: 101 MG/DL (ref 65–140)
GLUCOSE SERPL-MCNC: 107 MG/DL (ref 65–140)
GLUCOSE SERPL-MCNC: 133 MG/DL (ref 65–140)
GLUCOSE SERPL-MCNC: 157 MG/DL (ref 65–140)
GLUCOSE SERPL-MCNC: 169 MG/DL (ref 65–140)
HCT VFR BLD AUTO: 36.8 % (ref 36.5–49.3)
HGB BLD-MCNC: 11.5 G/DL (ref 12–17)
MCH RBC QN AUTO: 28 PG (ref 26.8–34.3)
MCHC RBC AUTO-ENTMCNC: 31.3 G/DL (ref 31.4–37.4)
MCV RBC AUTO: 90 FL (ref 82–98)
PLATELET # BLD AUTO: 338 THOUSANDS/UL (ref 149–390)
PMV BLD AUTO: 9.7 FL (ref 8.9–12.7)
POTASSIUM SERPL-SCNC: 4.6 MMOL/L (ref 3.5–5.3)
RBC # BLD AUTO: 4.11 MILLION/UL (ref 3.88–5.62)
SODIUM SERPL-SCNC: 139 MMOL/L (ref 135–147)
WBC # BLD AUTO: 9.78 THOUSAND/UL (ref 4.31–10.16)

## 2024-06-02 PROCEDURE — 80048 BASIC METABOLIC PNL TOTAL CA: CPT

## 2024-06-02 PROCEDURE — 99232 SBSQ HOSP IP/OBS MODERATE 35: CPT | Performed by: INTERNAL MEDICINE

## 2024-06-02 PROCEDURE — 85027 COMPLETE CBC AUTOMATED: CPT

## 2024-06-02 PROCEDURE — 82948 REAGENT STRIP/BLOOD GLUCOSE: CPT

## 2024-06-02 RX ORDER — METOPROLOL TARTRATE 50 MG/1
50 TABLET, FILM COATED ORAL EVERY 6 HOURS
Qty: 120 TABLET | Refills: 0 | Status: CANCELLED | OUTPATIENT
Start: 2024-06-02

## 2024-06-02 RX ORDER — INSULIN LISPRO 100 [IU]/ML
3 INJECTION, SOLUTION INTRAVENOUS; SUBCUTANEOUS
Status: DISCONTINUED | OUTPATIENT
Start: 2024-06-02 | End: 2024-06-03 | Stop reason: HOSPADM

## 2024-06-02 RX ORDER — INSULIN GLARGINE 100 [IU]/ML
30 INJECTION, SOLUTION SUBCUTANEOUS
Status: DISCONTINUED | OUTPATIENT
Start: 2024-06-03 | End: 2024-06-03 | Stop reason: HOSPADM

## 2024-06-02 RX ORDER — FUROSEMIDE 40 MG/1
40 TABLET ORAL DAILY
Status: DISCONTINUED | OUTPATIENT
Start: 2024-06-02 | End: 2024-06-03 | Stop reason: HOSPADM

## 2024-06-02 RX ADMIN — APIXABAN 5 MG: 5 TABLET, FILM COATED ORAL at 17:09

## 2024-06-02 RX ADMIN — ASPIRIN 81 MG CHEWABLE TABLET 81 MG: 81 TABLET CHEWABLE at 08:44

## 2024-06-02 RX ADMIN — CEFTRIAXONE SODIUM 1000 MG: 10 INJECTION, POWDER, FOR SOLUTION INTRAVENOUS at 05:21

## 2024-06-02 RX ADMIN — INSULIN LISPRO 4 UNITS: 100 INJECTION, SOLUTION INTRAVENOUS; SUBCUTANEOUS at 08:42

## 2024-06-02 RX ADMIN — PANTOPRAZOLE SODIUM 40 MG: 40 TABLET, DELAYED RELEASE ORAL at 05:23

## 2024-06-02 RX ADMIN — APIXABAN 5 MG: 5 TABLET, FILM COATED ORAL at 08:44

## 2024-06-02 RX ADMIN — METOPROLOL TARTRATE 50 MG: 50 TABLET, FILM COATED ORAL at 11:40

## 2024-06-02 RX ADMIN — INSULIN GLARGINE 32 UNITS: 100 INJECTION, SOLUTION SUBCUTANEOUS at 08:41

## 2024-06-02 RX ADMIN — LATANOPROST 1 DROP: 50 SOLUTION OPHTHALMIC at 21:28

## 2024-06-02 RX ADMIN — METOPROLOL TARTRATE 50 MG: 50 TABLET, FILM COATED ORAL at 17:08

## 2024-06-02 RX ADMIN — INSULIN LISPRO 3 UNITS: 100 INJECTION, SOLUTION INTRAVENOUS; SUBCUTANEOUS at 11:39

## 2024-06-02 RX ADMIN — METOPROLOL TARTRATE 50 MG: 50 TABLET, FILM COATED ORAL at 23:40

## 2024-06-02 RX ADMIN — FUROSEMIDE 40 MG: 40 TABLET ORAL at 13:03

## 2024-06-02 RX ADMIN — INSULIN LISPRO 1 UNITS: 100 INJECTION, SOLUTION INTRAVENOUS; SUBCUTANEOUS at 11:39

## 2024-06-02 RX ADMIN — METOPROLOL TARTRATE 50 MG: 50 TABLET, FILM COATED ORAL at 05:30

## 2024-06-02 RX ADMIN — LEVOTHYROXINE SODIUM 25 MCG: 25 TABLET ORAL at 05:23

## 2024-06-02 RX ADMIN — INSULIN LISPRO 3 UNITS: 100 INJECTION, SOLUTION INTRAVENOUS; SUBCUTANEOUS at 16:17

## 2024-06-02 RX ADMIN — ATORVASTATIN CALCIUM 40 MG: 40 TABLET, FILM COATED ORAL at 08:44

## 2024-06-02 NOTE — PROGRESS NOTES
NEPHROLOGY PROGRESS NOTE   Noel Khan 64 y.o. male MRN: [id number]  Unit/Bed#: S -01 Encounter: [id number]    ASSESSMENT & PLAN:  64-year-old male with chronic kidney disease stage IIIb  liver cirrhosis, CAD status post stents, hypertension, diabetes mellitus presented with fevers chills fatigue, poor oral intake and chronic diarrhea.  Nephrology consulted for acute kidney injury.  On presentation he was found to be in A-fib with RVR.  Also was found to have urine retention  Acute kidney injury, POA  -Baseline creatinine: 1.4 to 1.6 mg/dL.  Renal function was at creatinine 2.03 in February 2024 but worsened to 2.46 in May 2024  -Admission creatinine: 3.58 mg/dl  - Work up:   UA with microscopy: 2+ protein, 2-4 RBCs, 1-2 WBC, granular casts  Kidney ultrasound without any hydronephrosis  -Etiology: Suspected ATN from volume depletion due to GI loss, poor oral intake and diuretic use plus hypotension due to sepsis from pneumonia in the setting of autoregulatory failure from being on ACE inhibitor Plus new onset of A-fib with RVR contributing to dynamic changes.  Also there is a component of urinary retention.  Likely ATN as finding of granular casts on the urine exam  -Plan:   Renal function improved to creatinine 2.01 mg/dL with stable electrolytes.  Renal function improved with IV hydration as well as holding ACE inhibitor and diuretics.  Has been off IV fluids since 5/31 and clinically appears euvolemic.  Continue to hold ACE inhibitor for now and may consider as outpatient if blood pressure allows and if renal function remains stable.  Continue retention protocol  Started on Lasix 40 mg daily which is lower than home dose 80 mg daily and will monitor renal function during the hospital stay  Avoid nephrotoxins and dose all medications per EGFR.    Avoid hypotension.                                             Chronic Kidney Disease Stage 3B  -Outpatient Nephrologist: Dr. Thomas  -Baseline Creatinine: 1.4  to 1.6 mg/dL but recently was 2.46 on 5/20/2024  -Etiology: Chronic kidney disease likely due to diabetic nephropathy, hypertensive nephrosclerosis, chronic hepatorenal syndrome in the setting of liver cirrhosis  -Avoid Nephrotoxins and Dose all medications per eGFR  -Will need outpatient follow up after discharge.     BP/primary hypertension  -Currently BP stable and is at goal..  Was low on admission.  Home medication include Lasix, lisinopril 5 mg, metoprolol 25 mg twice daily  -Plan: Continue to hold lisinopril in the setting of acute kidney injury.  Continue metoprolol per cardiology and dose was increased in the setting of A-fib with RVR by cardiology.     Anion gap metabolic acidosis: Bicarb level of 19 with anion gap of 19 on admission.  Lactate level was normal.  Beta hydroxybutyrate was elevated but no ketones on UA.   Was treated initially with insulin drip for hyperglycemia  -There was concern for diabetic ketoacidosis.  Also in the setting of acute kidney injury bicarb level was low.  - Bicarb level now improved to normal range at 24.  Continue to monitor     Combined systolic and diastolic CHF: Echo with ejection fraction 50% on echo from 2021, at home was on Lasix 80 mg daily but in the setting of volume depletion from GI loss and in the setting of acute kidney injury, diuretics were held.  Currently appears clinically euvolemic renal function has improved   -Restarted on lower dose of diuretics Lasix 40 mg daily and will monitor.  Informed patient to hold diuretics at home in the setting of diarrhea in future     Hyperphosphatemia in the setting of acute kidney injury, phosphorus 4.7 recommend low phosphorus diet, continue to monitor.  Recheck with labs tomorrow      Sepsis due to right lower lobe pneumonia: Antibiotic per primary team.  Currently on ceftriaxone     A-fib with RVR: Management per cardiology.   Currently on treatment with metoprolol.  Continue management per cardiology     Diabetes  mellitus type 2 on insulin: Management per primary team.  Found to have hyperglycemia on admission        Discussed with primary team that renal function has improved to creatinine 2.01 mg/dL, would start back on lower dose of Lasix 40 mg daily and primary team agreed with the plan.  Continue management of A-fib per cardiology    SUBJECTIVE:  Patient feeling much better, denies any diarrhea denies any nausea vomiting    OBJECTIVE:  Current Weight: Weight - Scale: 83.1 kg (183 lb 3.2 oz)  Vitals:    06/02/24 1140   BP: 121/82   Pulse: 73   Resp:    Temp:    SpO2: 99%       Intake/Output Summary (Last 24 hours) at 6/2/2024 1232  Last data filed at 6/2/2024 0903  Gross per 24 hour   Intake 630 ml   Output --   Net 630 ml       Physical Exam  General:  Ill looking, awake.  Eyes: Conjunctivae pink,  Sclera anicteric  ENT: lips and mucous membranes moist  Neck: supple   Chest: Clear to Auscultation both lungs,  no crackles, ronchus or wheezing.  CVS: S1 & S2 present, tachycardia, irregularly irregular, no murmur.  Abdomen: soft, non-tender, non-distended, Bowel sounds normoactive  Extremities: no edema of  legs  Skin: no rash  Neuro: awake, alert, oriented x 3   Psych: Mood and affect appropriate    Medications:    Current Facility-Administered Medications:     apixaban (ELIQUIS) tablet 5 mg, 5 mg, Oral, BID, George Phillips DO, 5 mg at 06/02/24 0844    aspirin chewable tablet 81 mg, 81 mg, Oral, Daily, Brandon Sy DO, 81 mg at 06/02/24 0844    atorvastatin (LIPITOR) tablet 40 mg, 40 mg, Oral, Daily, Brandon Sy DO, 40 mg at 06/02/24 0844    ceftriaxone (ROCEPHIN) 1 g/50 mL in dextrose IVPB, 1,000 mg, Intravenous, Q24H, Brandon Sy DO, Stopped at 06/02/24 0551    [START ON 6/3/2024] insulin glargine (LANTUS) subcutaneous injection 30 Units 0.3 mL, 30 Units, Subcutaneous, Daily With Breakfast, Kyle Brunner, MD    insulin lispro (HumALOG/ADMELOG) 100 units/mL subcutaneous injection 1-6 Units, 1-6 Units,  "Subcutaneous, TID AC, 1 Units at 06/02/24 1139 **AND** Fingerstick Glucose (POCT), , , TID AC, George Phillips DO    insulin lispro (HumALOG/ADMELOG) 100 units/mL subcutaneous injection 3 Units, 3 Units, Subcutaneous, TID With Meals, Kyle Brunner, MD, 3 Units at 06/02/24 1139    latanoprost (XALATAN) 0.005 % ophthalmic solution 1 drop, 1 drop, Both Eyes, HS, Brandon Joatch, DO, 1 drop at 06/01/24 2117    levothyroxine tablet 25 mcg, 25 mcg, Oral, Early Morning, Brandon Labatch, DO, 25 mcg at 06/02/24 0523    loperamide (IMODIUM) capsule 2 mg, 2 mg, Oral, TID PRN, Brandon Joatch, DO, 2 mg at 05/30/24 0116    metoprolol tartrate (LOPRESSOR) tablet 50 mg, 50 mg, Oral, Q6H, Reginonatalie Kirkpatrick MD, 50 mg at 06/02/24 1140    ondansetron (ZOFRAN) injection 4 mg, 4 mg, Intravenous, Q8H PRN, Brandon Labatch, DO    pantoprazole (PROTONIX) EC tablet 40 mg, 40 mg, Oral, Early Morning, Brandon Labatch, DO, 40 mg at 06/02/24 0523    Invasive Devices:        Lab Results:   Results from last 7 days   Lab Units 06/02/24  0536 06/01/24  0503 05/31/24  0547 05/31/24  0447 05/30/24  0211 05/29/24  1948   WBC Thousand/uL 9.78 9.42  --  17.02*   < > 20.25*   HEMOGLOBIN g/dL 11.5* 11.6*  --  11.5*   < > 13.8   HEMATOCRIT % 36.8 36.0*  --  35.6*   < > 42.6   PLATELETS Thousands/uL 338 314  --  281   < > 267   POTASSIUM mmol/L 4.6 4.6 5.0  --    < > 4.2   CHLORIDE mmol/L 108 108 105  --    < > 94*   CO2 mmol/L 24 23 20*  --    < > 21   BUN mg/dL 62* 75* 84*  --    < > 74*   CREATININE mg/dL 2.01* 2.31* 2.83*  --    < > 3.58*   CALCIUM mg/dL 8.6 8.9 8.5  --    < > 9.1   ALK PHOS U/L  --   --   --   --   --  66   ALT U/L  --   --   --   --   --  16   AST U/L  --   --   --   --   --  16    < > = values in this interval not displayed.       Previous work up:         Portions of the record may have been created with voice recognition software. Occasional wrong word or \"sound a like\" substitutions may have occurred due to the inherent " limitations of voice recognition software. Read the chart carefully and recognize, using context, where substitutions have occurred.If you have any questions, please contact the dictating provider.

## 2024-06-02 NOTE — DISCHARGE INSTR - AVS FIRST PAGE
Dear Noel Khan,     It was our pleasure to care for you here at [facility].  It is our hope that we were always able to exceed your expectations for your care during your stay.  You were hospitalized due to sepsis and cared for on the [facility] fourth floor by Kyle Brunner, MD under the service of Whitney Gould MD with the [facility] who covers for your primary care physician (PCP), Jere Scruggs DO. If you have any questions or concerns related to this hospitalization, you may contact us at [phone]. A nurse will call you within a few days to answer any additional questions that may arise after your discharge. We recommend that you follow up with your PCP for medication refills. Please note the following instructions / recommendations:       STOP/ADJUST taking -   Stop taking lisinopril until otherwise instructed by your primary care provider  Discontinue aspirin  Discontinue Phenergan  Change metoprolol to tartrate (Lopressor) to 50 mg every 12 hours    START taking -  Eliquis 5 mg twice a day  Lasix 40 mg daily  Protonix 40 mg daily  Cefdinir - Take 1 capsule (300 mg total) by mouth every 12 (twelve) hours for 1 day Do not start before June 4, 2024.  This is for pneumonia.    Testing Required after Discharge -   Basic Metabolic Panel in 1 week   Please follow up with your primary care provider to order these tests    Important follow up information -   Please schedule an appointment with your primary care provider as soon as possible    Other Instructions -   Please follow-up with cardiologist soon as possible.  Please schedule an echo in a few weeks to recheck her ejection fraction.  Discussed with your cardiologist about stress test outpatient.    Please review your entire after visit summary including medication list, appointments, activity, diet, pertinent wound care, and any additional recommendations from your care team.    We  wish you well.    Sincerely,     Kyle Brunner, MD

## 2024-06-02 NOTE — ASSESSMENT & PLAN NOTE
Wt Readings from Last 3 Encounters:   06/02/24 83.1 kg (183 lb 3.2 oz)   05/23/24 87.1 kg (192 lb)   02/28/24 87.5 kg (193 lb)     Patient has history of combined heart failure with EF of 50 percent on echo 2022. Patient denies any chest pain. Patient does endorse fatigue + sob on exertion. Likely due to new onset PNA. Does not examine volume overloaded without edema rales of JVD.  Echo done this admission shows EF 30% with severe global hypokinesis    Plan  -daily weights + monitor I&O  -cardiology consult

## 2024-06-02 NOTE — PROGRESS NOTES
[facility]    Cardiology Progress Note  Noel Khan 64 y.o. male   YOB: 1960 MRN: [id number]  Unit/Bed#: S -01 Encounter: [id number]      Subjective:   He continues to be free of chest pain or shortness of breath.    Assessments  Principal Problem:    Sepsis (HCC)  Active Problems:    Elevated troponin    Benign essential hypertension    Insulin-dependent diabetes mellitus    Coronary artery disease    Chronic combined systolic and diastolic CHF    Ischemic cardiomyopathy    Acute-on-chronic kidney injury  (HCC)    Right lower lobe pneumonia    New onset atrial fibrillation (HCC)    New onset atrial fibrillation  Sepsis secondary to pneumonia  Recent diarrhea  Non-MI troponin elevation  CAD status post prior PCI  Ischemic cardiomyopathy  Hypertension  Diabetes mellitus type 2  LOBO on CKD  Baseline Cr 2-2.4, but has been slowly rising over last year     Plan:  New onset atrial fibrillation  Heart rate in [facility] is much better controlled, with only brief spikes to 120s now  Continue metoprolol 50 mg every 6 hours today   Recommend MELANY cardioversion to help further with his cardiomyopathy   With his low EF, CKD, he is better off getting this completed here as an inpatient tomorrow  Had very long discussion about this with both him and his wife present --> patient is agreeable to stay for MELANY/cardioversion tomorrow  Keep NPO after midnight  Continue Eliquis  Keep K =4, Mg=2     Mixed Cardiomyopathy, LVEf 30%  TTE done and images personally reviewed at bedside  LVEF 30%, severe global hypokinesis with apical/apical septal akinesis, Dilated RV, mild MR, mild to moderate TR, IVC not well visualized - ?normal sized, TR PG 48   Echo performed in [facility] with heart rate in the 120s  Reviewed images of prior echocardiogram and nuclear stress test from 2021 as well  Evidence of anteroapical infarct, LVEF 30% on nuclear stress  His EF appears to be at least mildly reduced compared to prior  "echocardiogram  Suspect this drop in EF is primarily related to rapid A-fib  No recent or ongoing ischemic symptoms otherwise  Plan  Optimize A-fib / cardioversion  Continue metoprolol  Currently not on ACE/ARB/ARNI due to renal dysfunction (home lisinopril 5 on hold)  Can plan to add Isordil/hydralazine if blood pressure allows, after uptitration of rate control therapy  Discussed risk of VT/VF and possible LifeVest protection, but he is not interested   can plan on eventually repeating nuclear Lexiscan stress test as outpatient to further risk stratify drop in EF    He is very anxious to go home, and wants to go home tomorrow after procedure.      Review of Systems   All other systems reviewed and are negative.        Telemetry Review: Afib, HR in , brief spikes to 120-130    Objective:   Vitals: Blood pressure 137/98, pulse (!) 108, temperature 97.8 °F (36.6 °C), resp. rate 19, height 5' 10\" (1.778 m), weight 83.1 kg (183 lb 3.2 oz), SpO2 96%., Body mass index is 26.29 kg/m².,   Orthostatic Blood Pressures      Flowsheet Row Most Recent Value   Blood Pressure 137/98 filed at 2024 0714   Patient Position - Orthostatic VS Lying filed at 2024 0528           Systolic (24hrs), Av , Min:126 , Max:145     Diastolic (24hrs), Av, Min:89, Max:98    Wt Readings from Last 5 Encounters:   24 83.1 kg (183 lb 3.2 oz)   24 87.1 kg (192 lb)   24 87.5 kg (193 lb)   24 88 kg (194 lb)   23 87.1 kg (192 lb)     I/O          07 07 07 07 0701   0700    P.O. 480 480 720    I.V. (mL/kg) 1127.5 (13.4)      IV Piggyback       Total Intake(mL/kg) 1607.5 (19.1) 480 (5.8) 720 (8.6)    Urine (mL/kg/hr) 675 (0.3)      Stool       Total Output 675      Net +932.5 +480 +720           Unmeasured Urine Occurrence   1 x                Physical Exam  Vitals and nursing note reviewed.   Constitutional:       General: He is not in acute distress.     " Appearance: Normal appearance. He is well-developed. He is not ill-appearing or diaphoretic.   HENT:      Head: Normocephalic and atraumatic.      Nose: No congestion.   Eyes:      General: No scleral icterus.     Conjunctiva/sclera: Conjunctivae normal.   Neck:      Vascular: No carotid bruit or JVD.   Cardiovascular:      Rate and Rhythm: Tachycardia present. Rhythm irregular.      Heart sounds: Normal heart sounds. No murmur heard.     No friction rub. No gallop.   Pulmonary:      Effort: Pulmonary effort is normal. No respiratory distress.      Breath sounds: Normal breath sounds. No wheezing or rales.   Chest:      Chest wall: No tenderness.   Abdominal:      General: There is no distension.      Palpations: Abdomen is soft.      Tenderness: There is no abdominal tenderness.   Musculoskeletal:         General: No swelling, tenderness or deformity.      Cervical back: Neck supple. No muscular tenderness.      Right lower leg: No edema.      Left lower leg: No edema.   Skin:     General: Skin is warm.   Neurological:      General: No focal deficit present.      Mental Status: He is alert and oriented to person, place, and time. Mental status is at baseline.   Psychiatric:         Mood and Affect: Mood normal.         Behavior: Behavior normal.         Thought Content: Thought content normal.         Laboratory Results: personally reviewed        CBC with diff:   Results from last 7 days   Lab Units 06/02/24  0536 06/01/24  0503 05/31/24  0447 05/30/24  0528 05/30/24  0211 05/29/24  1948   WBC Thousand/uL 9.78 9.42 17.02* 20.73* 16.31* 20.25*   HEMOGLOBIN g/dL 11.5* 11.6* 11.5* 12.5 11.3* 13.8   HEMATOCRIT % 36.8 36.0* 35.6* 39.5 35.1* 42.6   MCV fL 90 90 88 88 88 88   PLATELETS Thousands/uL 338 314 281 275 236 267   RBC Million/uL 4.11 4.02 4.06 4.47 3.98 4.82   MCH pg 28.0 28.9 28.3 28.0 28.4 28.6   MCHC g/dL 31.3* 32.2 32.3 31.6 32.2 32.4   RDW % 13.7 13.8 13.9 13.7 13.7 13.6   MPV fL 9.7 10.0 10.4 11.0 10.5  "10.3   NRBC AUTO /100 WBCs  --   --  0 0  --  0         CMP:  Results from last 7 days   Lab Units 06/02/24  0536 06/01/24  0503 05/31/24  0547 05/31/24  0006 05/30/24 1948 05/30/24 1726 05/30/24  1407 05/30/24  0528 05/29/24 1948   POTASSIUM mmol/L 4.6 4.6 5.0 5.1 4.0 3.6 3.7   < > 4.2   CHLORIDE mmol/L 108 108 105 104 102 100 99   < > 94*   CO2 mmol/L 24 23 20* 19* 21 21 19*   < > 21   BUN mg/dL 62* 75* 84* 85* 86* 84* 86*   < > 74*   CREATININE mg/dL 2.01* 2.31* 2.83* 2.99* 3.17* 3.12* 3.39*   < > 3.58*   CALCIUM mg/dL 8.6 8.9 8.5 8.5 8.7 8.5 8.6   < > 9.1   AST U/L  --   --   --   --   --   --   --   --  16   ALT U/L  --   --   --   --   --   --   --   --  16   ALK PHOS U/L  --   --   --   --   --   --   --   --  66   EGFR ml/min/1.73sq m 33 28 22 20 19 19 17   < > 16    < > = values in this interval not displayed.         BMP:  Results from last 7 days   Lab Units 06/02/24  0536 06/01/24 0503 05/31/24  0547 05/31/24 0006 05/30/24 1948 05/30/24 1726 05/30/24  1407   POTASSIUM mmol/L 4.6 4.6 5.0 5.1 4.0 3.6 3.7   CHLORIDE mmol/L 108 108 105 104 102 100 99   CO2 mmol/L 24 23 20* 19* 21 21 19*   BUN mg/dL 62* 75* 84* 85* 86* 84* 86*   CREATININE mg/dL 2.01* 2.31* 2.83* 2.99* 3.17* 3.12* 3.39*   CALCIUM mg/dL 8.6 8.9 8.5 8.5 8.7 8.5 8.6       BNP:   No results for input(s): \"BNP\" in the last 72 hours.      Magnesium:       Coags:   Results from last 7 days   Lab Units 06/01/24  0503 05/31/24  0547 05/30/24  1948 05/30/24  1356 05/30/24 0528 05/29/24 1948   PTT seconds 41* 78* 83* 74* 100* 38*   INR   --   --   --   --  1.23* 1.17       TSH:        Hemoglobin A1C       Lipid Profile:       Meds/Allergies   all current active meds have been reviewed and current meds:   Current Facility-Administered Medications   Medication Dose Route Frequency    apixaban (ELIQUIS) tablet 5 mg  5 mg Oral BID    aspirin chewable tablet 81 mg  81 mg Oral Daily    atorvastatin (LIPITOR) tablet 40 mg  40 mg Oral Daily    " ceftriaxone (ROCEPHIN) 1 g/50 mL in dextrose IVPB  1,000 mg Intravenous Q24H    [START ON 6/3/2024] insulin glargine (LANTUS) subcutaneous injection 30 Units 0.3 mL  30 Units Subcutaneous Daily With Breakfast    insulin lispro (HumALOG/ADMELOG) 100 units/mL subcutaneous injection 1-6 Units  1-6 Units Subcutaneous TID AC    insulin lispro (HumALOG/ADMELOG) 100 units/mL subcutaneous injection 3 Units  3 Units Subcutaneous TID With Meals    latanoprost (XALATAN) 0.005 % ophthalmic solution 1 drop  1 drop Both Eyes HS    levothyroxine tablet 25 mcg  25 mcg Oral Early Morning    loperamide (IMODIUM) capsule 2 mg  2 mg Oral TID PRN    metoprolol tartrate (LOPRESSOR) tablet 50 mg  50 mg Oral Q6H    ondansetron (ZOFRAN) injection 4 mg  4 mg Intravenous Q8H PRN    pantoprazole (PROTONIX) EC tablet 40 mg  40 mg Oral Early Morning       Medications Prior to Admission:     atorvastatin (LIPITOR) 40 mg tablet    Blood Pressure Monitoring ([employer] Blood Pressure Monitor) KIT    insulin glargine (LANTUS) 100 units/mL subcutaneous injection    latanoprost (XALATAN) 0.005 % ophthalmic solution    levothyroxine 25 mcg tablet    lisinopril (ZESTRIL) 5 mg tablet    Unifine Pentips 31G X 8 MM MISC    aspirin 81 mg chewable tablet    insulin NPH-insulin regular (NovoLIN 70/30) 100 units/mL subcutaneous injection    metoprolol tartrate (LOPRESSOR) 25 mg tablet    promethazine (PHENERGAN) 25 mg tablet         Cardiac testing: reviewed  Results for orders placed during the hospital encounter of 21    Echo complete with contrast if indicated    [facility]  [address], [place] [address] [phone]    Transthoracic Echocardiogram  2D, M-mode, Doppler, and Color Doppler    Study date:  2021    Patient: LUZ MARINA HUGGINS  MR number: [id number]  Account number: [id number]  : 1960  Age: 61 years  Gender: Male  Status: Outpatient  Location: [facility]  Height: 70  in  Weight: 178.6 lb  BP: 130/ 72 mmHg    Indications: Assess left ventricular function.    Diagnoses: I25.10 - Atherosclerotic heart disease of native coronary artery without angina pectoris    Sonographer:  PALAK Lundy  Primary Physician:  Frank Allen MD  Referring Physician:  Santa Figueroa DO  Group:  [facility]  Interpreting Physician:  Hunter Harmon MD    SUMMARY    LEFT VENTRICLE:  Systolic function was at the lower limits of normal. Ejection fraction was estimated to be 50 %. Global longitudinal strain was reduced at -9.3%.  There was dyskinesis of the mid anterolateral, apical septal, apical lateral, and apical wall(s).  Doppler parameters were consistent with restrictive physiology, indicative of decreased left ventricular diastolic compliance and/or increased left atrial pressure.  Doppler parameters were consistent with high ventricular filling pressure.    MITRAL VALVE:  There was mild regurgitation.    HISTORY: PRIOR HISTORY: CAD s/p stent, Ischemic cardiomyopathy, Heart failure, Hypertension, Dyslipidemia    PROCEDURE: The study was performed in the [facility]. This was a routine study. The transthoracic approach was used. The study included complete 2D imaging, M-mode, complete spectral Doppler, and color Doppler. The  heart rate was 64 bpm, at the start of the study. Images were obtained from the parasternal, apical, subcostal, and suprasternal notch acoustic windows. Intravenous contrast ( 0.6 ml Definity/NSS) was administered. Image quality was  adequate.    LEFT VENTRICLE: Size was normal. Systolic function was at the lower limits of normal. Ejection fraction was estimated to be 50 %. Global longitudinal strain was reduced at -9.3%. There was dyskinesis of the mid anterolateral, apical  septal, apical lateral, and apical wall(s). Wall thickness was normal. No evidence of apical thrombus. DOPPLER: There was a reduced contribution of atrial  contraction to ventricular filling, due to increased ventricular diastolic pressure  or atrial contractile dysfunction. Doppler parameters were consistent with restrictive physiology, indicative of decreased left ventricular diastolic compliance and/or increased left atrial pressure. Doppler parameters were consistent with  high ventricular filling pressure.    RIGHT VENTRICLE: The size was normal. Systolic function was normal. Wall thickness was normal.    LEFT ATRIUM: Size was normal.    RIGHT ATRIUM: Size was normal.    MITRAL VALVE: Valve structure was normal. There was normal leaflet separation. DOPPLER: The transmitral velocity was within the normal range. There was no evidence for stenosis. There was mild regurgitation.    AORTIC VALVE: The valve was trileaflet. Leaflets exhibited normal thickness, mild calcification, normal cuspal separation, and sclerosis. DOPPLER: Transaortic velocity was within the normal range. There was no evidence for stenosis. There  was trace regurgitation.    TRICUSPID VALVE: The valve structure was normal. There was normal leaflet separation. DOPPLER: The transtricuspid velocity was within the normal range. There was no evidence for stenosis. There was trace regurgitation. Pulmonary artery  systolic pressure was within the normal range. Estimated peak PA pressure was 21 mmHg.    PULMONIC VALVE: Leaflets exhibited normal thickness, no calcification, and normal cuspal separation. DOPPLER: The transpulmonic velocity was within the normal range. There was no significant regurgitation.    PERICARDIUM: There was no pericardial effusion. The pericardium was normal in appearance.    AORTA: The root exhibited normal size.    SYSTEMIC VEINS: IVC: The inferior vena cava was normal in size. Respirophasic changes were normal.    SYSTEM MEASUREMENT TABLES    2D  %FS: 23.42 %  AA PSSL Full: -4.21 %  AAS PSSL Full: -9.76 %  AI PSSL Full: -12.76 %  AL PSSL Full: -2.93 %  AP PSSL Full: -7.24 %  AS  PSSL Full: -10.64 %  AVC: 390.77 ms  Ao Diam: 2.94 cm  BA PSSL Full: -3.19 %  BAS PSSL Full: -4.11 %  BI PSSL Full: -16.6 %  BL PSSL Full: -13.1 %  BP PSSL Full: -8.97 %  BS PSSL Full: -13 %  EDV(Teich): 120.73 ml  EF Biplane: 36.09 %  EF(Teich): 46.62 %  ESV(Teich): 64.45 ml  G peak SL Full(A2C): -9.09 %  G peak SL Full(A4C): -10.36 %  G peak SL Full(APLAX): -8.36 %  G peak SL Full(Avg): -9.27 %  IVSd: 0.88 cm  LA Area: 11.8 cm2  LA Diam: 4.49 cm  LVEDV MOD A2C: 130.33 ml  LVEDV MOD A4C: 125.58 ml  LVEDV MOD BP: 128.19 ml  LVEDVInd MOD BP: 64.42 ml/m2  LVEF MOD A2C: 30.77 %  LVEF MOD A4C: 41.81 %  LVESV MOD A2C: 90.22 ml  LVESV MOD A4C: 73.07 ml  LVESV MOD BP: 81.93 ml  LVESVInd MOD BP: 41.17 ml/m2  LVIDd: 5.05 cm  LVIDs: 3.86 cm  LVLd A2C: 8.56 cm  LVLd A4C: 8.65 cm  LVLs A2C: 8.13 cm  LVLs A4C: 7.94 cm  LVPWd: 0.91 cm  MA PSSL Full: -7.09 %  MAS PSSL Full: -9.6 %  MI PSSL Full: -11.42 %  ML PSSL Full: -10.14 %  MP PSSL Full: -10.14 %  MS PSSL Full: -11.92 %  Peak SL Dispersion Full: 64.07 ms  RA Area: 12.98 cm2  RVIDd: 3.2 cm  SV MOD A2C: 40.1 ml  SV MOD A4C: 52.51 ml  SV(Teich): 56.28 ml    CW  TR MaxP.71 mmHg  TR Vmax: 2.1 m/s    MM  TAPSE: 1.99 cm    PW  E' Sept: 0.05 m/s  E/E' Sept: 16.3  LVOT Env.Ti: 289.25 ms  LVOT VTI: 12.63 cm  LVOT Vmax: 0.6 m/s  LVOT Vmean: 0.44 m/s  LVOT maxP.45 mmHg  LVOT meanP.85 mmHg  MV A Marcus: 0.2 m/s  MV Dec McCracken: 4.91 m/s2  MV DecT: 165.87 ms  MV E Marcus: 0.81 m/s  MV E/A Ratio: 4.15  MV PHT: 48.1 ms  MVA By PHT: 4.57 cm2    [facility] Accredited Echocardiography Laboratory    Prepared and electronically signed by    Hunter Harmon MD  Signed 2021 13:01:42    No results found for this or any previous visit.    No results found for this or any previous visit.    No results found for this or any previous visit.

## 2024-06-02 NOTE — PROGRESS NOTES
[facility]  Progress Note  Name: Noel Khan I  MRN: [id number]  Unit/Bed#: S -01 I Date of Admission: 5/29/2024   Date of Service: 6/2/2024 I Hospital Day: 4    Assessment & Plan   * Sepsis (HCC)  Assessment & Plan  Assessment:  65 yo M who presented fatigue, chills, nausea, diarrhea and subjective fevers at home for 5 days prior to admission. States he was having 2-5 watery Bms per day. Denies any recent antibiotic usage, any blood or mucus in stool.    Patient met SIRS criteria in ED with  and WBC 20.25. Lactic acid normal. Procalcitonin elevated 3.51. Suspected pneumonia source.  In ED, received 2.5 L boluses, maintenance fluids, and started on cefepime and azithromycin   CXR (5/29/2024) - Extensive right base pneumonia  UA with proteinuria, granular casts 25-30  FLU/RSV/COVID negative  Urine legionella and strep negative.  Azithromycin discontinued after 3 days  Blood cultures 2/2 NGTD    Plan  Continue ceftriaxone 1 g daily for 7 to 10 days  See pan for pneumonia below    New onset atrial fibrillation (HCC)  Assessment & Plan  New onset afib noted in ED on EKG with rate of 125. Patient denies any chest pain or palpitations, dizziness and lightheaded. Patient is asymptomatic.   [id number] score of 4  Patient was prescribed 25 mg metoprol tartrate BID. Patient states he is only taking 25mg daily  TSH WNL 3.460  Echo (5/31/2024) - EF 25-30%, severe global hypokinesis, LV false tendon near the apex, RV systolic function moderately to severely reduced, LA mildly dilated  Rates remain uncontrolled    Plan  Remains tachycardic ranging 105-120 bpm, may benefit from cardioversion.   Cardiology following, appreciate recommendations  Continue Eliquis and metoprolol tartrate 50 Mg every 6 hours as per cardiology.  Eventual cardioversion at time will defer to cardiology whether this will be done and patient versus outpatient  Telemetry     Right lower lobe pneumonia  Assessment &  Plan  CXR (5/29/2024) - Extensive right base pneumonia  FLU/RSV/COVID negative  Urine legionella and strep negative  Drip score low risk (0)  Patient met sepsis criteria, see above    Plan  Continue ceftriaxone  Aspiration precautions  Incentive spirometry    Acute-on-chronic kidney injury  (HCC)  Assessment & Plan  Recent Labs     05/31/24  0547 06/01/24  0503 06/02/24  0536   CREATININE 2.83* 2.31* 2.01*   EGFR 22 28 33     Estimated Creatinine Clearance: 37.3 mL/min (A) (by C-G formula based on SCr of 2.01 mg/dL (H)).    Follows with Dr. Thomas outpatient nephrology for CKD stage 3b/4 in setting of diabetes, hypertensive nephrosclerosis, ischemic cardiomyopathy, and cirrhosis. Worsening renal function since Feb and May 2024.  POA 3.58; (baseline 1.4 - 1.6)  UA with 2+ protein, 2-4 RBC, granular casts  Suspect multifactorial etiology to prerenal acidemia in setting of poor oral intake, hypotension in setting of sepsis 2/2 pneumonia, ATN with granular casts on UA, urinary retention, lasix and ACE use  Currently improving    Plan  Off fluids  Nephrology following, appreciate recommendations  Monitor BMP  Urinary retention protocol  Avoid hypoperfusion of the kidneys, minimize nephrotoxins  RAAS Blockers/Diuretics held: lasix 80mg daily + lisinopril     Ischemic cardiomyopathy  Assessment & Plan  Patient has history of combined heart failure with EF of 50% on echo 2022. Patient denies any chest pain. Patient does endorse fatigue + SOB on exertion. Likely due to new onset PNA. Does not examine volume overloaded without edema, rales, or JVD  Troponins mildly elevated 153 > 134 > 123   Echo (5/31/2024) - EF 25-30%, severe global hypokinesis, LV false tendon near the apex, RV systolic function moderately to severely reduced, LA mildly dilated    Plan  Continue aspirin, statin, and beta blocker  Holding lasix and lisinopril in setting of LOBO  Daily weights, I/Os  Cardiology following, appreciate recommendations  Elevated  troponins likely nonischemic myocardial injury in setting of sepsis, tachycardia and known underlying CAD   Recommend OP follow up and likely OP stress testing once recovered    Chronic combined systolic and diastolic CHF  Assessment & Plan  Wt Readings from Last 3 Encounters:   06/02/24 83.1 kg (183 lb 3.2 oz)   05/23/24 87.1 kg (192 lb)   02/28/24 87.5 kg (193 lb)     Patient has history of combined heart failure with EF of 50 percent on echo 2022. Patient denies any chest pain. Patient does endorse fatigue + sob on exertion. Likely due to new onset PNA. Does not examine volume overloaded without edema rales of JVD.  Echo done this admission shows EF 30% with severe global hypokinesis    Plan  -daily weights + monitor I&O  -cardiology consult  -hold lasix + lisinopril in the setting of LOBO  -monitor volume status while giving fluids for sepsis + LOBO    Coronary artery disease  Assessment & Plan  Cardiac cath (2014) - LULA x2 to  of proximal/mid LAD, LULA to mid LAD, LULA to RCA  Continue aspirin, statin, and BB    Insulin-dependent diabetes mellitus  Assessment & Plan  Lab Results   Component Value Date    HGBA1C 7.1 (H) 05/20/2024       Recent Labs     05/31/24  1611 05/31/24  2131 06/01/24  0714 06/01/24  1118   POCGLU 238* 220* 153* 154*         Blood Sugar Average: Last 72 hrs:  (P) [id number]    Endorses recent poor PO intake and has not been taking his insulin for every days prior to admission.   Patient states that at home he normally takes lantus 34 units in AM and 4 units of short acting insulin with meals  Received insulin gtt for anion gap metabolic acidosis in setting of  ketosis, hyperglycemia, and LOBO. Gap closed 5/31 evening with transition back to home regimen.    Plan  Restarted on home regimen of Lantus 32 units qAM and lispro 4 units TID with meals  SSI  Diabetic diet    Benign essential hypertension  Assessment & Plan  Controlled, stable  Continue BB    Elevated troponin  Assessment &  Plan  Troponins mildly elevated 153 > 134 > 123   Likely nonischemic myocardial injury in setting of sepsis, tachycardia and known underlying CAD     Diarrhea-resolved as of 5/31/2024  Assessment & Plan  Initially reports diarrhea for several days prior to admission  Received loperamide as needed  Patient reports resolved on 5/31    Increased anion gap metabolic acidosis-resolved as of 5/31/2024  Assessment & Plan  History of CKD stage 4, recent rise in creatinine from 2.46 to 3.59, and proteinuria in the setting of T2DM with uncontrolled sugars.   Patient reports he has not been taking his insulin for several days prior to admission due to poor oral intake  Initially had anion gap of 19  UA without ketones, beta-hydroxybutyrate elevated at 3.46, lactic acid 2.0 improved to 1.0, hyperglycemic. Received insulin infusion, isolyte @50, potassium repletion  VBG (5/30/2024) - pH 7.367, pCO2 27.3, pO2 81.4, HCO3 15.3  Gap closed 5/31 evening with transition back to home insulin regimen.           VTE Pharmacologic Prophylaxis: VTE Score: 6 High Risk (Score >/= 5) - Pharmacological DVT Prophylaxis Ordered: apixaban (Eliquis). Sequential Compression Devices Ordered.    Mobility:   Basic Mobility Inpatient Raw Score: 22  JH-HLM Goal: 7: Walk 25 feet or more  JH-HLM Achieved: 3: Sit at edge of bed  JH-HLM Goal NOT achieved. Continue with multidisciplinary rounding and encourage appropriate mobility to improve upon JH-HLM goals.    Patient Centered Rounds: I performed bedside rounds with nursing staff today.  Discussions with Specialists or Other Care Team Provider: Cardiology, Nephrology,     Education and Discussions with Family / Patient: Updated  (wife) via phone.    Current Length of Stay: 4 day(s)  Current Patient Status: Inpatient   Discharge Plan: Anticipate discharge in 24-48 hrs to home.    Code Status: Level 1 - Full Code    Subjective:   Patient seen and examined at the bedside eating breakfast with  his daughter present. No acute overnight events.  Patient reports that he is feeling well although acknowledges that his heart rate is still higher than what we would like.  Denies any headache, fever, chills, palpitations, or shortness of breath.    Will defer to cardiology whether patient will undergo cardioversion inpatient versus outpatient.  Objective:     Vitals:   Temp (24hrs), Av °F (36.7 °C), Min:97.8 °F (36.6 °C), Max:98.2 °F (36.8 °C)    Temp:  [97.8 °F (36.6 °C)-98.2 °F (36.8 °C)] 97.8 °F (36.6 °C)  HR:  [] 108  Resp:  [16-19] 19  BP: (126-145)/(89-98) 137/98  SpO2:  [96 %-98 %] 96 %  Body mass index is 26.29 kg/m².     Input and Output Summary (last 24 hours):     Intake/Output Summary (Last 24 hours) at 2024 0806  Last data filed at 2024 0551  Gross per 24 hour   Intake 150 ml   Output --   Net 150 ml       Physical Exam:   Physical Exam  Vitals and nursing note reviewed.   Constitutional:       General: He is not in acute distress.     Appearance: He is well-developed. He is ill-appearing. He is not toxic-appearing or diaphoretic.   HENT:      Head: Normocephalic and atraumatic.      Right Ear: External ear normal.      Left Ear: External ear normal.      Nose: Nose normal.      Mouth/Throat:      Mouth: Mucous membranes are moist.   Eyes:      General: No scleral icterus.     Extraocular Movements: Extraocular movements intact.      Conjunctiva/sclera: Conjunctivae normal.   Cardiovascular:      Rate and Rhythm: Tachycardia present. Rhythm irregular.      Heart sounds: No murmur heard.  Pulmonary:      Effort: Pulmonary effort is normal. No respiratory distress.      Breath sounds: No wheezing, rhonchi or rales.   Abdominal:      General: Bowel sounds are normal. There is no distension.      Palpations: Abdomen is soft.      Tenderness: There is no abdominal tenderness.   Musculoskeletal:         General: No swelling.      Cervical back: Neck supple.      Right lower leg: No edema.       Left lower leg: No edema.   Skin:     General: Skin is warm and dry.      Capillary Refill: Capillary refill takes less than 2 seconds.   Neurological:      Mental Status: He is alert.   Psychiatric:         Mood and Affect: Mood normal.          Additional Data:     Labs:  Results from last 7 days   Lab Units 06/02/24  0536 06/01/24  0503 05/31/24  0447   WBC Thousand/uL 9.78 9.42 17.02*   HEMOGLOBIN g/dL 11.5* 11.6* 11.5*   HEMATOCRIT % 36.8 36.0* 35.6*   PLATELETS Thousands/uL 338 314 281   BANDS PCT %  --  4  --    SEGS PCT %  --   --  85*   LYMPHO PCT %  --  17 4*   MONO PCT %  --  5 9   EOS PCT %  --  1 1     Results from last 7 days   Lab Units 06/02/24  0536 05/30/24  0528 05/29/24  1948   SODIUM mmol/L 139   < > 132*   POTASSIUM mmol/L 4.6   < > 4.2   CHLORIDE mmol/L 108   < > 94*   CO2 mmol/L 24   < > 21   BUN mg/dL 62*   < > 74*   CREATININE mg/dL 2.01*   < > 3.58*   ANION GAP mmol/L 7   < > 17*   CALCIUM mg/dL 8.6   < > 9.1   ALBUMIN g/dL  --   --  3.8   TOTAL BILIRUBIN mg/dL  --   --  1.65*   ALK PHOS U/L  --   --  66   ALT U/L  --   --  16   AST U/L  --   --  16   GLUCOSE RANDOM mg/dL 101   < > 263*    < > = values in this interval not displayed.     Results from last 7 days   Lab Units 05/30/24  0528   INR  1.23*     Results from last 7 days   Lab Units 06/02/24  0711 06/01/24  2057 06/01/24  1605 06/01/24  1118 06/01/24  0714 05/31/24  2131 05/31/24  1611 05/31/24  1117 05/31/24  0737 05/31/24  0143 05/31/24  0005 05/30/24  2200   POC GLUCOSE mg/dl 107 106 135 154* 153* 220* 238* 260* 173* 183* 175* 115         Results from last 7 days   Lab Units 05/30/24  1105 05/30/24  0528 05/29/24  1948   LACTIC ACID mmol/L 1.0  --  2.0   PROCALCITONIN ng/ml  --  3.32* 3.57*       Lines/Drains:  Invasive Devices       Peripheral Intravenous Line  Duration             Peripheral IV 05/29/24 Distal;Left;Upper;Ventral (anterior) Arm 3 days    Peripheral IV 05/30/24 Right;Ventral (anterior) Forearm 3 days     Peripheral IV 05/30/24 Distal;Left;Upper;Ventral (anterior) Arm 2 days                      Telemetry:  Telemetry Orders (From admission, onward)               24 Hour Telemetry Monitoring  Continuous x 24 Hours (Telem)        Comments: New onset afib   Expiring   Question:  Reason for 24 Hour Telemetry  Answer:  Arrhythmias requiring acute medical intervention / PPM or ICD malfunction                     Telemetry Reviewed: Atrial fibrillation. HR averaging 120  Indication for Continued Telemetry Use: Arrthymias requiring medical therapy             Imaging: Reviewed radiology reports from this admission including: chest xray and ultrasound(s) and Personally reviewed the following imaging: chest xray    Recent Cultures (last 7 days):   Results from last 7 days   Lab Units 05/30/24  0553 05/29/24  1948   BLOOD CULTURE   --  No Growth at 72 hrs.  No Growth at 72 hrs.   LEGIONELLA URINARY ANTIGEN  Negative  --        Last 24 Hours Medication List:   Current Facility-Administered Medications   Medication Dose Route Frequency Provider Last Rate    apixaban  5 mg Oral BID Georeg Phillips DO      aspirin  81 mg Oral Daily Brandon Labatch, DO      atorvastatin  40 mg Oral Daily Brandon Labatch, DO      cefTRIAXone  1,000 mg Intravenous Q24H Brandon Labatch, DO Stopped (06/02/24 0551)    insulin glargine  32 Units Subcutaneous Daily With Breakfast George Phillips DO      insulin lispro  1-6 Units Subcutaneous TID AC George Phillips DO      insulin lispro  4 Units Subcutaneous TID With Meals George Phillips DO      latanoprost  1 drop Both Eyes HS Brandon Labatch, DO      levothyroxine  25 mcg Oral Early Morning Brandon Labatch, DO      loperamide  2 mg Oral TID PRN Brandon Labatch, DO      metoprolol tartrate  50 mg Oral Q6H Regino Kirkpatrick MD      ondansetron  4 mg Intravenous Q8H PRN Brandon Labatch, DO      pantoprazole  40 mg Oral Early Morning Brandon Labatch, DO          Today, Patient Was Seen By: Jose Antonio  Brunner, MD    **Please Note: This note may have been constructed using a voice recognition system.**

## 2024-06-03 ENCOUNTER — APPOINTMENT (OUTPATIENT)
Dept: NON INVASIVE DIAGNOSTICS | Facility: HOSPITAL | Age: 64
DRG: 871 | End: 2024-06-03
Payer: COMMERCIAL

## 2024-06-03 VITALS
WEIGHT: 183 LBS | HEART RATE: 69 BPM | DIASTOLIC BLOOD PRESSURE: 73 MMHG | BODY MASS INDEX: 26.2 KG/M2 | SYSTOLIC BLOOD PRESSURE: 127 MMHG | HEIGHT: 70 IN | RESPIRATION RATE: 18 BRPM | OXYGEN SATURATION: 98 % | TEMPERATURE: 97.2 F

## 2024-06-03 LAB
ANION GAP SERPL CALCULATED.3IONS-SCNC: 7 MMOL/L (ref 4–13)
ATRIAL RATE: 60 BPM
BUN SERPL-MCNC: 54 MG/DL (ref 5–25)
CALCIUM SERPL-MCNC: 8.5 MG/DL (ref 8.4–10.2)
CHLORIDE SERPL-SCNC: 109 MMOL/L (ref 96–108)
CO2 SERPL-SCNC: 24 MMOL/L (ref 21–32)
CREAT SERPL-MCNC: 1.85 MG/DL (ref 0.6–1.3)
GFR SERPL CREATININE-BSD FRML MDRD: 37 ML/MIN/1.73SQ M
GLUCOSE SERPL-MCNC: 104 MG/DL (ref 65–140)
GLUCOSE SERPL-MCNC: 89 MG/DL (ref 65–140)
GLUCOSE SERPL-MCNC: 96 MG/DL (ref 65–140)
MAGNESIUM SERPL-MCNC: 2 MG/DL (ref 1.9–2.7)
P AXIS: 54 DEGREES
PHOSPHATE SERPL-MCNC: 3 MG/DL (ref 2.3–4.1)
POTASSIUM SERPL-SCNC: 4.2 MMOL/L (ref 3.5–5.3)
PR INTERVAL: 156 MS
QRS AXIS: -53 DEGREES
QRSD INTERVAL: 84 MS
QT INTERVAL: 460 MS
QTC INTERVAL: 460 MS
SL CV LV EF: 40
SODIUM SERPL-SCNC: 140 MMOL/L (ref 135–147)
T WAVE AXIS: 109 DEGREES
VENTRICULAR RATE: 60 BPM

## 2024-06-03 PROCEDURE — 99232 SBSQ HOSP IP/OBS MODERATE 35: CPT | Performed by: INTERNAL MEDICINE

## 2024-06-03 PROCEDURE — 99239 HOSP IP/OBS DSCHRG MGMT >30: CPT | Performed by: INTERNAL MEDICINE

## 2024-06-03 PROCEDURE — 83735 ASSAY OF MAGNESIUM: CPT

## 2024-06-03 PROCEDURE — 82948 REAGENT STRIP/BLOOD GLUCOSE: CPT

## 2024-06-03 PROCEDURE — 92960 CARDIOVERSION ELECTRIC EXT: CPT | Performed by: INTERNAL MEDICINE

## 2024-06-03 PROCEDURE — 80048 BASIC METABOLIC PNL TOTAL CA: CPT

## 2024-06-03 PROCEDURE — 93312 ECHO TRANSESOPHAGEAL: CPT | Performed by: INTERNAL MEDICINE

## 2024-06-03 PROCEDURE — 93312 ECHO TRANSESOPHAGEAL: CPT

## 2024-06-03 PROCEDURE — 84100 ASSAY OF PHOSPHORUS: CPT | Performed by: INTERNAL MEDICINE

## 2024-06-03 PROCEDURE — B245ZZ4 ULTRASONOGRAPHY OF LEFT HEART, TRANSESOPHAGEAL: ICD-10-PCS | Performed by: INTERNAL MEDICINE

## 2024-06-03 PROCEDURE — 93320 DOPPLER ECHO COMPLETE: CPT | Performed by: INTERNAL MEDICINE

## 2024-06-03 PROCEDURE — 93010 ELECTROCARDIOGRAM REPORT: CPT | Performed by: INTERNAL MEDICINE

## 2024-06-03 PROCEDURE — 92960 CARDIOVERSION ELECTRIC EXT: CPT

## 2024-06-03 PROCEDURE — 93325 DOPPLER ECHO COLOR FLOW MAPG: CPT | Performed by: INTERNAL MEDICINE

## 2024-06-03 PROCEDURE — 5A2204Z RESTORATION OF CARDIAC RHYTHM, SINGLE: ICD-10-PCS | Performed by: INTERNAL MEDICINE

## 2024-06-03 PROCEDURE — 93005 ELECTROCARDIOGRAM TRACING: CPT

## 2024-06-03 RX ORDER — ACETAMINOPHEN 325 MG/1
650 TABLET ORAL EVERY 6 HOURS PRN
Status: DISCONTINUED | OUTPATIENT
Start: 2024-06-03 | End: 2024-06-03 | Stop reason: HOSPADM

## 2024-06-03 RX ORDER — PANTOPRAZOLE SODIUM 40 MG/1
40 TABLET, DELAYED RELEASE ORAL
Qty: 30 TABLET | Refills: 0 | Status: SHIPPED | OUTPATIENT
Start: 2024-06-03

## 2024-06-03 RX ORDER — CEFDINIR 300 MG/1
300 CAPSULE ORAL EVERY 12 HOURS SCHEDULED
Qty: 2 CAPSULE | Refills: 0 | Status: SHIPPED | OUTPATIENT
Start: 2024-06-04 | End: 2024-06-05

## 2024-06-03 RX ORDER — METOPROLOL TARTRATE 50 MG/1
50 TABLET, FILM COATED ORAL EVERY 12 HOURS SCHEDULED
Status: DISCONTINUED | OUTPATIENT
Start: 2024-06-03 | End: 2024-06-03 | Stop reason: HOSPADM

## 2024-06-03 RX ORDER — FUROSEMIDE 40 MG/1
40 TABLET ORAL DAILY
Qty: 30 TABLET | Refills: 0 | Status: SHIPPED | OUTPATIENT
Start: 2024-06-03

## 2024-06-03 RX ORDER — METOPROLOL TARTRATE 50 MG/1
50 TABLET, FILM COATED ORAL EVERY 12 HOURS SCHEDULED
Qty: 60 TABLET | Refills: 0 | Status: SHIPPED | OUTPATIENT
Start: 2024-06-03

## 2024-06-03 RX ORDER — LIDOCAINE HYDROCHLORIDE 10 MG/ML
INJECTION, SOLUTION EPIDURAL; INFILTRATION; INTRACAUDAL; PERINEURAL AS NEEDED
Status: DISCONTINUED | OUTPATIENT
Start: 2024-06-03 | End: 2024-06-03

## 2024-06-03 RX ORDER — PROPOFOL 10 MG/ML
INJECTION, EMULSION INTRAVENOUS AS NEEDED
Status: DISCONTINUED | OUTPATIENT
Start: 2024-06-03 | End: 2024-06-03

## 2024-06-03 RX ORDER — SODIUM CHLORIDE, SODIUM LACTATE, POTASSIUM CHLORIDE, CALCIUM CHLORIDE 600; 310; 30; 20 MG/100ML; MG/100ML; MG/100ML; MG/100ML
INJECTION, SOLUTION INTRAVENOUS CONTINUOUS PRN
Status: DISCONTINUED | OUTPATIENT
Start: 2024-06-03 | End: 2024-06-03

## 2024-06-03 RX ORDER — PROPOFOL 10 MG/ML
INJECTION, EMULSION INTRAVENOUS CONTINUOUS PRN
Status: DISCONTINUED | OUTPATIENT
Start: 2024-06-03 | End: 2024-06-03

## 2024-06-03 RX ADMIN — INSULIN GLARGINE 30 UNITS: 100 INJECTION, SOLUTION SUBCUTANEOUS at 11:41

## 2024-06-03 RX ADMIN — SODIUM CHLORIDE, SODIUM LACTATE, POTASSIUM CHLORIDE, AND CALCIUM CHLORIDE: .6; .31; .03; .02 INJECTION, SOLUTION INTRAVENOUS at 10:25

## 2024-06-03 RX ADMIN — LIDOCAINE HYDROCHLORIDE 100 MG: 10 INJECTION, SOLUTION EPIDURAL; INFILTRATION; INTRACAUDAL; PERINEURAL at 10:37

## 2024-06-03 RX ADMIN — PROPOFOL 100 MCG/KG/MIN: 10 INJECTION, EMULSION INTRAVENOUS at 10:38

## 2024-06-03 RX ADMIN — FUROSEMIDE 40 MG: 40 TABLET ORAL at 11:41

## 2024-06-03 RX ADMIN — LEVOTHYROXINE SODIUM 25 MCG: 25 TABLET ORAL at 06:27

## 2024-06-03 RX ADMIN — ASPIRIN 81 MG CHEWABLE TABLET 81 MG: 81 TABLET CHEWABLE at 11:41

## 2024-06-03 RX ADMIN — INSULIN LISPRO 3 UNITS: 100 INJECTION, SOLUTION INTRAVENOUS; SUBCUTANEOUS at 12:24

## 2024-06-03 RX ADMIN — METOPROLOL TARTRATE 50 MG: 50 TABLET, FILM COATED ORAL at 06:27

## 2024-06-03 RX ADMIN — ATORVASTATIN CALCIUM 40 MG: 40 TABLET, FILM COATED ORAL at 11:41

## 2024-06-03 RX ADMIN — PANTOPRAZOLE SODIUM 40 MG: 40 TABLET, DELAYED RELEASE ORAL at 06:27

## 2024-06-03 RX ADMIN — CEFTRIAXONE SODIUM 1000 MG: 10 INJECTION, POWDER, FOR SOLUTION INTRAVENOUS at 06:25

## 2024-06-03 RX ADMIN — PROPOFOL 100 MG: 10 INJECTION, EMULSION INTRAVENOUS at 10:37

## 2024-06-03 RX ADMIN — APIXABAN 5 MG: 5 TABLET, FILM COATED ORAL at 11:41

## 2024-06-03 NOTE — PROGRESS NOTES
NEPHROLOGY HOSPITAL PROGRESS NOTE   Noel Khan 64 y.o. male MRN: [id number]  Unit/Bed#: S -01 Encounter: [id number]  Reason for Consult: Acute kidney injury    ASSESSMENT and PLAN:    64-year-old male with past medical history of CKD stage III, cirrhosis, CAD with prior PCI, hypertension, diabetes initially presents with fevers, chills, fatigue, poor p.o. intake, diarrhea.  Nephrology on board for LOBO.  Patient was found to have urinary retention.  Also concern for A-fib with RVR.    1-acute on chronic kidney injury stage IIIb    - Baseline creatinine 1.4 to 1.6 mg/dL but creatinine as high as 2.4 on May 20  - Outpatient nephrologist Dr. Thomas  - Patient has had a history of poor renal reserve, multiple episodes of LOBO prior  - Admission creatinine 3.6 mg/dL  - Urinalysis-2+ protein, 2-4 RBC, granular casts  - Renal imaging-without hydronephrosis  - Chest x-ray with extensive right base pneumonia  - CPK level unrevealing  - Etiology of LOBO-possibly in setting of prerenal/intravascular volume depletion/hypotension in the setting of sepsis from pneumonia, autoregulatory failure, ATN, also component of urinary retention  - Initially ACE inhibitor and diuretic were held.  Initially received intravenous fluids.  Intravenous fluids held 5/31.  - 6/2-creatinine improving.  Started on Lasix 40 mg daily.  Which was lower than home dose 80 mg daily.  - 6/3-creatinine improving 1.8 mg/dL.  Will continue lower dose Lasix today.  Was n.p.o. overnight but this was for cardioversion and likely will be eating later today and has some edema lower extremities therefore okay to continue low-dose diuretic.  Sodium and potassium and bicarbonate are appropriate.    Plan  - I/os; avoid nephrotoxic agents  - Avoid hypotension  - BMP in a.m.  - Urinary retention protocol per primary team.  - Metoprolol management and cardioversion management per cardiology team and primary team  - Can continue low-dose Lasix for now  - Will need  urology follow-up for urinary retention issues  - Reviewed case with primary team attending we are in agreement renal plan to continue low-dose Lasix and rest of renal plan.    2-electrolytes-    3-acid/base-initially low bicarbonate level.  Improving.  Was on pH balance fluids initially.  Was initially treated with insulin drip for hyperglycemia.  There was concern for diabetic ketoacidosis.    4-hypertension-initially holding ACE inhibitor and diuretic.  Was on metoprolol.    5-A-fib with RVR-Per cardiology team    6-pneumonia-on ceftriaxone per primary team    7-volume-echocardiogram with EF reduced 25 to 30% with global hypokinesis.  Per cardiology and primary team.  On Lasix.  Appears relatively euvolemic.    SUBJECTIVE / 24H INTERVAL HISTORY:    Patient denies shortness of breath.  No complaints.  Remains tachycardic intermittently overnight.  Blood pressures 1 20-1 49 systolic.  Weight is unchanged.    OBJECTIVE:  Current Weight: Weight - Scale: 83.1 kg (183 lb 3.2 oz)  Vitals:    06/02/24 2316 06/02/24 2339 06/03/24 0620 06/03/24 0717   BP: (!) 149/102 140/89 129/83 122/86   Pulse: (!) 121 69 104 (!) 118   Resp: 16   19   Temp: 97.8 °F (36.6 °C)   98 °F (36.7 °C)   TempSrc:       SpO2: 98% 96% 96% 96%   Weight:       Height:           Intake/Output Summary (Last 24 hours) at 6/3/2024 0739  Last data filed at 6/2/2024 0903  Gross per 24 hour   Intake 480 ml   Output --   Net 480 ml     General: NAD  Skin: no rash  Eyes: anicteric sclera  ENT: moist mucous membrane  Neck: supple  Chest: CTA b/l, no ronchii, no wheeze, no rubs, no rales  CVS: s1s2, no murmur, no gallop, no rub  Abdomen: soft, nontender, nl sounds, soft areas appearing firm lateral borders  Surgical scars  Extremities: Left greater than right edema left lower extremity 1+ otherwise no significant edema elsewhere  : no daniels  Neuro: AAOX3  Psych: normal affect    Medications:    Current Facility-Administered Medications:     apixaban (ELIQUIS)  tablet 5 mg, 5 mg, Oral, BID, George Phillips DO, 5 mg at 06/02/24 1709    aspirin chewable tablet 81 mg, 81 mg, Oral, Daily, Brandon Sy DO, 81 mg at 06/02/24 0844    atorvastatin (LIPITOR) tablet 40 mg, 40 mg, Oral, Daily, Brandon Sy DO, 40 mg at 06/02/24 0844    ceftriaxone (ROCEPHIN) 1 g/50 mL in dextrose IVPB, 1,000 mg, Intravenous, Q24H, Brandon Sy DO, Last Rate: 100 mL/hr at 06/03/24 0625, 1,000 mg at 06/03/24 0625    furosemide (LASIX) tablet 40 mg, 40 mg, Oral, Daily, Davey Ho MD, 40 mg at 06/02/24 1303    insulin glargine (LANTUS) subcutaneous injection 30 Units 0.3 mL, 30 Units, Subcutaneous, Daily With Breakfast, Kyle Brunner, MD    insulin lispro (HumALOG/ADMELOG) 100 units/mL subcutaneous injection 1-6 Units, 1-6 Units, Subcutaneous, TID AC, 1 Units at 06/02/24 1139 **AND** Fingerstick Glucose (POCT), , , TID AC, George Phillips DO    insulin lispro (HumALOG/ADMELOG) 100 units/mL subcutaneous injection 3 Units, 3 Units, Subcutaneous, TID With Meals, Kyle Brunner, MD, 3 Units at 06/02/24 1617    latanoprost (XALATAN) 0.005 % ophthalmic solution 1 drop, 1 drop, Both Eyes, HS, Brandon Sy DO, 1 drop at 06/02/24 2128    levothyroxine tablet 25 mcg, 25 mcg, Oral, Early Morning, Brandon Sy DO, 25 mcg at 06/03/24 0627    loperamide (IMODIUM) capsule 2 mg, 2 mg, Oral, TID PRN, Brandon Sy, DO, 2 mg at 05/30/24 0116    metoprolol tartrate (LOPRESSOR) tablet 50 mg, 50 mg, Oral, Q6H, Regino Kirkpatrick MD, 50 mg at 06/03/24 0627    ondansetron (ZOFRAN) injection 4 mg, 4 mg, Intravenous, Q8H PRN, Brandon Sy DO    pantoprazole (PROTONIX) EC tablet 40 mg, 40 mg, Oral, Early Morning, Brandon Sy DO, 40 mg at 06/03/24 0627    Laboratory Results:  Results from last 7 days   Lab Units 06/03/24  0648 06/02/24  0536 06/01/24  0503 05/31/24  0547 05/31/24  0447 05/31/24  0006 05/30/24  1948 05/30/24  1726 05/30/24  1407 05/30/24  0528 05/30/24  0211 05/29/24  1948    WBC Thousand/uL  --  9.78 9.42  --  17.02*  --   --   --   --  20.73* 16.31* 20.25*   HEMOGLOBIN g/dL  --  11.5* 11.6*  --  11.5*  --   --   --   --  12.5 11.3* 13.8   HEMATOCRIT %  --  36.8 36.0*  --  35.6*  --   --   --   --  39.5 35.1* 42.6   PLATELETS Thousands/uL  --  338 314  --  281  --   --   --   --  275 236 267   POTASSIUM mmol/L 4.2 4.6 4.6 5.0  --  5.1 4.0 3.6   < > 3.9  --  4.2   CHLORIDE mmol/L 109* 108 108 105  --  104 102 100   < > 96  --  94*   CO2 mmol/L 24 24 23 20*  --  19* 21 21   < > 19*  --  21   BUN mg/dL 54* 62* 75* 84*  --  85* 86* 84*   < > 78*  --  74*   CREATININE mg/dL 1.85* 2.01* 2.31* 2.83*  --  2.99* 3.17* 3.12*   < > 3.59*  --  3.58*   CALCIUM mg/dL 8.5 8.6 8.9 8.5  --  8.5 8.7 8.5   < > 8.7  --  9.1   MAGNESIUM mg/dL 2.0  --   --   --   --   --   --   --   --   --   --   --    PHOSPHORUS mg/dL 3.0  --   --   --   --   --   --   --   --   --   --   --     < > = values in this interval not displayed.

## 2024-06-03 NOTE — PROGRESS NOTES
Cardiology Progress Note - Noel Khan 64 y.o. male MRN: [id number]    Unit/Bed#: S -01 Encounter: [id number]      Assessment/Recommendations:  1.  New onset atrial fibrillation: With difficult to control heart rates on higher dose of metoprolol along with new cardiomyopathy.  Will proceed with MELANY and cardioversion today.  Continued on Eliquis for anticoagulation.  Will plan on changing metoprolol to 50 mg twice daily dosing after cardioversion.  2.  Sepsis secondary to pneumonia: Possibly triggering #1.  Management as per primary team.  3.  Recent diarrhea: Maintain electrolytes with potassium greater than 4 and magnesium greater than 2.  4.  Non-MI related troponin elevation: Likely in the setting of new cardiomyopathy as well as rapid heart rates with atrial fibrillation.  5.  CAD: Status post prior PCI.  Last stress test was in 2021, for continued outpatient follow-up and management.  Continue on aspirin, statin, beta-blocker for medical management.  6.  New onset cardiomyopathy: Possibly related to atrial fibrillation with rapid ventricular response.  Reevaluate ejection fraction in a few weeks after cardioversion.  Continued on beta-blocker for GDMT.  No ACE inhibitor/ARB/Arni due to renal dysfunction currently, resume home lisinopril once creatinine improves back to baseline.  Patient is not interested in LifeVest or ICD or invasive management with left heart catheterization.  If there is no significant improvement of ejection fraction into sinus rhythm in a few weeks, may repeat nuclear stress testing as an outpatient at that time.  7.  Hypertension: Well-controlled currently on beta-blocker.  8.  Type 2 diabetes mellitus: Management as per primary team.  9.  LOBO on CKD: Creatinine improving.  10.  Likely stable from cardiac standpoint for discharge later this afternoon after MELANY and cardioversion.  Outpatient follow-up recommended.    Subjective:   Patient seen and examined.  No significant events  "overnight.  ; pertinent negatives - chest pain, chest pressure/discomfort, dyspnea, and near-syncope.    Objective:     Vitals: Blood pressure 122/86, pulse (!) 118, temperature 98 °F (36.7 °C), resp. rate 19, height 5' 10\" (1.778 m), weight 83.1 kg (183 lb 3.2 oz), SpO2 96%., Body mass index is 26.29 kg/m².,   Orthostatic Blood Pressures      Flowsheet Row Most Recent Value   Blood Pressure 122/86 filed at 06/03/2024 0717   Patient Position - Orthostatic VS Lying filed at 06/02/2024 0597            No intake or output data in the 24 hours ending 06/03/24 0930    TELE: Atrial fibrillation with rapid ventricular response.    Physical Exam:    GEN: Noel Khan appears well, alert and oriented x 3, pleasant and cooperative   HEENT: pupils equal, round, and reactive to light; extraocular muscles intact  NECK: supple, no carotid bruits   HEART: Irregular rhythm, tachycardic, normal S1 and S2, no murmurs, clicks, gallops or rubs   LUNGS: clear to auscultation bilaterally; no wheezes, rales, or rhonchi   ABDOMEN: normal bowel sounds, soft, no tenderness, no distention  EXTREMITIES: peripheral pulses normal; no clubbing, cyanosis, or edema  NEURO: no focal findings   SKIN: normal without suspicious lesions on exposed skin    Medications:      Current Facility-Administered Medications:     apixaban (ELIQUIS) tablet 5 mg, 5 mg, Oral, BID, George Phillips DO, 5 mg at 06/02/24 1709    aspirin chewable tablet 81 mg, 81 mg, Oral, Daily, Brandon Sy DO, 81 mg at 06/02/24 0844    atorvastatin (LIPITOR) tablet 40 mg, 40 mg, Oral, Daily, Brandon Sy DO, 40 mg at 06/02/24 0844    ceftriaxone (ROCEPHIN) 1 g/50 mL in dextrose IVPB, 1,000 mg, Intravenous, Q24H, Brandon Sy DO, Last Rate: 100 mL/hr at 06/03/24 0625, 1,000 mg at 06/03/24 0625    furosemide (LASIX) tablet 40 mg, 40 mg, Oral, Daily, Davey Ho MD, 40 mg at 06/02/24 1303    insulin glargine (LANTUS) subcutaneous injection 30 Units 0.3 mL, 30 Units, " Subcutaneous, Daily With Breakfast, Kyle Brunner, MD    insulin lispro (HumALOG/ADMELOG) 100 units/mL subcutaneous injection 1-6 Units, 1-6 Units, Subcutaneous, TID AC, 1 Units at 06/02/24 1139 **AND** Fingerstick Glucose (POCT), , , TID AC, George Phillips DO    insulin lispro (HumALOG/ADMELOG) 100 units/mL subcutaneous injection 3 Units, 3 Units, Subcutaneous, TID With Meals, Kyle Brunner, MD, 3 Units at 06/02/24 1617    latanoprost (XALATAN) 0.005 % ophthalmic solution 1 drop, 1 drop, Both Eyes, HS, Brandon Labatch, DO, 1 drop at 06/02/24 2128    levothyroxine tablet 25 mcg, 25 mcg, Oral, Early Morning, Brandon Labatch, DO, 25 mcg at 06/03/24 0627    loperamide (IMODIUM) capsule 2 mg, 2 mg, Oral, TID PRN, Brandon Labatch, DO, 2 mg at 05/30/24 0116    metoprolol tartrate (LOPRESSOR) tablet 50 mg, 50 mg, Oral, Q6H, Regino Kirkpatrick MD, 50 mg at 06/03/24 0627    ondansetron (ZOFRAN) injection 4 mg, 4 mg, Intravenous, Q8H PRN, Brandon Labatch, DO    pantoprazole (PROTONIX) EC tablet 40 mg, 40 mg, Oral, Early Morning, Brandon Labatch, DO, 40 mg at 06/03/24 0627     Labs & Results:        Results from last 7 days   Lab Units 06/02/24  0536 06/01/24  0503 05/31/24  0447   WBC Thousand/uL 9.78 9.42 17.02*   HEMOGLOBIN g/dL 11.5* 11.6* 11.5*   HEMATOCRIT % 36.8 36.0* 35.6*   PLATELETS Thousands/uL 338 314 281         Results from last 7 days   Lab Units 06/03/24  0648 06/02/24  0536 06/01/24  0503 05/30/24  0528 05/29/24  1948   POTASSIUM mmol/L 4.2 4.6 4.6   < > 4.2   CHLORIDE mmol/L 109* 108 108   < > 94*   CO2 mmol/L 24 24 23   < > 21   BUN mg/dL 54* 62* 75*   < > 74*   CREATININE mg/dL 1.85* 2.01* 2.31*   < > 3.58*   CALCIUM mg/dL 8.5 8.6 8.9   < > 9.1   ALK PHOS U/L  --   --   --   --  66   ALT U/L  --   --   --   --  16   AST U/L  --   --   --   --  16    < > = values in this interval not displayed.     Results from last 7 days   Lab Units 06/01/24  0503 05/31/24  0547 05/30/24  1948 05/30/24  1350  05/30/24  0528 05/29/24 1948   INR   --   --   --   --  1.23* 1.17   PTT seconds 41* 78* 83*   < > 100* 38*    < > = values in this interval not displayed.     Results from last 7 days   Lab Units 06/03/24  0648   MAGNESIUM mg/dL 2.0       Echo: personally reviewed -ejection fraction 25 to 30% with dilated LV, moderately increased LV wall thickness.  Dilated RV with reduced function.  Left atrial lodgment.    EKG personally reviewed by Hunter Harmon MD.

## 2024-06-03 NOTE — PLAN OF CARE
Problem: PAIN - ADULT  Goal: Verbalizes/displays adequate comfort level or baseline comfort level  Description: Interventions:  - Encourage patient to monitor pain and request assistance  - Assess pain using appropriate pain scale  - Administer analgesics based on type and severity of pain and evaluate response  - Implement non-pharmacological measures as appropriate and evaluate response  - Consider cultural and social influences on pain and pain management  - Notify physician/advanced practitioner if interventions unsuccessful or patient reports new pain  Outcome: Progressing     Problem: INFECTION - ADULT  Goal: Absence or prevention of progression during hospitalization  Description: INTERVENTIONS:  - Assess and monitor for signs and symptoms of infection  - Monitor lab/diagnostic results  - Monitor all insertion sites, i.e. indwelling lines, tubes, and drains  - Monitor endotracheal if appropriate and nasal secretions for changes in amount and color  - [place] appropriate cooling/warming therapies per order  - Administer medications as ordered  - Instruct and encourage patient and family to use good hand hygiene technique  - Identify and instruct in appropriate isolation precautions for identified infection/condition  Outcome: Progressing     Problem: DISCHARGE PLANNING  Goal: Discharge to home or other facility with appropriate resources  Description: INTERVENTIONS:  - Identify barriers to discharge w/patient and caregiver  - Arrange for needed discharge resources and transportation as appropriate  - Identify discharge learning needs (meds, wound care, etc.)  - Arrange for interpretive services to assist at discharge as needed  - Refer to Case Management Department for coordinating discharge planning if the patient needs post-hospital services based on physician/advanced practitioner order or complex needs related to functional status, cognitive ability, or social support system  Outcome: Progressing      Problem: CARDIOVASCULAR - ADULT  Goal: Absence of cardiac dysrhythmias or at baseline rhythm  Description: INTERVENTIONS:  - Continuous cardiac monitoring, vital signs, obtain 12 lead EKG if ordered  - Administer antiarrhythmic and heart rate control medications as ordered  - Monitor electrolytes and administer replacement therapy as ordered  Outcome: Progressing     Problem: GASTROINTESTINAL - ADULT  Goal: Maintains or returns to baseline bowel function  Description: INTERVENTIONS:  - Assess bowel function  - Encourage oral fluids to ensure adequate hydration  - Administer IV fluids if ordered to ensure adequate hydration  - Administer ordered medications as needed  - Encourage mobilization and activity  - Consider nutritional services referral to assist patient with adequate nutrition and appropriate food choices  Outcome: Progressing     Problem: GENITOURINARY - ADULT  Goal: Absence of urinary retention  Description: INTERVENTIONS:  - Assess patient’s ability to void and empty bladder  - Monitor I/O  - Bladder scan as needed  - Discuss with physician/AP medications to alleviate retention as needed  - Discuss catheterization for long term situations as appropriate  Outcome: Progressing     Problem: Nutrition/Hydration-ADULT  Goal: Nutrient/Hydration intake appropriate for improving, restoring or maintaining nutritional needs  Description: Monitor and assess patient's nutrition/hydration status for malnutrition. Collaborate with interdisciplinary team and initiate plan and interventions as ordered.  Monitor patient's weight and dietary intake as ordered or per policy. Utilize nutrition screening tool and intervene as necessary. Determine patient's food preferences and provide high-protein, high-caloric foods as appropriate.     INTERVENTIONS:  - Monitor oral intake, urinary output, labs, and treatment plans  - Assess nutrition and hydration status and recommend course of action  - Evaluate amount of meals  eaten  - Assist patient with eating if necessary   - Allow adequate time for meals  - Recommend/ encourage appropriate diets, oral nutritional supplements, and vitamin/mineral supplements  - Order, calculate, and assess calorie counts as needed  - Recommend, monitor, and adjust tube feedings and TPN/PPN based on assessed needs  - Assess need for intravenous fluids  - Provide specific nutrition/hydration education as appropriate  - Include patient/family/caregiver in decisions related to nutrition  Outcome: Progressing

## 2024-06-03 NOTE — PLAN OF CARE
Problem: PAIN - ADULT  Goal: Verbalizes/displays adequate comfort level or baseline comfort level  Description: Interventions:  - Encourage patient to monitor pain and request assistance  - Assess pain using appropriate pain scale  - Administer analgesics based on type and severity of pain and evaluate response  - Implement non-pharmacological measures as appropriate and evaluate response  - Consider cultural and social influences on pain and pain management  - Notify physician/advanced practitioner if interventions unsuccessful or patient reports new pain  Outcome: Progressing     Problem: INFECTION - ADULT  Goal: Absence or prevention of progression during hospitalization  Description: INTERVENTIONS:  - Assess and monitor for signs and symptoms of infection  - Monitor lab/diagnostic results  - Monitor all insertion sites, i.e. indwelling lines, tubes, and drains  - Monitor endotracheal if appropriate and nasal secretions for changes in amount and color  - [place] appropriate cooling/warming therapies per order  - Administer medications as ordered  - Instruct and encourage patient and family to use good hand hygiene technique  - Identify and instruct in appropriate isolation precautions for identified infection/condition  Outcome: Progressing     Problem: DISCHARGE PLANNING  Goal: Discharge to home or other facility with appropriate resources  Description: INTERVENTIONS:  - Identify barriers to discharge w/patient and caregiver  - Arrange for needed discharge resources and transportation as appropriate  - Identify discharge learning needs (meds, wound care, etc.)  - Arrange for interpretive services to assist at discharge as needed  - Refer to Case Management Department for coordinating discharge planning if the patient needs post-hospital services based on physician/advanced practitioner order or complex needs related to functional status, cognitive ability, or social support system  Outcome: Progressing      Problem: CARDIOVASCULAR - ADULT  Goal: Absence of cardiac dysrhythmias or at baseline rhythm  Description: INTERVENTIONS:  - Continuous cardiac monitoring, vital signs, obtain 12 lead EKG if ordered  - Administer antiarrhythmic and heart rate control medications as ordered  - Monitor electrolytes and administer replacement therapy as ordered  Outcome: Progressing     Problem: GASTROINTESTINAL - ADULT  Goal: Maintains or returns to baseline bowel function  Description: INTERVENTIONS:  - Assess bowel function  - Encourage oral fluids to ensure adequate hydration  - Administer IV fluids if ordered to ensure adequate hydration  - Administer ordered medications as needed  - Encourage mobilization and activity  - Consider nutritional services referral to assist patient with adequate nutrition and appropriate food choices  Outcome: Progressing     Problem: GENITOURINARY - ADULT  Goal: Absence of urinary retention  Description: INTERVENTIONS:  - Assess patient’s ability to void and empty bladder  - Monitor I/O  - Bladder scan as needed  - Discuss with physician/AP medications to alleviate retention as needed  - Discuss catheterization for long term situations as appropriate  Outcome: Progressing

## 2024-06-03 NOTE — ANESTHESIA PREPROCEDURE EVALUATION
Procedure:  MELANY    Relevant Problems   CARDIO   (+) Benign essential hypertension   (+) Coronary artery disease   (+) New onset atrial fibrillation (HCC)   (+) Orthopnea      ENDO   (+) Insulin-dependent diabetes mellitus      GI/HEPATIC   (+) Cirrhosis      /RENAL   (+) Acute-on-chronic kidney injury  (HCC)   (+) Stage 4 chronic kidney disease (HCC)      HEMATOLOGY   (+) Anemia      NEURO/PSYCH   (+) Diabetic polyneuropathy associated with type 2 diabetes mellitus (HCC)      PULMONARY   (+) Orthopnea   (+) Right lower lobe pneumonia    Left Ventricle: Left ventricular cavity size is dilated. Wall thickness is moderately increased. The left ventricular ejection fraction is 25-30%. Systolic function is severely reduced. There is severe global hypokinesis with regional variation. There is an LV false tendon near the apex.    Right Ventricle: Right ventricular cavity size is dilated. Systolic function is moderately to severely reduced.    Left Atrium: The atrium is mildly dilated (35-41 mL/m2).        Physical Exam    Airway    Mallampati score: II  TM Distance: >3 FB  Neck ROM: full     Dental        Cardiovascular  Rhythm: irregular    Pulmonary  Pulmonary exam normal     Other Findings        Anesthesia Plan  ASA Score- 4     Anesthesia Type- IV sedation with anesthesia with ASA Monitors.         Additional Monitors:     Airway Plan:            Plan Factors-    Chart reviewed. EKG reviewed.  Existing labs reviewed. Patient summary reviewed.    Patient is not a current smoker.              Induction-     Postoperative Plan-     Perioperative Resuscitation Plan - Level 1 - Full Code.       Informed Consent- Anesthetic plan and risks discussed with patient.  I personally reviewed this patient with the CRNA. Discussed and agreed on the Anesthesia Plan with the CRNA..

## 2024-06-03 NOTE — ANESTHESIA POSTPROCEDURE EVALUATION
Post-Op Assessment Note    CV Status:  Stable  Pain Score: 0    Pain management: adequate       Mental Status:  Alert and awake   Hydration Status:  Euvolemic   PONV Controlled:  Controlled   Airway Patency:  Patent     Post Op Vitals Reviewed: Yes    No anethesia notable event occurred.    Staff: CRNA               BP   121/73   Temp   98.0   Pulse  61   Resp   18   SpO2   100

## 2024-06-04 ENCOUNTER — TRANSITIONAL CARE MANAGEMENT (OUTPATIENT)
Dept: FAMILY MEDICINE CLINIC | Facility: CLINIC | Age: 64
End: 2024-06-04

## 2024-06-04 ENCOUNTER — TELEPHONE (OUTPATIENT)
Dept: NEPHROLOGY | Facility: CLINIC | Age: 64
End: 2024-06-04

## 2024-06-04 DIAGNOSIS — N18.4 STAGE 4 CHRONIC KIDNEY DISEASE (HCC): Primary | ICD-10-CM

## 2024-06-04 LAB
BACTERIA BLD CULT: NORMAL
BACTERIA BLD CULT: NORMAL

## 2024-06-04 NOTE — UTILIZATION REVIEW
NOTIFICATION OF ADMISSION DISCHARGE   This is a Notification of Discharge from [facility]. Please be advised that this patient has been discharge from our facility. Below you will find the admission and discharge date and time including the patient’s disposition.   UTILIZATION REVIEW CONTACT:  Anamaria Antoine  Utilization   Network Utilization Review Department  Phone: [phone] carefully listen to the prompts. All voicemails are confidential.  Email: [email or web address]     ADMISSION INFORMATION  PRESENTATION DATE: 5/29/2024  7:24 PM  OBERVATION ADMISSION DATE:   INPATIENT ADMISSION DATE: 5/29/24  9:34 PM   DISCHARGE DATE: 6/3/2024  3:06 PM   DISPOSITION:Home/Self Care    Network Utilization Review Department  ATTENTION: Please call with any questions or concerns to [phone] and carefully listen to the prompts so that you are directed to the right person. All voicemails are confidential.   For Discharge needs, contact Care Management DC Support Team at [phone] opt. 2  Send all requests for admission clinical reviews, approved or denied determinations and any other requests to dedicated fax number below belonging to the campus where the patient is receiving treatment. List of dedicated fax numbers for the Facilities:  FACILITY NAME UR FAX NUMBER   ADMISSION DENIALS (Administrative/Medical Necessity) [phone]   DISCHARGE SUPPORT TEAM ([facility]) [phone]   PARENT CHILD HEALTH (Maternity/NICU/Pediatrics) [phone]   [facility] [phone]   [facility] [phone]   [facility] [phone]   [facility] [phone]   [facility] [phone]   [facility] [phone]   [facility] [phone]   [facility] [phone]    [facility] [phone]   [facility] [phone]   [facility] [phone]   [facility] [phone]

## 2024-06-04 NOTE — TELEPHONE ENCOUNTER
----- Message from Kathie Scruggs MD sent at 6/4/2024  5:49 AM EDT -----  Hello    Can pt have post hosp f/u with Dr Thomas or MIAN in 3-4 weeks.     BMP for Dr Thomas in one week    Pt p/w weakness, fatigue. Found to have PNA and a fib with RVR. LOBO peak creat 3.6. improving on d/c to 1.8.     Thank you

## 2024-06-10 NOTE — UTILIZATION REVIEW
NOTIFICATION OF ADMISSION DISCHARGE   This is a Notification of Discharge from [facility]. Please be advised that this patient has been discharge from our facility. Below you will find the admission and discharge date and time including the patient’s disposition.   UTILIZATION REVIEW CONTACT:  Anamaria Antoine  Utilization   Network Utilization Review Department  Phone: [phone] carefully listen to the prompts. All voicemails are confidential.  Email: [email or web address]     ADMISSION INFORMATION  PRESENTATION DATE: 5/29/2024  7:24 PM  OBERVATION ADMISSION DATE:   INPATIENT ADMISSION DATE: 5/29/24  9:34 PM   DISCHARGE DATE: 6/3/2024  3:06 PM   DISPOSITION:Home/Self Care    Network Utilization Review Department  ATTENTION: Please call with any questions or concerns to [phone] and carefully listen to the prompts so that you are directed to the right person. All voicemails are confidential.   For Discharge needs, contact Care Management DC Support Team at [phone] opt. 2  Send all requests for admission clinical reviews, approved or denied determinations and any other requests to dedicated fax number below belonging to the campus where the patient is receiving treatment. List of dedicated fax numbers for the Facilities:  FACILITY NAME UR FAX NUMBER   ADMISSION DENIALS (Administrative/Medical Necessity) [phone]   DISCHARGE SUPPORT TEAM ([facility]) [phone]   PARENT CHILD HEALTH (Maternity/NICU/Pediatrics) [phone]   [facility] [phone]   [facility] [phone]   [facility] [phone]   [facility] [phone]   [facility] [phone]   [facility] [phone]   General acute hospital [phone]   [facility] [phone]    [facility] [phone]   [facility] [phone]   [facility] [phone]   [facility] [phone]

## 2024-06-11 ENCOUNTER — APPOINTMENT (OUTPATIENT)
Dept: LAB | Facility: CLINIC | Age: 64
End: 2024-06-11
Payer: COMMERCIAL

## 2024-06-11 DIAGNOSIS — N18.4 STAGE 4 CHRONIC KIDNEY DISEASE (HCC): ICD-10-CM

## 2024-06-11 LAB
ANION GAP SERPL CALCULATED.3IONS-SCNC: 8 MMOL/L (ref 4–13)
BUN SERPL-MCNC: 50 MG/DL (ref 5–25)
CALCIUM SERPL-MCNC: 8.4 MG/DL (ref 8.4–10.2)
CHLORIDE SERPL-SCNC: 102 MMOL/L (ref 96–108)
CO2 SERPL-SCNC: 29 MMOL/L (ref 21–32)
CREAT SERPL-MCNC: 2.28 MG/DL (ref 0.6–1.3)
GFR SERPL CREATININE-BSD FRML MDRD: 28 ML/MIN/1.73SQ M
GLUCOSE P FAST SERPL-MCNC: 196 MG/DL (ref 65–99)
POTASSIUM SERPL-SCNC: 4.4 MMOL/L (ref 3.5–5.3)
SODIUM SERPL-SCNC: 139 MMOL/L (ref 135–147)

## 2024-06-11 PROCEDURE — 36415 COLL VENOUS BLD VENIPUNCTURE: CPT

## 2024-06-11 PROCEDURE — 80048 BASIC METABOLIC PNL TOTAL CA: CPT

## 2024-06-11 NOTE — TELEPHONE ENCOUNTER
Left a voicemail to patient with the following information: The patient's sCr has risen today.  Please call our office to ensure you are  urinating without issue as he did have prior retention issues. Please also ensure he is not having vomiting/diarrhea/poor oral intake/low blood pressure. Dr. Thomas recommend repeat BMP after increased hydration in 1 week and BMP again prior to office visit 7/22/24. Advised patient to please call our office to let us know he received the message and if have any other questions or concerns.

## 2024-06-11 NOTE — TELEPHONE ENCOUNTER
DARIO for pt to schedule hosp f/u. Please see above note from Dr. Thomas. Patient can be scheduled 7/22 at 11:30.

## 2024-06-12 NOTE — UTILIZATION REVIEW
NOTIFICATION OF ADMISSION DISCHARGE   This is a Notification of Discharge from [facility]. Please be advised that this patient has been discharge from our facility. Below you will find the admission and discharge date and time including the patient’s disposition.   UTILIZATION REVIEW CONTACT:  Anamaria Antoine  Utilization   Network Utilization Review Department  Phone: [phone] carefully listen to the prompts. All voicemails are confidential.  Email: [email or web address]     ADMISSION INFORMATION  PRESENTATION DATE: 5/29/2024  7:24 PM  OBERVATION ADMISSION DATE:   INPATIENT ADMISSION DATE: 5/29/24  9:34 PM   DISCHARGE DATE: 6/3/2024  3:06 PM   DISPOSITION:Home/Self Care    Network Utilization Review Department  ATTENTION: Please call with any questions or concerns to [phone] and carefully listen to the prompts so that you are directed to the right person. All voicemails are confidential.   For Discharge needs, contact Care Management DC Support Team at [phone] opt. 2  Send all requests for admission clinical reviews, approved or denied determinations and any other requests to dedicated fax number below belonging to the campus where the patient is receiving treatment. List of dedicated fax numbers for the Facilities:  FACILITY NAME UR FAX NUMBER   ADMISSION DENIALS (Administrative/Medical Necessity) [phone]   DISCHARGE SUPPORT TEAM ([facility]) [phone]   PARENT CHILD HEALTH (Maternity/NICU/Pediatrics) [phone]   [facility] [phone]   [facility] [phone]   [facility] [phone]   [facility] [phone]   On license of [facility] [phone]   [facility] [phone]   [facility] [phone]   [facility] [phone]    [facility] [phone]   [facility] [phone]   [facility] [phone]   [facility] [phone]

## 2024-06-13 ENCOUNTER — OFFICE VISIT (OUTPATIENT)
Dept: CARDIOLOGY CLINIC | Facility: CLINIC | Age: 64
End: 2024-06-13
Payer: COMMERCIAL

## 2024-06-13 ENCOUNTER — TELEPHONE (OUTPATIENT)
Dept: NEPHROLOGY | Facility: CLINIC | Age: 64
End: 2024-06-13

## 2024-06-13 VITALS
OXYGEN SATURATION: 97 % | HEIGHT: 70 IN | SYSTOLIC BLOOD PRESSURE: 122 MMHG | DIASTOLIC BLOOD PRESSURE: 77 MMHG | BODY MASS INDEX: 26.45 KG/M2 | HEART RATE: 61 BPM | WEIGHT: 184.8 LBS

## 2024-06-13 DIAGNOSIS — E11.42 DIABETIC POLYNEUROPATHY ASSOCIATED WITH TYPE 2 DIABETES MELLITUS (HCC): ICD-10-CM

## 2024-06-13 DIAGNOSIS — L97.929 CHRONIC VENOUS HYPERTENSION (IDIOPATHIC) WITH ULCER OF LEFT LOWER EXTREMITY (HCC): ICD-10-CM

## 2024-06-13 DIAGNOSIS — I42.9 CARDIOMYOPATHY, UNSPECIFIED TYPE (HCC): ICD-10-CM

## 2024-06-13 DIAGNOSIS — I25.10 CORONARY ARTERY DISEASE INVOLVING NATIVE CORONARY ARTERY OF NATIVE HEART WITHOUT ANGINA PECTORIS: ICD-10-CM

## 2024-06-13 DIAGNOSIS — I48.91 ATRIAL FIBRILLATION (HCC): ICD-10-CM

## 2024-06-13 DIAGNOSIS — I87.312 CHRONIC VENOUS HYPERTENSION (IDIOPATHIC) WITH ULCER OF LEFT LOWER EXTREMITY (HCC): ICD-10-CM

## 2024-06-13 DIAGNOSIS — I74.9 THROMBOEMBOLIC DISORDER (HCC): ICD-10-CM

## 2024-06-13 DIAGNOSIS — I50.22 CHRONIC HFREF (HEART FAILURE WITH REDUCED EJECTION FRACTION) (HCC): ICD-10-CM

## 2024-06-13 DIAGNOSIS — I48.91 NEW ONSET ATRIAL FIBRILLATION (HCC): ICD-10-CM

## 2024-06-13 DIAGNOSIS — Z09 HOSPITAL DISCHARGE FOLLOW-UP: Primary | ICD-10-CM

## 2024-06-13 DIAGNOSIS — N18.4 STAGE 4 CHRONIC KIDNEY DISEASE (HCC): ICD-10-CM

## 2024-06-13 DIAGNOSIS — I10 BENIGN ESSENTIAL HYPERTENSION: ICD-10-CM

## 2024-06-13 DIAGNOSIS — N18.4 STAGE 4 CHRONIC KIDNEY DISEASE (HCC): Primary | ICD-10-CM

## 2024-06-13 DIAGNOSIS — Z95.5 PRESENCE OF DRUG COATED STENT IN LAD CORONARY ARTERY: ICD-10-CM

## 2024-06-13 DIAGNOSIS — I48.91 A-FIB (HCC): ICD-10-CM

## 2024-06-13 PROCEDURE — 99214 OFFICE O/P EST MOD 30 MIN: CPT | Performed by: NURSE PRACTITIONER

## 2024-06-13 PROCEDURE — 93000 ELECTROCARDIOGRAM COMPLETE: CPT | Performed by: NURSE PRACTITIONER

## 2024-06-13 RX ORDER — FUROSEMIDE 40 MG/1
40 TABLET ORAL 2 TIMES DAILY
Qty: 180 TABLET | Refills: 3 | Status: SHIPPED | OUTPATIENT
Start: 2024-06-13 | End: 2024-06-19 | Stop reason: SDUPTHER

## 2024-06-13 RX ORDER — METOPROLOL TARTRATE 50 MG/1
50 TABLET, FILM COATED ORAL EVERY 12 HOURS SCHEDULED
Qty: 180 TABLET | Refills: 3 | Status: SHIPPED | OUTPATIENT
Start: 2024-06-13

## 2024-06-13 NOTE — TELEPHONE ENCOUNTER
Left a voicemail to patient with the above message. Advised to please call our office to Confirm current diuretic dosing and if have any other questions or concerns.      ----- Message from Lissa Thomas DO sent at 6/12/2024  3:19 PM EDT -----  Higher sCr noted but suspect this is due to current diuretic use. Confirm current diuretic dosing and ask patient to repeat BMP prior to office visit with me in July 2024. Thanks.

## 2024-06-13 NOTE — PROGRESS NOTES
Cardiology  Follow Up   Office Visit Note -     Noel Khan   64 y.o.   male   MRN: [id number]  [facility]  [address]  [address]  [place] [address] [phone]    PCP: No primary care provider on file.  Cardiologist : Dr Figueroa                Summary of Recommendations  2 g sodium diet, 2 L fluid restriction.  Heart failure education provided  Heart failure education provided  For weight gain 3 pounds in 24 hours, double Lasix  x 2 days, and then call cardiology  Nonfasting BMP, BNP 1 week  Follow-up with me 1 month: Limited echo for change in LV function 1 month.  If LV function remains suppressed, consider ischemic evaluation  Follow up will be scheduled with Dr Figueroa         Impression/plan  chronic HFrEF with a drop in his EF to 30%-40% likely due to A-fib with RVR.  Prior was 50% .ADM 5/30 to 6/3/2024Recent ADM for decompensation related to A-fib with RVR, in the setting of pneumonia/sepsis/diarrhea  -Reassess LV function in a short interval.  If his LV function remains depressed consider ischemic evaluation  Etiology: Mixed ,ischemic and nonischemic  Hospital discharge weight 183 pounds  Wt Readings from Last 3 Encounters:   06/13/24 83.8 kg (184 lb 12.8 oz)   06/03/24 83 kg (183 lb)   05/23/24 87.1 kg (192 lb)     --beta-blocker: Metoprolol tartrate 50 mg every 12  --Diuretic: Lasix 40 mg  BID ( pt increased it to BID on his own)  --ACE/ARB/ARNI: None given renal dysfunction (previously on lisinopril 5 mg daily-held)  --MRA: None  --SGLT2i:   --ICD-not interested in the LifeVest ICD and normal left heart cath  --Additional therapies: Could consider ISDN/hydralazine if BP allows after up titration of rate control  --2 g sodium diet, 1800 cc fluid restriction. Daily weights.   A-fib new onset, with RVR /difficult to control rates  Rate control with metoprolol tartrate 50 mg every 12  Anticoagulation Eliquis 5 mg twice daily  Status post MELANY/DCCV-> NSR after  1 200 J shock 6/3/2024  EKG today normal sinus rhythm 61 bpm.  LAHB, LVH with repolarization abnormalities  MV CAD, DESx3; S/P circumflex and proximal and mid LAD stenting 2014.    On aspirin, statin, beta-blocker.  SPECT 2021: No ischemia  Hypertension. /77 on metoprolol tartrate 50 mg q.12 hours and Lasix 40/d   Hyperlipidemia.  On atorvastatin 40 mg daily.  5/20/2024: Calculated LDL 34   Latest Reference Range & Units 06/01/21 08:26 04/06/22 08:24 05/20/24 06:42   Cholesterol See Comment mg/dL 115 114 87   Triglycerides See Comment mg/dL 43 38 84   HDL >=40 mg/dL 56 64 36 (L)   Non-HDL Cholesterol mg/dl  50 51   LDL Calculated 0 - 100 mg/dL 50 42 34   LDL CHOLESTEROL DIRECT 0 - 100 mg/dl 54     CKD3 baseline creatinine  Cr 2-2.7.  6/11/2024: Creatinine 2.28 BUN 50  T2DM.  5/20/2024: Hemoglobin A1c 7.1  Hx medical non adherence  Cardiac testing   cardiac catheterization 12/ 2014  ----Left main: Normal.   ----Proximal LAD: There was a diffuse 100 % stenosis. This is a likely culprit for the patient's clinical presentation. This lesion is a chronic total occlusion.   ----Mid LAD: There was a 90 % stenosis uncovered in mid LAD after recanalizing the proximal 100% occlusion.   ----Proximal RCA: There was a 80 % stenosis.  ----intervention: PCI/LULA x3  to 100% lesion of prox LAD, 90% lesion of mid LAD and 80% prox circumflex lesion   TTE 2/15. Ejection fraction was estimated in the range of 40 % to 45 %. There was mild hypokinesis of the distal anteroseptum and anterior walls. There was moderate to severe hypokinesis of the apex, distal lateral, and distal inferoseptal walls.   TTE 2021: Low normal LV systolic function with EF of 50%, dyskinesis of mid anterolateral, apical septal, apical lateral and apical walls,+ diastolic dysfunction, mild mitral regurgitation.   SPECT  2021: Moderate size complete fixed myocardial perfusion defect of the apical anterior wall suggestive of prior anterior apical MI, no  significant reversible ischemia present.   TTE 5/31/2024 LVEF 30%, severe global hypokinesis with apical/apical septal akinesis, Dilated RV, mild MR, mild to moderate TR, IVC not well visualized - ?normal sized, TR PG 48   MELANY/DCCV 6/3/2024 --> NSR with 1 -200 J biphasic shock.    MELANY-EF 40%.  Systolic function mildly reduced.  There is no LA thrombus.  No PFO.  Normal functioning DECLAN.  No thrombus.  There was mild continuous spontaneous echo contrast.  Mild MR.                   HPI:   Noel Khan is a 60 yo gentleman with a hx of ICM, EF 35%, CAD, prior multivessel coronary stenting, chronic combined HF, HTN, HL, diabetes and CKD.  He established care with Dr. Love in 2015, and then did not follow up until he was hospitalized in May of 2017 for decompensated heart failure. He followed briefly with Dr Figueroa after the admission. An updated, out pt echocardiogram was requested, but not obtained.   It was noted he was inconsistently taking his medications given a lack of insurance, and affordabiliy issues.  He was last seen in the office in July of 2017. It was noted he was maintained on metoprolol tartrate instead of succinate given cost.  Also since he had a recent acute kidney injury he was not on an ACE-inhibitor.  An echocardiogram and lab work was requested to monitor his renal function and lipids.  He was advised follow-up in 6 months and then was lost to cardiology follow up.    4/1/19 he presents today upon his request for evaluation of swollen feet.  He tells me he is 20 lb over his dry weight.  He is mostly troubled by his lower extremity edema.  He has chronic wounds of his left lower extremity from an accident that has never healed.  Currently they are open.  There are not weeping.  He has edema from his toes to his thighs.  He admits to chronic dyspnea which has not improved since his MI.  Some PND but mostly he is bothered by his lower extremity edema.  He also has lower extremity neuropathy  He  knows his sugars are elevated, and he tells me he knows he needs to do better with his lifestyle and diet .  He reports compliance with his current medication list as reviewed; Lasix 20 mg p.o. Daily metoprolol tartrate 25 mg b.i.d. He is also on an aspirin and statin       4/15/19 his weights are near baseline.  He has been monitoring him and he brings the list in for me.  Yesterday he was actually able to mow the grass.  Since the last visit, he has been on Lasix 40 mg b.i.d. With supplemental potassium.  His creatinine did bump to 1.9.  Were going to drop his Lasix back down to 40 mg daily. We're going to check his BMP again in a week.  He is trying to stay away from salt and sugar.  He declines going back to the wound center for his legs.  I asked to see his PCP.  He does have some scars and open wounds.  He tells me this is chronic for times 20 years    Interval history   5/1/2019; 8/5/2019; 4/21/2020; 3/18/2021; 6/2/2021; 7/6/2022 OV Dr. Figueroa      ADM 5/30 to 6/3/2024  Atrial fibrillation, new onset, in setting of diarrhea and infection/pneumonia-rate controlled, started on anticoagulation with Eliquis.  Notably he was taking metoprolol 25 mg only once daily at home although prescribed twice daily  Underwent MELANY/DCCV 6/3/2024--> NSR 1 200 J shock  Sepsis secondary to pneumonia  Dx mixed cardiomyopathy EF 30%  Non-MI troponin elevation  LOBO with a creatinine of 3.58 on admission prior baseline 2-2.7  Plan  Continue metoprolol tartrate 50 mg twice daily  Currently not on ACE/ARB/ARNI due to renal dysfunction (home lisinopril 5 on hold)  Can plan to add Isordil/hydralazine if blood pressure allows, after uptitration of rate control therapy  Discussed risk of VT/VF and possible LifeVest protection, but he is not interested   Reassess EF in few weeks after cardioversion  Patient does not desire invasive testing such as left heart catheterization.  Declines ICD or LifeVest  If EF does not improve consider repeating  ischemic evaluation  DC weight: 183 pounds  Discharge creatinine 1.85  Discharge diuretic: Lasix 40 mg daily      6/13/2024  PMH: CAD status post prior stenting, ICM, chronic HFrEF with a drop in EF in the setting of A-fib with RVR, in the setting of sepsis due to pneumonia, HTN, HL, DM2    ROS: He tells me he feels great.  He denies chest pain shortness of breath.  He increase his Lasix to 40 mg twice daily on his own.  He tells me he feels better at that dose.  On once a day he had orthopnea.  His lower extremity edema is improved.  He denies palpitations lightheadedness or dizziness  He is adherent to his medical therapy taking Eliquis as directed  Last labs 6/11/2024: Creatinine 2.28 BUN 50 potassium 4.4  He appears euvolemic on exam  His EKG shows sinus rhythm 61 bpm  Will continue the plan  I reviewed with him importance of a salt restricted diet and provided written education  He will see me in a month, limited echo prior to.  Will get labs next week including a BNP and a BMP    Assessment  Diagnoses and all orders for this visit:    Hospital discharge follow-up    New onset atrial fibrillation (HCC) s/p MELANY CV    Chronic HFrEF (heart failure with reduced ejection fraction) ([facility])  -     Basic metabolic panel; Future  -     B-Type Natriuretic Peptide(BNP); Future    Cardiomyopathy, unspecified type ([facility])  -     Echo follow up/limited w/ contrast if indicated; Future    Coronary artery disease involving native coronary artery of native heart without angina pectoris  -     POCT ECG    Presence of drug coated stent in LAD coronary artery    Thromboembolic disorder (HCC)    Chronic venous hypertension (idiopathic) with ulcer of left lower extremity ([facility])    Stage 4 chronic kidney disease (HCC)  -     furosemide (LASIX) 40 mg tablet; Take 1 tablet (40 mg total) by mouth 2 (two) times a day    Atrial fibrillation (HCC)  -     apixaban (Eliquis) 5 mg; Take 1 tablet (5 mg total) by mouth 2 (two) times a day    A-fib  ([facility])  -     metoprolol tartrate (LOPRESSOR) 50 mg tablet; Take 1 tablet (50 mg total) by mouth every 12 (twelve) hours    Benign essential hypertension    Diabetic polyneuropathy associated with type 2 diabetes mellitus ([facility])          Past Medical History:   Diagnosis Date    CAD (coronary artery disease)     Chronic combined systolic and diastolic CHF (congestive heart failure) ([facility])     Diabetes mellitus ([facility])     type 2    Dyslipidemia     Hypertension     Ischemic cardiomyopathy     MI (myocardial infarction) ([facility])     Neuropathy     Osteomyelitis ([facility])     Pancreatitis        Review of Systems   Constitutional: Negative for chills and weight gain.   Cardiovascular:  Negative for chest pain, claudication, cyanosis, dyspnea on exertion, irregular heartbeat, leg swelling, near-syncope, orthopnea, palpitations, paroxysmal nocturnal dyspnea and syncope.   Respiratory:  Negative for cough and shortness of breath.    Skin:         He reports chronic left lower extremity open wounds for “15 years”   Gastrointestinal:  Negative for heartburn and nausea.   Neurological:  Negative for dizziness, focal weakness, headaches, light-headedness and weakness.   All other systems reviewed and are negative.      No Known Allergies  .    Current Outpatient Medications:     apixaban (Eliquis) 5 mg, Take 1 tablet (5 mg total) by mouth 2 (two) times a day, Disp: 180 tablet, Rfl: 3    atorvastatin (LIPITOR) 40 mg tablet, TAKE 1 TABLET BY MOUTH EVERY DAY, Disp: 90 tablet, Rfl: 3    Blood Pressure Monitoring ([employer] Blood Pressure Monitor) KIT, Use 2 (two) times a day Automatic BP cuff. Measure BP twice daily., Disp: 1 kit, Rfl: 0    furosemide (LASIX) 40 mg tablet, Take 1 tablet (40 mg total) by mouth 2 (two) times a day, Disp: 180 tablet, Rfl: 3    insulin glargine (LANTUS) 100 units/mL subcutaneous injection, Inject 30 Units under the skin every morning, Disp: , Rfl:     insulin NPH-insulin regular (NovoLIN 70/30) 100 units/mL  subcutaneous injection, Inject under the skin, Disp: , Rfl:     latanoprost (XALATAN) 0.005 % ophthalmic solution, Administer 0.0005 drops to both eyes daily at bedtime, Disp: , Rfl:     levothyroxine 25 mcg tablet, Take 25 mcg by mouth daily, Disp: , Rfl:     metoprolol tartrate (LOPRESSOR) 50 mg tablet, Take 1 tablet (50 mg total) by mouth every 12 (twelve) hours, Disp: 180 tablet, Rfl: 3    pantoprazole (PROTONIX) 40 mg tablet, Take 1 tablet (40 mg total) by mouth daily in the early morning, Disp: 30 tablet, Rfl: 0    Unifine Pentips 31G X 8 MM MISC, , Disp: , Rfl:     Social History     Socioeconomic History    Marital status: /Civil Union     Spouse name: Not on file    Number of children: Not on file    Years of education: Not on file    Highest education level: Not on file   Occupational History    Not on file   Tobacco Use    Smoking status: Never    Smokeless tobacco: Never   Vaping Use    Vaping status: Never Used   Substance and Sexual Activity    Alcohol use: Not Currently     Comment: 20 years of sobriety    Drug use: Never    Sexual activity: Yes     Partners: Female     Birth control/protection: None   Other Topics Concern    Not on file   Social History Narrative    Not on file     Social Determinants of Health     Financial Resource Strain: Not on file   Food Insecurity: No Food Insecurity (5/13/2022)    Hunger Vital Sign     Worried About Running Out of Food in the Last Year: Never true     Ran Out of Food in the Last Year: Never true   Transportation Needs: No Transportation Needs (5/27/2022)    PRAPARE - Transportation     Lack of Transportation (Medical): No     Lack of Transportation (Non-Medical): No   Physical Activity: Not on file   Stress: Not on file   Social Connections: Not on file   Intimate Partner Violence: Not on file   Housing Stability: Low Risk  (5/27/2022)    Housing Stability Vital Sign     Unable to Pay for Housing in the Last Year: No     Number of Places Lived in the  "Last Year: 1     Unstable Housing in the Last Year: No       Family History   Problem Relation Age of Onset    Heart attack Father     Hyperlipidemia Father     Cancer Father     Diabetes Father     Diabetes Mother     Heart failure Mother         poss    Kidney disease Mother     Cancer Paternal Grandfather     Stroke Neg Hx     Anuerysm Neg Hx     Clotting disorder Neg Hx     Arrhythmia Neg Hx     Coronary artery disease Neg Hx     Hypertension Neg Hx     Sudden death Neg Hx         scd       Physical Exam  Vitals and nursing note reviewed.   Constitutional:       General: He is not in acute distress.  HENT:      Head: Normocephalic and atraumatic.   Eyes:      Extraocular Movements: Extraocular movements intact.      Conjunctiva/sclera: Conjunctivae normal.   Cardiovascular:      Rate and Rhythm: Normal rate and regular rhythm.      Pulses: Intact distal pulses.      Heart sounds: Normal heart sounds.   Pulmonary:      Effort: Pulmonary effort is normal.      Breath sounds: Normal breath sounds.   Abdominal:      General: Bowel sounds are normal.      Palpations: Abdomen is soft.   Musculoskeletal:         General: Normal range of motion.      Cervical back: Normal range of motion and neck supple.      Right lower leg: Edema present.      Left lower leg: Edema present.      Comments: Scar LLE. + venous stasis changes   Skin:     General: Skin is warm and dry.   Neurological:      Mental Status: He is alert and oriented to person, place, and time.   Psychiatric:         Mood and Affect: Mood normal.         Vitals: Blood pressure 122/77, pulse 61, height 5' 10\" (1.778 m), weight 83.8 kg (184 lb 12.8 oz), SpO2 97%.   Wt Readings from Last 3 Encounters:   06/13/24 83.8 kg (184 lb 12.8 oz)   06/03/24 83 kg (183 lb)   05/23/24 87.1 kg (192 lb)         Labs & Results:  Lab Results   Component Value Date    WBC 9.78 06/02/2024    HGB 11.5 (L) 06/02/2024    HCT 36.8 06/02/2024    MCV 90 06/02/2024     06/02/2024 " "    BNP   Date Value Ref Range Status   05/30/2024 1,434 (H) 0 - 100 pg/mL Final   12/22/2014 296 (H) 0 - 100 pg/mL Final     Comment:     The above 1 analytes were performed by JOE Ramirez [place] [id number]     12/16/2014 662 (H) 0 - 100 pg/mL Final     Comment:     The above 1 analytes were performed by JOE Ramirez [place] [id number]       No components found for: \"CHEM\"    No results found for this or any previous visit.  No results found for this or any previous visit.      This note was completed in part utilizing m-modal fluency direct voice recognition software.   Grammatical errors, random word insertion, spelling mistakes, and incomplete sentences may be an occasional consequence of the system secondary to software limitations, ambient noise and hardware issues. At the time of dictation, efforts were made to edit, clarify and /or correct errors.  Please read the chart carefully and recognize, using context, where substitutions have occurred.  If you have any questions or concerns about the context, text or information contained within the body of this dictation, please contact myself, the provider, for further clarification      "

## 2024-06-13 NOTE — PATIENT INSTRUCTIONS
For weight gain 3 pounds in 24 hours, double Lasix for x 2 days, and then call cardiology    DASH Eating Plan   WHAT YOU NEED TO KNOW:   The DASH (Dietary Approaches to Stop Hypertension) Eating Plan is designed to help prevent or lower high blood pressure. It can also help to lower LDL (bad) cholesterol and decrease your risk for heart disease. The plan is low in sodium, sugar, unhealthy fats, and total fat. It is high in potassium, calcium, magnesium, and fiber. These nutrients are added when you eat more fruits, vegetables, and whole grains. With the DASH eating plan, you need to eat a certain number of servings from each food group. This will help you get enough of certain nutrients and limit others. The amount of servings you should eat depends on how many calories you need. Your dietitian can help you create meal plans with the right number of servings for each food group.       DISCHARGE INSTRUCTIONS:   What you need to know about sodium:  Your dietitian will tell you how much sodium is safe for you to have each day. People with high blood pressure should have no more than 1,500 to 2,300 mg of sodium in a day. A teaspoon (tsp) of salt has 2,300 mg of sodium. This may seem like a difficult goal, but small changes to the foods you eat can make a big difference. Your healthcare provider or dietitian can help you create a meal plan that follows your sodium limit.  Read food labels.  Food labels can help you choose foods that are low in sodium. The amount of sodium is listed in milligrams (mg). The % Daily Value (DV) column tells you how much of your daily needs are met by 1 serving of the food for each nutrient listed. Choose foods that have less than 5% of the DV of sodium. These foods are considered low in sodium. Foods that have 20% or more of the DV of sodium are considered high in sodium. Avoid foods that have more than 300 mg of sodium in each serving. Choose foods that say low-sodium, reduced-sodium, or no  salt added on the food label.         Limit added salt.  Do not salt food at the table if you add salt when you cook. Use herbs and spices, such as onions, garlic, and salt-free seasonings to add flavor. Try lemon or lime juice or vinegar to add a tart flavor. Use hot peppers or a small amount of hot pepper sauce to add a spicy flavor. Limit foods high in added salt, such as the following:    Seasonings made with salt, such as garlic salt, celery salt, onion salt, seasoned salt, meat tenderizers, and monosodium glutamate (MSG)    Miso soup and canned or dried soup mixes    Regular soy sauce, barbecue sauce, teriyaki sauce, steak sauce, Worcestershire sauce, and most flavored vinegars    Snack foods, such as salted chips, popcorn, pretzels, pork rinds, salted crackers, and salted nuts    Frozen foods, such as dinners, entrees, vegetables with sauces, and breaded meats    Ask about salt substitutes.  Ask your healthcare provider if you may use salt substitutes. Some salt substitutes have ingredients that can be harmful if you have certain health conditions.    Choose foods carefully at restaurants.  Meals from restaurants, especially fast food restaurants, are often high in sodium. Some restaurants have nutrition information that tells you the amount of sodium in their foods. Ask to have your food prepared with less, or no salt.    What you need to know about fats:  Healthy fats include unsaturated fats and omega-3 fatty acids. Unhealthy fats include saturated fats and trans fats.  Include healthy fats, such as the following:      Cooking oils, such as soybean, canola, olive, or sunflower    Fatty fish, such as salmon, tuna, mackerel, or sardines    Flaxseed oil or ground flaxseed    ½ cup of cooked beans, such as black beans, kidney beans, or rebollar beans    1½ ounces of low-sodium nuts, such as almonds or walnuts    Low-sugar, low-sodium peanut butter    Seeds such as lesa seeds or sunflower seeds       Limit or do  not have unhealthy fats, such as the following:      Foods that contain fat from animals, such as fatty meats, whole milk, butter, and cream    Shortening, stick margarine, palm oil, and coconut oil    Full-fat or creamy salad dressing    Creamy soup    Crackers, chips, and baked goods made with margarine or shortening    Foods that are fried in unhealthy fats    Gravy and sauces, such as Rogelio or cheese sauces    What you need to know about carbohydrates (carbs):  All carbs break down into sugar. Complex carbs contain more fiber than simple carbs. This means complex carbs go into the bloodstream more slowly and cause less of a blood sugar spike. Try to include more complex carbs and fewer simple carbs.  Include complex carbs, such as the followin slice of whole-grain bread    1 ounce of dry cereal that does not contain added sugar    ½ cup of cooked oatmeal    2 ounces of cooked whole-grain pasta    ½ cup of cooked brown rice    Limit or do not have simple carbs, such as the following:      Baked goods, such as doughnuts, pastries, and cookies    Mixes for cornbread and biscuits    White rice and pasta mixes, such as boxed macaroni and cheese    Instant and cold cereals that contain sugar    Jelly, jam, and ice cream that contain sugar    Condiments such as ketchup    Drinks high in sugar, such as soft drinks, lemonade, and fruit juice    What you need to know about vegetables and fruits:  Vegetables and fruits can be fresh, frozen, or canned. If possible, try to choose low-sodium canned options.  Include a variety of vegetables and fruits, such as the followin medium apple, pear, or peach (about ½ cup chopped)    ½ small banana    ½ cup berries, such as blueberries, strawberries, or blackberries    1 cup of raw leafy greens, such as lettuce, spinach, kale, or louie greens    ½ cup of frozen or canned (no added salt) vegetables, such as green beans    ½ cup of fresh, frozen, or canned fruit  (canned in light syrup or fruit juice)    ½ cup of vegetable or fruit juice    Limit or do not have vegetables and fruits made in the following ways:      Frozen fruit such as cherries that have added sugar    Fruit in cream or butter sauce    Canned vegetables that are high in sodium    Sauerkraut, pickled vegetables, and other foods prepared in brine    Fried vegetables or vegetables in butter or high-fat sauces    What you need to know about protein foods:   Include lean or low-fat protein foods, such as the following:      Poultry (chicken, turkey) with no skin    Fish (especially fatty fish, such as salmon, fresh tuna, or mackerel)    Lean beef and pork (loin, round, extra lean hamburger)    Egg whites and egg substitutes    1 cup of nonfat (skim) or 1% milk    1½ ounces of fat-free or low-fat cheese    6 ounces of nonfat or low-fat yogurt    Limit or do not have high-fat protein foods, such as the following:      Smoked or cured meat, such as corned beef, rosenberg, ham, hot dogs, and sausage    Canned beans and canned meats or spreads, such as potted meats, sardines, anchovies, and imitation seafood    Deli or lunch meats, such as bologna, ham, turkey, and roast beef    High-fat meat (T-bone steak, regular hamburger, and ribs)    Whole eggs and egg yolks    Whole milk, 2% milk, and cream    Regular cheese and processed cheese    Other guidelines to follow:   Maintain a healthy weight.  Your risk for heart disease is higher if you have extra weight. Ask your healthcare provider what a healthy weight is for you. Your provider may suggest that you lose weight. You can lose weight by eating fewer calories and foods that have added sugars and fat. The DASH meal plan can help you do this. Decrease calories by eating smaller portions at each meal and fewer snacks. Ask your provider for more information about how to lose weight.    Exercise regularly.  Regular exercise can help you reach or maintain a healthy weight.  Regular exercise can also help decrease your blood pressure and improve your cholesterol levels. Get 30 minutes or more of moderate exercise each day of the week. To lose weight, get at least 60 minutes of exercise. Talk to your healthcare provider about the best exercise program for you.         Limit alcohol.  Women should limit alcohol to 1 drink a day. Men should limit alcohol to 2 drinks a day. A drink of alcohol is 12 ounces of beer, 5 ounces of wine, or 1½ ounces of liquor.    For more information:   [facility]  [facility] Information Center  [address] [address]  [place] [address]  Phone: [phone]  Web Address: http://www.nhlbi.nih.gov/health/infoctr/index.htm    © Copyright [employer] 2023 Information is for End User's use only and may not be sold, redistributed or otherwise used for commercial purposes.  The above information is an  only. It is not intended as medical advice for individual conditions or treatments. Talk to your doctor, nurse or pharmacist before following any medical regimen to see if it is safe and effective for you.

## 2024-06-13 NOTE — LETTER
June 13, 2024     Santa Figueroa DO  [address] [facility] [address] [place]  [address]  [facility] [address]    Patient: Noel Khan   YOB: 1960   Date of Visit: 6/13/2024       Dear Dr. Figueroa:    Thank you for referring Noel Khan to me for evaluation. Below are my notes for this consultation.    If you have questions, please do not hesitate to call me. I look forward to following your patient along with you.         Sincerely,        LATASHA Gray        CC: No Recipients    LATASHA Gray  6/13/2024 11:29 AM  Sign when Signing Visit  Cardiology  Follow Up   Office Visit Note -     Noel Khan   64 y.o.   male   MRN: [id number]  [facility] [place]  [address]  [address]  [facility] [address]  [phone]    PCP: No primary care provider on file.  Cardiologist : Dr Figueroa                Summary of Recommendations  2 g sodium diet, 2 L fluid restriction.  Heart failure education provided  Heart failure education provided  For weight gain 3 pounds in 24 hours, double Lasix  x 2 days, and then call cardiology  Nonfasting BMP, BNP 1 week  Follow-up with me 1 month: Limited echo for change in LV function 1 month.  If LV function remains suppressed, consider ischemic evaluation  Follow up will be scheduled with Dr Figueroa         Impression/plan  chronic HFrEF with a drop in his EF to 30%-40% likely due to A-fib with RVR.  Prior was 50% .ADM 5/30 to 6/3/2024Recent ADM for decompensation related to A-fib with RVR, in the setting of pneumonia/sepsis/diarrhea  -Reassess LV function in a short interval.  If his LV function remains depressed consider ischemic evaluation  Etiology: Mixed ,ischemic and nonischemic  Hospital discharge weight 183 pounds  Wt Readings from Last 3 Encounters:   06/13/24 83.8 kg (184 lb 12.8 oz)   06/03/24 83 kg (183 lb)   05/23/24 87.1 kg (192 lb)     --beta-blocker: Metoprolol tartrate 50 mg every 12  --Diuretic: Lasix 40 mg  BID ( pt increased it to  BID on his own)  --ACE/ARB/ARNI: None given renal dysfunction (previously on lisinopril 5 mg daily-held)  --MRA: None  --SGLT2i:   --ICD-not interested in the LifeVest ICD and normal left heart cath  --Additional therapies: Could consider ISDN/hydralazine if BP allows after up titration of rate control  --2 g sodium diet, 1800 cc fluid restriction. Daily weights.   A-fib new onset, with RVR /difficult to control rates  Rate control with metoprolol tartrate 50 mg every 12  Anticoagulation Eliquis 5 mg twice daily  Status post MELANY/DCCV-> NSR after 1 200 J shock 6/3/2024  EKG today normal sinus rhythm 61 bpm.  LAHB, LVH with repolarization abnormalities  MV CAD, DESx3; S/P circumflex and proximal and mid LAD stenting 2014.    On aspirin, statin, beta-blocker.  SPECT 2021: No ischemia  Hypertension. /77 on metoprolol tartrate 50 mg q.12 hours and Lasix 40/d   Hyperlipidemia.  On atorvastatin 40 mg daily.  5/20/2024: Calculated LDL 34   Latest Reference Range & Units 06/01/21 08:26 04/06/22 08:24 05/20/24 06:42   Cholesterol See Comment mg/dL 115 114 87   Triglycerides See Comment mg/dL 43 38 84   HDL >=40 mg/dL 56 64 36 (L)   Non-HDL Cholesterol mg/dl  50 51   LDL Calculated 0 - 100 mg/dL 50 42 34   LDL CHOLESTEROL DIRECT 0 - 100 mg/dl 54     CKD3 baseline creatinine  Cr 2-2.7.  6/11/2024: Creatinine 2.28 BUN 50  T2DM.  5/20/2024: Hemoglobin A1c 7.1  Hx medical non adherence  Cardiac testing   cardiac catheterization 12/ 2014  ----Left main: Normal.   ----Proximal LAD: There was a diffuse 100 % stenosis. This is a likely culprit for the patient's clinical presentation. This lesion is a chronic total occlusion.   ----Mid LAD: There was a 90 % stenosis uncovered in mid LAD after recanalizing the proximal 100% occlusion.   ----Proximal RCA: There was a 80 % stenosis.  ----intervention: PCI/LULA x3  to 100% lesion of prox LAD, 90% lesion of mid LAD and 80% prox circumflex lesion   TTE 2/15. Ejection fraction was  estimated in the range of 40 % to 45 %. There was mild hypokinesis of the distal anteroseptum and anterior walls. There was moderate to severe hypokinesis of the apex, distal lateral, and distal inferoseptal walls.   TTE 2021: Low normal LV systolic function with EF of 50%, dyskinesis of mid anterolateral, apical septal, apical lateral and apical walls,+ diastolic dysfunction, mild mitral regurgitation.   SPECT  2021: Moderate size complete fixed myocardial perfusion defect of the apical anterior wall suggestive of prior anterior apical MI, no significant reversible ischemia present.   TTE 5/31/2024 LVEF 30%, severe global hypokinesis with apical/apical septal akinesis, Dilated RV, mild MR, mild to moderate TR, IVC not well visualized - ?normal sized, TR PG 48   MELANY/DCCV 6/3/2024 --> NSR with 1 -200 J biphasic shock.    MELANY-EF 40%.  Systolic function mildly reduced.  There is no LA thrombus.  No PFO.  Normal functioning DECLAN.  No thrombus.  There was mild continuous spontaneous echo contrast.  Mild MR.                   HPI:   Noel Khan is a 60 yo gentleman with a hx of ICM, EF 35%, CAD, prior multivessel coronary stenting, chronic combined HF, HTN, HL, diabetes and CKD.  He established care with Dr. Love in 2015, and then did not follow up until he was hospitalized in May of 2017 for decompensated heart failure. He followed briefly with Dr Figueroa after the admission. An updated, out pt echocardiogram was requested, but not obtained.   It was noted he was inconsistently taking his medications given a lack of insurance, and affordabiliy issues.  He was last seen in the office in July of 2017. It was noted he was maintained on metoprolol tartrate instead of succinate given cost.  Also since he had a recent acute kidney injury he was not on an ACE-inhibitor.  An echocardiogram and lab work was requested to monitor his renal function and lipids.  He was advised follow-up in 6 months and then was lost to cardiology  follow up.    4/1/19 he presents today upon his request for evaluation of swollen feet.  He tells me he is 20 lb over his dry weight.  He is mostly troubled by his lower extremity edema.  He has chronic wounds of his left lower extremity from an accident that has never healed.  Currently they are open.  There are not weeping.  He has edema from his toes to his thighs.  He admits to chronic dyspnea which has not improved since his MI.  Some PND but mostly he is bothered by his lower extremity edema.  He also has lower extremity neuropathy  He knows his sugars are elevated, and he tells me he knows he needs to do better with his lifestyle and diet .  He reports compliance with his current medication list as reviewed; Lasix 20 mg p.o. Daily metoprolol tartrate 25 mg b.i.d. He is also on an aspirin and statin       4/15/19 his weights are near baseline.  He has been monitoring him and he brings the list in for me.  Yesterday he was actually able to mow the grass.  Since the last visit, he has been on Lasix 40 mg b.i.d. With supplemental potassium.  His creatinine did bump to 1.9.  Were going to drop his Lasix back down to 40 mg daily. We're going to check his BMP again in a week.  He is trying to stay away from salt and sugar.  He declines going back to the wound center for his legs.  I asked to see his PCP.  He does have some scars and open wounds.  He tells me this is chronic for times 20 years    Interval history   5/1/2019; 8/5/2019; 4/21/2020; 3/18/2021; 6/2/2021; 7/6/2022 OV Dr. Figueroa      ADM 5/30 to 6/3/2024  Atrial fibrillation, new onset, in setting of diarrhea and infection/pneumonia-rate controlled, started on anticoagulation with Eliquis.  Notably he was taking metoprolol 25 mg only once daily at home although prescribed twice daily  Underwent MELANY/DCCV 6/3/2024--> NSR 1 200 J shock  Sepsis secondary to pneumonia  Dx mixed cardiomyopathy EF 30%  Non-MI troponin elevation  LOBO with a creatinine of 3.58 on  admission prior baseline 2-2.7  Plan  Continue metoprolol tartrate 50 mg twice daily  Currently not on ACE/ARB/ARNI due to renal dysfunction (home lisinopril 5 on hold)  Can plan to add Isordil/hydralazine if blood pressure allows, after uptitration of rate control therapy  Discussed risk of VT/VF and possible LifeVest protection, but he is not interested   Reassess EF in few weeks after cardioversion  Patient does not desire invasive testing such as left heart catheterization.  Declines ICD or LifeVest  If EF does not improve consider repeating ischemic evaluation  DC weight: 183 pounds  Discharge creatinine 1.85  Discharge diuretic: Lasix 40 mg daily      6/13/2024  PMH: CAD status post prior stenting, ICM, chronic HFrEF with a drop in EF in the setting of A-fib with RVR, in the setting of sepsis due to pneumonia, HTN, HL, DM2    ROS: He tells me he feels great.  He denies chest pain shortness of breath.  He increase his Lasix to 40 mg twice daily on his own.  He tells me he feels better at that dose.  On once a day he had orthopnea.  His lower extremity edema is improved.  He denies palpitations lightheadedness or dizziness  He is adherent to his medical therapy taking Eliquis as directed  Last labs 6/11/2024: Creatinine 2.28 BUN 50 potassium 4.4  He appears euvolemic on exam  His EKG shows sinus rhythm 61 bpm  Will continue the plan  I reviewed with him importance of a salt restricted diet and provided written education  He will see me in a month, limited echo prior to.  Will get labs next week including a BNP and a BMP    Assessment  Diagnoses and all orders for this visit:    Hospital discharge follow-up    New onset atrial fibrillation (HCC) s/p MELANY CV    Chronic HFrEF (heart failure with reduced ejection fraction) ([facility])  -     Basic metabolic panel; Future  -     B-Type Natriuretic Peptide(BNP); Future    Cardiomyopathy, unspecified type ([facility])  -     Echo follow up/limited w/ contrast if indicated;  Future    Coronary artery disease involving native coronary artery of native heart without angina pectoris  -     POCT ECG    Presence of drug coated stent in LAD coronary artery    Thromboembolic disorder (HCC)    Chronic venous hypertension (idiopathic) with ulcer of left lower extremity ([facility])    Stage 4 chronic kidney disease ([facility])  -     furosemide (LASIX) 40 mg tablet; Take 1 tablet (40 mg total) by mouth 2 (two) times a day    Atrial fibrillation ([facility])  -     apixaban (Eliquis) 5 mg; Take 1 tablet (5 mg total) by mouth 2 (two) times a day    A-fib ([facility])  -     metoprolol tartrate (LOPRESSOR) 50 mg tablet; Take 1 tablet (50 mg total) by mouth every 12 (twelve) hours    Benign essential hypertension    Diabetic polyneuropathy associated with type 2 diabetes mellitus ([facility])          Past Medical History:   Diagnosis Date   • CAD (coronary artery disease)    • Chronic combined systolic and diastolic CHF (congestive heart failure) ([facility])    • Diabetes mellitus ([facility])     type 2   • Dyslipidemia    • Hypertension    • Ischemic cardiomyopathy    • MI (myocardial infarction) ([facility])    • Neuropathy    • Osteomyelitis ([facility])    • Pancreatitis        Review of Systems   Constitutional: Negative for chills and weight gain.   Cardiovascular:  Negative for chest pain, claudication, cyanosis, dyspnea on exertion, irregular heartbeat, leg swelling, near-syncope, orthopnea, palpitations, paroxysmal nocturnal dyspnea and syncope.   Respiratory:  Negative for cough and shortness of breath.    Skin:         He reports chronic left lower extremity open wounds for “15 years”   Gastrointestinal:  Negative for heartburn and nausea.   Neurological:  Negative for dizziness, focal weakness, headaches, light-headedness and weakness.   All other systems reviewed and are negative.      No Known Allergies  .    Current Outpatient Medications:   •  apixaban (Eliquis) 5 mg, Take 1 tablet (5 mg total) by mouth 2 (two) times a day, Disp: 180 tablet,  Rfl: 3  •  atorvastatin (LIPITOR) 40 mg tablet, TAKE 1 TABLET BY MOUTH EVERY DAY, Disp: 90 tablet, Rfl: 3  •  Blood Pressure Monitoring ([employer] Blood Pressure Monitor) KIT, Use 2 (two) times a day Automatic BP cuff. Measure BP twice daily., Disp: 1 kit, Rfl: 0  •  furosemide (LASIX) 40 mg tablet, Take 1 tablet (40 mg total) by mouth 2 (two) times a day, Disp: 180 tablet, Rfl: 3  •  insulin glargine (LANTUS) 100 units/mL subcutaneous injection, Inject 30 Units under the skin every morning, Disp: , Rfl:   •  insulin NPH-insulin regular (NovoLIN 70/30) 100 units/mL subcutaneous injection, Inject under the skin, Disp: , Rfl:   •  latanoprost (XALATAN) 0.005 % ophthalmic solution, Administer 0.0005 drops to both eyes daily at bedtime, Disp: , Rfl:   •  levothyroxine 25 mcg tablet, Take 25 mcg by mouth daily, Disp: , Rfl:   •  metoprolol tartrate (LOPRESSOR) 50 mg tablet, Take 1 tablet (50 mg total) by mouth every 12 (twelve) hours, Disp: 180 tablet, Rfl: 3  •  pantoprazole (PROTONIX) 40 mg tablet, Take 1 tablet (40 mg total) by mouth daily in the early morning, Disp: 30 tablet, Rfl: 0  •  Unifine Pentips 31G X 8 MM MISC, , Disp: , Rfl:     Social History     Socioeconomic History   • Marital status: /Civil Union     Spouse name: Not on file   • Number of children: Not on file   • Years of education: Not on file   • Highest education level: Not on file   Occupational History   • Not on file   Tobacco Use   • Smoking status: Never   • Smokeless tobacco: Never   Vaping Use   • Vaping status: Never Used   Substance and Sexual Activity   • Alcohol use: Not Currently     Comment: 20 years of sobriety   • Drug use: Never   • Sexual activity: Yes     Partners: Female     Birth control/protection: None   Other Topics Concern   • Not on file   Social History Narrative   • Not on file     Social Determinants of Health     Financial Resource Strain: Not on file   Food Insecurity: No Food Insecurity (5/13/2022)    Hunger Vital  Sign    • Worried About Running Out of Food in the Last Year: Never true    • Ran Out of Food in the Last Year: Never true   Transportation Needs: No Transportation Needs (5/27/2022)    PRAPARE - Transportation    • Lack of Transportation (Medical): No    • Lack of Transportation (Non-Medical): No   Physical Activity: Not on file   Stress: Not on file   Social Connections: Not on file   Intimate Partner Violence: Not on file   Housing Stability: Low Risk  (5/27/2022)    Housing Stability Vital Sign    • Unable to Pay for Housing in the Last Year: No    • Number of Places Lived in the Last Year: 1    • Unstable Housing in the Last Year: No       Family History   Problem Relation Age of Onset   • Heart attack Father    • Hyperlipidemia Father    • Cancer Father    • Diabetes Father    • Diabetes Mother    • Heart failure Mother         poss   • Kidney disease Mother    • Cancer Paternal Grandfather    • Stroke Neg Hx    • Anuerysm Neg Hx    • Clotting disorder Neg Hx    • Arrhythmia Neg Hx    • Coronary artery disease Neg Hx    • Hypertension Neg Hx    • Sudden death Neg Hx         scd       Physical Exam  Vitals and nursing note reviewed.   Constitutional:       General: He is not in acute distress.  HENT:      Head: Normocephalic and atraumatic.   Eyes:      Extraocular Movements: Extraocular movements intact.      Conjunctiva/sclera: Conjunctivae normal.   Cardiovascular:      Rate and Rhythm: Normal rate and regular rhythm.      Pulses: Intact distal pulses.      Heart sounds: Normal heart sounds.   Pulmonary:      Effort: Pulmonary effort is normal.      Breath sounds: Normal breath sounds.   Abdominal:      General: Bowel sounds are normal.      Palpations: Abdomen is soft.   Musculoskeletal:         General: Normal range of motion.      Cervical back: Normal range of motion and neck supple.      Right lower leg: Edema present.      Left lower leg: Edema present.      Comments: Scar LLE. + venous stasis changes  "  Skin:     General: Skin is warm and dry.   Neurological:      Mental Status: He is alert and oriented to person, place, and time.   Psychiatric:         Mood and Affect: Mood normal.         Vitals: Blood pressure 122/77, pulse 61, height 5' 10\" (1.778 m), weight 83.8 kg (184 lb 12.8 oz), SpO2 97%.   Wt Readings from Last 3 Encounters:   06/13/24 83.8 kg (184 lb 12.8 oz)   06/03/24 83 kg (183 lb)   05/23/24 87.1 kg (192 lb)         Labs & Results:  Lab Results   Component Value Date    WBC 9.78 06/02/2024    HGB 11.5 (L) 06/02/2024    HCT 36.8 06/02/2024    MCV 90 06/02/2024     06/02/2024     BNP   Date Value Ref Range Status   05/30/2024 1,434 (H) 0 - 100 pg/mL Final   12/22/2014 296 (H) 0 - 100 pg/mL Final     Comment:     The above 1 analytes were performed [place] [facility] [place] [id number]     12/16/2014 662 (H) 0 - 100 pg/mL Final     Comment:     The above 1 [facility] [place] [id number]       No components found for: \"CHEM\"    No results found for this or any previous visit.  No results found for this or any previous visit.      This note was completed in part utilizing [employer] direct voice recognition software.   Grammatical errors, random word insertion, spelling mistakes, and incomplete sentences may be an occasional consequence of the system secondary to software limitations, ambient noise and hardware issues. At the time of dictation, efforts were made to edit, clarify and /or correct errors.  Please read the chart carefully and recognize, using context, where substitutions have occurred.  If you have any questions or concerns about the context, text or information contained within the body of this dictation, please contact myself, the provider, for further clarification      "

## 2024-06-19 ENCOUNTER — LAB (OUTPATIENT)
Dept: LAB | Facility: CLINIC | Age: 64
End: 2024-06-19
Payer: COMMERCIAL

## 2024-06-19 DIAGNOSIS — I50.22 CHRONIC HFREF (HEART FAILURE WITH REDUCED EJECTION FRACTION) (HCC): ICD-10-CM

## 2024-06-19 DIAGNOSIS — N18.4 STAGE 4 CHRONIC KIDNEY DISEASE (HCC): ICD-10-CM

## 2024-06-19 DIAGNOSIS — I50.42 CHRONIC COMBINED SYSTOLIC AND DIASTOLIC CHF (CONGESTIVE HEART FAILURE) (HCC): Primary | ICD-10-CM

## 2024-06-19 LAB
ANION GAP SERPL CALCULATED.3IONS-SCNC: 6 MMOL/L (ref 4–13)
BNP SERPL-MCNC: 831 PG/ML (ref 0–100)
BUN SERPL-MCNC: 39 MG/DL (ref 5–25)
CALCIUM SERPL-MCNC: 8.7 MG/DL (ref 8.4–10.2)
CHLORIDE SERPL-SCNC: 102 MMOL/L (ref 96–108)
CO2 SERPL-SCNC: 32 MMOL/L (ref 21–32)
CREAT SERPL-MCNC: 1.92 MG/DL (ref 0.6–1.3)
GFR SERPL CREATININE-BSD FRML MDRD: 35 ML/MIN/1.73SQ M
GLUCOSE SERPL-MCNC: 54 MG/DL (ref 65–140)
POTASSIUM SERPL-SCNC: 3.7 MMOL/L (ref 3.5–5.3)
SODIUM SERPL-SCNC: 140 MMOL/L (ref 135–147)

## 2024-06-19 PROCEDURE — 83880 ASSAY OF NATRIURETIC PEPTIDE: CPT

## 2024-06-19 PROCEDURE — 36415 COLL VENOUS BLD VENIPUNCTURE: CPT

## 2024-06-19 PROCEDURE — 80048 BASIC METABOLIC PNL TOTAL CA: CPT

## 2024-06-19 RX ORDER — FUROSEMIDE 40 MG/1
TABLET ORAL
Start: 2024-06-19

## 2024-06-19 RX ORDER — POTASSIUM CHLORIDE 20 MEQ/1
20 TABLET, EXTENDED RELEASE ORAL 2 TIMES DAILY
Qty: 60 TABLET | Refills: 5 | Status: SHIPPED | OUTPATIENT
Start: 2024-06-19

## 2024-06-19 NOTE — RESULT ENCOUNTER NOTE
Patient contacted and made aware of results and recommendations. He was also informed of the repeat lab work that is expected to be collected in 2 weeks.

## 2024-06-28 ENCOUNTER — HOSPITAL ENCOUNTER (OUTPATIENT)
Dept: RADIOLOGY | Facility: HOSPITAL | Age: 64
Discharge: HOME | End: 2024-06-28
Attending: INTERNAL MEDICINE
Payer: COMMERCIAL

## 2024-06-28 DIAGNOSIS — N18.4 STAGE 4 CHRONIC KIDNEY DISEASE (HCC): ICD-10-CM

## 2024-06-28 PROBLEM — J18.9 RIGHT LOWER LOBE PNEUMONIA: Status: RESOLVED | Noted: 2024-05-29 | Resolved: 2024-06-28

## 2024-06-28 PROBLEM — A41.9 SEPSIS (HCC): Status: RESOLVED | Noted: 2020-02-10 | Resolved: 2024-06-28

## 2024-06-28 PROCEDURE — 76770 US EXAM ABDO BACK WALL COMP: CPT

## 2024-07-05 ENCOUNTER — TELEPHONE (OUTPATIENT)
Age: 64
End: 2024-07-05

## 2024-07-05 NOTE — TELEPHONE ENCOUNTER
Received a call from [facility] to have Dr. Thomas review the U/S of the kidney and bladder that there are significant findings.

## 2024-07-07 NOTE — RESPIRATORY THERAPY NOTE
RT Protocol Note  Noel Khan 64 y.o. male MRN: [id number]  Unit/Bed#: ED-36 Encounter: [id number]    Assessment    Active Problems:    Insulin-dependent diabetes mellitus    Chronic combined systolic and diastolic CHF    Sepsis (HCC)    LOBO (acute kidney injury) (HCC)    Right lower lobe pneumonia      Home Pulmonary Medications:         Past Medical History:   Diagnosis Date    CAD (coronary artery disease)     Chronic combined systolic and diastolic CHF (congestive heart failure) (HCC)     Diabetes mellitus (HCC)     type 2    Dyslipidemia     Hypertension     Ischemic cardiomyopathy     MI (myocardial infarction) (HCC)     Neuropathy     Osteomyelitis (HCC)     Pancreatitis      Social History     Socioeconomic History    Marital status: /Civil Union     Spouse name: None    Number of children: None    Years of education: None    Highest education level: None   Occupational History    None   Tobacco Use    Smoking status: Never    Smokeless tobacco: Never   Vaping Use    Vaping status: Never Used   Substance and Sexual Activity    Alcohol use: Not Currently     Comment: 20 years of sobriety    Drug use: Never    Sexual activity: Yes     Partners: Female     Birth control/protection: None   Other Topics Concern    None   Social History Narrative    None     Social Determinants of Health     Financial Resource Strain: Not on file   Food Insecurity: No Food Insecurity (5/13/2022)    Hunger Vital Sign     Worried About Running Out of Food in the Last Year: Never true     Ran Out of Food in the Last Year: Never true   Transportation Needs: No Transportation Needs (5/27/2022)    PRAPARE - Transportation     Lack of Transportation (Medical): No     Lack of Transportation (Non-Medical): No   Physical Activity: Not on file   Stress: Not on file   Social Connections: Not on file   Intimate Partner Violence: Not on file   Housing Stability: Low Risk  (5/27/2022)    Housing Stability Vital Sign     Unable to Pay  for Housing in the Last Year: No     Number of Places Lived in the Last Year: 1     Unstable Housing in the Last Year: No       Subjective         Objective    Physical Exam:   Assessment Type: Assess only  General Appearance: Alert, Awake  Respiratory Pattern: Normal  Chest Assessment: Chest expansion symmetrical  Bilateral Breath Sounds: Diminished, Clear    Vitals:  Blood pressure 103/64, pulse 102, temperature 99.4 °F (37.4 °C), temperature source Oral, resp. rate (P) 18, SpO2 96%.          Imaging and other studies: I have personally reviewed pertinent reports.            Plan    Respiratory Plan: No distress/Pulmonary history        Resp Comments: pt presents to the ER with Pneumonia, pt has been educated on IS. Pt does not wear O2 at home, doesn't take bronchodilators and doesn't use devices for sleep. Pt has mass hx of CHF and other heart problems.   Statement Selected

## 2024-07-08 ENCOUNTER — TELEPHONE (OUTPATIENT)
Dept: NEPHROLOGY | Facility: CLINIC | Age: 64
End: 2024-07-08

## 2024-07-08 NOTE — TELEPHONE ENCOUNTER
Message was sent through [employer]  ----- Message from Lissa Thomas DO sent at 7/8/2024  3:52 PM EDT -----  Patient has moderate postvoid residual noted on his renal ultrasound.  I recommend urology follow-up.  Will place urology referral if patient agrees. Thanks.

## 2024-07-11 ENCOUNTER — OFFICE VISIT (OUTPATIENT)
Dept: PODIATRY | Facility: CLINIC | Age: 64
End: 2024-07-11
Payer: COMMERCIAL

## 2024-07-11 VITALS
HEIGHT: 70 IN | BODY MASS INDEX: 26.92 KG/M2 | HEART RATE: 67 BPM | WEIGHT: 188 LBS | SYSTOLIC BLOOD PRESSURE: 128 MMHG | OXYGEN SATURATION: 100 % | DIASTOLIC BLOOD PRESSURE: 80 MMHG

## 2024-07-11 DIAGNOSIS — E11.42 DIABETIC POLYNEUROPATHY ASSOCIATED WITH TYPE 2 DIABETES MELLITUS (HCC): ICD-10-CM

## 2024-07-11 DIAGNOSIS — B35.1 ONYCHOMYCOSIS: Primary | ICD-10-CM

## 2024-07-11 DIAGNOSIS — Z89.422 HISTORY OF AMPUTATION OF LESSER TOE OF LEFT FOOT (HCC): ICD-10-CM

## 2024-07-11 PROCEDURE — 11721 DEBRIDE NAIL 6 OR MORE: CPT | Performed by: PODIATRIST

## 2024-07-11 PROCEDURE — RECHECK: Performed by: PODIATRIST

## 2024-07-11 NOTE — PROGRESS NOTES
Assessment/Plan:     The patient's clinical examination today is significant for thickened and dystrophic nail plates x 9 with discoloration and subungual debris consistent with onychomycosis.  There are no open lesions. There are no interdigital macerations.  There are no pretrophic changes nor callosities.  The patient has a history of a partial left fifth ray resection secondary to osteomyelitis.      The pedal nail plates were sharply debrided with a sterile nail clipper x 9.  The pedal nail plates were then mechanically reduced in thickness and girth utilizing a rotary bur.  There are no other acute pedal issues. His most recent hemoglobin A1c was 7.1 in May 2024, prior to this it was 9.8 in October 2023.     Recommend follow-up in 2 to 3 months for continued at risk foot care.        Diagnoses and all orders for this visit:    Onychomycosis    History of amputation of lesser toe of left foot (HCC)    Diabetic polyneuropathy associated with type 2 diabetes mellitus (HCC)          Subjective:     Patient ID: Noel Khan is a 64 y.o. male.    The patient presents today for continued at risk diabetic footcare.  He notes no new issues regarding his feet.  He does note that he was recently hospitalized to [facility] [place] secondary to a bout of pneumonia.  He also notes an episode of A-fib for he which he is currently on Eliquis.      PAST MEDICAL HISTORY:  Past Medical History:   Diagnosis Date    CAD (coronary artery disease)     Chronic combined systolic and diastolic CHF (congestive heart failure) ([facility])     Diabetes mellitus ([facility])     type 2    Dyslipidemia     Hypertension     Ischemic cardiomyopathy     MI (myocardial infarction) ([facility])     Neuropathy     Osteomyelitis ([facility])     Pancreatitis        PAST SURGICAL HISTORY:  Past Surgical History:   Procedure Laterality Date    ANGIOPLASTY      ballon    CORONARY ANGIOPLASTY WITH STENT PLACEMENT      LULA to proximal and mid LAD, Proximal RCA.     FOREIGN BODY  REMOVAL Left 5/26/2022    Procedure: REMOVAL FOREIGN BODY EXTREMITY;  Surgeon: Heron Herrera DPM;  Location: [facility];  Service: Podiatry    HERNIA REPAIR      INCISION AND DRAINAGE OF WOUND Left 2/14/2020    Procedure: INCISION AND DRAINAGE (I&D) EXTREMITY left foot (cpt 43569);  Surgeon: Kremit Chau DPM;  [facility];  Service: Podiatry    NJ AMPUTATION FOOT TRANSMETARSAL Left 5/26/2022    Procedure: AMPUTATION left 5th metatarsal;  Surgeon: Heron Herrera DPM;  [facility];  Service: Podiatry    NJ EXPLORATION PENETRATING WOUND SPX EXTREMITY Left 2/14/2020    Procedure: EXPLORATION EXTREMITY;  Surgeon: Kermit Chau DPM;  [facility];  Service: Podiatry    NJ NEGATIVE PRESSURE WOUND THERAPY DME <= 50 SQ CM Left 2/14/2020    Procedure: APPLICATION VAC DRESSING;  Surgeon: Kermit Chau DPM;  [facility];  Service: Podiatry    TOE AMPUTATION Left 5/14/2022    Procedure: EXCISION OF LEFT 5TH TOE ULCER WITH FILET FLAP;  Surgeon: Heron Herrera DPM;  [facility]: [facility];  Service: Podiatry        ALLERGIES:  Patient has no known allergies.    MEDICATIONS:  Current Outpatient Medications   Medication Sig Dispense Refill    apixaban (Eliquis) 5 mg Take 1 tablet (5 mg total) by mouth 2 (two) times a day 180 tablet 3    atorvastatin (LIPITOR) 40 mg tablet TAKE 1 TABLET BY MOUTH EVERY DAY 90 tablet 3    Blood Pressure Monitoring ([employer] Blood Pressure Monitor) KIT Use 2 (two) times a day Automatic BP cuff. Measure BP twice daily. 1 kit 0    furosemide (LASIX) 40 mg tablet 60 mg in the morning, 40 mg in the p.m.      insulin glargine (LANTUS) 100 units/mL subcutaneous injection Inject 30 Units under the skin every morning      insulin NPH-insulin regular (NovoLIN 70/30) 100 units/mL subcutaneous injection Inject under the skin      latanoprost (XALATAN) 0.005 % ophthalmic solution Administer 0.0005 drops to both eyes daily at bedtime      levothyroxine 25 mcg tablet Take 25 mcg by  mouth daily      metoprolol tartrate (LOPRESSOR) 50 mg tablet Take 1 tablet (50 mg total) by mouth every 12 (twelve) hours 180 tablet 3    pantoprazole (PROTONIX) 40 mg tablet Take 1 tablet (40 mg total) by mouth daily in the early morning 30 tablet 0    potassium chloride (Klor-Con M20) 20 mEq tablet Take 1 tablet (20 mEq total) by mouth 2 (two) times a day 60 tablet 5    Unifine Pentips 31G X 8 MM MISC        No current facility-administered medications for this visit.       SOCIAL HISTORY:  Social History     Socioeconomic History    Marital status: /Civil Union     Spouse name: None    Number of children: None    Years of education: None    Highest education level: None   Occupational History    None   Tobacco Use    Smoking status: Never    Smokeless tobacco: Never   Vaping Use    Vaping status: Never Used   Substance and Sexual Activity    Alcohol use: Not Currently     Comment: 20 years of sobriety    Drug use: Never    Sexual activity: Yes     Partners: Female     Birth control/protection: None   Other Topics Concern    None   Social History Narrative    None     Social Determinants of Health     Financial Resource Strain: Not on file   Food Insecurity: No Food Insecurity (5/13/2022)    Hunger Vital Sign     Worried About Running Out of Food in the Last Year: Never true     Ran Out of Food in the Last Year: Never true   Transportation Needs: No Transportation Needs (5/27/2022)    PRAPARE - Transportation     Lack of Transportation (Medical): No     Lack of Transportation (Non-Medical): No   Physical Activity: Not on file   Stress: Not on file   Social Connections: Not on file   Intimate Partner Violence: Not on file   Housing Stability: Low Risk  (5/27/2022)    Housing Stability Vital Sign     Unable to Pay for Housing in the Last Year: No     Number of Places Lived in the Last Year: 1     Unstable Housing in the Last Year: No        Review of Systems   Constitutional: Negative.    HENT: Negative.      Eyes: Negative.    Respiratory: Negative.     Cardiovascular: Negative.    Endocrine: Negative.    Musculoskeletal: Negative.    Neurological: Negative.    Hematological: Negative.    Psychiatric/Behavioral: Negative.           Objective:     Physical Exam  Vitals reviewed.   Constitutional:       Appearance: Normal appearance.   HENT:      Head: Normocephalic and atraumatic.      Nose: Nose normal.   Eyes:      Conjunctiva/sclera: Conjunctivae normal.      Pupils: Pupils are equal, round, and reactive to light.   Cardiovascular:      Pulses:           Dorsalis pedis pulses are 2+ on the right side and 2+ on the left side.        Posterior tibial pulses are 1+ on the right side and 1+ on the left side.   Pulmonary:      Effort: Pulmonary effort is normal.   Feet:      Right foot:      Skin integrity: Skin integrity normal.      Toenail Condition: Right toenails are abnormally thick and long. Fungal disease present.     Left foot:      Skin integrity: Skin integrity normal.      Toenail Condition: Left toenails are abnormally thick and long. Fungal disease present.     Comments: The patient's clinical examination today is significant for thickened and dystrophic nail plates x 9 with discoloration and subungual debris consistent with onychomycosis.  There are no open lesions. There are no interdigital macerations.  There are no pretrophic changes nor callosities.  The patient has a history of a partial left fifth ray resection secondary to osteomyelitis.   Skin:     General: Skin is warm.      Capillary Refill: Capillary refill takes less than 2 seconds.   Neurological:      General: No focal deficit present.      Mental Status: He is alert and oriented to person, place, and time.   Psychiatric:         Mood and Affect: Mood normal.         Behavior: Behavior normal.         Thought Content: Thought content normal.

## 2024-07-12 ENCOUNTER — TELEPHONE (OUTPATIENT)
Age: 64
End: 2024-07-12

## 2024-07-12 DIAGNOSIS — I50.42 CHRONIC COMBINED SYSTOLIC AND DIASTOLIC CHF (CONGESTIVE HEART FAILURE) (HCC): ICD-10-CM

## 2024-07-12 DIAGNOSIS — R33.9 URINARY RETENTION: Primary | ICD-10-CM

## 2024-07-12 NOTE — TELEPHONE ENCOUNTER
New Patient    What is the reason for the patient’s appointment?: NP- ref for Urinary retention     Patient states he is able to urinate at this time.     PVR on US was 395.7 pre void and 129.4 post void.     What office location does the patient prefer?: [place]     Does patient have Imaging/Lab Results:    US kidney and bladder done on 6/28/24    IMPRESSION:     Mild pelvocaliectasis in the left kidney lower pole. No shadowing renal calculi.     Moderate post void residual urine volume.        The study was marked in EPIC for significant notification.    Have patient records been requested?:  If No, are the records showing in Epic: Records in epic       HISTORY:   Has the patient had any previous Urologist(s)?: No     Was the patient seen in the ED?: No    Scheduled: 8/29/24 with  in our [place] office. Please advise on time frame.

## 2024-07-12 NOTE — TELEPHONE ENCOUNTER
Called patient to make sure he received the message and I relayed message to pt. Patient stating that he will be discuss with Dr. Thomas at his appt on 7/22. No further questions at this time.

## 2024-07-13 RX ORDER — POTASSIUM CHLORIDE 1500 MG/1
20 TABLET, EXTENDED RELEASE ORAL 2 TIMES DAILY
Qty: 180 TABLET | Refills: 1 | Status: SHIPPED | OUTPATIENT
Start: 2024-07-13

## 2024-07-15 ENCOUNTER — LAB (OUTPATIENT)
Dept: LAB | Facility: CLINIC | Age: 64
End: 2024-07-15
Payer: COMMERCIAL

## 2024-07-15 DIAGNOSIS — R80.1 PERSISTENT PROTEINURIA: ICD-10-CM

## 2024-07-15 DIAGNOSIS — I10 BENIGN ESSENTIAL HYPERTENSION: ICD-10-CM

## 2024-07-15 DIAGNOSIS — I50.42 CHRONIC COMBINED SYSTOLIC AND DIASTOLIC CHF (CONGESTIVE HEART FAILURE) (HCC): ICD-10-CM

## 2024-07-15 DIAGNOSIS — N18.4 STAGE 4 CHRONIC KIDNEY DISEASE (HCC): ICD-10-CM

## 2024-07-15 LAB
ANION GAP SERPL CALCULATED.3IONS-SCNC: 8 MMOL/L (ref 4–13)
BACTERIA UR QL AUTO: ABNORMAL /HPF
BILIRUB UR QL STRIP: NEGATIVE
BNP SERPL-MCNC: 962 PG/ML (ref 0–100)
BUN SERPL-MCNC: 42 MG/DL (ref 5–25)
CALCIUM SERPL-MCNC: 8.5 MG/DL (ref 8.4–10.2)
CHLORIDE SERPL-SCNC: 105 MMOL/L (ref 96–108)
CLARITY UR: CLEAR
CO2 SERPL-SCNC: 26 MMOL/L (ref 21–32)
COLOR UR: COLORLESS
CREAT SERPL-MCNC: 2.33 MG/DL (ref 0.6–1.3)
CREAT UR-MCNC: 32.2 MG/DL
CREAT UR-MCNC: 32.3 MG/DL
GFR SERPL CREATININE-BSD FRML MDRD: 28 ML/MIN/1.73SQ M
GLUCOSE P FAST SERPL-MCNC: 136 MG/DL (ref 65–99)
GLUCOSE UR STRIP-MCNC: NEGATIVE MG/DL
HGB UR QL STRIP.AUTO: NEGATIVE
KETONES UR STRIP-MCNC: NEGATIVE MG/DL
LEUKOCYTE ESTERASE UR QL STRIP: NEGATIVE
MICROALBUMIN UR-MCNC: 191.5 MG/L
MICROALBUMIN/CREAT 24H UR: 593 MG/G CREATININE (ref 0–30)
NITRITE UR QL STRIP: NEGATIVE
NON-SQ EPI CELLS URNS QL MICRO: ABNORMAL /HPF
PH UR STRIP.AUTO: 6 [PH]
POTASSIUM SERPL-SCNC: 4.1 MMOL/L (ref 3.5–5.3)
PROT UR STRIP-MCNC: ABNORMAL MG/DL
PROT UR-MCNC: 37 MG/DL
PROT/CREAT UR: 1.15 MG/G{CREAT} (ref 0–0.1)
RBC #/AREA URNS AUTO: ABNORMAL /HPF
SODIUM SERPL-SCNC: 139 MMOL/L (ref 135–147)
SP GR UR STRIP.AUTO: 1.01 (ref 1–1.03)
UROBILINOGEN UR STRIP-ACNC: <2 MG/DL
WBC #/AREA URNS AUTO: ABNORMAL /HPF

## 2024-07-15 PROCEDURE — 36415 COLL VENOUS BLD VENIPUNCTURE: CPT

## 2024-07-15 PROCEDURE — 81001 URINALYSIS AUTO W/SCOPE: CPT

## 2024-07-15 PROCEDURE — 82570 ASSAY OF URINE CREATININE: CPT

## 2024-07-15 PROCEDURE — 84156 ASSAY OF PROTEIN URINE: CPT

## 2024-07-15 PROCEDURE — 82043 UR ALBUMIN QUANTITATIVE: CPT

## 2024-07-15 PROCEDURE — 83880 ASSAY OF NATRIURETIC PEPTIDE: CPT

## 2024-07-15 PROCEDURE — 80048 BASIC METABOLIC PNL TOTAL CA: CPT

## 2024-07-17 ENCOUNTER — HOSPITAL ENCOUNTER (OUTPATIENT)
Dept: NON INVASIVE DIAGNOSTICS | Facility: CLINIC | Age: 64
Discharge: HOME | End: 2024-07-17
Payer: COMMERCIAL

## 2024-07-17 VITALS
DIASTOLIC BLOOD PRESSURE: 80 MMHG | SYSTOLIC BLOOD PRESSURE: 128 MMHG | WEIGHT: 188 LBS | HEART RATE: 67 BPM | BODY MASS INDEX: 26.92 KG/M2 | HEIGHT: 70 IN

## 2024-07-17 DIAGNOSIS — I42.9 CARDIOMYOPATHY, UNSPECIFIED TYPE (HCC): ICD-10-CM

## 2024-07-17 LAB
AORTIC ROOT: 3.4 CM
APICAL FOUR CHAMBER EJECTION FRACTION: 31 %
BSA FOR ECHO PROCEDURE: 2.03 M2
FRACTIONAL SHORTENING: 16 (ref 28–44)
INTERVENTRICULAR SEPTUM IN DIASTOLE (PARASTERNAL SHORT AXIS VIEW): 1.1 CM
INTERVENTRICULAR SEPTUM: 1.1 CM (ref 0.6–1.1)
LEFT ATRIUM SIZE: 4.3 CM
LEFT INTERNAL DIMENSION IN SYSTOLE: 4.2 CM (ref 2.1–4)
LEFT VENTRICULAR INTERNAL DIMENSION IN DIASTOLE: 5 CM (ref 3.5–6)
LEFT VENTRICULAR POSTERIOR WALL IN END DIASTOLE: 1.2 CM
LEFT VENTRICULAR STROKE VOLUME: 39 ML
LVSV (TEICH): 39 ML
RIGHT VENTRICLE ID DIMENSION: 4.8 CM
SL CV LV EF: 35
SL CV PED ECHO LEFT VENTRICLE DIASTOLIC VOLUME (MOD BIPLANE) 2D: 119 ML
SL CV PED ECHO LEFT VENTRICLE SYSTOLIC VOLUME (MOD BIPLANE) 2D: 81 ML
TRICUSPID ANNULAR PLANE SYSTOLIC EXCURSION: 1.7 CM

## 2024-07-17 PROCEDURE — 93325 DOPPLER ECHO COLOR FLOW MAPG: CPT | Performed by: INTERNAL MEDICINE

## 2024-07-17 PROCEDURE — 93325 DOPPLER ECHO COLOR FLOW MAPG: CPT

## 2024-07-17 PROCEDURE — 93321 DOPPLER ECHO F-UP/LMTD STD: CPT | Performed by: INTERNAL MEDICINE

## 2024-07-17 PROCEDURE — 93321 DOPPLER ECHO F-UP/LMTD STD: CPT

## 2024-07-17 PROCEDURE — 93308 TTE F-UP OR LMTD: CPT

## 2024-07-17 PROCEDURE — 93308 TTE F-UP OR LMTD: CPT | Performed by: INTERNAL MEDICINE

## 2024-07-17 RX ADMIN — PERFLUTREN 0.6 ML/MIN: 6.52 INJECTION, SUSPENSION INTRAVENOUS at 08:35

## 2024-07-19 NOTE — PROGRESS NOTES
Cardiology  Follow Up   Office Visit Note -     Noel Khan   64 y.o.   male   MRN: [id number]  [facility] [place]  [address]  [address]  [place] [address] [phone]    PCP: No primary care provider on file.  Cardiologist : Dr Figueroa                Summary of Recommendations  2 g sodium diet, 2 L fluid restriction.  Heart failure education provided  Heart failure education provided  Decrease Lasix to 40 mg BID ( stop the 20 mg in the pm)  Given a drop ion his EF, will order a SPECT  He has CKD 4.  His creatinine is 2.28. He has no chest pain  F/U with nephr today  Follow up will be scheduled with Dr Figueroa         Impression/plan  EF persistently 35% with wall motion abnormalities.  Initially his EF dropped from 50 to 30% suspected tachycardia mediated, however his LV dysfunction has persisted with wall motion abnormalities  He denies angina  Will pursue a SPECT  chronic HFrEF with a drop in his EF to 30%-40% likely due to A-fib with RVR.  Prior was 50% .ADM 5/30 to 6/3/2024Recent ADM for decompensation related to A-fib with RVR, in the setting of pneumonia/sepsis/diarrhea  -prior: Reassess LV function in a short interval.  If his LV function remains depressed consider ischemic evaluation  Etiology: Mixed ,ischemic and nonischemic  Hospital discharge weight 183 pounds  Wt Readings from Last 3 Encounters:   07/22/24 85.1 kg (187 lb 9.6 oz)   07/17/24 85.3 kg (188 lb)   07/11/24 85.3 kg (188 lb)         6/13/24:             184 lb    --beta-blocker: Metoprolol tartrate 50 mg every 12  --Diuretic: Lasix 40 mg  BID ( pt increased it to BID on his own). He has also been taking a 20 mg in the pm today: 7/22 will go back down to 40 twice daily  --ACE/ARB/ARNI: None given renal dysfunction (previously on lisinopril 5 mg daily-held)  --MRA: None  --SGLT2i:   --ICD-not interested in the LifeVest   --Additional therapies: Could consider ISDN/hydralazine if BP allows after up  titration of rate control  --2 g sodium diet, 1800 cc fluid restriction. Daily weights.   A-fib new onset, with RVR /difficult to control rates  Rate control with metoprolol tartrate 50 mg every 12  Anticoagulation Eliquis 5 mg twice daily  Status post MELANY/DCCV 6/3/2024-> NSR after 1 200 J shock   EKG  6/13/24: normal sinus rhythm 61 bpm.  LAHB, LVH with repolarization abnormalities  MV CAD, DESx3; S/P circumflex and proximal and mid LAD stenting 2014.    On aspirin, statin, beta-blocker.  SPECT 2021: No ischemia.  EF of 50% by echo.dyskinesis of mid anterolateral, apical septal, apical lateral and apical walls,  Echo 717 2024:EF 35%.  the following segments are akinetic: mid anterior, apical anterior, apical septal, apical inferior, apical lateral and apex.The following segments are hypokinetic: basal anterior, basal anterolateral and mid anterolateral  Hypertension. /80 on metoprolol tartrate 50 mg q.12 hours and Lasix 40/d   Hyperlipidemia.  On atorvastatin 40 mg daily.  5/20/2024: Calculated LDL 34   Latest Reference Range & Units 06/01/21 08:26 04/06/22 08:24 05/20/24 06:42   Cholesterol See Comment mg/dL 115 114 87   Triglycerides See Comment mg/dL 43 38 84   HDL >=40 mg/dL 56 64 36 (L)   Non-HDL Cholesterol mg/dl  50 51   LDL Calculated 0 - 100 mg/dL 50 42 34   LDL CHOLESTEROL DIRECT 0 - 100 mg/dl 54     CKD3 baseline creatinine  Cr 2-2.7.  6/11/2024: Creatinine 2.28 BUN 50  T2DM.  5/20/2024: Hemoglobin A1c 7.1  Hx medical non adherence  Cardiac testing   cardiac catheterization 12/ 2014  ----Left main: Normal.   ----Proximal LAD: There was a diffuse 100 % stenosis. This is a likely culprit for the patient's clinical presentation. This lesion is a chronic total occlusion.   ----Mid LAD: There was a 90 % stenosis uncovered in mid LAD after recanalizing the proximal 100% occlusion.   ----Proximal RCA: There was a 80 % stenosis.  ----intervention: PCI/LULA x3  to 100% lesion of prox LAD, 90% lesion of mid  LAD and 80% prox circumflex lesion   TTE 2/15. Ejection fraction was estimated in the range of 40 % to 45 %. There was mild hypokinesis of the distal anteroseptum and anterior walls. There was moderate to severe hypokinesis of the apex, distal lateral, and distal inferoseptal walls.   TTE 2021: Low normal LV systolic function with EF of 50%, dyskinesis of mid anterolateral, apical septal, apical lateral and apical walls,+ diastolic dysfunction, mild mitral regurgitation.   SPECT  2021: Moderate size complete fixed myocardial perfusion defect of the apical anterior wall suggestive of prior anterior apical MI, no significant reversible ischemia present.   TTE 5/31/2024 LVEF 30%, severe global hypokinesis with apical/apical septal akinesis, Dilated RV, mild MR, mild to moderate TR, IVC not well visualized - ?normal sized, TR PG 48   MELANY/DCCV 6/3/2024 --> NSR with 1 -200 J biphasic shock.    MELANY-6/3/2024 EF 40%.  Systolic function mildly reduced.  There is no LA thrombus.  No PFO.  Normal functioning DECLAN.  No thrombus.  There was mild continuous spontaneous echo contrast.  Mild MR.  TTE 7/17/2024.  EF 35%.  Systolic function moderate reduced.  Diastolic function is abnormal.The following segments are akinetic: mid anterior, apical anterior, apical septal, apical inferior, apical lateral and apex.The following segments are hypokinetic: basal anterior, basal anterolateral and mid anterolateral.RV cavity is dilated with normal systolic function             HPI:   Noel Khan is a 58 yo gentleman with a hx of ICM, EF 35%, CAD, prior multivessel coronary stenting, chronic combined HF, HTN, HL, diabetes and CKD.  He established care with Dr. Love in 2015, and then did not follow up until he was hospitalized in May of 2017 for decompensated heart failure. He followed briefly with Dr Figueroa after the admission. An updated, out pt echocardiogram was requested, but not obtained.   It was noted he was inconsistently taking his  medications given a lack of insurance, and affordabiliy issues.  He was last seen in the office in July of 2017. It was noted he was maintained on metoprolol tartrate instead of succinate given cost.  Also since he had a recent acute kidney injury he was not on an ACE-inhibitor.  An echocardiogram and lab work was requested to monitor his renal function and lipids.  He was advised follow-up in 6 months and then was lost to cardiology follow up.    4/1/19 he presents today upon his request for evaluation of swollen feet.  He tells me he is 20 lb over his dry weight.  He is mostly troubled by his lower extremity edema.  He has chronic wounds of his left lower extremity from an accident that has never healed.  Currently they are open.  There are not weeping.  He has edema from his toes to his thighs.  He admits to chronic dyspnea which has not improved since his MI.  Some PND but mostly he is bothered by his lower extremity edema.  He also has lower extremity neuropathy  He knows his sugars are elevated, and he tells me he knows he needs to do better with his lifestyle and diet .  He reports compliance with his current medication list as reviewed; Lasix 20 mg p.o. Daily metoprolol tartrate 25 mg b.i.d. He is also on an aspirin and statin       4/15/19 his weights are near baseline.  He has been monitoring him and he brings the list in for me.  Yesterday he was actually able to mow the grass.  Since the last visit, he has been on Lasix 40 mg b.i.d. With supplemental potassium.  His creatinine did bump to 1.9.  Were going to drop his Lasix back down to 40 mg daily. We're going to check his BMP again in a week.  He is trying to stay away from salt and sugar.  He declines going back to the wound center for his legs.  I asked to see his PCP.  He does have some scars and open wounds.  He tells me this is chronic for times 20 years    Interval history   5/1/2019; 8/5/2019; 4/21/2020; 3/18/2021; 6/2/2021; 7/6/2022 OV   [place]      ADM 5/30 to 6/3/2024  Atrial fibrillation, new onset, in setting of diarrhea and infection/pneumonia-rate controlled, started on anticoagulation with Eliquis.  Notably he was taking metoprolol 25 mg only once daily at home although prescribed twice daily  Underwent MELANY/DCCV 6/3/2024--> NSR 1 200 J shock  Sepsis secondary to pneumonia  Dx mixed cardiomyopathy EF 30%  Non-MI troponin elevation  LOBO with a creatinine of 3.58 on admission prior baseline 2-2.7  Plan  Continue metoprolol tartrate 50 mg twice daily  Currently not on ACE/ARB/ARNI due to renal dysfunction (home lisinopril 5 on hold)  Can plan to add Isordil/hydralazine if blood pressure allows, after uptitration of rate control therapy  Discussed risk of VT/VF and possible LifeVest protection, but he is not interested   Reassess EF in few weeks after cardioversion  Patient does not desire invasive testing such as left heart catheterization.  Declines ICD or LifeVest  If EF does not improve consider repeating ischemic evaluation  DC weight: 183 pounds  Discharge creatinine 1.85  Discharge diuretic: Lasix 40 mg daily      6/13/2024  PMH: CAD status post prior stenting, ICM, chronic HFrEF with a drop in EF in the setting of A-fib with RVR, in the setting of sepsis due to pneumonia, HTN, HL, DM2    ROS: He tells me he feels great.  He denies chest pain shortness of breath.  He increase his Lasix to 40 mg twice daily on his own.  He tells me he feels better at that dose.  On one a day he had orthopnea.  His lower extremity edema is improved.  He denies palpitations lightheadedness or dizziness  He is adherent to his medical therapy taking Eliquis as directed  Last labs 6/11/2024: Creatinine 2.28 BUN 50 potassium 4.4  He appears euvolemic on exam  His EKG shows sinus rhythm 61 bpm  Will continue the plan  I reviewed with him importance of a salt restricted diet and provided written education  He will see me in a month, limited echo prior to.  Will get  labs next week including a BNP and a BMP    7/2/2024  Close follow-up  PMH: CAD status post prior stenting, ICM, chronic HFrEF with a drop in EF in the setting of A-fib with RVR, in the setting of sepsis due to pneumonia, HTN, HL, DM2    He reports feeling well without complaints.  He denies chest pain shortness of breath lightness or dizziness.  He has chronic left lower extremity edema from a prior traumatic injury    Echo 7/17:  Ef 35%. No thrombus  The following segments are akinetic: mid anterior, apical anterior, apical septal, apical inferior, apical lateral and apex. The following segments are hypokinetic: basal anterior, basal anterolateral and mid anterolateral.    Last labs 7/15:  cr 2.33 BUN 42  K 4.1.  cr previous baseline 1.4-1.6  Wt 187 lby    At hosp Dc was on Lasix 40 BID   ( 80 TD)  6/19: I decreased it to 60 in the am, and 40 in the pm ( 100 TD)   He is currently on 80 in the am and 20 in the pm  ( 100 TD)   Today: I will decrease it back to  40 BID ( 80 TD)   Latest Reference Range & Units 05/31/24 05:47 06/01/24 05:03 06/02/24 05:36 06/03/24 06:48 06/11/24 06:35 06/19/24 09:20 07/15/24 06:30   Creatinine 0.60 - 1.30 mg/dL 2.83 (H) 2.31 (H) 2.01 (H) 1.85 (H) 2.28 (H) 1.92 (H) 2.33 (H)     Given a drop in his EF will order a pharmacological nuclear stress test.  He will see nephrology in a short interval  I recommend follow-up with his cardiologist          Assessment  Diagnoses and all orders for this visit:    Coronary artery disease involving native coronary artery of native heart without angina pectoris    Chronic combined systolic and diastolic CHF  -     furosemide (LASIX) 40 mg tablet; Take 1 tablet (40 mg total) by mouth 2 (two) times a day    Benign essential hypertension    Presence of drug coated stent in LAD coronary artery    Presence of drug coated stent in right coronary artery    Chronic venous hypertension (idiopathic) with ulcer of left lower extremity (HCC)    Ischemic  cardiomyopathy    New onset atrial fibrillation (HCC) s/p MELANY CV    Thromboembolic disorder (HCC)    Diabetic polyneuropathy associated with type 2 diabetes mellitus (HCC)    Stage 4 chronic kidney disease (HCC)  -     furosemide (LASIX) 40 mg tablet; Take 1 tablet (40 mg total) by mouth 2 (two) times a day            Past Medical History:   Diagnosis Date    CAD (coronary artery disease)     Chronic combined systolic and diastolic CHF (congestive heart failure) (HCC)     Diabetes mellitus (HCC)     type 2    Dyslipidemia     Hypertension     Ischemic cardiomyopathy     MI (myocardial infarction) (HCC)     Neuropathy     Osteomyelitis (HCC)     Pancreatitis        Review of Systems   Constitutional: Negative for chills and weight gain.   Cardiovascular:  Negative for chest pain, claudication, cyanosis, dyspnea on exertion, irregular heartbeat, leg swelling, near-syncope, orthopnea, palpitations, paroxysmal nocturnal dyspnea and syncope.   Respiratory:  Negative for cough and shortness of breath.    Skin:         He reports chronic left lower extremity open wounds for “15 years”   Gastrointestinal:  Negative for heartburn and nausea.   Neurological:  Negative for dizziness, focal weakness, headaches, light-headedness and weakness.   All other systems reviewed and are negative.      No Known Allergies  .    Current Outpatient Medications:     apixaban (Eliquis) 5 mg, Take 1 tablet (5 mg total) by mouth 2 (two) times a day, Disp: 180 tablet, Rfl: 3    atorvastatin (LIPITOR) 40 mg tablet, TAKE 1 TABLET BY MOUTH EVERY DAY, Disp: 90 tablet, Rfl: 3    Blood Pressure Monitoring ([employer] Blood Pressure Monitor) KIT, Use 2 (two) times a day Automatic BP cuff. Measure BP twice daily., Disp: 1 kit, Rfl: 0    furosemide (LASIX) 40 mg tablet, Take 1 tablet (40 mg total) by mouth 2 (two) times a day, Disp: , Rfl:     insulin glargine (LANTUS) 100 units/mL subcutaneous injection, Inject 30 Units under the skin every morning, Disp: ,  Rfl:     insulin NPH-insulin regular (NovoLIN 70/30) 100 units/mL subcutaneous injection, Inject under the skin, Disp: , Rfl:     latanoprost (XALATAN) 0.005 % ophthalmic solution, Administer 0.0005 drops to both eyes daily at bedtime, Disp: , Rfl:     levothyroxine 25 mcg tablet, Take 25 mcg by mouth daily, Disp: , Rfl:     metoprolol tartrate (LOPRESSOR) 50 mg tablet, Take 1 tablet (50 mg total) by mouth every 12 (twelve) hours, Disp: 180 tablet, Rfl: 3    pantoprazole (PROTONIX) 40 mg tablet, Take 1 tablet (40 mg total) by mouth daily in the early morning, Disp: 30 tablet, Rfl: 0    potassium chloride (Klor-Con M20) 20 mEq tablet, TAKE 1 TABLET BY MOUTH 2 TIMES A DAY., Disp: 180 tablet, Rfl: 1    Unifine Pentips 31G X 8 MM MISC, , Disp: , Rfl:     Social History     Socioeconomic History    Marital status: /Civil Union     Spouse name: Not on file    Number of children: Not on file    Years of education: Not on file    Highest education level: Not on file   Occupational History    Not on file   Tobacco Use    Smoking status: Never    Smokeless tobacco: Never   Vaping Use    Vaping status: Never Used   Substance and Sexual Activity    Alcohol use: Not Currently     Comment: 20 years of sobriety    Drug use: Never    Sexual activity: Yes     Partners: Female     Birth control/protection: None   Other Topics Concern    Not on file   Social History Narrative    Not on file     Social Determinants of Health     Financial Resource Strain: Not on file   Food Insecurity: No Food Insecurity (5/13/2022)    Hunger Vital Sign     Worried About Running Out of Food in the Last Year: Never true     Ran Out of Food in the Last Year: Never true   Transportation Needs: No Transportation Needs (5/27/2022)    PRAPARE - Transportation     Lack of Transportation (Medical): No     Lack of Transportation (Non-Medical): No   Physical Activity: Not on file   Stress: Not on file   Social Connections: Not on file   Intimate Partner  Violence: Not on file   Housing Stability: Low Risk  (5/27/2022)    Housing Stability Vital Sign     Unable to Pay for Housing in the Last Year: No     Number of Places Lived in the Last Year: 1     Unstable Housing in the Last Year: No       Family History   Problem Relation Age of Onset    Heart attack Father     Hyperlipidemia Father     Cancer Father     Diabetes Father     Diabetes Mother     Heart failure Mother         poss    Kidney disease Mother     Cancer Paternal Grandfather     Stroke Neg Hx     Anuerysm Neg Hx     Clotting disorder Neg Hx     Arrhythmia Neg Hx     Coronary artery disease Neg Hx     Hypertension Neg Hx     Sudden death Neg Hx         scd       Physical Exam  Vitals and nursing note reviewed.   Constitutional:       General: He is not in acute distress.  HENT:      Head: Normocephalic and atraumatic.   Eyes:      Extraocular Movements: Extraocular movements intact.      Conjunctiva/sclera: Conjunctivae normal.   Cardiovascular:      Rate and Rhythm: Normal rate and regular rhythm.      Pulses: Intact distal pulses.      Heart sounds: Normal heart sounds.   Pulmonary:      Effort: Pulmonary effort is normal.      Breath sounds: Normal breath sounds.   Abdominal:      General: Bowel sounds are normal.      Palpations: Abdomen is soft.   Musculoskeletal:         General: Normal range of motion.      Cervical back: Normal range of motion and neck supple.      Right lower leg: Edema present.      Left lower leg: Edema present.      Comments: Scar LLE. + venous stasis changes   Skin:     General: Skin is warm and dry.   Neurological:      Mental Status: He is alert and oriented to person, place, and time.   Psychiatric:         Mood and Affect: Mood normal.         Vitals: Blood pressure 133/80, pulse 57, weight 85.1 kg (187 lb 9.6 oz), SpO2 99%.   Wt Readings from Last 3 Encounters:   07/22/24 85.1 kg (187 lb 9.6 oz)   07/17/24 85.3 kg (188 lb)   07/11/24 85.3 kg (188 lb)         Labs &  "Results:  Lab Results   Component Value Date    WBC 9.78 06/02/2024    HGB 11.5 (L) 06/02/2024    HCT 36.8 06/02/2024    MCV 90 06/02/2024     06/02/2024     BNP   Date Value Ref Range Status   07/15/2024 962 (H) 0 - 100 pg/mL Final   06/19/2024 831 (H) 0 - 100 pg/mL Final   05/30/2024 1,434 (H) 0 - 100 pg/mL Final   12/22/2014 296 (H) 0 - 100 pg/mL Final     Comment:     [id number]     12/16/2014 662 (H) 0 - 100 pg/mL Final     Comment:     The above 1 analytes were [id number]       No components found for: \"CHEM\"    No results found for this or any previous visit.  No results found for this or any previous visit.      This note was completed in part utilizing [employer] direct voice recognition software.   Grammatical errors, random word insertion, spelling mistakes, and incomplete sentences may be an occasional consequence of the system secondary to software limitations, ambient noise and hardware issues. At the time of dictation, efforts were made to edit, clarify and /or correct errors.  Please read the chart carefully and recognize, using context, where substitutions have occurred.  If you have any questions or concerns about the context, text or information contained within the body of this dictation, please contact myself, the provider, for further clarification      "

## 2024-07-22 ENCOUNTER — OFFICE VISIT (OUTPATIENT)
Dept: CARDIOLOGY CLINIC | Facility: CLINIC | Age: 64
End: 2024-07-22
Payer: COMMERCIAL

## 2024-07-22 ENCOUNTER — OFFICE VISIT (OUTPATIENT)
Dept: NEPHROLOGY | Facility: CLINIC | Age: 64
End: 2024-07-22
Payer: COMMERCIAL

## 2024-07-22 VITALS
DIASTOLIC BLOOD PRESSURE: 80 MMHG | BODY MASS INDEX: 26.92 KG/M2 | WEIGHT: 187.6 LBS | OXYGEN SATURATION: 99 % | HEART RATE: 57 BPM | SYSTOLIC BLOOD PRESSURE: 133 MMHG

## 2024-07-22 VITALS
SYSTOLIC BLOOD PRESSURE: 120 MMHG | BODY MASS INDEX: 26.77 KG/M2 | OXYGEN SATURATION: 99 % | WEIGHT: 187 LBS | HEART RATE: 71 BPM | HEIGHT: 70 IN | DIASTOLIC BLOOD PRESSURE: 72 MMHG

## 2024-07-22 DIAGNOSIS — I50.42 CHRONIC COMBINED SYSTOLIC AND DIASTOLIC CHF (CONGESTIVE HEART FAILURE) (HCC): ICD-10-CM

## 2024-07-22 DIAGNOSIS — I25.10 CORONARY ARTERY DISEASE INVOLVING NATIVE CORONARY ARTERY OF NATIVE HEART WITHOUT ANGINA PECTORIS: Primary | ICD-10-CM

## 2024-07-22 DIAGNOSIS — N18.4 STAGE 4 CHRONIC KIDNEY DISEASE (HCC): ICD-10-CM

## 2024-07-22 DIAGNOSIS — I74.9 THROMBOEMBOLIC DISORDER (HCC): ICD-10-CM

## 2024-07-22 DIAGNOSIS — I10 BENIGN ESSENTIAL HYPERTENSION: ICD-10-CM

## 2024-07-22 DIAGNOSIS — I48.91 NEW ONSET ATRIAL FIBRILLATION (HCC): ICD-10-CM

## 2024-07-22 DIAGNOSIS — R33.9 URINARY RETENTION: ICD-10-CM

## 2024-07-22 DIAGNOSIS — D64.9 ANEMIA, UNSPECIFIED TYPE: ICD-10-CM

## 2024-07-22 DIAGNOSIS — R82.998: ICD-10-CM

## 2024-07-22 DIAGNOSIS — N18.4 STAGE 4 CHRONIC KIDNEY DISEASE (HCC): Primary | ICD-10-CM

## 2024-07-22 DIAGNOSIS — I87.312 CHRONIC VENOUS HYPERTENSION (IDIOPATHIC) WITH ULCER OF LEFT LOWER EXTREMITY (HCC): ICD-10-CM

## 2024-07-22 DIAGNOSIS — E11.42 DIABETIC POLYNEUROPATHY ASSOCIATED WITH TYPE 2 DIABETES MELLITUS (HCC): ICD-10-CM

## 2024-07-22 DIAGNOSIS — E55.9 VITAMIN D INSUFFICIENCY: ICD-10-CM

## 2024-07-22 DIAGNOSIS — L97.929 CHRONIC VENOUS HYPERTENSION (IDIOPATHIC) WITH ULCER OF LEFT LOWER EXTREMITY (HCC): ICD-10-CM

## 2024-07-22 DIAGNOSIS — Z95.5 PRESENCE OF DRUG COATED STENT IN RIGHT CORONARY ARTERY: ICD-10-CM

## 2024-07-22 DIAGNOSIS — I25.5 ISCHEMIC CARDIOMYOPATHY: ICD-10-CM

## 2024-07-22 DIAGNOSIS — R80.1 PERSISTENT PROTEINURIA: ICD-10-CM

## 2024-07-22 DIAGNOSIS — Z95.5 PRESENCE OF DRUG COATED STENT IN LAD CORONARY ARTERY: ICD-10-CM

## 2024-07-22 PROCEDURE — 99215 OFFICE O/P EST HI 40 MIN: CPT | Performed by: INTERNAL MEDICINE

## 2024-07-22 PROCEDURE — 99215 OFFICE O/P EST HI 40 MIN: CPT | Performed by: NURSE PRACTITIONER

## 2024-07-22 RX ORDER — FUROSEMIDE 40 MG/1
40 TABLET ORAL 2 TIMES DAILY
Start: 2024-07-22

## 2024-07-22 NOTE — PATIENT INSTRUCTIONS
1. CKD stage 4 in setting of diabetes, hypertensive nephrosclerosis, ischemic cardiomyopathy as well as cirrhosis and diuretic use  -sCr 2.33 with eGFR high 20s.  -suspect progressive CKD in part due to uncontrolled blood sugars although improved as of late, A1C 7.1 as of May 20, 2024  -b/l sCr appears to be 1.4-1.6 previously, with rise in sCr to 2.33 as of 7/15/24. Episode of LOBO likely d/t sepsis in setting of recent PNA, peak sCr 3.59  -monitor CMP  -recent UA showed trace protein, UpCr 1.15g as of 7/15/24  -need ongoing glycemic control to slow down progression of CKD  -has been to CKD education class  -CKD education booklet provided previously  -renal ultrasound showed normal cortical echogenicity     2. HTN - BP well controlled in office  -on lasix 40mg twice daily, metoprolol 50mg twice daily  -off lisinopril 5mg daily   -avoid high salt/sodium diet  -avoid caffeinated beverages     3. DM2, uncontrolled - last A1C 7.1 as of last check, following with Dr. Adame of endocrinology, continue lantus and novolog. Needs improved glycemic control.     4. Proteinuria likely d/t diabetic nephropathy in setting of uncontrolled diabetes - microalb:Cr was as high as 3.4g in March 2019, now down to 1.15g as of 7/15/24 with microalb:Cr 593mg/g - monitor this off ACEi  -anti PLA2R, dsDNA, HIV, RPR, complements, SPEP/UPEP/FLC, antiGBM, chronic hep panel, ANCA panel, LISA negative  -cryo + in setting of cirrhosis, RF - negative when repeated in Jan. 2022  -RBC casts noted on last UA with microscopy May 2024. Repeat UA with micoscropy     5. Ischemic cardiomyopathy with EF 35% - diuretic as above, monitor daily weight, follows with cardiology outpatient, Dr. Figueroa, to have stress test     6. Vitamin D insufficiency - may take 1000u daily over the counter vitamin D as last level 25.5(low) as of 10/17/22, monitor vitamin D level annually    7. Anemia, mild - last Hgb 11.5 during hospitalization, repeat CBC    8. Urinary retention  - to see urology     RTC in in 3 months.  Obtain blood and urine testing before next visit.

## 2024-07-22 NOTE — PROGRESS NOTES
NEPHROLOGY OUTPATIENT PROGRESS NOTE   Noel Khan 64 y.o. male MRN: [id number]  DATE: 7/22/2024  Reason for visit:   Chief Complaint   Patient presents with    Hospital Follow-up    Chronic Kidney Disease    Abnormal Lab        Patient Instructions   1. CKD stage 4 in setting of diabetes, hypertensive nephrosclerosis, ischemic cardiomyopathy as well as cirrhosis and diuretic use  -sCr 2.33 with eGFR high 20s.  -suspect progressive CKD in part due to uncontrolled blood sugars although improved as of late, A1C 7.1 as of May 20, 2024  -b/l sCr appears to be 1.4-1.6 previously, with rise in sCr to 2.33 as of 7/15/24. Episode of LOBO likely d/t sepsis in setting of recent PNA, peak sCr 3.59  -monitor CMP  -recent UA showed trace protein, UpCr 1.15g as of 7/15/24  -need ongoing glycemic control to slow down progression of CKD  -has been to CKD education class  -CKD education booklet provided previously  -renal ultrasound showed normal cortical echogenicity     2. HTN - BP well controlled in office  -on lasix 40mg twice daily, metoprolol 50mg twice daily  -off lisinopril 5mg daily   -avoid high salt/sodium diet  -avoid caffeinated beverages     3. DM2, uncontrolled - last A1C 7.1 as of last check, following with Dr. Adame of endocrinology, continue lantus and novolog. Needs improved glycemic control.     4. Proteinuria likely d/t diabetic nephropathy in setting of uncontrolled diabetes - microalb:Cr was as high as 3.4g in March 2019, now down to 1.15g as of 7/15/24 with microalb:Cr 593mg/g - monitor this off ACEi  -anti PLA2R, dsDNA, HIV, RPR, complements, SPEP/UPEP/FLC, antiGBM, chronic hep panel, ANCA panel, LISA negative  -cryo + in setting of cirrhosis, RF - negative when repeated in Jan. 2022  -RBC casts noted on last UA with microscopy May 2024. Repeat UA with micoscropy     5. Ischemic cardiomyopathy with EF 35% - diuretic as above, monitor daily weight, follows with cardiology outpatient, Dr. Figueroa, to have stress  test     6. Vitamin D insufficiency - may take 1000u daily over the counter vitamin D as last level 25.5(low) as of 10/17/22, monitor vitamin D level annually    7. Anemia, mild - last Hgb 11.5 during hospitalization, repeat CBC    8. Urinary retention - to see urology     RTC in in 3 months.  Obtain blood and urine testing before next visit.      Noel was seen today for hospital follow-up, chronic kidney disease and abnormal lab.    Diagnoses and all orders for this visit:    Stage 4 chronic kidney disease (HCC)  -     Urinalysis with microscopic; Future  -     Protein / creatinine ratio, urine; Future  -     Comprehensive metabolic panel; Future  -     Albumin / creatinine urine ratio; Future  -     CBC and differential; Future  -     Basic metabolic panel; Future    Benign essential hypertension    Chronic combined systolic and diastolic CHF    Persistent proteinuria  -     Protein / creatinine ratio, urine; Future  -     Albumin / creatinine urine ratio; Future    Vitamin D insufficiency  -     Vitamin D 25 hydroxy; Future    Anemia, unspecified type  -     CBC and differential; Future    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 CKD stage 4 in setting of diabetes, hypertensive nephrosclerosis, ischemic cardiomyopathy as well as cirrhosis and diuretic use  -sCr 2.33 with eGFR high 20s.  -suspect progressive CKD in part due to uncontrolled blood sugars although improved as of late, A1C 7.1 as of May 20, 2024  -b/l sCr appears to be 1.4-1.6 previously, with rise in sCr to 2.33 as of 7/15/24. Episode of LOBO likely d/t sepsis in setting of recent PNA, peak sCr 3.59  -monitor CMP  -recent UA showed trace protein, UpCr 1.15g as of 7/15/24  -need ongoing glycemic control to slow down progression of CKD  -has been to CKD education class  -CKD education booklet provided previously  -renal ultrasound showed normal cortical  echogenicity     2. HTN - BP well controlled in office  -on lasix 40mg twice daily, metoprolol 50mg twice daily  -off lisinopril 5mg daily   -avoid high salt/sodium diet  -avoid caffeinated beverages     3. DM2, uncontrolled - last A1C 7.1 as of last check, following with Dr. Adame of endocrinology, continue lantus and novolog. Needs improved glycemic control.     4. Proteinuria likely d/t diabetic nephropathy in setting of uncontrolled diabetes - microalb:Cr was as high as 3.4g in March 2019, now down to 1.15g as of 7/15/24 with microalb:Cr 593mg/g - monitor this off ACEi  -anti PLA2R, dsDNA, HIV, RPR, complements, SPEP/UPEP/FLC, antiGBM, chronic hep panel, ANCA panel, LISA negative  -cryo + in setting of cirrhosis, RF - negative when repeated in Jan. 2022  -RBC casts noted on last UA with microscopy May 2024. Repeat UA with micoscropy     5. Ischemic cardiomyopathy with EF 35% - diuretic as above, monitor daily weight, follows with cardiology outpatient, Dr. Figueroa, to have stress test     6. Vitamin D insufficiency - may take 1000u daily over the counter vitamin D as last level 25.5(low) as of 10/17/22, monitor vitamin D level annually    7. Anemia, mild - last Hgb 11.5 during hospitalization, repeat CBC    8. Urinary retention - to see urology     RTC in in 3 months.  Obtain blood and urine testing before next visit.    SUBJECTIVE / INTERVAL HISTORY:  64 y.o. male presents in hospital follow up of LOBO on CKD.     Noel Khan recently admitted for sepsis d/t PNA, s/p IV Abx with new onset Afib  s/p cardioversion. Course complicated by LOBO. Discharged on lasix 40mg daily. Now on lasix 40mg twice daily. Saw cardiology this am.    Denies NSAID use.  HTN - does not check BP at home  DM2 - blood sugars well controlled  Lost some weight but gained it back. Denies leg edema or shortness of breath.     Review of Systems   Constitutional:  Negative for chills and fever.   HENT:  Negative for sore throat.    Eyes:   "Negative for visual disturbance.   Respiratory:  Negative for cough and shortness of breath.    Cardiovascular:  Negative for chest pain and leg swelling.   Gastrointestinal:  Negative for abdominal pain, constipation, diarrhea, nausea and vomiting.   Endocrine: Negative for polyuria.   Genitourinary:  Negative for decreased urine volume, difficulty urinating, dysuria and hematuria.   Musculoskeletal:  Negative for back pain and myalgias.   Skin:  Negative for rash.   Neurological:  Negative for dizziness, light-headedness and numbness.   Psychiatric/Behavioral:  Negative for confusion.        OBJECTIVE:  /72 (BP Location: Left arm, Patient Position: Sitting, Cuff Size: Adult)   Pulse 71   Ht 5' 10\" (1.778 m)   Wt 84.8 kg (187 lb)   SpO2 99%   BMI 26.83 kg/m²  Body mass index is 26.83 kg/m².    Physical exam:  Physical Exam  Vitals reviewed.   Constitutional:       General: He is not in acute distress.     Appearance: Normal appearance. He is well-developed. He is not diaphoretic.   HENT:      Head: Normocephalic and atraumatic.      Nose: Nose normal.      Mouth/Throat:      Mouth: Mucous membranes are moist.      Pharynx: No oropharyngeal exudate.   Eyes:      General: No scleral icterus.        Right eye: No discharge.         Left eye: No discharge.      Comments: eyeglasses   Neck:      Thyroid: No thyromegaly.   Cardiovascular:      Rate and Rhythm: Normal rate and regular rhythm.      Heart sounds: Normal heart sounds.   Pulmonary:      Effort: Pulmonary effort is normal.      Breath sounds: Normal breath sounds. No wheezing or rales.   Abdominal:      General: Bowel sounds are normal. There is no distension.      Palpations: Abdomen is soft.      Tenderness: There is no abdominal tenderness.   Musculoskeletal:         General: Swelling (trace left LE edema) present. Normal range of motion.      Cervical back: Neck supple.   Lymphadenopathy:      Cervical: No cervical adenopathy.   Skin:     " General: Skin is warm and dry.      Coloration: Skin is not jaundiced.      Findings: No rash.   Neurological:      General: No focal deficit present.      Mental Status: He is alert.      Comments: awake   Psychiatric:         Mood and Affect: Mood normal.         Behavior: Behavior normal.         Medications:    Current Outpatient Medications:     apixaban (Eliquis) 5 mg, Take 1 tablet (5 mg total) by mouth 2 (two) times a day, Disp: 180 tablet, Rfl: 3    atorvastatin (LIPITOR) 40 mg tablet, TAKE 1 TABLET BY MOUTH EVERY DAY, Disp: 90 tablet, Rfl: 3    Blood Pressure Monitoring ([employer] Blood Pressure Monitor) KIT, Use 2 (two) times a day Automatic BP cuff. Measure BP twice daily., Disp: 1 kit, Rfl: 0    furosemide (LASIX) 40 mg tablet, Take 1 tablet (40 mg total) by mouth 2 (two) times a day, Disp: , Rfl:     insulin glargine (LANTUS) 100 units/mL subcutaneous injection, Inject 30 Units under the skin every morning, Disp: , Rfl:     insulin NPH-insulin regular (NovoLIN 70/30) 100 units/mL subcutaneous injection, Inject under the skin, Disp: , Rfl:     latanoprost (XALATAN) 0.005 % ophthalmic solution, Administer 0.0005 drops to both eyes daily at bedtime, Disp: , Rfl:     levothyroxine 25 mcg tablet, Take 25 mcg by mouth daily, Disp: , Rfl:     metoprolol tartrate (LOPRESSOR) 50 mg tablet, Take 1 tablet (50 mg total) by mouth every 12 (twelve) hours, Disp: 180 tablet, Rfl: 3    pantoprazole (PROTONIX) 40 mg tablet, Take 1 tablet (40 mg total) by mouth daily in the early morning, Disp: 30 tablet, Rfl: 0    potassium chloride (Klor-Con M20) 20 mEq tablet, TAKE 1 TABLET BY MOUTH 2 TIMES A DAY., Disp: 180 tablet, Rfl: 1    Unifine Pentips 31G X 8 MM MISC, , Disp: , Rfl:     Allergies:  Allergies as of 07/22/2024    (No Known Allergies)       The following portions of the patient's history were reviewed and updated as appropriate: past family history, past surgical history and problem list.    Laboratory Results:  Lab  "Results   Component Value Date    SODIUM 139 07/15/2024    K 4.1 07/15/2024     07/15/2024    CO2 26 07/15/2024    BUN 42 (H) 07/15/2024    CREATININE 2.33 (H) 07/15/2024    GLUC 54 (L) 06/19/2024    CALCIUM 8.5 07/15/2024        Lab Results   Component Value Date    .9 (H) 10/17/2022    CALCIUM 8.5 07/15/2024    PHOS 3.0 06/03/2024       Portions of the record may have been created with voice recognition software.  Occasional wrong word or \"sound a like\" substitutions may have occurred due to the inherent limitations of voice recognition software.  Read the chart carefully and recognize, using context, where substitutions have occurred.  "

## 2024-07-22 NOTE — LETTER
July 22, 2024     Santa Figueroa DO  [address] [facility] [place]  [address]  [place] [address]    Patient: Noel Khan   YOB: 1960   Date of Visit: 7/22/2024       Dear Dr. Figueroa:    Thank you for referring Noel Khan to me for evaluation. Below are my notes for this consultation.    If you have questions, please do not hesitate to call me. I look forward to following your patient along with you.         Sincerely,        LATASHA Gray        CC: No Recipients    LATASHA Gray  7/22/2024  8:48 AM  Sign when Signing Visit  Cardiology  Follow Up   Office Visit Note -     Noel Khan   64 y.o.   male   MRN: [id number]  [facility] [place]  [address]  [address]  [place] [address]  [phone]    PCP: No primary care provider on file.  Cardiologist : Dr Figueroa                Summary of Recommendations  2 g sodium diet, 2 L fluid restriction.  Heart failure education provided  Heart failure education provided  Decrease Lasix to 40 mg BID ( stop the 20 mg in the pm)  Given a drop ion his EF, will order a SPECT  He has CKD 4.  His creatinine is 2.28. He has no chest pain  F/U with nephr today  Follow up will be scheduled with Dr Figueroa         Impression/plan  EF persistently 35% with wall motion abnormalities.  Initially his EF dropped from 50 to 30% suspected tachycardia mediated, however his LV dysfunction has persisted with wall motion abnormalities  He denies angina  Will pursue a SPECT  chronic HFrEF with a drop in his EF to 30%-40% likely due to A-fib with RVR.  Prior was 50% .ADM 5/30 to 6/3/2024Recent ADM for decompensation related to A-fib with RVR, in the setting of pneumonia/sepsis/diarrhea  -prior: Reassess LV function in a short interval.  If his LV function remains depressed consider ischemic evaluation  Etiology: Mixed ,ischemic and nonischemic  Hospital discharge weight 183 pounds  Wt Readings from Last 3 Encounters:   07/22/24 85.1 kg (187  lb 9.6 oz)   07/17/24 85.3 kg (188 lb)   07/11/24 85.3 kg (188 lb)         6/13/24:             184 lb    --beta-blocker: Metoprolol tartrate 50 mg every 12  --Diuretic: Lasix 40 mg  BID ( pt increased it to BID on his own). He has also been taking a 20 mg in the pm today: 7/22 will go back down to 40 twice daily  --ACE/ARB/ARNI: None given renal dysfunction (previously on lisinopril 5 mg daily-held)  --MRA: None  --SGLT2i:   --ICD-not interested in the LifeVest   --Additional therapies: Could consider ISDN/hydralazine if BP allows after up titration of rate control  --2 g sodium diet, 1800 cc fluid restriction. Daily weights.   A-fib new onset, with RVR /difficult to control rates  Rate control with metoprolol tartrate 50 mg every 12  Anticoagulation Eliquis 5 mg twice daily  Status post MELANY/DCCV 6/3/2024-> NSR after 1 200 J shock   EKG  6/13/24: normal sinus rhythm 61 bpm.  LAHB, LVH with repolarization abnormalities  MV CAD, DESx3; S/P circumflex and proximal and mid LAD stenting 2014.    On aspirin, statin, beta-blocker.  SPECT 2021: No ischemia.  EF of 50% by echo.dyskinesis of mid anterolateral, apical septal, apical lateral and apical walls,  Echo 717 2024:EF 35%.  the following segments are akinetic: mid anterior, apical anterior, apical septal, apical inferior, apical lateral and apex.The following segments are hypokinetic: basal anterior, basal anterolateral and mid anterolateral  Hypertension. /80 on metoprolol tartrate 50 mg q.12 hours and Lasix 40/d   Hyperlipidemia.  On atorvastatin 40 mg daily.  5/20/2024: Calculated LDL 34   Latest Reference Range & Units 06/01/21 08:26 04/06/22 08:24 05/20/24 06:42   Cholesterol See Comment mg/dL 115 114 87   Triglycerides See Comment mg/dL 43 38 84   HDL >=40 mg/dL 56 64 36 (L)   Non-HDL Cholesterol mg/dl  50 51   LDL Calculated 0 - 100 mg/dL 50 42 34   LDL CHOLESTEROL DIRECT 0 - 100 mg/dl 54     CKD3 baseline creatinine  Cr 2-2.7.  6/11/2024: Creatinine 2.28  BUN 50  T2DM.  5/20/2024: Hemoglobin A1c 7.1  Hx medical non adherence  Cardiac testing   cardiac catheterization 12/ 2014  ----Left main: Normal.   ----Proximal LAD: There was a diffuse 100 % stenosis. This is a likely culprit for the patient's clinical presentation. This lesion is a chronic total occlusion.   ----Mid LAD: There was a 90 % stenosis uncovered in mid LAD after recanalizing the proximal 100% occlusion.   ----Proximal RCA: There was a 80 % stenosis.  ----intervention: PCI/LULA x3  to 100% lesion of prox LAD, 90% lesion of mid LAD and 80% prox circumflex lesion   TTE 2/15. Ejection fraction was estimated in the range of 40 % to 45 %. There was mild hypokinesis of the distal anteroseptum and anterior walls. There was moderate to severe hypokinesis of the apex, distal lateral, and distal inferoseptal walls.   TTE 2021: Low normal LV systolic function with EF of 50%, dyskinesis of mid anterolateral, apical septal, apical lateral and apical walls,+ diastolic dysfunction, mild mitral regurgitation.   SPECT  2021: Moderate size complete fixed myocardial perfusion defect of the apical anterior wall suggestive of prior anterior apical MI, no significant reversible ischemia present.   TTE 5/31/2024 LVEF 30%, severe global hypokinesis with apical/apical septal akinesis, Dilated RV, mild MR, mild to moderate TR, IVC not well visualized - ?normal sized, TR PG 48   MELANY/DCCV 6/3/2024 --> NSR with 1 -200 J biphasic shock.    MELANY-6/3/2024 EF 40%.  Systolic function mildly reduced.  There is no LA thrombus.  No PFO.  Normal functioning DECLAN.  No thrombus.  There was mild continuous spontaneous echo contrast.  Mild MR.  TTE 7/17/2024.  EF 35%.  Systolic function moderate reduced.  Diastolic function is abnormal.The following segments are akinetic: mid anterior, apical anterior, apical septal, apical inferior, apical lateral and apex.The following segments are hypokinetic: basal anterior, basal anterolateral and mid  anterolateral.RV cavity is dilated with normal systolic function             HPI:   Noel Khan is a 60 yo gentleman with a hx of ICM, EF 35%, CAD, prior multivessel coronary stenting, chronic combined HF, HTN, HL, diabetes and CKD.  He established care with Dr. Love in 2015, and then did not follow up until he was hospitalized in May of 2017 for decompensated heart failure. He followed briefly with Dr Figueroa after the admission. An updated, out pt echocardiogram was requested, but not obtained.   It was noted he was inconsistently taking his medications given a lack of insurance, and affordabiliy issues.  He was last seen in the office in July of 2017. It was noted he was maintained on metoprolol tartrate instead of succinate given cost.  Also since he had a recent acute kidney injury he was not on an ACE-inhibitor.  An echocardiogram and lab work was requested to monitor his renal function and lipids.  He was advised follow-up in 6 months and then was lost to cardiology follow up.    4/1/19 he presents today upon his request for evaluation of swollen feet.  He tells me he is 20 lb over his dry weight.  He is mostly troubled by his lower extremity edema.  He has chronic wounds of his left lower extremity from an accident that has never healed.  Currently they are open.  There are not weeping.  He has edema from his toes to his thighs.  He admits to chronic dyspnea which has not improved since his MI.  Some PND but mostly he is bothered by his lower extremity edema.  He also has lower extremity neuropathy  He knows his sugars are elevated, and he tells me he knows he needs to do better with his lifestyle and diet .  He reports compliance with his current medication list as reviewed; Lasix 20 mg p.o. Daily metoprolol tartrate 25 mg b.i.d. He is also on an aspirin and statin       4/15/19 his weights are near baseline.  He has been monitoring him and he brings the list in for me.  Yesterday he was actually able to  mow the grass.  Since the last visit, he has been on Lasix 40 mg b.i.d. With supplemental potassium.  His creatinine did bump to 1.9.  Were going to drop his Lasix back down to 40 mg daily. We're going to check his BMP again in a week.  He is trying to stay away from salt and sugar.  He declines going back to the wound center for his legs.  I asked to see his PCP.  He does have some scars and open wounds.  He tells me this is chronic for times 20 years    Interval history   5/1/2019; 8/5/2019; 4/21/2020; 3/18/2021; 6/2/2021; 7/6/2022 OV Dr. Figueroa      ADM 5/30 to 6/3/2024  Atrial fibrillation, new onset, in setting of diarrhea and infection/pneumonia-rate controlled, started on anticoagulation with Eliquis.  Notably he was taking metoprolol 25 mg only once daily at home although prescribed twice daily  Underwent MELANY/DCCV 6/3/2024--> NSR 1 200 J shock  Sepsis secondary to pneumonia  Dx mixed cardiomyopathy EF 30%  Non-MI troponin elevation  LOBO with a creatinine of 3.58 on admission prior baseline 2-2.7  Plan  Continue metoprolol tartrate 50 mg twice daily  Currently not on ACE/ARB/ARNI due to renal dysfunction (home lisinopril 5 on hold)  Can plan to add Isordil/hydralazine if blood pressure allows, after uptitration of rate control therapy  Discussed risk of VT/VF and possible LifeVest protection, but he is not interested   Reassess EF in few weeks after cardioversion  Patient does not desire invasive testing such as left heart catheterization.  Declines ICD or LifeVest  If EF does not improve consider repeating ischemic evaluation  DC weight: 183 pounds  Discharge creatinine 1.85  Discharge diuretic: Lasix 40 mg daily      6/13/2024  PMH: CAD status post prior stenting, ICM, chronic HFrEF with a drop in EF in the setting of A-fib with RVR, in the setting of sepsis due to pneumonia, HTN, HL, DM2    ROS: He tells me he feels great.  He denies chest pain shortness of breath.  He increase his Lasix to 40 mg twice  daily on his own.  He tells me he feels better at that dose.  On one a day he had orthopnea.  His lower extremity edema is improved.  He denies palpitations lightheadedness or dizziness  He is adherent to his medical therapy taking Eliquis as directed  Last labs 6/11/2024: Creatinine 2.28 BUN 50 potassium 4.4  He appears euvolemic on exam  His EKG shows sinus rhythm 61 bpm  Will continue the plan  I reviewed with him importance of a salt restricted diet and provided written education  He will see me in a month, limited echo prior to.  Will get labs next week including a BNP and a BMP    7/2/2024  Close follow-up  PMH: CAD status post prior stenting, ICM, chronic HFrEF with a drop in EF in the setting of A-fib with RVR, in the setting of sepsis due to pneumonia, HTN, HL, DM2    He reports feeling well without complaints.  He denies chest pain shortness of breath lightness or dizziness.  He has chronic left lower extremity edema from a prior traumatic injury    Echo 7/17:  Ef 35%. No thrombus  The following segments are akinetic: mid anterior, apical anterior, apical septal, apical inferior, apical lateral and apex. The following segments are hypokinetic: basal anterior, basal anterolateral and mid anterolateral.    Last labs 7/15:  cr 2.33 BUN 42  K 4.1.  cr previous baseline 1.4-1.6  Wt 187 lby    At [facility] was on Lasix 40 BID   ( 80 TD)  6/19: I decreased it to 60 in the am, and 40 in the pm ( 100 TD)   He is currently on 80 in the am and 20 in the pm  ( 100 TD)   Today: I will decrease it back to  40 BID ( 80 TD)   Latest Reference Range & Units 05/31/24 05:47 06/01/24 05:03 06/02/24 05:36 06/03/24 06:48 06/11/24 06:35 06/19/24 09:20 07/15/24 06:30   Creatinine 0.60 - 1.30 mg/dL 2.83 (H) 2.31 (H) 2.01 (H) 1.85 (H) 2.28 (H) 1.92 (H) 2.33 (H)     Given a drop in his EF will order a pharmacological nuclear stress test.  He will see nephrology in a short interval  I recommend follow-up with his  cardiologist          Assessment  Diagnoses and all orders for this visit:    Coronary artery disease involving native coronary artery of native heart without angina pectoris    Chronic combined systolic and diastolic CHF  -     furosemide (LASIX) 40 mg tablet; Take 1 tablet (40 mg total) by mouth 2 (two) times a day    Benign essential hypertension    Presence of drug coated stent in LAD coronary artery    Presence of drug coated stent in right coronary artery    Chronic venous hypertension (idiopathic) with ulcer of left lower extremity (HCC)    Ischemic cardiomyopathy    New onset atrial fibrillation (HCC) s/p MELANY CV    Thromboembolic disorder (HCC)    Diabetic polyneuropathy associated with type 2 diabetes mellitus ([facility])    Stage 4 chronic kidney disease (HCC)  -     furosemide (LASIX) 40 mg tablet; Take 1 tablet (40 mg total) by mouth 2 (two) times a day            Past Medical History:   Diagnosis Date   • CAD (coronary artery disease)    • Chronic combined systolic and diastolic CHF (congestive heart failure) ([facility])    • Diabetes mellitus ([facility])     type 2   • Dyslipidemia    • Hypertension    • Ischemic cardiomyopathy    • MI (myocardial infarction) ([facility])    • Neuropathy    • Osteomyelitis ([facility])    • Pancreatitis        Review of Systems   Constitutional: Negative for chills and weight gain.   Cardiovascular:  Negative for chest pain, claudication, cyanosis, dyspnea on exertion, irregular heartbeat, leg swelling, near-syncope, orthopnea, palpitations, paroxysmal nocturnal dyspnea and syncope.   Respiratory:  Negative for cough and shortness of breath.    Skin:         He reports chronic left lower extremity open wounds for “15 years”   Gastrointestinal:  Negative for heartburn and nausea.   Neurological:  Negative for dizziness, focal weakness, headaches, light-headedness and weakness.   All other systems reviewed and are negative.      No Known Allergies  .    Current Outpatient Medications:   •  apixaban  (Eliquis) 5 mg, Take 1 tablet (5 mg total) by mouth 2 (two) times a day, Disp: 180 tablet, Rfl: 3  •  atorvastatin (LIPITOR) 40 mg tablet, TAKE 1 TABLET BY MOUTH EVERY DAY, Disp: 90 tablet, Rfl: 3  •  Blood Pressure Monitoring ([employer] Blood Pressure Monitor) KIT, Use 2 (two) times a day Automatic BP cuff. Measure BP twice daily., Disp: 1 kit, Rfl: 0  •  furosemide (LASIX) 40 mg tablet, Take 1 tablet (40 mg total) by mouth 2 (two) times a day, Disp: , Rfl:   •  insulin glargine (LANTUS) 100 units/mL subcutaneous injection, Inject 30 Units under the skin every morning, Disp: , Rfl:   •  insulin NPH-insulin regular (NovoLIN 70/30) 100 units/mL subcutaneous injection, Inject under the skin, Disp: , Rfl:   •  latanoprost (XALATAN) 0.005 % ophthalmic solution, Administer 0.0005 drops to both eyes daily at bedtime, Disp: , Rfl:   •  levothyroxine 25 mcg tablet, Take 25 mcg by mouth daily, Disp: , Rfl:   •  metoprolol tartrate (LOPRESSOR) 50 mg tablet, Take 1 tablet (50 mg total) by mouth every 12 (twelve) hours, Disp: 180 tablet, Rfl: 3  •  pantoprazole (PROTONIX) 40 mg tablet, Take 1 tablet (40 mg total) by mouth daily in the early morning, Disp: 30 tablet, Rfl: 0  •  potassium chloride (Klor-Con M20) 20 mEq tablet, TAKE 1 TABLET BY MOUTH 2 TIMES A DAY., Disp: 180 tablet, Rfl: 1  •  Unifine Pentips 31G X 8 MM MISC, , Disp: , Rfl:     Social History     Socioeconomic History   • Marital status: /Civil Union     Spouse name: Not on file   • Number of children: Not on file   • Years of education: Not on file   • Highest education level: Not on file   Occupational History   • Not on file   Tobacco Use   • Smoking status: Never   • Smokeless tobacco: Never   Vaping Use   • Vaping status: Never Used   Substance and Sexual Activity   • Alcohol use: Not Currently     Comment: 20 years of sobriety   • Drug use: Never   • Sexual activity: Yes     Partners: Female     Birth control/protection: None   Other Topics Concern   • Not  on file   Social History Narrative   • Not on file     Social Determinants of Health     Financial Resource Strain: Not on file   Food Insecurity: No Food Insecurity (5/13/2022)    Hunger Vital Sign    • Worried About Running Out of Food in the Last Year: Never true    • Ran Out of Food in the Last Year: Never true   Transportation Needs: No Transportation Needs (5/27/2022)    PRAPARE - Transportation    • Lack of Transportation (Medical): No    • Lack of Transportation (Non-Medical): No   Physical Activity: Not on file   Stress: Not on file   Social Connections: Not on file   Intimate Partner Violence: Not on file   Housing Stability: Low Risk  (5/27/2022)    Housing Stability Vital Sign    • Unable to Pay for Housing in the Last Year: No    • Number of Places Lived in the Last Year: 1    • Unstable Housing in the Last Year: No       Family History   Problem Relation Age of Onset   • Heart attack Father    • Hyperlipidemia Father    • Cancer Father    • Diabetes Father    • Diabetes Mother    • Heart failure Mother         poss   • Kidney disease Mother    • Cancer Paternal Grandfather    • Stroke Neg Hx    • Anuerysm Neg Hx    • Clotting disorder Neg Hx    • Arrhythmia Neg Hx    • Coronary artery disease Neg Hx    • Hypertension Neg Hx    • Sudden death Neg Hx         scd       Physical Exam  Vitals and nursing note reviewed.   Constitutional:       General: He is not in acute distress.  HENT:      Head: Normocephalic and atraumatic.   Eyes:      Extraocular Movements: Extraocular movements intact.      Conjunctiva/sclera: Conjunctivae normal.   Cardiovascular:      Rate and Rhythm: Normal rate and regular rhythm.      Pulses: Intact distal pulses.      Heart sounds: Normal heart sounds.   Pulmonary:      Effort: Pulmonary effort is normal.      Breath sounds: Normal breath sounds.   Abdominal:      General: Bowel sounds are normal.      Palpations: Abdomen is soft.   Musculoskeletal:         General: Normal range  "of motion.      Cervical back: Normal range of motion and neck supple.      Right lower leg: Edema present.      Left lower leg: Edema present.      Comments: Scar LLE. + venous stasis changes   Skin:     General: Skin is warm and dry.   Neurological:      Mental Status: He is alert and oriented to person, place, and time.   Psychiatric:         Mood and Affect: Mood normal.         Vitals: Blood pressure 133/80, pulse 57, weight 85.1 kg (187 lb 9.6 oz), SpO2 99%.   Wt Readings from Last 3 Encounters:   07/22/24 85.1 kg (187 lb 9.6 oz)   07/17/24 85.3 kg (188 lb)   07/11/24 85.3 kg (188 lb)         Labs & Results:  Lab Results   Component Value Date    WBC 9.78 06/02/2024    HGB 11.5 (L) 06/02/2024    HCT 36.8 06/02/2024    MCV 90 06/02/2024     06/02/2024     BNP   Date Value Ref Range Status   07/15/2024 962 (H) 0 - 100 pg/mL Final   06/19/2024 831 (H) 0 - 100 pg/mL Final   05/30/2024 1,434 (H) 0 - 100 pg/mL Final   12/22/2014 296 (H) 0 - 100 pg/mL Final     Comment:     [facility] [place] [id number]     12/16/2014 662 (H) 0 - 100 pg/mL Final     Comment:     The above 1 analytes [facility] [place] [id number]       No components found for: \"CHEM\"    No results found for this or any previous visit.  No results found for this or any previous visit.      This note was completed in part utilizing [employer] direct voice recognition software.   Grammatical errors, random word insertion, spelling mistakes, and incomplete sentences may be an occasional consequence of the system secondary to software limitations, ambient noise and hardware issues. At the time of dictation, efforts were made to edit, clarify and /or correct errors.  Please read the chart carefully and recognize, using context, where substitutions have occurred.  If you have any questions or concerns about the context, text or information contained " within the body of this dictation, please contact myself, the provider, for further clarification

## 2024-07-31 ENCOUNTER — APPOINTMENT (OUTPATIENT)
Dept: LAB | Facility: CLINIC | Age: 64
End: 2024-07-31
Payer: COMMERCIAL

## 2024-07-31 DIAGNOSIS — N18.4 STAGE 4 CHRONIC KIDNEY DISEASE (HCC): ICD-10-CM

## 2024-07-31 DIAGNOSIS — R82.998: ICD-10-CM

## 2024-07-31 LAB
ANION GAP SERPL CALCULATED.3IONS-SCNC: 7 MMOL/L (ref 4–13)
BACTERIA UR QL AUTO: ABNORMAL /HPF
BILIRUB UR QL STRIP: NEGATIVE
BUN SERPL-MCNC: 38 MG/DL (ref 5–25)
CALCIUM SERPL-MCNC: 8.7 MG/DL (ref 8.4–10.2)
CHLORIDE SERPL-SCNC: 103 MMOL/L (ref 96–108)
CLARITY UR: CLEAR
CO2 SERPL-SCNC: 29 MMOL/L (ref 21–32)
COLOR UR: COLORLESS
CREAT SERPL-MCNC: 2.31 MG/DL (ref 0.6–1.3)
GFR SERPL CREATININE-BSD FRML MDRD: 28 ML/MIN/1.73SQ M
GLUCOSE P FAST SERPL-MCNC: 175 MG/DL (ref 65–99)
GLUCOSE UR STRIP-MCNC: ABNORMAL MG/DL
HGB UR QL STRIP.AUTO: NEGATIVE
KETONES UR STRIP-MCNC: NEGATIVE MG/DL
LEUKOCYTE ESTERASE UR QL STRIP: NEGATIVE
NITRITE UR QL STRIP: NEGATIVE
NON-SQ EPI CELLS URNS QL MICRO: ABNORMAL /HPF
PH UR STRIP.AUTO: 6 [PH]
POTASSIUM SERPL-SCNC: 4.1 MMOL/L (ref 3.5–5.3)
PROT UR STRIP-MCNC: ABNORMAL MG/DL
RBC #/AREA URNS AUTO: ABNORMAL /HPF
SODIUM SERPL-SCNC: 139 MMOL/L (ref 135–147)
SP GR UR STRIP.AUTO: 1.01 (ref 1–1.03)
UROBILINOGEN UR STRIP-ACNC: <2 MG/DL
WBC #/AREA URNS AUTO: ABNORMAL /HPF

## 2024-07-31 PROCEDURE — 81001 URINALYSIS AUTO W/SCOPE: CPT

## 2024-07-31 PROCEDURE — 36415 COLL VENOUS BLD VENIPUNCTURE: CPT

## 2024-07-31 PROCEDURE — 80048 BASIC METABOLIC PNL TOTAL CA: CPT

## 2024-08-06 ENCOUNTER — HOSPITAL ENCOUNTER (OUTPATIENT)
Dept: NUCLEAR MEDICINE | Facility: HOSPITAL | Age: 64
Discharge: HOME | End: 2024-08-06
Payer: COMMERCIAL

## 2024-08-06 ENCOUNTER — HOSPITAL ENCOUNTER (OUTPATIENT)
Dept: NON INVASIVE DIAGNOSTICS | Facility: HOSPITAL | Age: 64
Discharge: HOME | End: 2024-08-06
Payer: COMMERCIAL

## 2024-08-06 DIAGNOSIS — I50.22 CHRONIC HFREF (HEART FAILURE WITH REDUCED EJECTION FRACTION) (HCC): ICD-10-CM

## 2024-08-06 DIAGNOSIS — N18.4 STAGE 4 CHRONIC KIDNEY DISEASE (HCC): ICD-10-CM

## 2024-08-06 DIAGNOSIS — I48.91 NEW ONSET ATRIAL FIBRILLATION (HCC): ICD-10-CM

## 2024-08-06 DIAGNOSIS — E78.5 DYSLIPIDEMIA: ICD-10-CM

## 2024-08-06 DIAGNOSIS — I42.9 CARDIOMYOPATHY, UNSPECIFIED TYPE (HCC): ICD-10-CM

## 2024-08-06 DIAGNOSIS — I10 BENIGN ESSENTIAL HYPERTENSION: ICD-10-CM

## 2024-08-06 LAB
CHEST PAIN STATEMENT: NORMAL
CHEST PAIN STATEMENT: NORMAL
MAX DIASTOLIC BP: 66 MMHG
MAX DIASTOLIC BP: 66 MMHG
MAX HR PERCENT: 50 %
MAX HR: 117 BPM
MAX PREDICTED HEART RATE: 154 BPM
MAX PREDICTED HEART RATE: 154 BPM
NUC STRESS EJECTION FRACTION: 32 %
PROTOCOL NAME: NORMAL
PROTOCOL NAME: NORMAL
RATE PRESSURE PRODUCT: 8624
REASON FOR TERMINATION: NORMAL
REASON FOR TERMINATION: NORMAL
SL CV REST NUCLEAR ISOTOPE DOSE: 10.5 MCI
SL CV STRESS NUCLEAR ISOTOPE DOSE: 33 MCI
SL CV STRESS RECOVERY BP: NORMAL MMHG
SL CV STRESS RECOVERY HR: 74 BPM
SL CV STRESS RECOVERY O2 SAT: 100 %
STRESS ANGINA INDEX: 0
STRESS BASELINE BP: NORMAL MMHG
STRESS BASELINE HR: 62 BPM
STRESS O2 SAT REST: 99 %
STRESS PEAK HR: 77 BPM
STRESS POST ESTIMATED WORKLOAD: 1.5 METS
STRESS POST EXERCISE DUR MIN: 3 MIN
STRESS POST EXERCISE DUR SEC: 2 SEC
STRESS POST EXERCISE DUR SEC: 2 SEC
STRESS POST O2 SAT PEAK: 100 %
STRESS POST PEAK BP: 112 MMHG
STRESS POST PEAK HR: 77 BPM
STRESS POST PEAK HR: 77 BPM
STRESS POST PEAK SYSTOLIC BP: 114 MMHG
STRESS POST PEAK SYSTOLIC BP: 114 MMHG
STRESS/REST PERFUSION RATIO: 1.09
TARGET HR FORMULA: NORMAL
TARGET HR FORMULA: NORMAL
TEST INDICATION: NORMAL
TEST INDICATION: NORMAL

## 2024-08-06 PROCEDURE — 93018 CV STRESS TEST I&R ONLY: CPT | Performed by: INTERNAL MEDICINE

## 2024-08-06 PROCEDURE — A9502 TC99M TETROFOSMIN: HCPCS

## 2024-08-06 PROCEDURE — 78452 HT MUSCLE IMAGE SPECT MULT: CPT

## 2024-08-06 PROCEDURE — 93016 CV STRESS TEST SUPVJ ONLY: CPT | Performed by: INTERNAL MEDICINE

## 2024-08-06 PROCEDURE — 93017 CV STRESS TEST TRACING ONLY: CPT

## 2024-08-06 PROCEDURE — 78452 HT MUSCLE IMAGE SPECT MULT: CPT | Performed by: INTERNAL MEDICINE

## 2024-08-06 RX ORDER — REGADENOSON 0.08 MG/ML
0.4 INJECTION, SOLUTION INTRAVENOUS ONCE
Status: COMPLETED | OUTPATIENT
Start: 2024-08-06 | End: 2024-08-06

## 2024-08-06 RX ADMIN — REGADENOSON 0.4 MG: 0.08 INJECTION, SOLUTION INTRAVENOUS at 08:59

## 2024-08-29 ENCOUNTER — CONSULT (OUTPATIENT)
Dept: UROLOGY | Facility: AMBULATORY SURGERY CENTER | Age: 64
End: 2024-08-29
Payer: COMMERCIAL

## 2024-08-29 VITALS
HEART RATE: 64 BPM | HEIGHT: 70 IN | WEIGHT: 188.4 LBS | SYSTOLIC BLOOD PRESSURE: 128 MMHG | BODY MASS INDEX: 26.97 KG/M2 | DIASTOLIC BLOOD PRESSURE: 72 MMHG | OXYGEN SATURATION: 98 %

## 2024-08-29 DIAGNOSIS — R33.9 URINARY RETENTION: Primary | ICD-10-CM

## 2024-08-29 DIAGNOSIS — N13.39 OTHER HYDRONEPHROSIS: ICD-10-CM

## 2024-08-29 LAB — POST-VOID RESIDUAL VOLUME, ML POC: 68 ML

## 2024-08-29 PROCEDURE — 99204 OFFICE O/P NEW MOD 45 MIN: CPT | Performed by: UROLOGY

## 2024-08-29 PROCEDURE — 51798 US URINE CAPACITY MEASURE: CPT | Performed by: UROLOGY

## 2024-08-29 NOTE — PROGRESS NOTES
8/29/2024    Noel Khan  1960  [id number]      1. Urinary retention  -     POCT Measure PVR  -     Ambulatory Referral to Urology  -     CT renal stone study abdomen pelvis wo contrast; Future; Expected date: 09/29/2024  2. Other hydronephrosis  -     CT renal stone study abdomen pelvis wo contrast; Future; Expected date: 09/29/2024  We reviewed his history, chart, and ultrasound images.  There is in fact left lower pole pelvocaliectasis.  This is of unclear etiology.  We discussed that likely this is not clinically significant.  I would like to see a CT scan without contrast to evaluate both any obstruction of the upper tract as well as prostate size and configuration to determine if there is obstruction at that level.  We did discuss however that his PVR is normal.    If no abnormality on CT, no need for further urologic follow-up.      History of Present Illness  Noel is a 64 y.o. male pleasant gentleman referred for evaluation.  He has a history of CKD and diabetes requiring insulin.  He was found on ultrasound to have urinary retention as well as left pelvocaliectasis.  He has no complaints.  Denies any urinary symptoms.    We reviewed his labs as well as his ultrasound.  PVR at the time was 129 mL.  PVR today in office improved at 68 mL.      AUA SYMPTOM SCORE      Flowsheet Row Most Recent Value   AUA SYMPTOM SCORE    How often have you had a sensation of not emptying your bladder completely after you finished urinating? 0 (P)     How often have you had to urinate again less than two hours after you finished urinating? 3 (P)     How often have you found you stopped and started again several times when you urinate? 1 (P)     How often have you found it difficult to postpone urination? 5 (P)     How often have you had a weak urinary stream? 2 (P)     How often have you had to push or strain to begin urination? 2 (P)     How many times did you most typically get up to urinate from the time you went to  bed at night until the time you got up in the morning? 5 (P)     Quality of Life: If you were to spend the rest of your life with your urinary condition just the way it is now, how would you feel about that? 2 (P)     AUA SYMPTOM SCORE 18 (P)              Review of Systems   Constitutional: Negative.    HENT: Negative.     Respiratory: Negative.     Cardiovascular: Negative.    Gastrointestinal: Negative.    Genitourinary:         As per HPI   Musculoskeletal: Negative.    Skin: Negative.    Neurological: Negative.    Hematological: Negative.        Past Medical History  Past Medical History:   Diagnosis Date   • CAD (coronary artery disease)    • Chronic combined systolic and diastolic CHF (congestive heart failure) ([facility])    • Diabetes mellitus ([facility])     type 2   • Dyslipidemia    • Hypertension    • Ischemic cardiomyopathy    • MI (myocardial infarction) ([facility])    • Neuropathy    • Osteomyelitis ([facility])    • Pancreatitis        Past Social History  Past Surgical History:   Procedure Laterality Date   • ANGIOPLASTY      ballon   • CORONARY ANGIOPLASTY WITH STENT PLACEMENT      LULA to proximal and mid LAD, Proximal RCA.    • FOREIGN BODY REMOVAL Left 5/26/2022    Procedure: REMOVAL FOREIGN BODY EXTREMITY;  Surgeon: eHron Herrera DPM;  Location: [facility];  Service: Podiatry   • HERNIA REPAIR     • INCISION AND DRAINAGE OF WOUND Left 2/14/2020    Procedure: INCISION AND DRAINAGE (I&D) EXTREMITY left foot (cpt 31299);  Surgeon: Kermit Chau DPM;  Location: [facility];  Service: Podiatry   • GA AMPUTATION FOOT TRANSMETARSAL Left 5/26/2022    Procedure: AMPUTATION left 5th metatarsal;  Surgeon: Heron Herrera DPM;  [facility];  Service: Podiatry   • GA EXPLORATION PENETRATING WOUND SPX EXTREMITY Left 2/14/2020    Procedure: EXPLORATION EXTREMITY;  Surgeon: Kermit Chau DPM;  [facility];  Service: Podiatry   • GA NEGATIVE PRESSURE WOUND THERAPY DME <= 50 SQ CM Left 2/14/2020    Procedure:  APPLICATION VAC DRESSING;  Surgeon: Kermit Chau DPM;  [facility];  Service: Podiatry   • TOE AMPUTATION Left 5/14/2022    Procedure: EXCISION OF LEFT 5TH TOE ULCER WITH FILET FLAP;  Surgeon: Heron Herrera DPM;  Location: [facility];  Service: Podiatry       Past Family History  Family History   Problem Relation Age of Onset   • Heart attack Father    • Hyperlipidemia Father    • Cancer Father    • Diabetes Father    • Diabetes Mother    • Heart failure Mother         poss   • Kidney disease Mother    • Cancer Paternal Grandfather    • Stroke Neg Hx    • Anuerysm Neg Hx    • Clotting disorder Neg Hx    • Arrhythmia Neg Hx    • Coronary artery disease Neg Hx    • Hypertension Neg Hx    • Sudden death Neg Hx         scd       Past Social history  Social History     Socioeconomic History   • Marital status: /Civil Union     Spouse name: Not on file   • Number of children: Not on file   • Years of education: Not on file   • Highest education level: Not on file   Occupational History   • Not on file   Tobacco Use   • Smoking status: Never   • Smokeless tobacco: Never   Vaping Use   • Vaping status: Never Used   Substance and Sexual Activity   • Alcohol use: Not Currently     Comment: 20 years of sobriety   • Drug use: Never   • Sexual activity: Yes     Partners: Female     Birth control/protection: None   Other Topics Concern   • Not on file   Social History Narrative   • Not on file     Social Determinants of Health     Financial Resource Strain: Not on file   Food Insecurity: No Food Insecurity (5/13/2022)    Hunger Vital Sign    • Worried About Running Out of Food in the Last Year: Never true    • Ran Out of Food in the Last Year: Never true   Transportation Needs: No Transportation Needs (5/27/2022)    PRAPARE - Transportation    • Lack of Transportation (Medical): No    • Lack of Transportation (Non-Medical): No   Physical Activity: Not on file   Stress: Not on file   Social Connections: Not on  file   Intimate Partner Violence: Not on file   Housing Stability: Low Risk  (5/27/2022)    Housing Stability Vital Sign    • Unable to Pay for Housing in the Last Year: No    • Number of Places Lived in the Last Year: 1    • Unstable Housing in the Last Year: No     Social History     Tobacco Use   Smoking Status Never   Smokeless Tobacco Never       Current Medications  Current Outpatient Medications   Medication Sig Dispense Refill   • apixaban (Eliquis) 5 mg Take 1 tablet (5 mg total) by mouth 2 (two) times a day 180 tablet 3   • atorvastatin (LIPITOR) 40 mg tablet TAKE 1 TABLET BY MOUTH EVERY DAY 90 tablet 3   • Blood Pressure Monitoring ([employer] Blood Pressure Monitor) KIT Use 2 (two) times a day Automatic BP cuff. Measure BP twice daily. 1 kit 0   • Continuous Glucose Sensor (FreeStyle Jess 2 Sensor) MISC REPLACE SENSOR EVERY 14 DAYS     • furosemide (LASIX) 40 mg tablet Take 1 tablet (40 mg total) by mouth 2 (two) times a day     • insulin glargine (LANTUS) 100 units/mL subcutaneous injection Inject 30 Units under the skin every morning     • insulin NPH-insulin regular (NovoLIN 70/30) 100 units/mL subcutaneous injection Inject under the skin     • latanoprost (XALATAN) 0.005 % ophthalmic solution Administer 0.0005 drops to both eyes daily at bedtime     • levothyroxine 25 mcg tablet Take 25 mcg by mouth daily     • metoprolol tartrate (LOPRESSOR) 50 mg tablet Take 1 tablet (50 mg total) by mouth every 12 (twelve) hours 180 tablet 3   • pantoprazole (PROTONIX) 40 mg tablet Take 1 tablet (40 mg total) by mouth daily in the early morning 30 tablet 0   • potassium chloride (Klor-Con M20) 20 mEq tablet TAKE 1 TABLET BY MOUTH 2 TIMES A DAY. 180 tablet 1   • Unifine Pentips 31G X 8 MM MISC        No current facility-administered medications for this visit.       Allergies  No Known Allergies    Past Medical History, Social History, Family History, medications and allergies were reviewed.    Vitals  Vitals:     "08/29/24 1325   BP: 128/72   BP Location: Right arm   Patient Position: Sitting   Cuff Size: Adult   Pulse: 64   SpO2: 98%   Weight: 85.5 kg (188 lb 6.4 oz)   Height: 5' 10\" (1.778 m)       Physical Exam  Vitals reviewed.   Constitutional:       Appearance: He is well-developed.   HENT:      Head: Normocephalic and atraumatic.   Eyes:      Conjunctiva/sclera: Conjunctivae normal.   Cardiovascular:      Rate and Rhythm: Normal rate.   Pulmonary:      Effort: Pulmonary effort is normal.   Abdominal:      Palpations: Abdomen is soft.   Genitourinary:     Comments: Large scrotal and ventral hernias.  Prostate exam deferred.    Musculoskeletal:         General: Normal range of motion.   Skin:     General: Skin is warm and dry.   Neurological:      Mental Status: He is alert and oriented to person, place, and time.   Psychiatric:         Mood and Affect: Mood normal.           Results  Lab Results   Component Value Date    PSA 0.98 10/23/2023    PSA 0.7 04/06/2022    PSA 0.8 06/25/2015     Lab Results   Component Value Date    GLUCOSE 84 06/25/2015    CALCIUM 8.7 07/31/2024     06/25/2015    K 4.1 07/31/2024    CO2 29 07/31/2024     07/31/2024    BUN 38 (H) 07/31/2024    CREATININE 2.31 (H) 07/31/2024     Lab Results   Component Value Date    WBC 9.78 06/02/2024    HGB 11.5 (L) 06/02/2024    HCT 36.8 06/02/2024    MCV 90 06/02/2024     06/02/2024           "

## 2024-09-03 ENCOUNTER — HOSPITAL ENCOUNTER (EMERGENCY)
Facility: HOSPITAL | Age: 64
Discharge: HOME | End: 2024-09-04
Attending: EMERGENCY MEDICINE
Payer: COMMERCIAL

## 2024-09-03 DIAGNOSIS — N18.4 STAGE 4 CHRONIC KIDNEY DISEASE (HCC): ICD-10-CM

## 2024-09-03 DIAGNOSIS — I50.42 CHRONIC COMBINED SYSTOLIC AND DIASTOLIC CHF (CONGESTIVE HEART FAILURE) (HCC): ICD-10-CM

## 2024-09-03 DIAGNOSIS — T68.XXXA HYPOTHERMIA, INITIAL ENCOUNTER: ICD-10-CM

## 2024-09-03 DIAGNOSIS — E16.2 HYPOGLYCEMIA: Primary | ICD-10-CM

## 2024-09-03 LAB
ALBUMIN SERPL BCG-MCNC: 3.8 G/DL (ref 3.5–5)
ALP SERPL-CCNC: 73 U/L (ref 34–104)
ALT SERPL W P-5'-P-CCNC: 11 U/L (ref 7–52)
ANION GAP SERPL CALCULATED.3IONS-SCNC: 9 MMOL/L (ref 4–13)
AST SERPL W P-5'-P-CCNC: 13 U/L (ref 13–39)
BASOPHILS # BLD AUTO: 0.05 THOUSANDS/ΜL (ref 0–0.1)
BASOPHILS NFR BLD AUTO: 1 % (ref 0–1)
BILIRUB SERPL-MCNC: 0.75 MG/DL (ref 0.2–1)
BUN SERPL-MCNC: 43 MG/DL (ref 5–25)
CALCIUM SERPL-MCNC: 8.4 MG/DL (ref 8.4–10.2)
CHLORIDE SERPL-SCNC: 103 MMOL/L (ref 96–108)
CO2 SERPL-SCNC: 26 MMOL/L (ref 21–32)
CREAT SERPL-MCNC: 2.38 MG/DL (ref 0.6–1.3)
EOSINOPHIL # BLD AUTO: 0.23 THOUSAND/ΜL (ref 0–0.61)
EOSINOPHIL NFR BLD AUTO: 3 % (ref 0–6)
ERYTHROCYTE [DISTWIDTH] IN BLOOD BY AUTOMATED COUNT: 13.4 % (ref 11.6–15.1)
GFR SERPL CREATININE-BSD FRML MDRD: 27 ML/MIN/1.73SQ M
GLUCOSE SERPL-MCNC: 107 MG/DL (ref 65–140)
GLUCOSE SERPL-MCNC: 168 MG/DL (ref 65–140)
GLUCOSE SERPL-MCNC: 173 MG/DL (ref 65–140)
GLUCOSE SERPL-MCNC: 176 MG/DL (ref 65–140)
HCT VFR BLD AUTO: 33.6 % (ref 36.5–49.3)
HGB BLD-MCNC: 10.6 G/DL (ref 12–17)
IMM GRANULOCYTES # BLD AUTO: 0.04 THOUSAND/UL (ref 0–0.2)
IMM GRANULOCYTES NFR BLD AUTO: 1 % (ref 0–2)
LYMPHOCYTES # BLD AUTO: 1.27 THOUSANDS/ΜL (ref 0.6–4.47)
LYMPHOCYTES NFR BLD AUTO: 16 % (ref 14–44)
MCH RBC QN AUTO: 28 PG (ref 26.8–34.3)
MCHC RBC AUTO-ENTMCNC: 31.5 G/DL (ref 31.4–37.4)
MCV RBC AUTO: 89 FL (ref 82–98)
MONOCYTES # BLD AUTO: 0.62 THOUSAND/ΜL (ref 0.17–1.22)
MONOCYTES NFR BLD AUTO: 8 % (ref 4–12)
NEUTROPHILS # BLD AUTO: 5.54 THOUSANDS/ΜL (ref 1.85–7.62)
NEUTS SEG NFR BLD AUTO: 71 % (ref 43–75)
NRBC BLD AUTO-RTO: 0 /100 WBCS
PLATELET # BLD AUTO: 229 THOUSANDS/UL (ref 149–390)
PMV BLD AUTO: 9.8 FL (ref 8.9–12.7)
POTASSIUM SERPL-SCNC: 3.7 MMOL/L (ref 3.5–5.3)
PROT SERPL-MCNC: 7.1 G/DL (ref 6.4–8.4)
RBC # BLD AUTO: 3.78 MILLION/UL (ref 3.88–5.62)
SODIUM SERPL-SCNC: 138 MMOL/L (ref 135–147)
TSH SERPL DL<=0.05 MIU/L-ACNC: 5.4 UIU/ML (ref 0.45–4.5)
WBC # BLD AUTO: 7.75 THOUSAND/UL (ref 4.31–10.16)

## 2024-09-03 PROCEDURE — 99285 EMERGENCY DEPT VISIT HI MDM: CPT

## 2024-09-03 PROCEDURE — 84443 ASSAY THYROID STIM HORMONE: CPT | Performed by: EMERGENCY MEDICINE

## 2024-09-03 PROCEDURE — 85025 COMPLETE CBC W/AUTO DIFF WBC: CPT

## 2024-09-03 PROCEDURE — 36415 COLL VENOUS BLD VENIPUNCTURE: CPT

## 2024-09-03 PROCEDURE — 80053 COMPREHEN METABOLIC PANEL: CPT

## 2024-09-03 PROCEDURE — 82948 REAGENT STRIP/BLOOD GLUCOSE: CPT

## 2024-09-03 PROCEDURE — 93005 ELECTROCARDIOGRAM TRACING: CPT

## 2024-09-03 PROCEDURE — 84439 ASSAY OF FREE THYROXINE: CPT | Performed by: EMERGENCY MEDICINE

## 2024-09-03 PROCEDURE — 99291 CRITICAL CARE FIRST HOUR: CPT | Performed by: EMERGENCY MEDICINE

## 2024-09-03 RX ORDER — FUROSEMIDE 40 MG
40 TABLET ORAL 2 TIMES DAILY
Qty: 180 TABLET | Refills: 3 | Status: SHIPPED | OUTPATIENT
Start: 2024-09-03

## 2024-09-03 NOTE — ED PROVIDER NOTES
History  Chief Complaint   Patient presents with    Hypoglycemia - Symptomatic     Patient called EMS for hypoglycemia, sugar was 36 on EMS arrival, given PO glucose and rechecked at 50.       Hypoglycemia - Symptomatic  Associated symptoms: weakness (Generalized)     Pt is a 63 y/o male presenting to the ED with resolving AMS, generalized weakness after EMS found patient to have blood sugar of 36 at home.  Patient took his short acting insulin prior to falling asleep due to a jump in his blood sugar per Dexcom.  Patient only eats 1 meal a day, has had issues in the past similar in which he becomes hypoglycemic after taking short acting insulin without eating.  Patient had cold sweats, confusion and wife called EMS upon awakening.  Patient was given glucose and blood sugar improved on arrival.  Patient does not have any sulfonylurea medications.  Patient's clothing is wet presumably from sweat, cool to touch.  All symptoms currently resolving, patient awake and alert/oriented x 3 on arrival.    Prior to Admission Medications   Prescriptions Last Dose Informant Patient Reported? Taking?   Blood Pressure Monitoring ([employer] Blood Pressure Monitor) KIT  Self No No   Sig: Use 2 (two) times a day Automatic BP cuff. Measure BP twice daily.   Continuous Glucose Sensor (FreeStyle Jess 2 Sensor) MISC   Yes No   Sig: REPLACE SENSOR EVERY 14 DAYS   Unifine Pentips 31G X 8 MM MISC  Self Yes No   apixaban (Eliquis) 5 mg  Self No No   Sig: Take 1 tablet (5 mg total) by mouth 2 (two) times a day   atorvastatin (LIPITOR) 40 mg tablet  Self No No   Sig: TAKE 1 TABLET BY MOUTH EVERY DAY   furosemide (LASIX) 40 mg tablet   No No   Sig: Take 1 tablet (40 mg total) by mouth 2 (two) times a day   insulin NPH-insulin regular (NovoLIN 70/30) 100 units/mL subcutaneous injection  Self Yes No   Sig: Inject under the skin   insulin glargine (LANTUS) 100 units/mL subcutaneous injection  Self Yes No   Sig: Inject 30 Units under the skin every  morning   latanoprost (XALATAN) 0.005 % ophthalmic solution  Self Yes No   Sig: Administer 0.0005 drops to both eyes daily at bedtime   levothyroxine 25 mcg tablet  Self Yes No   Sig: Take 25 mcg by mouth daily   metoprolol tartrate (LOPRESSOR) 50 mg tablet  Self No No   Sig: Take 1 tablet (50 mg total) by mouth every 12 (twelve) hours   pantoprazole (PROTONIX) 40 mg tablet  Self No No   Sig: Take 1 tablet (40 mg total) by mouth daily in the early morning   potassium chloride (Klor-Con M20) 20 mEq tablet   No No   Sig: TAKE 1 TABLET BY MOUTH 2 TIMES A DAY.      Facility-Administered Medications: None       Past Medical History:   Diagnosis Date    CAD (coronary artery disease)     Chronic combined systolic and diastolic CHF (congestive heart failure) ([facility])     Diabetes mellitus (HCC)     type 2    Dyslipidemia     Hypertension     Ischemic cardiomyopathy     MI (myocardial infarction) ([facility])     Neuropathy     Osteomyelitis ([facility])     Pancreatitis        Past Surgical History:   Procedure Laterality Date    ANGIOPLASTY      ballon    CORONARY ANGIOPLASTY WITH STENT PLACEMENT      LULA to proximal and mid LAD, Proximal RCA.     FOREIGN BODY REMOVAL Left 5/26/2022    Procedure: REMOVAL FOREIGN BODY EXTREMITY;  Surgeon: Heron Herrera DPM;  [facility]: [facility];  Service: Podiatry    HERNIA REPAIR      INCISION AND DRAINAGE OF WOUND Left 2/14/2020    Procedure: INCISION AND DRAINAGE (I&D) EXTREMITY left foot (cpt 29635);  Surgeon: Kermit Chau DPM;  [facility];  Service: Podiatry    UT AMPUTATION FOOT TRANSMETARSAL Left 5/26/2022    Procedure: AMPUTATION left 5th metatarsal;  Surgeon: Heron Herrera DPM;  [facility];  Service: Podiatry    UT EXPLORATION PENETRATING WOUND SPX EXTREMITY Left 2/14/2020    Procedure: EXPLORATION EXTREMITY;  Surgeon: Kermit Chau DPM;  [facility];  Service: Podiatry    UT NEGATIVE PRESSURE WOUND THERAPY DME <= 50 SQ CM Left 2/14/2020    Procedure:  APPLICATION VAC DRESSING;  Surgeon: Kermit Chau DPM;  [facility];  Service: Podiatry    TOE AMPUTATION Left 5/14/2022    Procedure: EXCISION OF LEFT 5TH TOE ULCER WITH FILET FLAP;  Surgeon: Heron Herrera DPM;  [facility];  Service: Podiatry       Family History   Problem Relation Age of Onset    Heart attack Father     Hyperlipidemia Father     Cancer Father     Diabetes Father     Diabetes Mother     Heart failure Mother         poss    Kidney disease Mother     Cancer Paternal Grandfather     Stroke Neg Hx     Anuerysm Neg Hx     Clotting disorder Neg Hx     Arrhythmia Neg Hx     Coronary artery disease Neg Hx     Hypertension Neg Hx     Sudden death Neg Hx         scd     I have reviewed and agree with the history as documented.    E-Cigarette/Vaping    E-Cigarette Use Never User      E-Cigarette/Vaping Substances    Nicotine No     THC No     CBD No     Flavoring No     Other No     Unknown No      Social History     Tobacco Use    Smoking status: Never    Smokeless tobacco: Never   Vaping Use    Vaping status: Never Used   Substance Use Topics    Alcohol use: Not Currently     Comment: 20 years of sobriety    Drug use: Never        Review of Systems   Constitutional:  Positive for fatigue.   Neurological:  Positive for syncope (bsg of 30) and weakness (Generalized).   All other systems reviewed and are negative.      Physical Exam  ED Triage Vitals   Temperature Pulse Respirations Blood Pressure SpO2   09/03/24 1948 09/03/24 1852 09/03/24 1852 09/03/24 1852 09/03/24 1852   (S) (!) 94.7 °F (34.8 °C) 68 18 155/75 99 %      Temp Source Heart Rate Source Patient Position - Orthostatic VS BP Location FiO2 (%)   09/03/24 1948 09/03/24 1852 09/03/24 1852 09/03/24 1852 --   Rectal Monitor Lying Right arm       Pain Score       --                    Orthostatic Vital Signs  Vitals:    09/03/24 1852 09/03/24 2117 09/03/24 2130 09/04/24 0015   BP: 155/75 107/68 109/58    Pulse: 68 61 60 65    Patient Position - Orthostatic VS: Lying Lying  Lying       Physical Exam  Constitutional:       General: He is not in acute distress.     Appearance: He is diaphoretic. He is not toxic-appearing.   HENT:      Head: Normocephalic and atraumatic.      Nose: Nose normal.      Mouth/Throat:      Mouth: Mucous membranes are moist.      Pharynx: Oropharynx is clear.   Eyes:      General: No scleral icterus.        Right eye: No discharge.         Left eye: No discharge.      Extraocular Movements: Extraocular movements intact.      Conjunctiva/sclera: Conjunctivae normal.      Pupils: Pupils are equal, round, and reactive to light.   Cardiovascular:      Rate and Rhythm: Normal rate and regular rhythm.      Pulses: Normal pulses.      Heart sounds: Normal heart sounds.   Pulmonary:      Effort: Pulmonary effort is normal. No respiratory distress.      Breath sounds: Normal breath sounds. No stridor. No wheezing, rhonchi or rales.   Chest:      Chest wall: No tenderness.   Abdominal:      General: Bowel sounds are normal. There is no distension.      Palpations: Abdomen is soft.      Tenderness: There is no abdominal tenderness. There is no right CVA tenderness, left CVA tenderness or guarding.   Genitourinary:     Comments: Hernia present - chronic, unchanged  Musculoskeletal:         General: No swelling, tenderness, deformity or signs of injury. Normal range of motion.      Cervical back: Normal range of motion and neck supple. No rigidity or tenderness.      Right lower leg: No edema.      Left lower leg: No edema.   Skin:     Coloration: Skin is not jaundiced or pale.      Findings: No bruising, erythema, lesion or rash.      Comments: Slightly cool to touch   Neurological:      General: No focal deficit present.      Mental Status: He is alert and oriented to person, place, and time.      Cranial Nerves: No cranial nerve deficit.      Sensory: No sensory deficit.      Motor: No weakness.      Coordination:  "Coordination normal.   Psychiatric:         Mood and Affect: Mood normal.         Behavior: Behavior normal.         ED Medications  Medications - No data to display    Diagnostic Studies  Results Reviewed       Procedure Component Value Units Date/Time    T4, free [[id number]]  (Normal) Collected: 09/03/24 1910    Lab Status: Final result Specimen: Blood from Arm, Left Updated: 09/04/24 0457     Free T4 0.96 ng/dL     Narrative:        \"Therapeutic range for patients medicated with thyroid disorders: 0.61-1.24 ng/dL.\"    Fingerstick Glucose (POCT) [[id number]]  (Abnormal) Collected: 09/04/24 0106    Lab Status: Final result Specimen: Blood Updated: 09/04/24 0108     POC Glucose 171 mg/dl     Fingerstick Glucose (POCT) [[id number]]  (Abnormal) Collected: 09/03/24 2307    Lab Status: Final result Specimen: Blood Updated: 09/03/24 2308     POC Glucose 173 mg/dl     TSH, 3rd generation with Free T4 reflex [[id number]]  (Abnormal) Collected: 09/03/24 1910    Lab Status: Final result Specimen: Blood from Arm, Left Updated: 09/03/24 2223     TSH 3RD GENERATON 5.400 uIU/mL     Fingerstick Glucose (POCT) [[id number]]  (Abnormal) Collected: 09/03/24 2056    Lab Status: Final result Specimen: Blood Updated: 09/03/24 2058     POC Glucose 176 mg/dl     Comprehensive metabolic panel [[id number]]  (Abnormal) Collected: 09/03/24 1910    Lab Status: Final result Specimen: Blood from Arm, Left Updated: 09/03/24 1939     Sodium 138 mmol/L      Potassium 3.7 mmol/L      Chloride 103 mmol/L      CO2 26 mmol/L      ANION GAP 9 mmol/L      BUN 43 mg/dL      Creatinine 2.38 mg/dL      Glucose 168 mg/dL      Calcium 8.4 mg/dL      AST 13 U/L      ALT 11 U/L      Alkaline Phosphatase 73 U/L      Total Protein 7.1 g/dL      Albumin 3.8 g/dL      Total Bilirubin 0.75 mg/dL      eGFR 27 ml/min/1.73sq m     Narrative:      [employer] guidelines for Chronic Kidney Disease (CKD):     Stage 1 with normal or high GFR (GFR > 90 " mL/min/1.73 square meters)    Stage 2 Mild CKD (GFR = 60-89 mL/min/1.73 square meters)    Stage 3A Moderate CKD (GFR = 45-59 mL/min/1.73 square meters)    Stage 3B Moderate CKD (GFR = 30-44 mL/min/1.73 square meters)    Stage 4 Severe CKD (GFR = 15-29 mL/min/1.73 square meters)    Stage 5 End Stage CKD (GFR <15 mL/min/1.73 square meters)  Note: GFR calculation is accurate only with a steady state creatinine    CBC and differential [[id number]]  (Abnormal) Collected: 09/03/24 1910    Lab Status: Final result Specimen: Blood from Arm, Left Updated: 09/03/24 1930     WBC 7.75 Thousand/uL      RBC 3.78 Million/uL      Hemoglobin 10.6 g/dL      Hematocrit 33.6 %      MCV 89 fL      MCH 28.0 pg      MCHC 31.5 g/dL      RDW 13.4 %      MPV 9.8 fL      Platelets 229 Thousands/uL      nRBC 0 /100 WBCs      Segmented % 71 %      Immature Grans % 1 %      Lymphocytes % 16 %      Monocytes % 8 %      Eosinophils Relative 3 %      Basophils Relative 1 %      Absolute Neutrophils 5.54 Thousands/µL      Absolute Immature Grans 0.04 Thousand/uL      Absolute Lymphocytes 1.27 Thousands/µL      Absolute Monocytes 0.62 Thousand/µL      Eosinophils Absolute 0.23 Thousand/µL      Basophils Absolute 0.05 Thousands/µL     Fingerstick Glucose (POCT) [[id number]]  (Normal) Collected: 09/03/24 1853    Lab Status: Final result Specimen: Blood Updated: 09/03/24 1855     POC Glucose 107 mg/dl                    No orders to display         Procedures  Procedures      ED Course  ED Course as of 09/08/24 0805   Tue Sep 03, 2024   1901  on finger stick initially 30 when measured at home. IDDM, has had similar sxs previously after short acting insulin. Currently feeling much improved with bsg wnl.   2111 Temp 94.8 on jessy hugger currently. States he feels back to baseline - has not had any infectious symptoms. BSG currently 176.     Temp improved to 97.7 after jessy hugger, reassessed after discontinuation. He has not become hypoglycemic after  6 hours of observation after glucose administration. Discussed eating with short acting insulin - 4 units prior to falling asleep and becoming hypoglycemic. He states this has happened before and he generally eats one meal a day - discussed better distribution of meals with timing of short acting insulin which he understands. No infectious symptoms. States he would much prefer discharge as bsg is stable and temp now normothermic. He is agreeable to return precautions and will f/u with his pcp this week. Hemodynamically stable, no acute distress.                                  Medical Decision Making  Differential diagnosis including but not limited to: insulin, poor oral intake, sepsis, infection including UTI, metabolic abnormality, dehydration, cardiac etiology, intracranial process.     63 y/o male presenting to the ED with resolving AMS, generalized weakness after EMS found patient to have blood sugar of 36 at home.  Patient took his short acting insulin prior to falling asleep due to a jump in his blood sugar per Dexcom.  Patient only eats 1 meal a day, has had issues in the past similar in which he becomes hypoglycemic after taking short acting insulin without eating.  Patient had cold sweats, confusion and wife called EMS upon awakening.  Patient was given glucose and blood sugar improved on arrival.  Patient does not have any sulfonylurea medications.  Patient's clothing is wet presumably from sweat, cool to touch.  All symptoms currently resolving, patient awake and alert/oriented x 3 on arrival.     on finger stick initially 30 when measured at home. IDDM, has had similar sxs previously after short acting insulin. Currently feeling much improved with bsg wnl.  Temp 94.8 on jessy hugger currently. States he feels back to baseline - has not had any infectious symptoms. BSG currently 176.    Temp improved to 97.7 after jessy hugger, reassessed after discontinuation. He has not become hypoglycemic after  6 hours of observation after glucose administration. Discussed eating with short acting insulin - 4 units prior to falling asleep and becoming hypoglycemic. He states this has happened before and he generally eats one meal a day - discussed better distribution of meals with timing of short acting insulin which he understands. No infectious symptoms. States he would much prefer discharge as bsg is stable and temp now normothermic. He is agreeable to return precautions and will f/u with his pcp this week. Hemodynamically stable, no acute distress.    Amount and/or Complexity of Data Reviewed  Labs: ordered.          Disposition  Final diagnoses:   Hypoglycemia   Hypothermia, initial encounter - resolved     Time reflects when diagnosis was documented in both MDM as applicable and the Disposition within this note       Time User Action Codes Description Comment    9/4/2024  1:22 AM Steven Baker Add [E16.2] Hypoglycemia     9/4/2024  1:22 AM Steven Baker Add [T68.XXXA] Hypothermia, initial encounter     9/4/2024  1:26 AM Steven Baker Modify [T68.XXXA] Hypothermia, initial encounter resolved          ED Disposition       ED Disposition   Discharge    Condition   Stable    Date/Time   Wed Sep 4, 2024  1:22 AM    Comment   Noel Khan discharge to home/self care.                   Follow-up Information       Follow up With Specialties Details Why Contact Info Additional Information    [facility] Emergency Department Emergency Medicine  As needed [address] [id number] [phone] [facility] Emergency Department, [address], [place], [address]    your pcp in the next week for reevaluation                 Discharge Medication List as of 9/4/2024  1:39 AM        CONTINUE these medications which have NOT CHANGED    Details   apixaban (Eliquis) 5 mg Take 1 tablet (5 mg total) by mouth 2 (two) times a day,  Starting Thu 6/13/2024, Normal      atorvastatin (LIPITOR) 40 mg tablet TAKE 1 TABLET BY MOUTH EVERY DAY, Normal      Blood Pressure Monitoring ([employer]S Blood Pressure Monitor) KIT Use 2 (two) times a day Automatic BP cuff. Measure BP twice daily., Starting Tue 12/7/2021, Normal      Continuous Glucose Sensor (FreeStyle Jess 2 Sensor) MISC REPLACE SENSOR EVERY 14 DAYS, Historical Med      furosemide (LASIX) 40 mg tablet Take 1 tablet (40 mg total) by mouth 2 (two) times a day, Starting Tue 9/3/2024, Normal      insulin glargine (LANTUS) 100 units/mL subcutaneous injection Inject 30 Units under the skin every morning, Historical Med      insulin NPH-insulin regular (NovoLIN 70/30) 100 units/mL subcutaneous injection Inject under the skin, Historical Med      latanoprost (XALATAN) 0.005 % ophthalmic solution Administer 0.0005 drops to both eyes daily at bedtime, Starting Thu 11/3/2022, Historical Med      levothyroxine 25 mcg tablet Take 25 mcg by mouth daily, Historical Med      metoprolol tartrate (LOPRESSOR) 50 mg tablet Take 1 tablet (50 mg total) by mouth every 12 (twelve) hours, Starting Thu 6/13/2024, Normal      pantoprazole (PROTONIX) 40 mg tablet Take 1 tablet (40 mg total) by mouth daily in the early morning, Starting Mon 6/3/2024, Normal      potassium chloride (Klor-Con M20) 20 mEq tablet TAKE 1 TABLET BY MOUTH 2 TIMES A DAY., Starting Sat 7/13/2024, Normal      Unifine Pentips 31G X 8 MM MISC Historical Med           No discharge procedures on file.    PDMP Review         Value Time User    PDMP Reviewed  Yes 5/28/2022 11:01 AM Sho Thomas MD             ED Provider  Attending physically available and evaluated Noel Khan. I managed the patient along with the ED Attending.    Electronically Signed by           Steven Baker DO  09/08/24 0805

## 2024-09-04 VITALS
RESPIRATION RATE: 18 BRPM | DIASTOLIC BLOOD PRESSURE: 58 MMHG | HEART RATE: 65 BPM | TEMPERATURE: 97.7 F | WEIGHT: 194.45 LBS | BODY MASS INDEX: 27.9 KG/M2 | OXYGEN SATURATION: 96 % | SYSTOLIC BLOOD PRESSURE: 109 MMHG

## 2024-09-04 LAB
ATRIAL RATE: 61 BPM
GLUCOSE SERPL-MCNC: 171 MG/DL (ref 65–140)
P AXIS: 64 DEGREES
PR INTERVAL: 190 MS
QRS AXIS: -56 DEGREES
QRSD INTERVAL: 92 MS
QT INTERVAL: 486 MS
QTC INTERVAL: 489 MS
T WAVE AXIS: 188 DEGREES
T4 FREE SERPL-MCNC: 0.96 NG/DL (ref 0.61–1.12)
VENTRICULAR RATE: 61 BPM

## 2024-09-04 PROCEDURE — 82948 REAGENT STRIP/BLOOD GLUCOSE: CPT

## 2024-09-04 PROCEDURE — 93010 ELECTROCARDIOGRAM REPORT: CPT | Performed by: INTERNAL MEDICINE

## 2024-09-04 NOTE — DISCHARGE INSTRUCTIONS
Follow-up with your pcp in the next week. Continue to monitor your blood glucose at home, ensure that you eat regularly with your short acting insulin as well. Please return to the ED as needed for recurrence of symptoms or new/worsening symptoms including fevers, confusion, focal weakness, headaches, chest pain, shortness of breath, etc.

## 2024-09-10 ENCOUNTER — TELEPHONE (OUTPATIENT)
Age: 64
End: 2024-09-10

## 2024-09-10 ENCOUNTER — APPOINTMENT (OUTPATIENT)
Dept: LAB | Facility: CLINIC | Age: 64
End: 2024-09-10
Payer: COMMERCIAL

## 2024-09-10 DIAGNOSIS — E55.9 VITAMIN D INSUFFICIENCY: ICD-10-CM

## 2024-09-10 DIAGNOSIS — D64.9 ANEMIA, UNSPECIFIED TYPE: ICD-10-CM

## 2024-09-10 DIAGNOSIS — R80.1 PERSISTENT PROTEINURIA: ICD-10-CM

## 2024-09-10 DIAGNOSIS — N18.4 STAGE 4 CHRONIC KIDNEY DISEASE (HCC): ICD-10-CM

## 2024-09-10 LAB
25(OH)D3 SERPL-MCNC: 30.1 NG/ML (ref 30–100)
ALBUMIN SERPL BCG-MCNC: 3.8 G/DL (ref 3.5–5)
ALP SERPL-CCNC: 76 U/L (ref 34–104)
ALT SERPL W P-5'-P-CCNC: 11 U/L (ref 7–52)
ANION GAP SERPL CALCULATED.3IONS-SCNC: 8 MMOL/L (ref 4–13)
AST SERPL W P-5'-P-CCNC: 12 U/L (ref 13–39)
BACTERIA UR QL AUTO: ABNORMAL /HPF
BASOPHILS # BLD AUTO: 0.05 THOUSANDS/ÂΜL (ref 0–0.1)
BASOPHILS NFR BLD AUTO: 1 % (ref 0–1)
BILIRUB SERPL-MCNC: 0.92 MG/DL (ref 0.2–1)
BILIRUB UR QL STRIP: NEGATIVE
BUN SERPL-MCNC: 43 MG/DL (ref 5–25)
CALCIUM SERPL-MCNC: 8.6 MG/DL (ref 8.4–10.2)
CHLORIDE SERPL-SCNC: 104 MMOL/L (ref 96–108)
CLARITY UR: CLEAR
CO2 SERPL-SCNC: 28 MMOL/L (ref 21–32)
COLOR UR: COLORLESS
CREAT SERPL-MCNC: 2.42 MG/DL (ref 0.6–1.3)
CREAT UR-MCNC: 33.2 MG/DL
CREAT UR-MCNC: 33.9 MG/DL
EOSINOPHIL # BLD AUTO: 0.4 THOUSAND/ÂΜL (ref 0–0.61)
EOSINOPHIL NFR BLD AUTO: 6 % (ref 0–6)
ERYTHROCYTE [DISTWIDTH] IN BLOOD BY AUTOMATED COUNT: 13.5 % (ref 11.6–15.1)
GFR SERPL CREATININE-BSD FRML MDRD: 26 ML/MIN/1.73SQ M
GLUCOSE P FAST SERPL-MCNC: 98 MG/DL (ref 65–99)
GLUCOSE UR STRIP-MCNC: ABNORMAL MG/DL
HCT VFR BLD AUTO: 34.2 % (ref 36.5–49.3)
HGB BLD-MCNC: 10.9 G/DL (ref 12–17)
HGB UR QL STRIP.AUTO: NEGATIVE
IMM GRANULOCYTES # BLD AUTO: 0.03 THOUSAND/UL (ref 0–0.2)
IMM GRANULOCYTES NFR BLD AUTO: 0 % (ref 0–2)
KETONES UR STRIP-MCNC: NEGATIVE MG/DL
LEUKOCYTE ESTERASE UR QL STRIP: NEGATIVE
LYMPHOCYTES # BLD AUTO: 1.59 THOUSANDS/ÂΜL (ref 0.6–4.47)
LYMPHOCYTES NFR BLD AUTO: 22 % (ref 14–44)
MCH RBC QN AUTO: 28.2 PG (ref 26.8–34.3)
MCHC RBC AUTO-ENTMCNC: 31.9 G/DL (ref 31.4–37.4)
MCV RBC AUTO: 88 FL (ref 82–98)
MICROALBUMIN UR-MCNC: 196.1 MG/L
MICROALBUMIN/CREAT 24H UR: 591 MG/G CREATININE (ref 0–30)
MONOCYTES # BLD AUTO: 0.73 THOUSAND/ÂΜL (ref 0.17–1.22)
MONOCYTES NFR BLD AUTO: 10 % (ref 4–12)
NEUTROPHILS # BLD AUTO: 4.51 THOUSANDS/ÂΜL (ref 1.85–7.62)
NEUTS SEG NFR BLD AUTO: 61 % (ref 43–75)
NITRITE UR QL STRIP: NEGATIVE
NON-SQ EPI CELLS URNS QL MICRO: ABNORMAL /HPF
NRBC BLD AUTO-RTO: 0 /100 WBCS
PH UR STRIP.AUTO: 6 [PH]
PLATELET # BLD AUTO: 246 THOUSANDS/UL (ref 149–390)
PMV BLD AUTO: 9.8 FL (ref 8.9–12.7)
POTASSIUM SERPL-SCNC: 4.4 MMOL/L (ref 3.5–5.3)
PROT SERPL-MCNC: 6.8 G/DL (ref 6.4–8.4)
PROT UR STRIP-MCNC: ABNORMAL MG/DL
PROT UR-MCNC: 40.4 MG/DL
PROT/CREAT UR: 1.2 MG/G{CREAT} (ref 0–0.1)
RBC # BLD AUTO: 3.87 MILLION/UL (ref 3.88–5.62)
RBC #/AREA URNS AUTO: ABNORMAL /HPF
SODIUM SERPL-SCNC: 140 MMOL/L (ref 135–147)
SP GR UR STRIP.AUTO: 1.01 (ref 1–1.03)
UROBILINOGEN UR STRIP-ACNC: <2 MG/DL
WBC # BLD AUTO: 7.31 THOUSAND/UL (ref 4.31–10.16)
WBC #/AREA URNS AUTO: ABNORMAL /HPF

## 2024-09-10 PROCEDURE — 80053 COMPREHEN METABOLIC PANEL: CPT

## 2024-09-10 PROCEDURE — 84156 ASSAY OF PROTEIN URINE: CPT

## 2024-09-10 PROCEDURE — 82043 UR ALBUMIN QUANTITATIVE: CPT

## 2024-09-10 PROCEDURE — 82570 ASSAY OF URINE CREATININE: CPT

## 2024-09-10 PROCEDURE — 81001 URINALYSIS AUTO W/SCOPE: CPT

## 2024-09-10 PROCEDURE — 85025 COMPLETE CBC W/AUTO DIFF WBC: CPT

## 2024-09-10 PROCEDURE — 36415 COLL VENOUS BLD VENIPUNCTURE: CPT

## 2024-09-10 PROCEDURE — 82306 VITAMIN D 25 HYDROXY: CPT

## 2024-09-10 NOTE — TELEPHONE ENCOUNTER
Caller: Noel Khan    Doctor: Burt    Reason for call: His foot and ankle are swollen.  He is diabetic and he is very concerned about this.  Can we force him on?  Thank you.      Call back#: [phone]

## 2024-09-11 ENCOUNTER — OFFICE VISIT (OUTPATIENT)
Dept: NEPHROLOGY | Facility: CLINIC | Age: 64
End: 2024-09-11
Payer: COMMERCIAL

## 2024-09-11 VITALS
DIASTOLIC BLOOD PRESSURE: 62 MMHG | SYSTOLIC BLOOD PRESSURE: 114 MMHG | HEART RATE: 65 BPM | BODY MASS INDEX: 27.26 KG/M2 | WEIGHT: 190.4 LBS | HEIGHT: 70 IN

## 2024-09-11 DIAGNOSIS — I10 BENIGN ESSENTIAL HYPERTENSION: ICD-10-CM

## 2024-09-11 DIAGNOSIS — N18.4 STAGE 4 CHRONIC KIDNEY DISEASE (HCC): ICD-10-CM

## 2024-09-11 DIAGNOSIS — D63.1 ANEMIA DUE TO STAGE 4 CHRONIC KIDNEY DISEASE  (HCC): ICD-10-CM

## 2024-09-11 DIAGNOSIS — E11.42 DIABETIC POLYNEUROPATHY ASSOCIATED WITH TYPE 2 DIABETES MELLITUS (HCC): Primary | ICD-10-CM

## 2024-09-11 DIAGNOSIS — N18.4 ANEMIA DUE TO STAGE 4 CHRONIC KIDNEY DISEASE  (HCC): ICD-10-CM

## 2024-09-11 DIAGNOSIS — R80.1 PERSISTENT PROTEINURIA: ICD-10-CM

## 2024-09-11 DIAGNOSIS — I50.42 CHRONIC COMBINED SYSTOLIC AND DIASTOLIC CHF (CONGESTIVE HEART FAILURE) (HCC): ICD-10-CM

## 2024-09-11 DIAGNOSIS — E55.9 VITAMIN D INSUFFICIENCY: ICD-10-CM

## 2024-09-11 PROCEDURE — 99214 OFFICE O/P EST MOD 30 MIN: CPT | Performed by: INTERNAL MEDICINE

## 2024-09-11 NOTE — PATIENT INSTRUCTIONS
1. CKD stage 4 in setting of diabetes, hypertensive nephrosclerosis, ischemic cardiomyopathy as well as cirrhosis and diuretic use  -sCr 2.42 with eGFR mid to high 20s.  -suspect progressive CKD in part due to uncontrolled blood sugars although improved as of late, A1C 7.1 as of May 20, 2024  -b/l sCr appears to be 1.4-1.6 previously, with rise in sCr to 2.33 as of 7/15/24. Episode of LOBO likely d/t sepsis in setting of recent PNA, peak sCr 3.59  -monitor BMP  -recent UA showed trace protein, UpCr 1.2g as of 9/10/24  -need ongoing glycemic control to slow down progression of CKD  -has been to CKD education class  -CKD education booklet provided previously  -renal ultrasound showed normal cortical echogenicity     2. HTN - BP well controlled in office  -on lasix 40mg twice daily, metoprolol 50mg twice daily  -off lisinopril 5mg daily   -avoid high salt/sodium diet  -avoid caffeinated beverages     3. DM2, uncontrolled - last A1C 7.1 as of last check, following with Dr. Adame of endocrinology, continue lantus and novolog. Needs improved glycemic control.     4. Proteinuria likely d/t diabetic nephropathy in setting of uncontrolled diabetes - microalb:Cr was as high as 3.4g in March 2019, now down to 1.2g as of 9/10/24 with microalb:Cr 591mg/g and stable - monitor this off ACEi  -anti PLA2R, dsDNA, HIV, RPR, complements, SPEP/UPEP/FLC, antiGBM, chronic hep panel, ANCA panel, LISA negative  -cryo + in setting of cirrhosis, RF - negative when repeated in Jan. 2022  -RBC casts noted on last UA with microscopy May 2024. Repeat UA with micoscropy shows resolution of RBC casts     5. Ischemic cardiomyopathy with EF 35% - diuretic as above, monitor daily weight, follows with cardiology outpatient, Dr. Figueroa     6. Vitamin D insufficiency - may take 1000u daily over the counter vitamin D as last level 25.5(low) as of 10/17/22, vitamin D level 30.1 as of 9/10/24     7. Anemia, mild of CKD - last Hgb 10.9, monitor CBC, recent  iron panel showed iron sat 20%     RTC in in 3 months.  Obtain blood and urine testing before next visit.

## 2024-09-11 NOTE — PROGRESS NOTES
NEPHROLOGY OUTPATIENT PROGRESS NOTE   Noel Khan 64 y.o. male MRN: [id number]  DATE: 9/11/2024  Reason for visit:   Chief Complaint   Patient presents with    Chronic Kidney Disease    Follow-up        Patient Instructions   1. CKD stage 4 in setting of diabetes, hypertensive nephrosclerosis, ischemic cardiomyopathy as well as cirrhosis and diuretic use  -sCr 2.42 with eGFR mid to high 20s.  -suspect progressive CKD in part due to uncontrolled blood sugars although improved as of late, A1C 7.1 as of May 20, 2024  -b/l sCr appears to be 1.4-1.6 previously, with rise in sCr to 2.33 as of 7/15/24. Episode of LOBO likely d/t sepsis in setting of recent PNA, peak sCr 3.59  -monitor BMP  -recent UA showed trace protein, UpCr 1.2g as of 9/10/24  -need ongoing glycemic control to slow down progression of CKD  -has been to CKD education class  -CKD education booklet provided previously  -renal ultrasound showed normal cortical echogenicity     2. HTN - BP well controlled in office  -on lasix 40mg twice daily, metoprolol 50mg twice daily  -off lisinopril 5mg daily   -avoid high salt/sodium diet  -avoid caffeinated beverages     3. DM2, uncontrolled - last A1C 7.1 as of last check, following with Dr. Adame of endocrinology, continue lantus and novolog. Needs improved glycemic control.     4. Proteinuria likely d/t diabetic nephropathy in setting of uncontrolled diabetes - microalb:Cr was as high as 3.4g in March 2019, now down to 1.2g as of 9/10/24 with microalb:Cr 591mg/g and stable - monitor this off ACEi  -anti PLA2R, dsDNA, HIV, RPR, complements, SPEP/UPEP/FLC, antiGBM, chronic hep panel, ANCA panel, LISA negative  -cryo + in setting of cirrhosis, RF - negative when repeated in Jan. 2022  -RBC casts noted on last UA with microscopy May 2024. Repeat UA with micoscropy shows resolution of RBC casts     5. Ischemic cardiomyopathy with EF 35% - diuretic as above, monitor daily weight, follows with cardiology outpatient,   [place]     6. Vitamin D insufficiency - may take 1000u daily over the counter vitamin D as last level 25.5(low) as of 10/17/22, vitamin D level 30.1 as of 9/10/24     7. Anemia, mild of CKD - last Hgb 10.9, monitor CBC, recent iron panel showed iron sat 20%     RTC in in 3 months.  Obtain blood and urine testing before next visit.      Noel was seen today for chronic kidney disease and follow-up.    Diagnoses and all orders for this visit:    Diabetic polyneuropathy associated with type 2 diabetes mellitus (HCC)  -     Basic metabolic panel; Future  -     Urinalysis with microscopic; Future  -     Protein / creatinine ratio, urine; Future  -     Albumin / creatinine urine ratio; Future    Benign essential hypertension    Stage 4 chronic kidney disease (HCC)  -     Basic metabolic panel; Future  -     Urinalysis with microscopic; Future  -     Protein / creatinine ratio, urine; Future  -     Albumin / creatinine urine ratio; Future    Chronic combined systolic and diastolic CHF    Anemia due to stage 4 chronic kidney disease  (HCC)    Persistent proteinuria  -     Protein / creatinine ratio, urine; Future  -     Albumin / creatinine urine ratio; Future    Vitamin D insufficiency        Assessment/Plan:  1. CKD stage 4 in setting of diabetes, hypertensive nephrosclerosis, ischemic cardiomyopathy as well as cirrhosis and diuretic use  -sCr 2.42 with eGFR mid to high 20s.  -suspect progressive CKD in part due to uncontrolled blood sugars although improved as of late, A1C 7.1 as of May 20, 2024  -b/l sCr appears to be 1.4-1.6 previously, with rise in sCr to 2.33 as of 7/15/24. Episode of LOBO likely d/t sepsis in setting of recent PNA, peak sCr 3.59  -monitor BMP  -recent UA showed trace protein, UpCr 1.2g as of 9/10/24  -need ongoing glycemic control to slow down progression of CKD  -has been to CKD education class  -CKD education booklet provided previously  -renal ultrasound showed normal cortical echogenicity     2.  HTN - BP well controlled in office  -on lasix 40mg twice daily, metoprolol 50mg twice daily  -off lisinopril 5mg daily   -avoid high salt/sodium diet  -avoid caffeinated beverages     3. DM2, uncontrolled - last A1C 7.1 as of last check, following with Dr. Adame of endocrinology, continue lantus and novolog. Needs improved glycemic control.     4. Proteinuria likely d/t diabetic nephropathy in setting of uncontrolled diabetes - microalb:Cr was as high as 3.4g in March 2019, now down to 1.2g as of 9/10/24 with microalb:Cr 591mg/g and stable - monitor this off ACEi  -anti PLA2R, dsDNA, HIV, RPR, complements, SPEP/UPEP/FLC, antiGBM, chronic hep panel, ANCA panel, LISA negative  -cryo + in setting of cirrhosis, RF - negative when repeated in Jan. 2022  -RBC casts noted on last UA with microscopy May 2024. Repeat UA with micoscropy shows resolution of RBC casts     5. Ischemic cardiomyopathy with EF 35% - diuretic as above, monitor daily weight, follows with cardiology outpatient, Dr. Figueroa     6. Vitamin D insufficiency - may take 1000u daily over the counter vitamin D as last level 25.5(low) as of 10/17/22, vitamin D level 30.1 as of 9/10/24     7. Anemia, mild of CKD - last Hgb 10.9, monitor CBC, recent iron panel showed iron sat 20%     RTC in in 3 months.  Obtain blood and urine testing before next visit.      SUBJECTIVE / INTERVAL HISTORY:  64 y.o. male presents in follow up of CKD.     Noel EULALIO Khan went to the ER for hypoglycemia, BS 36. Was also hypothermic.    Denies NSAID use  HTN - BP well controlled  Reviewed ER visit from 9/3/24.    Review of Systems   Constitutional:  Negative for chills and fever.   HENT:  Negative for sore throat.    Eyes:  Negative for visual disturbance.   Respiratory:  Negative for cough and shortness of breath.    Cardiovascular:  Positive for leg swelling (left foot). Negative for chest pain.   Gastrointestinal:  Negative for abdominal pain, constipation, diarrhea, nausea and  "vomiting.   Endocrine: Negative for polyuria.   Genitourinary:  Negative for decreased urine volume, difficulty urinating, dysuria and hematuria.   Musculoskeletal:  Negative for back pain and myalgias.        Left foot pain   Skin:  Negative for rash.   Neurological:  Negative for dizziness, light-headedness and numbness.   Psychiatric/Behavioral:  Negative for confusion.        OBJECTIVE:  /62 (BP Location: Left arm, Patient Position: Sitting, Cuff Size: Standard)   Pulse 65   Ht 5' 10\" (1.778 m)   Wt 86.4 kg (190 lb 6.4 oz)   BMI 27.32 kg/m²  Body mass index is 27.32 kg/m².    Physical exam:  Physical Exam  Vitals reviewed.   Constitutional:       General: He is not in acute distress.     Appearance: Normal appearance. He is well-developed. He is not diaphoretic.   HENT:      Head: Normocephalic and atraumatic.      Nose: Nose normal.      Mouth/Throat:      Mouth: Mucous membranes are moist.      Pharynx: No oropharyngeal exudate.   Eyes:      General: No scleral icterus.        Right eye: No discharge.         Left eye: No discharge.      Comments: eyeglasses   Neck:      Thyroid: No thyromegaly.   Cardiovascular:      Rate and Rhythm: Normal rate and regular rhythm.      Heart sounds: Normal heart sounds.   Pulmonary:      Effort: Pulmonary effort is normal.      Breath sounds: Normal breath sounds. No wheezing or rales.   Abdominal:      General: Bowel sounds are normal. There is no distension.      Palpations: Abdomen is soft.      Tenderness: There is no abdominal tenderness.   Musculoskeletal:         General: Swelling (Left > right LE edema) present. Normal range of motion.      Cervical back: Neck supple.   Lymphadenopathy:      Cervical: No cervical adenopathy.   Skin:     General: Skin is warm and dry.      Coloration: Skin is not jaundiced.      Findings: No rash.   Neurological:      General: No focal deficit present.      Mental Status: He is alert.      Comments: awake   Psychiatric:    "      Mood and Affect: Mood normal.         Behavior: Behavior normal.         Medications:    Current Outpatient Medications:     apixaban (Eliquis) 5 mg, Take 1 tablet (5 mg total) by mouth 2 (two) times a day, Disp: 180 tablet, Rfl: 3    atorvastatin (LIPITOR) 40 mg tablet, TAKE 1 TABLET BY MOUTH EVERY DAY, Disp: 90 tablet, Rfl: 3    Blood Pressure Monitoring ([employer]S Blood Pressure Monitor) KIT, Use 2 (two) times a day Automatic BP cuff. Measure BP twice daily., Disp: 1 kit, Rfl: 0    Continuous Glucose Sensor (FreeStyle Jess 2 Sensor) [facility], REPLACE SENSOR EVERY 14 DAYS, Disp: , Rfl:     furosemide (LASIX) 40 mg tablet, Take 1 tablet (40 mg total) by mouth 2 (two) times a day, Disp: 180 tablet, Rfl: 3    insulin glargine (LANTUS) 100 units/mL subcutaneous injection, Inject 30 Units under the skin every morning, Disp: , Rfl:     insulin NPH-insulin regular (NovoLIN 70/30) 100 units/mL subcutaneous injection, Inject under the skin, Disp: , Rfl:     latanoprost (XALATAN) 0.005 % ophthalmic solution, Administer 0.0005 drops to both eyes daily at bedtime, Disp: , Rfl:     levothyroxine 25 mcg tablet, Take 25 mcg by mouth daily, Disp: , Rfl:     metoprolol tartrate (LOPRESSOR) 50 mg tablet, Take 1 tablet (50 mg total) by mouth every 12 (twelve) hours, Disp: 180 tablet, Rfl: 3    pantoprazole (PROTONIX) 40 mg tablet, Take 1 tablet (40 mg total) by mouth daily in the early morning, Disp: 30 tablet, Rfl: 0    potassium chloride (Klor-Con M20) 20 mEq tablet, TAKE 1 TABLET BY MOUTH 2 TIMES A DAY., Disp: 180 tablet, Rfl: 1    Unifine Pentips 31G X 8 MM MISC, , Disp: , Rfl:     Allergies:  Allergies as of 09/11/2024    (No Known Allergies)       The following portions of the patient's history were reviewed and updated as appropriate: past family history, past surgical history and problem list.    Laboratory Results:  Lab Results   Component Value Date    SODIUM 140 09/10/2024    K 4.4 09/10/2024     09/10/2024    CO2 28  "09/10/2024    BUN 43 (H) 09/10/2024    CREATININE 2.42 (H) 09/10/2024    GLUC 168 (H) 09/03/2024    CALCIUM 8.6 09/10/2024        Lab Results   Component Value Date    .9 (H) 10/17/2022    CALCIUM 8.6 09/10/2024    PHOS 3.0 06/03/2024       Portions of the record may have been created with voice recognition software.  Occasional wrong word or \"sound a like\" substitutions may have occurred due to the inherent limitations of voice recognition software.  Read the chart carefully and recognize, using context, where substitutions have occurred.  "

## 2024-09-12 ENCOUNTER — OFFICE VISIT (OUTPATIENT)
Dept: PODIATRY | Facility: CLINIC | Age: 64
End: 2024-09-12
Payer: COMMERCIAL

## 2024-09-12 ENCOUNTER — HOSPITAL ENCOUNTER (OUTPATIENT)
Dept: RADIOLOGY | Facility: HOSPITAL | Age: 64
End: 2024-09-12
Attending: PODIATRIST
Payer: COMMERCIAL

## 2024-09-12 VITALS
HEART RATE: 63 BPM | DIASTOLIC BLOOD PRESSURE: 86 MMHG | WEIGHT: 188.2 LBS | SYSTOLIC BLOOD PRESSURE: 135 MMHG | BODY MASS INDEX: 26.94 KG/M2 | HEIGHT: 70 IN

## 2024-09-12 DIAGNOSIS — M25.572 ACUTE LEFT ANKLE PAIN: ICD-10-CM

## 2024-09-12 DIAGNOSIS — E11.42 DIABETIC POLYNEUROPATHY ASSOCIATED WITH TYPE 2 DIABETES MELLITUS (HCC): ICD-10-CM

## 2024-09-12 DIAGNOSIS — M25.572 ACUTE LEFT ANKLE PAIN: Primary | ICD-10-CM

## 2024-09-12 DIAGNOSIS — M25.472 LEFT ANKLE SWELLING: ICD-10-CM

## 2024-09-12 DIAGNOSIS — I87.2 VENOUS INSUFFICIENCY: ICD-10-CM

## 2024-09-12 PROCEDURE — 73610 X-RAY EXAM OF ANKLE: CPT

## 2024-09-12 PROCEDURE — 99213 OFFICE O/P EST LOW 20 MIN: CPT | Performed by: PODIATRIST

## 2024-09-12 NOTE — PROGRESS NOTES
Assessment/Plan:     The patient's clinical examination today significant for diffuse left lower extremity +2 edema there is a very small superficial abrasion to his anteromedial left lower leg that is noninfected.  This was dressed with triple antibiotic ointment and a Band-Aid.  There is diffuse tenderness to palpation of the left lower leg.  Tenderness is noted with palpation of the ankle joint.  Passive range of motion of the ankle is mildly reduced and with mild tenderness.  Pedal pulses are palpable but diminished.    X-rays of the patient's left ankle taken today were personally reviewed and interpreted.  There are no signs of acute fracture or dislocation.  There are arthritic changes noted throughout the ankle joint, hindfoot and midfoot.  Calcified vessels are also noted.    The patient's clinical symptomatology is most consistent with diffuse left lower extremity edema secondary to venous sufficiency of the left lower extremity.  As the patient is having tenderness to his left lower leg, a venous duplex is in order.  I did order a venous insufficiency ultrasound study to rule out DVT and to assess his veins for venous insufficiency.  Recommend compression therapy as tolerated.  The patient does note he has support stockings at home.  Tubigrip's are also an option if he finds the compression stockings too tight.  He is already on a course of diuretics with furosemide 40 mg twice daily.    Recommend follow-up in 3 to 4 weeks.     Diagnoses and all orders for this visit:    Acute left ankle pain  -     XR ankle 3+ vw left; Future  -     VAS Lower extremity venous insufficiency duplex, Single leg w/ measurements; Future    Left ankle swelling  -     XR ankle 3+ vw left; Future  -     VAS Lower extremity venous insufficiency duplex, Single leg w/ measurements; Future    Venous insufficiency  -     VAS Lower extremity venous insufficiency duplex, Single leg w/ measurements; Future    Diabetic polyneuropathy  associated with type 2 diabetes mellitus (HCC)          Subjective:     Patient ID: Noel Khan is a 64 y.o. male.    The patient presents today with a chief complaint of new onset swelling to the left lower extremity over the past 2 weeks.  He cannot recall any history of recent injury or trauma to the left lower extremity.  He states that the swelling does progress throughout the course of the day.  By the end of the day, he does note some tenderness associated with with the edema.  He does note that he is already on diuretic therapy.      PAST MEDICAL HISTORY:  Past Medical History:   Diagnosis Date    CAD (coronary artery disease)     Chronic combined systolic and diastolic CHF (congestive heart failure) (HCC)     Diabetes mellitus (HCC)     type 2    Dyslipidemia     Hypertension     Ischemic cardiomyopathy     MI (myocardial infarction) (HCC)     Neuropathy     Osteomyelitis (HCC)     Pancreatitis        PAST SURGICAL HISTORY:  Past Surgical History:   Procedure Laterality Date    ANGIOPLASTY      ballon    CORONARY ANGIOPLASTY WITH STENT PLACEMENT      LULA to proximal and mid LAD, Proximal RCA.     FOREIGN BODY REMOVAL Left 5/26/2022    Procedure: REMOVAL FOREIGN BODY EXTREMITY;  Surgeon: Heron Herrera DPM;  [facility];  Service: Podiatry    HERNIA REPAIR      INCISION AND DRAINAGE OF WOUND Left 2/14/2020    Procedure: INCISION AND DRAINAGE (I&D) EXTREMITY left foot (cpt 61927);  Surgeon: Kermit Cahu DPM;  [facility];  Service: Podiatry    KY AMPUTATION FOOT TRANSMETARSAL Left 5/26/2022    Procedure: AMPUTATION left 5th metatarsal;  Surgeon: Heron Herrera DPM;  [facility];  Service: Podiatry    KY EXPLORATION PENETRATING WOUND SPX EXTREMITY Left 2/14/2020    Procedure: EXPLORATION EXTREMITY;  Surgeon: Kermit Chau DPM;  [facility];  Service: Podiatry    KY NEGATIVE PRESSURE WOUND THERAPY DME <= 50 SQ CM Left 2/14/2020    Procedure: APPLICATION VAC DRESSING;   Surgeon: Kermit Chau DPM;  [facility];  Service: Podiatry    TOE AMPUTATION Left 5/14/2022    Procedure: EXCISION OF LEFT 5TH TOE ULCER WITH FILET FLAP;  Surgeon: Heron Herrera DPM;  [facility];  Service: Podiatry        ALLERGIES:  Patient has no known allergies.    MEDICATIONS:  Current Outpatient Medications   Medication Sig Dispense Refill    apixaban (Eliquis) 5 mg Take 1 tablet (5 mg total) by mouth 2 (two) times a day 180 tablet 3    atorvastatin (LIPITOR) 40 mg tablet TAKE 1 TABLET BY MOUTH EVERY DAY 90 tablet 3    Blood Pressure Monitoring (CVS Blood Pressure Monitor) KIT Use 2 (two) times a day Automatic BP cuff. Measure BP twice daily. 1 kit 0    Continuous Glucose Sensor (FreeStyle Jess 2 Sensor) MISC REPLACE SENSOR EVERY 14 DAYS      furosemide (LASIX) 40 mg tablet Take 1 tablet (40 mg total) by mouth 2 (two) times a day 180 tablet 3    insulin glargine (LANTUS) 100 units/mL subcutaneous injection Inject 30 Units under the skin every morning      insulin NPH-insulin regular (NovoLIN 70/30) 100 units/mL subcutaneous injection Inject under the skin      latanoprost (XALATAN) 0.005 % ophthalmic solution Administer 0.0005 drops to both eyes daily at bedtime      levothyroxine 25 mcg tablet Take 25 mcg by mouth daily      metoprolol tartrate (LOPRESSOR) 50 mg tablet Take 1 tablet (50 mg total) by mouth every 12 (twelve) hours 180 tablet 3    pantoprazole (PROTONIX) 40 mg tablet Take 1 tablet (40 mg total) by mouth daily in the early morning 30 tablet 0    potassium chloride (Klor-Con M20) 20 mEq tablet TAKE 1 TABLET BY MOUTH 2 TIMES A DAY. 180 tablet 1    Unifine Pentips 31G X 8 MM MISC        No current facility-administered medications for this visit.       SOCIAL HISTORY:  Social History     Socioeconomic History    Marital status: /Civil Union     Spouse name: None    Number of children: None    Years of education: None    Highest education level: None   Occupational  History    None   Tobacco Use    Smoking status: Never    Smokeless tobacco: Never   Vaping Use    Vaping status: Never Used   Substance and Sexual Activity    Alcohol use: Not Currently     Comment: 20 years of sobriety    Drug use: Never    Sexual activity: Yes     Partners: Female     Birth control/protection: None   Other Topics Concern    None   Social History Narrative    None     Social Determinants of Health     Financial Resource Strain: Not on file   Food Insecurity: No Food Insecurity (5/13/2022)    Hunger Vital Sign     Worried About Running Out of Food in the Last Year: Never true     Ran Out of Food in the Last Year: Never true   Transportation Needs: No Transportation Needs (5/27/2022)    PRAPARE - Transportation     Lack of Transportation (Medical): No     Lack of Transportation (Non-Medical): No   Physical Activity: Not on file   Stress: Not on file   Social Connections: Not on file   Intimate Partner Violence: Not on file   Housing Stability: Low Risk  (5/27/2022)    Housing Stability Vital Sign     Unable to Pay for Housing in the Last Year: No     Number of Places Lived in the Last Year: 1     Unstable Housing in the Last Year: No        Review of Systems   Constitutional: Negative.    HENT: Negative.     Eyes: Negative.    Respiratory: Negative.     Cardiovascular: Negative.    Endocrine: Negative.    Musculoskeletal: Negative.    Neurological: Negative.    Hematological: Negative.    Psychiatric/Behavioral: Negative.           Objective:     Physical Exam  Vitals reviewed.   Constitutional:       Appearance: Normal appearance.   HENT:      Head: Normocephalic and atraumatic.      Nose: Nose normal.   Eyes:      Conjunctiva/sclera: Conjunctivae normal.      Pupils: Pupils are equal, round, and reactive to light.   Cardiovascular:      Pulses:           Dorsalis pedis pulses are 1+ on the left side.        Posterior tibial pulses are 1+ on the left side.   Pulmonary:      Effort: Pulmonary effort  is normal.   Musculoskeletal:        Feet:    Feet:      Left foot:      Skin integrity: Skin breakdown present. No erythema.      Comments: The patient's clinical examination today significant for diffuse left lower extremity +2 edema there is a very small superficial abrasion to his anteromedial left lower leg that is noninfected.  This was dressed with triple antibiotic ointment and a Band-Aid.  There is diffuse tenderness to palpation of the left lower leg.  Tenderness is noted with palpation of the ankle joint.  Passive range of motion of the ankle is mildly reduced and with mild tenderness.  Pedal pulses are palpable but diminished.  Skin:     General: Skin is warm.      Capillary Refill: Capillary refill takes less than 2 seconds.   Neurological:      General: No focal deficit present.      Mental Status: He is alert and oriented to person, place, and time.   Psychiatric:         Mood and Affect: Mood normal.         Behavior: Behavior normal.         Thought Content: Thought content normal.

## 2024-09-22 NOTE — ED ATTENDING ATTESTATION
9/3/2024  IJermaine MD, saw and evaluated the patient. I have discussed the patient with the resident/non-physician practitioner and agree with the resident's/non-physician practitioner's findings, Plan of Care, and MDM as documented in the resident's/non-physician practitioner's note, except where noted. All available labs and Radiology studies were reviewed.  I was present for key portions of any procedure(s) performed by the resident/non-physician practitioner and I was immediately available to provide assistance.       At this point I agree with the current assessment done in the Emergency Department.  I have conducted an independent evaluation of this patient a history and physical is as follows:    64-year-old male with history of insulin-dependent diabetes brought for evaluation of hypoglycemia noted at home with associated cold sweats, confusion.  Patient had taken insulin, then taken a nap and did not eat much today.  He generally only eats 1 meal a day.  Wife reports she woke up to his Dexcom beeping and woke the patient who was confused.  Was given dextrose by EMS.  Repeat blood glucose here improved on arrival.  Patient hypothermic to 94.8 degrees likely secondary to hypoglycemia.  Warming with bear hugger.  Will continue to monitor    ED Course     Temperature improved, glucose remained normal.  No significant lab changes.  Stable for discharge home.  Counseled to eat smaller meals throughout the day rather than 1 large meal.    Critical Care Time  CriticalCare Time    Date/Time: 9/3/2024 11:39 PM    Performed by: Jermaine Chaudhry MD  Authorized by: Jermaine Chaudhry MD    Critical care provider statement:     Critical care time (minutes):  30    Critical care time was exclusive of:  Separately billable procedures and treating other patients and teaching time    Critical care was necessary to treat or prevent imminent or life-threatening deterioration of the following conditions:   Endocrine crisis    Critical care was time spent personally by me on the following activities:  Obtaining history from patient or surrogate, development of treatment plan with patient or surrogate, evaluation of patient's response to treatment, examination of patient, ordering and performing treatments and interventions, ordering and review of laboratory studies, re-evaluation of patient's condition and review of old charts

## 2024-09-23 ENCOUNTER — HOSPITAL ENCOUNTER (OUTPATIENT)
Dept: CT IMAGING | Facility: HOSPITAL | Age: 64
Discharge: HOME | End: 2024-09-23
Attending: UROLOGY
Payer: COMMERCIAL

## 2024-09-23 DIAGNOSIS — N13.39 OTHER HYDRONEPHROSIS: ICD-10-CM

## 2024-09-23 DIAGNOSIS — R33.9 URINARY RETENTION: ICD-10-CM

## 2024-09-23 PROCEDURE — 74176 CT ABD & PELVIS W/O CONTRAST: CPT

## 2024-09-24 ENCOUNTER — TELEPHONE (OUTPATIENT)
Dept: UROLOGY | Facility: AMBULATORY SURGERY CENTER | Age: 64
End: 2024-09-24

## 2024-09-24 NOTE — TELEPHONE ENCOUNTER
Please inform that CT does not show any kidney abnormality.  He does not require urologic follow up.

## 2024-09-24 NOTE — TELEPHONE ENCOUNTER
Notified Noel that  CT scan show any Kidney abnormality - no urologic followup needed - patient understood

## 2024-09-30 ENCOUNTER — HOSPITAL ENCOUNTER (OUTPATIENT)
Dept: NON INVASIVE DIAGNOSTICS | Facility: HOSPITAL | Age: 64
Discharge: HOME | End: 2024-09-30
Attending: PODIATRIST
Payer: COMMERCIAL

## 2024-09-30 DIAGNOSIS — M25.572 ACUTE LEFT ANKLE PAIN: ICD-10-CM

## 2024-09-30 DIAGNOSIS — I87.2 VENOUS INSUFFICIENCY: ICD-10-CM

## 2024-09-30 DIAGNOSIS — M25.472 LEFT ANKLE SWELLING: ICD-10-CM

## 2024-09-30 PROCEDURE — 93971 EXTREMITY STUDY: CPT | Performed by: SURGERY

## 2024-09-30 PROCEDURE — 93971 EXTREMITY STUDY: CPT

## 2024-10-15 ENCOUNTER — OFFICE VISIT (OUTPATIENT)
Dept: PODIATRY | Facility: CLINIC | Age: 64
End: 2024-10-15
Payer: COMMERCIAL

## 2024-10-15 VITALS
DIASTOLIC BLOOD PRESSURE: 84 MMHG | WEIGHT: 182 LBS | OXYGEN SATURATION: 100 % | HEART RATE: 68 BPM | HEIGHT: 70 IN | BODY MASS INDEX: 26.05 KG/M2 | SYSTOLIC BLOOD PRESSURE: 135 MMHG

## 2024-10-15 DIAGNOSIS — E11.42 DIABETIC POLYNEUROPATHY ASSOCIATED WITH TYPE 2 DIABETES MELLITUS (HCC): ICD-10-CM

## 2024-10-15 DIAGNOSIS — I87.2 VENOUS INSUFFICIENCY: ICD-10-CM

## 2024-10-15 DIAGNOSIS — Z89.422 HISTORY OF AMPUTATION OF LESSER TOE OF LEFT FOOT (HCC): ICD-10-CM

## 2024-10-15 DIAGNOSIS — B35.1 ONYCHOMYCOSIS: Primary | ICD-10-CM

## 2024-10-15 PROCEDURE — 99213 OFFICE O/P EST LOW 20 MIN: CPT | Performed by: PODIATRIST

## 2024-10-15 PROCEDURE — 11721 DEBRIDE NAIL 6 OR MORE: CPT | Performed by: PODIATRIST

## 2024-10-15 NOTE — PROGRESS NOTES
Assessment/Plan:     The patient's clinical examination today is significant for thickened and dystrophic nail plates x 9 with discoloration and subungual debris consistent with onychomycosis.  There are no open lesions. There are no interdigital macerations.  There are no pretrophic changes nor callosities.  The patient has a history of a partial left fifth ray resection secondary to osteomyelitis.  Bilateral lower extremity edema has much improved compared to his prior visit.  There are no open lesions.     Venous duplex of the left lower extremity was reviewed and appreciated.  Pertinent findings are as follows:    LEFT LIMB:  Deep venous incompetence is noted throughout the femoral and popliteal veins.  The great saphenous vein is incompetent at the prox calf ( .64 secs).  The great saphenous vein remains within the saphenous compartment in the thigh.  The small saphenous vein is competent and does not communicate with the  popliteal vein.  There is evidence of incompetent perforators in the distal calf with associated  varicose veins.  There is no evidence of deep vein thrombosis in the CFV, the proximal PFV, the  femoral vein and the popliteal vein.    These results were reviewed with the patient.  Recommend elevation when at rest and use of support stockings.  We can consider consultation with vascular surgery if his symptoms progress.    The pedal nail plates were sharply debrided with a sterile nail clipper x 9.  The pedal nail plates were then mechanically reduced in thickness and girth utilizing a rotary bur.  There are no other acute pedal issues. His most recent hemoglobin A1c was 7.1 in May 2024, prior to this it was 9.8 in October 2023.     Recommend follow-up in 2 to 3 months for continued at risk foot care.        Diagnoses and all orders for this visit:    Onychomycosis    Diabetic polyneuropathy associated with type 2 diabetes mellitus (HCC)    History of amputation of lesser toe of left foot  ([facility])    Venous insufficiency          Subjective:     Patient ID: Noel Khan is a 64 y.o. male.    The patient presents today for at risk diabetic footcare.  Since his last visit, he has noted reduction in his bilateral lower extremity edema.  He notes no other acute pedal issues today.           PAST MEDICAL HISTORY:  Past Medical History:   Diagnosis Date    CAD (coronary artery disease)     Chronic combined systolic and diastolic CHF (congestive heart failure) ([facility])     Diabetes mellitus ([facility])     type 2    Dyslipidemia     Hypertension     Ischemic cardiomyopathy     MI (myocardial infarction) ([facility])     Neuropathy     Osteomyelitis ([facility])     Pancreatitis        PAST SURGICAL HISTORY:  Past Surgical History:   Procedure Laterality Date    ANGIOPLASTY      ballon    CORONARY ANGIOPLASTY WITH STENT PLACEMENT      LULA to proximal and mid LAD, Proximal RCA.     FOREIGN BODY REMOVAL Left 5/26/2022    Procedure: REMOVAL FOREIGN BODY EXTREMITY;  Surgeon: Heron Herrera DPM;  [facility];  Service: Podiatry    HERNIA REPAIR      INCISION AND DRAINAGE OF WOUND Left 2/14/2020    Procedure: INCISION AND DRAINAGE (I&D) EXTREMITY left foot (cpt 91739);  Surgeon: Kermit Chau DPM;  [facility];  Service: Podiatry    IN AMPUTATION FOOT TRANSMETARSAL Left 5/26/2022    Procedure: AMPUTATION left 5th metatarsal;  Surgeon: Heron Herrera DPM;  [facility];  Service: Podiatry    IN EXPLORATION PENETRATING WOUND SPX EXTREMITY Left 2/14/2020    Procedure: EXPLORATION EXTREMITY;  Surgeon: Kermit Chau DPM;  [facility];  Service: Podiatry    IN NEGATIVE PRESSURE WOUND THERAPY DME <= 50 SQ CM Left 2/14/2020    Procedure: APPLICATION VAC DRESSING;  Surgeon: Kermit Chau DPM;  [facility];  Service: Podiatry    TOE AMPUTATION Left 5/14/2022    Procedure: EXCISION OF LEFT 5TH TOE ULCER WITH FILET FLAP;  Surgeon: Heron Herrera DPM;  [facility];  Service: Podiatry         ALLERGIES:  Patient has no known allergies.    MEDICATIONS:  Current Outpatient Medications   Medication Sig Dispense Refill    apixaban (Eliquis) 5 mg Take 1 tablet (5 mg total) by mouth 2 (two) times a day 180 tablet 3    atorvastatin (LIPITOR) 40 mg tablet TAKE 1 TABLET BY MOUTH EVERY DAY 90 tablet 3    Blood Pressure Monitoring ([employer]S Blood Pressure Monitor) KIT Use 2 (two) times a day Automatic BP cuff. Measure BP twice daily. 1 kit 0    Continuous Glucose Sensor (FreeStyle Jess 2 Sensor) MISC REPLACE SENSOR EVERY 14 DAYS      furosemide (LASIX) 40 mg tablet Take 1 tablet (40 mg total) by mouth 2 (two) times a day 180 tablet 3    insulin glargine (LANTUS) 100 units/mL subcutaneous injection Inject 30 Units under the skin every morning      insulin NPH-insulin regular (NovoLIN 70/30) 100 units/mL subcutaneous injection Inject under the skin      latanoprost (XALATAN) 0.005 % ophthalmic solution Administer 0.0005 drops to both eyes daily at bedtime      levothyroxine 25 mcg tablet Take 25 mcg by mouth daily      metoprolol tartrate (LOPRESSOR) 50 mg tablet Take 1 tablet (50 mg total) by mouth every 12 (twelve) hours 180 tablet 3    pantoprazole (PROTONIX) 40 mg tablet Take 1 tablet (40 mg total) by mouth daily in the early morning 30 tablet 0    potassium chloride (Klor-Con M20) 20 mEq tablet TAKE 1 TABLET BY MOUTH 2 TIMES A DAY. 180 tablet 1    Unifine Pentips 31G X 8 MM MISC        No current facility-administered medications for this visit.       SOCIAL HISTORY:  Social History     Socioeconomic History    Marital status: /Civil Union     Spouse name: None    Number of children: None    Years of education: None    Highest education level: None   Occupational History    None   Tobacco Use    Smoking status: Never    Smokeless tobacco: Never   Vaping Use    Vaping status: Never Used   Substance and Sexual Activity    Alcohol use: Not Currently     Comment: 20 years of sobriety    Drug use: Never     Sexual activity: Yes     Partners: Female     Birth control/protection: None   Other Topics Concern    None   Social History Narrative    None     Social Determinants of Health     Financial Resource Strain: Not on file   Food Insecurity: No Food Insecurity (5/13/2022)    Hunger Vital Sign     Worried About Running Out of Food in the Last Year: Never true     Ran Out of Food in the Last Year: Never true   Transportation Needs: No Transportation Needs (5/27/2022)    PRAPARE - Transportation     Lack of Transportation (Medical): No     Lack of Transportation (Non-Medical): No   Physical Activity: Not on file   Stress: Not on file   Social Connections: Not on file   Intimate Partner Violence: Not on file   Housing Stability: Low Risk  (5/27/2022)    Housing Stability Vital Sign     Unable to Pay for Housing in the Last Year: No     Number of Places Lived in the Last Year: 1     Unstable Housing in the Last Year: No        Review of Systems   Constitutional: Negative.    HENT: Negative.     Eyes: Negative.    Respiratory: Negative.     Cardiovascular: Negative.    Endocrine: Negative.    Musculoskeletal: Negative.    Neurological: Negative.    Hematological: Negative.    Psychiatric/Behavioral: Negative.           Objective:     Physical Exam  Vitals reviewed.   Constitutional:       Appearance: Normal appearance.   HENT:      Head: Normocephalic and atraumatic.      Nose: Nose normal.   Eyes:      Conjunctiva/sclera: Conjunctivae normal.      Pupils: Pupils are equal, round, and reactive to light.   Pulmonary:      Effort: Pulmonary effort is normal.   Skin:     General: Skin is warm.      Capillary Refill: Capillary refill takes 2 to 3 seconds.   Neurological:      General: No focal deficit present.      Mental Status: He is alert and oriented to person, place, and time.   Psychiatric:         Mood and Affect: Mood normal.         Behavior: Behavior normal.         Thought Content: Thought content normal.

## 2024-11-06 ENCOUNTER — OFFICE VISIT (OUTPATIENT)
Dept: CARDIOLOGY CLINIC | Facility: CLINIC | Age: 64
End: 2024-11-06
Payer: COMMERCIAL

## 2024-11-06 VITALS
DIASTOLIC BLOOD PRESSURE: 70 MMHG | BODY MASS INDEX: 27.06 KG/M2 | SYSTOLIC BLOOD PRESSURE: 132 MMHG | HEART RATE: 74 BPM | WEIGHT: 188.6 LBS | OXYGEN SATURATION: 99 %

## 2024-11-06 DIAGNOSIS — Z95.5 PRESENCE OF DRUG COATED STENT IN RIGHT CORONARY ARTERY: ICD-10-CM

## 2024-11-06 DIAGNOSIS — I25.10 CORONARY ARTERY DISEASE INVOLVING NATIVE CORONARY ARTERY OF NATIVE HEART WITHOUT ANGINA PECTORIS: ICD-10-CM

## 2024-11-06 DIAGNOSIS — Z95.5 PRESENCE OF DRUG COATED STENT IN LAD CORONARY ARTERY: ICD-10-CM

## 2024-11-06 DIAGNOSIS — E78.5 DYSLIPIDEMIA: ICD-10-CM

## 2024-11-06 DIAGNOSIS — I48.91 NEW ONSET ATRIAL FIBRILLATION (HCC): Primary | ICD-10-CM

## 2024-11-06 DIAGNOSIS — I25.5 ISCHEMIC CARDIOMYOPATHY: ICD-10-CM

## 2024-11-06 PROCEDURE — 99214 OFFICE O/P EST MOD 30 MIN: CPT | Performed by: INTERNAL MEDICINE

## 2024-11-06 RX ORDER — METOPROLOL SUCCINATE 50 MG/1
50 TABLET, EXTENDED RELEASE ORAL 2 TIMES DAILY
Qty: 180 TABLET | Refills: 3 | Status: SHIPPED | OUTPATIENT
Start: 2024-11-06

## 2024-11-06 RX ORDER — ISOSORBIDE DINITRATE 5 MG/1
5 TABLET ORAL 2 TIMES DAILY
Qty: 180 TABLET | Refills: 3 | Status: SHIPPED | OUTPATIENT
Start: 2024-11-06

## 2024-11-06 RX ORDER — HYDRALAZINE HYDROCHLORIDE 10 MG/1
10 TABLET, FILM COATED ORAL 2 TIMES DAILY
Qty: 180 TABLET | Refills: 3 | Status: SHIPPED | OUTPATIENT
Start: 2024-11-06

## 2024-11-06 NOTE — PROGRESS NOTES
Cardiology Follow Up    Noel Khan  1960  [id number]  [facility] [place]  [address] [facility] [address]  [address]  [place] [address] [phone]    Assessment & Plan  New onset atrial fibrillation (HCC) s/p MELANY CV  -  Maintaining normal sinus rhythm  -  Continue current AV sun blocking regimen and Eliquis for anticoagulation    Coronary artery disease involving native coronary artery of native heart without angina pectoris  -  No symptoms of angina or no anginal equivalents  -  Recent stress test with LAD territory scar, no ischemia  -  On no aspirin in setting of anticoagulant use  -  On beta-blocker and statin therapy  -  LDL at goal on most recent lipid panel    Presence of drug coated stent in right coronary artery    Presence of drug coated stent in LAD coronary artery      Ischemic cardiomyopathy  -  EF remains impaired despite restoring normal sinus rhythm  -  On GDMT with metoprolol albeit tartrate  -  On no ACE/ARB/ARNi/Jardiance in setting of advanced CKD  -  Agreeable to addition of Isordil/hydralazine - reports he does not think he      would remember TID dosing hence BID prescribed  -  Transition metoprolol tartrate to succinate 50 twice daily  -  Will reassess EF with echo in three months, discussed AICD if EF remains low    Dyslipidemia  -  LDL at goal on recent lipid panel  -  Low HDL for which therapeutic lifestyle modification recommended    He will follow-up in the office in three months.      Interval History:  Mr. Khan is a pleasant 64-year-old gentleman who presents to the office today for follow-up.  He was last seen by me in 2022.  Thereafter he was admitted to the hospital with atrial fibrillation with a rapid ventricular response in the setting of pneumonia.  He did undergo a MELANY-guided cardioversion with restoration of normal sinus rhythm.  He was placed on Eliquis for anticoagulation.  Imaging during that  hospital stay revealed that his ejection fraction had decreased from 50% to 35%.  It was suspected that this was tachycardia-mediated.  He previously was on an ACE which was held at discharge in the setting of acute kidney injury.  A LifeVest was discussed which he declined.  He was discharged on Lasix 40 mg twice daily at a weight of 183 pounds.  He was seen thereafter by her advanced practitioner.  He had increase the Lasix to 80 mg twice daily dosing.  He was maintaining normal sinus rhythm.  A repeat echocardiogram revealed that his EF remained at 35% with a subsequent pharmacologic test test revealing stress test revealing scar in his LAD territory but no ischemia.  Thereafter his diuretics were decreased eventually back to 40 mg twice daily.    He has been weighing himself on a daily basis at about 187 pounds.  He denies any progressive lower extremity edema, paroxysmal nocturnal dyspnea, orthopnea, acute weight gain or increasing abdominal girth.  He attempts to abide by a salt restricted diet.  He denies any exertional chest pain or progressive shortness of breath with exertion with the activity he performs.  He denies lightheadedness, syncope or presyncope.  He denies any symptoms of palpitations.  He reports denies any bleeding consequences or falls on Eliquis.  He denies symptoms of claudication.    Medical Problems       Problem List       Orthopnea    Elevated troponin    Benign essential hypertension    Insulin-dependent diabetes mellitus    Overview Signed 5/18/2017  6:20 AM by Anya Strong PA-C     type 1           Lab Results   Component Value Date    HGBA1C 7.1 (H) 05/20/2024         Coronary artery disease    Stage 4 chronic kidney disease (HCC)    Lab Results   Component Value Date    EGFR 26 09/10/2024    EGFR 27 09/03/2024    EGFR 28 07/31/2024    CREATININE 2.42 (H) 09/10/2024    CREATININE 2.38 (H) 09/03/2024    CREATININE 2.31 (H) 07/31/2024         Chronic combined systolic and diastolic  CHF    Wt Readings from Last 3 Encounters:   11/06/24 85.5 kg (188 lb 9.6 oz)   10/15/24 82.6 kg (182 lb)   09/12/24 85.4 kg (188 lb 3.2 oz)                 Dyslipidemia    Ischemic cardiomyopathy    Presence of drug coated stent in LAD coronary artery    Presence of drug coated stent in right coronary artery    Dislocation of proximal interphalangeal joint of left little finger    Abscess of tendon of left foot    Overview Signed 2/13/2020 12:47 PM by Kermit Chau DPM     Added automatically from request for surgery [id number]         LLQ pain    Cirrhosis    AVN (avascular necrosis of bone) (HCC)    Presacral mass    Acute-on-chronic kidney injury  (HCC)    Lab Results   Component Value Date    EGFR 26 09/10/2024    EGFR 27 09/03/2024    EGFR 28 07/31/2024    CREATININE 2.42 (H) 09/10/2024    CREATININE 2.38 (H) 09/03/2024    CREATININE 2.31 (H) 07/31/2024         Persistent proteinuria    Preoperative clearance    Anemia    History of amputation of lesser toe of left foot (HCC)    Vitamin D insufficiency    Diabetic polyneuropathy associated with type 2 diabetes mellitus (HCC)      Lab Results   Component Value Date    HGBA1C 7.1 (H) 05/20/2024         New onset atrial fibrillation (HCC) s/p MELANY CV    Thromboembolic disorder (HCC)    Chronic venous hypertension (idiopathic) with ulcer of left lower extremity (HCC)    Hospital discharge follow-up    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    Urinary retention        Past Medical History:   Diagnosis Date    CAD (coronary artery disease)     Chronic combined systolic and diastolic CHF (congestive heart failure) (HCC)     Diabetes mellitus (HCC)     type 2    Dyslipidemia     Hypertension     Ischemic cardiomyopathy     MI (myocardial infarction) (HCC)     Neuropathy     Osteomyelitis (HCC)     Pancreatitis      Social History     Socioeconomic History    Marital status: /Civil Union     Spouse name: Not on file    Number of children: Not on file    Years of  education: Not on file    Highest education level: Not on file   Occupational History    Not on file   Tobacco Use    Smoking status: Never    Smokeless tobacco: Never   Vaping Use    Vaping status: Never Used   Substance and Sexual Activity    Alcohol use: Not Currently     Comment: 20 years of sobriety    Drug use: Never    Sexual activity: Yes     Partners: Female     Birth control/protection: None   Other Topics Concern    Not on file   Social History Narrative    Not on file     Social Determinants of Health     Financial Resource Strain: Not on file   Food Insecurity: No Food Insecurity (5/13/2022)    Hunger Vital Sign     Worried About Running Out of Food in the Last Year: Never true     Ran Out of Food in the Last Year: Never true   Transportation Needs: No Transportation Needs (5/27/2022)    PRAPARE - Transportation     Lack of Transportation (Medical): No     Lack of Transportation (Non-Medical): No   Physical Activity: Not on file   Stress: Not on file   Social Connections: Not on file   Intimate Partner Violence: Not on file   Housing Stability: Low Risk  (5/27/2022)    Housing Stability Vital Sign     Unable to Pay for Housing in the Last Year: No     Number of Places Lived in the Last Year: 1     Unstable Housing in the Last Year: No      Family History   Problem Relation Age of Onset    Heart attack Father     Hyperlipidemia Father     Cancer Father     Diabetes Father     Diabetes Mother     Heart failure Mother         poss    Kidney disease Mother     Cancer Paternal Grandfather     Stroke Neg Hx     Anuerysm Neg Hx     Clotting disorder Neg Hx     Arrhythmia Neg Hx     Coronary artery disease Neg Hx     Hypertension Neg Hx     Sudden death Neg Hx         scd     Past Surgical History:   Procedure Laterality Date    ANGIOPLASTY      ballon    CORONARY ANGIOPLASTY WITH STENT PLACEMENT      LULA to proximal and mid LAD, Proximal RCA.     FOREIGN BODY REMOVAL Left 5/26/2022    Procedure: REMOVAL  FOREIGN BODY EXTREMITY;  Surgeon: Heron Herrera DPM;  [facility];  Service: Podiatry    HERNIA REPAIR      INCISION AND DRAINAGE OF WOUND Left 2/14/2020    Procedure: INCISION AND DRAINAGE (I&D) EXTREMITY left foot (cpt 08570);  Surgeon: Kermit Chau DPM;  [facility];  Service: Podiatry    PA AMPUTATION FOOT TRANSMETARSAL Left 5/26/2022    Procedure: AMPUTATION left 5th metatarsal;  Surgeon: Heron Herrera DPM;  [facility];  Service: Podiatry    PA EXPLORATION PENETRATING WOUND SPX EXTREMITY Left 2/14/2020    Procedure: EXPLORATION EXTREMITY;  Surgeon: Kermit Chau DPM;  [facility];  Service: Podiatry    PA NEGATIVE PRESSURE WOUND THERAPY DME <= 50 SQ CM Left 2/14/2020    Procedure: APPLICATION VAC DRESSING;  Surgeon: Kermit Chau DPM;  [facility];  Service: Podiatry    TOE AMPUTATION Left 5/14/2022    Procedure: EXCISION OF LEFT 5TH TOE ULCER WITH FILET FLAP;  Surgeon: Heron Herrera DPM;  [facility];  Service: Podiatry       Current Outpatient Medications:     apixaban (Eliquis) 5 mg, Take 1 tablet (5 mg total) by mouth 2 (two) times a day, Disp: 180 tablet, Rfl: 3    atorvastatin (LIPITOR) 40 mg tablet, TAKE 1 TABLET BY MOUTH EVERY DAY, Disp: 90 tablet, Rfl: 3    Blood Pressure Monitoring (CVS Blood Pressure Monitor) KIT, Use 2 (two) times a day Automatic BP cuff. Measure BP twice daily., Disp: 1 kit, Rfl: 0    Continuous Glucose Sensor (FreeStyle Jess 2 Sensor) [facility] [place], REPLACE SENSOR EVERY 14 DAYS, Disp: , Rfl:     furosemide (LASIX) 40 mg tablet, Take 1 tablet (40 mg total) by mouth 2 (two) times a day, Disp: 180 tablet, Rfl: 3    hydrALAZINE (APRESOLINE) 10 mg tablet, Take 1 tablet (10 mg total) by mouth 2 (two) times a day, Disp: 180 tablet, Rfl: 3    insulin glargine (LANTUS) 100 units/mL subcutaneous injection, Inject 30 Units under the skin every morning, Disp: , Rfl:     insulin NPH-insulin regular (NovoLIN 70/30) 100 units/mL  subcutaneous injection, Inject under the skin, Disp: , Rfl:     isosorbide dinitrate (ISORDIL) 5 mg tablet, Take 1 tablet (5 mg total) by mouth 2 (two) times a day, Disp: 180 tablet, Rfl: 3    latanoprost (XALATAN) 0.005 % ophthalmic solution, Administer 0.0005 drops to both eyes daily at bedtime, Disp: , Rfl:     levothyroxine 25 mcg tablet, Take 25 mcg by mouth daily, Disp: , Rfl:     metoprolol succinate (TOPROL-XL) 50 mg 24 hr tablet, Take 1 tablet (50 mg total) by mouth 2 (two) times a day, Disp: 180 tablet, Rfl: 3    pantoprazole (PROTONIX) 40 mg tablet, Take 1 tablet (40 mg total) by mouth daily in the early morning, Disp: 30 tablet, Rfl: 0    potassium chloride (Klor-Con M20) 20 mEq tablet, TAKE 1 TABLET BY MOUTH 2 TIMES A DAY., Disp: 180 tablet, Rfl: 1    Unifine Pentips 31G X 8 MM MISC, , Disp: , Rfl:   No Known Allergies    Labs:     Chemistry        Component Value Date/Time     06/25/2015 1038    K 4.4 09/10/2024 0709    K 4.3 06/25/2015 1038     09/10/2024 0709     06/25/2015 1038    CO2 28 09/10/2024 0709    CO2 27 06/25/2015 1038    BUN 43 (H) 09/10/2024 0709    BUN 30 (H) 06/25/2015 1038    CREATININE 2.42 (H) 09/10/2024 0709    CREATININE 1.16 06/25/2015 1038        Component Value Date/Time    CALCIUM 8.6 09/10/2024 0709    CALCIUM 8.7 06/25/2015 1038    ALKPHOS 76 09/10/2024 0709    ALKPHOS 61 06/25/2015 1038    AST 12 (L) 09/10/2024 0709    AST 15 06/25/2015 1038    ALT 11 09/10/2024 0709    ALT 22 06/25/2015 1038    BILITOT 1.3 (H) 06/25/2015 1038            Lab Results   Component Value Date    CHOL 98 06/25/2015    CHOL 102 01/29/2015    CHOL 150 12/18/2014     Lab Results   Component Value Date    HDL 36 (L) 05/20/2024    HDL 64 04/06/2022    HDL 56 06/01/2021     Lab Results   Component Value Date    LDLCALC 34 05/20/2024    LDLCALC 42 04/06/2022    LDLCALC 50 06/01/2021     Lab Results   Component Value Date    TRIG 84 05/20/2024    TRIG 38 04/06/2022    TRIG 43  "06/01/2021     No results found for: \"CHOLHDL\"    Imaging: No results found.      Review of Systems   Cardiovascular:  Negative for chest pain, claudication, dyspnea on exertion, leg swelling, near-syncope, orthopnea, palpitations and paroxysmal nocturnal dyspnea.   Musculoskeletal:  Positive for arthritis.   All other systems reviewed and are negative.      Vitals:    11/06/24 1255   BP: 132/70   Pulse: 74   SpO2: 99%     Vitals:    11/06/24 1255   Weight: 85.5 kg (188 lb 9.6 oz)         Body mass index is 27.06 kg/m².    Physical Exam:  General appearance:  Appears stated age, alert, well appearing and in no distress  HEENT:  PERRLA, EOMI, no scleral icterus, no conjunctival pallor  NECK:  Supple, No elevated JVP, no thyromegaly, no carotid bruits  HEART:  Regular rate and rhythm, normal S1/S2, no S3/S4, no murmur   LUNGS:  Clear to auscultation bilaterally  ABDOMEN:  Soft, non-tender, positive bowel sounds, no rebound or guarding, no organomegaly   EXTREMITIES: Left lower extremity edema with chronic venous stasis changes  SKIN: No lesions or rashes on exposed skin  NEURO:  CN II-XII intact, no focal deficits    "

## 2024-11-06 NOTE — ASSESSMENT & PLAN NOTE
-  EF remains impaired despite restoring normal sinus rhythm  -  On GDMT with metoprolol albeit tartrate  -  On no ACE/ARB/ARNi/Jardiance in setting of advanced CKD  -  Agreeable to addition of Isordil/hydralazine - reports he does not think he      would remember TID dosing hence BID prescribed  -  Transition metoprolol tartrate to succinate 50 twice daily  -  Will reassess EF with echo in three months, discussed AICD if EF remains low

## 2024-11-06 NOTE — ASSESSMENT & PLAN NOTE
-  Maintaining normal sinus rhythm  -  Continue current AV sun blocking regimen and Eliquis for anticoagulation

## 2024-11-06 NOTE — ASSESSMENT & PLAN NOTE
-  LDL at goal on recent lipid panel  -  Low HDL for which therapeutic lifestyle modification recommended

## 2024-11-06 NOTE — ASSESSMENT & PLAN NOTE
-  No symptoms of angina or no anginal equivalents  -  Recent stress test with LAD territory scar, no ischemia  -  On no aspirin in setting of anticoagulant use  -  On beta-blocker and statin therapy  -  LDL at goal on most recent lipid panel

## 2024-12-12 ENCOUNTER — TELEPHONE (OUTPATIENT)
Dept: NEPHROLOGY | Facility: HOSPITAL | Age: 64
End: 2024-12-12

## 2024-12-12 NOTE — TELEPHONE ENCOUNTER
Spoke to pt reminding to have labs completed prior to upcoming visit - pt stated will have done on Monday

## 2024-12-16 ENCOUNTER — APPOINTMENT (OUTPATIENT)
Dept: LAB | Facility: CLINIC | Age: 64
End: 2024-12-16
Payer: COMMERCIAL

## 2024-12-16 DIAGNOSIS — E11.42 DIABETIC POLYNEUROPATHY ASSOCIATED WITH TYPE 2 DIABETES MELLITUS (HCC): ICD-10-CM

## 2024-12-16 DIAGNOSIS — R80.1 PERSISTENT PROTEINURIA: ICD-10-CM

## 2024-12-16 DIAGNOSIS — N18.4 STAGE 4 CHRONIC KIDNEY DISEASE (HCC): ICD-10-CM

## 2024-12-16 LAB
ANION GAP SERPL CALCULATED.3IONS-SCNC: 8 MMOL/L (ref 4–13)
BACTERIA UR QL AUTO: ABNORMAL /HPF
BILIRUB UR QL STRIP: NEGATIVE
BUN SERPL-MCNC: 47 MG/DL (ref 5–25)
CALCIUM SERPL-MCNC: 8.6 MG/DL (ref 8.4–10.2)
CHLORIDE SERPL-SCNC: 101 MMOL/L (ref 96–108)
CLARITY UR: CLEAR
CO2 SERPL-SCNC: 29 MMOL/L (ref 21–32)
COLOR UR: COLORLESS
CREAT SERPL-MCNC: 2.28 MG/DL (ref 0.6–1.3)
CREAT UR-MCNC: 28.3 MG/DL
CREAT UR-MCNC: 29.6 MG/DL
GFR SERPL CREATININE-BSD FRML MDRD: 28 ML/MIN/1.73SQ M
GLUCOSE P FAST SERPL-MCNC: 135 MG/DL (ref 65–99)
GLUCOSE UR STRIP-MCNC: NEGATIVE MG/DL
HGB UR QL STRIP.AUTO: NEGATIVE
KETONES UR STRIP-MCNC: NEGATIVE MG/DL
LEUKOCYTE ESTERASE UR QL STRIP: NEGATIVE
MICROALBUMIN UR-MCNC: 230.5 MG/L
MICROALBUMIN/CREAT 24H UR: 814 MG/G CREATININE (ref 0–30)
NITRITE UR QL STRIP: NEGATIVE
NON-SQ EPI CELLS URNS QL MICRO: ABNORMAL /HPF
PH UR STRIP.AUTO: 6 [PH]
POTASSIUM SERPL-SCNC: 3.8 MMOL/L (ref 3.5–5.3)
PROT UR STRIP-MCNC: ABNORMAL MG/DL
PROT UR-MCNC: 43.6 MG/DL
PROT/CREAT UR: 1.5 MG/G{CREAT} (ref 0–0.1)
RBC #/AREA URNS AUTO: ABNORMAL /HPF
SODIUM SERPL-SCNC: 138 MMOL/L (ref 135–147)
SP GR UR STRIP.AUTO: 1.01 (ref 1–1.03)
UROBILINOGEN UR STRIP-ACNC: <2 MG/DL
WBC #/AREA URNS AUTO: ABNORMAL /HPF

## 2024-12-16 PROCEDURE — 82570 ASSAY OF URINE CREATININE: CPT

## 2024-12-16 PROCEDURE — 36415 COLL VENOUS BLD VENIPUNCTURE: CPT

## 2024-12-16 PROCEDURE — 80048 BASIC METABOLIC PNL TOTAL CA: CPT

## 2024-12-16 PROCEDURE — 84156 ASSAY OF PROTEIN URINE: CPT

## 2024-12-16 PROCEDURE — 81001 URINALYSIS AUTO W/SCOPE: CPT

## 2024-12-16 PROCEDURE — 82043 UR ALBUMIN QUANTITATIVE: CPT

## 2024-12-17 ENCOUNTER — RESULTS FOLLOW-UP (OUTPATIENT)
Dept: NEPHROLOGY | Facility: HOSPITAL | Age: 64
End: 2024-12-17

## 2024-12-18 ENCOUNTER — OFFICE VISIT (OUTPATIENT)
Dept: NEPHROLOGY | Facility: CLINIC | Age: 64
End: 2024-12-18
Payer: COMMERCIAL

## 2024-12-18 VITALS
WEIGHT: 188 LBS | BODY MASS INDEX: 26.92 KG/M2 | OXYGEN SATURATION: 99 % | HEIGHT: 70 IN | HEART RATE: 66 BPM | SYSTOLIC BLOOD PRESSURE: 138 MMHG | DIASTOLIC BLOOD PRESSURE: 78 MMHG

## 2024-12-18 DIAGNOSIS — E55.9 VITAMIN D INSUFFICIENCY: ICD-10-CM

## 2024-12-18 DIAGNOSIS — R80.1 PERSISTENT PROTEINURIA: ICD-10-CM

## 2024-12-18 DIAGNOSIS — I50.20 HFREF (HEART FAILURE WITH REDUCED EJECTION FRACTION) (HCC): ICD-10-CM

## 2024-12-18 DIAGNOSIS — I10 BENIGN ESSENTIAL HYPERTENSION: ICD-10-CM

## 2024-12-18 DIAGNOSIS — E11.22 CKD STAGE 4 DUE TO TYPE 2 DIABETES MELLITUS (HCC): Primary | ICD-10-CM

## 2024-12-18 DIAGNOSIS — N18.4 CKD STAGE 4 DUE TO TYPE 2 DIABETES MELLITUS (HCC): Primary | ICD-10-CM

## 2024-12-18 PROCEDURE — 99214 OFFICE O/P EST MOD 30 MIN: CPT | Performed by: INTERNAL MEDICINE

## 2024-12-18 NOTE — ASSESSMENT & PLAN NOTE
Proteinuria likely d/t diabetic nephropathy in setting of uncontrolled diabetes - microalb:Cr was as high as 3.4g in March 2019, now down to 1.5g as of 12/16/24 with microalb:Cr 814mg/g and stable - monitor this off ACEi  -anti PLA2R, dsDNA, HIV, RPR, complements, SPEP/UPEP/FLC, antiGBM, chronic hep panel, ANCA panel, LISA negative  -cryo + in setting of cirrhosis, RF - negative when repeated in Jan. 2022

## 2024-12-18 NOTE — ASSESSMENT & PLAN NOTE
- BP well controlled in office  -on lasix 40mg twice daily, isordil 5mg twice daily, metoprolol 50mg twice daily, hydralazine 10mg twice daily  -off lisinopril 5mg daily   -avoid high salt/sodium diet  -avoid caffeinated beverages

## 2024-12-18 NOTE — ASSESSMENT & PLAN NOTE
- may take 1000u daily over the counter vitamin D as last level 25.5(low) as of 10/17/22, vitamin D level 30.1 as of 9/10/24

## 2024-12-18 NOTE — ASSESSMENT & PLAN NOTE
CKD stage 4 in setting of diabetes, hypertensive nephrosclerosis, ischemic cardiomyopathy as well as cirrhosis and diuretic use  -sCr 2.28 with eGFR mid to high 20s and stable.  -suspect progressive CKD in part due to uncontrolled blood sugars although improved as of late, A1C 7.1 as of May 20, 2024  -b/l sCr appears to be low 2s after episode of LOBO with peak sCr  -monitor BMP  -recent UA showed 1+ protein, UpCr 1.5g as of 12/16/24  -need ongoing glycemic control to slow down progression of CKD  -has been to CKD education class  -CKD education booklet provided previously  -renal ultrasound showed normal cortical echogenicity  -would benefit from improved glycemic control

## 2024-12-18 NOTE — ASSESSMENT & PLAN NOTE
-Ischemic cardiomyopathy with EF 35% - diuretic as above, monitor daily weight, follows with cardiology outpatient, Dr. Figueroa   -on potassium supplement 20meq twice daily, potassium 3.8 on last check  -to have echo Feb. 2025

## 2024-12-18 NOTE — PROGRESS NOTES
NEPHROLOGY OUTPATIENT PROGRESS NOTE   Noel Khan 64 y.o. male MRN: [id number]  DATE: 12/18/2024  Reason for visit:   Chief Complaint   Patient presents with    Follow-up    Chronic Kidney Disease        Patient Instructions   CKD stage 4 due to type 2 diabetes mellitus (HCC)  CKD stage 4 in setting of diabetes, hypertensive nephrosclerosis, ischemic cardiomyopathy as well as cirrhosis and diuretic use  -sCr 2.28 with eGFR mid to high 20s and stable.  -suspect progressive CKD in part due to uncontrolled blood sugars although improved as of late, A1C 7.1 as of May 20, 2024  -b/l sCr appears to be low 2s after episode of LOBO with peak sCr  -monitor BMP  -recent UA showed 1+ protein, UpCr 1.5g as of 12/16/24  -need ongoing glycemic control to slow down progression of CKD  -has been to CKD education class  -CKD education booklet provided previously  -renal ultrasound showed normal cortical echogenicity  -would benefit from improved glycemic control  Benign essential hypertension  - BP well controlled in office  -on lasix 40mg twice daily, isordil 5mg twice daily, metoprolol 50mg twice daily, hydralazine 10mg twice daily  -off lisinopril 5mg daily   -avoid high salt/sodium diet  -avoid caffeinated beverages  HFrEF (heart failure with reduced ejection fraction) (HCC)  -Ischemic cardiomyopathy with EF 35% - diuretic as above, monitor daily weight, follows with cardiology outpatient, Dr. Figueroa   -on potassium supplement 20meq twice daily, potassium 3.8 on last check  -to have echo Feb. 2025    Persistent proteinuria  Proteinuria likely d/t diabetic nephropathy in setting of uncontrolled diabetes - microalb:Cr was as high as 3.4g in March 2019, now down to 1.5g as of 12/16/24 with microalb:Cr 814mg/g and stable - monitor this off ACEi  -anti PLA2R, dsDNA, HIV, RPR, complements, SPEP/UPEP/FLC, antiGBM, chronic hep panel, ANCA panel, LISA negative  -cryo + in setting of cirrhosis, RF - negative when repeated in Jan.  2022  Vitamin D insufficiency   - may take 1000u daily over the counter vitamin D as last level 25.5(low) as of 10/17/22, vitamin D level 30.1 as of 9/10/24     RTC in 4 months.  Obtain blood and urine testing before next visit.      Assessment & Plan  CKD stage 4 due to type 2 diabetes mellitus (HCC)  CKD stage 4 in setting of diabetes, hypertensive nephrosclerosis, ischemic cardiomyopathy as well as cirrhosis and diuretic use  -sCr 2.28 with eGFR mid to high 20s and stable.  -suspect progressive CKD in part due to uncontrolled blood sugars although improved as of late, A1C 7.1 as of May 20, 2024  -b/l sCr appears to be low 2s after episode of LOBO with peak sCr  -monitor BMP  -recent UA showed 1+ protein, UpCr 1.5g as of 12/16/24  -need ongoing glycemic control to slow down progression of CKD  -has been to CKD education class  -CKD education booklet provided previously  -renal ultrasound showed normal cortical echogenicity  -would benefit from improved glycemic control  Benign essential hypertension  - BP well controlled in office  -on lasix 40mg twice daily, isordil 5mg twice daily, metoprolol 50mg twice daily, hydralazine 10mg twice daily  -off lisinopril 5mg daily   -avoid high salt/sodium diet  -avoid caffeinated beverages  HFrEF (heart failure with reduced ejection fraction) (HCC)  -Ischemic cardiomyopathy with EF 35% - diuretic as above, monitor daily weight, follows with cardiology outpatient, Dr. Figueroa   -on potassium supplement 20meq twice daily, potassium 3.8 on last check  -to have echo Feb. 2025    Persistent proteinuria  Proteinuria likely d/t diabetic nephropathy in setting of uncontrolled diabetes - microalb:Cr was as high as 3.4g in March 2019, now down to 1.5g as of 12/16/24 with microalb:Cr 814mg/g and stable - monitor this off ACEi  -anti PLA2R, dsDNA, HIV, RPR, complements, SPEP/UPEP/FLC, antiGBM, chronic hep panel, ANCA panel, LISA negative  -cryo + in setting of cirrhosis, RF - negative when  "repeated in Jan. 2022  Vitamin D insufficiency   - may take 1000u daily over the counter vitamin D as last level 25.5(low) as of 10/17/22, vitamin D level 30.1 as of 9/10/24     RTC in 4 months.    SUBJECTIVE / INTERVAL HISTORY:  64 y.o. male presents in follow up of CKD.     Noel Khan denies any recent illness/hospitalizations/medication changes since last office visit.    Denies NSAID use  Blood sugars have been erratic with some episodes of hypoglycemia.  BP usually good.  Weight has been stable. Denies leg edema.    Review of Systems   Constitutional:  Negative for chills and fever.   HENT:  Negative for sore throat.    Eyes:  Negative for visual disturbance.   Respiratory:  Negative for cough and shortness of breath.    Cardiovascular:  Negative for chest pain and leg swelling.   Gastrointestinal:  Negative for abdominal pain, constipation, diarrhea, nausea and vomiting.   Endocrine: Negative for polyuria.   Genitourinary:  Negative for decreased urine volume, difficulty urinating, dysuria and hematuria.   Musculoskeletal:  Negative for back pain and myalgias.   Skin:  Negative for rash.   Neurological:  Negative for dizziness, light-headedness and numbness.   Psychiatric/Behavioral:  Negative for confusion.        OBJECTIVE:  /78 (BP Location: Left arm, Patient Position: Sitting, Cuff Size: Adult)   Pulse 66   Ht 5' 10\" (1.778 m)   Wt 85.3 kg (188 lb)   SpO2 99%   BMI 26.98 kg/m²  Body mass index is 26.98 kg/m².    Physical exam:  Physical Exam  Vitals reviewed.   Constitutional:       General: He is not in acute distress.     Appearance: Normal appearance. He is well-developed. He is not diaphoretic.   HENT:      Head: Normocephalic and atraumatic.      Nose: Nose normal.      Mouth/Throat:      Mouth: Mucous membranes are moist.      Pharynx: No oropharyngeal exudate.   Eyes:      General: No scleral icterus.        Right eye: No discharge.         Left eye: No discharge.      Comments: " eyeglasses   Neck:      Thyroid: No thyromegaly.   Cardiovascular:      Rate and Rhythm: Normal rate and regular rhythm.      Heart sounds: Normal heart sounds.   Pulmonary:      Effort: Pulmonary effort is normal.      Breath sounds: Normal breath sounds. No wheezing or rales.   Abdominal:      General: Bowel sounds are normal. There is no distension.      Palpations: Abdomen is soft.      Tenderness: There is no abdominal tenderness.   Musculoskeletal:         General: Swelling (LLE) present. Normal range of motion.      Cervical back: Neck supple.   Lymphadenopathy:      Cervical: No cervical adenopathy.   Skin:     General: Skin is warm and dry.      Coloration: Skin is not jaundiced.      Findings: No rash.   Neurological:      General: No focal deficit present.      Mental Status: He is alert.      Comments: awake   Psychiatric:         Mood and Affect: Mood normal.         Behavior: Behavior normal.         Medications:    Current Outpatient Medications:     apixaban (Eliquis) 5 mg, Take 1 tablet (5 mg total) by mouth 2 (two) times a day, Disp: 180 tablet, Rfl: 3    atorvastatin (LIPITOR) 40 mg tablet, TAKE 1 TABLET BY MOUTH EVERY DAY, Disp: 90 tablet, Rfl: 3    Blood Pressure Monitoring ([employer] Blood Pressure Monitor) KIT, Use 2 (two) times a day Automatic BP cuff. Measure BP twice daily., Disp: 1 kit, Rfl: 0    Continuous Glucose Sensor (FreeStyle Jess 2 Sensor) [facility] [place], REPLACE SENSOR EVERY 14 DAYS, Disp: , Rfl:     furosemide (LASIX) 40 mg tablet, Take 1 tablet (40 mg total) by mouth 2 (two) times a day, Disp: 180 tablet, Rfl: 3    hydrALAZINE (APRESOLINE) 10 mg tablet, Take 1 tablet (10 mg total) by mouth 2 (two) times a day, Disp: 180 tablet, Rfl: 3    insulin glargine (LANTUS) 100 units/mL subcutaneous injection, Inject 30 Units under the skin every morning, Disp: , Rfl:     insulin NPH-insulin regular (NovoLIN 70/30) 100 units/mL subcutaneous injection, Inject under the skin if needed, Disp: , Rfl:      "isosorbide dinitrate (ISORDIL) 5 mg tablet, Take 1 tablet (5 mg total) by mouth 2 (two) times a day, Disp: 180 tablet, Rfl: 3    latanoprost (XALATAN) 0.005 % ophthalmic solution, Administer 0.0005 drops to both eyes daily at bedtime, Disp: , Rfl:     levothyroxine 25 mcg tablet, Take 25 mcg by mouth daily, Disp: , Rfl:     metoprolol succinate (TOPROL-XL) 50 mg 24 hr tablet, Take 1 tablet (50 mg total) by mouth 2 (two) times a day, Disp: 180 tablet, Rfl: 3    pantoprazole (PROTONIX) 40 mg tablet, Take 1 tablet (40 mg total) by mouth daily in the early morning, Disp: 30 tablet, Rfl: 0    potassium chloride (Klor-Con M20) 20 mEq tablet, TAKE 1 TABLET BY MOUTH 2 TIMES A DAY., Disp: 180 tablet, Rfl: 1    Unifine Pentips 31G X 8 MM MISC, , Disp: , Rfl:     Allergies:  Allergies as of 12/18/2024    (No Known Allergies)       The following portions of the patient's history were reviewed and updated as appropriate: past family history, past surgical history and problem list.    Laboratory Results:  Lab Results   Component Value Date    SODIUM 138 12/16/2024    K 3.8 12/16/2024     12/16/2024    CO2 29 12/16/2024    BUN 47 (H) 12/16/2024    CREATININE 2.28 (H) 12/16/2024    GLUC 168 (H) 09/03/2024    CALCIUM 8.6 12/16/2024        Lab Results   Component Value Date    .9 (H) 10/17/2022    CALCIUM 8.6 12/16/2024    PHOS 3.0 06/03/2024       Portions of the record may have been created with voice recognition software.  Occasional wrong word or \"sound a like\" substitutions may have occurred due to the inherent limitations of voice recognition software.  Read the chart carefully and recognize, using context, where substitutions have occurred.  "

## 2024-12-18 NOTE — PATIENT INSTRUCTIONS
CKD stage 4 due to type 2 diabetes mellitus (HCC)  CKD stage 4 in setting of diabetes, hypertensive nephrosclerosis, ischemic cardiomyopathy as well as cirrhosis and diuretic use  -sCr 2.28 with eGFR mid to high 20s and stable.  -suspect progressive CKD in part due to uncontrolled blood sugars although improved as of late, A1C 7.1 as of May 20, 2024  -b/l sCr appears to be low 2s after episode of LOOB with peak sCr  -monitor BMP  -recent UA showed 1+ protein, UpCr 1.5g as of 12/16/24  -need ongoing glycemic control to slow down progression of CKD  -has been to CKD education class  -CKD education booklet provided previously  -renal ultrasound showed normal cortical echogenicity  -would benefit from improved glycemic control  Benign essential hypertension  - BP well controlled in office  -on lasix 40mg twice daily, isordil 5mg twice daily, metoprolol 50mg twice daily, hydralazine 10mg twice daily  -off lisinopril 5mg daily   -avoid high salt/sodium diet  -avoid caffeinated beverages  HFrEF (heart failure with reduced ejection fraction) (HCC)  -Ischemic cardiomyopathy with EF 35% - diuretic as above, monitor daily weight, follows with cardiology outpatient, Dr. Figueroa   -on potassium supplement 20meq twice daily, potassium 3.8 on last check  -to have echo Feb. 2025    Persistent proteinuria  Proteinuria likely d/t diabetic nephropathy in setting of uncontrolled diabetes - microalb:Cr was as high as 3.4g in March 2019, now down to 1.5g as of 12/16/24 with microalb:Cr 814mg/g and stable - monitor this off ACEi  -anti PLA2R, dsDNA, HIV, RPR, complements, SPEP/UPEP/FLC, antiGBM, chronic hep panel, ANCA panel, LISA negative  -cryo + in setting of cirrhosis, RF - negative when repeated in Jan. 2022  Vitamin D insufficiency   - may take 1000u daily over the counter vitamin D as last level 25.5(low) as of 10/17/22, vitamin D level 30.1 as of 9/10/24     RTC in 4 months.  Obtain blood and urine testing before next visit.

## 2025-01-13 DIAGNOSIS — I50.42 CHRONIC COMBINED SYSTOLIC AND DIASTOLIC CHF (CONGESTIVE HEART FAILURE) (HCC): ICD-10-CM

## 2025-01-13 RX ORDER — POTASSIUM CHLORIDE 1500 MG/1
20 TABLET, EXTENDED RELEASE ORAL 2 TIMES DAILY
Qty: 180 TABLET | Refills: 1 | Status: SHIPPED | OUTPATIENT
Start: 2025-01-13

## 2025-01-15 ENCOUNTER — OFFICE VISIT (OUTPATIENT)
Dept: PODIATRY | Facility: CLINIC | Age: 65
End: 2025-01-15
Payer: COMMERCIAL

## 2025-01-15 VITALS
WEIGHT: 194 LBS | SYSTOLIC BLOOD PRESSURE: 136 MMHG | HEART RATE: 69 BPM | BODY MASS INDEX: 27.77 KG/M2 | HEIGHT: 70 IN | DIASTOLIC BLOOD PRESSURE: 76 MMHG | OXYGEN SATURATION: 99 %

## 2025-01-15 DIAGNOSIS — B35.1 ONYCHOMYCOSIS: Primary | ICD-10-CM

## 2025-01-15 DIAGNOSIS — E11.42 DIABETIC POLYNEUROPATHY ASSOCIATED WITH TYPE 2 DIABETES MELLITUS (HCC): ICD-10-CM

## 2025-01-15 DIAGNOSIS — Z89.422 HISTORY OF AMPUTATION OF LESSER TOE OF LEFT FOOT (HCC): ICD-10-CM

## 2025-01-15 PROCEDURE — 11721 DEBRIDE NAIL 6 OR MORE: CPT | Performed by: PODIATRIST

## 2025-01-15 PROCEDURE — RECHECK: Performed by: PODIATRIST

## 2025-01-15 NOTE — PROGRESS NOTES
Assessment/Plan:     The patient's clinical examination today is significant for thickened and dystrophic nail plates x 9 with discoloration and subungual debris consistent with onychomycosis.  There are no open lesions. There are no interdigital macerations.  There are no pretrophic changes nor callosities.  The patient has a history of a partial left fifth ray resection secondary to osteomyelitis.  There is mild to moderate +2 pitting edema of the left lower extremity today.  There are no open lesions.  Vibratory and epicritic sensations are diminished bilaterally secondary to his history of peripheral neuropathy.    The pedal nail plates were sharply debrided with a sterile nail clipper x 9.  The pedal nail plates were then mechanically reduced in thickness and girth utilizing a rotary bur.  There are no other acute pedal issues. His most recent hemoglobin A1c was 7.1 in May 2024, prior to this it was 9.8 in October 2023.     Recommend follow-up in 2 to 3 months for continued at risk foot care.        Diagnoses and all orders for this visit:    Onychomycosis    Diabetic polyneuropathy associated with type 2 diabetes mellitus (HCC)    History of amputation of lesser toe of left foot (HCC)          Subjective:     Patient ID: Noel Khan is a 64 y.o. male.    The patient resents today for follow-up at risk diabetic footcare.  He does have a history of a partial left fifth ray amputation.  He has no new issues today other than some recurrent swelling to his left lower extremity.  He notes that he has been increasing his dose of Lasix due to his increased leg edema.        Review of Systems   Constitutional: Negative.    HENT: Negative.     Eyes: Negative.    Respiratory: Negative.     Cardiovascular: Negative.    Endocrine: Negative.    Musculoskeletal: Negative.    Neurological: Negative.    Hematological: Negative.    Psychiatric/Behavioral: Negative.           Objective:     Physical Exam  Vitals reviewed.    Constitutional:       Appearance: Normal appearance.   HENT:      Head: Normocephalic and atraumatic.      Nose: Nose normal.   Eyes:      Conjunctiva/sclera: Conjunctivae normal.      Pupils: Pupils are equal, round, and reactive to light.   Cardiovascular:      Pulses:           Dorsalis pedis pulses are 2+ on the right side and 2+ on the left side.        Posterior tibial pulses are 1+ on the right side and 1+ on the left side.   Pulmonary:      Effort: Pulmonary effort is normal.   Musculoskeletal:      Left Lower Extremity: (History of partial left fifth ray amputation.)  Feet:      Right foot:      Skin integrity: Skin integrity normal.      Toenail Condition: Right toenails are abnormally thick and long. Fungal disease present.     Left foot:      Skin integrity: Skin integrity normal.      Toenail Condition: Left toenails are abnormally thick and long. Fungal disease present.     Comments: The patient's clinical examination today is significant for thickened and dystrophic nail plates x 9 with discoloration and subungual debris consistent with onychomycosis.  There are no open lesions. There are no interdigital macerations.  There are no pretrophic changes nor callosities.  The patient has a history of a partial left fifth ray resection secondary to osteomyelitis.  There is mild to moderate +2 pitting edema of the left lower extremity today.  There are no open lesions.  Vibratory and epicritic sensations are diminished bilaterally secondary to his history of peripheral neuropathy.  Skin:     General: Skin is warm.      Capillary Refill: Capillary refill takes 2 to 3 seconds.   Neurological:      General: No focal deficit present.      Mental Status: He is alert and oriented to person, place, and time.   Psychiatric:         Mood and Affect: Mood normal.         Behavior: Behavior normal.         Thought Content: Thought content normal.

## 2025-02-13 ENCOUNTER — HOSPITAL ENCOUNTER (OUTPATIENT)
Dept: NON INVASIVE DIAGNOSTICS | Facility: CLINIC | Age: 65
Discharge: HOME | End: 2025-02-13
Payer: COMMERCIAL

## 2025-02-13 VITALS
BODY MASS INDEX: 27.77 KG/M2 | WEIGHT: 194 LBS | HEART RATE: 69 BPM | SYSTOLIC BLOOD PRESSURE: 136 MMHG | DIASTOLIC BLOOD PRESSURE: 76 MMHG | HEIGHT: 70 IN

## 2025-02-13 DIAGNOSIS — I25.5 ISCHEMIC CARDIOMYOPATHY: ICD-10-CM

## 2025-02-13 LAB
AV LVOT MEAN GRADIENT: 1 MMHG
AV LVOT PEAK GRADIENT: 1 MMHG
BSA FOR ECHO PROCEDURE: 2.06 M2
DOP CALC LVOT PEAK VEL VTI: 10.87 CM
DOP CALC LVOT PEAK VEL: 0.57 M/S
E WAVE DECELERATION TIME: 196 MS
E/A RATIO: 2.32
FRACTIONAL SHORTENING: 23 (ref 28–44)
INTERVENTRICULAR SEPTUM IN DIASTOLE (PARASTERNAL SHORT AXIS VIEW): 1 CM
INTERVENTRICULAR SEPTUM: 1 CM (ref 0.6–1.1)
LEFT INTERNAL DIMENSION IN SYSTOLE: 4 CM (ref 2.1–4)
LEFT VENTRICLE DIASTOLIC VOLUME (MOD BIPLANE): 138 ML
LEFT VENTRICLE DIASTOLIC VOLUME INDEX (MOD BIPLANE): 67 ML/M2
LEFT VENTRICLE SYSTOLIC VOLUME (MOD BIPLANE): 81 ML
LEFT VENTRICLE SYSTOLIC VOLUME INDEX (MOD BIPLANE): 39.3 ML/M2
LEFT VENTRICULAR INTERNAL DIMENSION IN DIASTOLE: 5.2 CM (ref 3.5–6)
LEFT VENTRICULAR POSTERIOR WALL IN END DIASTOLE: 1 CM
LEFT VENTRICULAR STROKE VOLUME: 57 ML
LV EF BIPLANE MOD: 41 %
LV EF US.2D.A4C+ESTIMATED: 46 %
LVSV (TEICH): 57 ML
MV PEAK A VEL: 0.31 M/S
MV PEAK E VEL: 72 CM/S
MV STENOSIS PRESSURE HALF TIME: 57 MS
MV VALVE AREA P 1/2 METHOD: 3.86
PISA MRMAX VEL: 0.42 M/S
PISA RADIUS: 0.4 CM
RA PRESSURE ESTIMATED: 8 MMHG
RV PSP: 59 MMHG
SL CV LV EF: 40
SL CV PED ECHO LEFT VENTRICLE DIASTOLIC VOLUME (MOD BIPLANE) 2D: 127 ML
SL CV PED ECHO LEFT VENTRICLE SYSTOLIC VOLUME (MOD BIPLANE) 2D: 70 ML
TR MAX PG: 51 MMHG
TR PEAK VELOCITY: 3.6 M/S
TRICUSPID VALVE PEAK REGURGITATION VELOCITY: 3.58 M/S

## 2025-02-13 PROCEDURE — 93308 TTE F-UP OR LMTD: CPT

## 2025-02-13 PROCEDURE — 93308 TTE F-UP OR LMTD: CPT | Performed by: INTERNAL MEDICINE

## 2025-02-13 RX ADMIN — PERFLUTREN 0.4 ML/MIN: 6.52 INJECTION, SUSPENSION INTRAVENOUS at 15:45

## 2025-02-25 ENCOUNTER — APPOINTMENT (OUTPATIENT)
Dept: LAB | Facility: CLINIC | Age: 65
End: 2025-02-25
Payer: COMMERCIAL

## 2025-02-25 ENCOUNTER — TRANSCRIBE ORDERS (OUTPATIENT)
Dept: LAB | Facility: CLINIC | Age: 65
End: 2025-02-25

## 2025-02-25 DIAGNOSIS — B02.23 POSTHERPETIC POLYNEUROPATHY: ICD-10-CM

## 2025-02-25 DIAGNOSIS — E03.9 MYXEDEMA HEART DISEASE: ICD-10-CM

## 2025-02-25 DIAGNOSIS — I51.9 MYXEDEMA HEART DISEASE: ICD-10-CM

## 2025-02-25 DIAGNOSIS — E66.9 OBESITY, UNSPECIFIED CLASS, UNSPECIFIED OBESITY TYPE, UNSPECIFIED WHETHER SERIOUS COMORBIDITY PRESENT: ICD-10-CM

## 2025-02-25 DIAGNOSIS — E11.65 INADEQUATELY CONTROLLED DIABETES MELLITUS (HCC): ICD-10-CM

## 2025-02-25 DIAGNOSIS — N18.4 CKD STAGE 4 DUE TO TYPE 2 DIABETES MELLITUS (HCC): ICD-10-CM

## 2025-02-25 DIAGNOSIS — E11.22 CKD STAGE 4 DUE TO TYPE 2 DIABETES MELLITUS (HCC): ICD-10-CM

## 2025-02-25 DIAGNOSIS — I73.9 PERIPHERAL VASCULAR DISEASE, UNSPECIFIED (HCC): ICD-10-CM

## 2025-02-25 DIAGNOSIS — E16.2 HYPOGLYCEMIA, UNSPECIFIED: ICD-10-CM

## 2025-02-25 DIAGNOSIS — E11.65 INADEQUATELY CONTROLLED DIABETES MELLITUS (HCC): Primary | ICD-10-CM

## 2025-02-25 DIAGNOSIS — E11.22 TYPE 2 DIABETES MELLITUS WITH DIABETIC CHRONIC KIDNEY DISEASE, UNSPECIFIED CKD STAGE, UNSPECIFIED WHETHER LONG TERM INSULIN USE (HCC): ICD-10-CM

## 2025-02-25 DIAGNOSIS — R80.1 PERSISTENT PROTEINURIA: ICD-10-CM

## 2025-02-25 LAB
ALBUMIN SERPL BCG-MCNC: 4 G/DL (ref 3.5–5)
ALP SERPL-CCNC: 82 U/L (ref 34–104)
ALT SERPL W P-5'-P-CCNC: 14 U/L (ref 7–52)
AMYLASE SERPL-CCNC: 48 IU/L (ref 29–103)
ANION GAP SERPL CALCULATED.3IONS-SCNC: 8 MMOL/L (ref 4–13)
AST SERPL W P-5'-P-CCNC: 14 U/L (ref 13–39)
BASOPHILS # BLD AUTO: 0.05 THOUSANDS/ÂΜL (ref 0–0.1)
BASOPHILS NFR BLD AUTO: 1 % (ref 0–1)
BILIRUB SERPL-MCNC: 0.77 MG/DL (ref 0.2–1)
BUN SERPL-MCNC: 43 MG/DL (ref 5–25)
CALCIUM SERPL-MCNC: 8.9 MG/DL (ref 8.4–10.2)
CHLORIDE SERPL-SCNC: 105 MMOL/L (ref 96–108)
CO2 SERPL-SCNC: 28 MMOL/L (ref 21–32)
CREAT SERPL-MCNC: 2.27 MG/DL (ref 0.6–1.3)
CREAT UR-MCNC: 30.3 MG/DL
CREAT UR-MCNC: 31.8 MG/DL
EOSINOPHIL # BLD AUTO: 0.32 THOUSAND/ÂΜL (ref 0–0.61)
EOSINOPHIL NFR BLD AUTO: 5 % (ref 0–6)
ERYTHROCYTE [DISTWIDTH] IN BLOOD BY AUTOMATED COUNT: 14.6 % (ref 11.6–15.1)
EST. AVERAGE GLUCOSE BLD GHB EST-MCNC: 237 MG/DL
GFR SERPL CREATININE-BSD FRML MDRD: 28 ML/MIN/1.73SQ M
GLUCOSE P FAST SERPL-MCNC: 73 MG/DL (ref 65–99)
HBA1C MFR BLD: 9.9 %
HCT VFR BLD AUTO: 36.6 % (ref 36.5–49.3)
HGB BLD-MCNC: 11.7 G/DL (ref 12–17)
IMM GRANULOCYTES # BLD AUTO: 0.02 THOUSAND/UL (ref 0–0.2)
IMM GRANULOCYTES NFR BLD AUTO: 0 % (ref 0–2)
LIPASE SERPL-CCNC: 28 U/L (ref 11–82)
LYMPHOCYTES # BLD AUTO: 1.89 THOUSANDS/ÂΜL (ref 0.6–4.47)
LYMPHOCYTES NFR BLD AUTO: 29 % (ref 14–44)
MAGNESIUM SERPL-MCNC: 1.8 MG/DL (ref 1.9–2.7)
MCH RBC QN AUTO: 27.5 PG (ref 26.8–34.3)
MCHC RBC AUTO-ENTMCNC: 32 G/DL (ref 31.4–37.4)
MCV RBC AUTO: 86 FL (ref 82–98)
MICROALBUMIN UR-MCNC: 173.9 MG/L
MICROALBUMIN/CREAT 24H UR: 547 MG/G CREATININE (ref 0–30)
MONOCYTES # BLD AUTO: 0.56 THOUSAND/ÂΜL (ref 0.17–1.22)
MONOCYTES NFR BLD AUTO: 9 % (ref 4–12)
NEUTROPHILS # BLD AUTO: 3.61 THOUSANDS/ÂΜL (ref 1.85–7.62)
NEUTS SEG NFR BLD AUTO: 56 % (ref 43–75)
NRBC BLD AUTO-RTO: 0 /100 WBCS
PHOSPHATE SERPL-MCNC: 4.5 MG/DL (ref 2.3–4.1)
PLATELET # BLD AUTO: 293 THOUSANDS/UL (ref 149–390)
PMV BLD AUTO: 10.1 FL (ref 8.9–12.7)
POTASSIUM SERPL-SCNC: 4 MMOL/L (ref 3.5–5.3)
PROT SERPL-MCNC: 7.4 G/DL (ref 6.4–8.4)
PROT UR-MCNC: 36.1 MG/DL
PROT/CREAT UR: 1.2 MG/G{CREAT} (ref 0–0.1)
RBC # BLD AUTO: 4.26 MILLION/UL (ref 3.88–5.62)
SODIUM SERPL-SCNC: 141 MMOL/L (ref 135–147)
T4 FREE SERPL-MCNC: 0.93 NG/DL (ref 0.61–1.12)
TSH SERPL DL<=0.05 MIU/L-ACNC: 5.92 UIU/ML (ref 0.45–4.5)
WBC # BLD AUTO: 6.45 THOUSAND/UL (ref 4.31–10.16)

## 2025-02-25 PROCEDURE — 82150 ASSAY OF AMYLASE: CPT

## 2025-02-25 PROCEDURE — 83690 ASSAY OF LIPASE: CPT

## 2025-02-25 PROCEDURE — 84443 ASSAY THYROID STIM HORMONE: CPT

## 2025-02-25 PROCEDURE — 82570 ASSAY OF URINE CREATININE: CPT

## 2025-02-25 PROCEDURE — 84439 ASSAY OF FREE THYROXINE: CPT

## 2025-02-25 PROCEDURE — 80053 COMPREHEN METABOLIC PANEL: CPT

## 2025-02-25 PROCEDURE — 82043 UR ALBUMIN QUANTITATIVE: CPT

## 2025-02-25 PROCEDURE — 85025 COMPLETE CBC W/AUTO DIFF WBC: CPT

## 2025-02-25 PROCEDURE — 84156 ASSAY OF PROTEIN URINE: CPT

## 2025-02-25 PROCEDURE — 83036 HEMOGLOBIN GLYCOSYLATED A1C: CPT

## 2025-02-25 PROCEDURE — 83735 ASSAY OF MAGNESIUM: CPT

## 2025-02-25 PROCEDURE — 36415 COLL VENOUS BLD VENIPUNCTURE: CPT

## 2025-02-25 PROCEDURE — 84100 ASSAY OF PHOSPHORUS: CPT

## 2025-03-17 PROBLEM — I48.0 PAROXYSMAL ATRIAL FIBRILLATION (HCC): Status: ACTIVE | Noted: 2024-05-30

## 2025-03-17 NOTE — ASSESSMENT & PLAN NOTE
-  Maintaining normal sinus rhythm  -  Continue current AV sun blocking regimen and Eliquis for anticoagulation     Coronary artery disease involving native coronary artery of native heart without angina pectoris  -  No symptoms of angina or no anginal equivalents  -  Recent stress test with LAD territory scar, no ischemia  -  On no aspirin in setting of anticoagulant use  -  On beta-blocker and statin therapy  -  LDL at goal on most recent lipid panel, repeat requested     Presence of drug coated stent in right coronary artery     Presence of drug coated stent in LAD coronary artery        Ischemic cardiomyopathy  -  EF remains impaired despite restoring normal sinus rhythm  -  On GDMT with metoprolol albeit tartrate  -  On no ACE/ARB/ARNi/Jardiance in setting of advanced CKD  -  Isordil and hydralazine added after his last visit  -  Transitioned metoprolol tartrate to succinate 50 twice daily at last visit   -  Follow-up echo with improvement of EF to 40%     Dyslipidemia  -  LDL at goal on recent lipid panel  -  Low HDL for which therapeutic lifestyle modification recommended  -  Repeat lipid panel requested

## 2025-03-17 NOTE — PROGRESS NOTES
Cardiology Follow Up    Noel Khan  1960  [id number]  [facility] [place]  [address]  [address]  [place] [address] [phone]    Assessment & Plan  Paroxysmal atrial fibrillation (HCC)  -  Maintaining normal sinus rhythm  -  Continue current AV sun blocking regimen and Eliquis for anticoagulation     Coronary artery disease involving native coronary artery of native heart without angina pectoris  -  No symptoms of angina or no anginal equivalents  -  Recent stress test with LAD territory scar, no ischemia  -  On no aspirin in setting of anticoagulant use  -  On beta-blocker and statin therapy  -  LDL at goal on most recent lipid panel, repeat requested     Presence of drug coated stent in right coronary artery     Presence of drug coated stent in LAD coronary artery        Ischemic cardiomyopathy  -  EF remains impaired despite restoring normal sinus rhythm  -  On GDMT with metoprolol albeit tartrate  -  On no ACE/ARB/ARNi/Jardiance in setting of advanced CKD  -  Isordil and hydralazine added after his last visit  -  Transitioned metoprolol tartrate to succinate 50 twice daily at last visit   -  Follow-up echo with improvement of EF to 40%     Dyslipidemia  -  LDL at goal on recent lipid panel  -  Low HDL for which therapeutic lifestyle modification recommended  -  Repeat lipid panel requested        He will follow-up in the office in six months.     Interval History:  Mr. Khan is a pleasant 64-year-old gentleman who presents to the office today for follow-up.     Since his last visit he has been feeling well.  He has been weighing himself on a daily basis at about 179 to 181 pounds.  He denies any progressive lower extremity edema, paroxysmal nocturnal dyspnea, orthopnea, acute weight gain or increasing abdominal girth.  He attempts to abide by a salt restricted diet.  He denies any exertional chest pain or  progressive shortness of breath with exertion with the activity he performs.  He denies lightheadedness, syncope or presyncope.  He denies any symptoms of palpitations.  He reports denies any bleeding consequences or falls on Eliquis.  He denies symptoms of claudication.    Medical Problems       Problem List       Orthopnea    Elevated troponin    Benign essential hypertension    Insulin-dependent diabetes mellitus    Overview Signed 5/18/2017  6:20 AM by Ayna Strong PA-C     type 1           Lab Results   Component Value Date    HGBA1C 9.9 (H) 02/25/2025         Coronary artery disease    Stage 4 chronic kidney disease (HCC)    Lab Results   Component Value Date    EGFR 28 02/25/2025    EGFR 28 12/16/2024    EGFR 26 09/10/2024    CREATININE 2.27 (H) 02/25/2025    CREATININE 2.28 (H) 12/16/2024    CREATININE 2.42 (H) 09/10/2024         Chronic combined systolic and diastolic CHF    Wt Readings from Last 3 Encounters:   02/13/25 88 kg (194 lb)   01/15/25 88 kg (194 lb)   12/18/24 85.3 kg (188 lb)                 Dyslipidemia    Ischemic cardiomyopathy    Presence of drug coated stent in LAD coronary artery    Presence of drug coated stent in right coronary artery    Dislocation of proximal interphalangeal joint of left little finger    Abscess of tendon of left foot    Overview Signed 2/13/2020 12:47 PM by Kermit Chau DPM     Added automatically from request for surgery [id number]         LLQ pain    Cirrhosis    AVN (avascular necrosis of bone) (HCC)    Presacral mass    Acute-on-chronic kidney injury  (HCC)    Lab Results   Component Value Date    EGFR 28 02/25/2025    EGFR 28 12/16/2024    EGFR 26 09/10/2024    CREATININE 2.27 (H) 02/25/2025    CREATININE 2.28 (H) 12/16/2024    CREATININE 2.42 (H) 09/10/2024         Persistent proteinuria    Preoperative clearance    Anemia    History of amputation of lesser toe of left foot (HCC)    Vitamin D insufficiency    Diabetic polyneuropathy associated with type 2 diabetes  mellitus (HCC)      Lab Results   Component Value Date    HGBA1C 9.9 (H) 02/25/2025         New onset atrial fibrillation (HCC) s/p MELANY CV    Thromboembolic disorder (HCC)    Chronic venous hypertension (idiopathic) with ulcer of left lower extremity (HCC)    Hospital discharge follow-up    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    Urinary retention        Past Medical History:   Diagnosis Date    CAD (coronary artery disease)     Chronic combined systolic and diastolic CHF (congestive heart failure) (HCC)     Diabetes mellitus (HCC)     type 2    Dyslipidemia     Hypertension     Ischemic cardiomyopathy     MI (myocardial infarction) (HCC)     Neuropathy     Osteomyelitis (HCC)     Pancreatitis     Paroxysmal atrial fibrillation (HCC) 05/30/2024     Social History     Socioeconomic History    Marital status: /Civil Union     Spouse name: Not on file    Number of children: Not on file    Years of education: Not on file    Highest education level: Not on file   Occupational History    Not on file   Tobacco Use    Smoking status: Never    Smokeless tobacco: Never   Vaping Use    Vaping status: Never Used   Substance and Sexual Activity    Alcohol use: Not Currently     Comment: 20 years of sobriety    Drug use: Never    Sexual activity: Yes     Partners: Female     Birth control/protection: None   Other Topics Concern    Not on file   Social History Narrative    Not on file     Social Drivers of Health     Financial Resource Strain: Not on file   Food Insecurity: No Food Insecurity (5/13/2022)    Hunger Vital Sign     Worried About Running Out of Food in the Last Year: Never true     Ran Out of Food in the Last Year: Never true   Transportation Needs: No Transportation Needs (5/27/2022)    PRAPARE - Transportation     Lack of Transportation (Medical): No     Lack of Transportation (Non-Medical): No   Physical Activity: Not on file   Stress: Not on file   Social Connections: Not on file   Intimate Partner  Violence: Not on file   Housing Stability: Low Risk  (5/27/2022)    Housing Stability Vital Sign     Unable to Pay for Housing in the Last Year: No     Number of Places Lived in the Last Year: 1     Unstable Housing in the Last Year: No      Family History   Problem Relation Age of Onset    Heart attack Father     Hyperlipidemia Father     Cancer Father     Diabetes Father     Diabetes Mother     Heart failure Mother         poss    Kidney disease Mother     Cancer Paternal Grandfather     Stroke Neg Hx     Anuerysm Neg Hx     Clotting disorder Neg Hx     Arrhythmia Neg Hx     Coronary artery disease Neg Hx     Hypertension Neg Hx     Sudden death Neg Hx         scd     Past Surgical History:   Procedure Laterality Date    ANGIOPLASTY      ballon    CORONARY ANGIOPLASTY WITH STENT PLACEMENT      LULA to proximal and mid LAD, Proximal RCA.     FOREIGN BODY REMOVAL Left 5/26/2022    Procedure: REMOVAL FOREIGN BODY EXTREMITY;  Surgeon: Heron Herrera DPM;  [facility];  Service: Podiatry    HERNIA REPAIR      INCISION AND DRAINAGE OF WOUND Left 2/14/2020    Procedure: INCISION AND DRAINAGE (I&D) EXTREMITY left foot (cpt 48091);  Surgeon: Kermit Chau DPM;  [facility];  Service: Podiatry    NM AMPUTATION FOOT TRANSMETARSAL Left 5/26/2022    Procedure: AMPUTATION left 5th metatarsal;  Surgeon: Heron Herrera DPM;  [facility];  Service: Podiatry    NM EXPLORATION PENETRATING WOUND SPX EXTREMITY Left 2/14/2020    Procedure: EXPLORATION EXTREMITY;  Surgeon: Kermit Chau DPM;  [facility];  Service: Podiatry    NM NEGATIVE PRESSURE WOUND THERAPY DME <= 50 SQ CM Left 2/14/2020    Procedure: APPLICATION VAC DRESSING;  Surgeon: Kermit Chau DPM;  [facility];  Service: Podiatry    TOE AMPUTATION Left 5/14/2022    Procedure: EXCISION OF LEFT 5TH TOE ULCER WITH FILET FLAP;  Surgeon: Heron Herrera DPM;  [facility];  Service: Podiatry       Current Outpatient  Medications:     apixaban (Eliquis) 5 mg, Take 1 tablet (5 mg total) by mouth 2 (two) times a day, Disp: 180 tablet, Rfl: 3    atorvastatin (LIPITOR) 40 mg tablet, TAKE 1 TABLET BY MOUTH EVERY DAY, Disp: 90 tablet, Rfl: 3    Blood Pressure Monitoring (CVS Blood Pressure Monitor) KIT, Use 2 (two) times a day Automatic BP cuff. Measure BP twice daily., Disp: 1 kit, Rfl: 0    Continuous Glucose Sensor (FreeStyle Jess 2 Sensor) MISC, REPLACE SENSOR EVERY 14 DAYS, Disp: , Rfl:     furosemide (LASIX) 40 mg tablet, Take 1 tablet (40 mg total) by mouth 2 (two) times a day, Disp: 180 tablet, Rfl: 3    hydrALAZINE (APRESOLINE) 10 mg tablet, Take 1 tablet (10 mg total) by mouth 2 (two) times a day, Disp: 180 tablet, Rfl: 3    insulin glargine (LANTUS) 100 units/mL subcutaneous injection, Inject 30 Units under the skin every morning, Disp: , Rfl:     insulin NPH-insulin regular (NovoLIN 70/30) 100 units/mL subcutaneous injection, Inject under the skin if needed, Disp: , Rfl:     isosorbide dinitrate (ISORDIL) 5 mg tablet, Take 1 tablet (5 mg total) by mouth 2 (two) times a day, Disp: 180 tablet, Rfl: 3    latanoprost (XALATAN) 0.005 % ophthalmic solution, Administer 0.0005 drops to both eyes daily at bedtime, Disp: , Rfl:     levothyroxine 25 mcg tablet, Take 25 mcg by mouth daily, Disp: , Rfl:     metoprolol succinate (TOPROL-XL) 50 mg 24 hr tablet, Take 1 tablet (50 mg total) by mouth 2 (two) times a day, Disp: 180 tablet, Rfl: 3    pantoprazole (PROTONIX) 40 mg tablet, Take 1 tablet (40 mg total) by mouth daily in the early morning, Disp: 30 tablet, Rfl: 0    potassium chloride (Klor-Con M20) 20 mEq tablet, Take 1 tablet (20 mEq total) by mouth 2 (two) times a day, Disp: 180 tablet, Rfl: 1    Unifine Pentips 31G X 8 MM MISC, , Disp: , Rfl:   No Known Allergies    Labs:     Chemistry        Component Value Date/Time     06/25/2015 1038    K 4.0 02/25/2025 0649    K 4.3 06/25/2015 1038     02/25/2025 0649    CL  "104 06/25/2015 1038    CO2 28 02/25/2025 0649    CO2 27 06/25/2015 1038    BUN 43 (H) 02/25/2025 0649    BUN 30 (H) 06/25/2015 1038    CREATININE 2.27 (H) 02/25/2025 0649    CREATININE 1.16 06/25/2015 1038        Component Value Date/Time    CALCIUM 8.9 02/25/2025 0649    CALCIUM 8.7 06/25/2015 1038    ALKPHOS 82 02/25/2025 0649    ALKPHOS 61 06/25/2015 1038    AST 14 02/25/2025 0649    AST 15 06/25/2015 1038    ALT 14 02/25/2025 0649    ALT 22 06/25/2015 1038    BILITOT 1.3 (H) 06/25/2015 1038            Lab Results   Component Value Date    CHOL 98 06/25/2015    CHOL 102 01/29/2015    CHOL 150 12/18/2014     Lab Results   Component Value Date    HDL 36 (L) 05/20/2024    HDL 64 04/06/2022    HDL 56 06/01/2021     Lab Results   Component Value Date    LDLCALC 34 05/20/2024    LDLCALC 42 04/06/2022    LDLCALC 50 06/01/2021     Lab Results   Component Value Date    TRIG 84 05/20/2024    TRIG 38 04/06/2022    TRIG 43 06/01/2021     No results found for: \"CHOLHDL\"    Imaging: No results found.      Review of Systems   Cardiovascular:  Negative for chest pain, claudication, dyspnea on exertion, leg swelling, near-syncope, orthopnea, palpitations and paroxysmal nocturnal dyspnea.   Musculoskeletal:  Positive for arthritis.   All other systems reviewed and are negative.      There were no vitals filed for this visit.    There were no vitals filed for this visit.        There is no height or weight on file to calculate BMI.    Physical Exam:  General appearance:  Appears stated age, alert, well appearing and in no distress  HEENT:  PERRLA, EOMI, no scleral icterus, no conjunctival pallor  NECK:  Supple, No elevated JVP, no thyromegaly, no carotid bruits  HEART:  Regular rate and rhythm, normal S1/S2, no S3/S4, no murmur   LUNGS:  Clear to auscultation bilaterally  ABDOMEN:  Soft, non-tender, positive bowel sounds, no rebound or guarding, no organomegaly   EXTREMITIES: Left lower extremity edema with chronic venous stasis " changes  SKIN: No lesions or rashes on exposed skin  NEURO:  CN II-XII intact, no focal deficits

## 2025-03-18 ENCOUNTER — OFFICE VISIT (OUTPATIENT)
Dept: CARDIOLOGY CLINIC | Facility: CLINIC | Age: 65
End: 2025-03-18
Payer: COMMERCIAL

## 2025-03-18 VITALS
DIASTOLIC BLOOD PRESSURE: 60 MMHG | BODY MASS INDEX: 26.39 KG/M2 | SYSTOLIC BLOOD PRESSURE: 110 MMHG | WEIGHT: 183.9 LBS | OXYGEN SATURATION: 99 % | HEART RATE: 61 BPM

## 2025-03-18 DIAGNOSIS — Z95.5 PRESENCE OF DRUG COATED STENT IN RIGHT CORONARY ARTERY: ICD-10-CM

## 2025-03-18 DIAGNOSIS — N18.4 CKD STAGE 4 DUE TO TYPE 2 DIABETES MELLITUS (HCC): ICD-10-CM

## 2025-03-18 DIAGNOSIS — Z95.5 PRESENCE OF DRUG COATED STENT IN LAD CORONARY ARTERY: ICD-10-CM

## 2025-03-18 DIAGNOSIS — E78.5 DYSLIPIDEMIA: ICD-10-CM

## 2025-03-18 DIAGNOSIS — I48.0 PAROXYSMAL ATRIAL FIBRILLATION (HCC): Primary | ICD-10-CM

## 2025-03-18 DIAGNOSIS — E11.22 CKD STAGE 4 DUE TO TYPE 2 DIABETES MELLITUS (HCC): ICD-10-CM

## 2025-03-18 DIAGNOSIS — I10 BENIGN ESSENTIAL HYPERTENSION: ICD-10-CM

## 2025-03-18 DIAGNOSIS — I25.10 CORONARY ARTERY DISEASE INVOLVING NATIVE CORONARY ARTERY OF NATIVE HEART WITHOUT ANGINA PECTORIS: ICD-10-CM

## 2025-03-18 DIAGNOSIS — I25.5 ISCHEMIC CARDIOMYOPATHY: ICD-10-CM

## 2025-03-18 DIAGNOSIS — I50.20 HFREF (HEART FAILURE WITH REDUCED EJECTION FRACTION) (HCC): ICD-10-CM

## 2025-03-18 PROCEDURE — 99214 OFFICE O/P EST MOD 30 MIN: CPT | Performed by: INTERNAL MEDICINE

## 2025-03-18 RX ORDER — LEVOTHYROXINE SODIUM 50 UG/1
50 TABLET ORAL DAILY
COMMUNITY

## 2025-04-17 ENCOUNTER — OFFICE VISIT (OUTPATIENT)
Dept: PODIATRY | Facility: CLINIC | Age: 65
End: 2025-04-17
Payer: COMMERCIAL

## 2025-04-17 VITALS — WEIGHT: 185 LBS | BODY MASS INDEX: 26.48 KG/M2 | HEART RATE: 64 BPM | HEIGHT: 70 IN | OXYGEN SATURATION: 98 %

## 2025-04-17 DIAGNOSIS — Z89.422 HISTORY OF AMPUTATION OF LESSER TOE OF LEFT FOOT (HCC): ICD-10-CM

## 2025-04-17 DIAGNOSIS — E11.42 DIABETIC POLYNEUROPATHY ASSOCIATED WITH TYPE 2 DIABETES MELLITUS (HCC): ICD-10-CM

## 2025-04-17 DIAGNOSIS — B35.1 ONYCHOMYCOSIS: Primary | ICD-10-CM

## 2025-04-17 PROCEDURE — 11721 DEBRIDE NAIL 6 OR MORE: CPT | Performed by: PODIATRIST

## 2025-04-17 PROCEDURE — RECHECK: Performed by: PODIATRIST

## 2025-04-17 NOTE — PROGRESS NOTES
:  Assessment & Plan  Onychomycosis         Diabetic polyneuropathy associated with type 2 diabetes mellitus (HCC)    Lab Results   Component Value Date    HGBA1C 9.9 (H) 02/25/2025            History of amputation of lesser toe of left foot ([facility])           The patient's clinical examination today is significant for thickened and dystrophic nail plates x 9 with discoloration and subungual debris consistent with onychomycosis.  There are no open lesions. There are no interdigital macerations.  There are no pretrophic changes nor callosities.  The patient has a history of a partial left fifth ray resection secondary to osteomyelitis.  There is mild to moderate +2 pitting edema of the left lower extremity today.  Vibratory and epicritic sensations are diminished bilaterally secondary to his history of peripheral neuropathy.     The pedal nail plates were sharply debrided with a sterile nail clipper x 9.  The pedal nail plates were then mechanically reduced in thickness and girth utilizing a rotary bur.  There are no other acute pedal issues. His most recent hemoglobin A1c was 9.9 in February 2025, prior to that was 7.1 in May 2024.     Recommend follow-up in 2 to 3 months for continued at risk foot care.     History of Present Illness     Noel Khan is a 65 y.o. male   The patient presents today for follow-up at risk diabetic footcare.  He does have a history of a partial left fifth ray amputation.  He has no new issues today other than some recurrent swelling to his left lower extremity.  He notes that he has been increasing his dose of Lasix due to his increased leg edema.      PAST MEDICAL HISTORY:  Past Medical History:   Diagnosis Date    CAD (coronary artery disease)     Chronic combined systolic and diastolic CHF (congestive heart failure) ([facility])     Diabetes mellitus ([facility])     type 2    Dyslipidemia     Hypertension     Ischemic cardiomyopathy     MI (myocardial infarction) ([facility])     Neuropathy      Osteomyelitis (HCC)     Pancreatitis     Paroxysmal atrial fibrillation (HCC) 05/30/2024       PAST SURGICAL HISTORY:  Past Surgical History:   Procedure Laterality Date    ANGIOPLASTY      ballon    CORONARY ANGIOPLASTY WITH STENT PLACEMENT      LULA to proximal and mid LAD, Proximal RCA.     FOREIGN BODY REMOVAL Left 5/26/2022    Procedure: REMOVAL FOREIGN BODY EXTREMITY;  Surgeon: Heron Herrera DPM;  [facility];  Service: Podiatry    HERNIA REPAIR      INCISION AND DRAINAGE OF WOUND Left 2/14/2020    Procedure: INCISION AND DRAINAGE (I&D) EXTREMITY left foot (cpt 75864);  Surgeon: Kermit Chau DPM;  [facility];  Service: Podiatry    NJ AMPUTATION FOOT TRANSMETARSAL Left 5/26/2022    Procedure: AMPUTATION left 5th metatarsal;  Surgeon: Heron Herrera DPM;  [facility];  Service: Podiatry    NJ EXPLORATION PENETRATING WOUND SPX EXTREMITY Left 2/14/2020    Procedure: EXPLORATION EXTREMITY;  Surgeon: Kermit Chau DPM;  [facility];  Service: Podiatry    NJ NEGATIVE PRESSURE WOUND THERAPY DME <= 50 SQ CM Left 2/14/2020    Procedure: APPLICATION VAC DRESSING;  Surgeon: Kermit Chau DPM;  [facility];  Service: Podiatry    TOE AMPUTATION Left 5/14/2022    Procedure: EXCISION OF LEFT 5TH TOE ULCER WITH FILET FLAP;  Surgeon: Heron Herrera DPM;  [facility];  Service: Podiatry        ALLERGIES:  Patient has no known allergies.    MEDICATIONS:  Current Outpatient Medications   Medication Sig Dispense Refill    apixaban (Eliquis) 5 mg Take 1 tablet (5 mg total) by mouth 2 (two) times a day 180 tablet 3    atorvastatin (LIPITOR) 40 mg tablet TAKE 1 TABLET BY MOUTH EVERY DAY 90 tablet 3    Blood Pressure Monitoring (CVS Blood Pressure Monitor) KIT Use 2 (two) times a day Automatic BP cuff. Measure BP twice daily. 1 kit 0    Continuous Glucose Sensor (FreeStyle Jess 2 Sensor) MISC REPLACE SENSOR EVERY 14 DAYS      furosemide (LASIX) 40 mg tablet Take 1 tablet  (40 mg total) by mouth 2 (two) times a day 180 tablet 3    hydrALAZINE (APRESOLINE) 10 mg tablet Take 1 tablet (10 mg total) by mouth 2 (two) times a day 180 tablet 3    insulin glargine (LANTUS) 100 units/mL subcutaneous injection Inject 30 Units under the skin every morning      insulin NPH-insulin regular (NovoLIN 70/30) 100 units/mL subcutaneous injection Inject under the skin if needed      isosorbide dinitrate (ISORDIL) 5 mg tablet Take 1 tablet (5 mg total) by mouth 2 (two) times a day 180 tablet 3    latanoprost (XALATAN) 0.005 % ophthalmic solution Administer 0.0005 drops to both eyes daily at bedtime      levothyroxine 25 mcg tablet Take 25 mcg by mouth daily      levothyroxine 50 mcg tablet Take 50 mcg by mouth daily      metoprolol succinate (TOPROL-XL) 50 mg 24 hr tablet Take 1 tablet (50 mg total) by mouth 2 (two) times a day 180 tablet 3    pantoprazole (PROTONIX) 40 mg tablet Take 1 tablet (40 mg total) by mouth daily in the early morning 30 tablet 0    potassium chloride (Klor-Con M20) 20 mEq tablet Take 1 tablet (20 mEq total) by mouth 2 (two) times a day 180 tablet 1    Unifine Pentips 31G X 8 MM MISC        No current facility-administered medications for this visit.       SOCIAL HISTORY:  Social History     Socioeconomic History    Marital status: /Civil Union     Spouse name: None    Number of children: None    Years of education: None    Highest education level: None   Occupational History    None   Tobacco Use    Smoking status: Never    Smokeless tobacco: Never   Vaping Use    Vaping status: Never Used   Substance and Sexual Activity    Alcohol use: Not Currently     Comment: 20 years of sobriety    Drug use: Never    Sexual activity: Yes     Partners: Female     Birth control/protection: None   Other Topics Concern    None   Social History Narrative    None     Social Drivers of Health     Financial Resource Strain: Not on file   Food Insecurity: No Food Insecurity (5/13/2022)     "Hunger Vital Sign     Worried About Running Out of Food in the Last Year: Never true     Ran Out of Food in the Last Year: Never true   Transportation Needs: No Transportation Needs (5/27/2022)    PRAPARE - Transportation     Lack of Transportation (Medical): No     Lack of Transportation (Non-Medical): No   Physical Activity: Not on file   Stress: Not on file   Social Connections: Not on file   Intimate Partner Violence: Not on file   Housing Stability: Low Risk  (5/27/2022)    Housing Stability Vital Sign     Unable to Pay for Housing in the Last Year: No     Number of Places Lived in the Last Year: 1     Unstable Housing in the Last Year: No      Review of Systems   Constitutional: Negative.    Cardiovascular:  Negative for chest pain.   Endocrine: Negative.    Skin: Negative.    Psychiatric/Behavioral: Negative.       Objective   Pulse 64   Ht 5' 10\" (1.778 m)   Wt 83.9 kg (185 lb)   SpO2 98%   BMI 26.54 kg/m²      Physical Exam  Constitutional:       Appearance: Normal appearance.   HENT:      Head: Normocephalic and atraumatic.   Cardiovascular:      Pulses:           Dorsalis pedis pulses are 2+ on the right side and 2+ on the left side.        Posterior tibial pulses are 1+ on the right side and 1+ on the left side.   Pulmonary:      Effort: Pulmonary effort is normal.   Feet:      Right foot:      Skin integrity: Dry skin present.      Toenail Condition: Right toenails are abnormally thick and long. Fungal disease present.     Left foot:      Skin integrity: Dry skin present.      Toenail Condition: Left toenails are abnormally thick and long. Fungal disease present.     Comments: The patient's clinical examination today is significant for thickened and dystrophic nail plates x 9 with discoloration and subungual debris consistent with onychomycosis.  There are no open lesions. There are no interdigital macerations.  There are no pretrophic changes nor callosities.  The patient has a history of a partial " left fifth ray resection secondary to osteomyelitis.  There is mild to moderate +2 pitting edema of the left lower extremity today.  Vibratory and epicritic sensations are diminished bilaterally secondary to his history of peripheral neuropathy.  Skin:     General: Skin is warm and dry.      Capillary Refill: Capillary refill takes less than 2 seconds.   Neurological:      General: No focal deficit present.      Mental Status: He is alert and oriented to person, place, and time.   Psychiatric:         Mood and Affect: Mood normal.

## 2025-06-17 ENCOUNTER — TELEPHONE (OUTPATIENT)
Age: 65
End: 2025-06-17

## 2025-06-17 ENCOUNTER — APPOINTMENT (OUTPATIENT)
Dept: LAB | Facility: CLINIC | Age: 65
End: 2025-06-17
Attending: INTERNAL MEDICINE
Payer: COMMERCIAL

## 2025-06-17 DIAGNOSIS — I73.00 RAYNAUD'S DISEASE WITHOUT GANGRENE: ICD-10-CM

## 2025-06-17 DIAGNOSIS — E11.311 DIABETIC RETINAL EDEMA (HCC): ICD-10-CM

## 2025-06-17 DIAGNOSIS — N18.4 CKD STAGE 4 DUE TO TYPE 2 DIABETES MELLITUS (HCC): ICD-10-CM

## 2025-06-17 DIAGNOSIS — E78.5 DYSLIPIDEMIA: Primary | ICD-10-CM

## 2025-06-17 DIAGNOSIS — E03.9 HYPOTHYROIDISM, ADULT: ICD-10-CM

## 2025-06-17 DIAGNOSIS — E11.22 CKD STAGE 4 DUE TO TYPE 2 DIABETES MELLITUS (HCC): ICD-10-CM

## 2025-06-17 DIAGNOSIS — E11.49 DIABETIC NEUROPATHY WITH NEUROLOGIC COMPLICATION (HCC): ICD-10-CM

## 2025-06-17 DIAGNOSIS — E11.40 DIABETIC NEUROPATHY WITH NEUROLOGIC COMPLICATION (HCC): ICD-10-CM

## 2025-06-17 DIAGNOSIS — B02.23 POSTHERPETIC POLYNEUROPATHY: ICD-10-CM

## 2025-06-17 DIAGNOSIS — E11.65 INADEQUATELY CONTROLLED DIABETES MELLITUS (HCC): ICD-10-CM

## 2025-06-17 LAB
ALBUMIN SERPL BCG-MCNC: 3.9 G/DL (ref 3.5–5)
ALP SERPL-CCNC: 75 U/L (ref 34–104)
ALT SERPL W P-5'-P-CCNC: 23 U/L (ref 7–52)
ANION GAP SERPL CALCULATED.3IONS-SCNC: 4 MMOL/L (ref 4–13)
AST SERPL W P-5'-P-CCNC: 15 U/L (ref 13–39)
BACTERIA UR QL AUTO: ABNORMAL /HPF
BASOPHILS # BLD AUTO: 0.06 THOUSANDS/ÂΜL (ref 0–0.1)
BASOPHILS NFR BLD AUTO: 1 % (ref 0–1)
BILIRUB SERPL-MCNC: 0.9 MG/DL (ref 0.2–1)
BILIRUB UR QL STRIP: NEGATIVE
BUN SERPL-MCNC: 46 MG/DL (ref 5–25)
CALCIUM SERPL-MCNC: 8.6 MG/DL (ref 8.4–10.2)
CHLORIDE SERPL-SCNC: 106 MMOL/L (ref 96–108)
CHOLEST SERPL-MCNC: 80 MG/DL (ref ?–200)
CLARITY UR: CLEAR
CO2 SERPL-SCNC: 28 MMOL/L (ref 21–32)
COLOR UR: COLORLESS
CREAT SERPL-MCNC: 2.09 MG/DL (ref 0.6–1.3)
CREAT UR-MCNC: 29 MG/DL
EOSINOPHIL # BLD AUTO: 0.41 THOUSAND/ÂΜL (ref 0–0.61)
EOSINOPHIL NFR BLD AUTO: 6 % (ref 0–6)
ERYTHROCYTE [DISTWIDTH] IN BLOOD BY AUTOMATED COUNT: 13.9 % (ref 11.6–15.1)
EST. AVERAGE GLUCOSE BLD GHB EST-MCNC: 246 MG/DL
GFR SERPL CREATININE-BSD FRML MDRD: 31 ML/MIN/1.73SQ M
GLUCOSE P FAST SERPL-MCNC: 139 MG/DL (ref 65–99)
GLUCOSE UR STRIP-MCNC: ABNORMAL MG/DL
HBA1C MFR BLD: 10.2 %
HCT VFR BLD AUTO: 34.9 % (ref 36.5–49.3)
HDLC SERPL-MCNC: 35 MG/DL
HGB BLD-MCNC: 11.2 G/DL (ref 12–17)
HGB UR QL STRIP.AUTO: NEGATIVE
IMM GRANULOCYTES # BLD AUTO: 0.03 THOUSAND/UL (ref 0–0.2)
IMM GRANULOCYTES NFR BLD AUTO: 1 % (ref 0–2)
KETONES UR STRIP-MCNC: NEGATIVE MG/DL
LDLC SERPL CALC-MCNC: 31 MG/DL (ref 0–100)
LDLC SERPL DIRECT ASSAY-MCNC: 32 MG/DL (ref 0–100)
LEUKOCYTE ESTERASE UR QL STRIP: NEGATIVE
LYMPHOCYTES # BLD AUTO: 1.58 THOUSANDS/ÂΜL (ref 0.6–4.47)
LYMPHOCYTES NFR BLD AUTO: 24 % (ref 14–44)
MAGNESIUM SERPL-MCNC: 1.7 MG/DL (ref 1.9–2.7)
MCH RBC QN AUTO: 28 PG (ref 26.8–34.3)
MCHC RBC AUTO-ENTMCNC: 32.1 G/DL (ref 31.4–37.4)
MCV RBC AUTO: 87 FL (ref 82–98)
MICROALBUMIN UR-MCNC: 210.8 MG/L
MICROALBUMIN/CREAT 24H UR: 727 MG/G CREATININE (ref 0–30)
MONOCYTES # BLD AUTO: 0.52 THOUSAND/ÂΜL (ref 0.17–1.22)
MONOCYTES NFR BLD AUTO: 8 % (ref 4–12)
NEUTROPHILS # BLD AUTO: 3.9 THOUSANDS/ÂΜL (ref 1.85–7.62)
NEUTS SEG NFR BLD AUTO: 60 % (ref 43–75)
NITRITE UR QL STRIP: NEGATIVE
NON-SQ EPI CELLS URNS QL MICRO: ABNORMAL /HPF
NONHDLC SERPL-MCNC: 45 MG/DL
NRBC BLD AUTO-RTO: 0 /100 WBCS
PH UR STRIP.AUTO: 6 [PH]
PHOSPHATE SERPL-MCNC: 4.4 MG/DL (ref 2.3–4.1)
PLATELET # BLD AUTO: 228 THOUSANDS/UL (ref 149–390)
PMV BLD AUTO: 10.4 FL (ref 8.9–12.7)
POTASSIUM SERPL-SCNC: 4.1 MMOL/L (ref 3.5–5.3)
PROT SERPL-MCNC: 7 G/DL (ref 6.4–8.4)
PROT UR STRIP-MCNC: ABNORMAL MG/DL
RBC # BLD AUTO: 4 MILLION/UL (ref 3.88–5.62)
RBC #/AREA URNS AUTO: ABNORMAL /HPF
SODIUM SERPL-SCNC: 138 MMOL/L (ref 135–147)
SP GR UR STRIP.AUTO: 1.01 (ref 1–1.03)
T4 FREE SERPL-MCNC: 1.13 NG/DL (ref 0.61–1.12)
TRIGL SERPL-MCNC: 68 MG/DL (ref ?–150)
TSH SERPL DL<=0.05 MIU/L-ACNC: 2.81 UIU/ML (ref 0.45–4.5)
UROBILINOGEN UR STRIP-ACNC: <2 MG/DL
WBC # BLD AUTO: 6.5 THOUSAND/UL (ref 4.31–10.16)
WBC #/AREA URNS AUTO: ABNORMAL /HPF

## 2025-06-17 PROCEDURE — 36415 COLL VENOUS BLD VENIPUNCTURE: CPT

## 2025-06-17 PROCEDURE — 80053 COMPREHEN METABOLIC PANEL: CPT

## 2025-06-17 PROCEDURE — 83036 HEMOGLOBIN GLYCOSYLATED A1C: CPT

## 2025-06-17 PROCEDURE — 80061 LIPID PANEL: CPT

## 2025-06-17 PROCEDURE — 83735 ASSAY OF MAGNESIUM: CPT

## 2025-06-17 PROCEDURE — 85025 COMPLETE CBC W/AUTO DIFF WBC: CPT

## 2025-06-17 PROCEDURE — 82043 UR ALBUMIN QUANTITATIVE: CPT

## 2025-06-17 PROCEDURE — 84100 ASSAY OF PHOSPHORUS: CPT

## 2025-06-17 PROCEDURE — 81001 URINALYSIS AUTO W/SCOPE: CPT

## 2025-06-17 PROCEDURE — 84443 ASSAY THYROID STIM HORMONE: CPT

## 2025-06-17 PROCEDURE — 83721 ASSAY OF BLOOD LIPOPROTEIN: CPT

## 2025-06-17 PROCEDURE — 84439 ASSAY OF FREE THYROXINE: CPT

## 2025-06-17 PROCEDURE — 82570 ASSAY OF URINE CREATININE: CPT

## 2025-06-17 NOTE — TELEPHONE ENCOUNTER
Received call from pt.  He has tried to apply for assistance with Eliquis, but he was not approved.  He said the cost is getting to be too much.  He wants to know if there is an alternative medication.  Please advise.

## 2025-06-18 ENCOUNTER — RESULTS FOLLOW-UP (OUTPATIENT)
Dept: NON INVASIVE DIAGNOSTICS | Facility: HOSPITAL | Age: 65
End: 2025-06-18

## 2025-06-18 NOTE — TELEPHONE ENCOUNTER
"Spoke with pt. [email or web address] patient assistance is pending. Paper work has been submitted. Pt states \"he has not taken Eliquis in at least a month\" Office will provided samples of Eliquis. Pt states \"he will call [email or web address] to check on status of application\"   Eliquis 5mg  ACH 2150A  10/25  [id number]  02/26  GT 3763A  12/25  (2 boxes)   "

## 2025-06-19 ENCOUNTER — OFFICE VISIT (OUTPATIENT)
Dept: NEPHROLOGY | Facility: CLINIC | Age: 65
End: 2025-06-19
Payer: COMMERCIAL

## 2025-06-19 VITALS
WEIGHT: 181 LBS | BODY MASS INDEX: 25.91 KG/M2 | SYSTOLIC BLOOD PRESSURE: 134 MMHG | HEIGHT: 70 IN | DIASTOLIC BLOOD PRESSURE: 74 MMHG

## 2025-06-19 DIAGNOSIS — N18.30 CKD STAGE 3 SECONDARY TO DIABETES (HCC): Primary | ICD-10-CM

## 2025-06-19 DIAGNOSIS — E55.9 VITAMIN D INSUFFICIENCY: ICD-10-CM

## 2025-06-19 DIAGNOSIS — I10 BENIGN ESSENTIAL HYPERTENSION: ICD-10-CM

## 2025-06-19 DIAGNOSIS — R80.1 PERSISTENT PROTEINURIA: ICD-10-CM

## 2025-06-19 DIAGNOSIS — D64.9 MILD ANEMIA: ICD-10-CM

## 2025-06-19 DIAGNOSIS — E11.22 CKD STAGE 3 SECONDARY TO DIABETES (HCC): Primary | ICD-10-CM

## 2025-06-19 DIAGNOSIS — I50.20 HFREF (HEART FAILURE WITH REDUCED EJECTION FRACTION) (HCC): ICD-10-CM

## 2025-06-19 PROCEDURE — 99214 OFFICE O/P EST MOD 30 MIN: CPT | Performed by: INTERNAL MEDICINE

## 2025-06-19 NOTE — PATIENT INSTRUCTIONS
CKD stage 3 secondary to diabetes (HCC)  CKD stage 3/b4 in setting of diabetes, hypertensive nephrosclerosis, ischemic cardiomyopathy as well as cirrhosis and diuretic use  -sCr 2.09 with eGFR mid to high 20s to low 30s and stable.  -suspect progressive CKD in part due to uncontrolled blood sugars, A1C 10.2 as of last check  -b/l sCr appears to be low 2s after episode of LOBO with peak sCr  -monitor BMP  -recent UA showed trace protein with microalb:Cr 727mg/g  -need ongoing glycemic control to slow down progression of CKD  -has been to CKD education class  -CKD education booklet provided previously  -renal ultrasound showed normal cortical echogenicity  -would benefit from improved glycemic control  -consider SGLT2i, for example jardiance  Benign essential hypertension  - BP well controlled in office  -on lasix 40mg twice daily, isordil 5mg twice daily, metoprolol 50mg twice daily, hydralazine 10mg twice daily  -off lisinopril 5mg daily   -avoid high salt/sodium diet  -avoid caffeinated beverages  HFrEF (heart failure with reduced ejection fraction) (HCC)  -Ischemic cardiomyopathy with EF 40%, also with diastolic dysfunction - diuretic as above, monitor daily weight, follows with cardiology outpatient, Dr. Figueroa   -on potassium supplement 20meq twice daily, potassium 3.8 on last check    Persistent proteinuria  Proteinuria likely d/t diabetic nephropathy in setting of uncontrolled diabetes - microalb:Cr was as high as 3.4g in March 2019, now down to 1.2g as of Feb. 2025 with microalb:Cr 727mg/g and stable - monitor this off ACEi  -anti PLA2R, dsDNA, HIV, RPR, complements, SPEP/UPEP/FLC, antiGBM, chronic hep panel, ANCA panel, LISA negative  -cryo + in setting of cirrhosis, RF - negative when repeated in Jan. 2022  Vitamin D insufficiency   - may take 1000u daily over the counter vitamin D as last level 25.5(low) as of 10/17/22, vitamin D level 30.1 as of 9/10/24   Mild anemia  -possibly d/t CKD  -last Hgb 11.2 as of  6/17/25, continue to monitor CBC  -if downtrending, pursue further work up    RTC in 4 months.  Obtain labs prior to office visit.  Tylenol is ok to take for pain.  Would avoid aleve, advil, motrin, naproxen, celebrex, indomethacin, toradol.  Consider jardiance/SGLT2i, defer to endocrinology.

## 2025-06-19 NOTE — PROGRESS NOTES
NEPHROLOGY OUTPATIENT PROGRESS NOTE   Noel Khan 65 y.o. male MRN: [id number]  DATE: 6/19/2025  Reason for visit:   Chief Complaint   Patient presents with    Follow-up    Chronic Kidney Disease        Patient Instructions   CKD stage 3 secondary to diabetes (HCC)  CKD stage 3/b4 in setting of diabetes, hypertensive nephrosclerosis, ischemic cardiomyopathy as well as cirrhosis and diuretic use  -sCr 2.09 with eGFR mid to high 20s to low 30s and stable.  -suspect progressive CKD in part due to uncontrolled blood sugars, A1C 10.2 as of last check  -b/l sCr appears to be low 2s after episode of LOBO with peak sCr  -monitor BMP  -recent UA showed trace protein with microalb:Cr 727mg/g  -need ongoing glycemic control to slow down progression of CKD  -has been to CKD education class  -CKD education booklet provided previously  -renal ultrasound showed normal cortical echogenicity  -would benefit from improved glycemic control  -consider SGLT2i, for example jardiance  Benign essential hypertension  - BP well controlled in office  -on lasix 40mg twice daily, isordil 5mg twice daily, metoprolol 50mg twice daily, hydralazine 10mg twice daily  -off lisinopril 5mg daily   -avoid high salt/sodium diet  -avoid caffeinated beverages  HFrEF (heart failure with reduced ejection fraction) (HCC)  -Ischemic cardiomyopathy with EF 40%, also with diastolic dysfunction - diuretic as above, monitor daily weight, follows with cardiology outpatient, Dr. Figueroa   -on potassium supplement 20meq twice daily, potassium 3.8 on last check    Persistent proteinuria  Proteinuria likely d/t diabetic nephropathy in setting of uncontrolled diabetes - microalb:Cr was as high as 3.4g in March 2019, now down to 1.2g as of Feb. 2025 with microalb:Cr 727mg/g and stable - monitor this off ACEi  -anti PLA2R, dsDNA, HIV, RPR, complements, SPEP/UPEP/FLC, antiGBM, chronic hep panel, ANCA panel, LISA negative  -cryo + in setting of cirrhosis, RF - negative when  repeated in Jan. 2022  Vitamin D insufficiency   - may take 1000u daily over the counter vitamin D as last level 25.5(low) as of 10/17/22, vitamin D level 30.1 as of 9/10/24   Mild anemia  -possibly d/t CKD  -last Hgb 11.2 as of 6/17/25, continue to monitor CBC  -if downtrending, pursue further work up    RTC in 4 months.  Obtain labs prior to office visit.  Tylenol is ok to take for pain.  Would avoid aleve, advil, motrin, naproxen, celebrex, indomethacin, toradol.  Consider jardiance/SGLT2i, defer to endocrinology.      Assessment & Plan  CKD stage 3 secondary to diabetes (HCC)  CKD stage 3/b4 in setting of diabetes, hypertensive nephrosclerosis, ischemic cardiomyopathy as well as cirrhosis and diuretic use  -sCr 2.09 with eGFR mid to high 20s to low 30s and stable.  -suspect progressive CKD in part due to uncontrolled blood sugars, A1C 10.2 as of last check  -b/l sCr appears to be low 2s after episode of LOBO with peak sCr  -monitor BMP  -recent UA showed trace protein with microalb:Cr 727mg/g  -need ongoing glycemic control to slow down progression of CKD  -has been to CKD education class  -CKD education booklet provided previously  -renal ultrasound showed normal cortical echogenicity  -would benefit from improved glycemic control  -consider SGLT2i, for example jardiance  Benign essential hypertension  - BP well controlled in office  -on lasix 40mg twice daily, isordil 5mg twice daily, metoprolol 50mg twice daily, hydralazine 10mg twice daily  -off lisinopril 5mg daily   -avoid high salt/sodium diet  -avoid caffeinated beverages  HFrEF (heart failure with reduced ejection fraction) (HCC)  -Ischemic cardiomyopathy with EF 40%, also with diastolic dysfunction - diuretic as above, monitor daily weight, follows with cardiology outpatient, Dr. Figueroa   -on potassium supplement 20meq twice daily, potassium 3.8 on last check    Persistent proteinuria  Proteinuria likely d/t diabetic nephropathy in setting of  "uncontrolled diabetes - microalb:Cr was as high as 3.4g in March 2019, now down to 1.2g as of Feb. 2025 with microalb:Cr 727mg/g and stable - monitor this off ACEi  -anti PLA2R, dsDNA, HIV, RPR, complements, SPEP/UPEP/FLC, antiGBM, chronic hep panel, ANCA panel, LISA negative  -cryo + in setting of cirrhosis, RF - negative when repeated in Jan. 2022  Vitamin D insufficiency   - may take 1000u daily over the counter vitamin D as last level 25.5(low) as of 10/17/22, vitamin D level 30.1 as of 9/10/24   Mild anemia  -possibly d/t CKD  -last Hgb 11.2 as of 6/17/25, continue to monitor CBC  -if downtrending, pursue further work up    RTC in 4 months.  Obtain labs prior to office visit.      SUBJECTIVE / INTERVAL HISTORY:  65 y.o. male presents in follow up of CKD.     Noel Khan denies any recent illness/hospitalizations/medication changes since last office visit.    Denies NSAID use.  Weight has been stable. Does have to take additional lasix dose for weight gain but none recent.   Sugars are higher.  BP is controlled.    Review of Systems   Constitutional:  Negative for chills and fever.   HENT:  Negative for sore throat.    Eyes:  Negative for visual disturbance.   Respiratory:  Negative for cough and shortness of breath.    Cardiovascular:  Negative for chest pain and leg swelling.   Gastrointestinal:  Negative for abdominal pain, constipation, diarrhea, nausea and vomiting.   Endocrine: Negative for polyuria.   Genitourinary:  Negative for decreased urine volume, difficulty urinating, dysuria and hematuria.   Musculoskeletal:  Negative for back pain and myalgias.   Skin:  Negative for rash.   Neurological:  Negative for dizziness, light-headedness and numbness.   Psychiatric/Behavioral:  Negative for confusion.        OBJECTIVE:  /74 (BP Location: Left arm, Patient Position: Sitting, Cuff Size: Standard)   Ht 5' 10\" (1.778 m)   Wt 82.1 kg (181 lb)   BMI 25.97 kg/m²  Body mass index is 25.97 " kg/m².    Physical exam:  Physical Exam  Vitals reviewed.   Constitutional:       General: He is not in acute distress.     Appearance: Normal appearance. He is well-developed. He is not diaphoretic.   HENT:      Head: Normocephalic and atraumatic.      Nose: Nose normal.      Mouth/Throat:      Mouth: Mucous membranes are moist.      Pharynx: No oropharyngeal exudate.     Eyes:      General: No scleral icterus.        Right eye: No discharge.         Left eye: No discharge.      Comments: eyeglasses   Neck:      Thyroid: No thyromegaly.     Cardiovascular:      Rate and Rhythm: Normal rate and regular rhythm.      Heart sounds: Normal heart sounds.   Pulmonary:      Effort: Pulmonary effort is normal.      Breath sounds: Normal breath sounds. No wheezing or rales.   Abdominal:      General: Bowel sounds are normal. There is no distension.      Palpations: Abdomen is soft.      Tenderness: There is no abdominal tenderness.     Musculoskeletal:         General: No swelling. Normal range of motion.      Cervical back: Neck supple.   Lymphadenopathy:      Cervical: No cervical adenopathy.     Skin:     General: Skin is warm and dry.      Coloration: Skin is not jaundiced.      Findings: No rash.     Neurological:      General: No focal deficit present.      Mental Status: He is alert.      Comments: awake   Psychiatric:         Mood and Affect: Mood normal.         Behavior: Behavior normal.         Medications:  Current Medications[1]    Allergies:  Allergies as of 06/19/2025    (No Known Allergies)       The following portions of the patient's history were reviewed and updated as appropriate: past family history, past surgical history and problem list.    Laboratory Results:  Lab Results   Component Value Date    SODIUM 138 06/17/2025    K 4.1 06/17/2025     06/17/2025    CO2 28 06/17/2025    BUN 46 (H) 06/17/2025    CREATININE 2.09 (H) 06/17/2025    GLUC 168 (H) 09/03/2024    CALCIUM 8.6 06/17/2025        Lab  "Results   Component Value Date    .9 (H) 10/17/2022    CALCIUM 8.6 06/17/2025    PHOS 4.4 (H) 06/17/2025       Portions of the record may have been created with voice recognition software.  Occasional wrong word or \"sound a like\" substitutions may have occurred due to the inherent limitations of voice recognition software.  Read the chart carefully and recognize, using context, where substitutions have occurred.         [1]   Current Outpatient Medications:     apixaban (Eliquis) 5 mg, Take 1 tablet (5 mg total) by mouth 2 (two) times a day, Disp: 180 tablet, Rfl: 3    atorvastatin (LIPITOR) 40 mg tablet, TAKE 1 TABLET BY MOUTH EVERY DAY, Disp: 90 tablet, Rfl: 3    Blood Pressure Monitoring ([employer] Blood Pressure Monitor) KIT, Use 2 (two) times a day Automatic BP cuff. Measure BP twice daily., Disp: 1 kit, Rfl: 0    Continuous Glucose Sensor (FreeStyle Jess 2 Sensor) MISC, , Disp: , Rfl:     furosemide (LASIX) 40 mg tablet, Take 1 tablet (40 mg total) by mouth 2 (two) times a day, Disp: 180 tablet, Rfl: 3    hydrALAZINE (APRESOLINE) 10 mg tablet, Take 1 tablet (10 mg total) by mouth 2 (two) times a day, Disp: 180 tablet, Rfl: 3    insulin glargine (LANTUS) 100 units/mL subcutaneous injection, Inject 30 Units under the skin every morning, Disp: , Rfl:     insulin NPH-insulin regular (NovoLIN 70/30) 100 units/mL subcutaneous injection, Inject under the skin if needed, Disp: , Rfl:     isosorbide dinitrate (ISORDIL) 5 mg tablet, Take 1 tablet (5 mg total) by mouth 2 (two) times a day, Disp: 180 tablet, Rfl: 3    latanoprost (XALATAN) 0.005 % ophthalmic solution, Administer 0.0005 drops to both eyes daily at bedtime, Disp: , Rfl:     levothyroxine 25 mcg tablet, Take 25 mcg by mouth daily, Disp: , Rfl:     levothyroxine 50 mcg tablet, Take 50 mcg by mouth in the morning., Disp: , Rfl:     metoprolol succinate (TOPROL-XL) 50 mg 24 hr tablet, Take 1 tablet (50 mg total) by mouth 2 (two) times a day, Disp: 180 tablet, " Rfl: 3    pantoprazole (PROTONIX) 40 mg tablet, Take 1 tablet (40 mg total) by mouth daily in the early morning, Disp: 30 tablet, Rfl: 0    potassium chloride (Klor-Con M20) 20 mEq tablet, Take 1 tablet (20 mEq total) by mouth 2 (two) times a day, Disp: 180 tablet, Rfl: 1    Unifine Pentips 31G X 8 MM MISC, , Disp: , Rfl:

## 2025-06-19 NOTE — ASSESSMENT & PLAN NOTE
Proteinuria likely d/t diabetic nephropathy in setting of uncontrolled diabetes - microalb:Cr was as high as 3.4g in March 2019, now down to 1.2g as of Feb. 2025 with microalb:Cr 727mg/g and stable - monitor this off ACEi  -anti PLA2R, dsDNA, HIV, RPR, complements, SPEP/UPEP/FLC, antiGBM, chronic hep panel, ANCA panel, LISA negative  -cryo + in setting of cirrhosis, RF - negative when repeated in Jan. 2022

## 2025-06-19 NOTE — LETTER
June 19, 2025     Lucina Adame MD  [address] [facility]  [address]  [place] [address]    Patient: Noel Khan   YOB: 1960   Date of Visit: 6/19/2025       Dear Dr. Lucina Adame MD:    Thank you for referring Noel Khan to me for evaluation. Below are my notes for this consultation.    As this patient has proteinuria, CKD, diabetes and CHF, I believe he would benefit from jardiance (as this is SGLT2i is covered under his insurance, farxiga is not). Wanted to know if you agreed. He sees you in the office in a few weeks. Hoped you could consider this.    If you have questions, please do not hesitate to call me. I look forward to following your patient along with you.         Sincerely,        Lissa Thomas DO        CC: No Recipients    Lissa Thomas DO  6/19/2025  7:55 AM  Incomplete  NEPHROLOGY OUTPATIENT PROGRESS NOTE   Noel Khan 65 y.o. male MRN: [id number]  DATE: 6/19/2025  Reason for visit:   Chief Complaint   Patient presents with   • Follow-up   • Chronic Kidney Disease        There are no Patient Instructions on file for this visit.      Assessment & Plan  CKD stage 3 secondary to diabetes (HCC)  CKD stage 3/b4 in setting of diabetes, hypertensive nephrosclerosis, ischemic cardiomyopathy as well as cirrhosis and diuretic use  -sCr 2.09 with eGFR mid to high 20s to low 30s and stable.  -suspect progressive CKD in part due to uncontrolled blood sugars, A1C 10.2 as of last check  -b/l sCr appears to be low 2s after episode of LOBO with peak sCr  -monitor BMP  -recent UA showed trace protein with microalb:Cr 727mg/g  -need ongoing glycemic control to slow down progression of CKD  -has been to CKD education class  -CKD education booklet provided previously  -renal ultrasound showed normal cortical echogenicity  -would benefit from improved glycemic control  -consider SGLT2i, for example jardiance  Benign essential hypertension  - BP well controlled in office  -on lasix 40mg twice  daily, isordil 5mg twice daily, metoprolol 50mg twice daily, hydralazine 10mg twice daily  -off lisinopril 5mg daily   -avoid high salt/sodium diet  -avoid caffeinated beverages  HFrEF (heart failure with reduced ejection fraction) (HCC)  -Ischemic cardiomyopathy with EF 40%, also with diastolic dysfunction - diuretic as above, monitor daily weight, follows with cardiology outpatient, Dr. Figueroa   -on potassium supplement 20meq twice daily, potassium 3.8 on last check    Persistent proteinuria  Proteinuria likely d/t diabetic nephropathy in setting of uncontrolled diabetes - microalb:Cr was as high as 3.4g in March 2019, now down to 1.2g as of Feb. 2025 with microalb:Cr 727mg/g and stable - monitor this off ACEi  -anti PLA2R, dsDNA, HIV, RPR, complements, SPEP/UPEP/FLC, antiGBM, chronic hep panel, ANCA panel, LISA negative  -cryo + in setting of cirrhosis, RF - negative when repeated in Jan. 2022  Vitamin D insufficiency   - may take 1000u daily over the counter vitamin D as last level 25.5(low) as of 10/17/22, vitamin D level 30.1 as of 9/10/24   Mild anemia  -possibly d/t CKD  -last Hgb 11.2 as of 6/17/25, continue to monitor CBC  -if downtrending, pursue further work up    RTC in 4 months.  Obtain labs prior to office visit.      SUBJECTIVE / INTERVAL HISTORY:  65 y.o. male presents in follow up of CKD.     Noel Khan denies any recent illness/hospitalizations/medication changes since last office visit.    ?NSAID use    Review of Systems   Constitutional:  Negative for chills and fever.   HENT:  Negative for sore throat.    Eyes:  Negative for visual disturbance.   Respiratory:  Negative for cough and shortness of breath.    Cardiovascular:  Negative for chest pain and leg swelling.   Gastrointestinal:  Negative for abdominal pain, constipation, diarrhea, nausea and vomiting.   Endocrine: Negative for polyuria.   Genitourinary:  Negative for decreased urine volume, difficulty urinating, dysuria and hematuria.  "  Musculoskeletal:  Negative for back pain and myalgias.   Skin:  Negative for rash.   Neurological:  Negative for dizziness, light-headedness and numbness.   Psychiatric/Behavioral:  Negative for confusion.        OBJECTIVE:  /74 (BP Location: Left arm, Patient Position: Sitting, Cuff Size: Standard)   Ht 5' 10\" (1.778 m)   Wt 82.1 kg (181 lb)   BMI 25.97 kg/m²  Body mass index is 25.97 kg/m².    Physical exam:  Physical Exam  Vitals reviewed.   Constitutional:       General: He is not in acute distress.     Appearance: Normal appearance. He is well-developed. He is not diaphoretic.   HENT:      Head: Normocephalic and atraumatic.      Nose: Nose normal.      Mouth/Throat:      Mouth: Mucous membranes are moist.      Pharynx: No oropharyngeal exudate.     Eyes:      General: No scleral icterus.        Right eye: No discharge.         Left eye: No discharge.      Comments: eyeglasses   Neck:      Thyroid: No thyromegaly.     Cardiovascular:      Rate and Rhythm: Normal rate and regular rhythm.      Heart sounds: Normal heart sounds.   Pulmonary:      Effort: Pulmonary effort is normal.      Breath sounds: Normal breath sounds. No wheezing or rales.   Abdominal:      General: Bowel sounds are normal. There is no distension.      Palpations: Abdomen is soft.      Tenderness: There is no abdominal tenderness.     Musculoskeletal:         General: No swelling. Normal range of motion.      Cervical back: Neck supple.   Lymphadenopathy:      Cervical: No cervical adenopathy.     Skin:     General: Skin is warm and dry.      Coloration: Skin is not jaundiced.      Findings: No rash.     Neurological:      General: No focal deficit present.      Mental Status: He is alert.      Comments: awake   Psychiatric:         Mood and Affect: Mood normal.         Behavior: Behavior normal.         Medications:  Current Medications[1]    Allergies:  Allergies as of 06/19/2025   • (No Known Allergies)       The following " "portions of the patient's history were reviewed and updated as appropriate: past family history, past surgical history and problem list.    Laboratory Results:  Lab Results   Component Value Date    SODIUM 138 06/17/2025    K 4.1 06/17/2025     06/17/2025    CO2 28 06/17/2025    BUN 46 (H) 06/17/2025    CREATININE 2.09 (H) 06/17/2025    GLUC 168 (H) 09/03/2024    CALCIUM 8.6 06/17/2025        Lab Results   Component Value Date    .9 (H) 10/17/2022    CALCIUM 8.6 06/17/2025    PHOS 4.4 (H) 06/17/2025       Portions of the record may have been created with voice recognition software.  Occasional wrong word or \"sound a like\" substitutions may have occurred due to the inherent limitations of voice recognition software.  Read the chart carefully and recognize, using context, where substitutions have occurred.           [1]    Current Outpatient Medications:   •  apixaban (Eliquis) 5 mg, Take 1 tablet (5 mg total) by mouth 2 (two) times a day, Disp: 180 tablet, Rfl: 3  •  atorvastatin (LIPITOR) 40 mg tablet, TAKE 1 TABLET BY MOUTH EVERY DAY, Disp: 90 tablet, Rfl: 3  •  Blood Pressure Monitoring (CV[employer] Blood Pressure Monitor) KIT, Use 2 (two) times a day Automatic BP cuff. Measure BP twice daily., Disp: 1 kit, Rfl: 0  •  Continuous Glucose Sensor (FreeStyle Jess 2 Sensor) MISC, , Disp: , Rfl:   •  furosemide (LASIX) 40 mg tablet, Take 1 tablet (40 mg total) by mouth 2 (two) times a day, Disp: 180 tablet, Rfl: 3  •  hydrALAZINE (APRESOLINE) 10 mg tablet, Take 1 tablet (10 mg total) by mouth 2 (two) times a day, Disp: 180 tablet, Rfl: 3  •  insulin glargine (LANTUS) 100 units/mL subcutaneous injection, Inject 30 Units under the skin every morning, Disp: , Rfl:   •  insulin NPH-insulin regular (NovoLIN 70/30) 100 units/mL subcutaneous injection, Inject under the skin if needed, Disp: , Rfl:   •  isosorbide dinitrate (ISORDIL) 5 mg tablet, Take 1 tablet (5 mg total) by mouth 2 (two) times a day, Disp: 180 tablet, " Rfl: 3  •  latanoprost (XALATAN) 0.005 % ophthalmic solution, Administer 0.0005 drops to both eyes daily at bedtime, Disp: , Rfl:   •  levothyroxine 25 mcg tablet, Take 25 mcg by mouth daily, Disp: , Rfl:   •  levothyroxine 50 mcg tablet, Take 50 mcg by mouth in the morning., Disp: , Rfl:   •  metoprolol succinate (TOPROL-XL) 50 mg 24 hr tablet, Take 1 tablet (50 mg total) by mouth 2 (two) times a day, Disp: 180 tablet, Rfl: 3  •  pantoprazole (PROTONIX) 40 mg tablet, Take 1 tablet (40 mg total) by mouth daily in the early morning, Disp: 30 tablet, Rfl: 0  •  potassium chloride (Klor-Con M20) 20 mEq tablet, Take 1 tablet (20 mEq total) by mouth 2 (two) times a day, Disp: 180 tablet, Rfl: 1  •  Unifine Pentips 31G X 8 MM MISC, , Disp: , Rfl:     Lissa Thomas DO  6/19/2025  7:48 AM  Sign when Signing Visit  NEPHROLOGY OUTPATIENT PROGRESS NOTE   Noel Khan 65 y.o. male MRN: [id number]  DATE: 6/19/2025  Reason for visit:   Chief Complaint   Patient presents with   • Follow-up   • Chronic Kidney Disease        There are no Patient Instructions on file for this visit.      Assessment & Plan  CKD stage 3 secondary to diabetes (HCC)  CKD stage 3/b4 in setting of diabetes, hypertensive nephrosclerosis, ischemic cardiomyopathy as well as cirrhosis and diuretic use  -sCr 2.09 with eGFR mid to high 20s to low 30s and stable.  -suspect progressive CKD in part due to uncontrolled blood sugars, A1C 10.2 as of last check  -b/l sCr appears to be low 2s after episode of LOBO with peak sCr  -monitor BMP  -recent UA showed trace protein with microalb:Cr 727mg/g  -need ongoing glycemic control to slow down progression of CKD  -has been to CKD education class  -CKD education booklet provided previously  -renal ultrasound showed normal cortical echogenicity  -would benefit from improved glycemic control  Benign essential hypertension  - BP well controlled in office  -on lasix 40mg twice daily, isordil 5mg twice daily, metoprolol 50mg  twice daily, hydralazine 10mg twice daily  -off lisinopril 5mg daily   -avoid high salt/sodium diet  -avoid caffeinated beverages  HFrEF (heart failure with reduced ejection fraction) (HCC)  -Ischemic cardiomyopathy with EF 40%, also with diastolic dysfunction - diuretic as above, monitor daily weight, follows with cardiology outpatient, Dr. Figueroa   -on potassium supplement 20meq twice daily, potassium 3.8 on last check    Persistent proteinuria  Proteinuria likely d/t diabetic nephropathy in setting of uncontrolled diabetes - microalb:Cr was as high as 3.4g in March 2019, now down to 1.2g as of Feb. 2025 with microalb:Cr 727mg/g and stable - monitor this off ACEi  -anti PLA2R, dsDNA, HIV, RPR, complements, SPEP/UPEP/FLC, antiGBM, chronic hep panel, ANCA panel, LISA negative  -cryo + in setting of cirrhosis, RF - negative when repeated in Jan. 2022  Vitamin D insufficiency   - may take 1000u daily over the counter vitamin D as last level 25.5(low) as of 10/17/22, vitamin D level 30.1 as of 9/10/24   Mild anemia  -possibly d/t CKD  -last Hgb 11.2 as of 6/17/25, continue to monitor CBC  -if downtrending, pursue further work up    RTC in 4 months.  Obtain labs prior to office visit.      SUBJECTIVE / INTERVAL HISTORY:  65 y.o. male presents in follow up of CKD.     Noel EULALIO Khan denies any recent illness/hospitalizations/medication changes since last office visit.    ?NSAID use    Review of Systems   Constitutional:  Negative for chills and fever.   HENT:  Negative for sore throat.    Eyes:  Negative for visual disturbance.   Respiratory:  Negative for cough and shortness of breath.    Cardiovascular:  Negative for chest pain and leg swelling.   Gastrointestinal:  Negative for abdominal pain, constipation, diarrhea, nausea and vomiting.   Endocrine: Negative for polyuria.   Genitourinary:  Negative for decreased urine volume, difficulty urinating and dysuria.   Musculoskeletal:  Negative for back pain and myalgias.  "  Skin:  Negative for rash.   Neurological:  Negative for light-headedness and numbness.   Psychiatric/Behavioral:  Negative for confusion.        OBJECTIVE:  /74 (BP Location: Left arm, Patient Position: Sitting, Cuff Size: Standard)   Ht 5' 10\" (1.778 m)   Wt 82.1 kg (181 lb)   BMI 25.97 kg/m²  Body mass index is 25.97 kg/m².    Physical exam:  Physical Exam  Vitals reviewed.   Constitutional:       General: He is not in acute distress.     Appearance: Normal appearance. He is well-developed. He is not diaphoretic.   HENT:      Head: Normocephalic and atraumatic.      Nose: Nose normal.      Mouth/Throat:      Mouth: Mucous membranes are moist.      Pharynx: No oropharyngeal exudate.     Eyes:      General: No scleral icterus.        Right eye: No discharge.         Left eye: No discharge.     Neck:      Thyroid: No thyromegaly.     Cardiovascular:      Rate and Rhythm: Normal rate and regular rhythm.      Heart sounds: Normal heart sounds.   Pulmonary:      Effort: Pulmonary effort is normal.      Breath sounds: Normal breath sounds. No wheezing or rales.   Abdominal:      General: Bowel sounds are normal. There is no distension.      Palpations: Abdomen is soft.      Tenderness: There is no abdominal tenderness.     Musculoskeletal:         General: No swelling. Normal range of motion.      Cervical back: Neck supple.   Lymphadenopathy:      Cervical: No cervical adenopathy.     Skin:     General: Skin is warm and dry.      Coloration: Skin is not jaundiced.      Findings: No rash.     Neurological:      General: No focal deficit present.      Mental Status: He is alert.      Comments: awake   Psychiatric:         Mood and Affect: Mood normal.         Behavior: Behavior normal.         Medications:  Current Medications[1]    Allergies:  Allergies as of 06/19/2025   • (No Known Allergies)       The following portions of the patient's history were reviewed and updated as appropriate: past family history, " "past surgical history and problem list.    Laboratory Results:  Lab Results   Component Value Date    SODIUM 138 06/17/2025    K 4.1 06/17/2025     06/17/2025    CO2 28 06/17/2025    BUN 46 (H) 06/17/2025    CREATININE 2.09 (H) 06/17/2025    GLUC 168 (H) 09/03/2024    CALCIUM 8.6 06/17/2025        Lab Results   Component Value Date    .9 (H) 10/17/2022    CALCIUM 8.6 06/17/2025    PHOS 4.4 (H) 06/17/2025       Portions of the record may have been created with voice recognition software.  Occasional wrong word or \"sound a like\" substitutions may have occurred due to the inherent limitations of voice recognition software.  Read the chart carefully and recognize, using context, where substitutions have occurred.       [1]   Current Outpatient Medications:   •  apixaban (Eliquis) 5 mg, Take 1 tablet (5 mg total) by mouth 2 (two) times a day, Disp: 180 tablet, Rfl: 3  •  atorvastatin (LIPITOR) 40 mg tablet, TAKE 1 TABLET BY MOUTH EVERY DAY, Disp: 90 tablet, Rfl: 3  •  Blood Pressure Monitoring ([employer]S Blood Pressure Monitor) KIT, Use 2 (two) times a day Automatic BP cuff. Measure BP twice daily., Disp: 1 kit, Rfl: 0  •  Continuous Glucose Sensor (FreeStyle Jess 2 Sensor) MISC, , Disp: , Rfl:   •  furosemide (LASIX) 40 mg tablet, Take 1 tablet (40 mg total) by mouth 2 (two) times a day, Disp: 180 tablet, Rfl: 3  •  hydrALAZINE (APRESOLINE) 10 mg tablet, Take 1 tablet (10 mg total) by mouth 2 (two) times a day, Disp: 180 tablet, Rfl: 3  •  insulin glargine (LANTUS) 100 units/mL subcutaneous injection, Inject 30 Units under the skin every morning, Disp: , Rfl:   •  insulin NPH-insulin regular (NovoLIN 70/30) 100 units/mL subcutaneous injection, Inject under the skin if needed, Disp: , Rfl:   •  isosorbide dinitrate (ISORDIL) 5 mg tablet, Take 1 tablet (5 mg total) by mouth 2 (two) times a day, Disp: 180 tablet, Rfl: 3  •  latanoprost (XALATAN) 0.005 % ophthalmic solution, Administer 0.0005 drops to both eyes " daily at bedtime, Disp: , Rfl:   •  levothyroxine 25 mcg tablet, Take 25 mcg by mouth daily, Disp: , Rfl:   •  levothyroxine 50 mcg tablet, Take 50 mcg by mouth in the morning., Disp: , Rfl:   •  metoprolol succinate (TOPROL-XL) 50 mg 24 hr tablet, Take 1 tablet (50 mg total) by mouth 2 (two) times a day, Disp: 180 tablet, Rfl: 3  •  pantoprazole (PROTONIX) 40 mg tablet, Take 1 tablet (40 mg total) by mouth daily in the early morning, Disp: 30 tablet, Rfl: 0  •  potassium chloride (Klor-Con M20) 20 mEq tablet, Take 1 tablet (20 mEq total) by mouth 2 (two) times a day, Disp: 180 tablet, Rfl: 1  •  Unifine Pentips 31G X 8 MM MISC, , Disp: , Rfl:

## 2025-06-19 NOTE — ASSESSMENT & PLAN NOTE
-Ischemic cardiomyopathy with EF 40%, also with diastolic dysfunction - diuretic as above, monitor daily weight, follows with cardiology outpatient, Dr. Figueroa   -on potassium supplement 20meq twice daily, potassium 3.8 on last check

## 2025-06-19 NOTE — ASSESSMENT & PLAN NOTE
CKD stage 3/b4 in setting of diabetes, hypertensive nephrosclerosis, ischemic cardiomyopathy as well as cirrhosis and diuretic use  -sCr 2.09 with eGFR mid to high 20s to low 30s and stable.  -suspect progressive CKD in part due to uncontrolled blood sugars, A1C 10.2 as of last check  -b/l sCr appears to be low 2s after episode of LBOO with peak sCr  -monitor BMP  -recent UA showed trace protein with microalb:Cr 727mg/g  -need ongoing glycemic control to slow down progression of CKD  -has been to CKD education class  -CKD education booklet provided previously  -renal ultrasound showed normal cortical echogenicity  -would benefit from improved glycemic control  -consider SGLT2i, for example jardiance

## 2025-07-01 DIAGNOSIS — I25.5 ISCHEMIC CARDIOMYOPATHY: ICD-10-CM

## 2025-07-01 DIAGNOSIS — I50.42 CHRONIC COMBINED SYSTOLIC AND DIASTOLIC CHF (CONGESTIVE HEART FAILURE) (HCC): ICD-10-CM

## 2025-07-01 RX ORDER — POTASSIUM CHLORIDE 1500 MG/1
20 TABLET, EXTENDED RELEASE ORAL 2 TIMES DAILY
Qty: 180 TABLET | Refills: 1 | Status: SHIPPED | OUTPATIENT
Start: 2025-07-01

## 2025-07-01 RX ORDER — METOPROLOL SUCCINATE 50 MG/1
50 TABLET, EXTENDED RELEASE ORAL 2 TIMES DAILY
Qty: 180 TABLET | Refills: 1 | Status: SHIPPED | OUTPATIENT
Start: 2025-07-01

## 2025-07-24 ENCOUNTER — PROCEDURE VISIT (OUTPATIENT)
Dept: PODIATRY | Facility: CLINIC | Age: 65
End: 2025-07-24
Payer: COMMERCIAL

## 2025-07-24 VITALS — HEIGHT: 70 IN | BODY MASS INDEX: 26.2 KG/M2 | OXYGEN SATURATION: 98 % | HEART RATE: 65 BPM | WEIGHT: 183 LBS

## 2025-07-24 DIAGNOSIS — B35.1 ONYCHOMYCOSIS: Primary | ICD-10-CM

## 2025-07-24 DIAGNOSIS — Z89.422 HISTORY OF AMPUTATION OF LESSER TOE OF LEFT FOOT (HCC): ICD-10-CM

## 2025-07-24 DIAGNOSIS — E11.42 DIABETIC POLYNEUROPATHY ASSOCIATED WITH TYPE 2 DIABETES MELLITUS (HCC): ICD-10-CM

## 2025-07-24 PROCEDURE — 11721 DEBRIDE NAIL 6 OR MORE: CPT | Performed by: PODIATRIST

## 2025-07-24 NOTE — PROGRESS NOTES
Name: Noel Khan      : 1960      MRN: [id number]  Encounter Provider: Massimo Dallas DPM  Encounter Date: 2025   Encounter department: [facility]  :  Assessment & Plan  Onychomycosis    The patient's clinical examination today is significant for thickened and dystrophic nail plates x 9 with discoloration and subungual debris consistent with onychomycosis.  There are no open lesions. There are no interdigital macerations.  There are no pretrophic changes nor callosities.  The patient has a history of a partial left fifth ray resection secondary to osteomyelitis.  There is mild to moderate +2 pitting edema of the left lower extremity today.  Vibratory and epicritic sensations are diminished bilaterally secondary to his history of peripheral neuropathy.     The pedal nail plates were sharply debrided with a sterile nail clipper x 9.  The pedal nail plates were then mechanically reduced in thickness and girth utilizing a rotary bur.  There are no other acute pedal issues. His most recent hemoglobin A1c was 10.2 in 2025, this is up from 9.9 in 2025.    Recommend follow-up in 3 months for continued at risk foot care.    Diabetic polyneuropathy associated with type 2 diabetes mellitus (HCC)    Lab Results   Component Value Date    HGBA1C 10.2 (H) 2025            History of amputation of lesser toe of left foot (HCC)             History of Present Illness   HPI  Noel Khan is a 65 y.o. male who presents today for follow-up at risk diabetic footcare.  He notes no acute pedal issues today.  His amputation site on the left foot is doing well.      Review of Systems   Constitutional: Negative.    Respiratory: Negative.     Cardiovascular: Negative.    Psychiatric/Behavioral: Negative.       Pertinent Medical History     PAST MEDICAL HISTORY:  Past Medical History[1]    PAST SURGICAL HISTORY:  Past Surgical History[2]     ALLERGIES:  Patient has no known  "allergies.    MEDICATIONS:  Current Medications[3]    SOCIAL HISTORY:  Social History[4]            Objective   Pulse 65   Ht 5' 10\" (1.778 m)   Wt 83 kg (183 lb)   SpO2 98%   BMI 26.26 kg/m²      Physical Exam  Constitutional:       Appearance: Normal appearance.   HENT:      Head: Normocephalic and atraumatic.     Cardiovascular:      Pulses: no weak pulses.           Dorsalis pedis pulses are 2+ on the right side and 2+ on the left side.        Posterior tibial pulses are 1+ on the right side and 1+ on the left side.   Pulmonary:      Effort: Pulmonary effort is normal.     Musculoskeletal:      Amputation Left Lower Extremity: (History of partial left fifth ray amputation)  Feet:      Right foot:      Skin integrity: Skin integrity normal. No ulcer, skin breakdown, erythema, warmth, callus or dry skin.      Toenail Condition: Right toenails are abnormally thick and long. Fungal disease present.     Left foot:      Skin integrity: Skin integrity normal. No ulcer, skin breakdown, erythema, warmth, callus or dry skin.      Toenail Condition: Left toenails are abnormally thick and long. Fungal disease present.     Comments: The patient's clinical examination today is significant for thickened and dystrophic nail plates x 9 with discoloration and subungual debris consistent with onychomycosis.  There are no open lesions. There are no interdigital macerations.  There are no pretrophic changes nor callosities.  The patient has a history of a partial left fifth ray resection secondary to osteomyelitis.  There is mild to moderate +2 pitting edema of the left lower extremity today.  Vibratory and epicritic sensations are diminished bilaterally secondary to his history of peripheral neuropathy.    Skin:     General: Skin is warm and dry.      Capillary Refill: Capillary refill takes less than 2 seconds.     Neurological:      General: No focal deficit present.      Mental Status: He is alert and oriented to person, " place, and time.     Psychiatric:         Mood and Affect: Mood normal.     Diabetic Foot Exam    Patient's shoes and socks removed.    Right Foot/Ankle   Right Foot Inspection  Skin Exam: skin normal and skin intact. No dry skin, no warmth, no callus, no erythema, no maceration, no abnormal color, no pre-ulcer, no ulcer and no callus.     Toe Exam: ROM and strength within normal limits.     Sensory   Vibration: diminished  Monofilament testing: diminished    Vascular  Capillary refills: < 3 seconds  The right DP pulse is 2+. The right PT pulse is 1+.     Left Foot/Ankle  Left Foot Inspection  Skin Exam: skin normal and skin intact. No dry skin, no warmth, no erythema, no maceration, normal color, no pre-ulcer, no ulcer and no callus.     Toe Exam: ROM and strength within normal limits.     Sensory   Vibration: diminished  Monofilament testing: diminished    Vascular  Capillary refills: < 3 seconds  The left DP pulse is 2+. The left PT pulse is 1+.     Assign Risk Category  Deformity present  Loss of protective sensation  No weak pulses  Risk: 2             [1]   Past Medical History:  Diagnosis Date    CAD (coronary artery disease)     Chronic combined systolic and diastolic CHF (congestive heart failure) ([facility])     Diabetes mellitus (HCC)     type 2    Dyslipidemia     Hypertension     Ischemic cardiomyopathy     MI (myocardial infarction) ([facility])     Neuropathy     Osteomyelitis ([facility])     Pancreatitis     Paroxysmal atrial fibrillation ([facility]) 05/30/2024   [2]   Past Surgical History:  Procedure Laterality Date    ANGIOPLASTY      ballon    CORONARY ANGIOPLASTY WITH STENT PLACEMENT      LULA to proximal and mid LAD, Proximal RCA.     FOREIGN BODY REMOVAL Left 5/26/2022    Procedure: REMOVAL FOREIGN BODY EXTREMITY;  Surgeon: Heron Herrera DPM;  [facility]: [facility];  Service: Podiatry    HERNIA REPAIR      INCISION AND DRAINAGE OF WOUND Left 2/14/2020    Procedure: INCISION AND DRAINAGE (I&D) EXTREMITY left foot  (cpt 65172);  Surgeon: Kermit Chau DPM;  [facility];  Service: Podiatry    TN AMPUTATION FOOT TRANSMETARSAL Left 5/26/2022    Procedure: AMPUTATION left 5th metatarsal;  Surgeon: Heron Herrera DPM;  [facility];  Service: Podiatry    TN EXPLORATION PENETRATING WOUND SPX EXTREMITY Left 2/14/2020    Procedure: EXPLORATION EXTREMITY;  Surgeon: Kermit Chau DPM;  [facility];  Service: Podiatry    TN NEGATIVE PRESSURE WOUND THERAPY DME <= 50 SQ CM Left 2/14/2020    Procedure: APPLICATION VAC DRESSING;  Surgeon: Kermit Chau DPM;  [facility];  Service: Podiatry    TOE AMPUTATION Left 5/14/2022    Procedure: EXCISION OF LEFT 5TH TOE ULCER WITH FILET FLAP;  Surgeon: Heron Herrera DPM;  [facility]: [facility];  Service: Podiatry   [3]   Current Outpatient Medications   Medication Sig Dispense Refill    apixaban (Eliquis) 5 mg Take 1 tablet (5 mg total) by mouth 2 (two) times a day 180 tablet 3    atorvastatin (LIPITOR) 40 mg tablet TAKE 1 TABLET BY MOUTH EVERY DAY 90 tablet 3    Blood Pressure Monitoring ([employer] Blood Pressure Monitor) KIT Use 2 (two) times a day Automatic BP cuff. Measure BP twice daily. 1 kit 0    Continuous Glucose Sensor (FreeStyle Jess 2 Sensor) MISC       furosemide (LASIX) 40 mg tablet Take 1 tablet (40 mg total) by mouth 2 (two) times a day 180 tablet 3    hydrALAZINE (APRESOLINE) 10 mg tablet Take 1 tablet (10 mg total) by mouth 2 (two) times a day 180 tablet 3    insulin glargine (LANTUS) 100 units/mL subcutaneous injection Inject 30 Units under the skin every morning      insulin NPH-insulin regular (NovoLIN 70/30) 100 units/mL subcutaneous injection Inject under the skin if needed      isosorbide dinitrate (ISORDIL) 5 mg tablet Take 1 tablet (5 mg total) by mouth 2 (two) times a day 180 tablet 3    latanoprost (XALATAN) 0.005 % ophthalmic solution Administer 0.0005 drops to both eyes daily at bedtime      levothyroxine 25 mcg tablet Take 25 mcg by mouth  daily      levothyroxine 50 mcg tablet Take 50 mcg by mouth in the morning.      metoprolol succinate (TOPROL-XL) 50 mg 24 hr tablet Take 1 tablet (50 mg total) by mouth in the morning and 1 tablet (50 mg total) before bedtime. 180 tablet 1    pantoprazole (PROTONIX) 40 mg tablet Take 1 tablet (40 mg total) by mouth daily in the early morning 30 tablet 0    potassium chloride (Klor-Con M20) 20 mEq tablet TAKE 1 TABLET BY MOUTH 2 TIMES A DAY. 180 tablet 1    Unifine Pentips 31G X 8 MM MISC        No current facility-administered medications for this visit.   [4]   Social History  Socioeconomic History    Marital status: /Civil Union   Tobacco Use    Smoking status: Never    Smokeless tobacco: Never   Vaping Use    Vaping status: Never Used   Substance and Sexual Activity    Alcohol use: Not Currently     Comment: 20 years of sobriety    Drug use: Never    Sexual activity: Yes     Partners: Female     Birth control/protection: None     Social Drivers of Health     Food Insecurity: No Food Insecurity (5/13/2022)    Hunger Vital Sign     Worried About Running Out of Food in the Last Year: Never true     Ran Out of Food in the Last Year: Never true   Transportation Needs: No Transportation Needs (5/27/2022)    PRAPARE - Transportation     Lack of Transportation (Medical): No     Lack of Transportation (Non-Medical): No   Housing Stability: Low Risk  (5/27/2022)    Housing Stability Vital Sign     Unable to Pay for Housing in the Last Year: No     Number of Places Lived in the Last Year: 1     Unstable Housing in the Last Year: No

## 2025-07-28 ENCOUNTER — TELEPHONE (OUTPATIENT)
Age: 65
End: 2025-07-28

## 2025-08-22 LAB
CHEST PAIN STATEMENT: NORMAL
MAX DIASTOLIC BP: 92 MMHG
MAX PREDICTED HEART RATE: 157 BPM
PROTOCOL NAME: NORMAL
REASON FOR TERMINATION: NORMAL
STRESS POST EXERCISE DUR MIN: 3 MIN
STRESS POST EXERCISE DUR SEC: 0 SEC
STRESS POST PEAK HR: 67 BPM
STRESS POST PEAK SYSTOLIC BP: 152 MMHG
TARGET HR FORMULA: NORMAL
TEST INDICATION: NORMAL

## (undated) DEVICE — GAUZE SPONGES,16 PLY: Brand: CURITY

## (undated) DEVICE — DRESSING XEROFORM 5 X 9

## (undated) DEVICE — SYRINGE 10ML LL

## (undated) DEVICE — PADDING CAST 4 IN  COTTON STRL

## (undated) DEVICE — TUBING SUCTION 5MM X 12 FT

## (undated) DEVICE — CURITY NON-ADHERENT STRIPS: Brand: CURITY

## (undated) DEVICE — SPONGE STICK WITH PVP-I: Brand: KENDALL

## (undated) DEVICE — CUFF TOURNIQUET 18 X 4 IN QUICK CONNECT DISP 1 BLADDER

## (undated) DEVICE — MEDI-VAC YANK SUCT HNDL W/TPRD BULBOUS TIP: Brand: CARDINAL HEALTH

## (undated) DEVICE — SPONGE PVP SCRUB WING STERILE

## (undated) DEVICE — ACE WRAP 6 IN UNSTERILE

## (undated) DEVICE — BLADE SAGITTAL 25.6 X 9.5MM

## (undated) DEVICE — NEEDLE 25G X 1 1/2

## (undated) DEVICE — VIAL DECANTER

## (undated) DEVICE — BETHLEHEM UNIVERSAL  MIONR EXT: Brand: CARDINAL HEALTH

## (undated) DEVICE — SPONGE LAP 18 X 18 IN STRL RFD

## (undated) DEVICE — CULTURE TUBE AEROBIC

## (undated) DEVICE — PAD GROUNDING ADULT

## (undated) DEVICE — ACE WRAP 4 IN UNSTERILE

## (undated) DEVICE — OCCLUSIVE GAUZE STRIP,3% BISMUTH TRIBROMOPHENATE IN PETROLATUM BLEND: Brand: XEROFORM

## (undated) DEVICE — GLOVE SRG BIOGEL 7.5

## (undated) DEVICE — PREP PAD BNS: Brand: CONVERTORS

## (undated) DEVICE — GLOVE SRG BIOGEL 7

## (undated) DEVICE — GLOVE INDICATOR PI UNDERGLOVE SZ 7.5 BLUE

## (undated) DEVICE — BETHLEHEM UNIV MAJ EXT ,KIT: Brand: CARDINAL HEALTH

## (undated) DEVICE — KERLIX BANDAGE ROLL: Brand: KERLIX

## (undated) DEVICE — INTENDED FOR TISSUE SEPARATION, AND OTHER PROCEDURES THAT REQUIRE A SHARP SURGICAL BLADE TO PUNCTURE OR CUT.: Brand: BARD-PARKER SAFETY BLADES SIZE 15, STERILE

## (undated) DEVICE — PENCIL ELECTROSURG E-Z CLEAN -0035H

## (undated) DEVICE — ABDOMINAL PAD: Brand: DERMACEA

## (undated) DEVICE — CULTURE TUBE ANAEROBIC

## (undated) DEVICE — CURITY STRETCH BANDAGE: Brand: CURITY

## (undated) DEVICE — THE SIMPULSE SOLO SYSTEM WITH ULTREX RETRACTABLE SPLASH SHIELD TIP: Brand: SIMPULSE SOLO

## (undated) DEVICE — UNIVERSAL MAJOR EXTREMITY,KIT: Brand: CARDINAL HEALTH

## (undated) DEVICE — INTENDED FOR TISSUE SEPARATION, AND OTHER PROCEDURES THAT REQUIRE A SHARP SURGICAL BLADE TO PUNCTURE OR CUT.: Brand: BARD-PARKER ® CARBON RIB-BACK BLADES

## (undated) DEVICE — VAC CANISTER 500ML

## (undated) DEVICE — STOCKINETTE REGULAR

## (undated) DEVICE — 10FR FRAZIER SUCTION HANDLE: Brand: CARDINAL HEALTH

## (undated) DEVICE — 3M™ V.A.C.® GRANUFOAM™ DRESSING KIT, M8275052, MEDIUM: Brand: 3M™ V.A.C.® GRANUFOAM™

## (undated) DEVICE — STRETCH BANDAGE: Brand: CURITY

## (undated) DEVICE — PAD CAST 4 IN COTTON NON STERILE

## (undated) DEVICE — CHLORAPREP HI-LITE 26ML ORANGE

## (undated) DEVICE — LIGHT HANDLE COVER SLEEVE DISP BLUE STELLAR